# Patient Record
Sex: MALE | Race: BLACK OR AFRICAN AMERICAN | NOT HISPANIC OR LATINO | Employment: OTHER | URBAN - METROPOLITAN AREA
[De-identification: names, ages, dates, MRNs, and addresses within clinical notes are randomized per-mention and may not be internally consistent; named-entity substitution may affect disease eponyms.]

---

## 2017-01-12 ENCOUNTER — HOSPITAL ENCOUNTER (OUTPATIENT)
Dept: CARDIOLOGY | Facility: CLINIC | Age: 67
Discharge: HOME | End: 2017-01-12
Payer: MEDICARE

## 2017-01-12 DIAGNOSIS — I21.4 NON-ST ELEVATION MYOCARDIAL INFARCTION (NSTEMI): ICD-10-CM

## 2017-01-12 LAB — DIASTOLIC DYSFUNCTION: NO

## 2017-01-12 PROCEDURE — 94621 CARDIOPULM EXERCISE TESTING: CPT | Mod: S$GLB,,, | Performed by: INTERNAL MEDICINE

## 2017-01-18 ENCOUNTER — CLINICAL SUPPORT (OUTPATIENT)
Dept: CARDIAC REHAB | Facility: CLINIC | Age: 67
End: 2017-01-18
Payer: MEDICARE

## 2017-01-18 VITALS
SYSTOLIC BLOOD PRESSURE: 130 MMHG | RESPIRATION RATE: 16 BRPM | OXYGEN SATURATION: 99 % | DIASTOLIC BLOOD PRESSURE: 68 MMHG | HEART RATE: 79 BPM

## 2017-01-18 DIAGNOSIS — I25.10 CORONARY ATHEROSCLEROSIS OF UNSPECIFIED TYPE OF VESSEL, NATIVE OR GRAFT: ICD-10-CM

## 2017-01-18 DIAGNOSIS — I21.4 NSTEMI (NON-ST ELEVATED MYOCARDIAL INFARCTION): ICD-10-CM

## 2017-01-18 DIAGNOSIS — Z98.61 S/P PTCA (PERCUTANEOUS TRANSLUMINAL CORONARY ANGIOPLASTY): ICD-10-CM

## 2017-01-18 DIAGNOSIS — Z98.61 S/P PERCUTANEOUS TRANSLUMINAL CORONARY ANGIOPLASTY: ICD-10-CM

## 2017-01-18 PROCEDURE — 93798 PHYS/QHP OP CAR RHAB W/ECG: CPT | Mod: S$GLB,,, | Performed by: INTERNAL MEDICINE

## 2017-01-18 PROCEDURE — 99999 PR PBB SHADOW E&M-EST. PATIENT-LVL III: CPT | Mod: PBBFAC,,,

## 2017-01-18 NOTE — PROGRESS NOTES
Met with Chau Rodarte for orientation to Phase II cardiac rehab.      Weight: 249 lbs  BMI:  36.76  Abdominal girth: 49 inches  Body fat: 31.3%    Pt reports that he recently lost ~20 lbs. Pt was 268 lbs in November. Pt is now 249 lbs after being hospitalized for hear attack in December. Goal weight is 230 lbs.     Labs reviewed with patient. Patient confirms he is taking atorvastatin for cholesterol control.    Lab Results   Component Value Date    CHOL 110 (L) 01/12/2017     Lab Results   Component Value Date    HDL 25 (L) 01/12/2017     Lab Results   Component Value Date    LDLCALC 72.2 01/12/2017     Lab Results   Component Value Date    TRIG 64 01/12/2017     Lab Results   Component Value Date    CHOLHDL 22.7 01/12/2017         Lab Results   Component Value Date    GLUF 111 (H) 01/12/2017     Lab Results   Component Value Date    HGBA1C 8.2 (H) 01/12/2017       History of diabetes for twenty years and is followed by his PCP.  Diabetic medications currently taking include insulin.  Patient has a home glucometer and checks his glucose 1x/day in the morning.  Reports typical morning glucose ranges between  mg/dL.  Patient verbalizes understanding to bring home glucometer and check glucose pre and post each exercise session.  Per cardiac rehab protocols, patient's glucose must be between 90 and 270 mg/dL to exercise.  Patient denies any recent glucose levels less than 60 mg/dL or greater than 300 mg/dL.  Patient aware of signs and symptoms of hypoglycemia, reports his symptoms to be that he gets shaky.  Patient verbalizes importance of notifying rehab staff if symptoms of hypoglycemia occur while at cardiac rehab.  Abnormal labs will be reported by rehab staff.      Vitamins/supplements: Vitamin D    Patient eats 2 meals daily + 1 snack.  Pt's wife prepares meals at home.  [employer] food with Mrs Tao. Recently started using olive oil instead of cristco.  Denies use of a salt shaker at the table on prepared  foods. Dines out 1x/month at restaurants such as [employer] and "[employer]  Chooses fried foods 1x/month. Pt does not like seafood. Pt eats plenty of vegetables (salad, peppers). Beverages include crystal light, water, tea.  Alcohol: none.    24 hr diet recall:  B: 1 egg, spoonful of grits, 1/2 biscuit, 2 turkey sausage links  S: 1 hard boiled egg, crackers  D: Salad with grilled chicken    3-day food record given to patient to complete and return for nutritional assessment.  Will follow Chau Rodarte while enrolled in Phase II cardiac rehab and encourage compliance to 2 gram sodium, Mediterranean style eating.      Amy Dunbar MS, RD, LDN

## 2017-01-18 NOTE — PROGRESS NOTES
Patient here for Phase II Cardiac Rehab orientation.      Discussed QOL, Risk factors & goals with patient.  Head to toe nursing assessment done.  Vital signs obtained. Pt is wearing a life vest. Pt denies chest pain or shortness of breath. Pt has good pulses bilaterally in upper extremities. Bilateral pedal pulses present are a little fainter. No edema present in extremities.  Pt has been diabetic X 20 years feet examined and there are no ulcers or wounds. Pt instructed about monitoring blood glucose before and after exercises.    QOL:  Discussed Latoya & SF36 Questionnaire scores with patient. Pt states he has good coping skills and denies any overwhelming stress or anxiety.  Encouraged pt to seek help if problems occur.     RISK FACTORS:  The following risk factors are present:  diabetes, hyperlipidemia, hypertension, obesity, positive family history, sedentary lifestyle    GOALS:  Patient has set the following goals:  Weight loss    Discussed Cardiac Rehab program in depth with patient.  Medication list updated per patient & marked as reviewed.  Patient has been instructed to notify staff of any problems while attending rehab (ie: chest pain, shortness of breath, lightheadedness, dizziness).  Patient has been instructed to monitor blood pressure readings outside of rehab & to keep a daily log of the readings.  Patient verbalizes understanding.    Sean Hamm, RN  Cardiac Rehab Nurse

## 2017-01-18 NOTE — PROGRESS NOTES
Exercise:  Met with the patient for orientation.  Consent forms were signed, entry CPX test results were reviewed, proper attire and shoes were discussed, and strength assessment was performed.         Entry CPX test results included weight (249 lb), abdominal girth (49 in), BMI (36.76), and body composition (31.3%).  An estimated MET Level of 3.0 and a Peak VO2 of 13.3ml/kg/min (3.8 actual METS) were measured.  This places the patient in the high risk category.  Upon exit CPX an estimated MET Level of 3.9 will be desired to achieve the goal of a 30% improvement.     Based on entry CPX test, the target heart rate range for patient is rest + 20 bpm.  The patient will attend cardiac rehab class 3 times per week which will include both aerobic and resistance training which will be modified to fit limitations.  Aerobic exercise will be 30-60 minutes with a goal intensity of 12-15 on the RPE scale.  Resistance training will incorporate free weights 1-2 sets, 10-15 repetitions with a beginning weight of 5 to 8 pounds based on strength assessment.    There were some limitations noted by patient with walking due to some leg blockages.  The patient stated they are currently not exercising.  Patient was encouraged to begin exercising aerobically and to prepare to exercise at least two additional days per week outside of attending cardiac rehab class for a minimum of 30 minutes.  The patient stated understanding.      The patient will begin Cardiac Rehab on Monday, January 23 at 4:00pm.    Exercise prescription will be adjusted based on tolerance of exercise intensity by patient.    Devyn Lucero., CEP

## 2017-01-23 ENCOUNTER — CLINICAL SUPPORT (OUTPATIENT)
Dept: CARDIAC REHAB | Facility: CLINIC | Age: 67
End: 2017-01-23
Payer: MEDICARE

## 2017-01-23 PROCEDURE — 93798 PHYS/QHP OP CAR RHAB W/ECG: CPT | Mod: S$GLB,,, | Performed by: INTERNAL MEDICINE

## 2017-01-23 NOTE — PROGRESS NOTES
Patient here for  1st Phase II Cardiac rehab session.  Patient tolerated Nustep well without any problems, but on treadmill, had to intermittently start & stop treadmill due to right calf pain of 3-4/10.   Will continue to monitor patient.

## 2017-01-25 ENCOUNTER — DOCUMENTATION ONLY (OUTPATIENT)
Dept: CARDIAC REHAB | Facility: CLINIC | Age: 67
End: 2017-01-25

## 2017-01-25 DIAGNOSIS — I49.8 ARRHYTHMIA, ATRIAL: Primary | ICD-10-CM

## 2017-01-25 PROCEDURE — 93000 ELECTROCARDIOGRAM COMPLETE: CPT | Mod: S$GLB,,, | Performed by: INTERNAL MEDICINE

## 2017-01-25 NOTE — PROGRESS NOTES
Patient here for Phase II Cardiac rehab session.  Noted atrial flutter on monitor.  Dr. Sims was notified & review EKG strips from rehab.  Also had a 12 lead EKG done.  Patient denies any lightheadedness/dizziness/chest pain/shortness of breath.  Dr. Sims will notify Mr. Rodarte's cardiologist, Dr. Simpson of this information.  Patient did not exercise today.  Will follow up after Dr. Simpson reviews info on patient.  Patient has been instructed to seek attention via ER/911 in the event of any problems.  He verbalizes understanding.

## 2017-01-26 ENCOUNTER — TELEPHONE (OUTPATIENT)
Dept: CARDIOLOGY | Facility: CLINIC | Age: 67
End: 2017-01-26

## 2017-01-26 ENCOUNTER — OFFICE VISIT (OUTPATIENT)
Dept: ENDOCRINOLOGY | Facility: CLINIC | Age: 67
End: 2017-01-26
Payer: MEDICARE

## 2017-01-26 ENCOUNTER — INITIAL CONSULT (OUTPATIENT)
Dept: ELECTROPHYSIOLOGY | Facility: CLINIC | Age: 67
End: 2017-01-26
Payer: MEDICARE

## 2017-01-26 VITALS
HEIGHT: 69 IN | WEIGHT: 251.56 LBS | HEART RATE: 80 BPM | DIASTOLIC BLOOD PRESSURE: 80 MMHG | SYSTOLIC BLOOD PRESSURE: 116 MMHG | BODY MASS INDEX: 37.26 KG/M2

## 2017-01-26 VITALS
SYSTOLIC BLOOD PRESSURE: 132 MMHG | BODY MASS INDEX: 37.18 KG/M2 | HEIGHT: 69 IN | WEIGHT: 251 LBS | HEART RATE: 80 BPM | DIASTOLIC BLOOD PRESSURE: 76 MMHG

## 2017-01-26 DIAGNOSIS — I25.5 ISCHEMIC CARDIOMYOPATHY: Primary | ICD-10-CM

## 2017-01-26 DIAGNOSIS — E11.51 TYPE 2 DIABETES MELLITUS WITH DIABETIC PERIPHERAL ANGIOPATHY WITHOUT GANGRENE, WITHOUT LONG-TERM CURRENT USE OF INSULIN: Chronic | ICD-10-CM

## 2017-01-26 DIAGNOSIS — E11.42 TYPE 2 DIABETES MELLITUS WITH DIABETIC POLYNEUROPATHY, WITHOUT LONG-TERM CURRENT USE OF INSULIN: Chronic | ICD-10-CM

## 2017-01-26 DIAGNOSIS — I63.9 CRYPTOGENIC STROKE: Chronic | ICD-10-CM

## 2017-01-26 DIAGNOSIS — E66.9 OBESITY (BMI 30-39.9): Chronic | ICD-10-CM

## 2017-01-26 DIAGNOSIS — I50.23 ACUTE ON CHRONIC SYSTOLIC CHF (CONGESTIVE HEART FAILURE): ICD-10-CM

## 2017-01-26 DIAGNOSIS — I50.20 BENIGN HYPERTENSIVE HEART AND KIDNEY DISEASE WITH SYSTOLIC CHF, NYHA CLASS 2 AND CKD STAGE 3: ICD-10-CM

## 2017-01-26 DIAGNOSIS — I48.3 TYPICAL ATRIAL FLUTTER: Chronic | ICD-10-CM

## 2017-01-26 DIAGNOSIS — I10 ESSENTIAL HYPERTENSION: ICD-10-CM

## 2017-01-26 DIAGNOSIS — I25.5 ISCHEMIC CARDIOMYOPATHY: ICD-10-CM

## 2017-01-26 DIAGNOSIS — Z95.828 S/P FEMORAL-POPLITEAL BYPASS SURGERY: ICD-10-CM

## 2017-01-26 DIAGNOSIS — N18.30 BENIGN HYPERTENSIVE HEART AND KIDNEY DISEASE WITH SYSTOLIC CHF, NYHA CLASS 2 AND CKD STAGE 3: ICD-10-CM

## 2017-01-26 DIAGNOSIS — I25.10 CORONARY ARTERY DISEASE INVOLVING NATIVE CORONARY ARTERY OF NATIVE HEART WITHOUT ANGINA PECTORIS: ICD-10-CM

## 2017-01-26 DIAGNOSIS — I13.0 BENIGN HYPERTENSIVE HEART AND KIDNEY DISEASE WITH SYSTOLIC CHF, NYHA CLASS 2 AND CKD STAGE 3: ICD-10-CM

## 2017-01-26 DIAGNOSIS — E78.5 HYPERLIPIDEMIA, UNSPECIFIED HYPERLIPIDEMIA TYPE: ICD-10-CM

## 2017-01-26 DIAGNOSIS — E11.42 TYPE 2 DIABETES MELLITUS WITH DIABETIC POLYNEUROPATHY, WITHOUT LONG-TERM CURRENT USE OF INSULIN: Primary | Chronic | ICD-10-CM

## 2017-01-26 DIAGNOSIS — I49.8 ARRHYTHMIA, ATRIAL: ICD-10-CM

## 2017-01-26 PROCEDURE — 3075F SYST BP GE 130 - 139MM HG: CPT | Mod: S$GLB,,, | Performed by: INTERNAL MEDICINE

## 2017-01-26 PROCEDURE — 93000 ELECTROCARDIOGRAM COMPLETE: CPT | Mod: S$GLB,,, | Performed by: INTERNAL MEDICINE

## 2017-01-26 PROCEDURE — 1159F MED LIST DOCD IN RCRD: CPT | Mod: S$GLB,,, | Performed by: INTERNAL MEDICINE

## 2017-01-26 PROCEDURE — 1126F AMNT PAIN NOTED NONE PRSNT: CPT | Mod: S$GLB,,, | Performed by: INTERNAL MEDICINE

## 2017-01-26 PROCEDURE — 99999 PR PBB SHADOW E&M-EST. PATIENT-LVL III: CPT | Mod: PBBFAC,,, | Performed by: INTERNAL MEDICINE

## 2017-01-26 PROCEDURE — 1160F RVW MEDS BY RX/DR IN RCRD: CPT | Mod: S$GLB,,, | Performed by: INTERNAL MEDICINE

## 2017-01-26 PROCEDURE — 3074F SYST BP LT 130 MM HG: CPT | Mod: S$GLB,,, | Performed by: INTERNAL MEDICINE

## 2017-01-26 PROCEDURE — 3045F PR MOST RECENT HEMOGLOBIN A1C LEVEL 7.0-9.0%: CPT | Mod: S$GLB,,, | Performed by: INTERNAL MEDICINE

## 2017-01-26 PROCEDURE — 1157F ADVNC CARE PLAN IN RCRD: CPT | Mod: S$GLB,,, | Performed by: INTERNAL MEDICINE

## 2017-01-26 PROCEDURE — 3060F POS MICROALBUMINURIA REV: CPT | Mod: S$GLB,,, | Performed by: INTERNAL MEDICINE

## 2017-01-26 PROCEDURE — 3078F DIAST BP <80 MM HG: CPT | Mod: S$GLB,,, | Performed by: INTERNAL MEDICINE

## 2017-01-26 PROCEDURE — 3079F DIAST BP 80-89 MM HG: CPT | Mod: S$GLB,,, | Performed by: INTERNAL MEDICINE

## 2017-01-26 PROCEDURE — 99214 OFFICE O/P EST MOD 30 MIN: CPT | Mod: S$GLB,,, | Performed by: INTERNAL MEDICINE

## 2017-01-26 PROCEDURE — 2022F DILAT RTA XM EVC RTNOPTHY: CPT | Mod: S$GLB,,, | Performed by: INTERNAL MEDICINE

## 2017-01-26 PROCEDURE — 99205 OFFICE O/P NEW HI 60 MIN: CPT | Mod: S$GLB,,, | Performed by: INTERNAL MEDICINE

## 2017-01-26 PROCEDURE — 99499 UNLISTED E&M SERVICE: CPT | Mod: S$GLB,,, | Performed by: INTERNAL MEDICINE

## 2017-01-26 RX ORDER — WARFARIN SODIUM 5 MG/1
5 TABLET ORAL DAILY
Qty: 30 TABLET | Refills: 11 | Status: ON HOLD | OUTPATIENT
Start: 2017-01-26 | End: 2017-03-22 | Stop reason: HOSPADM

## 2017-01-26 RX ORDER — PANTOPRAZOLE SODIUM 20 MG/1
20 TABLET, DELAYED RELEASE ORAL DAILY
Qty: 30 TABLET | Refills: 11 | Status: ON HOLD | OUTPATIENT
Start: 2017-01-26 | End: 2017-03-22

## 2017-01-26 NOTE — LETTER
January 26, 2017      Meño Simpson MD  [address]  [facility] [address]           [facility] - Arrhythmia  [address]  [facility] [address]  Phone: [phone]  Fax: [phone]          Patient: Chau Rodarte   MR Number: [id number]   YOB: 1950   Date of Visit: 1/26/2017       Dear Dr. Meño Simpson:    Thank you for referring Chau Rodarte to me for evaluation. Attached you will find relevant portions of my assessment and plan of care.    If you have questions, please do not hesitate to call me. I look forward to following Chau Rodarte along with you.    Sincerely,    Cayden Moreno MD    Enclosure  CC:  No Recipients    If you would like to receive this communication electronically, please contact [email or web address] or [phone] to request more information on [employer] access.    For providers and/or their staff who would like to refer a patient to [facility], please contact us through our one-stop-shop provider referral line, [facility], at [phone].    If you feel you have received this communication in error or would no longer like to receive these types of communications, please e-mail [email or web address]

## 2017-01-26 NOTE — MR AVS SNAPSHOT
[facility] - Endo/Diab/Metab  [address]  [facility] [address]  Phone: [phone]  Fax: [phone]                  Chau Rodarte   2017 3:00 PM   Office Visit    Description:  Male : 1950   Provider:  Cony Saleem NP   Department:  [facility] Endo/Diab/Metab           Reason for Visit     Diabetes Mellitus           Diagnoses this Visit        Comments    Type 2 diabetes mellitus with diabetic polyneuropathy, without long-term current use of insulin    -  Primary            To Do List           Future Appointments        Provider Department Dept Phone    2017 4:00 PM REH[facility], [facility]ARDI[facility] - Cardiac Rehab [phone]    2017 4:00 PM RE[facility], CARDIAC [facility] - Cardiac Rehab [phone]    2017 4:00 PM REHAB, CARDIAC [facility] - Cardiac Rehab [phone]    2/3/2017 4:00 PM RE[facility], [facility]ARDI[facility] - Cardiac Rehab [phone]    2017 4:00 PM REHAB, CARDIAC [facility] - Cardiac Rehab [phone]      Goals (5 Years of Data)     None      Follow-Up and Disposition     Return in about 3 months (around 2017).      [facility]     [facility] - 24/7 Assistance  Registered nurses in the [facility] provide clinical advisement, health education, appointment booking, and other advisory services.  Call for this free service at [phone].             Medications           Message regarding Medications     Verify the changes and/or additions to your medication regime listed below are the same as discussed with your clinician today.  If any of these changes or additions are incorrect, please notify your healthcare provider.        STOP taking these medications     ondansetron (ZOFRAN) 4 MG tablet Take 1 tablet (4 mg total) by mouth 3 (three) times daily as needed for Nausea.    zolpidem (AMBIEN) 10 mg Tab Take 1 tablet (10 mg total) by mouth nightly as needed (insomnia).           Verify that the below list of  "medications is an accurate representation of the medications you are currently taking.  If none reported, the list may be blank. If incorrect, please contact your healthcare provider. Carry this list with you in case of emergency.           Current Medications     aspirin 81 MG Chew Take 1 tablet (81 mg total) by mouth once daily.    atorvastatin (LIPITOR) 80 MG tablet Take 1 tablet (80 mg total) by mouth once daily.    clopidogrel (PLAVIX) 75 mg tablet Take 1 tablet (75 mg total) by mouth once daily.    ergocalciferol (ERGOCALCIFEROL) 50,000 unit Cap Take 1 capsule (50,000 Units total) by mouth twice a week.    furosemide (LASIX) 40 MG tablet Take 1 tablet (40 mg total) by mouth once daily.    insulin detemir (LEVEMIR FLEXTOUCH) 100 unit/mL (3 mL) SubQ InPn pen Inject 16 Units into the skin every evening.    isosorbide-hydrALAZINE 20-37.5 mg (BIDIL) 20-37.5 mg Tab Take 1 tablet by mouth 3 (three) times daily.    liraglutide 0.6 mg/0.1 mL, 18 mg/3 mL, subq PNIJ (VICTOZA 2-SAGAR) 0.6 mg/0.1 mL (18 mg/3 mL) PnIj Inject 0.6 mg daily x1 week, then 1.2 mg daily x1 week, then 1.8 mg daily thereafter    metoprolol succinate (TOPROL-XL) 50 MG 24 hr tablet Take 1 tablet (50 mg total) by mouth once daily.    pen needle, diabetic (BD ULTRA-FINE HANG PEN NEEDLES) 32 gauge x 5/32" Ndle Uses 2 times daily, on  insulin injections           Clinical Reference Information           Vital Signs - Last Recorded  Most recent update: 1/26/2017  3:06 PM by Sara Bean MA    BP Pulse Ht Wt BMI    116/80 (BP Location: Left arm, Patient Position: Sitting, BP Method: Manual) 80 5' 9" (1.753 m) 114.1 kg (251 lb 8.7 oz) 37.15 kg/m2      Blood Pressure          Most Recent Value    BP  116/80      Allergies as of 1/26/2017     No Known Allergies      Immunizations Administered on Date of Encounter - 1/26/2017     None      Orders Placed During Today's Visit     Future Labs/Procedures Expected by Expires    Hemoglobin A1c  1/26/2017 " 3/27/2018    Renal function panel  1/26/2017 3/27/2018    TSH  1/26/2017 (Approximate) 1/21/2018    Vitamin D  1/26/2017 3/27/2018

## 2017-01-26 NOTE — PROGRESS NOTES
Subjective:    Patient ID:  Chau Rodarte is a 66 y.o. male who presents for evaluation of Atrial Flutter    Referring Cardiologists: Meño Gan MD, Yanick Holland MD and Robson Sims MD    HPI  I had the pleasure of seeing Mr. Rodarte today in our electrophysiology clinic in consultation for his newly discovered atrial arrhythmia.  As you are aware he is a pleasant 66 year-old man with extensive vascular disease including right femoral-popliteal bypass and left SFA stent, ischemic cardiomyopathy with prior LVEF of 35% and currently 20-25% s/p multivessel PCI 12/5/2016 (PCI to prox-LAD/LCX, distal LMCA and mid LAD) with NYHA class II symptoms, chronic kidney disease stage 3, cryptogenic stroke, hypertension and poorly controlled diabetes mellitus.  He was admitted in November of 2016 for progressive heart failure symptoms.  He was also found to have a NSTEMI in this setting.  He was diuresed and underwent multi-vessel PCI as stated, after turned down for surgery.  He was feeling better and has been undergoing cardiac rehab at the [facility].  When he arrived yesterday he was noted to not be in sinus rhythm on the monitor.  An electrocardiogram was performed which disclosed atrial flutter with variable AV conduction.  He had no significant symptoms and was referred here for further evaluation.  He has no history of palpitations.  He does report a stroke of unknown etiology in 2006.  His symptoms were facial droop, dysarthria and right sided weakness.  He has been on aspirin/plavix since.  He has never been diagnosed with an atrial arrhythmia.    I reviewed all available electrocardiograms in EPIC which disclose normal sinus rhythm until 1/25/2017 electrocardiogram which revealed atrial flutter with variable AV conduction (ventricular rate of 84 bpm).      11/28//2016 ECHO  CONCLUSIONS     1 - Concentric LVH with severely depressed left ventricular systolic function (EF 20-25%).     2 - Severe left atrial  enlargement.     3 - Left ventricular diastolic dysfunction.     4 - Normal right ventricular systolic function .     5 - Increased central venous pressure.     My interpretation of today's in clinic electrocardiogram is sinus rhythm with first degree AV block, rate of 80 bpm and AZ of 246.    Review of Systems   Constitution: Negative. Negative for weakness and malaise/fatigue.   HENT: Negative.  Negative for congestion and headaches.    Eyes: Negative.  Negative for blurred vision.   Cardiovascular: Positive for dyspnea on exertion (much improved). Negative for chest pain, irregular heartbeat, leg swelling, near-syncope, orthopnea, palpitations, paroxysmal nocturnal dyspnea and syncope.   Respiratory: Negative.  Negative for shortness of breath, sleep disturbances due to breathing and wheezing.    Endocrine: Negative.    Hematologic/Lymphatic: Negative.  Negative for bleeding problem. Does not bruise/bleed easily.   Skin: Negative.    Musculoskeletal: Negative.  Negative for arthritis.   Gastrointestinal: Negative.  Negative for abdominal pain, hematochezia and melena.   Genitourinary: Negative.    Neurological: Negative for excessive daytime sleepiness, dizziness, focal weakness, light-headedness and loss of balance.   Psychiatric/Behavioral: Negative.         Objective:    Physical Exam   Constitutional: He is oriented to person, place, and time. He appears well-developed and well-nourished. No distress.   HENT:   Head: Normocephalic and atraumatic.   Eyes: Conjunctivae are normal. Right eye exhibits no discharge. Left eye exhibits no discharge.   Neck: Neck supple. No JVD present.   Cardiovascular: Normal rate, regular rhythm and normal heart sounds.  Exam reveals no gallop and no friction rub.    No murmur heard.  Pulmonary/Chest: Effort normal and breath sounds normal. No respiratory distress. He has no wheezes. He has no rales.   Abdominal: Soft. Bowel sounds are normal. He exhibits no distension. There is  "no tenderness.   Musculoskeletal: Normal range of motion. He exhibits no edema or tenderness.   Neurological: He is alert and oriented to person, place, and time. No cranial nerve deficit.   Skin: Skin is warm and dry. No rash noted. He is not diaphoretic. No erythema. No pallor.   Psychiatric: He has a normal mood and affect. His behavior is normal. Judgment and thought content normal.   Vitals reviewed.        Assessment:       1. Ischemic cardiomyopathy    2. Typical atrial flutter    3. Essential hypertension    4. Coronary artery disease involving native coronary artery of native heart without angina pectoris    5. 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, NYHA class 2 and CKD stage 3    6. Type 2 diabetes mellitus with diabetic peripheral angiopathy without gangrene, without long-term current use of insulin    7. Type 2 diabetes mellitus with diabetic polyneuropathy, without long-term current use of insulin    8. Obesity (BMI 30-39.9)    9. S/P femoral-popliteal bypass surgery    10. Cryptogenic stroke         Plan:       In summary, Mr. Rodarte is a pleasant 66 year-old man with extensive vascular disease including right femoral-popliteal bypass and left SFA stent, ischemic cardiomyopathy with prior LVEF of 35% and currently 20-25% s/p multivessel PCI 12/5/2016 (PCI to prox-LAD/LCX, distal LMCA and mid LAD) with NYHA class II symptoms, chronic kidney disease stage 3, cryptogenic stroke, hypertension and poorly controlled diabetes mellitus who presents for evaluation of newly diagnosed typical counterclockwise atrial flutter.  He had no perceived symptoms at the time of diagnosis.  However his [id number] score is 7 carrying a 14% per year risk of stroke.  He has already had a stroke.  While he is certainly at risk for atrial fibrillation, this has never been seen in him.  I discussed atrial flutter, even if he does not "feel" it, may worsen his heart failure by loss of AV synchrony as well " as carries its stroke risk.  Since this is the only atrial arrhythmia we have seen in him I feel there is benefit to performing RFA of the cavotricuspid isthmus.  I spent about a half hour discussing the nature of EP study and ablation. We discussed risks and benefits at length. Our discussion included, but was not limited to the risk of death, infection, bleeding, stroke, MI, vascular injury, cardiac perforation, embolism, cardiac tamponade, skin burns, and other organic injury including the possibility for need for surgery or pacemaker implantation.  He and his wife understood and agreed.  They had no further questions regarding the procedure.  Consent signed.    I had a long discussion with the patient and his wife about the pathophysiology and risks of atrial flutter and its basic pathophysiology, including its health implications and treatment options with the patient. Specifically, I addressed the need for CVA (stroke) prophylaxis with aspirin versus oral anticoagulation (warfarin vs NOACs, discussed bleeding risks, irreversibility of NOACs vs Vitamin K/plasma reversal of warfarin, and need to come to the ER for any head trauma for CT scanning even if asymptomatic).  In the setting of DAPT for the new year, warfarin would be the preferred anti-coagulant.  Furthermore I would continue warfarin indefinitely as long as is tolerated as his stroke risk is very high and he is at risk for atrial fibrillation, even after CTI ablation.  He agrees.  Would also start gastric protection with pantoprazole.    With regard to his cardiomyopathy, will need a repeat ECHO 3 months post PCI.  He is on metoprolol and BiDil currently.  He is not on an ACE-i or ARB yet due to acute on chronic kidney injury it is not being started at the moment.  Should his EF remain less <35% then would offer ICD for primary prevention.      RFA of CTI  Carto  LIANE morning of, cancel if in NSR  INR target 2-3  Anesthesia/MAC for sedation    Thank  you for allowing me to participate in the care of this patient. Please do not hesitate to call me with any questions or concerns.    Cayden Moreno MD, PhD  Cardiac Electrophysiology

## 2017-01-26 NOTE — TELEPHONE ENCOUNTER
Referral made to see EP today at 3:40 PM with Dr. Cayden Moreno for further management of AFL. Patient notified of appointment and verbalized understanding.

## 2017-01-26 NOTE — MR AVS SNAPSHOT
[facility]  [address]  [facility] [address]  Phone: [phone]  Fax: [phone]                  Chau Rodarte   2017 3:40 PM   Initial consult    Description:  Male : 1950   Provider:  Cayden Moreno MD   Department:  [facility] Arr[facility]           Reason for Visit     Atrial Flutter           Diagnoses this Visit        Comments    Ischemic cardiomyopathy    -  Primary     Typical atrial flutter         Essential hypertension         Coronary artery disease involving native coronary artery of native heart without angina pectoris         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, NYHA class 2 and CKD stage 3         Type 2 diabetes mellitus with diabetic peripheral angiopathy without gangrene, without long-term current use of insulin         Type 2 diabetes mellitus with diabetic polyneuropathy, without long-term current use of insulin         Obesity (BMI 30-39.9)         S/P femoral-popliteal bypass surgery                To Do List           Future Appointments        Provider Department Dept Phone    2017 4:00 PM [facility] [phone]    2017 4:00 PM [facility] [phone]    2017 4:00 PM [facility] [phone]    2/3/2017 4:00 PM [facility], [facility] [phone]    2017 4:00 PM [facility], [facility] [phone]      Goals (5 Years of Data)     None       These Medications        Disp Refills Start End    pantoprazole (PROTONIX) 20 MG tablet 30 tablet 11 2017    Take 1 tablet (20 mg total) by mouth once daily. - Oral    Pharmacy: [facility]/pharmacy #[id number] - [place] [address] Ph #: [phone]       warfarin (COUMADIN) 5 MG tablet 30 tablet 11 2017    Take 1 tablet (5 mg total) by mouth Daily. - Oral    Pharmacy: [facility]/pharmacy #[id number] - [place] [address]  #: [phone]         [facility]     [facility] - 24/7 Assistance  Registered nurses in the [facility] provide clinical advisement, health education, appointment booking, and other advisory services.  Call for this free service at [phone].             Medications           Message regarding Medications     Verify the changes and/or additions to your medication regime listed below are the same as discussed with your clinician today.  If any of these changes or additions are incorrect, please notify your healthcare provider.        START taking these NEW medications        Refills    pantoprazole (PROTONIX) 20 MG tablet 11    Sig: Take 1 tablet (20 mg total) by mouth once daily.    Class: Normal    Route: Oral    warfarin (COUMADIN) 5 MG tablet 11    Sig: Take 1 tablet (5 mg total) by mouth Daily.    Class: Normal    Route: Oral           Verify that the below list of medications is an accurate representation of the medications you are currently taking.  If none reported, the list may be blank. If incorrect, please contact your healthcare provider. Carry this list with you in case of emergency.           Current Medications     aspirin 81 MG Chew Take 1 tablet (81 mg total) by mouth once daily.    atorvastatin (LIPITOR) 80 MG tablet Take 1 tablet (80 mg total) by mouth once daily.    clopidogrel (PLAVIX) 75 mg tablet Take 1 tablet (75 mg total) by mouth once daily.    ergocalciferol (ERGOCALCIFEROL) 50,000 unit Cap Take 1 capsule (50,000 Units total) by mouth twice a week.    furosemide (LASIX) 40 MG tablet Take 1 tablet (40 mg total) by mouth once daily.    insulin detemir (LEVEMIR FLEXTOUCH) 100 unit/mL (3 mL) SubQ InPn pen Inject 16 Units into the skin every evening.    isosorbide-hydrALAZINE 20-37.5 mg (BIDIL) 20-37.5 mg Tab Take 1 tablet by mouth 3 (three) times daily.    liraglutide 0.6 mg/0.1 mL, 18 mg/3 mL, subq PNIJ (VICTOZA 2-SAGAR) 0.6  "mg/0.1 mL (18 mg/3 mL) PnIj Inject 0.6 mg daily x1 week, then 1.2 mg daily x1 week, then 1.8 mg daily thereafter    metoprolol succinate (TOPROL-XL) 50 MG 24 hr tablet Take 1 tablet (50 mg total) by mouth once daily.    pantoprazole (PROTONIX) 20 MG tablet Take 1 tablet (20 mg total) by mouth once daily.    pen needle, diabetic (BD ULTRA-FINE HANG PEN NEEDLES) 32 gauge x 5/32" Ndle Uses 2 times daily, on  insulin injections    warfarin (COUMADIN) 5 MG tablet Take 1 tablet (5 mg total) by mouth Daily.           Clinical Reference Information           Vital Signs - Last Recorded  Most recent update: 1/26/2017  4:17 PM by Kike Grant MA    BP Pulse Ht Wt BMI    132/76 80 5' 9" (1.753 m) 113.9 kg (251 lb) 37.07 kg/m2      Blood Pressure          Most Recent Value    BP  132/76      Allergies as of 1/26/2017     No Known Allergies      Immunizations Administered on Date of Encounter - 1/26/2017     None      Orders Placed During Today's Visit      Normal Orders This Visit    Ambulatory consult to Anticoagulation Monitoring       "

## 2017-01-26 NOTE — PROGRESS NOTES
Chief Complaint: Diabetes Mellitus      HPI:  Chau Rodarte is 66 y.o. male here for the first time for diabetes management. He initially presented to the ER in 12/2016 with c/o progressively increasing shortness of breath, +NSTEMI with stent placement. Endocrinology was consulted on admission for a Hb A1c of 9.2%     The patient states diabetes was diagnosed at age 46     Currently, he denies nocturia, polyuria, unexplained weight loss or blurred vision.     Current diabetes medications include: Victoza 1.2 mg daily and Levemir 16 units at bedtime   Other medications tried: metformin, farxiga and glucotrol     Hypoglycemia: denies recent hypoglycemic episodes or symptoms     denies hospital admission related to diabetes.    Diabetes complications include: neuropathy, CKD     Last diabetic eye exam: July 2016  No evidence of retinopathy     Last podiatry exam: Tyra Hoover - outside podiatrist     reports history of numbness, tingling sensation to hands or feet     reports symptomatic CAD or CVD     24 hour dietary recall:   Breakfast: fried egg, turkey sausage, 4 ounces apple juice   Lunch: chicken salad sandwich on wheat, water   Dinner: 1/2 pork chop , 1 ear of boil corn, unsweet tea   Snacks: jello     SMBG: tests BG once daily   Personal review of log with fasting averages between  mg/dL     Diabetes education: Yes  Exercise: started cardio rehab on Monday 01/21/2017    ADA STANDARDS OF CARE:   ACE inhibitor or angiotension II receptor blocker: denies   Statin drug: Lipitor  Low dose ASA: yes, 81 mg daily   Dental exam: 6 months ago   Flu shot: yes 12/2016  Pneumonia vaccine: denies    Wears medic alert tag: No    Has Glucagon ER kit: No    Review of Systems   Constitutional: Negative for unexpected weight change.   Eyes: Negative for visual disturbance.   Respiratory: Negative for shortness of breath.    Cardiovascular: Negative for chest pain.   Gastrointestinal: Negative for abdominal pain,  constipation, diarrhea, nausea and vomiting.   Endocrine: Negative for polydipsia, polyphagia and polyuria.   Genitourinary: Negative for dysuria.   Musculoskeletal: Negative for myalgias.   Skin: Negative for wound.   Neurological: Negative for headaches.   Hematological: Does not bruise/bleed easily.   Psychiatric/Behavioral: Negative for sleep disturbance.       Physical Exam   Constitutional: He appears well-developed.   HENT:   Right Ear: External ear normal.   Left Ear: External ear normal.   Nose: Nose normal.   Hearing normal    Neck: No tracheal deviation present. No thyromegaly present.   Cardiovascular: Normal rate.    No murmur heard.  Pulses:       Dorsalis pedis pulses are 2+ on the right side, and 2+ on the left side.   Pulmonary/Chest: Effort normal and breath sounds normal.   Abdominal: Soft. He exhibits no mass.   No hernia noted   Musculoskeletal: He exhibits no edema.        Right foot: There is no deformity.        Left foot: There is no deformity.   Feet:   Right Foot:   Protective Sensation: 10 sites tested. 8 sites sensed.   Skin Integrity: Positive for callus and dry skin. Negative for ulcer, blister, skin breakdown, erythema or warmth.   Left Foot:   Protective Sensation: 10 sites tested. 8 sites sensed.   Skin Integrity: Positive for callus and dry skin. Negative for ulcer, blister, skin breakdown, erythema or warmth.   Neurological: He is alert. No cranial nerve deficit or sensory deficit. Coordination and gait normal.   Decreased vibratory sensation noted bilaterally      Skin: No rash noted.   No induration   injection sites are ok. No lipo hypertropthy or atrophy    +acanthosis and skin tags  No supraclavicular fulness  Pale thin striae  Normal proximal muscle strength     Psychiatric: He has a normal mood and affect. Judgment normal.   Vitals reviewed.      Labs:  Hemoglobin A1C   Date Value Ref Range Status   01/12/2017 8.2 (H) 4.5 - 6.2 % Final     Comment:     According to ADA  guidelines, hemoglobin A1C <7.0% represents  optimal control in non-pregnant diabetic patients.  Different  metrics may apply to specific populations.   Standards of Medical Care in Diabetes - 2016.  For the purpose of screening for the presence of diabetes:  <5.7%     Consistent with the absence of diabetes  5.7-6.4%  Consistent with increasing risk for diabetes   (prediabetes)  >or=6.5%  Consistent with diabetes  Currently no consensus exists for use of hemoglobin A1C  for diagnosis of diabetes for children.     11/28/2016 9.2 (H) 4.5 - 6.2 % Final     Comment:     According to ADA guidelines, hemoglobin A1C <7.0% represents  optimal control in non-pregnant diabetic patients.  Different  metrics may apply to specific populations.   Standards of Medical Care in Diabetes - 2016.  For the purpose of screening for the presence of diabetes:  <5.7%     Consistent with the absence of diabetes  5.7-6.4%  Consistent with increasing risk for diabetes   (prediabetes)  >or=6.5%  Consistent with diabetes  Currently no consensus exists for use of hemoglobin A1C  for diagnosis of diabetes for children.       Lab Results   Component Value Date    CHOL 110 (L) 01/12/2017    HDL 25 (L) 01/12/2017    LDLCALC 72.2 01/12/2017    TRIG 64 01/12/2017    CHOLHDL 22.7 01/12/2017     Lab Results   Component Value Date     12/20/2016    K 4.7 12/20/2016     12/20/2016    CO2 28 12/20/2016     (H) 12/20/2016    BUN 30 (H) 12/20/2016    CREATININE 2.0 (H) 12/20/2016    CALCIUM 9.5 12/20/2016    PROT 6.8 12/07/2016    ALBUMIN 2.9 (L) 12/07/2016    BILITOT 0.5 12/07/2016    ALKPHOS 82 12/07/2016    AST 34 12/07/2016    ALT 39 12/07/2016    ANIONGAP 11 12/20/2016    ESTGFRAFRICA 38.8 (A) 12/20/2016    EGFRNONAA 33.5 (A) 12/20/2016         Assessment and Plan:  1. Type 2 diabetes mellitus with diabetic polyneuropathy, without long-term current use of insulin  - good control per reported blood sugars   - discussed disease  progression, risks and complications of uncontrolled diabetes   - for now, continue current tx plan   - reinforced dietary and lifestyle modifications   - reviewed signs and symptoms of hypoglycemia along with appropriate treatment protocol   - discussed importance of appropriate footwear and daily foot inspections   - continue to smbg 2 times daily and bring log or meter to office visits   -     Vitamin D; Future; Expected date: 1/26/17  -     TSH; Future; Expected date: 1/26/17  -     Hemoglobin A1c; Future; Expected date: 1/26/17  -     Renal function panel; Future; Expected date: 1/26/17    2. Essential hypertension  - stable   - followed by Cardiology and PCP   - follow a low sodium diet   - encouraged exercise     3. Acute on chronic systolic CHF (congestive heart failure)  - followed by Cardiology     4. Ischemic cardiomyopathy  - followed by Cardiology     5. Hyperlipidemia, unspecified hyperlipidemia type  - on statin therapy   - discussed the importance of following a low sodium diet   - encouraged to continue exercise as tolerated     RTC in 3 months with labs prior   The patient is instructed to notify the office with BG concerns or questions

## 2017-01-27 ENCOUNTER — ANTI-COAG VISIT (OUTPATIENT)
Dept: CARDIOLOGY | Facility: CLINIC | Age: 67
End: 2017-01-27

## 2017-01-27 ENCOUNTER — TELEPHONE (OUTPATIENT)
Dept: CARDIAC REHAB | Facility: CLINIC | Age: 67
End: 2017-01-27

## 2017-01-27 DIAGNOSIS — Z79.01 LONG TERM (CURRENT) USE OF ANTICOAGULANTS: ICD-10-CM

## 2017-01-27 DIAGNOSIS — I48.3 TYPICAL ATRIAL FLUTTER: ICD-10-CM

## 2017-01-27 NOTE — PROGRESS NOTES
66 year-old man with extensive vascular disease including right femoral-popliteal bypass and left SFA stent, ischemic cardiomyopathy with prior LVEF of 35% and currently 20-25% s/p multivessel PCI 12/5/2016 (PCI to prox-LAD/LCX, distal LMCA and mid LAD) with NYHA class II symptoms, chronic kidney disease stage 3, cryptogenic stroke, hypertension and poorly controlled diabetes mellitus who presents for evaluation of newly diagnosed typical counterclockwise atrial flutter with Dr. Moreno 1/26. It was decided to pursue RFA of CTI and start on anticoagulation. Pt received Rx for warfarin 5mg. S/w pt and wife, they report pt is going to have labs 1/31 and ablation on 2/3. This is not yet documented in EPIC. I have sent a message to Dr. Moreno' staff for confirmation. We will have pt come in for INR and education 1/31.     Per Jaimee, they are planning preop labs 1/31, RFA 2/3

## 2017-01-29 DIAGNOSIS — I48.3 TYPICAL ATRIAL FLUTTER: Primary | ICD-10-CM

## 2017-01-30 NOTE — PROGRESS NOTES
"ABLATION EDUCATION CHECKLIST    1/31/17 - lab work  [facility]  You do not have to fast for this lab work!    2/3/17 @ 7 AM - Transesophageal Echocardiogram (LIANE) & Ablation  Report to Cardiology Waiting Room on 3rd floor of the Hospital    (Do not report to clinic)  Directions for Reporting to Cardiology Waiting Area in the Hospital  If you park in the Parking Garage:  Take elevators to the 2nd floor  Walk up ramp and turn right by Gold Elevators  Take elevator to the 3rd floor  Upon exiting the elevator, turn away from the clinic areas  Walk long joseph around to front of hospital to area with windows overlooking [facility]  Check in at Reception Desk  OR  If family is dropping you off:  Have them drop you off at the front of the Hospital  (Near the ER, where all the flags are hung).  Take the E elevators to the 3rd floor.  Check in at the Reception Desk in the waiting room.    Do not eat or drink anything after: 12 mn on the night before your procedure    Medications:   On the night prior to your procedure, only take 1/2 dose of insulin Levemir - 8 units total.   On the morning of your procedure, do not take victoza.   You may take your usual morning medications with a sip of water    You will be spending the night after your procedure  You will need someone to drive you home the day after your procedure.    Your pain during your procedure will be managed by the anesthesia team.     THE ABOVE INSTRUCTIONS WERE GIVEN TO THE PATIENT VERBALLY AND THEY VERBALIZED UNDERSTANDING.  THEY DO NOT REQUIRE ANY SPECIAL NEEDS AND DO NOT HAVE ANY LEARNING BARRIERS.    Any need to reschedule or cancel procedures, or any questions regarding your procedures should be addressed directly with the Arrhythmia Department Nurses at the following phone number: [phone]        "

## 2017-01-31 ENCOUNTER — LAB VISIT (OUTPATIENT)
Dept: LAB | Facility: HOSPITAL | Age: 67
End: 2017-01-31
Attending: INTERNAL MEDICINE
Payer: MEDICARE

## 2017-01-31 ENCOUNTER — TELEPHONE (OUTPATIENT)
Dept: CARDIOLOGY | Facility: CLINIC | Age: 67
End: 2017-01-31

## 2017-01-31 ENCOUNTER — ANTI-COAG VISIT (OUTPATIENT)
Dept: CARDIOLOGY | Facility: CLINIC | Age: 67
End: 2017-01-31
Payer: MEDICARE

## 2017-01-31 DIAGNOSIS — I48.3 TYPICAL ATRIAL FLUTTER: ICD-10-CM

## 2017-01-31 DIAGNOSIS — Z79.01 LONG TERM (CURRENT) USE OF ANTICOAGULANTS: Primary | ICD-10-CM

## 2017-01-31 LAB
ANION GAP SERPL CALC-SCNC: 10 MMOL/L
APTT BLDCRRT: 26.1 SEC
BASOPHILS # BLD AUTO: 0.04 K/UL
BASOPHILS NFR BLD: 0.4 %
BUN SERPL-MCNC: 26 MG/DL
CALCIUM SERPL-MCNC: 8.9 MG/DL
CHLORIDE SERPL-SCNC: 105 MMOL/L
CO2 SERPL-SCNC: 27 MMOL/L
CREAT SERPL-MCNC: 1.6 MG/DL
DIFFERENTIAL METHOD: ABNORMAL
EOSINOPHIL # BLD AUTO: 0.4 K/UL
EOSINOPHIL NFR BLD: 4.3 %
ERYTHROCYTE [DISTWIDTH] IN BLOOD BY AUTOMATED COUNT: 14.8 %
EST. GFR  (AFRICAN AMERICAN): 50.8 ML/MIN/1.73 M^2
EST. GFR  (NON AFRICAN AMERICAN): 43.9 ML/MIN/1.73 M^2
GLUCOSE SERPL-MCNC: 118 MG/DL
HCT VFR BLD AUTO: 35.9 %
HGB BLD-MCNC: 11.8 G/DL
INR PPP: 1.4
INR PPP: 1.4 (ref 2–3)
LYMPHOCYTES # BLD AUTO: 2.1 K/UL
LYMPHOCYTES NFR BLD: 22.6 %
MCH RBC QN AUTO: 29.1 PG
MCHC RBC AUTO-ENTMCNC: 32.9 %
MCV RBC AUTO: 89 FL
MONOCYTES # BLD AUTO: 0.9 K/UL
MONOCYTES NFR BLD: 9.3 %
NEUTROPHILS # BLD AUTO: 5.9 K/UL
NEUTROPHILS NFR BLD: 63.2 %
PLATELET # BLD AUTO: 276 K/UL
PMV BLD AUTO: 10 FL
POTASSIUM SERPL-SCNC: 4.1 MMOL/L
PROTHROMBIN TIME: 14.3 SEC
RBC # BLD AUTO: 4.05 M/UL
SODIUM SERPL-SCNC: 142 MMOL/L
WBC # BLD AUTO: 9.38 K/UL

## 2017-01-31 PROCEDURE — 85610 PROTHROMBIN TIME: CPT | Mod: QW,S$GLB,,

## 2017-01-31 PROCEDURE — 99211 OFF/OP EST MAY X REQ PHY/QHP: CPT | Mod: 25,S$GLB,,

## 2017-01-31 PROCEDURE — 36415 COLL VENOUS BLD VENIPUNCTURE: CPT

## 2017-01-31 PROCEDURE — 85025 COMPLETE CBC W/AUTO DIFF WBC: CPT

## 2017-01-31 PROCEDURE — 85730 THROMBOPLASTIN TIME PARTIAL: CPT

## 2017-01-31 PROCEDURE — 85610 PROTHROMBIN TIME: CPT

## 2017-01-31 PROCEDURE — 80048 BASIC METABOLIC PNL TOTAL CA: CPT

## 2017-01-31 NOTE — PROGRESS NOTES
INR increasing as expected after only 4 doses. We will boost dose alittle today due to plans for RFA later this week. We will f/u after RFA for further dose adjustments.     Educated pt on coumadin diet and when to call CC; handouts given on each. He generally doesn't eat high vit K foods already and so will avoid them. Pt has been taking coumadin in the AM. Pt advised to switch to PM dosing. He took 5mg this AM. We will have him take 2.5mg tonight then continue PM dosing.

## 2017-01-31 NOTE — TELEPHONE ENCOUNTER
Patient called about LIANE scheduled on 2/3/17 . Pre-procedural questions asked.  Denies swallowing issues and esophageal issues. Denies previous sedation/anesthesia problems. States does not snore or have sleep apnea.   Has dentures. Denies recent trauma/surgery/radiation therapy to head/neck/airway. States is able to move neck without difficulty. LIANE described to patient. Instructed NPO past midnight and to have a designated  to drive patient home after the procedures due to sedation being given. Instructed to report to   [facility] at 0700 am.  Verbalizes understanding. Questions answered.

## 2017-02-03 ENCOUNTER — SURGERY (OUTPATIENT)
Age: 67
End: 2017-02-03

## 2017-02-03 ENCOUNTER — HOSPITAL ENCOUNTER (OUTPATIENT)
Facility: HOSPITAL | Age: 67
Discharge: HOME | End: 2017-02-03
Attending: INTERNAL MEDICINE | Admitting: INTERNAL MEDICINE
Payer: MEDICARE

## 2017-02-03 ENCOUNTER — HOSPITAL ENCOUNTER (OUTPATIENT)
Dept: CARDIOLOGY | Facility: CLINIC | Age: 67
Discharge: HOME | End: 2017-02-03

## 2017-02-03 ENCOUNTER — ANESTHESIA (OUTPATIENT)
Dept: MEDSURG UNIT | Facility: HOSPITAL | Age: 67
End: 2017-02-03
Payer: MEDICARE

## 2017-02-03 ENCOUNTER — ANTI-COAG VISIT (OUTPATIENT)
Dept: CARDIOLOGY | Facility: CLINIC | Age: 67
End: 2017-02-03

## 2017-02-03 ENCOUNTER — ANESTHESIA EVENT (OUTPATIENT)
Dept: MEDSURG UNIT | Facility: HOSPITAL | Age: 67
End: 2017-02-03
Payer: MEDICARE

## 2017-02-03 VITALS
HEART RATE: 61 BPM | SYSTOLIC BLOOD PRESSURE: 167 MMHG | OXYGEN SATURATION: 96 % | BODY MASS INDEX: 37.18 KG/M2 | WEIGHT: 251 LBS | HEIGHT: 69 IN | DIASTOLIC BLOOD PRESSURE: 74 MMHG | RESPIRATION RATE: 20 BRPM | TEMPERATURE: 97 F

## 2017-02-03 DIAGNOSIS — I48.3 TYPICAL ATRIAL FLUTTER: Primary | Chronic | ICD-10-CM

## 2017-02-03 DIAGNOSIS — Z79.01 LONG TERM (CURRENT) USE OF ANTICOAGULANTS: ICD-10-CM

## 2017-02-03 DIAGNOSIS — I48.3 TYPICAL ATRIAL FLUTTER: ICD-10-CM

## 2017-02-03 DIAGNOSIS — I48.92 ATRIAL FLUTTER: ICD-10-CM

## 2017-02-03 LAB
INR PPP: 1.9
POCT GLUCOSE: 152 MG/DL (ref 70–110)
POCT GLUCOSE: 161 MG/DL (ref 70–110)
PROTHROMBIN TIME: 18.9 SEC

## 2017-02-03 PROCEDURE — 93621 COMP EP EVL L PAC&REC C SINS: CPT | Mod: 26,,, | Performed by: INTERNAL MEDICINE

## 2017-02-03 PROCEDURE — 93005 ELECTROCARDIOGRAM TRACING: CPT

## 2017-02-03 PROCEDURE — 63600175 PHARM REV CODE 636 W HCPCS: Performed by: NURSE ANESTHETIST, CERTIFIED REGISTERED

## 2017-02-03 PROCEDURE — 93653 COMPRE EP EVAL TX SVT: CPT | Mod: ,,, | Performed by: INTERNAL MEDICINE

## 2017-02-03 PROCEDURE — 93010 ELECTROCARDIOGRAM REPORT: CPT | Mod: ,,, | Performed by: INTERNAL MEDICINE

## 2017-02-03 PROCEDURE — 93010 ELECTROCARDIOGRAM REPORT: CPT | Mod: 76,,, | Performed by: INTERNAL MEDICINE

## 2017-02-03 PROCEDURE — 93613 INTRACARDIAC EPHYS 3D MAPG: CPT | Mod: ,,, | Performed by: INTERNAL MEDICINE

## 2017-02-03 PROCEDURE — 82962 GLUCOSE BLOOD TEST: CPT

## 2017-02-03 PROCEDURE — 85610 PROTHROMBIN TIME: CPT

## 2017-02-03 PROCEDURE — 37000009 HC ANESTHESIA EA ADD 15 MINS: Performed by: INTERNAL MEDICINE

## 2017-02-03 PROCEDURE — C1893 INTRO/SHEATH, FIXED,NON-PEEL: HCPCS

## 2017-02-03 PROCEDURE — 27200049 EP LAB PROCEDURE

## 2017-02-03 PROCEDURE — D9220A PRA ANESTHESIA: Mod: ANES,,, | Performed by: ANESTHESIOLOGY

## 2017-02-03 PROCEDURE — 25000003 PHARM REV CODE 250: Performed by: NURSE ANESTHETIST, CERTIFIED REGISTERED

## 2017-02-03 PROCEDURE — 25000003 PHARM REV CODE 250: Performed by: NURSE PRACTITIONER

## 2017-02-03 PROCEDURE — 25000003 PHARM REV CODE 250

## 2017-02-03 PROCEDURE — 37000008 HC ANESTHESIA 1ST 15 MINUTES: Performed by: INTERNAL MEDICINE

## 2017-02-03 PROCEDURE — D9220A PRA ANESTHESIA: Mod: CRNA,,, | Performed by: NURSE ANESTHETIST, CERTIFIED REGISTERED

## 2017-02-03 RX ORDER — NAPROXEN SODIUM 220 MG/1
81 TABLET, FILM COATED ORAL DAILY
Status: DISCONTINUED | OUTPATIENT
Start: 2017-02-04 | End: 2017-02-03 | Stop reason: HOSPADM

## 2017-02-03 RX ORDER — ATORVASTATIN CALCIUM 20 MG/1
80 TABLET, FILM COATED ORAL DAILY
Status: DISCONTINUED | OUTPATIENT
Start: 2017-02-04 | End: 2017-02-03 | Stop reason: HOSPADM

## 2017-02-03 RX ORDER — METOPROLOL SUCCINATE 50 MG/1
50 TABLET, EXTENDED RELEASE ORAL DAILY
Status: DISCONTINUED | OUTPATIENT
Start: 2017-02-04 | End: 2017-02-03 | Stop reason: HOSPADM

## 2017-02-03 RX ORDER — WARFARIN SODIUM 5 MG/1
5 TABLET ORAL DAILY
Status: DISCONTINUED | OUTPATIENT
Start: 2017-02-03 | End: 2017-02-03 | Stop reason: HOSPADM

## 2017-02-03 RX ORDER — IBUPROFEN 200 MG
16 TABLET ORAL
Status: DISCONTINUED | OUTPATIENT
Start: 2017-02-03 | End: 2017-02-03 | Stop reason: HOSPADM

## 2017-02-03 RX ORDER — CLOPIDOGREL BISULFATE 75 MG/1
75 TABLET ORAL DAILY
Status: DISCONTINUED | OUTPATIENT
Start: 2017-02-04 | End: 2017-02-03 | Stop reason: HOSPADM

## 2017-02-03 RX ORDER — MIDAZOLAM HYDROCHLORIDE 1 MG/ML
INJECTION, SOLUTION INTRAMUSCULAR; INTRAVENOUS
Status: DISCONTINUED | OUTPATIENT
Start: 2017-02-03 | End: 2017-02-03

## 2017-02-03 RX ORDER — IBUPROFEN 200 MG
24 TABLET ORAL
Status: DISCONTINUED | OUTPATIENT
Start: 2017-02-03 | End: 2017-02-03 | Stop reason: HOSPADM

## 2017-02-03 RX ORDER — SODIUM CHLORIDE 9 MG/ML
INJECTION, SOLUTION INTRAVENOUS CONTINUOUS
Status: DISCONTINUED | OUTPATIENT
Start: 2017-02-03 | End: 2017-02-03

## 2017-02-03 RX ORDER — GLUCAGON 1 MG
1 KIT INJECTION
Status: DISCONTINUED | OUTPATIENT
Start: 2017-02-03 | End: 2017-02-03 | Stop reason: HOSPADM

## 2017-02-03 RX ORDER — DEXMEDETOMIDINE HYDROCHLORIDE 100 UG/ML
INJECTION, SOLUTION INTRAVENOUS
Status: DISCONTINUED | OUTPATIENT
Start: 2017-02-03 | End: 2017-02-03

## 2017-02-03 RX ORDER — FUROSEMIDE 40 MG/1
40 TABLET ORAL DAILY
Status: DISCONTINUED | OUTPATIENT
Start: 2017-02-04 | End: 2017-02-03 | Stop reason: HOSPADM

## 2017-02-03 RX ORDER — ISOSORBIDE DINITRATE AND HYDRALAZINE HYDROCHLORIDE 37.5; 2 MG/1; MG/1
1 TABLET ORAL 3 TIMES DAILY
Status: DISCONTINUED | OUTPATIENT
Start: 2017-02-03 | End: 2017-02-03 | Stop reason: HOSPADM

## 2017-02-03 RX ORDER — INSULIN ASPART 100 [IU]/ML
0-5 INJECTION, SOLUTION INTRAVENOUS; SUBCUTANEOUS
Status: DISCONTINUED | OUTPATIENT
Start: 2017-02-03 | End: 2017-02-03 | Stop reason: HOSPADM

## 2017-02-03 RX ORDER — FENTANYL CITRATE 50 UG/ML
INJECTION, SOLUTION INTRAMUSCULAR; INTRAVENOUS
Status: DISCONTINUED | OUTPATIENT
Start: 2017-02-03 | End: 2017-02-03

## 2017-02-03 RX ORDER — PANTOPRAZOLE SODIUM 20 MG/1
20 TABLET, DELAYED RELEASE ORAL DAILY
Status: DISCONTINUED | OUTPATIENT
Start: 2017-02-04 | End: 2017-02-03 | Stop reason: HOSPADM

## 2017-02-03 RX ADMIN — MIDAZOLAM HYDROCHLORIDE 1 MG: 1 INJECTION INTRAMUSCULAR; INTRAVENOUS at 09:02

## 2017-02-03 RX ADMIN — FENTANYL CITRATE 50 MCG: 50 INJECTION, SOLUTION INTRAMUSCULAR; INTRAVENOUS at 09:02

## 2017-02-03 RX ADMIN — DEXMEDETOMIDINE HYDROCHLORIDE 0.5 MCG/KG/HR: 100 INJECTION, SOLUTION, CONCENTRATE INTRAVENOUS at 08:02

## 2017-02-03 RX ADMIN — MIDAZOLAM HYDROCHLORIDE 0.5 MG: 1 INJECTION INTRAMUSCULAR; INTRAVENOUS at 09:02

## 2017-02-03 RX ADMIN — SODIUM CHLORIDE, SODIUM GLUCONATE, SODIUM ACETATE, POTASSIUM CHLORIDE, MAGNESIUM CHLORIDE, SODIUM PHOSPHATE, DIBASIC, AND POTASSIUM PHOSPHATE: .53; .5; .37; .037; .03; .012; .00082 INJECTION, SOLUTION INTRAVENOUS at 08:02

## 2017-02-03 RX ADMIN — SODIUM CHLORIDE 1000 ML: 0.9 INJECTION, SOLUTION INTRAVENOUS at 08:02

## 2017-02-03 NOTE — H&P (VIEW-ONLY)
Subjective:    Patient ID:  Chau Rodarte is a 66 y.o. male who presents for evaluation of Atrial Flutter    Referring Cardiologists: Meño Gan MD, Yanick Holland MD and Robson Sims MD    HPI  I had the pleasure of seeing Mr. Rodarte today in our electrophysiology clinic in consultation for his newly discovered atrial arrhythmia.  As you are aware he is a pleasant 66 year-old man with extensive vascular disease including right femoral-popliteal bypass and left SFA stent, ischemic cardiomyopathy with prior LVEF of 35% and currently 20-25% s/p multivessel PCI 12/5/2016 (PCI to prox-LAD/LCX, distal LMCA and mid LAD) with NYHA class II symptoms, chronic kidney disease stage 3, cryptogenic stroke, hypertension and poorly controlled diabetes mellitus.  He was admitted in November of 2016 for progressive heart failure symptoms.  He was also found to have a NSTEMI in this setting.  He was diuresed and underwent multi-vessel PCI as stated, after turned down for surgery.  He was feeling better and has been undergoing cardiac rehab at the [facility].  When he arrived yesterday he was noted to not be in sinus rhythm on the monitor.  An electrocardiogram was performed which disclosed atrial flutter with variable AV conduction.  He had no significant symptoms and was referred here for further evaluation.  He has no history of palpitations.  He does report a stroke of unknown etiology in 2006.  His symptoms were facial droop, dysarthria and right sided weakness.  He has been on aspirin/plavix since.  He has never been diagnosed with an atrial arrhythmia.    I reviewed all available electrocardiograms in EPIC which disclose normal sinus rhythm until 1/25/2017 electrocardiogram which revealed atrial flutter with variable AV conduction (ventricular rate of 84 bpm).      11/28//2016 ECHO  CONCLUSIONS     1 - Concentric LVH with severely depressed left ventricular systolic function (EF 20-25%).     2 - Severe left atrial  enlargement.     3 - Left ventricular diastolic dysfunction.     4 - Normal right ventricular systolic function .     5 - Increased central venous pressure.     My interpretation of today's in clinic electrocardiogram is sinus rhythm with first degree AV block, rate of 80 bpm and DC of 246.    Review of Systems   Constitution: Negative. Negative for weakness and malaise/fatigue.   HENT: Negative.  Negative for congestion and headaches.    Eyes: Negative.  Negative for blurred vision.   Cardiovascular: Positive for dyspnea on exertion (much improved). Negative for chest pain, irregular heartbeat, leg swelling, near-syncope, orthopnea, palpitations, paroxysmal nocturnal dyspnea and syncope.   Respiratory: Negative.  Negative for shortness of breath, sleep disturbances due to breathing and wheezing.    Endocrine: Negative.    Hematologic/Lymphatic: Negative.  Negative for bleeding problem. Does not bruise/bleed easily.   Skin: Negative.    Musculoskeletal: Negative.  Negative for arthritis.   Gastrointestinal: Negative.  Negative for abdominal pain, hematochezia and melena.   Genitourinary: Negative.    Neurological: Negative for excessive daytime sleepiness, dizziness, focal weakness, light-headedness and loss of balance.   Psychiatric/Behavioral: Negative.         Objective:    Physical Exam   Constitutional: He is oriented to person, place, and time. He appears well-developed and well-nourished. No distress.   HENT:   Head: Normocephalic and atraumatic.   Eyes: Conjunctivae are normal. Right eye exhibits no discharge. Left eye exhibits no discharge.   Neck: Neck supple. No JVD present.   Cardiovascular: Normal rate, regular rhythm and normal heart sounds.  Exam reveals no gallop and no friction rub.    No murmur heard.  Pulmonary/Chest: Effort normal and breath sounds normal. No respiratory distress. He has no wheezes. He has no rales.   Abdominal: Soft. Bowel sounds are normal. He exhibits no distension. There is  "no tenderness.   Musculoskeletal: Normal range of motion. He exhibits no edema or tenderness.   Neurological: He is alert and oriented to person, place, and time. No cranial nerve deficit.   Skin: Skin is warm and dry. No rash noted. He is not diaphoretic. No erythema. No pallor.   Psychiatric: He has a normal mood and affect. His behavior is normal. Judgment and thought content normal.   Vitals reviewed.        Assessment:       1. Ischemic cardiomyopathy    2. Typical atrial flutter    3. Essential hypertension    4. Coronary artery disease involving native coronary artery of native heart without angina pectoris    5. 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, NYHA class 2 and CKD stage 3    6. Type 2 diabetes mellitus with diabetic peripheral angiopathy without gangrene, without long-term current use of insulin    7. Type 2 diabetes mellitus with diabetic polyneuropathy, without long-term current use of insulin    8. Obesity (BMI 30-39.9)    9. S/P femoral-popliteal bypass surgery    10. Cryptogenic stroke         Plan:       In summary, Mr. Rodarte is a pleasant 66 year-old man with extensive vascular disease including right femoral-popliteal bypass and left SFA stent, ischemic cardiomyopathy with prior LVEF of 35% and currently 20-25% s/p multivessel PCI 12/5/2016 (PCI to prox-LAD/LCX, distal LMCA and mid LAD) with NYHA class II symptoms, chronic kidney disease stage 3, cryptogenic stroke, hypertension and poorly controlled diabetes mellitus who presents for evaluation of newly diagnosed typical counterclockwise atrial flutter.  He had no perceived symptoms at the time of diagnosis.  However his [id number] score is 7 carrying a 14% per year risk of stroke.  He has already had a stroke.  While he is certainly at risk for atrial fibrillation, this has never been seen in him.  I discussed atrial flutter, even if he does not "feel" it, may worsen his heart failure by loss of AV synchrony as well " as carries its stroke risk.  Since this is the only atrial arrhythmia we have seen in him I feel there is benefit to performing RFA of the cavotricuspid isthmus.  I spent about a half hour discussing the nature of EP study and ablation. We discussed risks and benefits at length. Our discussion included, but was not limited to the risk of death, infection, bleeding, stroke, MI, vascular injury, cardiac perforation, embolism, cardiac tamponade, skin burns, and other organic injury including the possibility for need for surgery or pacemaker implantation.  He and his wife understood and agreed.  They had no further questions regarding the procedure.  Consent signed.    I had a long discussion with the patient and his wife about the pathophysiology and risks of atrial flutter and its basic pathophysiology, including its health implications and treatment options with the patient. Specifically, I addressed the need for CVA (stroke) prophylaxis with aspirin versus oral anticoagulation (warfarin vs NOACs, discussed bleeding risks, irreversibility of NOACs vs Vitamin K/plasma reversal of warfarin, and need to come to the ER for any head trauma for CT scanning even if asymptomatic).  In the setting of DAPT for the new year, warfarin would be the preferred anti-coagulant.  Furthermore I would continue warfarin indefinitely as long as is tolerated as his stroke risk is very high and he is at risk for atrial fibrillation, even after CTI ablation.  He agrees.  Would also start gastric protection with pantoprazole.    With regard to his cardiomyopathy, will need a repeat ECHO 3 months post PCI.  He is on metoprolol and BiDil currently.  He is not on an ACE-i or ARB yet due to acute on chronic kidney injury it is not being started at the moment.  Should his EF remain less <35% then would offer ICD for primary prevention.      RFA of CTI  Carto  LIANE morning of, cancel if in NSR  INR target 2-3  Anesthesia/MAC for sedation    Thank  you for allowing me to participate in the care of this patient. Please do not hesitate to call me with any questions or concerns.    Cayden Moreno MD, PhD  Cardiac Electrophysiology

## 2017-02-03 NOTE — DISCHARGE INSTRUCTIONS
Continue coumadin dosing per coumadin clinic instructions.      1. Do not strain or lift anything greater than 10 lb for 1 week.   2. Do not drive or operate any dangerous machinery for 24 hours.   3. Keep the dressing on, clean, and dry for 24 hours.   4. After 24 hours, the dressing may be removed and a shower is allowed.   5. Clean the area with mild soap and water.   6. Once the skin has healed (1 week), bathing in a tub or swimming is allowed.   7. Inspect the groin site daily and report to the physician any signs of infection at the site: redness, pain, fever >100.4, unusual pain at the   access site or affected extremity, unusual swelling at the access site, or any yellow, white or green drainage.  Call 911 if you have:   Bleeding from the puncture site that you cannot stop by doing the following:   Relax and lie down right away. Keep your leg flat and apply firm pressure to the site using your fingers and a gauze pad. Keep the pressure on for 10 minutes. Continue this until the bleeding stops. This may take awhile. When bleeding stops, cover the site with a sterile bandage and keep your leg still as much as possible.

## 2017-02-03 NOTE — ANESTHESIA RELEASE NOTE
"Anesthesia Release from PACU Note    Patient: Chau Rodarte    Procedure(s) Performed: Procedure(s) (LRB):  ABLATION (N/A)  TRANSESOPHAGEAL ECHOCARDIOGRAM (LIANE) (N/A)    Anesthesia type: general    Post pain: Adequate analgesia    Post assessment: no apparent anesthetic complications, tolerated procedure well and no evidence of recall    Last Vitals:   Visit Vitals    BP (!) 116/55    Pulse (!) 58    Temp 36.7 °C (98.1 °F) (Axillary)    Resp 12    Ht 5' 9" (1.753 m)    Wt 113.9 kg (251 lb)    SpO2 96%    BMI 37.07 kg/m2       Post vital signs: stable    Level of consciousness: awake, alert  and oriented    Nausea/Vomiting: no nausea/no vomiting    Complications: none    Airway Patency: patent    Respiratory: unassisted    Cardiovascular: stable and blood pressure at baseline    Hydration: euvolemic  "

## 2017-02-03 NOTE — PROCEDURES
Cardiac Electrophysiology/RFA of CTI for Atrial Flutter Procedure Note:    Procedure Date: 2/3/2017  Procedure Name: RFA of CTI for typical atrial flutter  Procedure Indication: atrial flutter with history of cryptogenic stroke     : Cayden Moreno MD, PhD    Procedure Details: The patient's bilateral groins were prepped and draped in usual sterile fashion.   Time out was performed.  Right groin area overlying femoral veins were identified and anesthetized with 10cc of 1% lidocaine. Using US guidance, right CFV was accessed with needle and wire.  However there was abundant scar tissue due to prior surgery.  Needle and wire were removed and local pressure applied for 5 minutes.  Next the left groin skin overlying the left CVF was anesthetized with 10cc of 1% lidocaine. Once the area was anesthetized properly 1 8.5Fr sheath and 2 7.5Fr sheaths were introduced into the left CFV using US guidance. Right atrial Halo and coronary sinus catheters were introduced into the appropriate position. 8.5Fr sheath was exchanged for a SR0 sheath.  Presenting rhythm was sinus rhythm.  We elected to not attempt to induce any arrhythmias as he has a depressed EF and did not want to prolong the case.  His ECG was consistent with typical counterclockwise CTI dependent flutter.  Using CARTO mapping system and an 8mm non-irrigated ablation catheter ablation lesions were applied in a linear fashion across the CTI.  Differential pacing and right atrial activation pattern were criteria used to confirm bidirectional CTI block.  These maneuvers were repeated 20 minutes later confirming block persistence.  Catheters were removed.  Sheaths were removed and manual pressure was held for 20 minutes with hemostasis achieved.  Groin site was covered with sterile gauze and tegaderm bandage. There were no immediate complications.    Estimated blood loss: <50cc    Plan:   Post-groin access and sedation monitoring  Continue coumadin, INR  2-3    Cayden Moreno MD, PhD  Cardiac Electrophysiology Fellow

## 2017-02-03 NOTE — PROGRESS NOTES
Cardiac Electrophysiology  RFA of CTI for Atrial Flutter    Pt seen in recovery room, VSS. Left groin with no bleeding.       PLAN:  1. Monitor overnight on telemetry   2. Post-groin access monitoring ( 4 hours bedrest)   3. Sedation monitoring.  4. Resume home coumadin at 5 mg   5. Resume home medications     CHIN Calderon-BC  Cardiology Electrophysiology  NP   [facility]    Attending: MD Cayden Rodarte

## 2017-02-03 NOTE — PLAN OF CARE
Problem: Patient Care Overview  Goal: Plan of Care Review  Outcome: Ongoing (interventions implemented as appropriate)  Received report from CHUNG Alvarado. Patient s/p RFA, AAOx3. VSS, no c/o pain or discomfort at this time, resp even and unlabored. Gauze/tegaderm dressing to L groin is CDI. No active bleeding. No hematoma noted. Post procedure protocol reviewed with patient and patient's family. Understanding verbalized. Family members at bedside. Nurse call bell within reach. Will continue to monitor per post procedure protocol.

## 2017-02-03 NOTE — PROGRESS NOTES
Pt DC'd per MD order. Discharge instructions given including activity, wound care, S&S of infections, future appointments, and when to call MD. Medications reviewed including when to take next dose. PIV DC'd, catheter tip intact. Pt left unit via wheelchair with family.

## 2017-02-03 NOTE — INTERVAL H&P NOTE
The patient has been examined and the H&P has been reviewed:      No acute changes since previous visit  EP Clinic visit, no acute symptoms at present.      * Typical atrial flutter  - RFA of CTI  - Carto  - LIANE cancelled given patient in NSR   - Anesthesia/MAC for sedation    CHIN Calderon-BC  Cardiology Electrophysiology  NP   [facility]    Attending:  MD Cayden Moreno

## 2017-02-03 NOTE — DISCHARGE SUMMARY
[facility]  Discharge Note  Short Stay    Admit Date: 2/3/2017    Discharge Date and Time: 2/3/2017    Attending Physician: Cayden Moreno MD     Discharge Provider: Cayden Moreno    Diagnoses:  Active Hospital Problems    Diagnosis  POA    *Typical atrial flutter [I48.3]  Yes     Chronic    Atrial flutter [I48.92]  Yes    Long term (current) use of anticoagulants [Z79.01] [Z79.01]  Not Applicable    Cryptogenic stroke [I63.9]  Yes     Chronic    Obesity (BMI 30-39.9) [E66.9]  Yes     Chronic    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, NYHA class 2 and CKD stage 3 [I13.0, I50.20, N18.3]  Yes    HLD (hyperlipidemia) [E78.5]  Yes    S/P femoral-popliteal bypass surgery [Z95.828]  Not Applicable     Right leg        Resolved Hospital Problems    Diagnosis Date Resolved POA   No resolved problems to display.       Discharged Condition: good    Hospital Course: Patient was admitted for an outpatient procedure and tolerated the procedure well with no complications.    Final Diagnoses: Same as principal problem.    Disposition: Home or Self Care    Follow up/Patient Instructions:    Medications:  Reconciled Home Medications:   Current Discharge Medication List      CONTINUE these medications which have NOT CHANGED    Details   aspirin 81 MG Chew Take 1 tablet (81 mg total) by mouth once daily.  Refills: 0      atorvastatin (LIPITOR) 80 MG tablet Take 1 tablet (80 mg total) by mouth once daily.  Qty: 90 tablet, Refills: 3      clopidogrel (PLAVIX) 75 mg tablet Take 1 tablet (75 mg total) by mouth once daily.  Qty: 90 tablet, Refills: 3      ergocalciferol (ERGOCALCIFEROL) 50,000 unit Cap Take 1 capsule (50,000 Units total) by mouth twice a week.  Qty: 24 capsule, Refills: 1    Associated Diagnoses: Vitamin D deficiency      furosemide (LASIX) 40 MG tablet Take 1 tablet (40 mg total) by mouth once daily.  Qty: 30 tablet, Refills: 11      insulin detemir (LEVEMIR FLEXTOUCH) 100  "unit/mL (3 mL) SubQ InPn pen Inject 16 Units into the skin every evening.  Qty: 1 Box, Refills: 3      isosorbide-hydrALAZINE 20-37.5 mg (BIDIL) 20-37.5 mg Tab Take 1 tablet by mouth 3 (three) times daily.  Qty: 90 tablet, Refills: 11      liraglutide 0.6 mg/0.1 mL, 18 mg/3 mL, subq PNIJ (VICTOZA 2-SAGAR) 0.6 mg/0.1 mL (18 mg/3 mL) PnIj Inject 0.6 mg daily x1 week, then 1.2 mg daily x1 week, then 1.8 mg daily thereafter  Qty: 9 mL, Refills: 3    Associated Diagnoses: Type 2 diabetes mellitus without complication, with long-term current use of insulin      metoprolol succinate (TOPROL-XL) 50 MG 24 hr tablet Take 1 tablet (50 mg total) by mouth once daily.  Qty: 30 tablet, Refills: 11      pantoprazole (PROTONIX) 20 MG tablet Take 1 tablet (20 mg total) by mouth once daily.  Qty: 30 tablet, Refills: 11    Associated Diagnoses: Typical atrial flutter      pen needle, diabetic (BD ULTRA-FINE HANG PEN NEEDLES) 32 gauge x 5/32" Ndle Uses 2 times daily, on  insulin injections  Qty: 180 each, Refills: 4      warfarin (COUMADIN) 5 MG tablet Take 1 tablet (5 mg total) by mouth Daily.  Qty: 30 tablet, Refills: 11    Associated Diagnoses: Typical atrial flutter             Discharge Procedure Orders  Diet general   Order Specific Question Answer Comments   Total calories: 2000 Calorie    Additional restrictions: Cardiac (Low Na/Chol)      Lifting restrictions   Order Comments: No lifting objects heavier than 10 pounds for 1 week     Call MD for:  temperature >100.4     Call MD for:  persistent nausea and vomiting or diarrhea     Call MD for:  severe uncontrolled pain     Call MD for:  redness, tenderness, or signs of infection (pain, swelling, redness, odor or green/yellow discharge around incision site)     Call MD for:  difficulty breathing or increased cough     Call MD for:  severe persistent headache     Call MD for:  worsening rash     Call MD for:  persistent dizziness, light-headedness, or visual disturbances     Call MD " for:  increased confusion or weakness     Remove dressing in 24 hours       Follow-up Information     Follow up with Cayden Moreno MD In 4 weeks.    Specialties:  Cardiology, Electrophysiology    Contact information:    [address]  [facility] [address] [phone]            Discharge Procedure Orders (must include Diet, Follow-up, Activity):    Discharge Procedure Orders (must include Diet, Follow-up, Activity)  Diet general   Order Specific Question Answer Comments   Total calories: 2000 Calorie    Additional restrictions: Cardiac (Low Na/Chol)      Lifting restrictions   Order Comments: No lifting objects heavier than 10 pounds for 1 week     Call MD for:  temperature >100.4     Call MD for:  persistent nausea and vomiting or diarrhea     Call MD for:  severe uncontrolled pain     Call MD for:  redness, tenderness, or signs of infection (pain, swelling, redness, odor or green/yellow discharge around incision site)     Call MD for:  difficulty breathing or increased cough     Call MD for:  severe persistent headache     Call MD for:  worsening rash     Call MD for:  persistent dizziness, light-headedness, or visual disturbances     Call MD for:  increased confusion or weakness     Remove dressing in 24 hours

## 2017-02-03 NOTE — ANESTHESIA PREPROCEDURE EVALUATION
02/03/2017  Chau Rodarte is a 66 y.o., male.    [facility] Anesthesia Evaluation    I have reviewed the Patient Summary Reports.    I have reviewed the Nursing Notes.      Review of Systems  Anesthesia Hx:  No problems with previous Anesthesia    Social:  Non-Smoker    Hematology/Oncology:  Hematology Normal   Oncology Normal     Cardiovascular:   Hypertension, well controlled Past MI CAD   CHF    Pulmonary:  Pulmonary Normal    Renal/:   Chronic Renal Disease    Hepatic/GI:  Hepatic/GI Normal    Musculoskeletal:  Musculoskeletal Normal    OB/GYN/PEDS:  Legal Guardian is Mother , birth was Full Term Denies Developmental Delay Denies Anomilies    Neurological:  Neurology Normal    Endocrine:   Diabetes, well controlled    Dermatological:  Skin Normal        Physical Exam  General:  Obesity, Well nourished    Airway/Jaw/Neck:  Airway Findings: Mouth Opening: Normal Tongue: Normal  General Airway Assessment: Adult  Mallampati: II  Improves to II with phonation.  TM Distance: Normal, at least 6 cm      Dental:  Dental Findings: Upper Dentures, lower partial dentures   Chest/Lungs:  Chest/Lungs Findings: Clear to auscultation     Heart/Vascular:  Heart Findings: Rate: Normal  Rhythm: Regular Rhythm  Sounds: Normal        Mental Status:  Mental Status Findings:  Cooperative, Alert and Oriented         Anesthesia Plan  Type of Anesthesia, risks & benefits discussed:  Anesthesia Type:  general  Patient's Preference:   Intra-op Monitoring Plan:   Intra-op Monitoring Plan Comments:   Post Op Pain Control Plan:   Post Op Pain Control Plan Comments:   Induction:   IV  Beta Blocker:  Patient is on a Beta-Blocker and has received one dose within the past 24 hours (No further documentation required).       Informed Consent: Patient understands risks and agrees with Anesthesia plan.  Questions answered. Anesthesia consent signed  with patient.  ASA Score: 4     Day of Surgery Review of History & Physical:            Ready For Surgery From Anesthesia Perspective.     Pre-operative evaluation for ABLATION (N/A), TRANSESOPHAGEAL ECHOCARDIOGRAM (LIANE) (N/A)    Chief Complaint:    PMH:    Past Surgical History   Procedure Laterality Date    Left leg stent      Right leg bypass      Foot nerve graft  2013         Vital Signs Range (Last 24H):  Temp:  [36.6 °C (97.9 °F)]   Pulse:  [72]   Resp:  [16]   BP: (130-132)/(61-65)   SpO2:  [98 %]       CBC:     Recent Labs  Lab 17  0720 17  1613   WBC 8.38 9.38   RBC 4.14* 4.05*   HGB 12.1* 11.8*   HCT 35.9* 35.9*    276   MCV 87 89   MCH 29.2 29.1   MCHC 33.7 32.9       CMP:   Recent Labs  Lab 17  1613      K 4.1      CO2 27   BUN 26*   CREATININE 1.6*   *   CALCIUM 8.9       INR:    Recent Labs  Lab 17  1549 17  1613   INR 1.4 1.4*   APTT  --  26.1         Diagnostic Studies:      [facility] Echo:    Patient Active Problem List   Diagnosis    HLD (hyperlipidemia)    S/P femoral-popliteal bypass surgery    HTN (hypertension)    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, NYHA class 2 and CKD stage 3    NSTEMI (non-ST elevated myocardial infarction)    Type 2 diabetes mellitus with diabetic polyneuropathy, without long-term current use of insulin    Acute on chronic systolic CHF (congestive heart failure)    Obesity (BMI 30-39.9)    Cardiomegaly    Acute on chronic renal failure    Coronary artery disease involving native coronary artery of native heart without angina pectoris    Ischemic cardiomyopathy    Typical atrial flutter    Cryptogenic stroke    Long term (current) use of anticoagulants [Z79.01]    Atrial flutter

## 2017-02-03 NOTE — IP AVS SNAPSHOT
[facility]  [address]  [facility] [address]  Phone: [phone]           Patient Discharge Instructions     Our goal is to set you up for success. This packet includes information on your condition, medications, and your home care. It will help you to care for yourself so you don't get sicker and need to go back to the hospital.     Please ask your nurse if you have any questions.        There are many details to remember when preparing to leave the hospital. Here is what you will need to do:    1. Take your medicine. If you are prescribed medications, review your Medication List in the following pages. You may have new medications to  at the pharmacy and others that you'll need to stop taking. Review the instructions for how and when to take your medications. Talk with your doctor or nurses if you are unsure of what to do.     2. Go to your follow-up appointments. Specific follow-up information is listed in the following pages. Your may be contacted by a transition nurse or clinical provider about future appointments. Be sure we have all of the phone numbers to reach you, if needed. Please contact your provider's office if you are unable to make an appointment.     3. Watch for warning signs. Your doctor or nurse will give you detailed warning signs to watch for and when to call for assistance. These instructions may also include educational information about your condition. If you experience any of warning signs to your health, call your doctor.               [facility]  Unless otherwise directed by your provider, please contact [facility], our nurse care line that is available for 24/7 assistance.     [phone] (toll-free)    Registered nurses in the [facility] provide clinical advisement, health education, appointment booking, and other advisory services.                    ** Verify the list of medication(s) below is accurate and up  to date. Carry this with you in case of emergency. If your medications have changed, please notify your healthcare provider.             Medication List      CHANGE how you take these medications        Additional Info                      liraglutide 0.6 mg/0.1 mL (18 mg/3 mL) subq PNIJ 0.6 mg/0.1 mL (18 mg/3 mL) Pnij   Commonly known as:  VICTOZA 2-SAGAR   Quantity:  9 mL   Refills:  3   What changed:    - how much to take  - how to take this  - when to take this  - additional instructions    Instructions:  Inject 0.6 mg daily x1 week, then 1.2 mg daily x1 week, then 1.8 mg daily thereafter     Begin Date    AM    Noon    PM    Bedtime         CONTINUE taking these medications        Additional Info                      aspirin 81 MG Chew   Refills:  0   Dose:  81 mg    Instructions:  Take 1 tablet (81 mg total) by mouth once daily.     Begin Date    AM    Noon    PM    Bedtime       atorvastatin 80 MG tablet   Commonly known as:  LIPITOR   Quantity:  90 tablet   Refills:  3   Dose:  80 mg    Instructions:  Take 1 tablet (80 mg total) by mouth once daily.     Begin Date    AM    Noon    PM    Bedtime       clopidogrel 75 mg tablet   Commonly known as:  PLAVIX   Quantity:  90 tablet   Refills:  3   Dose:  75 mg    Instructions:  Take 1 tablet (75 mg total) by mouth once daily.     Begin Date    AM    Noon    PM    Bedtime       ergocalciferol 50,000 unit Cap   Commonly known as:  ERGOCALCIFEROL   Quantity:  24 capsule   Refills:  1   Dose:  19829 Units    Instructions:  Take 1 capsule (50,000 Units total) by mouth twice a week.     Begin Date    AM    Noon    PM    Bedtime       furosemide 40 MG tablet   Commonly known as:  LASIX   Quantity:  30 tablet   Refills:  11   Dose:  40 mg    Instructions:  Take 1 tablet (40 mg total) by mouth once daily.     Begin Date    AM    Noon    PM    Bedtime       insulin detemir 100 unit/mL (3 mL) Inpn pen   Commonly known as:  LEVEMIR FLEXTOUCH   Quantity:  1 Box   Refills:  3  "  Dose:  16 Units    Instructions:  Inject 16 Units into the skin every evening.     Begin Date    AM    Noon    PM    Bedtime       isosorbide-hydrALAZINE 20-37.5 mg 20-37.5 mg Tab   Commonly known as:  BIDIL   Quantity:  90 tablet   Refills:  11   Dose:  1 tablet    Instructions:  Take 1 tablet by mouth 3 (three) times daily.     Begin Date    AM    Noon    PM    Bedtime       metoprolol succinate 50 MG 24 hr tablet   Commonly known as:  TOPROL-XL   Quantity:  30 tablet   Refills:  11   Dose:  50 mg    Instructions:  Take 1 tablet (50 mg total) by mouth once daily.     Begin Date    AM    Noon    PM    Bedtime       pantoprazole 20 MG tablet   Commonly known as:  PROTONIX   Quantity:  30 tablet   Refills:  11   Dose:  20 mg    Instructions:  Take 1 tablet (20 mg total) by mouth once daily.     Begin Date    AM    Noon    PM    Bedtime       pen needle, diabetic 32 gauge x 5/32" Ndle   Commonly known as:  BD ULTRA-FINE HANG PEN NEEDLES   Quantity:  180 each   Refills:  4    Instructions:  Uses 2 times daily, on  insulin injections     Begin Date    AM    Noon    PM    Bedtime       warfarin 5 MG tablet   Commonly known as:  COUMADIN   Quantity:  30 tablet   Refills:  11   Dose:  5 mg    Instructions:  Take 1 tablet (5 mg total) by mouth Daily.     Begin Date    AM    Noon    PM    Bedtime                  Please bring to all follow up appointments:    1. A copy of your discharge instructions.  2. All medicines you are currently taking in their original bottles.  3. Identification and insurance card.    Please arrive 15 minutes ahead of scheduled appointment time.    Please call 24 hours in advance if you must reschedule your appointment and/or time.        Your Scheduled Appointments     Feb 06, 2017  2:30 PM CST   Non-Fasting Lab with LAB, [facility] [address]  [place] [address] [phone]            Feb 06, 2017  4:00 PM CST   Cardiac Rehab Phase 2 with " REHAB, CARDIAC   [place] [facility] [place]    [address] [facility]  [place] [address]   [phone]            Feb 08, 2017  4:00 PM CST   Cardiac Rehab Phase 2 with REHAB, CARDIAC   [place] [facility]    [address] [facility]  [place] [address]   [phone]            Feb 10, 2017  4:00 PM CST   Cardiac Rehab Phase 2 with REHAB, CARDIAC   [place] [facility] [place]    [address] [facility]  [place] [address]   [phone]            Feb 13, 2017  4:00 PM CST   Cardiac Rehab Phase 2 with REHAB, CARDIAC   [place] [facility]    [address] [facility]  [place] [address]   [phone]              Follow-up Information     Follow up with Cayden Moreno MD In 4 weeks.    Specialties:  Cardiology, Electrophysiology    Contact information:    [address] [place] [id number] [phone]          Discharge Instructions     Future Orders    Call MD for:  difficulty breathing or increased cough     Call MD for:  increased confusion or weakness     Call MD for:  persistent dizziness, light-headedness, or visual disturbances     Call MD for:  persistent nausea and vomiting or diarrhea     Call MD for:  redness, tenderness, or signs of infection (pain, swelling, redness, odor or green/yellow discharge around incision site)     Call MD for:  severe persistent headache     Call MD for:  severe uncontrolled pain     Call MD for:  temperature >100.4     Call MD for:  worsening rash     Diet general     Questions:    Total calories:  2000 Calorie    Fat restriction, if any:      Protein restriction, if any:      Na restriction, if any:      Fluid restriction:      Additional restrictions:  Cardiac (Low Na/Chol)    Lifting restrictions     Comments:    No lifting objects heavier than 10 pounds for 1 week        Discharge Instructions       Continue coumadin dosing per coumadin clinic instructions.      1. Do not strain or  lift anything greater than 10 lb for 1 week.   2. Do not drive or operate any dangerous machinery for 24 hours.   3. Keep the dressing on, clean, and dry for 24 hours.   4. After 24 hours, the dressing may be removed and a shower is allowed.   5. Clean the area with mild soap and water.   6. Once the skin has healed (1 week), bathing in a tub or swimming is allowed.   7. Inspect the groin site daily and report to the physician any signs of infection at the site: redness, pain, fever >100.4, unusual pain at the   access site or affected extremity, unusual swelling at the access site, or any yellow, white or green drainage.  Call 911 if you have:   Bleeding from the puncture site that you cannot stop by doing the following:   Relax and lie down right away. Keep your leg flat and apply firm pressure to the site using your fingers and a gauze pad. Keep the pressure on for 10 minutes. Continue this until the bleeding stops. This may take awhile. When bleeding stops, cover the site with a sterile bandage and keep your leg still as much as possible.      Primary Diagnosis     Your primary diagnosis was:  Typical Atrial Flutter      Admission Information     Date & Time Provider Department CSN    2/3/2017  7:35 AM Cayden Moreno MD [facility] [id number]      Care Providers     Provider Role Specialty Primary office phone    Cayden Moreno MD Attending Provider Cardiology [phone]    Cayden Moreno MD Surgeon  Cardiology [phone]    Anjel King MD Team Attending  Cardiology [phone]    Robson Kulkarni MD Team Attending  Electrophysiology [phone]    Osei Green MD Team Attending  Electrophysiology [phone]    Koby Bauman MD Team Attending  Cardiology [phone]    Karsten Rodriguez MD Team Attending  Electrophysiology [phone]      Your Vitals Were     BP Pulse Temp Resp Height Weight    167/74 (BP Location: Left arm, Patient Position: Sitting, BP Method:  "Automatic) 61 96.9 °F (36.1 °C) (Oral) 20 5' 9" (1.753 m) 113.9 kg (251 lb)    SpO2 BMI             96% 37.07 kg/m2         Recent Lab Values        11/28/2016 1/12/2017                        3:09 AM  7:20 AM          A1C 9.2 (H) 8.2 (H)          Comment for A1C at  3:09 AM on 11/28/2016:  According to ADA guidelines, hemoglobin A1C <7.0% represents  optimal control in non-pregnant diabetic patients.  Different  metrics may apply to specific populations.   Standards of Medical Care in Diabetes - 2016.  For the purpose of screening for the presence of diabetes:  <5.7%     Consistent with the absence of diabetes  5.7-6.4%  Consistent with increasing risk for diabetes   (prediabetes)  >or=6.5%  Consistent with diabetes  Currently no consensus exists for use of hemoglobin A1C  for diagnosis of diabetes for children.      Comment for A1C at  7:20 AM on 1/12/2017:  According to ADA guidelines, hemoglobin A1C <7.0% represents  optimal control in non-pregnant diabetic patients.  Different  metrics may apply to specific populations.   Standards of Medical Care in Diabetes - 2016.  For the purpose of screening for the presence of diabetes:  <5.7%     Consistent with the absence of diabetes  5.7-6.4%  Consistent with increasing risk for diabetes   (prediabetes)  >or=6.5%  Consistent with diabetes  Currently no consensus exists for use of hemoglobin A1C  for diagnosis of diabetes for children.        Allergies as of 2/3/2017     No Known Allergies      Advance Directives     An advance directive is a document which, in the event you are no longer able to make decisions for yourself, tells your healthcare team what kind of treatment you do or do not want to receive, or who you would like to make those decisions for you.  If you do not currently have an advance directive, [facility] encourages you to create one.  For more information call:  [phone]), [phone] ([phone]),  or log on to " [email or web address].        Smoking Cessation     If you would like to quit smoking:   You may be eligible for free services if you are a [place] resident and started smoking cigarettes before September 1, 1988.  Call the Smoking Cessation Trust (SCT) toll free at (368) 792-0238 or (525) 447-1086.   Call 1-800-QUIT-NOW if you do not meet the above criteria.            Language Assistance Services     ATTENTION: Language assistance services are available, free of charge. Please call [phone].      ATENCIÓN: Si habla español, tiene a zaldivar disposición servicios gratuitos de asistencia lingüística. Llame al [phone].     CHÚ Ý: N?u b?n nói Ti?ng Vi?t, có các d?ch v? h? tr? ngôn ng? mi?n phí dành cho b?n. G?i s? [phone].        Stroke Education              Heart Failure Education       Heart Failure: Being Active  You have a condition called heart failure. Being active doesnt mean that you have to wear yourself out. Even a little movement each day helps to strengthen your heart. If you cant get out to exercise, you can do simple stretching and strengthening exercises at home. These are good ways to keep you well-conditioned and prevent you and your heart from becoming excessively weak.    Ideas to get you started  · Add a little movement to things you do now. Walk to mail letters. Park your car at the far end of the parking lot and walk to the store. Walk up a flight of stairs instead of taking the elevator.  · Choose activities you enjoy. You might walk, swim, or ride an exercise bike. Things like gardening and washing the car count, too. Other possibilities include: washing dishes, walking the dog, walking around the mall, and doing aerobic activities with friends.  · Join a group exercise program at a [facility], a senior center, or a community center. Or look into a hospital cardiac rehabilitation program. Ask your doctor if you qualify.  Tips to keep you going  · Get up and get dressed  each day. Go to a coffee shop and read a newspaper or go somewhere that you'll be in the presence of other active people. Youll feel more like being active.  · Make a plan. Choose one or more activities that you enjoy and that you can easily do. Then plan to do at least one each day. You might write your plan on a calendar.  · Go with a friend or a group if you like company. This can help you feel supported and stay motivated, too.  · Plan social events that you enjoy. This will keep you mentally engaged as well as physically motivated to do things you find pleasure in.  For your safety  · Talk with your healthcare provider before starting an exercise program.  · Exercise indoors when its too hot or too cold outside, or when the air quality is poor. Try walking at a shopping mall.  · Wear socks and sturdy shoes to maintain your balance and prevent falls.  · Start slowly. Do a few minutes several times a day at first. Increase your time and speed little by little.  · Stop and rest whenever you feel tired or get short of breath.  · Dont push yourself on days when you dont feel well.  Date Last Reviewed: 3/20/2016  © 4775-9808 [employer]. [address] [place] [address], [place] [address]. All rights reserved. This information is not intended as a substitute for professional medical care. Always follow your healthcare professional's instructions.              Heart Failure: Evaluating Your Heart  You have a condition called heart failure. To evaluate your condition, your doctor will examine you, ask questions, and do some tests. Along with looking for signs of heart failure, the doctor looks for any other health problems that may have led to heart failure. The results of your evaluation will help your doctor form a treatment plan.  Health history and physical exam  Your visit will start with a health history. Tell the doctor about any symptoms youve noticed and about all medicines you take. Then youll have  a physical exam. This includes listening to your heartbeat and breathing. Youll also be checked for swelling (edema) in your legs and neck. When you have fluid buildup or fluid in the lungs, it may be called congestive heart failure.  Diagnosing heart failure     During an echocardiogram, sound waves bounce off the heart. These are converted into a picture on the screen.   The following may be done to help your doctor form a diagnosis:  · X-rays show the size and shape of your heart. These pictures can also show fluid in your lungs.  · An electrocardiogram (ECG or EKG) shows the pattern of your heartbeat. Small pads (electrodes) are placed on your chest, arms, and legs. Wires connect the pads to the ECG machine, which records your hearts electrical signals. This can give the doctor information about heart function.  · An echocardiogram uses ultrasound waves to show the structure and movement of your heart muscle. This shows how well the heart pumps. It also shows the thickness of the heart walls, and if the heart is enlarged. It is one of the most useful, non-invasive tests as it provides information about the heart's general function. This helps your doctor make treatment decisions.  · Lab tests evaluate small amounts of blood or urine for signs of problems. A BNP lab test can help diagnose and evaluate heart failure. BNP stands for B-type natriuretic peptide. The ventricles secrete more BNP when heart failure worsens. Lab tests can also provide information about metabolic dysfunction or heart dysfunction.  Your treatment plan  Based on the results of your evaluation and tests, your doctor will develop a treatment plan. This plan is designed to relieve some of your heart failure symptoms and help make you more comfortable. Your treatment plan may include:  · Medicine to help your heart work better and improve your quality of life  · Changes in what you eat and drink to help prevent fluid from backing up in your  body  · Daily monitoring of your weight and heart failure symptoms to see how well your treatment plan is working  · Exercise to help you stay healthy  · Help with quitting smoking  · Emotional and psychological support to help adjust to the changes  · Referrals to other specialists to make sure you are being treated comprehensively  Date Last Reviewed: 3/21/2016  © 2195-1414 [employer]. [address] [place] [address], [place] [address]. All rights reserved. This information is not intended as a substitute for professional medical care. Always follow your healthcare professional's instructions.              Heart Failure: Making Changes to Your Diet  You have a condition called heart failure. When you have heart failure, excess fluid is more likely to build up in your body because your heart isn't working well. This makes the heart work harder to pump blood. Fluid buildup causes symptoms such as shortness of breath and swelling (edema). This is often referred to as congestive heart failure or CHF. Controlling the amount of salt (sodium) you eat may help stop fluid from building up. Your doctor may also tell you to reduce the amount of fluid you drink.  Reading food labels    Your healthcare provider will tell you how much sodium you can eat each day. Read food labels to keep track. Keep in mind that certain foods are high in salt. These include canned, frozen, and processed foods. Check the amount of sodium in each serving. Watch out for high-sodium ingredients. These include MSG (monosodium glutamate), baking soda, and sodium phosphate.   Eating less salt  Give yourself time to get used to eating less salt. It may take a little while. Here are some tips to help:  · Take the saltshaker off the table. Replace it with salt-free herb mixes and spices.  · Eat fresh or plain frozen vegetables. These have much less salt than canned vegetables.  · Choose low-sodium snacks like sodium-free pretzels, crackers, or  air-popped popcorn.  · Dont add salt to your food when youre cooking. Instead, season your foods with pepper, lemon, garlic, or onion.  · When you eat out, ask that your food be cooked without added salt.  · Avoid eating fried foods as these often have a great deal of salt.  If youre told to limit fluids  You may need to limit how much fluid you have to help prevent swelling. This includes anything that is liquid at room temperature, such as ice cream and soup. If your doctor tells you to limit fluid, try these tips:  · Measure drinks in a measuring cup before you drink them. This will help you meet daily goals.  · Chill drinks to make them more refreshing.  · Suck on frozen lemon wedges to quench thirst.  · Only drink when youre thirsty.  · Chew sugarless gum or suck on hard candy to keep your mouth moist.  · Weigh yourself daily to know if your body's fluid content is rising.  My sodium goal  Your healthcare provider may give you a sodium goal to meet each day. This includes sodium found in food as well as salt that you add. My goal is to eat no more than ___________ mg of sodium per day.     When to call your doctor  Call your doctor right away if you have any symptoms of worsening heart failure. These can include:  · Sudden weight gain  · Increased swelling of your legs or ankles  · Trouble breathing when youre resting or at night  · Increase in the number of pillows you have to sleep on  · Chest pain, pressure, discomfort, or pain in the jaw, neck, or back   Date Last Reviewed: 3/21/2016  © 5515-8624 [employer]. [address] [place] [address]. All rights reserved. This information is not intended as a substitute for professional medical care. Always follow your healthcare professional's instructions.              Heart Failure: Medicines to Help Your Heart    You have a condition called heart failure (also known as congestive heart failure, or CHF). Your doctor will likely  prescribe medicines for heart failure and any underlying health problems you have. Most heart failure patients take one or more types of medicinen. Your healthcare provider will work to find the combination of medicines that works best for you.  Heart failure medicines  Here are the most common heart failure medicines:  · ACE inhibitors lower blood pressure and decrease strain on the heart. This makes it easier for the heart to pump. Angiotensin receptor blockers have similar effects. These are prescribed for some patients instead of ACE inhibitors.  · Beta-blockers relieve stress on the heart. They also improve symptoms. They may also improve the heart's pumping action over time.  · Diuretics (also called water pills) help rid your body of excess water. This can help rid your body of swelling (edema). Having less fluid to pump means your heart doesnt have to work as hard. Some diuretics make your body lose a mineral called potassium. Your doctor will tell you if you need to take supplements or eat more foods high in potassium.  · Digoxin helps your heart pump with more strength. This helps your heart pump more blood with each beat. So, more oxygen-rich blood travels to the rest of the body.  · Aldosterone antagonists help alter hormones and decrease strain on the heart.  · Hydralazine and nitrates are two separate medicines used together to treat heart failure. They may come in one combination pill. They lower blood pressure and decrease how hard the heart has to pump.  Medicines for related conditions  Controlling other heart problems helps keep heart failure under control, too. Depending on other heart problems you have, medicines may be prescribed to:  · Lower blood pressure (antihypertensives).  · Lower cholesterol levels (statins).  · Prevent blood clots (anticoagulants or aspirin).  · Keep the heartbeat steady (antiarrhythmics).  Date Last Reviewed: 3/5/2016  © 8512-9939 [employer] [address] [id number]. All rights reserved. This information is not intended as a substitute for professional medical care. Always follow your healthcare professional's instructions.              Heart Failure: Procedures That May Help    The heart is a muscle that pumps oxygen-rich blood to all parts of the body. When you have heart failure, the heart is not able to pump as well as it should. Blood and fluid may back up into the lungs (congestive heart failure), and some parts of the body dont get enough oxygen-rich blood to work normally. These problems lead to the symptoms of heart failure.     Certain procedures may help the heart pump better in some cases of heart failure. Some procedures are done to treat health problems that may have caused the heart failure such as coronary artery disease or heart rhythm problems. For more serious heart failure, other options are available.  Treating artery and valve problems  If you have coronary artery disease or valve disease, procedures may be done to improve blood flow. This helps the heart pump better, which can improve heart failure symptoms. First, your doctor may do a cardiac catheterization to help detect clogged blood vessels or valve damage. During this procedure, a  thin tube (catheter) in inserted into a blood vessel and guided to the heart. There a dye is injected and a special type of X-ray (angiogram) is taken of the blood vessels. Procedures to open a blocked artery or fix damaged valves can also be done using catheterization.  · Angioplasty uses a balloon-tipped instrument at the end of the catheter. The balloon is inflated to widen the narrowed artery. In many cases, a stent is expanded to further support the narrowed artery. A stent is a metal mesh tube.  · Valve surgery repairs or replacement of faulty valves can also be done during catheterization so blood can flow properly through the chambers of the heart.  Bypass surgery is another  option to help treat blocked arteries. It uses a healthy blood vessel from elsewhere in the body. The healthy blood vessel is attached above and below the blocked area so that blood can flow around the blocked artery.  Treating heart rhythm problems  A device may be placed in the chest to help a weak heart maintain a healthy, heartbeat so the heart can pump more effectively:  · Pacemaker. A pacemaker is an implanted device that regulates your heartbeat electronically. It monitors your heart's rhythm and generates a painless electric impulse that helps the heart beat in a regular rhythm. A pacemaker is programmed to meet your specific heart rhythm needs.  · Biventricular pacing/cardiac resynchronization therapy. A type of pacemaker that paces both pumping chambers of the heart at the same time to coordinate contractions and to improve the heart's function. Some people with heart failure are candidates for this therapy.  · Implantable cardioverter defibrillator. A device similar to a pacemaker that senses when the heart is beating too fast and delivers an electrical shock to convert the fast rhythm to a normal rhythm. This can be a life saving device.  In severe cases  In more serious cases of heart failure when other treatments no longer work, other options may include:  · Ventricular assist devices (VADs). These are mechanical devices used to take over the pumping function for one or both of the heart's ventricles, or pumping chambers. A VAD may be necessary when heart failure progresses to the point that medicines and other treatments no longer help. In some cases, a VAD may be used as a bridge to transplant.  · Heart transplant. This is replacing the diseased heart with a healthy one from a donor. This is an option for a few people who are very sick. A heart transplant is very serious and not an option for all patients. Your doctor can tell you more.  Date Last Reviewed: 3/20/2016  © 3550-4669 [employer]. [address] [place] [address], [place] [address]. All rights reserved. This information is not intended as a substitute for professional medical care. Always follow your healthcare professional's instructions.              Heart Failure: Tracking Your Weight  You have a condition called heart failure. When you have heart failure, a sudden weight gain or a steady rise in weight is a warning sign that your body is retaining too much water and salt. This could mean your heart failure is getting worse. If left untreated, it can cause problems for your lungs and result in shortness of breath. Weighing yourself each day is the best way to know if youre retaining water. If your weight goes up quickly, call your doctor. You will be given instructions on how to get rid of the excess water. You will likely need medicines and to avoid salt. This will help your heart work better.  Call your doctor if you gain more than 2 pounds in 1 day, more than 5 pounds in 1 week, or whatever weight gain you were told to report by your doctor. This is often a sign of worsening heart failure and needs to be evaluated and treated. Your doctor will tell you what to do next.   Tips for weighing yourself    · Weigh yourself at the same time each morning, wearing the same clothes. Weigh yourself after urinating and before eating.  · Use the same scale each day. Make sure the numbers are easy to read. Put the scale on a flat, hard surface -- not on a rug or carpet.  · Do not stop weighing yourself. If you forget one day, weigh again the next morning.  How to use your weight chart  · Keep your weight chart near the scale. Write your weight on the chart as soon as you get off the scale.  · Fill in the month and the start date on the chart. Then write down your weight each day. Your chart will look like this:    · If you miss a day, leave the space blank. Weigh yourself the next day and write your weight in the next space.  · Take your weight  chart with you when you go to see your doctor.  Date Last Reviewed: 3/20/2016  © 7595-8211 [employer]. [address] [place] [address], [place] [address]. All rights reserved. This information is not intended as a substitute for professional medical care. Always follow your healthcare professional's instructions.              Heart Failure: Warning Signs of a Flare-Up  You have a condition called heart failure. Once you have heart failure, flare-ups can happen. Below are signs that can mean your heart failure is getting worse. If you notice any of these warning signs, call your healthcare provider.  Swelling    · Your feet, ankles, or lower legs get puffier.  · You notice skin changes on your lower legs.  · Your shoes feel too tight.  · Your clothes are tighter in the waist.  · You have trouble getting rings on or off your fingers.  Shortness of breath  · You have to breathe harder even when youre doing your normal activities or when youre resting.  · You are short of breath walking up stairs or even short distances.  · You wake up at night short of breath or coughing.  · You need to use more pillows or sit up to sleep.  · You wake up tired or restless.  Other warning signs  · You feel weaker, dizzy, or more tired.  · You have chest pain or changes in your heartbeat.  · You have a cough that wont go away.  · You cant remember things or dont feel like eating.  Tracking your weight  Gaining weight is often the first warning sign that heart failure is getting worse. Gaining even a few pounds can be a sign that your body is retaining excess water and salt. Weighing yourself each day in the morning after you urinate and before you eat, is the best way to know if you're retaining water. Get a scale that is easy to read and make sure you wear the same clothes and use the same scale every time you weigh. Your healthcare provider will show you how to track your weight. Call your doctor if you gain more than 2  pounds in 1 day, 5 pounds in 1 week, or whatever weight gain you were told to report by your doctor. This is often a sign of worsening heart failure and needs to be evaluated and treated before it compromises your breathing. Your doctor will tell you what to do next.    Date Last Reviewed: 3/15/2016  © 6274-6659 [employer]. [address] [place] [address], [place] [address]. All rights reserved. This information is not intended as a substitute for professional medical care. Always follow your healthcare professional's instructions.              Coumadin Discharge Instructions                         Chronic Kindey Disease Education             Diabetes Discharge Instructions                                    [facility] complies with applicable Federal civil rights laws and does not discriminate on the basis of race, color, national origin, age, disability, or sex.

## 2017-02-03 NOTE — ANESTHESIA POSTPROCEDURE EVALUATION
"Anesthesia Post Evaluation    Patient: Chau Rodarte    Procedure(s) Performed: Procedure(s) (LRB):  ABLATION (N/A)  TRANSESOPHAGEAL ECHOCARDIOGRAM (LIANE) (N/A)    Final Anesthesia Type: general  Patient location during evaluation: PACU  Patient participation: Yes- Able to Participate  Level of consciousness: awake and alert  Post-procedure vital signs: reviewed and stable  Pain management: adequate  Airway patency: patent  PONV status at discharge: No PONV  Anesthetic complications: no      Cardiovascular status: blood pressure returned to baseline  Respiratory status: unassisted  Hydration status: euvolemic  Follow-up not needed.        Visit Vitals    BP (!) 116/55    Pulse (!) 58    Temp 36.7 °C (98.1 °F) (Axillary)    Resp 12    Ht 5' 9" (1.753 m)    Wt 113.9 kg (251 lb)    SpO2 96%    BMI 37.07 kg/m2       Pain/Miroslava Score: Pain Assessment Performed: Yes (2/3/2017 11:50 AM)  Presence of Pain: denies (2/3/2017 11:50 AM)  Miroslava Score: 10 (2/3/2017 11:45 AM)      "

## 2017-02-03 NOTE — TRANSFER OF CARE
"Anesthesia Transfer of Care Note    Patient: Chau Rodarte    Procedure(s) Performed: Procedure(s) (LRB):  ABLATION (N/A)  TRANSESOPHAGEAL ECHOCARDIOGRAM (LIANE) (N/A)    Patient location: PACU    Anesthesia Type: general    Transport from OR: Transported from OR on 6-10 L/min O2 by face mask with adequate spontaneous ventilation    Post pain: adequate analgesia    Post assessment: no apparent anesthetic complications    Post vital signs: stable    Level of consciousness: sedated    Complications: none          Last vitals:   Visit Vitals    /62    Pulse 64    Temp 36.7 °C (98.1 °F) (Axillary)    Resp 12    Ht 5' 9" (1.753 m)    Wt 113.9 kg (251 lb)    SpO2 100%    BMI 37.07 kg/m2     "

## 2017-02-03 NOTE — PLAN OF CARE
Problem: Patient Care Overview  Goal: Plan of Care Review  Outcome: Ongoing (interventions implemented as appropriate)  Patient arrived to room. PIV placed, labs sent. Admit assessment completed. Plan of care discussed with patient. Will monitor

## 2017-02-03 NOTE — PROGRESS NOTES
Patient ambulated around unit with standby assist. L groin dressing remained CDI, site soft, no bleeding or hematoma noted. Will monitor.

## 2017-02-06 ENCOUNTER — LAB VISIT (OUTPATIENT)
Dept: LAB | Facility: HOSPITAL | Age: 67
End: 2017-02-06
Attending: INTERNAL MEDICINE
Payer: MEDICARE

## 2017-02-06 DIAGNOSIS — Z79.01 LONG TERM (CURRENT) USE OF ANTICOAGULANTS: ICD-10-CM

## 2017-02-06 DIAGNOSIS — I48.3 TYPICAL ATRIAL FLUTTER: ICD-10-CM

## 2017-02-06 LAB
INR PPP: 2.4
PROTHROMBIN TIME: 24 SEC

## 2017-02-06 PROCEDURE — 85610 PROTHROMBIN TIME: CPT

## 2017-02-06 PROCEDURE — 36415 COLL VENOUS BLD VENIPUNCTURE: CPT | Mod: PO

## 2017-02-07 ENCOUNTER — ANTI-COAG VISIT (OUTPATIENT)
Dept: CARDIOLOGY | Facility: CLINIC | Age: 67
End: 2017-02-07

## 2017-02-07 DIAGNOSIS — Z79.01 LONG TERM (CURRENT) USE OF ANTICOAGULANTS: ICD-10-CM

## 2017-02-07 DIAGNOSIS — I48.3 TYPICAL ATRIAL FLUTTER: ICD-10-CM

## 2017-02-07 NOTE — PROGRESS NOTES
LMFCB for patient to discuss bloody diarrhea.  Left second message, advised patient to seek emergent medical care if he is having a large amount of blood, SOB, or chest pain.  Advised him to contact clinic for further discussion.  Addendum:  2/8 LMFCB.

## 2017-02-09 ENCOUNTER — TELEPHONE (OUTPATIENT)
Dept: ELECTROPHYSIOLOGY | Facility: CLINIC | Age: 67
End: 2017-02-09

## 2017-02-09 NOTE — TELEPHONE ENCOUNTER
----- Message from Darya Lockwood sent at 2/9/2017 11:36 AM CST -----  Contact: patient wife  Please call pt wife at [phone] or [phone]. Need a return to work excuse from 02/03/17 (LIANE done) faxed to [phone]. Patient is going back to work on Monday 02/13/17    Thank you

## 2017-02-10 ENCOUNTER — ANTI-COAG VISIT (OUTPATIENT)
Dept: CARDIOLOGY | Facility: CLINIC | Age: 67
End: 2017-02-10

## 2017-02-10 DIAGNOSIS — Z79.01 LONG TERM (CURRENT) USE OF ANTICOAGULANTS: ICD-10-CM

## 2017-02-10 DIAGNOSIS — I48.3 TYPICAL ATRIAL FLUTTER: ICD-10-CM

## 2017-02-13 ENCOUNTER — LAB VISIT (OUTPATIENT)
Dept: LAB | Facility: HOSPITAL | Age: 67
End: 2017-02-13
Attending: INTERNAL MEDICINE
Payer: MEDICARE

## 2017-02-13 ENCOUNTER — ANTI-COAG VISIT (OUTPATIENT)
Dept: CARDIOLOGY | Facility: CLINIC | Age: 67
End: 2017-02-13

## 2017-02-13 ENCOUNTER — CLINICAL SUPPORT (OUTPATIENT)
Dept: CARDIAC REHAB | Facility: CLINIC | Age: 67
End: 2017-02-13
Payer: MEDICARE

## 2017-02-13 DIAGNOSIS — I48.3 TYPICAL ATRIAL FLUTTER: ICD-10-CM

## 2017-02-13 DIAGNOSIS — Z79.01 LONG TERM (CURRENT) USE OF ANTICOAGULANTS: ICD-10-CM

## 2017-02-13 DIAGNOSIS — I25.10 CORONARY ATHEROSCLEROSIS OF UNSPECIFIED TYPE OF VESSEL, NATIVE OR GRAFT: ICD-10-CM

## 2017-02-13 DIAGNOSIS — I25.2 OLD MYOCARDIAL INFARCTION: ICD-10-CM

## 2017-02-13 LAB
INR PPP: 2.9
PROTHROMBIN TIME: 29 SEC

## 2017-02-13 PROCEDURE — 93798 PHYS/QHP OP CAR RHAB W/ECG: CPT | Mod: S$GLB,,, | Performed by: INTERNAL MEDICINE

## 2017-02-13 NOTE — PROGRESS NOTES
Patient tolerated exercise session fair with complaints of right calf pain of 5-6/10.  Patient able to tolerate Nustep modality through the entire 15 minutes.  While walking on treadmill, patient having to intermittently start/stop treadmill due to calf pain.  Encouragement given.  Will continue to monitor patient.  Noted Normal sinus rhythm on monitor with rare PVC's.

## 2017-02-15 ENCOUNTER — CLINICAL SUPPORT (OUTPATIENT)
Dept: CARDIAC REHAB | Facility: CLINIC | Age: 67
End: 2017-02-15
Payer: MEDICARE

## 2017-02-15 DIAGNOSIS — I25.10 CORONARY ATHEROSCLEROSIS OF UNSPECIFIED TYPE OF VESSEL, NATIVE OR GRAFT: ICD-10-CM

## 2017-02-15 DIAGNOSIS — I25.2 OLD MYOCARDIAL INFARCTION: ICD-10-CM

## 2017-02-15 PROCEDURE — 93798 PHYS/QHP OP CAR RHAB W/ECG: CPT | Mod: S$GLB,,, | Performed by: INTERNAL MEDICINE

## 2017-02-15 NOTE — PROGRESS NOTES
Pt did well on Nu Step but had to start and stop frequently on the treadmill due to leg pain but did finish his 15 minutes. Patient tolerated exercise session well with no complaints

## 2017-02-20 ENCOUNTER — ANTI-COAG VISIT (OUTPATIENT)
Dept: CARDIOLOGY | Facility: CLINIC | Age: 67
End: 2017-02-20

## 2017-02-20 ENCOUNTER — OFFICE VISIT (OUTPATIENT)
Dept: SLEEP MEDICINE | Facility: CLINIC | Age: 67
End: 2017-02-20
Payer: MEDICARE

## 2017-02-20 ENCOUNTER — LAB VISIT (OUTPATIENT)
Dept: LAB | Facility: HOSPITAL | Age: 67
End: 2017-02-20
Attending: INTERNAL MEDICINE
Payer: MEDICARE

## 2017-02-20 VITALS
HEIGHT: 69 IN | SYSTOLIC BLOOD PRESSURE: 106 MMHG | BODY MASS INDEX: 37.18 KG/M2 | DIASTOLIC BLOOD PRESSURE: 66 MMHG | WEIGHT: 251 LBS | HEART RATE: 72 BPM

## 2017-02-20 DIAGNOSIS — Z79.01 LONG TERM (CURRENT) USE OF ANTICOAGULANTS: ICD-10-CM

## 2017-02-20 DIAGNOSIS — G47.30 SLEEP APNEA, UNSPECIFIED TYPE: Primary | ICD-10-CM

## 2017-02-20 DIAGNOSIS — I48.3 TYPICAL ATRIAL FLUTTER: ICD-10-CM

## 2017-02-20 LAB
INR PPP: 2.9
PROTHROMBIN TIME: 28.9 SEC

## 2017-02-20 PROCEDURE — 99204 OFFICE O/P NEW MOD 45 MIN: CPT | Mod: S$GLB,,, | Performed by: PSYCHIATRY & NEUROLOGY

## 2017-02-20 PROCEDURE — 3078F DIAST BP <80 MM HG: CPT | Mod: S$GLB,,, | Performed by: PSYCHIATRY & NEUROLOGY

## 2017-02-20 PROCEDURE — 1126F AMNT PAIN NOTED NONE PRSNT: CPT | Mod: S$GLB,,, | Performed by: PSYCHIATRY & NEUROLOGY

## 2017-02-20 PROCEDURE — 99499 UNLISTED E&M SERVICE: CPT | Mod: S$GLB,,, | Performed by: PSYCHIATRY & NEUROLOGY

## 2017-02-20 PROCEDURE — 1157F ADVNC CARE PLAN IN RCRD: CPT | Mod: S$GLB,,, | Performed by: PSYCHIATRY & NEUROLOGY

## 2017-02-20 PROCEDURE — 99999 PR PBB SHADOW E&M-EST. PATIENT-LVL III: CPT | Mod: PBBFAC,,, | Performed by: PSYCHIATRY & NEUROLOGY

## 2017-02-20 PROCEDURE — 3074F SYST BP LT 130 MM HG: CPT | Mod: S$GLB,,, | Performed by: PSYCHIATRY & NEUROLOGY

## 2017-02-20 PROCEDURE — 1159F MED LIST DOCD IN RCRD: CPT | Mod: S$GLB,,, | Performed by: PSYCHIATRY & NEUROLOGY

## 2017-02-20 NOTE — PROGRESS NOTES
Chau Rodarte  was seen at the request of  Referral, Self for sleep evaluation.    02/20/2017 INITIAL HISTORY OF PRESENT ILLNESS:  Chau Rodarte is a 66 y.o. male is here to be evaluated for a sleep disorder.       CHIEF COMPLAINT:      The patient's denied snoring, gasping for air. Would sometimes fall asleep while watching TV.  Likes to lift head of bed.    Seeing a cardiologists - Dr. Leung and Dr. Moreno - had 3 stents - recent hospitalization November for acute CHF - feeling much better now. Had 2 MIs before; h/o A-flutter.    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  Daytime naps: no    Exercise routine: yes  Caffeine:  Coffee   1 cup in AM.     PREVIOUS SLEEP STUDIES:     never    DME:       PAST MEDICAL HISTORY:    Active Ambulatory Problems     Diagnosis Date Noted    HLD (hyperlipidemia) 10/24/2013    S/P femoral-popliteal bypass surgery 10/24/2013    HTN (hypertension) 03/03/2014    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, NYHA class 2 and CKD stage 3 02/21/2016    NSTEMI (non-ST elevated myocardial infarction) 11/28/2016    Type 2 diabetes mellitus  with diabetic polyneuropathy, without long-term current use of insulin 11/29/2016    Acute on chronic systolic CHF (congestive heart failure) 11/29/2016    Obesity (BMI 30-39.9) 11/29/2016    Cardiomegaly 12/05/2016    Acute on chronic renal failure 12/06/2016    Coronary artery disease involving native coronary artery of native heart without angina pectoris 12/20/2016    Ischemic cardiomyopathy 12/20/2016    Typical atrial flutter 01/26/2017    Cryptogenic stroke 01/26/2017    Long term (current) use of anticoagulants [Z79.01] 01/27/2017    Atrial flutter 02/03/2017     Resolved Ambulatory Problems     Diagnosis Date Noted    DM (diabetes mellitus) 10/17/2013    Ulcer of foot 11/13/2013    Hyperlipidemia 03/03/2014    Diabetes mellitus type 2, uncontrolled, with complications 02/21/2016    Shortness of breath 11/28/2016    Shortness of breath 12/05/2016     Past Medical History   Diagnosis Date    CKD (chronic kidney disease) stage 2, GFR 60-89 ml/min 2/21/2016    PVD (peripheral vascular disease)     Stroke     Type 2 diabetes mellitus with diabetic peripheral angiopathy without gangrene, without long-term current use of insulin 10/24/2013                PAST SURGICAL HISTORY:    Past Surgical History   Procedure Laterality Date    Left leg stent      Right leg bypass      Foot nerve graft  11/2013         FAMILY HISTORY:                Family History   Problem Relation Age of Onset    Diabetes Mother     Heart disease Father        SOCIAL HISTORY:          Tobacco:   History   Smoking Status    Former Smoker    Quit date: 3/1/2012   Smokeless Tobacco    Never Used       alcohol use:    History   Alcohol Use No                   ALLERGIES:  Review of patient's allergies indicates:  No Known Allergies    CURRENT MEDICATIONS:    Current Outpatient Prescriptions   Medication Sig Dispense Refill    aspirin 81 MG Chew Take 1 tablet (81 mg total) by mouth once daily.  0    atorvastatin  "(LIPITOR) 80 MG tablet Take 1 tablet (80 mg total) by mouth once daily. 90 tablet 3    clopidogrel (PLAVIX) 75 mg tablet Take 1 tablet (75 mg total) by mouth once daily. 90 tablet 3    ergocalciferol (ERGOCALCIFEROL) 50,000 unit Cap Take 1 capsule (50,000 Units total) by mouth twice a week. 24 capsule 1    furosemide (LASIX) 40 MG tablet Take 1 tablet (40 mg total) by mouth once daily. 30 tablet 11    insulin detemir (LEVEMIR FLEXTOUCH) 100 unit/mL (3 mL) SubQ InPn pen Inject 16 Units into the skin every evening. 1 Box 3    isosorbide-hydrALAZINE 20-37.5 mg (BIDIL) 20-37.5 mg Tab Take 1 tablet by mouth 3 (three) times daily. 90 tablet 11    liraglutide 0.6 mg/0.1 mL, 18 mg/3 mL, subq PNIJ (VICTOZA 2-SAGAR) 0.6 mg/0.1 mL (18 mg/3 mL) PnIj Inject 0.6 mg daily x1 week, then 1.2 mg daily x1 week, then 1.8 mg daily thereafter (Patient taking differently: Inject 1.8 mg into the skin once daily. er) 9 mL 3    metoprolol succinate (TOPROL-XL) 50 MG 24 hr tablet Take 1 tablet (50 mg total) by mouth once daily. 30 tablet 11    pantoprazole (PROTONIX) 20 MG tablet Take 1 tablet (20 mg total) by mouth once daily. 30 tablet 11    pen needle, diabetic (BD ULTRA-FINE HANG PEN NEEDLES) 32 gauge x 5/32" Ndle Uses 2 times daily, on  insulin injections 180 each 4    warfarin (COUMADIN) 5 MG tablet Take 1 tablet (5 mg total) by mouth Daily. 30 tablet 11     No current facility-administered medications for this visit.                       REVIEW OF SYSTEMS:   Sleep related symptoms as per HPI    reports weight gain - lost 20 lbs since admission  Denies dyspnea  Denies palpitations  Denies acid reflux   Denies polyuria  Denies  mood diturbance  Denies  anemia  Denies  muscle pain  Denies  Gait imbalance    Otherwise, a balance of 10 systems reviewed is negative.    PHYSICAL EXAM:  Visit Vitals    /66 (BP Location: Right arm, Patient Position: Sitting, BP Method: Automatic)    Pulse 72    Ht 5' 9" (1.753 m)    Wt 113.9 " "kg (251 lb)    BMI 37.07 kg/m2     GENERAL: Normal development, well groomed.  HEENT:   HEENT:  Conjunctivae are non-erythematous; Pupils equal, round, and reactive to light; Nose is symmetrical; Nasal mucosa is pink and moist; Septum is midline; Inferior turbinates are hypertrophied; Nasal airflow is normal; Posterior pharynx is pink; Modified Mallampati:III-IV; Posterior palate is low; Tonsils not visualized; Uvula is normal and pink;Tongue is normal; Dentition is fair; No TMJ tenderness; Jaw opening and protrusion without click and without discomfort.  NECK: Supple. Neck circumference is 18 inches. No thyromegaly. No palpable nodes.     SKIN: On face and neck: No abrasions, no rashes, no lesions.  No subcutaneous nodules are palpable.  RESPIRATORY: Chest is clear to auscultation.  Normal chest expansion and non-labored breathing at rest.  CARDIOVASCULAR: Normal S1, S2.  No murmurs, gallops or rubs. No carotid bruits bilaterally.  No edema. No clubbing. No cyanosis.    NEURO: Oriented to time, place and person. Normal attention span and concentration. Gait normal.    PSYCH: Affect is full. Mood is normal  MUSCULOSKELETAL: Moves 4 extremities. Gait normal.         Using [facility]: yes      ASSESSMENT:    1. Sleep Apnea NEC. The patient symptomatically has periodic daytime sleepines  with exam findings of "a crowded oral airway and elevated body mass index. The patient has medical co-morbidities of CAD (h/o 2 MI), CHF and diabetes,  which can be worsened by DELISA. This warrants further investigation for possible obstructive sleep apnea.          PLAN:    Diagnostic: Polysomnogram in lab given CHF. The nature of this procedure and its indication was discussed with the patient. he would  like to come discuss PSG results if + for moderate or severe DELISA.        PSG in lab testing is warranted due to medical comorbidities, including CHF.      More than 25 minutes of this 45 minutes visit was spent in counseling: during " our discussion today, we talked about the etiology of  DELISA as well as the potential ramifications of untreated sleep apnea, which could include daytime sleepiness, hypertension, heart disease and/or stroke.  We discussed potential treatment options, which could include weight loss, body positioning, continuous positive airway pressure (CPAP), or referral for surgical consideration. Meanwhile, he  is urged to avoid supine sleep, weight gain and alcoholic beverages since all of these can worsen DELISA.     Precautions: The patient was advised to abstain from driving should he feel sleepy or drowsy.    Follow up: MD/NP  after the sleep study has been completed.     Thank you for allowing me the opportunity to participate in the care of your patient.    This visit summary will be sent to referring provider via inbasket

## 2017-02-20 NOTE — PATIENT INSTRUCTIONS
SLEEP LAB ([facility] or [facility]) will contact you to schedulethe sleep study. Their number is [phone] (ext 1). Please call them if you do not hear from them in 10 business days from now.  The [facility] is located on 7th floor of the [facility]; [place] lab is located in [facility].    SLEEP CLINIC (my assistant) will call you when the sleep study results are ready - if you have not heard from us by 2 weeks from the date of the study, please call [phone] (ext 2) or you can use [facility] to contact me.    You are advised to abstain from driving should you feel sleepy or drowsy.

## 2017-02-22 ENCOUNTER — CLINICAL SUPPORT (OUTPATIENT)
Dept: CARDIAC REHAB | Facility: CLINIC | Age: 67
End: 2017-02-22
Payer: MEDICARE

## 2017-02-22 DIAGNOSIS — I25.2 OLD MYOCARDIAL INFARCTION: ICD-10-CM

## 2017-02-22 DIAGNOSIS — I25.10 CORONARY ATHEROSCLEROSIS OF UNSPECIFIED TYPE OF VESSEL, NATIVE OR GRAFT: ICD-10-CM

## 2017-02-22 PROCEDURE — 93798 PHYS/QHP OP CAR RHAB W/ECG: CPT | Mod: S$GLB,,, | Performed by: INTERNAL MEDICINE

## 2017-02-27 ENCOUNTER — LAB VISIT (OUTPATIENT)
Dept: LAB | Facility: HOSPITAL | Age: 67
End: 2017-02-27
Attending: NURSE PRACTITIONER
Payer: MEDICARE

## 2017-02-27 DIAGNOSIS — I48.3 TYPICAL ATRIAL FLUTTER: ICD-10-CM

## 2017-02-27 DIAGNOSIS — Z79.01 LONG TERM (CURRENT) USE OF ANTICOAGULANTS: ICD-10-CM

## 2017-02-27 DIAGNOSIS — E11.51 TYPE 2 DIABETES MELLITUS WITH DIABETIC PERIPHERAL ANGIOPATHY WITHOUT GANGRENE, WITHOUT LONG-TERM CURRENT USE OF INSULIN: Chronic | ICD-10-CM

## 2017-02-27 LAB
ANION GAP SERPL CALC-SCNC: 8 MMOL/L
BUN SERPL-MCNC: 24 MG/DL
CALCIUM SERPL-MCNC: 8.9 MG/DL
CHLORIDE SERPL-SCNC: 104 MMOL/L
CO2 SERPL-SCNC: 29 MMOL/L
CREAT SERPL-MCNC: 1.9 MG/DL
EST. GFR  (AFRICAN AMERICAN): 41.3 ML/MIN/1.73 M^2
EST. GFR  (NON AFRICAN AMERICAN): 35.7 ML/MIN/1.73 M^2
GLUCOSE SERPL-MCNC: 110 MG/DL
INR PPP: 2.1
POTASSIUM SERPL-SCNC: 4 MMOL/L
PROTHROMBIN TIME: 21.4 SEC
SODIUM SERPL-SCNC: 141 MMOL/L

## 2017-02-27 PROCEDURE — 85610 PROTHROMBIN TIME: CPT

## 2017-02-27 PROCEDURE — 80048 BASIC METABOLIC PNL TOTAL CA: CPT

## 2017-02-27 PROCEDURE — 36415 COLL VENOUS BLD VENIPUNCTURE: CPT | Mod: PO

## 2017-02-27 PROCEDURE — 83036 HEMOGLOBIN GLYCOSYLATED A1C: CPT

## 2017-02-28 LAB
ESTIMATED AVG GLUCOSE: 157 MG/DL
HBA1C MFR BLD HPLC: 7.1 %

## 2017-03-01 ENCOUNTER — OFFICE VISIT (OUTPATIENT)
Dept: PODIATRY | Facility: CLINIC | Age: 67
End: 2017-03-01
Payer: MEDICARE

## 2017-03-01 VITALS
HEIGHT: 69 IN | DIASTOLIC BLOOD PRESSURE: 78 MMHG | SYSTOLIC BLOOD PRESSURE: 126 MMHG | BODY MASS INDEX: 37.18 KG/M2 | WEIGHT: 251 LBS | HEART RATE: 72 BPM

## 2017-03-01 DIAGNOSIS — E11.9 ENCOUNTER FOR COMPREHENSIVE DIABETIC FOOT EXAMINATION, TYPE 2 DIABETES MELLITUS: Primary | ICD-10-CM

## 2017-03-01 DIAGNOSIS — E11.42 DIABETIC POLYNEUROPATHY ASSOCIATED WITH TYPE 2 DIABETES MELLITUS: ICD-10-CM

## 2017-03-01 DIAGNOSIS — Z79.01 LONG TERM (CURRENT) USE OF ANTICOAGULANTS: ICD-10-CM

## 2017-03-01 DIAGNOSIS — Z95.828 S/P FEMORAL-POPLITEAL BYPASS SURGERY: ICD-10-CM

## 2017-03-01 PROCEDURE — 1159F MED LIST DOCD IN RCRD: CPT | Mod: S$GLB,,, | Performed by: PODIATRIST

## 2017-03-01 PROCEDURE — 3074F SYST BP LT 130 MM HG: CPT | Mod: S$GLB,,, | Performed by: PODIATRIST

## 2017-03-01 PROCEDURE — 1126F AMNT PAIN NOTED NONE PRSNT: CPT | Mod: S$GLB,,, | Performed by: PODIATRIST

## 2017-03-01 PROCEDURE — 99999 PR PBB SHADOW E&M-EST. PATIENT-LVL III: CPT | Mod: PBBFAC,,, | Performed by: PODIATRIST

## 2017-03-01 PROCEDURE — 99202 OFFICE O/P NEW SF 15 MIN: CPT | Mod: 25,S$GLB,, | Performed by: PODIATRIST

## 2017-03-01 PROCEDURE — 99499 UNLISTED E&M SERVICE: CPT | Mod: S$GLB,,, | Performed by: PODIATRIST

## 2017-03-01 PROCEDURE — 11721 DEBRIDE NAIL 6 OR MORE: CPT | Mod: Q9,S$GLB,, | Performed by: PODIATRIST

## 2017-03-01 PROCEDURE — 3060F POS MICROALBUMINURIA REV: CPT | Mod: S$GLB,,, | Performed by: PODIATRIST

## 2017-03-01 PROCEDURE — 3045F PR MOST RECENT HEMOGLOBIN A1C LEVEL 7.0-9.0%: CPT | Mod: S$GLB,,, | Performed by: PODIATRIST

## 2017-03-01 PROCEDURE — 1157F ADVNC CARE PLAN IN RCRD: CPT | Mod: S$GLB,,, | Performed by: PODIATRIST

## 2017-03-01 PROCEDURE — 1160F RVW MEDS BY RX/DR IN RCRD: CPT | Mod: S$GLB,,, | Performed by: PODIATRIST

## 2017-03-01 PROCEDURE — 2022F DILAT RTA XM EVC RTNOPTHY: CPT | Mod: S$GLB,,, | Performed by: PODIATRIST

## 2017-03-01 PROCEDURE — 3078F DIAST BP <80 MM HG: CPT | Mod: S$GLB,,, | Performed by: PODIATRIST

## 2017-03-01 NOTE — MR AVS SNAPSHOT
[facility]   [place] [address]  Phone: [phone]                  Chau Rodarte   3/1/2017 1:45 PM   Office Visit    Description:  Male : 1950   Provider:  Mervin García DPM   Department:  [facility]           Reason for Visit     Diabetes Mellitus     Diabetic Foot Exam           Diagnoses this Visit        Comments    Encounter for comprehensive diabetic foot examination, type 2 diabetes mellitus    -  Primary     S/P femoral-popliteal bypass surgery         Long term (current) use of anticoagulants         Diabetic polyneuropathy associated with type 2 diabetes mellitus                To Do List           Future Appointments        Provider Department Dept Phone    3/1/2017 4:00 PM REHAB, CARDIAC [place] - Card[facility] Rehab [phone]    3/3/2017 4:00 PM REHAB, CARDIAC [place] [facility] [phone]    3/6/2017 4:00 PM REHAB, CARDIAC [place] [facility] [phone]    3/7/2017 2:00 PM MD Jay[facility] Nagy [facility] - Cardiology [phone]    3/8/2017 2:30 PM EKG, APPT [facility] -[phone]      Goals (5 Years of Data)     None      Follow-Up and Disposition     Return in about 6 months (around 2017).    Follow-up and Disposition History      [facility] On Call     [facility] On Call Nurse Care Line -  Assistance  Registered nurses in the [facility] On Call Center provide clinical advisement, health education, appointment booking, and other advisory services.  Call for this free service at [phone].             Medications           Message regarding Medications     Verify the changes and/or additions to your medication regime listed below are the same as discussed with your clinician today.  If any of these changes or additions are incorrect, please notify your healthcare provider.             Verify that the below list of medications is an accurate representation of the medications you are currently taking.  If none reported, the list  "may be blank. If incorrect, please contact your healthcare provider. Carry this list with you in case of emergency.           Current Medications     aspirin 81 MG Chew Take 1 tablet (81 mg total) by mouth once daily.    atorvastatin (LIPITOR) 80 MG tablet Take 1 tablet (80 mg total) by mouth once daily.    clopidogrel (PLAVIX) 75 mg tablet Take 1 tablet (75 mg total) by mouth once daily.    ergocalciferol (ERGOCALCIFEROL) 50,000 unit Cap Take 1 capsule (50,000 Units total) by mouth twice a week.    furosemide (LASIX) 40 MG tablet Take 1 tablet (40 mg total) by mouth once daily.    insulin detemir (LEVEMIR FLEXTOUCH) 100 unit/mL (3 mL) SubQ InPn pen Inject 16 Units into the skin every evening.    isosorbide-hydrALAZINE 20-37.5 mg (BIDIL) 20-37.5 mg Tab Take 1 tablet by mouth 3 (three) times daily.    liraglutide 0.6 mg/0.1 mL, 18 mg/3 mL, subq PNIJ (VICTOZA 2-SAGAR) 0.6 mg/0.1 mL (18 mg/3 mL) PnIj Inject 0.6 mg daily x1 week, then 1.2 mg daily x1 week, then 1.8 mg daily thereafter    metoprolol succinate (TOPROL-XL) 50 MG 24 hr tablet Take 1 tablet (50 mg total) by mouth once daily.    pantoprazole (PROTONIX) 20 MG tablet Take 1 tablet (20 mg total) by mouth once daily.    pen needle, diabetic (BD ULTRA-FINE HANG PEN NEEDLES) 32 gauge x 5/32" Ndle Uses 2 times daily, on  insulin injections    warfarin (COUMADIN) 5 MG tablet Take 1 tablet (5 mg total) by mouth Daily.           Clinical Reference Information           Your Vitals Were     BP                   126/78           Blood Pressure          Most Recent Value    BP  126/78      Allergies as of 3/1/2017     No Known Allergies      Immunizations Administered on Date of Encounter - 3/1/2017     None      Instructions    How to Check Your Feet    Below are tips to help you look for foot problems. Try to check your feet at the same time each day, such as when you get out of bed in the morning.    · Check the top of each foot. The tops of toes, back of the heel, and " outer edge of the foot can get a lot of rubbing from poor-fitting shoes.    · Check the bottom of each foot. Daily wear and tear often leads to problems at pressure spots.    · Check the toes and nails. Fungal infections often occur between toes. Toenail problems can also be a sign of fungal infections or lead to breaks in the skin.    · Check your shoes, too. Loose objects inside a shoe can injure the foot. Use your hand to feel inside your shoes for things like rangel, loose stitching, or rough areas that could irritate your skin.        Diabetic Foot Care    Diabetes can lead to a number of different foot complications. Fortunately, most of these complications can be prevented with a little extra foot care. If diabetes is not well controlled, the high blood sugar can cause damage to blood vessels and result in poor circulation to the foot. When the skin does not get enough blood flow, it becomes prone to pressure sores and ulcers, which heal slowly.  High blood sugar can also damage nerves, interfering with the ability to feel pain and pressure. When you cant feel your foot normally, it is easy to injure your skin, bones and joints without knowing it. For these reasons diabetes increases the risk of fungal infections, bunions and ulcers. Deep ulcers can lead to bone infection. Gangrene is the most serious foot complication of diabetes. It usually occurs on the tips of the toes as blacked areas of skin. The black area is dead tissue. In severe cases, gangrene spreads to involve the entire toe, other toes and the entire foot. Foot or toe amputation may be required. Good foot care and blood sugar control can prevent this.    Home Care  1. Wear comfortable, proper fitting shoes.  2. Wash your feet daily with warm water and mild soap.  3. After drying, apply a moisturizing cream or lotion.  4. Check your feet daily for skin breaks, blisters, swelling, or redness. Look between your toes also.  5. Wear cotton socks  and change them every day.  6. Trim toe nails carefully and do not cut your cuticles.  7. Strive to keep your blood sugar under control with a combination of medicines, diet and activity.  8. If you smoke and have diabetes, it is very important that you stop. Smoking reduces blood flow to your foot.  9. Avoid activities that increase your risk of foot injury:  · Do not walk barefoot.  · Do not use heating pads or hot water bottles on your feet.  · Do not put your foot in a hot tub without first checking the temperature with your hand.  10) Schedule yearly foot exams.    Follow Up  with your doctor or as advised by our staff. Report any cut, puncture, scrape, other injury, blister, ingrown toenail or ulcer on your foot.    Get Prompt Medical Attention  if any of the following occur:  -- Open ulcer with pus draining from the wound  -- Increasing foot or leg pain  -- New areas of redness or swelling or tender areas of the foot    © 7954-6701 [employer]. [address] [place] [address]. All rights reserved. This information is not intended as a substitute for professional medical care. Always follow your healthcare professional's instructions.           Language Assistance Services     ATTENTION: Language assistance services are available, free of charge. Please call [phone].      ATENCIÓN: Si andreas bains, tiene a zaldivar disposición servicios gratuitos de asistencia lingüística. Llame al [phone].     YESENIA Ý: N?u b?n nói Ti?ng Vi?t, có các d?ch v? h? tr? ngôn ng? mi?n phí dành cho b?n. G?i s? [phone].         [facility] complies with applicable Federal civil rights laws and does not discriminate on the basis of race, color, national origin, age, disability, or sex.

## 2017-03-01 NOTE — PATIENT INSTRUCTIONS
How to Check Your Feet    Below are tips to help you look for foot problems. Try to check your feet at the same time each day, such as when you get out of bed in the morning.    · Check the top of each foot. The tops of toes, back of the heel, and outer edge of the foot can get a lot of rubbing from poor-fitting shoes.    · Check the bottom of each foot. Daily wear and tear often leads to problems at pressure spots.    · Check the toes and nails. Fungal infections often occur between toes. Toenail problems can also be a sign of fungal infections or lead to breaks in the skin.    · Check your shoes, too. Loose objects inside a shoe can injure the foot. Use your hand to feel inside your shoes for things like rangel, loose stitching, or rough areas that could irritate your skin.        Diabetic Foot Care    Diabetes can lead to a number of different foot complications. Fortunately, most of these complications can be prevented with a little extra foot care. If diabetes is not well controlled, the high blood sugar can cause damage to blood vessels and result in poor circulation to the foot. When the skin does not get enough blood flow, it becomes prone to pressure sores and ulcers, which heal slowly.  High blood sugar can also damage nerves, interfering with the ability to feel pain and pressure. When you cant feel your foot normally, it is easy to injure your skin, bones and joints without knowing it. For these reasons diabetes increases the risk of fungal infections, bunions and ulcers. Deep ulcers can lead to bone infection. Gangrene is the most serious foot complication of diabetes. It usually occurs on the tips of the toes as blacked areas of skin. The black area is dead tissue. In severe cases, gangrene spreads to involve the entire toe, other toes and the entire foot. Foot or toe amputation may be required. Good foot care and blood sugar control can prevent this.    Home Care  1. Wear comfortable, proper fitting  shoes.  2. Wash your feet daily with warm water and mild soap.  3. After drying, apply a moisturizing cream or lotion.  4. Check your feet daily for skin breaks, blisters, swelling, or redness. Look between your toes also.  5. Wear cotton socks and change them every day.  6. Trim toe nails carefully and do not cut your cuticles.  7. Strive to keep your blood sugar under control with a combination of medicines, diet and activity.  8. If you smoke and have diabetes, it is very important that you stop. Smoking reduces blood flow to your foot.  9. Avoid activities that increase your risk of foot injury:  · Do not walk barefoot.  · Do not use heating pads or hot water bottles on your feet.  · Do not put your foot in a hot tub without first checking the temperature with your hand.  10) Schedule yearly foot exams.    Follow Up  with your doctor or as advised by our staff. Report any cut, puncture, scrape, other injury, blister, ingrown toenail or ulcer on your foot.    Get Prompt Medical Attention  if any of the following occur:  -- Open ulcer with pus draining from the wound  -- Increasing foot or leg pain  -- New areas of redness or swelling or tender areas of the foot    © 4815-2170 [employer]. [address] [place] [address], [place] [address]. All rights reserved. This information is not intended as a substitute for professional medical care. Always follow your healthcare professional's instructions.

## 2017-03-01 NOTE — PROGRESS NOTES
Subjective:      Patient ID: Chau Rodarte is a 66 y.o. male.    Chief Complaint: Diabetes Mellitus and Diabetic Foot Exam    Chau DAMIAN is a 66 y.o. male who presents to the clinic upon referral from Dr. Reid  for evaluation and treatment of diabetic feet. Chau DAMIAN has a past medical history of Acute on chronic systolic CHF (congestive heart failure) (11/29/2016); CKD (chronic kidney disease) stage 2, GFR 60-89 ml/min (2/21/2016); HTN (hypertension) (3/3/2014); Hyperlipidemia (3/3/2014); NSTEMI (non-ST elevated myocardial infarction) (11/28/2016); Obesity (BMI 30-39.9) (11/29/2016); PVD (peripheral vascular disease); Stroke; Type 2 diabetes mellitus with diabetic peripheral angiopathy without gangrene, without long-term current use of insulin (10/24/2013); and Type 2 diabetes mellitus with diabetic polyneuropathy, without long-term current use of insulin (11/29/2016). Patient relates no major problem with feet. Only complaints today consist of thick, fungal toenails. Denies history of lower extremity wounds, infections and/or injury.      PCP: Shaggy Neal MD    Date Last Seen by PCP: October, 2017  Kvng, 1/6/2017    Current shoe gear: Casual shoes    Hemoglobin A1C   Date Value Ref Range Status   02/27/2017 7.1 (H) 4.5 - 6.2 % Final     Comment:     According to ADA guidelines, hemoglobin A1C <7.0% represents  optimal control in non-pregnant diabetic patients.  Different  metrics may apply to specific populations.   Standards of Medical Care in Diabetes - 2016.  For the purpose of screening for the presence of diabetes:  <5.7%     Consistent with the absence of diabetes  5.7-6.4%  Consistent with increasing risk for diabetes   (prediabetes)  >or=6.5%  Consistent with diabetes  Currently no consensus exists for use of hemoglobin A1C  for diagnosis of diabetes for children.     01/12/2017 8.2 (H) 4.5 - 6.2 % Final     Comment:     According to ADA guidelines, hemoglobin A1C <7.0% represents  optimal control in  non-pregnant diabetic patients.  Different  metrics may apply to specific populations.   Standards of Medical Care in Diabetes - 2016.  For the purpose of screening for the presence of diabetes:  <5.7%     Consistent with the absence of diabetes  5.7-6.4%  Consistent with increasing risk for diabetes   (prediabetes)  >or=6.5%  Consistent with diabetes  Currently no consensus exists for use of hemoglobin A1C  for diagnosis of diabetes for children.     11/28/2016 9.2 (H) 4.5 - 6.2 % Final     Comment:     According to ADA guidelines, hemoglobin A1C <7.0% represents  optimal control in non-pregnant diabetic patients.  Different  metrics may apply to specific populations.   Standards of Medical Care in Diabetes - 2016.  For the purpose of screening for the presence of diabetes:  <5.7%     Consistent with the absence of diabetes  5.7-6.4%  Consistent with increasing risk for diabetes   (prediabetes)  >or=6.5%  Consistent with diabetes  Currently no consensus exists for use of hemoglobin A1C  for diagnosis of diabetes for children.             Review of Systems   Constitution: Negative for chills, fever and malaise/fatigue.   Cardiovascular: Negative for chest pain, leg swelling, orthopnea and palpitations.   Respiratory: Negative for cough, shortness of breath and wheezing.    Skin: Positive for color change, dry skin and nail changes. Negative for itching, poor wound healing and rash.   Musculoskeletal: Negative for arthritis, gout, joint pain, joint swelling, muscle weakness and myalgias.   Neurological: Positive for numbness, paresthesias and sensory change. Negative for disturbances in coordination, dizziness, focal weakness and tremors.           Objective:      Physical Exam   Cardiovascular:   Dorsalis pedis and posterior tibial pulses are diminished Bilaterally. Toes are cool to touch. Feet are warm proximally.There is decreased digital hair. Skin is atrophic, slightly hyperpigmented, and mildly edematous        Musculoskeletal:        Right foot: There is no deformity.        Left foot: There is no deformity.   Musculoskeletal:  Muscle strength is 5/5 in all groups bilaterally.  Metatarsophalangeal and subtalar range of motion are within normal limits without crepitus bilaterally. There is limitation of ankle dorsiflexion with knees extended and flexed Bilaterally.       Feet:   Right Foot:   Protective Sensation: 10 sites tested. 8 sites sensed.   Skin Integrity: Negative for ulcer, blister, skin breakdown or callus.   Left Foot:   Protective Sensation: 10 sites tested. 7 sites sensed.   Skin Integrity: Negative for ulcer, blister, skin breakdown or callus.   Neurological:   Williamson-Isiah 5.07 monofilamant testing is diminished both feet. Sharp/dull sensation diminished Bilaterally. Light touch absent Bilaterally.       Skin:   Toenails 1-5 bilaterally are elongated by 2-3 mm, thickened by 2-3 mm, discolored/yellowed, dystrophic, brittle with subungual debris. No incurvation. Mild xerosis noted, bilat. No open wounds.                 Assessment:       Encounter Diagnoses   Name Primary?    Encounter for comprehensive diabetic foot examination, type 2 diabetes mellitus Yes    S/P femoral-popliteal bypass surgery     Long term (current) use of anticoagulants [Z79.01]          Plan:       Chau DAMIAN was seen today for diabetes mellitus and diabetic foot exam.    Diagnoses and all orders for this visit:    Encounter for comprehensive diabetic foot examination, type 2 diabetes mellitus    S/P femoral-popliteal bypass surgery    Long term (current) use of anticoagulants [Z79.01]      I counseled the patient on his conditions, their implications and medical management.        - Shoe inspection. Diabetic Foot Education. Patient reminded of the importance of good nutrition and blood sugar control to help prevent podiatric complications of diabetes. Patient instructed on proper foot hygeine. We discussed wearing proper shoe  gear, daily foot inspections, never walking without protective shoe gear, never putting sharp instruments to feet.    - With patient's permission, nails were aggressively reduced and debrided x 10 to their soft tissue attachment mechanically and with electric , removing all offending nail and debris. Patient relates relief following the procedure. He will continue to monitor the areas daily, inspect his feet, wear protective shoe gear when ambulatory, moisturizer to maintain skin integrity and follow in this office in approximately 6 months, sooner p.r.n.

## 2017-03-02 DIAGNOSIS — I48.92 ATRIAL FLUTTER, UNSPECIFIED TYPE: Primary | ICD-10-CM

## 2017-03-06 ENCOUNTER — CLINICAL SUPPORT (OUTPATIENT)
Dept: CARDIAC REHAB | Facility: CLINIC | Age: 67
End: 2017-03-06
Payer: MEDICARE

## 2017-03-06 DIAGNOSIS — I25.10 CORONARY ATHEROSCLEROSIS OF UNSPECIFIED TYPE OF VESSEL, NATIVE OR GRAFT: ICD-10-CM

## 2017-03-06 DIAGNOSIS — I25.2 OLD MYOCARDIAL INFARCTION: ICD-10-CM

## 2017-03-06 PROCEDURE — 93798 PHYS/QHP OP CAR RHAB W/ECG: CPT | Mod: S$GLB,,, | Performed by: INTERNAL MEDICINE

## 2017-03-06 NOTE — PROGRESS NOTES
Patient tolerated exercise session well with continued complaints of bilateral leg pain.  Patient intermittently starting & stopping on treadmill.  Will continue to monitor.

## 2017-03-07 ENCOUNTER — OFFICE VISIT (OUTPATIENT)
Dept: CARDIOLOGY | Facility: CLINIC | Age: 67
End: 2017-03-07
Payer: MEDICARE

## 2017-03-07 ENCOUNTER — HOSPITAL ENCOUNTER (OUTPATIENT)
Dept: CARDIOLOGY | Facility: CLINIC | Age: 67
Discharge: HOME | End: 2017-03-07
Payer: MEDICARE

## 2017-03-07 ENCOUNTER — TELEPHONE (OUTPATIENT)
Dept: CARDIOLOGY | Facility: CLINIC | Age: 67
End: 2017-03-07

## 2017-03-07 VITALS
HEIGHT: 69 IN | DIASTOLIC BLOOD PRESSURE: 59 MMHG | WEIGHT: 246.25 LBS | HEART RATE: 74 BPM | BODY MASS INDEX: 36.47 KG/M2 | SYSTOLIC BLOOD PRESSURE: 123 MMHG

## 2017-03-07 DIAGNOSIS — I25.10 CORONARY ARTERY DISEASE INVOLVING NATIVE CORONARY ARTERY OF NATIVE HEART WITHOUT ANGINA PECTORIS: ICD-10-CM

## 2017-03-07 DIAGNOSIS — I51.7 CARDIOMEGALY: ICD-10-CM

## 2017-03-07 DIAGNOSIS — I25.10 CORONARY ARTERY DISEASE INVOLVING NATIVE CORONARY ARTERY OF NATIVE HEART WITHOUT ANGINA PECTORIS: Primary | ICD-10-CM

## 2017-03-07 DIAGNOSIS — I48.3 TYPICAL ATRIAL FLUTTER: Chronic | ICD-10-CM

## 2017-03-07 DIAGNOSIS — I25.5 ISCHEMIC CARDIOMYOPATHY: ICD-10-CM

## 2017-03-07 DIAGNOSIS — E78.5 HYPERLIPIDEMIA, UNSPECIFIED HYPERLIPIDEMIA TYPE: ICD-10-CM

## 2017-03-07 DIAGNOSIS — I10 ESSENTIAL HYPERTENSION: ICD-10-CM

## 2017-03-07 LAB
DIASTOLIC DYSFUNCTION: YES
RETIRED EF AND QEF - SEE NOTES: 45 (ref 55–65)

## 2017-03-07 PROCEDURE — 1126F AMNT PAIN NOTED NONE PRSNT: CPT | Mod: GC,S$GLB,, | Performed by: INTERNAL MEDICINE

## 2017-03-07 PROCEDURE — 3078F DIAST BP <80 MM HG: CPT | Mod: GC,S$GLB,, | Performed by: INTERNAL MEDICINE

## 2017-03-07 PROCEDURE — 99213 OFFICE O/P EST LOW 20 MIN: CPT | Mod: GC,S$GLB,, | Performed by: INTERNAL MEDICINE

## 2017-03-07 PROCEDURE — 93306 TTE W/DOPPLER COMPLETE: CPT | Mod: S$GLB,,, | Performed by: INTERNAL MEDICINE

## 2017-03-07 PROCEDURE — 1159F MED LIST DOCD IN RCRD: CPT | Mod: GC,S$GLB,, | Performed by: INTERNAL MEDICINE

## 2017-03-07 PROCEDURE — 96374 THER/PROPH/DIAG INJ IV PUSH: CPT | Mod: 59,S$GLB,, | Performed by: INTERNAL MEDICINE

## 2017-03-07 PROCEDURE — 99999 PR PBB SHADOW E&M-EST. PATIENT-LVL III: CPT | Mod: PBBFAC,GC,, | Performed by: INTERNAL MEDICINE

## 2017-03-07 PROCEDURE — 1157F ADVNC CARE PLAN IN RCRD: CPT | Mod: GC,S$GLB,, | Performed by: INTERNAL MEDICINE

## 2017-03-07 PROCEDURE — 3074F SYST BP LT 130 MM HG: CPT | Mod: GC,S$GLB,, | Performed by: INTERNAL MEDICINE

## 2017-03-07 PROCEDURE — 1160F RVW MEDS BY RX/DR IN RCRD: CPT | Mod: GC,S$GLB,, | Performed by: INTERNAL MEDICINE

## 2017-03-07 NOTE — MR AVS SNAPSHOT
[facility] - Cardiology  [address]  [facility] [address]  Phone: [phone]                  Chau Rodarte   3/7/2017 2:00 PM   Office Visit    Description:  Male : 1950   Provider:  Meño Simpson MD   Department:  [facility]           Reason for Visit     Coronary Artery Disease                To Do List           Future Appointments        Provider Department Dept Phone    3/7/2017 2:30 PM ECHO, MAIN CAMPUS [facility] - Echo/Stress Lab [phone]    3/8/2017 2:30 PM EKG, APPT [facility] - [phone]    3/8/2017 3:20 PM Cayden Moreno MD [facility] - Arrhythmia [phone]    3/8/2017 4:00 PM REHAB, CARDIAC [place] - Cardiac Rehab [phone]    3/10/2017 4:00 PM REHAB, CARDIAC [place] - Cardiac Rehab [phone]      Goals (5 Years of Data)     None      [facility]     [facility] On Call Nurse [facility] Line -  Assistance  Registered nurses in the [facility] On Call Center provide clinical advisement, health education, appointment booking, and other advisory services.  Call for this free service at [phone].             Medications           Message regarding Medications     Verify the changes and/or additions to your medication regime listed below are the same as discussed with your clinician today.  If any of these changes or additions are incorrect, please notify your healthcare provider.             Verify that the below list of medications is an accurate representation of the medications you are currently taking.  If none reported, the list may be blank. If incorrect, please contact your healthcare provider. Carry this list with you in case of emergency.           Current Medications     aspirin 81 MG Chew Take 1 tablet (81 mg total) by mouth once daily.    atorvastatin (LIPITOR) 80 MG tablet Take 1 tablet (80 mg total) by mouth once daily.    clopidogrel (PLAVIX) 75 mg tablet Take 1 tablet (75 mg total) by mouth once daily.    ergocalciferol (ERGOCALCIFEROL)  "50,000 unit Cap Take 1 capsule (50,000 Units total) by mouth twice a week.    furosemide (LASIX) 40 MG tablet Take 1 tablet (40 mg total) by mouth once daily.    insulin detemir (LEVEMIR FLEXTOUCH) 100 unit/mL (3 mL) SubQ InPn pen Inject 16 Units into the skin every evening.    isosorbide-hydrALAZINE 20-37.5 mg (BIDIL) 20-37.5 mg Tab Take 1 tablet by mouth 3 (three) times daily.    liraglutide 0.6 mg/0.1 mL, 18 mg/3 mL, subq PNIJ (VICTOZA 2-SAGAR) 0.6 mg/0.1 mL (18 mg/3 mL) PnIj Inject 0.6 mg daily x1 week, then 1.2 mg daily x1 week, then 1.8 mg daily thereafter    metoprolol succinate (TOPROL-XL) 50 MG 24 hr tablet Take 1 tablet (50 mg total) by mouth once daily.    pantoprazole (PROTONIX) 20 MG tablet Take 1 tablet (20 mg total) by mouth once daily.    pen needle, diabetic (BD ULTRA-FINE HANG PEN NEEDLES) 32 gauge x 5/32" Ndle Uses 2 times daily, on  insulin injections    warfarin (COUMADIN) 5 MG tablet Take 1 tablet (5 mg total) by mouth Daily.           Clinical Reference Information           Your Vitals Were     BP Pulse Height Weight BMI    123/59 (BP Location: Left arm, Patient Position: Sitting, BP Method: Automatic) 74 5' 9" (1.753 m) 111.7 kg (246 lb 4.1 oz) 36.37 kg/m2      Blood Pressure          Most Recent Value    Right Arm BP - Sitting  110/55    Left Arm BP - Sitting  123/59    BP  (!)  123/59      Allergies as of 3/7/2017     No Known Allergies      Immunizations Administered on Date of Encounter - 3/7/2017     None      Language Assistance Services     ATTENTION: Language assistance services are available, free of charge. Please call [phone].      ATENCIÓN: Si andreas bains, tiene a zaldivar disposición servicios gratuitos de asistencia lingüística. Llame al [phone].     CHÚ Ý: N?u b?n nói Ti?ng Vi?t, có các d?ch v? h? tr? ngôn ng? mi?n phí dành cho b?n. G?i s? [phone].         [facility] complies with applicable Federal civil rights laws and does not discriminate on the " basis of race, color, national origin, age, disability, or sex.

## 2017-03-07 NOTE — PROGRESS NOTES
Subjective:    Patient ID:  Chau Rodarte is a 66 y.o. male who presents for follow-up of Coronary Artery Disease (6 months fu)      HPI Comments: 66 y.o male with a PMHx of CAD (s/p PCI on 12/5 of pLAD, mLAD, distal LM and pLCx), ICM (EF=20-25%), CKD III, T2DM, HTN, PVD (s/p Right Fem-Pop bypass, Left SFA stent) that presents today as a hospital follow-up.    Patient was initially admitted on 11/28 from the ER after presenting with worsening VELÁSQUEZ and SOB over a two week period. Treated as NSTEMI, found to have multi-vessel disease, evaluate by CTS, not a candidate for CABG. Patient underwent MV-PCI as above with Impella support and tolerated procedure without significant complication. Since being discharge, patient denies chest pain, SOB, VELÁSQUEZ, palpitations, PND, orthopnea. Reports medical compliance and has been walking as tolerated. Has been undergoing Phase II of cardiac rehab with good results. Reports increased exercise tolerance and no reports recurrence of previous sx prior to PCI. Underwent CTI RFA for typical AFL with Dr. Cayden Moreno on 2/3 after being found to have typical AFL during cardiac rehab. Now on Coumadin also.    Coronary Artery Disease   Pertinent negatives include no chest pain, dizziness, leg swelling, palpitations or shortness of breath.       Review of Systems   Constitution: Negative for chills and fever.   HENT: Negative for headaches, hoarse voice and sore throat.    Cardiovascular: Negative for chest pain, claudication, cyanosis, dyspnea on exertion, irregular heartbeat, leg swelling, near-syncope, orthopnea, palpitations, paroxysmal nocturnal dyspnea and syncope.   Respiratory: Negative for cough, hemoptysis and shortness of breath.    Musculoskeletal: Negative for back pain, joint pain and joint swelling.   Gastrointestinal: Negative for abdominal pain, constipation, diarrhea, hematochezia, melena, nausea and vomiting.   Genitourinary: Negative for dysuria, hematuria and incomplete  emptying.   Neurological: Negative for dizziness and light-headedness.   Psychiatric/Behavioral: Negative for altered mental status, depression and suicidal ideas. The patient is not nervous/anxious.         Objective:    Physical Exam   Constitutional: He is oriented to person, place, and time. He appears well-developed and well-nourished. No distress.   HENT:   Head: Normocephalic and atraumatic.   Mouth/Throat: Oropharynx is clear and moist. No oropharyngeal exudate.   Eyes: Conjunctivae and EOM are normal. Pupils are equal, round, and reactive to light. Right eye exhibits no discharge. Left eye exhibits no discharge. No scleral icterus.   Neck: Normal range of motion. Neck supple. No JVD present. No tracheal deviation present. No thyromegaly present.   Cardiovascular: Normal rate, regular rhythm, normal heart sounds and intact distal pulses.  Exam reveals no gallop and no friction rub.    No murmur heard.  Pulmonary/Chest: Effort normal and breath sounds normal. No respiratory distress. He has no wheezes. He has no rales.   Abdominal: Soft. Bowel sounds are normal. He exhibits no distension and no mass. There is no tenderness. There is no rebound and no guarding.   Musculoskeletal: Normal range of motion.   Lymphadenopathy:     He has no cervical adenopathy.   Neurological: He is alert and oriented to person, place, and time. No cranial nerve deficit.   Skin: Skin is warm and dry. He is not diaphoretic.   Psychiatric: He has a normal mood and affect. His behavior is normal. Judgment and thought content normal.   Nursing note and vitals reviewed.      Vitals:    03/07/17 1359   BP: (!) 123/59   Pulse: 74         Assessment:       1. Coronary artery disease involving native coronary artery of native heart without angina pectoris    2. Cardiomegaly    3. Ischemic cardiomyopathy    4. Typical atrial flutter    5. Essential hypertension    6. Hyperlipidemia, unspecified hyperlipidemia type      Patient presented with  NSTEMI, found to have MV CAD, underwent complicated MV-PCI with impella support, no immediate complications since surgery. Compliant with medications. Progressing with therapy. Underwent CTI RFA in Feb for typical AFL.      Plan:          Chau DAMIAN was seen today for shortness of breath.    Diagnoses and all orders for this visit:    Coronary artery disease involving native coronary artery of native heart without angina pectoris  - Continue aspirin, Plavix, statin  - Continue Toprol and Bidil    Essential hypertension  - Well controlled, tolerating regimen of Bidil and Toprol XL well, continue    Type 2 diabetes mellitus with diabetic polyneuropathy, without long-term current use of insulin       -     Management per endocrine    Ischemic cardiomyopathy  -     2D Echo w/ Color Flow Doppler; Future - Today to reassess EF        -     Patient has appointment with Dr. Cayden Moreno with EP tomorrow. Repeat echo to determine need for ICD for primary prevention of SCA.      RTC in 3 months    Patient seen and discussed with Dr. Cayden Vanegas.     Meño Simpson MD, MPH  PGY-IV  Cardiovascular Disease Fellow

## 2017-03-07 NOTE — PROGRESS NOTES
Pt consented for contrast administration. No contraindications exist. 22g IV started in right AC. Optison administered. Pt tolerated well. IV discontinued. Catheter intact. Pressure dressing applied. Echo complete.

## 2017-03-07 NOTE — TELEPHONE ENCOUNTER
Notified patient that EF had improved to 45%. Continue current medication regimen. Keep follow-up with Dr. Cayden Moreno.

## 2017-03-08 ENCOUNTER — OFFICE VISIT (OUTPATIENT)
Dept: ELECTROPHYSIOLOGY | Facility: CLINIC | Age: 67
End: 2017-03-08
Payer: MEDICARE

## 2017-03-08 VITALS
HEIGHT: 69 IN | WEIGHT: 244 LBS | BODY MASS INDEX: 36.14 KG/M2 | SYSTOLIC BLOOD PRESSURE: 87 MMHG | DIASTOLIC BLOOD PRESSURE: 55 MMHG | HEART RATE: 71 BPM

## 2017-03-08 DIAGNOSIS — I25.5 ISCHEMIC CARDIOMYOPATHY: ICD-10-CM

## 2017-03-08 DIAGNOSIS — I63.9 CRYPTOGENIC STROKE: Chronic | ICD-10-CM

## 2017-03-08 DIAGNOSIS — Z95.828 S/P FEMORAL-POPLITEAL BYPASS SURGERY: ICD-10-CM

## 2017-03-08 DIAGNOSIS — E11.42 TYPE 2 DIABETES MELLITUS WITH DIABETIC POLYNEUROPATHY, WITHOUT LONG-TERM CURRENT USE OF INSULIN: Chronic | ICD-10-CM

## 2017-03-08 DIAGNOSIS — I48.3 TYPICAL ATRIAL FLUTTER: Primary | Chronic | ICD-10-CM

## 2017-03-08 DIAGNOSIS — I25.10 CORONARY ARTERY DISEASE INVOLVING NATIVE CORONARY ARTERY OF NATIVE HEART WITHOUT ANGINA PECTORIS: ICD-10-CM

## 2017-03-08 DIAGNOSIS — I48.92 ATRIAL FLUTTER, UNSPECIFIED TYPE: ICD-10-CM

## 2017-03-08 DIAGNOSIS — I10 ESSENTIAL HYPERTENSION: ICD-10-CM

## 2017-03-08 DIAGNOSIS — Z79.01 LONG TERM (CURRENT) USE OF ANTICOAGULANTS: ICD-10-CM

## 2017-03-08 DIAGNOSIS — E66.9 OBESITY (BMI 30-39.9): Chronic | ICD-10-CM

## 2017-03-08 PROCEDURE — 1159F MED LIST DOCD IN RCRD: CPT | Mod: S$GLB,,, | Performed by: INTERNAL MEDICINE

## 2017-03-08 PROCEDURE — 3074F SYST BP LT 130 MM HG: CPT | Mod: S$GLB,,, | Performed by: INTERNAL MEDICINE

## 2017-03-08 PROCEDURE — 1126F AMNT PAIN NOTED NONE PRSNT: CPT | Mod: S$GLB,,, | Performed by: INTERNAL MEDICINE

## 2017-03-08 PROCEDURE — 99999 PR PBB SHADOW E&M-EST. PATIENT-LVL III: CPT | Mod: PBBFAC,,, | Performed by: INTERNAL MEDICINE

## 2017-03-08 PROCEDURE — 99215 OFFICE O/P EST HI 40 MIN: CPT | Mod: S$GLB,,, | Performed by: INTERNAL MEDICINE

## 2017-03-08 PROCEDURE — 93005 ELECTROCARDIOGRAM TRACING: CPT | Mod: S$GLB,,, | Performed by: INTERNAL MEDICINE

## 2017-03-08 PROCEDURE — 3045F PR MOST RECENT HEMOGLOBIN A1C LEVEL 7.0-9.0%: CPT | Mod: S$GLB,,, | Performed by: INTERNAL MEDICINE

## 2017-03-08 PROCEDURE — 3060F POS MICROALBUMINURIA REV: CPT | Mod: S$GLB,,, | Performed by: INTERNAL MEDICINE

## 2017-03-08 PROCEDURE — 2022F DILAT RTA XM EVC RTNOPTHY: CPT | Mod: S$GLB,,, | Performed by: INTERNAL MEDICINE

## 2017-03-08 PROCEDURE — 93010 ELECTROCARDIOGRAM REPORT: CPT | Mod: S$GLB,,, | Performed by: INTERNAL MEDICINE

## 2017-03-08 PROCEDURE — 1157F ADVNC CARE PLAN IN RCRD: CPT | Mod: S$GLB,,, | Performed by: INTERNAL MEDICINE

## 2017-03-08 PROCEDURE — 3078F DIAST BP <80 MM HG: CPT | Mod: S$GLB,,, | Performed by: INTERNAL MEDICINE

## 2017-03-08 PROCEDURE — 1160F RVW MEDS BY RX/DR IN RCRD: CPT | Mod: S$GLB,,, | Performed by: INTERNAL MEDICINE

## 2017-03-08 PROCEDURE — 99499 UNLISTED E&M SERVICE: CPT | Mod: S$GLB,,, | Performed by: INTERNAL MEDICINE

## 2017-03-08 NOTE — LETTER
March 8, 2017      Meño Simpson MD  [address]  [facility] [address]           [facility] - Arrhythmia  [address]  [facility] [address]  Phone: [phone]  Fax: [phone]          Patient: Chau Roadrte   MR Number: [id number]   YOB: 1950   Date of Visit: 3/8/2017       Dear Dr. Meño Simpson:    Thank you for referring Chau Rodarte to me for evaluation. Attached you will find relevant portions of my assessment and plan of care.    If you have questions, please do not hesitate to call me. I look forward to following Chau Rodarte along with you.    Sincerely,    Cayden Moreno MD    Enclosure  CC:  No Recipients    If you would like to receive this communication electronically, please contact [email or web address] or [phone] to request more information on [facility] access.    For providers and/or their staff who would like to refer a patient to [facility], please contact us through our one-stop-shop provider referral line, [facility], at [phone].    If you feel you have received this communication in error or would no longer like to receive these types of communications, please e-mail [email or web address]

## 2017-03-08 NOTE — PROGRESS NOTES
Subjective:    Patient ID:  Chau Rodarte is a 66 y.o. male who presents for follow-up of Atrial Flutter and Congestive Heart Failure    Referring Cardiologists: Meño Gan MD, Yanick Holland MD and Robson Sims MD    HPI  Prior Hx:  I had the pleasure of seeing Mr. Rodarte today in our electrophysiology clinic in consultation for his newly discovered atrial arrhythmia. As you are aware he is a pleasant 66 year-old man with extensive vascular disease including right femoral-popliteal bypass and left SFA stent, ischemic cardiomyopathy with prior LVEF of 35% and currently 20-25% s/p multivessel PCI 12/5/2016 (PCI to prox-LAD/LCX, distal LMCA and mid LAD) with NYHA class II symptoms, chronic kidney disease stage 3, cryptogenic stroke, hypertension and poorly controlled diabetes mellitus. He was admitted in November of 2016 for progressive heart failure symptoms. He was also found to have a NSTEMI in this setting. He was diuresed and underwent multi-vessel PCI as stated, after turned down for surgery. He was feeling better and has been undergoing cardiac rehab at the [facility]. When he arrived yesterday he was noted to not be in sinus rhythm on the monitor. An electrocardiogram was performed which disclosed atrial flutter with variable AV conduction. He had no significant symptoms and was referred here for further evaluation. He has no history of palpitations. He does report a stroke of unknown etiology in 2006. His symptoms were facial droop, dysarthria and right sided weakness. He has been on aspirin/plavix since. He has never been diagnosed with an atrial arrhythmia.     I reviewed all available electrocardiograms in EPIC which disclose normal sinus rhythm until 1/25/2017 electrocardiogram which revealed atrial flutter with variable AV conduction (ventricular rate of 84 bpm).    ECHO 3/2017 CONCLUSIONS     1 - Eccentric hypertrophy.     2 - Mildly depressed left ventricular systolic function (EF 45-50%).  "    3 - Normal right ventricular systolic function .     4 - Grade I left ventricular diastolic dysfunction.     5 - Mildly enlarged ascending aorta.       ROS     Objective:    Physical Exam      Assessment:       1. Typical atrial flutter    2. Atrial flutter, unspecified type    3. Ischemic cardiomyopathy    4. Coronary artery disease involving native coronary artery of native heart without angina pectoris    5. Essential hypertension    6. Type 2 diabetes mellitus with diabetic polyneuropathy, without long-term current use of insulin    7. Obesity (BMI 30-39.9)    8. Long term (current) use of anticoagulants [Z79.01]    9. S/P femoral-popliteal bypass surgery    10. Cryptogenic stroke, remote history (2006)         Plan:       In summary, Mr. Rodarte is a pleasant 66 year-old man with extensive vascular disease including right femoral-popliteal bypass and left SFA stent, ischemic cardiomyopathy with prior LVEF of 20-25% now improved to 45% s/p multivessel PCI 12/5/2016 (PCI to prox-LAD/LCX, distal LMCA and mid LAD) with NYHA class II symptoms, chronic kidney disease stage 3, cryptogenic stroke, hypertension and poorly controlled diabetes mellitus who presents for evaluation of newly diagnosed typical counterclockwise atrial flutter. He had no perceived symptoms at the time of diagnosis. However his [id number] score is 7 carrying a 14% per year risk of stroke. He has already had a stroke. While he is certainly at risk for atrial fibrillation, this has never been seen in him. We discussed this and his atrial flutter, even if he does not "feel" it, may worsen his heart failure by loss of AV synchrony as well as carries its stroke risk. Since this is the only atrial arrhythmia we discuss benefit of RFA of the CTI which he had performed 4 weeks ago.  His EF is improved post RFA of CTI and multivessel PCI.  He has no indication now for ICD implantation.  Due to his high recurrent stroke risk we discussed indefinite " anticoagulation.  He is however at a high bleeding risk due to need for dual-antiplatelet therapy.  Since we have never seen atrial fibrillation in him and he has a remote history of cryptogenic stroke and he is now s/p RFA of CTI for typical atrial flutter we discussed benefit of ILR implantation for atrial fibrillation surveillance in setting of remote stroke history, which could have been from occult atrial flutter.  He agreed and desires to minimize his bleeding risk however make a safe decision for atrial fibrillation monitoring.    Plan  ILR implantation for atrial fibrillation surveillance in setting of remote stroke with recent history of typical atrial flutter s/p RFA of CTI  Stop coumadin 4 days prior to procedure.    Thank you for allowing me to participate in the care of this patient. Please do not hesitate to call me with any questions or concerns.    Cayden Moreno MD, PhD  Cardiac Electrophysiology

## 2017-03-10 ENCOUNTER — CLINICAL SUPPORT (OUTPATIENT)
Dept: CARDIAC REHAB | Facility: CLINIC | Age: 67
End: 2017-03-10
Payer: MEDICARE

## 2017-03-10 DIAGNOSIS — I25.10 CORONARY ATHEROSCLEROSIS OF UNSPECIFIED TYPE OF VESSEL, NATIVE OR GRAFT: ICD-10-CM

## 2017-03-10 DIAGNOSIS — I25.2 OLD MYOCARDIAL INFARCTION: ICD-10-CM

## 2017-03-10 PROCEDURE — 93798 PHYS/QHP OP CAR RHAB W/ECG: CPT | Mod: S$GLB,,, | Performed by: INTERNAL MEDICINE

## 2017-03-10 NOTE — PROGRESS NOTES
Pt arrived late and completed Nu Step for 20 minutes. Pt attempted to walk on treadmill but had to keep stopping due to calf pain and ended up only completing 8 minutes.  Pt was unable to do any weights due to time constraints.

## 2017-03-13 ENCOUNTER — TELEPHONE (OUTPATIENT)
Dept: ELECTROPHYSIOLOGY | Facility: CLINIC | Age: 67
End: 2017-03-13

## 2017-03-13 DIAGNOSIS — I48.92 ATRIAL FLUTTER, UNSPECIFIED TYPE: Primary | ICD-10-CM

## 2017-03-13 DIAGNOSIS — I48.3 TYPICAL ATRIAL FLUTTER: Primary | Chronic | ICD-10-CM

## 2017-03-14 ENCOUNTER — PATIENT MESSAGE (OUTPATIENT)
Dept: ELECTROPHYSIOLOGY | Facility: CLINIC | Age: 67
End: 2017-03-14

## 2017-03-14 ENCOUNTER — TELEPHONE (OUTPATIENT)
Dept: ELECTROPHYSIOLOGY | Facility: CLINIC | Age: 67
End: 2017-03-14

## 2017-03-14 NOTE — TELEPHONE ENCOUNTER
Pt confirmed appt for Holter placement on 3/16, however, would prefer an afternoon appt at 330 pm. Would also like a phone call from Dr. Moreno for questions regarding procedure on 2/3/17. I will send Dr. Moreno a message. Denied further questions, needs, and concerns.   June stated OK for Pt to have Holter placed at 330 pm.

## 2017-03-14 NOTE — TELEPHONE ENCOUNTER
----- Message from Anay Ware RN sent at 3/13/2017  1:54 PM CDT -----  Fiona Da Silva,     This pt is scheduled for an ILR procedure with MS. He needs a holter first. I put the order in. Can you let the pt know and assist with scheduling?     Thanks!  Anay

## 2017-03-14 NOTE — TELEPHONE ENCOUNTER
I called Pt to discuss Holter monitor placement, however, he did not answer. I left a detailed voicemail asking him to call me back at his earliest convenience. I will continue to follow up.

## 2017-03-14 NOTE — TELEPHONE ENCOUNTER
IMPLANTABLE LOOP RECORDER EDUCATION CHECKLIST    3/16/17 @ 330 PM: 48 Holter Monitor Placement @ [facility]: Arrhythmia Clinic.     3/24/17 @ 6 AM  REPORT TO THE CARDIOLOGY WAITING AREA ON 3RD FLOOR OF THE HOSPITAL (DO NOT REPORT TO CLINIC)  Directions for Reporting to Cardiology Waiting Area in the Hospital  If you park in the Parking Garage:  Take elevators to the 2nd floor  Walk up ramp and turn right by Gold Elevators  Take elevator to the 3rd floor  Upon exiting the elevator, turn away from the clinic areas  Walk long joseph around to front of hospital to area with windows overlooking [facility]  Check in at Reception Desk  OR  If family is dropping you off:  Have them drop you off at the front of the Hospital  (Near the ER, where all the flags are hung).  Take the E elevators to the 3rd floor.  Check in at the Reception Desk in the waiting room.    WASH WITH HIBICLENS ANTIBACTERIAL SOAP (SUCH AS DIAL) ON THE NIGHT BEFORE AND THE MORNING OF YOUR PROCEDURE PRIOR TO ARRIVAL    DO NOT EAT OR DRINK ANYTHING 6 HOURS BEFORE YOUR PROCEDURE    MEDICATIONS:  TAKE HALF of your insulin detemir (Levemir) the night before your procedure - 8 units.   HOLD ALL diabetic medications the morning of your procedure - Victoza.   HOLD warfarin (Coumadin) four (4) days prior to your procedure. YOUR LAST DOSE: Monday, March 20, 2017.   YOU MAY TAKE YOUR OTHER NORMAL MORNING MEDICATIONS WITH A SIP OF WATER, including aspirin.     YOU WILL GO HOME AFTER YOUR PROCEDURE    YOUR PAIN WILL ME MONITORED BY THE SEDATION NURSE DURING YOUR PROCEDURE    THE ABOVE INSTRUCTIONS WERE GIVEN TO THE PATIENT VERBALLY AND THEY VERBALIZED UNDERSTANDING. THEY DO NOT REQUIRE ANY SPECIAL NEEDS AND DO NOT HAVE ANY LEARNING BARRIERS.     Any need to reschedule or cancel procedures, or any questions regarding your procedures should be addressed directly with the Arrhythmia Department Nurses at the following phone number: [phone]

## 2017-03-15 ENCOUNTER — TELEPHONE (OUTPATIENT)
Dept: ELECTROPHYSIOLOGY | Facility: CLINIC | Age: 67
End: 2017-03-15

## 2017-03-15 NOTE — TELEPHONE ENCOUNTER
----- Message from Cayden Moreno MD sent at 3/14/2017  5:18 PM CDT -----  Thanks,  I spoke with him    Cayden  ----- Message -----     From: Rekha Martinez, RN     Sent: 3/14/2017   3:29 PM       To: Cayden Moreno MD    Hi Dr. Moreno,     Mr. Rodarte is going to have his 48 HR Holter placed on 3/16 and ILR Implantation is scheduled 3/24 (Going to hold coumadin 4 days prior). He would like to speak with you in reference to EP procedure 2/3. He would not give me further information, however, has some questions. Please let me know if I can assist in any other way.     Thanks,     RV

## 2017-03-16 ENCOUNTER — CLINICAL SUPPORT (OUTPATIENT)
Dept: ELECTROPHYSIOLOGY | Facility: CLINIC | Age: 67
End: 2017-03-16
Payer: MEDICARE

## 2017-03-16 DIAGNOSIS — I48.92 ATRIAL FLUTTER, UNSPECIFIED TYPE: ICD-10-CM

## 2017-03-16 PROCEDURE — 93224 XTRNL ECG REC UP TO 48 HRS: CPT | Mod: S$GLB,,, | Performed by: INTERNAL MEDICINE

## 2017-03-17 ENCOUNTER — NURSE TRIAGE (OUTPATIENT)
Dept: ADMINISTRATIVE | Facility: CLINIC | Age: 67
End: 2017-03-17

## 2017-03-17 NOTE — PROGRESS NOTES
2/27 INR inadvertently not addressed, however, it was therapeutic.  Scheduled patient for INR 3/20 to get current level.  He is scheduled for ILR implantation 3/24 and per cardiology, is to hold warfarin x 4 days prior.  Per patient, he may not resume warfarin after ILR placement.

## 2017-03-17 NOTE — TELEPHONE ENCOUNTER
"  Reason for Disposition   [1] Bruise on head/face, chest, or abdomen AND [2]  taking Coumadin (warfarin) or other strong blood thinner, or known bleeding disorder (e.g., thrombocytopenia)    Answer Assessment - Initial Assessment Questions  1. APPEARANCE of BRUISE: "Describe the bruise."       Both feet are bruised  2. SIZE: "How large is the bruise?"       Entire foot involved  3. NUMBER: "How many bruises are there?"       Bruises on stomach and arm    5. ONSET: "How long ago did the bruise occur?"       X 1 week  6. CAUSE: "Tell me how it happened."      Hurts to walk  7. MEDICAL HISTORY: "Do you have any medical problems that can cause easy bruising or bleeding?" (e.g., leukemia, liver disease, recent chemotherapy)      On coumadin  8. MEDICATIONS : "Do you take any medications which thin the blood such as: aspirin, heparin, ibuprofen (NSAIDS), Plavix, or Coumadin?"      On coumadin  9. OTHER SYMPTOMS: "Do you have any other symptoms?"  (e.g., weakness, dizziness, pain, fever, nosebleed, blood in urine/stool)      + blood in stool this PM/ + pain in feet/ denies the rest  10. PREGNANCY: "Is there any chance you are pregnant?" "When was your last menstrual period?"        n/a    Protocols used: ST BRUISES-A-AH    Feet have been red and tender x 1 week/ now they are bruised looking/ this PM passed BRB mixed in stool/ denies bleeding from gums or hematuria/ also notes bruises on abdomen and arms/ on coumadin/ last level checked 2 weeks ago    To ER for evaluation and tx    Cortney Jones RN  "

## 2017-03-20 ENCOUNTER — ANESTHESIA EVENT (OUTPATIENT)
Dept: ENDOSCOPY | Facility: HOSPITAL | Age: 67
DRG: 378 | End: 2017-03-20
Payer: MEDICARE

## 2017-03-20 ENCOUNTER — HOSPITAL ENCOUNTER (INPATIENT)
Facility: HOSPITAL | Age: 67
LOS: 2 days | Discharge: HOME | DRG: 378 | End: 2017-03-22
Attending: EMERGENCY MEDICINE | Admitting: EMERGENCY MEDICINE
Payer: MEDICARE

## 2017-03-20 ENCOUNTER — TELEPHONE (OUTPATIENT)
Dept: SLEEP MEDICINE | Facility: OTHER | Age: 67
End: 2017-03-20

## 2017-03-20 DIAGNOSIS — I48.3 TYPICAL ATRIAL FLUTTER: Chronic | ICD-10-CM

## 2017-03-20 DIAGNOSIS — N17.9 AKI (ACUTE KIDNEY INJURY): ICD-10-CM

## 2017-03-20 DIAGNOSIS — K92.2 GASTROINTESTINAL HEMORRHAGE, UNSPECIFIED GASTROINTESTINAL HEMORRHAGE TYPE: ICD-10-CM

## 2017-03-20 DIAGNOSIS — K62.5 RECTAL BLEEDING: ICD-10-CM

## 2017-03-20 DIAGNOSIS — K92.2 LOWER GI BLEED: ICD-10-CM

## 2017-03-20 DIAGNOSIS — R42 DIZZINESS: Primary | ICD-10-CM

## 2017-03-20 DIAGNOSIS — D62 ACUTE BLOOD LOSS ANEMIA: ICD-10-CM

## 2017-03-20 DIAGNOSIS — K92.2 GIB (GASTROINTESTINAL BLEEDING): ICD-10-CM

## 2017-03-20 PROBLEM — N18.30 CKD STAGE 3 DUE TO TYPE 2 DIABETES MELLITUS: Status: ACTIVE | Noted: 2017-03-20

## 2017-03-20 PROBLEM — E11.22 CKD STAGE 3 DUE TO TYPE 2 DIABETES MELLITUS: Status: ACTIVE | Noted: 2017-03-20

## 2017-03-20 LAB
ABO + RH BLD: NORMAL
ALBUMIN SERPL BCP-MCNC: 3 G/DL
ALP SERPL-CCNC: 79 U/L
ALT SERPL W/O P-5'-P-CCNC: 8 U/L
ANION GAP SERPL CALC-SCNC: 15 MMOL/L
APTT BLDCRRT: 29.7 SEC
AST SERPL-CCNC: 12 U/L
BASOPHILS # BLD AUTO: 0.02 K/UL
BASOPHILS # BLD AUTO: 0.04 K/UL
BASOPHILS # BLD AUTO: 0.04 K/UL
BASOPHILS NFR BLD: 0.2 %
BASOPHILS NFR BLD: 0.3 %
BASOPHILS NFR BLD: 0.4 %
BILIRUB SERPL-MCNC: 0.5 MG/DL
BILIRUB UR QL STRIP: NEGATIVE
BLD GP AB SCN CELLS X3 SERPL QL: NORMAL
BLD PROD TYP BPU: NORMAL
BLOOD UNIT EXPIRATION DATE: NORMAL
BLOOD UNIT TYPE CODE: 5100
BLOOD UNIT TYPE: NORMAL
BUN SERPL-MCNC: 74 MG/DL
CALCIUM SERPL-MCNC: 8.4 MG/DL
CHLORIDE SERPL-SCNC: 106 MMOL/L
CLARITY UR REFRACT.AUTO: CLEAR
CO2 SERPL-SCNC: 20 MMOL/L
CODING SYSTEM: NORMAL
COLOR UR AUTO: YELLOW
CREAT SERPL-MCNC: 3.1 MG/DL
DIFFERENTIAL METHOD: ABNORMAL
DISPENSE STATUS: NORMAL
EOSINOPHIL # BLD AUTO: 0.2 K/UL
EOSINOPHIL # BLD AUTO: 0.3 K/UL
EOSINOPHIL # BLD AUTO: 0.4 K/UL
EOSINOPHIL NFR BLD: 1.2 %
EOSINOPHIL NFR BLD: 2.2 %
EOSINOPHIL NFR BLD: 3.6 %
ERYTHROCYTE [DISTWIDTH] IN BLOOD BY AUTOMATED COUNT: 15.3 %
ERYTHROCYTE [DISTWIDTH] IN BLOOD BY AUTOMATED COUNT: 15.3 %
ERYTHROCYTE [DISTWIDTH] IN BLOOD BY AUTOMATED COUNT: 15.4 %
EST. GFR  (AFRICAN AMERICAN): 22.8 ML/MIN/1.73 M^2
EST. GFR  (NON AFRICAN AMERICAN): 19.7 ML/MIN/1.73 M^2
GLUCOSE SERPL-MCNC: 216 MG/DL
GLUCOSE UR QL STRIP: NEGATIVE
HCT VFR BLD AUTO: 22.4 %
HCT VFR BLD AUTO: 22.7 %
HCT VFR BLD AUTO: 23.1 %
HGB BLD-MCNC: 7.4 G/DL
HGB BLD-MCNC: 7.5 G/DL
HGB BLD-MCNC: 7.5 G/DL
HGB UR QL STRIP: NEGATIVE
INR PPP: 1.9
INR PPP: 2.8
KETONES UR QL STRIP: NEGATIVE
LEUKOCYTE ESTERASE UR QL STRIP: NEGATIVE
LYMPHOCYTES # BLD AUTO: 1.5 K/UL
LYMPHOCYTES # BLD AUTO: 1.9 K/UL
LYMPHOCYTES # BLD AUTO: 2.4 K/UL
LYMPHOCYTES NFR BLD: 11.4 %
LYMPHOCYTES NFR BLD: 17.2 %
LYMPHOCYTES NFR BLD: 20.1 %
MAGNESIUM SERPL-MCNC: 1.9 MG/DL
MCH RBC QN AUTO: 28.6 PG
MCH RBC QN AUTO: 28.7 PG
MCH RBC QN AUTO: 29 PG
MCHC RBC AUTO-ENTMCNC: 32 %
MCHC RBC AUTO-ENTMCNC: 33 %
MCHC RBC AUTO-ENTMCNC: 33.5 %
MCV RBC AUTO: 86 FL
MCV RBC AUTO: 88 FL
MCV RBC AUTO: 89 FL
MONOCYTES # BLD AUTO: 0.6 K/UL
MONOCYTES # BLD AUTO: 0.9 K/UL
MONOCYTES # BLD AUTO: 1 K/UL
MONOCYTES NFR BLD: 4.8 %
MONOCYTES NFR BLD: 7.6 %
MONOCYTES NFR BLD: 9 %
NEUTROPHILS # BLD AUTO: 10.4 K/UL
NEUTROPHILS # BLD AUTO: 7.8 K/UL
NEUTROPHILS # BLD AUTO: 8.4 K/UL
NEUTROPHILS NFR BLD: 69.6 %
NEUTROPHILS NFR BLD: 69.7 %
NEUTROPHILS NFR BLD: 81.9 %
NITRITE UR QL STRIP: NEGATIVE
NUM UNITS TRANS FFP: NORMAL
NUM UNITS TRANS FFP: NORMAL
PH UR STRIP: 5 [PH] (ref 5–8)
PHOSPHATE SERPL-MCNC: 4 MG/DL
PLATELET # BLD AUTO: 205 K/UL
PLATELET # BLD AUTO: 206 K/UL
PLATELET # BLD AUTO: 238 K/UL
PMV BLD AUTO: 10.2 FL
PMV BLD AUTO: 10.2 FL
PMV BLD AUTO: 9.9 FL
POCT GLUCOSE: 119 MG/DL (ref 70–110)
POCT GLUCOSE: 138 MG/DL (ref 70–110)
POTASSIUM SERPL-SCNC: 4.2 MMOL/L
PROT SERPL-MCNC: 6 G/DL
PROT UR QL STRIP: NEGATIVE
PROTHROMBIN TIME: 18.8 SEC
PROTHROMBIN TIME: 27.9 SEC
RBC # BLD AUTO: 2.59 M/UL
RBC # BLD AUTO: 2.59 M/UL
RBC # BLD AUTO: 2.61 M/UL
SODIUM SERPL-SCNC: 141 MMOL/L
SP GR UR STRIP: 1.01 (ref 1–1.03)
TRANS ERYTHROCYTES VOL PATIENT: NORMAL ML
URN SPEC COLLECT METH UR: NORMAL
UROBILINOGEN UR STRIP-ACNC: NEGATIVE EU/DL
WBC # BLD AUTO: 11.19 K/UL
WBC # BLD AUTO: 12.06 K/UL
WBC # BLD AUTO: 12.67 K/UL

## 2017-03-20 PROCEDURE — 85610 PROTHROMBIN TIME: CPT | Mod: 91

## 2017-03-20 PROCEDURE — 96365 THER/PROPH/DIAG IV INF INIT: CPT

## 2017-03-20 PROCEDURE — C9113 INJ PANTOPRAZOLE SODIUM, VIA: HCPCS | Performed by: STUDENT IN AN ORGANIZED HEALTH CARE EDUCATION/TRAINING PROGRAM

## 2017-03-20 PROCEDURE — 99285 EMERGENCY DEPT VISIT HI MDM: CPT | Mod: 25

## 2017-03-20 PROCEDURE — 86901 BLOOD TYPING SEROLOGIC RH(D): CPT

## 2017-03-20 PROCEDURE — 83735 ASSAY OF MAGNESIUM: CPT

## 2017-03-20 PROCEDURE — 36415 COLL VENOUS BLD VENIPUNCTURE: CPT

## 2017-03-20 PROCEDURE — 96376 TX/PRO/DX INJ SAME DRUG ADON: CPT

## 2017-03-20 PROCEDURE — 99291 CRITICAL CARE FIRST HOUR: CPT | Mod: ,,, | Performed by: EMERGENCY MEDICINE

## 2017-03-20 PROCEDURE — 25000003 PHARM REV CODE 250: Performed by: STUDENT IN AN ORGANIZED HEALTH CARE EDUCATION/TRAINING PROGRAM

## 2017-03-20 PROCEDURE — 63600175 PHARM REV CODE 636 W HCPCS: Performed by: INTERNAL MEDICINE

## 2017-03-20 PROCEDURE — 86900 BLOOD TYPING SEROLOGIC ABO: CPT

## 2017-03-20 PROCEDURE — 80053 COMPREHEN METABOLIC PANEL: CPT

## 2017-03-20 PROCEDURE — 85610 PROTHROMBIN TIME: CPT

## 2017-03-20 PROCEDURE — 84100 ASSAY OF PHOSPHORUS: CPT

## 2017-03-20 PROCEDURE — 25000003 PHARM REV CODE 250: Performed by: INTERNAL MEDICINE

## 2017-03-20 PROCEDURE — 11000001 HC ACUTE MED/SURG PRIVATE ROOM

## 2017-03-20 PROCEDURE — 96366 THER/PROPH/DIAG IV INF ADDON: CPT

## 2017-03-20 PROCEDURE — 85730 THROMBOPLASTIN TIME PARTIAL: CPT

## 2017-03-20 PROCEDURE — 99223 1ST HOSP IP/OBS HIGH 75: CPT | Mod: AI,GC,, | Performed by: HOSPITALIST

## 2017-03-20 PROCEDURE — 85025 COMPLETE CBC W/AUTO DIFF WBC: CPT

## 2017-03-20 PROCEDURE — P9021 RED BLOOD CELLS UNIT: HCPCS

## 2017-03-20 PROCEDURE — 81003 URINALYSIS AUTO W/O SCOPE: CPT

## 2017-03-20 PROCEDURE — C9113 INJ PANTOPRAZOLE SODIUM, VIA: HCPCS | Performed by: INTERNAL MEDICINE

## 2017-03-20 PROCEDURE — 93005 ELECTROCARDIOGRAM TRACING: CPT

## 2017-03-20 PROCEDURE — 63600175 PHARM REV CODE 636 W HCPCS: Performed by: STUDENT IN AN ORGANIZED HEALTH CARE EDUCATION/TRAINING PROGRAM

## 2017-03-20 PROCEDURE — 93010 ELECTROCARDIOGRAM REPORT: CPT | Mod: ,,, | Performed by: INTERNAL MEDICINE

## 2017-03-20 PROCEDURE — 86920 COMPATIBILITY TEST SPIN: CPT

## 2017-03-20 PROCEDURE — 36430 TRANSFUSION BLD/BLD COMPNT: CPT

## 2017-03-20 PROCEDURE — 99222 1ST HOSP IP/OBS MODERATE 55: CPT | Mod: GC,,, | Performed by: INTERNAL MEDICINE

## 2017-03-20 PROCEDURE — P9017 PLASMA 1 DONOR FRZ W/IN 8 HR: HCPCS

## 2017-03-20 RX ORDER — HYDROCODONE BITARTRATE AND ACETAMINOPHEN 500; 5 MG/1; MG/1
TABLET ORAL
Status: DISCONTINUED | OUTPATIENT
Start: 2017-03-20 | End: 2017-03-22 | Stop reason: HOSPADM

## 2017-03-20 RX ORDER — POLYETHYLENE GLYCOL 3350, SODIUM SULFATE ANHYDROUS, SODIUM BICARBONATE, SODIUM CHLORIDE, POTASSIUM CHLORIDE 236; 22.74; 6.74; 5.86; 2.97 G/4L; G/4L; G/4L; G/4L; G/4L
2000 POWDER, FOR SOLUTION ORAL ONCE
Status: COMPLETED | OUTPATIENT
Start: 2017-03-20 | End: 2017-03-20

## 2017-03-20 RX ORDER — PANTOPRAZOLE SODIUM 40 MG/10ML
80 INJECTION, POWDER, LYOPHILIZED, FOR SOLUTION INTRAVENOUS
Status: COMPLETED | OUTPATIENT
Start: 2017-03-20 | End: 2017-03-20

## 2017-03-20 RX ORDER — ACETAMINOPHEN 500 MG
5000 TABLET ORAL DAILY
COMMUNITY
End: 2019-01-14

## 2017-03-20 RX ORDER — ATORVASTATIN CALCIUM 20 MG/1
80 TABLET, FILM COATED ORAL DAILY
Status: DISCONTINUED | OUTPATIENT
Start: 2017-03-20 | End: 2017-03-22 | Stop reason: HOSPADM

## 2017-03-20 RX ORDER — INSULIN ASPART 100 [IU]/ML
0-5 INJECTION, SOLUTION INTRAVENOUS; SUBCUTANEOUS
Status: DISCONTINUED | OUTPATIENT
Start: 2017-03-20 | End: 2017-03-22 | Stop reason: HOSPADM

## 2017-03-20 RX ORDER — POLYETHYLENE GLYCOL 3350, SODIUM SULFATE ANHYDROUS, SODIUM BICARBONATE, SODIUM CHLORIDE, POTASSIUM CHLORIDE 236; 22.74; 6.74; 5.86; 2.97 G/4L; G/4L; G/4L; G/4L; G/4L
2000 POWDER, FOR SOLUTION ORAL ONCE
Status: COMPLETED | OUTPATIENT
Start: 2017-03-21 | End: 2017-03-21

## 2017-03-20 RX ORDER — IBUPROFEN 200 MG
16 TABLET ORAL
Status: DISCONTINUED | OUTPATIENT
Start: 2017-03-20 | End: 2017-03-22 | Stop reason: HOSPADM

## 2017-03-20 RX ORDER — PANTOPRAZOLE SODIUM 40 MG/10ML
40 INJECTION, POWDER, LYOPHILIZED, FOR SOLUTION INTRAVENOUS 2 TIMES DAILY
Status: DISCONTINUED | OUTPATIENT
Start: 2017-03-20 | End: 2017-03-22 | Stop reason: HOSPADM

## 2017-03-20 RX ORDER — GLUCAGON 1 MG
1 KIT INJECTION
Status: DISCONTINUED | OUTPATIENT
Start: 2017-03-20 | End: 2017-03-22 | Stop reason: HOSPADM

## 2017-03-20 RX ORDER — CLOPIDOGREL BISULFATE 75 MG/1
75 TABLET ORAL DAILY
Status: DISCONTINUED | OUTPATIENT
Start: 2017-03-20 | End: 2017-03-22 | Stop reason: HOSPADM

## 2017-03-20 RX ORDER — ISOSORBIDE DINITRATE AND HYDRALAZINE HYDROCHLORIDE 37.5; 2 MG/1; MG/1
1 TABLET ORAL 3 TIMES DAILY
Status: DISCONTINUED | OUTPATIENT
Start: 2017-03-20 | End: 2017-03-22 | Stop reason: HOSPADM

## 2017-03-20 RX ORDER — METOPROLOL SUCCINATE 50 MG/1
50 TABLET, EXTENDED RELEASE ORAL DAILY
Status: DISCONTINUED | OUTPATIENT
Start: 2017-03-20 | End: 2017-03-22 | Stop reason: HOSPADM

## 2017-03-20 RX ORDER — IBUPROFEN 200 MG
24 TABLET ORAL
Status: DISCONTINUED | OUTPATIENT
Start: 2017-03-20 | End: 2017-03-22 | Stop reason: HOSPADM

## 2017-03-20 RX ORDER — ONDANSETRON 2 MG/ML
4 INJECTION INTRAMUSCULAR; INTRAVENOUS EVERY 12 HOURS PRN
Status: DISCONTINUED | OUTPATIENT
Start: 2017-03-20 | End: 2017-03-22 | Stop reason: HOSPADM

## 2017-03-20 RX ORDER — NAPROXEN SODIUM 220 MG/1
81 TABLET, FILM COATED ORAL DAILY
Status: DISCONTINUED | OUTPATIENT
Start: 2017-03-20 | End: 2017-03-22 | Stop reason: HOSPADM

## 2017-03-20 RX ADMIN — CLOPIDOGREL 75 MG: 75 TABLET, FILM COATED ORAL at 09:03

## 2017-03-20 RX ADMIN — INSULIN DETEMIR 8 UNITS: 100 INJECTION, SOLUTION SUBCUTANEOUS at 09:03

## 2017-03-20 RX ADMIN — POLYETHYLENE GLYCOL 3350, SODIUM SULFATE ANHYDROUS, SODIUM BICARBONATE, SODIUM CHLORIDE, POTASSIUM CHLORIDE 2000 ML: 236; 22.74; 6.74; 5.86; 2.97 POWDER, FOR SOLUTION ORAL at 06:03

## 2017-03-20 RX ADMIN — HYDRALAZINE HYDROCHLORIDE AND ISOSORBIDE DINITRATE 1 TABLET: 37.5; 2 TABLET, FILM COATED ORAL at 02:03

## 2017-03-20 RX ADMIN — PANTOPRAZOLE SODIUM 80 MG: 40 INJECTION, POWDER, FOR SOLUTION INTRAVENOUS at 03:03

## 2017-03-20 RX ADMIN — ASPIRIN 81 MG CHEWABLE TABLET 81 MG: 81 TABLET CHEWABLE at 09:03

## 2017-03-20 RX ADMIN — METOPROLOL SUCCINATE 50 MG: 50 TABLET, EXTENDED RELEASE ORAL at 09:03

## 2017-03-20 RX ADMIN — SODIUM CHLORIDE 1000 ML: 0.9 INJECTION, SOLUTION INTRAVENOUS at 03:03

## 2017-03-20 RX ADMIN — PANTOPRAZOLE SODIUM 8 MG/HR: 40 INJECTION, POWDER, FOR SOLUTION INTRAVENOUS at 04:03

## 2017-03-20 RX ADMIN — ATORVASTATIN CALCIUM 80 MG: 20 TABLET, FILM COATED ORAL at 09:03

## 2017-03-20 RX ADMIN — PANTOPRAZOLE SODIUM 40 MG: 40 INJECTION, POWDER, FOR SOLUTION INTRAVENOUS at 09:03

## 2017-03-20 NOTE — ASSESSMENT & PLAN NOTE
-- Patient on aspirin and plavix following PCI with placement of multiple SOULEYMANE in December of 2016  -- Hold aspirin and plavix in setting of GI bleed

## 2017-03-20 NOTE — ANESTHESIA PREPROCEDURE EVALUATION
03/20/2017  Chau Rodarte is a 66 y.o., male with a PMHx of CAD (s/p PCI on 12/5 of pLAD, mLAD, distal LM and pLCx), ICM (EF=20-25%) improved to 45% on most recent 2DE 03/2017 s/p lifevest, CKD III, T2DM, HTN, PVD (s/p Right Fem-Pop bypass, Left SFA stent) who presents to hospital today w/ complaints rectal bleeding since Friday evening    Pre-operative evaluation for Procedure(s) (LRB):  ESOPHAGOGASTRODUODENOSCOPY (EGD) (N/A)  COLONOSCOPY (N/A)    Lines  18 G PIV RAC  20 G PIV LAC       Patient Active Problem List   Diagnosis    HLD (hyperlipidemia)    S/P femoral-popliteal bypass surgery    Essential hypertension    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, NYHA class 2 and CKD stage 3    NSTEMI (non-ST elevated myocardial infarction)    Type 2 diabetes mellitus with diabetic polyneuropathy, with long-term current use of insulin    Chronic systolic heart failure    Obesity (BMI 30-39.9)    Cardiomegaly    CORRY (acute kidney injury)    Coronary artery disease involving native coronary artery of native heart without angina pectoris    Ischemic cardiomyopathy    Typical atrial flutter    Cryptogenic stroke, remote history (2006)    Long term (current) use of anticoagulants [Z79.01]    Lower GI bleed    CKD stage 3 due to type 2 diabetes mellitus    Acute blood loss anemia       Review of patient's allergies indicates:  No Known Allergies    No current facility-administered medications on file prior to encounter.      Current Outpatient Prescriptions on File Prior to Encounter   Medication Sig Dispense Refill    aspirin 81 MG Chew Take 1 tablet (81 mg total) by mouth once daily.  0    atorvastatin (LIPITOR) 80 MG tablet Take 1 tablet (80 mg total) by mouth once daily. 90 tablet 3    clopidogrel (PLAVIX) 75 mg tablet Take 1 tablet (75 mg total) by mouth once daily. 90  "tablet 3    furosemide (LASIX) 40 MG tablet Take 1 tablet (40 mg total) by mouth once daily. 30 tablet 11    insulin detemir (LEVEMIR FLEXTOUCH) 100 unit/mL (3 mL) SubQ InPn pen Inject 16 Units into the skin every evening. 1 Box 3    isosorbide-hydrALAZINE 20-37.5 mg (BIDIL) 20-37.5 mg Tab Take 1 tablet by mouth 3 (three) times daily. 90 tablet 11    liraglutide 0.6 mg/0.1 mL, 18 mg/3 mL, subq PNIJ (VICTOZA 2-SAGAR) 0.6 mg/0.1 mL (18 mg/3 mL) PnIj Inject 0.6 mg daily x1 week, then 1.2 mg daily x1 week, then 1.8 mg daily thereafter (Patient taking differently: Inject 1.8 mg into the skin once daily. er) 9 mL 3    metoprolol succinate (TOPROL-XL) 50 MG 24 hr tablet Take 1 tablet (50 mg total) by mouth once daily. 30 tablet 11    pantoprazole (PROTONIX) 20 MG tablet Take 1 tablet (20 mg total) by mouth once daily. 30 tablet 11    pen needle, diabetic (BD ULTRA-FINE HANG PEN NEEDLES) 32 gauge x 5/32" Ndle Uses 2 times daily, on  insulin injections 180 each 4    warfarin (COUMADIN) 5 MG tablet Take 1 tablet (5 mg total) by mouth Daily. 30 tablet 11       Past Surgical History:   Procedure Laterality Date    FOOT NERVE GRAFT  11/2013    left leg stent      right leg bypass         Social History     Social History    Marital status:      Spouse name: N/A    Number of children: N/A    Years of education: N/A     Occupational History    Not on file.     Social History Main Topics    Smoking status: Former Smoker     Quit date: 3/1/2011    Smokeless tobacco: Never Used    Alcohol use No    Drug use: No    Sexual activity: Yes     Other Topics Concern    Not on file     Social History Narrative         Vital Signs Range (Last 24H):  Temp:  [35.8 °C (96.5 °F)-36.8 °C (98.3 °F)]   Pulse:  []   Resp:  [16-18]   BP: ()/(53-78)   SpO2:  [86 %-100 %]       CBC:   Recent Labs      03/20/17   0224  03/20/17   1325   WBC  12.67  12.06   RBC  2.59*  2.61*   HGB  7.4*  7.5*   HCT  23.1*  22.4*   PLT "  238  205   MCV  89  86   MCH  28.6  28.7   MCHC  32.0  33.5       CMP:   Recent Labs      03/20/17 0224 03/20/17   0826   NA  141   --    K  4.2   --    CL  106   --    CO2  20*   --    BUN  74*   --    CREATININE  3.1*   --    GLU  216*   --    MG   --   1.9   PHOS   --   4.0   CALCIUM  8.4*   --    ALBUMIN  3.0*   --    PROT  6.0   --    ALKPHOS  79   --    ALT  8*   --    AST  12   --    BILITOT  0.5   --        INR  Recent Labs      03/20/17 0224 03/20/17   1325   INR  2.8*  1.9*   APTT  29.7   --            Diagnostic Studies:    2D Echo:  CONCLUSIONS     1 - Eccentric hypertrophy.     2 - Mildly depressed left ventricular systolic function (EF 45-50%).     3 - Normal right ventricular systolic function .     4 - Grade I left ventricular diastolic dysfunction.     5 - Mildly enlarged ascending aorta.             This document has been electronically    SIGNED BY: Robson Sims MD On: 03/07/2017 15:38      [facility] Anesthesia Evaluation    I have reviewed the Patient Summary Reports.    I have reviewed the Nursing Notes.      Review of Systems  Anesthesia Hx:  No problems with previous Anesthesia  History of prior surgery of interest to airway management or planning: Denies Family Hx of Anesthesia complications.   Denies Personal Hx of Anesthesia complications.   Social:  Non-Smoker    Hematology/Oncology:  Hematology Normal   Oncology Normal     Cardiovascular:   Hypertension, well controlled Past MI CAD   CHF    Pulmonary:  Pulmonary Normal    Renal/:   Chronic Renal Disease    Hepatic/GI:  Hepatic/GI Normal    Musculoskeletal:  Musculoskeletal Normal    OB/GYN/PEDS:  Legal Guardian is Mother , birth was Full Term Denies Developmental Delay Denies Anomilies    Neurological:  Neurology Normal    Endocrine:   Diabetes, well controlled    Dermatological:  Skin Normal        Physical Exam  General:  Obesity, Well nourished    Airway/Jaw/Neck:  Airway Findings: Mouth Opening: Normal Tongue: Normal  General  Airway Assessment: Adult  Mallampati: III  Improves to II with phonation.  TM Distance: Normal, at least 6 cm      Dental:  Dental Findings: Upper Dentures, lower partial dentures   Chest/Lungs:  Chest/Lungs Findings: Clear to auscultation     Heart/Vascular:  Heart Findings: Rate: Normal  Rhythm: Regular Rhythm  Sounds: Normal        Mental Status:  Mental Status Findings:  Cooperative, Alert and Oriented         Anesthesia Plan  Type of Anesthesia, risks & benefits discussed:  Anesthesia Type:  general, MAC  Patient's Preference:   Intra-op Monitoring Plan:   Intra-op Monitoring Plan Comments:   Post Op Pain Control Plan:   Post Op Pain Control Plan Comments:   Induction:    Beta Blocker:  Patient is on a Beta-Blocker and has received one dose within the past 24 hours (No further documentation required).       Informed Consent: Patient understands risks and agrees with Anesthesia plan.  Questions answered. Anesthesia consent signed with patient.  ASA Score: 3     Day of Surgery Review of History & Physical:    H&P update referred to the surgeon.         Ready For Surgery From Anesthesia Perspective.

## 2017-03-20 NOTE — ASSESSMENT & PLAN NOTE
-- Patient was anticoagulated with coumadin  -- Was planning to have loop recorder implanted on Friday, 3/24  -- hold coumadin in setting of GI Bleed and INR of 2.8

## 2017-03-20 NOTE — ED NOTES
"Patient identifiers for Chau Rodarte checked and correct.  LOC: Patient is awake, alert, and aware of environment with an appropriate affect. Patient is oriented x 3 and speaking appropriately.  APPEARANCE: Patient resting comfortably and in no acute distress. Patient is clean and well groomed, patient's clothing is properly fastened.  SKIN: The skin is warm and dry. Patient has normal skin turgor and moist mucus membrances. Skin is intact; minimal bruising to abdomen  MUSKULOSKELETAL: Patient is moving all extremities well, no obvious swelling or deformities noted. Pulses intact.   RESPIRATORY: Airway is open and patent. Respirations are spontaneous with normal effort and rate.  CARDIAC: Patient has a normal rate and rhythm. No peripheral edema noted. Capillary refill < 3 seconds.  ABDOMEN: No distention noted. Bowel sounds active in all 4 quadrants. Soft and non-tender upon palpation. Pt reports bloody stool  NEUROLOGICAL: PERRL. Facial expression is symmetrical. Hand grasps are equal bilaterally. Normal sensation in all extremities when touched with finger. Feet tender to touch (pt has neuropathy)    C/o "lightheadedness" when stands  "

## 2017-03-20 NOTE — PHARMACY MED REC
"[facility]  [facility]    Patient was admitted on 3/20/2017 for Lower GI bleed.    Patient's prior to admission medication regimen was as follows:  Prescriptions Prior to Admission   Medication Sig Dispense Refill Last Dose    aspirin 81 MG Chew Take 1 tablet (81 mg total) by mouth once daily.  0 3/20/2017    atorvastatin (LIPITOR) 80 MG tablet Take 1 tablet (80 mg total) by mouth once daily. 90 tablet 3 3/20/2017    cholecalciferol, vitamin D3, (VITAMIN D3) 5,000 unit Tab Take 5,000 Units by mouth once daily.       clopidogrel (PLAVIX) 75 mg tablet Take 1 tablet (75 mg total) by mouth once daily. 90 tablet 3 3/20/2017    furosemide (LASIX) 40 MG tablet Take 1 tablet (40 mg total) by mouth once daily. 30 tablet 11 3/20/2017    insulin detemir (LEVEMIR FLEXTOUCH) 100 unit/mL (3 mL) SubQ InPn pen Inject 16 Units into the skin every evening. 1 Box 3 3/20/2017    isosorbide-hydrALAZINE 20-37.5 mg (BIDIL) 20-37.5 mg Tab Take 1 tablet by mouth 3 (three) times daily. 90 tablet 11 3/20/2017    liraglutide 0.6 mg/0.1 mL, 18 mg/3 mL, subq PNIJ (VICTOZA 2-SAGAR) 0.6 mg/0.1 mL (18 mg/3 mL) PnIj Inject 0.6 mg daily x1 week, then 1.2 mg daily x1 week, then 1.8 mg daily thereafter (Patient taking differently: Inject 1.8 mg into the skin once daily. er) 9 mL 3 Taking    metoprolol succinate (TOPROL-XL) 50 MG 24 hr tablet Take 1 tablet (50 mg total) by mouth once daily. 30 tablet 11 3/20/2017    pantoprazole (PROTONIX) 20 MG tablet Take 1 tablet (20 mg total) by mouth once daily. 30 tablet 11 3/20/2017    pen needle, diabetic (BD ULTRA-FINE HANG PEN NEEDLES) 32 gauge x 5/32" Ndle Uses 2 times daily, on  insulin injections 180 each 4 Taking    warfarin (COUMADIN) 5 MG tablet Take 1 tablet (5 mg total) by mouth Daily. 30 tablet 11 3/20/2017     Please add appropriate    SmartPhrase below:  Admission Medication Reconciliation - Pharmacy Consult Note    The home medication history was taken by Su" "Chito, pharmacy technician.  Based on information gathered and subsequent review by the clinical pharmacist, the items below may need attention.     You may go to "Admission" then "Reconcile Home Medications" tabs to review and/or act upon these items. Based on information gathered and subsequent review by the clinical pharmacist, the items below may need attention.    Potentially problematic discrepancies with current MAR  o Patient IS taking the following which was not ordered upon admit  o Furosemide 40 mg QD  o Victoza 1.8 mg SQ QD (held)  o Warfarin 5 mg QD (held)    Please address this information as you see fit.  Feel free to contact us if you have any questions or require assistance.    Karen Lake, Pharm.D  EXT [phone]        "

## 2017-03-20 NOTE — H&P
[facility] Hospital Medicine  History & Physical    Patient Name: Chau Rodarte  MRN: [id number]  Admission Date: 3/20/2017  Attending Physician: Eric Anderson MD  Primary Care Provider: Shaggy Neal MD    [facility] Medicine Team: [facility] HOSP MED 3 Rodney Kim MD     Patient information was obtained from patient, spouse/SO, past medical records and ER records.     Subjective:     Principal Problem:GIB (gastrointestinal bleeding)    Chief Complaint:   Chief Complaint   Patient presents with    Dizziness     Pt reports intermittent dizziness all day. Pt also states he has had bright red blood in his stool since Friday, currently on Coumadin. Denies CP or SOB.        HPI: 66 y.o male with a PMHx of CAD (s/p PCI on 12/5 of pLAD, mLAD, distal LM and pLCx), ICM (EF=20-25%) improved to 45% on most recent 2DE 03/2017 s/p lifevest, CKD III, T2DM, HTN, PVD (s/p Right Fem-Pop bypass, Left SFA stent) who presents to hospital today w/ complaints rectal bleeding since Friday evening. Pt denies any preceding HAs. Pts wife is at bedside during exam, she confirms that he had his first ever episode of bleeding Friday (apx 2-4 teaspoons, then had a little more blood the next day, and by the third day lost .5-1 cup of blood per pt. Pt appears to be on plavix and coumadin w/ baby asprin. Pt does endorse dizziness. He was initially tachycardic and hypotensive upon presentation. Noted to have significant amount of dark maroon stool on bedside HUE. INR 2.8 today.    Patient given 1 U pRBC and 1 L NS in ED. Pantoprazole drip also initiated. GI was consulted.        Past Medical History:   Diagnosis Date    Acute on chronic systolic CHF (congestive heart failure) 11/29/2016    CKD (chronic kidney disease) stage 2, GFR 60-89 ml/min 2/21/2016    HTN (hypertension) 3/3/2014    Hyperlipidemia 3/3/2014    NSTEMI (non-ST elevated myocardial infarction) 11/28/2016    Obesity (BMI 30-39.9) 11/29/2016    PVD (peripheral  "vascular disease)     Stroke     Type 2 diabetes mellitus with diabetic peripheral angiopathy without gangrene, without long-term current use of insulin 10/24/2013    Type 2 diabetes mellitus with diabetic polyneuropathy, without long-term current use of insulin 11/29/2016       Past Surgical History:   Procedure Laterality Date    FOOT NERVE GRAFT  11/2013    left leg stent      right leg bypass         Review of patient's allergies indicates:  No Known Allergies    No current facility-administered medications on file prior to encounter.      Current Outpatient Prescriptions on File Prior to Encounter   Medication Sig    aspirin 81 MG Chew Take 1 tablet (81 mg total) by mouth once daily.    atorvastatin (LIPITOR) 80 MG tablet Take 1 tablet (80 mg total) by mouth once daily.    clopidogrel (PLAVIX) 75 mg tablet Take 1 tablet (75 mg total) by mouth once daily.    ergocalciferol (ERGOCALCIFEROL) 50,000 unit Cap Take 1 capsule (50,000 Units total) by mouth twice a week.    furosemide (LASIX) 40 MG tablet Take 1 tablet (40 mg total) by mouth once daily.    insulin detemir (LEVEMIR FLEXTOUCH) 100 unit/mL (3 mL) SubQ InPn pen Inject 16 Units into the skin every evening.    isosorbide-hydrALAZINE 20-37.5 mg (BIDIL) 20-37.5 mg Tab Take 1 tablet by mouth 3 (three) times daily.    metoprolol succinate (TOPROL-XL) 50 MG 24 hr tablet Take 1 tablet (50 mg total) by mouth once daily.    pantoprazole (PROTONIX) 20 MG tablet Take 1 tablet (20 mg total) by mouth once daily.    warfarin (COUMADIN) 5 MG tablet Take 1 tablet (5 mg total) by mouth Daily.    liraglutide 0.6 mg/0.1 mL, 18 mg/3 mL, subq PNIJ (VICTOZA 2-SAGAR) 0.6 mg/0.1 mL (18 mg/3 mL) PnIj Inject 0.6 mg daily x1 week, then 1.2 mg daily x1 week, then 1.8 mg daily thereafter (Patient taking differently: Inject 1.8 mg into the skin once daily. er)    pen needle, diabetic (BD ULTRA-FINE HANG PEN NEEDLES) 32 gauge x 5/32" Ndle Uses 2 times daily, on  insulin " injections     Family History     Problem Relation (Age of Onset)    Diabetes Mother    Heart disease Father        Social History Main Topics    Smoking status: Former Smoker     Quit date: 3/1/2011    Smokeless tobacco: Never Used    Alcohol use No    Drug use: No    Sexual activity: Not on file     Review of Systems   General: Negative for weight gain, weakness, fatigue  HEENT: Negative for sore throat, vision changes, congestion  CVS: Positive for palpitations. Negative for chest pain, pressure  Resp: Negative for SOB or wheezing  GI: Positive for diarrhea. Negative for nausea, vomiting, constipation, or abdominal pain  : Negative for dysuria  MSK: Negative for myalgias or arthralgias.  Skin: Negative for rashes or bleeding  Neuro: Positive for dizziness, lightheadedness. Negative for headache, syncope, numbness, paresthesias  All other systems otherwise negative    Objective:     Vital Signs (Most Recent):  Temp: 98.3 °F (36.8 °C) (03/20/17 0534)  Pulse: 66 (03/20/17 0534)  Resp: 16 (03/20/17 0534)  BP: 124/64 (03/20/17 0534)  SpO2: 100 % (03/20/17 0534) Vital Signs (24h Range):  Temp:  [97.9 °F (36.6 °C)-98.3 °F (36.8 °C)] 98.3 °F (36.8 °C)  Pulse:  [] 66  Resp:  [16-18] 16  SpO2:  [86 %-100 %] 100 %  BP: ()/(53-70) 124/64     Weight: 110.7 kg (244 lb)  Body mass index is 36.03 kg/(m^2).    Physical Exam  Cons: AOx3, NAD, well developed and well nourished  HENT: Normocephalic and atraumatic.   Eyes: PERRL, No scleral icterus.   Neck: Neck supple. No thyromegaly present.   CV: tachycardic, without murmur, gallop, or rub    Pulmonary: clear to auscultation bilaterally  Abdomen: Soft. Bowel sounds are normal. Nontender, nondistended. Rectal per ED note: guaiac positive with dark maroon stool present  Neurological: Normal strength and normal reflexes. No cranial nerve deficit.   Skin: Skin is warm and dry. No rash noted.   Psychiatric: Patient has a normal mood and affect. Behavior is normal.  Thought content normal. Cognition and memory are normal.     Significant Labs:   Recent Results (from the past 24 hour(s))   CBC auto differential    Collection Time: 03/20/17  2:24 AM   Result Value Ref Range    WBC 12.67 3.90 - 12.70 K/uL    RBC 2.59 (L) 4.60 - 6.20 M/uL    Hemoglobin 7.4 (L) 14.0 - 18.0 g/dL    Hematocrit 23.1 (L) 40.0 - 54.0 %    MCV 89 82 - 98 fL    MCH 28.6 27.0 - 31.0 pg    MCHC 32.0 32.0 - 36.0 %    RDW 15.4 (H) 11.5 - 14.5 %    Platelets 238 150 - 350 K/uL    MPV 10.2 9.2 - 12.9 fL    Gran # 10.4 (H) 1.8 - 7.7 K/uL    Lymph # 1.5 1.0 - 4.8 K/uL    Mono # 0.6 0.3 - 1.0 K/uL    Eos # 0.2 0.0 - 0.5 K/uL    Baso # 0.04 0.00 - 0.20 K/uL    Gran% 81.9 (H) 38.0 - 73.0 %    Lymph% 11.4 (L) 18.0 - 48.0 %    Mono% 4.8 4.0 - 15.0 %    Eosinophil% 1.2 0.0 - 8.0 %    Basophil% 0.3 0.0 - 1.9 %    Differential Method Automated    Comprehensive metabolic panel    Collection Time: 03/20/17  2:24 AM   Result Value Ref Range    Sodium 141 136 - 145 mmol/L    Potassium 4.2 3.5 - 5.1 mmol/L    Chloride 106 95 - 110 mmol/L    CO2 20 (L) 23 - 29 mmol/L    Glucose 216 (H) 70 - 110 mg/dL    BUN, Bld 74 (H) 8 - 23 mg/dL    Creatinine 3.1 (H) 0.5 - 1.4 mg/dL    Calcium 8.4 (L) 8.7 - 10.5 mg/dL    Total Protein 6.0 6.0 - 8.4 g/dL    Albumin 3.0 (L) 3.5 - 5.2 g/dL    Total Bilirubin 0.5 0.1 - 1.0 mg/dL    Alkaline Phosphatase 79 55 - 135 U/L    AST 12 10 - 40 U/L    ALT 8 (L) 10 - 44 U/L    Anion Gap 15 8 - 16 mmol/L    eGFR if African American 22.8 (A) >60 mL/min/1.73 m^2    eGFR if non  19.7 (A) >60 mL/min/1.73 m^2   Protime-INR    Collection Time: 03/20/17  2:24 AM   Result Value Ref Range    Prothrombin Time 27.9 (H) 9.0 - 12.5 sec    INR 2.8 (H) 0.8 - 1.2   Type & Screen    Collection Time: 03/20/17  2:24 AM   Result Value Ref Range    Group & Rh O POS     Indirect Ron NEG    APTT    Collection Time: 03/20/17  2:24 AM   Result Value Ref Range    aPTT 29.7 21.0 - 32.0 sec   Prepare RBC 1 Unit     Collection Time: 03/20/17  2:24 AM   Result Value Ref Range    UNIT NUMBER [id number]     PRODUCT CODE W5822C88     DISPENSE STATUS ISSUED     CODING SYSTEM [id number]     Unit Blood Type Code 5100     Unit Blood Type O POS     Unit Expiration [id number]      Significant Imaging:   Imaging Results     None            Assessment/Plan:     * GIB (gastrointestinal bleeding)  -- Patient on DAPT as well as coumadin for his prior stents and atrial flutter, respectively  -- Patient reports history of bright red blood of increasing volume in the toilet with bowel movements  -- Hg has decreased from baseline of 12 to 7.4  -- Likely lower GI bleed  -- Consult placed with GI  -- q12 cbc  -- sugar free clears diet  -- Two large bore IVs obtained  -- Given one unit pRBC, continue to transfuse to maintain Hg > 7  -- Maintain platelets greater than 50 and INR<2.5      HLD (hyperlipidemia)  -- atorvastatin 80 mg qd    HTN (hypertension)  -- Patient taking bidil and metoprolol at home  -- Some orthostatic changes in BP earlier that patient reports are common for him  -- Plan to continue BP meds, with hold parameters      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, NYHA class 2 and CKD stage 3  -- Patient with CHF, last EF 45%  -- Continue bidil and metoprolol    Type 2 diabetes mellitus with diabetic polyneuropathy, without long-term current use of insulin  -- Patient taking 16 U levemir qd at home as well as victoza  -- Hold victoza  -- Low dose SSI  -- Give levemir 8 U qd  -- Glucose checks qidwm      Coronary artery disease involving native coronary artery of native heart without angina pectoris  -- Patient on aspirin and plavix following PCI with placement of multiple SOULEYMANE in December of 2016  -- Continue aspirin and plavix due to recency of SOULEYMANE    Typical atrial flutter  -- Patient was anticoagulated with coumadin  -- Was planning to have loop recorder implanted on Friday, 3/24  -- hold coumadin in setting  of GI Bleed and INR of 2.8      Long term (current) use of anticoagulants [Z79.01]  -- Patient on coumadin for atrial flutter  -- Hold coumadin      VTE Risk Mitigation     None        Rodney Kim MD  Department of Hospital Medicine   [facility]

## 2017-03-20 NOTE — ASSESSMENT & PLAN NOTE
-- Patient taking 16 U levemir qd at home as well as victoza  -- Hold victoza  -- Low dose SSI  -- Give levemir 8 U qd  -- Glucose checks qid

## 2017-03-20 NOTE — PLAN OF CARE
Shaggy Neal MD     Extended Emergency Contact Information  Primary Emergency Contact: Vladimir Rodarte  Address: [address] [place] [address] [place]  Mobile Phone: [phone]  Relation: Spouse       [id number] [place] - [address]  [address] [address]  Phone: [phone] Fax: [phone]    Payor: [employer] MEDICARE / Plan: [employer] 65 / Product Type: Medicare Advantage /         03/20/17 1537   Discharge Assessment   Assessment Type Discharge Planning Assessment   Confirmed/corrected address and phone number on facesheet? Yes   Assessment information obtained from? Patient   Expected Length of Stay (days) 3   Communicated expected length of stay with patient/caregiver yes   Prior to hospitilization cognitive status: Alert/Oriented   Prior to hospitalization functional status: Independent   Current cognitive status: Alert/Oriented   Current Functional Status: Needs Assistance   Arrived From home or self-care   Lives With spouse   Able to Return to Prior Arrangements yes   Is patient able to care for self after discharge? Yes   How many people do you have in your home that can help with your care after discharge? 1   Patient's perception of discharge disposition home or selfcare   Readmission Within The Last 30 Days no previous admission in last 30 days   Patient currently being followed by outpatient case management? No   Patient currently receives home health services? No   Does the patient currently use HME? No   Patient currently receives private duty nursing? No   Patient currently receives any other outside agency services? No   Equipment Currently Used at Home none   Do you have any problems affording any of your prescribed medications? No   Is the patient taking medications as prescribed? yes   Do you have any financial concerns preventing you from receiving the healthcare you need? No   Does the patient  have transportation to healthcare appointments? Yes   Transportation Available family or friend will provide   On Dialysis? No   Does the patient receive services at the Coumadin Clinic? Yes  ([facility])   Are there any open cases? No   Discharge Plan A Home with family   Discharge Plan B Home with family;Home Health   Patient/Family In Agreement With Plan yes

## 2017-03-20 NOTE — NURSING TRANSFER
Nursing Transfer Note      3/20/2017     Transfer To: [address]    Transfer via wheelchair    Transfer with cardiac monitoring    Transported by RN    Medicines sent: Golytely prep    Chart send with patient: Yes    Notified: spouse    Patient reassessed at: 3/20/17 at 1800    Upon arrival to floor: patient oriented to room, call bell in reach and bed in lowest position

## 2017-03-20 NOTE — PROVIDER PROGRESS NOTES - EMERGENCY DEPT.
Encounter Date: 3/20/2017    ED Physician Progress Notes         EKG - STEMI Decision  Initial Reading: No STEMI present.

## 2017-03-20 NOTE — ASSESSMENT & PLAN NOTE
-- Patient taking bidil and metoprolol at home  -- Some orthostatic changes in BP earlier that patient reports are common for him  -- Plan to continue BP meds, with hold parameters

## 2017-03-20 NOTE — ASSESSMENT & PLAN NOTE
-- Patient on DAPT as well as coumadin for his prior stents and atrial flutter, respectively  -- Patient reports history of bright red blood of increasing volume in the toilet with bowel movements  -- Hg has decreased from baseline of 12 to 7.4  -- Likely lower GI bleed  -- Consult placed with GI  -- q8 cbc  -- Pantoprazole drip  -- NPO  -- Two large bore IVs obtained  -- Given one unit pRBC, continue to transfuse to maintain Hg > 7  -- Maintain platelets greater than 50 and INR<2.5

## 2017-03-20 NOTE — ED PROVIDER NOTES
Encounter Date: 3/20/2017    SCRIBE #1 NOTE: I, Luiza Degroot, am scribing for, and in the presence of,  Dr. Flores. I have scribed the following portions of the note - the Resident attestation.       History     Chief Complaint   Patient presents with    Dizziness     Pt reports intermittent dizziness all day. Pt also states he has had bright red blood in his stool since Friday, currently on Coumadin. Denies CP or SOB.     Review of patient's allergies indicates:  No Known Allergies  HPI Comments: 66 y.o male with a PMHx of CAD (s/p PCI on 12/5 of pLAD, mLAD, distal LM and pLCx), ICM (EF=20-25%) improved to 45% on most recent 2DE 03/2017 s/p lifevest, CKD III, T2DM, HTN, PVD (s/p Right Fem-Pop bypass, Left SFA stent) who presents to hospital today w/ complaints rectal bleeding since Friday evening. Pt denies any preceding HAs. Pts wife is at bedside during exam, she confirms that he had his first ever episode of bleeding Friday (apx 2-4 teaspoons, then had a little more blood the next day, and by the third day lost .5-1 cup of blood per pt. Pt appears to be on plavix and coumadin w/ baby asprin. Pt does endorse dizziness. He was initially tachycardic and hypotensive upon presentation. Noted to have significant amount of dark maroon stool on bedside HUE. INR 2.8 today        The history is provided by the patient and medical records.     Past Medical History:   Diagnosis Date    Acute on chronic systolic CHF (congestive heart failure) 11/29/2016    CKD (chronic kidney disease) stage 2, GFR 60-89 ml/min 2/21/2016    HTN (hypertension) 3/3/2014    Hyperlipidemia 3/3/2014    NSTEMI (non-ST elevated myocardial infarction) 11/28/2016    Obesity (BMI 30-39.9) 11/29/2016    PVD (peripheral vascular disease)     Stroke     Type 2 diabetes mellitus with diabetic peripheral angiopathy without gangrene, without long-term current use of insulin 10/24/2013    Type 2 diabetes mellitus with diabetic polyneuropathy,  without long-term current use of insulin 11/29/2016     Past Surgical History:   Procedure Laterality Date    FOOT NERVE GRAFT  11/2013    left leg stent      right leg bypass       Family History   Problem Relation Age of Onset    Diabetes Mother     Heart disease Father      Social History   Substance Use Topics    Smoking status: Former Smoker     Quit date: 3/1/2011    Smokeless tobacco: Never Used    Alcohol use No     Review of Systems   HENT: Negative for drooling, ear discharge, ear pain, facial swelling, hearing loss and mouth sores.    Eyes: Positive for visual disturbance. Negative for photophobia, pain, redness and itching.   Respiratory: Negative for apnea, cough, choking, chest tightness, shortness of breath, wheezing and stridor.    Cardiovascular: Positive for palpitations. Negative for chest pain and leg swelling.   Gastrointestinal: Positive for diarrhea. Negative for abdominal distention, abdominal pain, anal bleeding, blood in stool, constipation, nausea, rectal pain and vomiting.   Endocrine: Negative for cold intolerance, heat intolerance and polyphagia.   Genitourinary: Negative for decreased urine volume, difficulty urinating, dysuria, enuresis, flank pain, frequency, genital sores, penile pain, penile swelling, scrotal swelling and testicular pain.   Musculoskeletal: Negative for arthralgias, back pain, gait problem, myalgias, neck pain and neck stiffness.   Skin: Positive for rash. Negative for pallor.   Neurological: Positive for dizziness and light-headedness. Negative for tremors, seizures, syncope, facial asymmetry, speech difficulty, weakness and numbness.   Hematological: Negative for adenopathy. Bruises/bleeds easily.   Psychiatric/Behavioral: Negative for agitation, behavioral problems, confusion, decreased concentration and sleep disturbance. The patient is not nervous/anxious.        Physical Exam   Initial Vitals   BP Pulse Resp Temp SpO2   03/20/17 0053 03/20/17 0053  03/20/17 0053 03/20/17 0053 03/20/17 0053   128/60 106 18 98.2 °F (36.8 °C) 98 %     Physical Exam    Constitutional: He appears well-developed and well-nourished. He is not diaphoretic.   HENT:   Head: Normocephalic and atraumatic.   Right Ear: External ear normal.   Left Ear: External ear normal.   Nose: Nose normal.   Mouth/Throat: Oropharynx is clear and moist.   Eyes: Conjunctivae and EOM are normal. Pupils are equal, round, and reactive to light. Right eye exhibits no discharge. Left eye exhibits no discharge.   Neck: No thyromegaly present. No tracheal deviation present. No JVD present.   Cardiovascular: Normal heart sounds and intact distal pulses. Exam reveals no gallop and no friction rub.    No murmur heard.  tachycardic   Pulmonary/Chest: Breath sounds normal. No stridor. No respiratory distress. He has no wheezes. He has no rhonchi. He has no rales.   Abdominal: Soft. He exhibits no distension. There is no tenderness. There is no rebound and no guarding.   Genitourinary: Rectal exam shows guaiac positive stool. Guaiac positive stool. : Acceptable.  Musculoskeletal: Normal range of motion. He exhibits no edema or tenderness.   Lymphadenopathy:     He has no cervical adenopathy.   Neurological: He is alert and oriented to person, place, and time. He has normal reflexes. He displays normal reflexes. No cranial nerve deficit or sensory deficit.   Skin: Skin is warm and dry. No pallor.   Psychiatric: He has a normal mood and affect. His behavior is normal. Judgment and thought content normal.         ED Course   Procedures  Labs Reviewed   CBC W/ AUTO DIFFERENTIAL - Abnormal; Notable for the following:        Result Value    RBC 2.59 (*)     Hemoglobin 7.4 (*)     Hematocrit 23.1 (*)     RDW 15.4 (*)     Gran # 10.4 (*)     Gran% 81.9 (*)     Lymph% 11.4 (*)     All other components within normal limits   COMPREHENSIVE METABOLIC PANEL - Abnormal; Notable for the following:     CO2 20 (*)      Glucose 216 (*)     BUN, Bld 74 (*)     Creatinine 3.1 (*)     Calcium 8.4 (*)     Albumin 3.0 (*)     ALT 8 (*)     eGFR if  22.8 (*)     eGFR if non  19.7 (*)     All other components within normal limits   PROTIME-INR - Abnormal; Notable for the following:     Prothrombin Time 27.9 (*)     INR 2.8 (*)     All other components within normal limits   APTT   URINALYSIS   CBC W/ AUTO DIFFERENTIAL   CBC W/ AUTO DIFFERENTIAL   TYPE & SCREEN   PREPARE RBC SOFT                  Medical Decision Making:   History:   Old Medical Records: I decided to obtain old medical records.  Initial Assessment:   67yo M presents w/ brisk dark maroon rectal bleed since Friday. Pt has been on coumadin since 12/2017, INR 2.8, within acceptable range at this point. Labs including INR, CBC, CMP, lytes, ordered. IVF and PRBC started.  Differential Diagnosis:   UGIB vs LGIB vs PUD vs hemorrhoids    Clinical Tests:   Lab Tests: Reviewed and Ordered  Medical Tests: Reviewed and Ordered  ED Management:  - HUE w/ strongly positive dark maroon blood, BUN/CR <30 suggesting likely LGIB.   - At this point will start IVF resuscitation w/ 1L and follow w/ IVF as tolerated.   - Pt consented and transfused 1u PRBC.   - Given 80 IV protonix push followed by GTT  - GI Consulted, assistance appreciated  - pt will be admitted to hospital medicine  - Discontinue all ASA, NSAIDs and Heparin products  - Please correct any coagulopathy with platelets and FFP to a goal of platelets >50K and INR <2.5  - Maintain IV access with 2 large bore IVs  - Keep NPO     It was a pleasure caring for this patient,    Bartolo No MD  IM PGY2  5:08 AM                    Scribe Attestation:   Scribe #1: I performed the above scribed service and the documentation accurately describes the services I performed. I attest to the accuracy of the note.    Attending Attestation:   Physician Attestation Statement for Resident:  As the supervising MD    Physician Attestation Statement: I have personally seen and examined this patient.   I agree with the above history. -: Emergent evaluation of pt who presents with dizziness and blood in stool for last 2 days. Pt is currently taking coumadin for atrial fibrillation. He denies any prior history of GI bleed. He is noted to have heme positive maroon stool. He has orthostatic positive vital signs. His H&H dropped from 11 to 7.4. He has been typed and screened and a Protonix infusion has been started.    As the supervising MD I agree with the above PE.    As the supervising MD I agree with the above treatment, course, plan, and disposition.        Attending Critical Care:   Critical Care Times:   Direct Patient Care (initial evaluation, reassessments, and time considering the case)................................................................25 minutes.   Additional History from reviewing old medical records or taking additional history from the family, EMS, PCP, etc.......................5 minutes.   Ordering, Reviewing, and Interpreting Diagnostic Studies...............................................................................................................5 minutes.   Documentation..................................................................................................................................................................................5 minutes.   Consultation with other Physicians. .................................................................................................................................................5 minutes.   Consultation with the patient's family directly relating to the patient's condition, care, and DNR status (when patient unable)......5 minutes.   ==============================================================  · Total Critical Care Time - exclusive of procedural time: 50 minutes.  ==============================================================  Critical  care was necessary to treat or prevent imminent or life-threatening deterioration of the following conditions: GI bleeding.     Physician Attestation for Scribe:  Physician Attestation Statement for Scribe #1: I, Dr. Flores, reviewed documentation, as scribed by Luiza Degroot in my presence, and it is both accurate and complete.         Attending ED Notes:   Orthostatic VS improved after IV Fluids, discussed with Dr. Perez, stable for the floor.          ED Course     Clinical Impression:   The primary encounter diagnosis was Dizziness. Diagnoses of Rectal bleeding, Gastrointestinal hemorrhage, unspecified gastrointestinal hemorrhage type, Acute blood loss anemia, CORRY (acute kidney injury), and GIB (gastrointestinal bleeding) were also pertinent to this visit.    Disposition:   Disposition: Admitted  Condition: Fair       Juan Flores MD  03/20/17 4319

## 2017-03-20 NOTE — ASSESSMENT & PLAN NOTE
-- Patient with CHF, last EF 45%  -- Continue bidil and metoprolol  -- Hold aspirin and plavix in setting of GI bleed

## 2017-03-20 NOTE — ED NOTES
Pt transferred to [address] by Shaggy WILKES. Pt on telemetry and continuous pulse ox. WAR room aware. Wife w pt

## 2017-03-20 NOTE — ED TRIAGE NOTES
"Pt reports he has been "feeling light headed all day. Then had a BM tonight with a lot of bright red blood in it." pt takes coumadin  "

## 2017-03-20 NOTE — SUBJECTIVE & OBJECTIVE
Past Medical History:   Diagnosis Date    Acute on chronic systolic CHF (congestive heart failure) 11/29/2016    CKD (chronic kidney disease) stage 2, GFR 60-89 ml/min 2/21/2016    HTN (hypertension) 3/3/2014    Hyperlipidemia 3/3/2014    NSTEMI (non-ST elevated myocardial infarction) 11/28/2016    Obesity (BMI 30-39.9) 11/29/2016    PVD (peripheral vascular disease)     Stroke     Type 2 diabetes mellitus with diabetic peripheral angiopathy without gangrene, without long-term current use of insulin 10/24/2013    Type 2 diabetes mellitus with diabetic polyneuropathy, without long-term current use of insulin 11/29/2016       Past Surgical History:   Procedure Laterality Date    FOOT NERVE GRAFT  11/2013    left leg stent      right leg bypass         Review of patient's allergies indicates:  No Known Allergies    No current facility-administered medications on file prior to encounter.      Current Outpatient Prescriptions on File Prior to Encounter   Medication Sig    aspirin 81 MG Chew Take 1 tablet (81 mg total) by mouth once daily.    atorvastatin (LIPITOR) 80 MG tablet Take 1 tablet (80 mg total) by mouth once daily.    clopidogrel (PLAVIX) 75 mg tablet Take 1 tablet (75 mg total) by mouth once daily.    ergocalciferol (ERGOCALCIFEROL) 50,000 unit Cap Take 1 capsule (50,000 Units total) by mouth twice a week.    furosemide (LASIX) 40 MG tablet Take 1 tablet (40 mg total) by mouth once daily.    insulin detemir (LEVEMIR FLEXTOUCH) 100 unit/mL (3 mL) SubQ InPn pen Inject 16 Units into the skin every evening.    isosorbide-hydrALAZINE 20-37.5 mg (BIDIL) 20-37.5 mg Tab Take 1 tablet by mouth 3 (three) times daily.    metoprolol succinate (TOPROL-XL) 50 MG 24 hr tablet Take 1 tablet (50 mg total) by mouth once daily.    pantoprazole (PROTONIX) 20 MG tablet Take 1 tablet (20 mg total) by mouth once daily.    warfarin (COUMADIN) 5 MG tablet Take 1 tablet (5 mg total) by mouth Daily.     "liraglutide 0.6 mg/0.1 mL, 18 mg/3 mL, subq PNIJ (VICTOZA 2-SAGAR) 0.6 mg/0.1 mL (18 mg/3 mL) PnIj Inject 0.6 mg daily x1 week, then 1.2 mg daily x1 week, then 1.8 mg daily thereafter (Patient taking differently: Inject 1.8 mg into the skin once daily. er)    pen needle, diabetic (BD ULTRA-FINE HANG PEN NEEDLES) 32 gauge x 5/32" Ndle Uses 2 times daily, on  insulin injections     Family History     Problem Relation (Age of Onset)    Diabetes Mother    Heart disease Father        Social History Main Topics    Smoking status: Former Smoker     Quit date: 3/1/2011    Smokeless tobacco: Never Used    Alcohol use No    Drug use: No    Sexual activity: Not on file     Review of Systems   General: Negative for weight gain, weakness, fatigue  HEENT: Negative for sore throat, vision changes, congestion  CVS: Positive for palpitations. Negative for chest pain, pressure  Resp: Negative for SOB or wheezing  GI: Positive for diarrhea. Negative for nausea, vomiting, constipation, or abdominal pain  : Negative for dysuria  MSK: Negative for myalgias or arthralgias.  Skin: Negative for rashes or bleeding  Neuro: Positive for dizziness, lightheadedness. Negative for headache, syncope, numbness, paresthesias  All other systems otherwise negative    Objective:     Vital Signs (Most Recent):  Temp: 98.3 °F (36.8 °C) (03/20/17 0534)  Pulse: 66 (03/20/17 0534)  Resp: 16 (03/20/17 0534)  BP: 124/64 (03/20/17 0534)  SpO2: 100 % (03/20/17 0534) Vital Signs (24h Range):  Temp:  [97.9 °F (36.6 °C)-98.3 °F (36.8 °C)] 98.3 °F (36.8 °C)  Pulse:  [] 66  Resp:  [16-18] 16  SpO2:  [86 %-100 %] 100 %  BP: ()/(53-70) 124/64     Weight: 110.7 kg (244 lb)  Body mass index is 36.03 kg/(m^2).    Physical Exam  Cons: AOx3, NAD, well developed and well nourished  HENT: Normocephalic and atraumatic.   Eyes: PERRL, No scleral icterus.   Neck: Neck supple. No thyromegaly present.   CV: tachycardic, without murmur, gallop, or rub  "   Pulmonary: clear to auscultation bilaterally  Abdomen: Soft. Bowel sounds are normal. Nontender, nondistended. Rectal per ED note: guaiac positive with dark maroon stool present  Neurological: Normal strength and normal reflexes. No cranial nerve deficit.   Skin: Skin is warm and dry. No rash noted.   Psychiatric: Patient has a normal mood and affect. Behavior is normal. Thought content normal. Cognition and memory are normal.     Significant Labs:   Recent Results (from the past 24 hour(s))   CBC auto differential    Collection Time: 03/20/17  2:24 AM   Result Value Ref Range    WBC 12.67 3.90 - 12.70 K/uL    RBC 2.59 (L) 4.60 - 6.20 M/uL    Hemoglobin 7.4 (L) 14.0 - 18.0 g/dL    Hematocrit 23.1 (L) 40.0 - 54.0 %    MCV 89 82 - 98 fL    MCH 28.6 27.0 - 31.0 pg    MCHC 32.0 32.0 - 36.0 %    RDW 15.4 (H) 11.5 - 14.5 %    Platelets 238 150 - 350 K/uL    MPV 10.2 9.2 - 12.9 fL    Gran # 10.4 (H) 1.8 - 7.7 K/uL    Lymph # 1.5 1.0 - 4.8 K/uL    Mono # 0.6 0.3 - 1.0 K/uL    Eos # 0.2 0.0 - 0.5 K/uL    Baso # 0.04 0.00 - 0.20 K/uL    Gran% 81.9 (H) 38.0 - 73.0 %    Lymph% 11.4 (L) 18.0 - 48.0 %    Mono% 4.8 4.0 - 15.0 %    Eosinophil% 1.2 0.0 - 8.0 %    Basophil% 0.3 0.0 - 1.9 %    Differential Method Automated    Comprehensive metabolic panel    Collection Time: 03/20/17  2:24 AM   Result Value Ref Range    Sodium 141 136 - 145 mmol/L    Potassium 4.2 3.5 - 5.1 mmol/L    Chloride 106 95 - 110 mmol/L    CO2 20 (L) 23 - 29 mmol/L    Glucose 216 (H) 70 - 110 mg/dL    BUN, Bld 74 (H) 8 - 23 mg/dL    Creatinine 3.1 (H) 0.5 - 1.4 mg/dL    Calcium 8.4 (L) 8.7 - 10.5 mg/dL    Total Protein 6.0 6.0 - 8.4 g/dL    Albumin 3.0 (L) 3.5 - 5.2 g/dL    Total Bilirubin 0.5 0.1 - 1.0 mg/dL    Alkaline Phosphatase 79 55 - 135 U/L    AST 12 10 - 40 U/L    ALT 8 (L) 10 - 44 U/L    Anion Gap 15 8 - 16 mmol/L    eGFR if African American 22.8 (A) >60 mL/min/1.73 m^2    eGFR if non  19.7 (A) >60 mL/min/1.73 m^2   Protime-INR     Collection Time: 03/20/17  2:24 AM   Result Value Ref Range    Prothrombin Time 27.9 (H) 9.0 - 12.5 sec    INR 2.8 (H) 0.8 - 1.2   Type & Screen    Collection Time: 03/20/17  2:24 AM   Result Value Ref Range    Group & Rh O POS     Indirect Ron NEG    APTT    Collection Time: 03/20/17  2:24 AM   Result Value Ref Range    aPTT 29.7 21.0 - 32.0 sec   Prepare RBC 1 Unit    Collection Time: 03/20/17  2:24 AM   Result Value Ref Range    UNIT NUMBER [id number]     PRODUCT CODE N3339P22     DISPENSE STATUS ISSUED     CODING SYSTEM [id number]     Unit Blood Type Code 5100     Unit Blood Type O POS     Unit Expiration [id number]      Significant Imaging:   Imaging Results     None

## 2017-03-20 NOTE — CONSULTS
[facility]  Gastroenterology  Consult Note    Patient Name: Chau Rodarte  MRN: [id number]  Admission Date: 3/20/2017  Hospital Length of Stay: 0 days  Code Status: Full Code   Attending Provider: Kevin Bernabe*   Consulting Provider: Mert Richardson MD  Primary Care Physician: Shaggy Neal MD  Principal Problem:Lower GI bleed    Inpatient consult to Gastroenterology  Consult performed by: MERT RICHARDSON  Consult ordered by: WANDER MAYEN  Reason for consult: rectal bleeding        Subjective:     Chau Rodarte is a 66 y.o. male with CAD (s/p PCI on 12/5 of pLAD, mLAD, distal LM and pLCx), ICM (EF 20-25%) improved to 45% on most recent TTE, 03/2017 s/p lifevest, CKD III, T2DM, HTN, PVD (s/p Right Fem-Pop bypass, Left SFA stent). He presented to hospital with BRBPR and dizziness since Friday. Bleeding is not asscoiated with any abdominal discomfort. He had no prior GIB. No prior EGDs but had colonoscopy in 2009 which was normal. He was hypotensive on admission which resolved after 1U PRBC transfusion. No recent abdominal imaging. Denies NSAID use. He reports last BM was yesterday and it was bright red blood with no clots. No nausea or vomiting, no hematemesis.     Review of Systems:  Constitutional: No fever, chills.   Eyes: no visual changes, no diplopia, no eye discharge  ENT: no sore throat or runny nose.  Respiratory: no cough or shortness of breath    Cardiovascular: no chest pain or palpitations    Gastrointestinal: as per HPI  Hematologic/Lymphatic: No petechia, no lymphadenopathy  Musculoskeletal: + arthralgias and myalgias    Neurological: no seizures, tremors   Behavioral/Psych: No suicidal ideation, no hallucination  Skin: No rash, no raynaud's    Past Medical History: has a past medical history of Acute on chronic systolic CHF (congestive heart failure); CKD (chronic kidney disease) stage 2, GFR 60-89 ml/min; HTN (hypertension); Hyperlipidemia; NSTEMI (non-ST  elevated myocardial infarction); Obesity (BMI 30-39.9); PVD (peripheral vascular disease); Stroke; Type 2 diabetes mellitus with diabetic peripheral angiopathy without gangrene, without long-term current use of insulin; and Type 2 diabetes mellitus with diabetic polyneuropathy, without long-term current use of insulin.    Past Surgical History: has a past surgical history that includes left leg stent; right leg bypass; and Foot nerve graft (11/2013).    Family History:family history includes Diabetes in his mother; Heart disease in his father.    Allergies: Reviewed in EPIC.     Social History: reports that he quit smoking about 6 years ago. He has never used smokeless tobacco. He reports that he does not drink alcohol or use illicit drugs.    Home medications:   No current facility-administered medications on file prior to encounter.      Current Outpatient Prescriptions on File Prior to Encounter   Medication Sig Dispense Refill    aspirin 81 MG Chew Take 1 tablet (81 mg total) by mouth once daily.  0    atorvastatin (LIPITOR) 80 MG tablet Take 1 tablet (80 mg total) by mouth once daily. 90 tablet 3    clopidogrel (PLAVIX) 75 mg tablet Take 1 tablet (75 mg total) by mouth once daily. 90 tablet 3    ergocalciferol (ERGOCALCIFEROL) 50,000 unit Cap Take 1 capsule (50,000 Units total) by mouth twice a week. 24 capsule 1    furosemide (LASIX) 40 MG tablet Take 1 tablet (40 mg total) by mouth once daily. 30 tablet 11    insulin detemir (LEVEMIR FLEXTOUCH) 100 unit/mL (3 mL) SubQ InPn pen Inject 16 Units into the skin every evening. 1 Box 3    isosorbide-hydrALAZINE 20-37.5 mg (BIDIL) 20-37.5 mg Tab Take 1 tablet by mouth 3 (three) times daily. 90 tablet 11    metoprolol succinate (TOPROL-XL) 50 MG 24 hr tablet Take 1 tablet (50 mg total) by mouth once daily. 30 tablet 11    pantoprazole (PROTONIX) 20 MG tablet Take 1 tablet (20 mg total) by mouth once daily. 30 tablet 11    warfarin (COUMADIN) 5 MG tablet Take  "1 tablet (5 mg total) by mouth Daily. 30 tablet 11    liraglutide 0.6 mg/0.1 mL, 18 mg/3 mL, subq PNIJ (VICTOZA 2-SAGAR) 0.6 mg/0.1 mL (18 mg/3 mL) PnIj Inject 0.6 mg daily x1 week, then 1.2 mg daily x1 week, then 1.8 mg daily thereafter (Patient taking differently: Inject 1.8 mg into the skin once daily. er) 9 mL 3    pen needle, diabetic (BD ULTRA-FINE HANG PEN NEEDLES) 32 gauge x 5/32" Ndle Uses 2 times daily, on  insulin injections 180 each 4       Scheduled Meds:    aspirin  81 mg Oral Daily    atorvastatin  80 mg Oral Daily    clopidogrel  75 mg Oral Daily    insulin detemir  8 Units Subcutaneous QHS    isosorbide-hydrALAZINE 20-37.5 mg  1 tablet Oral TID    metoprolol succinate  50 mg Oral Daily       Continuous Infusions:      PRN Meds: sodium chloride, dextrose 50%, dextrose 50%, glucagon (human recombinant), glucose, glucose, insulin aspart, ondansetron    Vital signs:  Temp:  [96.5 °F (35.8 °C)-98.3 °F (36.8 °C)]   Pulse:  []   Resp:  [16-18]   BP: ()/(53-72)   SpO2:  [86 %-100 %]     Physical exam:  General: No acute distress, WBWD  HEENT: Head: Normocephalic, atraumatic.   Eyes: Sclera non-icteric. EOMI.   Neck: Supple  Heart: Regular rate and rhythm, no gallops  Lungs: Non labored breathing, no wheezing  Abdomen: Bowel sounds normal, no organomegaly, masses, or hernia. No tenderness, no rebound or guarding. No distention.   Rectal: Deferred  Extremities: No deformities, no C/C/E  Neurologic: Able to follow commands and move 4 extremities. AOX3  Skin: No rash    Routine labs:    Recent Labs  Lab 03/20/17 0224   WBC 12.67   HGB 7.4*   HCT 23.1*      MCV 89       Recent Labs  Lab 03/20/17 0224   INR 2.8*   APTT 29.7       Recent Labs  Lab 03/20/17 0224      K 4.2   CO2 20*   BUN 74*   CREATININE 3.1*       Recent Labs  Lab 03/20/17 0224   ALBUMIN 3.0*   ALKPHOS 79   ALT 8*   AST 12   BILITOT 0.5     MELD-Na score: 29 at 3/20/2017  2:24 AM  MELD score: 29 at 3/20/2017  " 2:24 AM  Calculated from:  Serum Creatinine: 3.1 mg/dL at 3/20/2017  2:24 AM  Serum Sodium: 141 mmol/L (Rounded to 137) at 3/20/2017  2:24 AM  Total Bilirubin: 0.5 mg/dL (Rounded to 1) at 3/20/2017  2:24 AM  INR(ratio): 2.8 at 3/20/2017  2:24 AM  Age: 66 years    Imaging and prior endoscopies  As above    Assessment:  Chau Rdoarte is a 66 y.o. male with significant vascular disease on triple therapy with painless hematochezia. This is likely a lower source and behaves as diverticular bleeding, less likely colonic ischemia or upper GI. We would proceed with EGD/colon if he continues bleed to rule out UGI source.    Recs:  Protonix 80mg IV bolus and gtt  Intravascular resuscitation/support with IVFs   Serial H/H's and pRBCs transfusion as indicated  Discontinue all NSAIDs and Heparin products  Please correct any coagulopathy with platelets and FFP to a goal of platelets >50K and INR <2.0  Maintain IV access with 2 large bore IVs  NPO  Plan for EGD/ colonoscopy if he continues to bleed.   Please notify GI team if there is significant change in patient's clinical status      Denver Garg MD  Gastroenterology & Hepatology Fellow  Pager: [phone]

## 2017-03-20 NOTE — PLAN OF CARE
Problem: Patient Care Overview  Goal: Plan of Care Review  Outcome: Ongoing (interventions implemented as appropriate)  Patient received 2 units of PRBCs and 2 units FFP this shift. All transfusions well tolerated by patient. He is scheduled for EGD and colonoscopy tomorrow.  No BM this shift. Generalized weakness noted. Safety maintained at all times.  Accucheck monitored, patient not requiring Insulin correction coverage.

## 2017-03-20 NOTE — PROGRESS NOTES
Please notify the patient that his holter did not show any atrial fibrillation but shows some occasional episodes of an atrial tachycardia, mostly nonsustained.  I would like to proceed with loop implant once he is out of the hospital for his GI bleed.

## 2017-03-20 NOTE — IP AVS SNAPSHOT
[facility]  [address]  [facility] [address]  Phone: [phone]           Patient Discharge Instructions     Our goal is to set you up for success. This packet includes information on your condition, medications, and your home care. It will help you to care for yourself so you don't get sicker and need to go back to the hospital.     Please ask your nurse if you have any questions.        There are many details to remember when preparing to leave the hospital. Here is what you will need to do:    1. Take your medicine. If you are prescribed medications, review your Medication List in the following pages. You may have new medications to  at the pharmacy and others that you'll need to stop taking. Review the instructions for how and when to take your medications. Talk with your doctor or nurses if you are unsure of what to do.     2. Go to your follow-up appointments. Specific follow-up information is listed in the following pages. Your may be contacted by a transition nurse or clinical provider about future appointments. Be sure we have all of the phone numbers to reach you, if needed. Please contact your provider's office if you are unable to make an appointment.     3. Watch for warning signs. Your doctor or nurse will give you detailed warning signs to watch for and when to call for assistance. These instructions may also include educational information about your condition. If you experience any of warning signs to your health, call your doctor.               ** Verify the list of medication(s) below is accurate and up to date. Carry this with you in case of emergency. If your medications have changed, please notify your healthcare provider.             Medication List      CHANGE how you take these medications        Additional Info                      liraglutide 0.6 mg/0.1 mL (18 mg/3 mL) subq PNIJ 0.6 mg/0.1 mL (18 mg/3 mL) Pnij   Commonly known as:  VICTOZA  2-SAGAR   Quantity:  9 mL   Refills:  3   What changed:    - how much to take  - how to take this  - when to take this  - additional instructions    Instructions:  Inject 0.6 mg daily x1 week, then 1.2 mg daily x1 week, then 1.8 mg daily thereafter     Begin Date    AM    Noon    PM    Bedtime       pantoprazole 20 MG tablet   Commonly known as:  PROTONIX   Quantity:  30 tablet   Refills:  11   Dose:  40 mg   What changed:  how much to take    Instructions:  Take 2 tablets (40 mg total) by mouth once daily.     Begin Date    AM    Noon    PM    Bedtime         CONTINUE taking these medications        Additional Info                      aspirin 81 MG Chew   Refills:  0   Dose:  81 mg    Last time this was given:  81 mg on 3/22/2017  8:10 AM   Instructions:  Take 1 tablet (81 mg total) by mouth once daily.     Begin Date    AM    Noon    PM    Bedtime       atorvastatin 80 MG tablet   Commonly known as:  LIPITOR   Quantity:  90 tablet   Refills:  3   Dose:  80 mg    Last time this was given:  80 mg on 3/22/2017  8:10 AM   Instructions:  Take 1 tablet (80 mg total) by mouth once daily.     Begin Date    AM    Noon    PM    Bedtime       clopidogrel 75 mg tablet   Commonly known as:  PLAVIX   Quantity:  90 tablet   Refills:  3   Dose:  75 mg    Last time this was given:  75 mg on 3/22/2017  8:10 AM   Instructions:  Take 1 tablet (75 mg total) by mouth once daily.     Begin Date    AM    Noon    PM    Bedtime       furosemide 40 MG tablet   Commonly known as:  LASIX   Quantity:  30 tablet   Refills:  11   Dose:  40 mg    Instructions:  Take 1 tablet (40 mg total) by mouth once daily.     Begin Date    AM    Noon    PM    Bedtime       insulin detemir 100 unit/mL (3 mL) Inpn pen   Commonly known as:  LEVEMIR FLEXTOUCH   Quantity:  1 Box   Refills:  3   Dose:  16 Units    Last time this was given:  8 Units on 3/21/2017 11:54 PM   Instructions:  Inject 16 Units into the skin every evening.     Begin Date    AM    Noon    PM  "   Bedtime       isosorbide-hydrALAZINE 20-37.5 mg 20-37.5 mg Tab   Commonly known as:  BIDIL   Quantity:  90 tablet   Refills:  11   Dose:  1 tablet    Last time this was given:  1 tablet on 3/22/2017  6:29 AM   Instructions:  Take 1 tablet by mouth 3 (three) times daily.     Begin Date    AM    Noon    PM    Bedtime       metoprolol succinate 50 MG 24 hr tablet   Commonly known as:  TOPROL-XL   Quantity:  30 tablet   Refills:  11   Dose:  50 mg    Last time this was given:  50 mg on 3/22/2017  8:10 AM   Instructions:  Take 1 tablet (50 mg total) by mouth once daily.     Begin Date    AM    Noon    PM    Bedtime       pen needle, diabetic 32 gauge x 5/32" Ndle   Commonly known as:  BD ULTRA-FINE HANG PEN NEEDLES   Quantity:  180 each   Refills:  4    Instructions:  Uses 2 times daily, on  insulin injections     Begin Date    AM    Noon    PM    Bedtime       VITAMIN D3 5,000 unit Tab   Refills:  0   Dose:  5000 Units   Generic drug:  cholecalciferol (vitamin D3)    Instructions:  Take 5,000 Units by mouth once daily.     Begin Date    AM    Noon    PM    Bedtime         STOP taking these medications     warfarin 5 MG tablet   Commonly known as:  COUMADIN            Where to Get Your Medications      These medications were sent to [facility]/pharmacy #[id number] - [place] - [address]  [address] [place] [address]     Phone:  [phone]     pantoprazole 20 MG tablet                  Please bring to all follow up appointments:    1. A copy of your discharge instructions.  2. All medicines you are currently taking in their original bottles.  3. Identification and insurance card.    Please arrive 15 minutes ahead of scheduled appointment time.    Please call 24 hours in advance if you must reschedule your appointment and/or time.        Your Scheduled Appointments     Mar 22, 2017  4:00 PM CDT   Cardiac Rehab Phase 2 with REHAB, CARDIAC   [place] [facility]    [address] [facility] " [facility]  [place] [address]   [phone]            Mar 24, 2017  6:45 AM CDT   SSCU with [address]PREP, [address] [facility]    [address] [facility] [address] [phone]            Mar 24, 2017  4:00 PM CDT   Cardiac Rehab Phase 2 with REHAB, CARDIAC   [place] [facility] [place]    [address] [facility]  [place] [id number] [phone]            Mar 27, 2017  4:00 PM CDT   Cardiac Rehab Phase 2 with REHAB, CARDIAC   [place] [facility] ([place]    [address] [facility]  [place] [address] [phone]            Mar 28, 2017  2:00 PM CDT   Physical with Riki Huynh MD   [facility] ([facility])    [address]  [place] [address] [phone]              Your Future Surgeries/Procedures     Mar 24, 2017   Surgery with Cayden Moreno MD   [facility]    [facility] [facility] [address] [phone]              Follow-up Information     Follow up with Shaggy Neal MD In 1 month.    Specialty:  General Practice    Contact information:    [address] [address] [phone]          Follow up with [facility] - Cardiology.    Specialty:  Cardiology    Contact information:    [facility] [facility] [address] [phone]    Additional information:    3rd floor        Discharge Instructions     Future Orders    Call MD for:  difficulty breathing or increased cough     Call MD for:  increased confusion or weakness     Call MD for:  persistent dizziness, light-headedness, or visual disturbances     Call MD for:  persistent nausea and vomiting or diarrhea     Call MD for:  redness, tenderness, or signs of infection (pain, swelling, redness, odor or green/yellow discharge around incision site)     Call MD for:  severe persistent headache     Call MD for:   severe uncontrolled pain     Call MD for:  temperature >100.4     Call MD for:  worsening rash     Diet general     Questions:    Total calories:      Fat restriction, if any:      Protein restriction, if any:      Na restriction, if any:      Fluid restriction:      Additional restrictions:          Discharge Instructions         Colonoscopy     A camera attached to a flexible tube with a viewing lens is used to take video pictures.     Colonoscopy is a test to view the inside of your lower digestive tract (colon and rectum). Sometimes it can show the last part of the small intestine (ileum). During the test, small pieces of tissue may be removed for testing. This is called a biopsy. Small growths, such as polyps, may also be removed.   Why is colonoscopy done?  The test is done to help look for colon cancer. And it can help find the source of abdominal pain, bleeding, and changes in bowel habits. It may be needed once a year, depending on factors such as your:  · Age  · Health history  · Family health history  · Symptoms  · Results from any prior colonoscopy  Risks and possible complications  These include:  · Bleeding               · A puncture or tear in the colon   · Risks of anesthesia  · A cancer lesion not being seen  Getting ready   To prepare for the test:  · Talk with your healthcare provider about the risks of the test (see below). Also ask your healthcare provider about alternatives to the test.  · Tell your healthcare provider about any medicines you take. Also tell him or her about any health conditions you may have.  · Make sure your rectum and colon are empty for the test. Follow the diet and bowel prep instructions exactly. If you dont, the test may need to be rescheduled.  · Plan for a friend or family member to drive you home after the test.     Colonoscopy provides an inside view of the entire colon.     You may discuss the results with your doctor right away or at a future visit.  During the  test   The test is usually done in the hospital on an outpatient basis. This means you go home the same day. The procedure takes about 30 minutes. During that time:  · You are given relaxing (sedating) medicine through an IV line. You may be drowsy, or fully asleep.  · The healthcare provider will first give you a physical exam to check for anal and rectal problems.  · Then the anus is lubricated and the scope inserted.  · If you are awake, you may have a feeling similar to needing to have a bowel movement. You may also feel pressure as air is pumped into the colon. Its OK to pass gas during the procedure.  · Biopsy, polyp removal, or other treatments may be done during the test.  After the test   You may have gas right after the test. It can help to try to pass it to help prevent later bloating. Your healthcare provider may discuss the results with you right away. Or you may need to schedule a follow-up visit to talk about the results. After the test, you can go back to your normal eating and other activities. You may be tired from the sedation and need to rest for a few hours.  Date Last Reviewed: 11/1/2016  © 2197-4136 [employer]. [address] [place] [address]. All rights reserved. This information is not intended as a substitute for professional medical care. Always follow your healthcare professional's instructions.        Upper GI Endoscopy     During endoscopy, a long, flexible tube is used to view the inside of your upper GI tract.      Upper GI endoscopy allows your healthcare provider to look directly into the beginning of your gastrointestinal (GI) tract. The esophagus, stomach, and duodenum (the first part of the small intestine) make up the upper GI tract.   Before the exam  Follow these and any other instructions you are given before your endoscopy. If you dont follow the healthcare providers instructions carefully, the test may need to be canceled or done over:  · Don't  eat or drink anything after midnight the night before your exam. If your exam is in the afternoon, drink only clear liquids in the morning. Don't eat or drink anything for 8 hours before the exam. In some cases, you may be able to take medicines with sips of water until 2 hours before the procedure. Speak with your healthcare provider about this.   · Bring your X-rays and any other test results you have.  · Because you will be sedated, arrange for an adult to drive you home after the exam.  · Tell your healthcare provider before the exam if you are taking any medicines or have any medical problems.  The procedure  Here is what to expect:  · You will lie on the endoscopy table. Usually patients lie on the left side.  · You will be monitored and given oxygen.  · Your throat may be numbed with a spray or gargle. You are given medicine through an intravenous (IV) line that will help you relax and remain comfortable. You may be awake or asleep during the procedure.  · The healthcare provider will put the endoscope in your mouth and down your esophagus. It is thinner than most pieces of food that you swallow. It will not affect your breathing. The medicine helps keep you from gagging.  · Air is put into your GI tract to expand it. It can make you burp.  · During the procedure, the healthcare provider can take biopsies (tissue samples), remove abnormalities, such as polyps, or treat abnormalities through a variety of devices placed through the endoscope. You will not feel this.   · The endoscope carries images of your upper GI tract to a video screen. If you are awake, you may be able to look at the images.  · After the procedure is done, you will rest for a time. An adult must drive you home.  When to call your healthcare provider  Contact your healthcare provider if you have:  · Black or tarry stools, or blood in your stool  · Fever  · Pain in your belly that does not go away  · Nausea and vomiting, or vomiting blood  "  Date Last Reviewed: 7/1/2016  © 1198-7921 [employer]. [address] [place] [address], [place] [address]. All rights reserved. This information is not intended as a substitute for professional medical care. Always follow your healthcare professional's instructions.            Primary Diagnosis     Your primary diagnosis was:  Lower Intestinal Bleeding      Admission Information     Date & Time Provider Department CSN    3/20/2017  1:41 AM Pat Castle MD [facility] [id number]      Care Providers     Provider Role Specialty Primary office phone    Pat Castle MD Attending Provider Hospitalist [phone]    Mike Huizar MD Consulting Physician  Gastroenterology [phone]    Pat Castle MD Team Attending  Hospitalist [phone]    Karissa Peck MD Surgeon  Gastroenterology [phone]      Your Vitals Were     BP Pulse Temp Resp Height Weight    138/68 (BP Location: Right arm, Patient Position: Lying, BP Method: Automatic) 82 98.5 °F (36.9 °C) (Oral) 18 5' 9" (1.753 m) 110.7 kg (244 lb)    SpO2 BMI             97% 36.03 kg/m2         Recent Lab Values        11/28/2016 1/12/2017 2/27/2017                     3:09 AM  7:20 AM  9:37 AM         A1C 9.2 (H) 8.2 (H) 7.1 (H)         Comment for A1C at  3:09 AM on 11/28/2016:  According to ADA guidelines, hemoglobin A1C <7.0% represents  optimal control in non-pregnant diabetic patients.  Different  metrics may apply to specific populations.   Standards of Medical Care in Diabetes - 2016.  For the purpose of screening for the presence of diabetes:  <5.7%     Consistent with the absence of diabetes  5.7-6.4%  Consistent with increasing risk for diabetes   (prediabetes)  >or=6.5%  Consistent with diabetes  Currently no consensus exists for use of hemoglobin A1C  for diagnosis of diabetes for children.      Comment for A1C at  7:20 AM on 1/12/2017:  According to ADA guidelines, hemoglobin A1C <7.0% " represents  optimal control in non-pregnant diabetic patients.  Different  metrics may apply to specific populations.   Standards of Medical Care in Diabetes - 2016.  For the purpose of screening for the presence of diabetes:  <5.7%     Consistent with the absence of diabetes  5.7-6.4%  Consistent with increasing risk for diabetes   (prediabetes)  >or=6.5%  Consistent with diabetes  Currently no consensus exists for use of hemoglobin A1C  for diagnosis of diabetes for children.      Comment for A1C at  9:37 AM on 2/27/2017:  According to ADA guidelines, hemoglobin A1C <7.0% represents  optimal control in non-pregnant diabetic patients.  Different  metrics may apply to specific populations.   Standards of Medical Care in Diabetes - 2016.  For the purpose of screening for the presence of diabetes:  <5.7%     Consistent with the absence of diabetes  5.7-6.4%  Consistent with increasing risk for diabetes   (prediabetes)  >or=6.5%  Consistent with diabetes  Currently no consensus exists for use of hemoglobin A1C  for diagnosis of diabetes for children.        Pending Labs     Order Current Status    H.Pylori Antibody IgG In process    Specimen to Pathology - Surgery In process    Prepare Fresh Frozen Plasma 2 Units Preliminary result    Prepare RBC 1 Unit Preliminary result      Allergies as of 3/22/2017     No Known Allergies      [facility]     [facility] Nurse Care Line - 24/7 Assistance  Unless otherwise directed by your provider, please contact [facility], our nurse care line that is available for 24/7 assistance.     Registered nurses in the [facility] provide clinical advisement, health education, appointment booking, and other advisory services.  Call for this free service at [phone].        Advance Directives     An advance directive is a document which, in the event you are no longer able to make decisions for yourself, tells your healthcare team what kind of treatment you do  or do not want to receive, or who you would like to make those decisions for you.  If you do not currently have an advance directive, [facility] encourages you to create one.  For more information call:  [phone]), [phone] ([phone]),  or log on to [email or web address].        Smoking Cessation     If you would like to quit smoking:   You may be eligible for free services if you are a [place] resident and started smoking cigarettes before September 1, 1988.  Call the Smoking Cessation Trust (SCT) toll free at (208) 054-8092 or (730) 178-7481.   Call 2-638-QUIT-NOW if you do not meet the above criteria.            Language Assistance Services     ATTENTION: Language assistance services are available, free of charge. Please call [phone].      ATENCIÓN: Si habla español, tiene a zaldivar disposición servicios gratuitos de asistencia lingüística. Llame al [phone].     [facility] Ý: N?u b?n nói Ti?ng Vi?t, có các d?ch v? h? tr? ngôn ng? mi?n phí dành cho b?n. G?i s? [phone].        Blood Transfusion Reaction Signs and Symptoms     The blood you have received has been matched for you as carefully as possible. Most patients who receive a blood transfusion do not experience problems. However, there can be a delayed reaction that happens a few weeks after your blood transfusion. Contact your physician immediately if you experience any NEW SYMPTOMS listed below:     Fever greater than 100.4 degrees    Chills   Yellow color to your skin or eyes(Jaundice)   Back pain, chest pain, or pain at the infusion site   Weakness (more than usual)   Discomfort or uneasiness more than usual (Malaise)   Nausea or vomiting   Shortness of breath, wheezing, or coughing   Higher or lower blood pressure than normal   Skin rash, itching, skin redness, or localized swelling (example: hands or feet)   Urinating less than normal   Urine appears reddish or orange and is darker than  normal      Remember that some these signs may already exist for you--such as having chronic back pain or high blood pressure. You only need to look for and report to your doctor any new occurrences since your blood transfusion that are of concern.        Stroke Education              Heart Failure Education       Heart Failure: Being Active  You have a condition called heart failure. Being active doesnt mean that you have to wear yourself out. Even a little movement each day helps to strengthen your heart. If you cant get out to exercise, you can do simple stretching and strengthening exercises at home. These are good ways to keep you well-conditioned and prevent you and your heart from becoming excessively weak.    Ideas to get you started  · Add a little movement to things you do now. Walk to mail letters. Park your car at the far end of the parking lot and walk to the store. Walk up a flight of stairs instead of taking the elevator.  · Choose activities you enjoy. You might walk, swim, or ride an exercise bike. Things like gardening and washing the car count, too. Other possibilities include: washing dishes, walking the dog, walking around the mall, and doing aerobic activities with friends.  · Join a group exercise program at a [facility] or [facility], a senior center, or a community center. Or look into a hospital cardiac rehabilitation program. Ask your doctor if you qualify.  Tips to keep you going  · Get up and get dressed each day. Go to a coffee shop and read a newspaper or go somewhere that you'll be in the presence of other active people. Youll feel more like being active.  · Make a plan. Choose one or more activities that you enjoy and that you can easily do. Then plan to do at least one each day. You might write your plan on a calendar.  · Go with a friend or a group if you like company. This can help you feel supported and stay motivated, too.  · Plan social events that you enjoy. This will keep you mentally  engaged as well as physically motivated to do things you find pleasure in.  For your safety  · Talk with your healthcare provider before starting an exercise program.  · Exercise indoors when its too hot or too cold outside, or when the air quality is poor. Try walking at a shopping mall.  · Wear socks and sturdy shoes to maintain your balance and prevent falls.  · Start slowly. Do a few minutes several times a day at first. Increase your time and speed little by little.  · Stop and rest whenever you feel tired or get short of breath.  · Dont push yourself on days when you dont feel well.  Date Last Reviewed: 3/20/2016  © 6776-9546 [employer]. [address] [place] [address], [place] [address]. All rights reserved. This information is not intended as a substitute for professional medical care. Always follow your healthcare professional's instructions.              Heart Failure: Evaluating Your Heart  You have a condition called heart failure. To evaluate your condition, your doctor will examine you, ask questions, and do some tests. Along with looking for signs of heart failure, the doctor looks for any other health problems that may have led to heart failure. The results of your evaluation will help your doctor form a treatment plan.  Health history and physical exam  Your visit will start with a health history. Tell the doctor about any symptoms youve noticed and about all medicines you take. Then youll have a physical exam. This includes listening to your heartbeat and breathing. Youll also be checked for swelling (edema) in your legs and neck. When you have fluid buildup or fluid in the lungs, it may be called congestive heart failure.  Diagnosing heart failure     During an echocardiogram, sound waves bounce off the heart. These are converted into a picture on the screen.   The following may be done to help your doctor form a diagnosis:  · X-rays show the size and shape of your heart. These  pictures can also show fluid in your lungs.  · An electrocardiogram (ECG or EKG) shows the pattern of your heartbeat. Small pads (electrodes) are placed on your chest, arms, and legs. Wires connect the pads to the ECG machine, which records your hearts electrical signals. This can give the doctor information about heart function.  · An echocardiogram uses ultrasound waves to show the structure and movement of your heart muscle. This shows how well the heart pumps. It also shows the thickness of the heart walls, and if the heart is enlarged. It is one of the most useful, non-invasive tests as it provides information about the heart's general function. This helps your doctor make treatment decisions.  · Lab tests evaluate small amounts of blood or urine for signs of problems. A BNP lab test can help diagnose and evaluate heart failure. BNP stands for B-type natriuretic peptide. The ventricles secrete more BNP when heart failure worsens. Lab tests can also provide information about metabolic dysfunction or heart dysfunction.  Your treatment plan  Based on the results of your evaluation and tests, your doctor will develop a treatment plan. This plan is designed to relieve some of your heart failure symptoms and help make you more comfortable. Your treatment plan may include:  · Medicine to help your heart work better and improve your quality of life  · Changes in what you eat and drink to help prevent fluid from backing up in your body  · Daily monitoring of your weight and heart failure symptoms to see how well your treatment plan is working  · Exercise to help you stay healthy  · Help with quitting smoking  · Emotional and psychological support to help adjust to the changes  · Referrals to other specialists to make sure you are being treated comprehensively  Date Last Reviewed: 3/21/2016  © 5253-1274 [employer]. [address] [place] [address], [place] [address]. All rights reserved. This information is not  intended as a substitute for professional medical care. Always follow your healthcare professional's instructions.              Heart Failure: Making Changes to Your Diet  You have a condition called heart failure. When you have heart failure, excess fluid is more likely to build up in your body because your heart isn't working well. This makes the heart work harder to pump blood. Fluid buildup causes symptoms such as shortness of breath and swelling (edema). This is often referred to as congestive heart failure or CHF. Controlling the amount of salt (sodium) you eat may help stop fluid from building up. Your doctor may also tell you to reduce the amount of fluid you drink.  Reading food labels    Your healthcare provider will tell you how much sodium you can eat each day. Read food labels to keep track. Keep in mind that certain foods are high in salt. These include canned, frozen, and processed foods. Check the amount of sodium in each serving. Watch out for high-sodium ingredients. These include MSG (monosodium glutamate), baking soda, and sodium phosphate.   Eating less salt  Give yourself time to get used to eating less salt. It may take a little while. Here are some tips to help:  · Take the saltshaker off the table. Replace it with salt-free herb mixes and spices.  · Eat fresh or plain frozen vegetables. These have much less salt than canned vegetables.  · Choose low-sodium snacks like sodium-free pretzels, crackers, or air-popped popcorn.  · Dont add salt to your food when youre cooking. Instead, season your foods with pepper, lemon, garlic, or onion.  · When you eat out, ask that your food be cooked without added salt.  · Avoid eating fried foods as these often have a great deal of salt.  If youre told to limit fluids  You may need to limit how much fluid you have to help prevent swelling. This includes anything that is liquid at room temperature, such as ice cream and soup. If your doctor tells you to  limit fluid, try these tips:  · Measure drinks in a measuring cup before you drink them. This will help you meet daily goals.  · Chill drinks to make them more refreshing.  · Suck on frozen lemon wedges to quench thirst.  · Only drink when youre thirsty.  · Chew sugarless gum or suck on hard candy to keep your mouth moist.  · Weigh yourself daily to know if your body's fluid content is rising.  My sodium goal  Your healthcare provider may give you a sodium goal to meet each day. This includes sodium found in food as well as salt that you add. My goal is to eat no more than ___________ mg of sodium per day.     When to call your doctor  Call your doctor right away if you have any symptoms of worsening heart failure. These can include:  · Sudden weight gain  · Increased swelling of your legs or ankles  · Trouble breathing when youre resting or at night  · Increase in the number of pillows you have to sleep on  · Chest pain, pressure, discomfort, or pain in the jaw, neck, or back   Date Last Reviewed: 3/21/2016  © 2302-8982 [employer]. [address] [place] [address]. All rights reserved. This information is not intended as a substitute for professional medical care. Always follow your healthcare professional's instructions.              Heart Failure: Medicines to Help Your Heart    You have a condition called heart failure (also known as congestive heart failure, or CHF). Your doctor will likely prescribe medicines for heart failure and any underlying health problems you have. Most heart failure patients take one or more types of medicinen. Your healthcare provider will work to find the combination of medicines that works best for you.  Heart failure medicines  Here are the most common heart failure medicines:  · ACE inhibitors lower blood pressure and decrease strain on the heart. This makes it easier for the heart to pump. Angiotensin receptor blockers have similar effects. These are  prescribed for some patients instead of ACE inhibitors.  · Beta-blockers relieve stress on the heart. They also improve symptoms. They may also improve the heart's pumping action over time.  · Diuretics (also called water pills) help rid your body of excess water. This can help rid your body of swelling (edema). Having less fluid to pump means your heart doesnt have to work as hard. Some diuretics make your body lose a mineral called potassium. Your doctor will tell you if you need to take supplements or eat more foods high in potassium.  · Digoxin helps your heart pump with more strength. This helps your heart pump more blood with each beat. So, more oxygen-rich blood travels to the rest of the body.  · Aldosterone antagonists help alter hormones and decrease strain on the heart.  · Hydralazine and nitrates are two separate medicines used together to treat heart failure. They may come in one combination pill. They lower blood pressure and decrease how hard the heart has to pump.  Medicines for related conditions  Controlling other heart problems helps keep heart failure under control, too. Depending on other heart problems you have, medicines may be prescribed to:  · Lower blood pressure (antihypertensives).  · Lower cholesterol levels (statins).  · Prevent blood clots (anticoagulants or aspirin).  · Keep the heartbeat steady (antiarrhythmics).  Date Last Reviewed: 3/5/2016  © 9396-7671 [email or web address]. [address] [place] [address], [place] [address]. All rights reserved. This information is not intended as a substitute for professional medical care. Always follow your healthcare professional's instructions.              Heart Failure: Procedures That May Help    The heart is a muscle that pumps oxygen-rich blood to all parts of the body. When you have heart failure, the heart is not able to pump as well as it should. Blood and fluid may back up into the lungs (congestive heart failure), and some parts of  the body dont get enough oxygen-rich blood to work normally. These problems lead to the symptoms of heart failure.     Certain procedures may help the heart pump better in some cases of heart failure. Some procedures are done to treat health problems that may have caused the heart failure such as coronary artery disease or heart rhythm problems. For more serious heart failure, other options are available.  Treating artery and valve problems  If you have coronary artery disease or valve disease, procedures may be done to improve blood flow. This helps the heart pump better, which can improve heart failure symptoms. First, your doctor may do a cardiac catheterization to help detect clogged blood vessels or valve damage. During this procedure, a  thin tube (catheter) in inserted into a blood vessel and guided to the heart. There a dye is injected and a special type of X-ray (angiogram) is taken of the blood vessels. Procedures to open a blocked artery or fix damaged valves can also be done using catheterization.  · Angioplasty uses a balloon-tipped instrument at the end of the catheter. The balloon is inflated to widen the narrowed artery. In many cases, a stent is expanded to further support the narrowed artery. A stent is a metal mesh tube.  · Valve surgery repairs or replacement of faulty valves can also be done during catheterization so blood can flow properly through the chambers of the heart.  Bypass surgery is another option to help treat blocked arteries. It uses a healthy blood vessel from elsewhere in the body. The healthy blood vessel is attached above and below the blocked area so that blood can flow around the blocked artery.  Treating heart rhythm problems  A device may be placed in the chest to help a weak heart maintain a healthy, heartbeat so the heart can pump more effectively:  · Pacemaker. A pacemaker is an implanted device that regulates your heartbeat electronically. It monitors your heart's  rhythm and generates a painless electric impulse that helps the heart beat in a regular rhythm. A pacemaker is programmed to meet your specific heart rhythm needs.  · Biventricular pacing/cardiac resynchronization therapy. A type of pacemaker that paces both pumping chambers of the heart at the same time to coordinate contractions and to improve the heart's function. Some people with heart failure are candidates for this therapy.  · Implantable cardioverter defibrillator. A device similar to a pacemaker that senses when the heart is beating too fast and delivers an electrical shock to convert the fast rhythm to a normal rhythm. This can be a life saving device.  In severe cases  In more serious cases of heart failure when other treatments no longer work, other options may include:  · Ventricular assist devices (VADs). These are mechanical devices used to take over the pumping function for one or both of the heart's ventricles, or pumping chambers. A VAD may be necessary when heart failure progresses to the point that medicines and other treatments no longer help. In some cases, a VAD may be used as a bridge to transplant.  · Heart transplant. This is replacing the diseased heart with a healthy one from a donor. This is an option for a few people who are very sick. A heart transplant is very serious and not an option for all patients. Your doctor can tell you more.  Date Last Reviewed: 3/20/2016  © 0924-9958 [employer]. [address] [place] [address], [place] [address]. All rights reserved. This information is not intended as a substitute for professional medical care. Always follow your healthcare professional's instructions.              Heart Failure: Tracking Your Weight  You have a condition called heart failure. When you have heart failure, a sudden weight gain or a steady rise in weight is a warning sign that your body is retaining too much water and salt. This could mean your heart failure is getting  worse. If left untreated, it can cause problems for your lungs and result in shortness of breath. Weighing yourself each day is the best way to know if youre retaining water. If your weight goes up quickly, call your doctor. You will be given instructions on how to get rid of the excess water. You will likely need medicines and to avoid salt. This will help your heart work better.  Call your doctor if you gain more than 2 pounds in 1 day, more than 5 pounds in 1 week, or whatever weight gain you were told to report by your doctor. This is often a sign of worsening heart failure and needs to be evaluated and treated. Your doctor will tell you what to do next.   Tips for weighing yourself    · Weigh yourself at the same time each morning, wearing the same clothes. Weigh yourself after urinating and before eating.  · Use the same scale each day. Make sure the numbers are easy to read. Put the scale on a flat, hard surface -- not on a rug or carpet.  · Do not stop weighing yourself. If you forget one day, weigh again the next morning.  How to use your weight chart  · Keep your weight chart near the scale. Write your weight on the chart as soon as you get off the scale.  · Fill in the month and the start date on the chart. Then write down your weight each day. Your chart will look like this:    · If you miss a day, leave the space blank. Weigh yourself the next day and write your weight in the next space.  · Take your weight chart with you when you go to see your doctor.  Date Last Reviewed: 3/20/2016  © 9563-5608 [employer]. [address] [place] [address]. All rights reserved. This information is not intended as a substitute for professional medical care. Always follow your healthcare professional's instructions.              Heart Failure: Warning Signs of a Flare-Up  You have a condition called heart failure. Once you have heart failure, flare-ups can happen. Below are signs that can mean  your heart failure is getting worse. If you notice any of these warning signs, call your healthcare provider.  Swelling    · Your feet, ankles, or lower legs get puffier.  · You notice skin changes on your lower legs.  · Your shoes feel too tight.  · Your clothes are tighter in the waist.  · You have trouble getting rings on or off your fingers.  Shortness of breath  · You have to breathe harder even when youre doing your normal activities or when youre resting.  · You are short of breath walking up stairs or even short distances.  · You wake up at night short of breath or coughing.  · You need to use more pillows or sit up to sleep.  · You wake up tired or restless.  Other warning signs  · You feel weaker, dizzy, or more tired.  · You have chest pain or changes in your heartbeat.  · You have a cough that wont go away.  · You cant remember things or dont feel like eating.  Tracking your weight  Gaining weight is often the first warning sign that heart failure is getting worse. Gaining even a few pounds can be a sign that your body is retaining excess water and salt. Weighing yourself each day in the morning after you urinate and before you eat, is the best way to know if you're retaining water. Get a scale that is easy to read and make sure you wear the same clothes and use the same scale every time you weigh. Your healthcare provider will show you how to track your weight. Call your doctor if you gain more than 2 pounds in 1 day, 5 pounds in 1 week, or whatever weight gain you were told to report by your doctor. This is often a sign of worsening heart failure and needs to be evaluated and treated before it compromises your breathing. Your doctor will tell you what to do next.    Date Last Reviewed: 3/15/2016  © 2973-3861 [employer]. [address] [place] [address], [place] [address]. All rights reserved. This information is not intended as a substitute for professional medical care. Always follow your  healthcare professional's instructions.              Chronic Kindey Disease Education             Diabetes Discharge Instructions                                    [facility] complies with applicable Federal civil rights laws and does not discriminate on the basis of race, color, national origin, age, disability, or sex.

## 2017-03-21 ENCOUNTER — TELEPHONE (OUTPATIENT)
Dept: ELECTROPHYSIOLOGY | Facility: CLINIC | Age: 67
End: 2017-03-21

## 2017-03-21 ENCOUNTER — ANESTHESIA (OUTPATIENT)
Dept: ENDOSCOPY | Facility: HOSPITAL | Age: 67
DRG: 378 | End: 2017-03-21
Payer: MEDICARE

## 2017-03-21 LAB
ALBUMIN SERPL BCP-MCNC: 3.2 G/DL
ALP SERPL-CCNC: 78 U/L
ALT SERPL W/O P-5'-P-CCNC: 8 U/L
ANION GAP SERPL CALC-SCNC: 12 MMOL/L
AST SERPL-CCNC: 13 U/L
BASOPHILS # BLD AUTO: 0.03 K/UL
BASOPHILS # BLD AUTO: 0.05 K/UL
BASOPHILS NFR BLD: 0.3 %
BASOPHILS NFR BLD: 0.5 %
BILIRUB SERPL-MCNC: 0.9 MG/DL
BUN SERPL-MCNC: 66 MG/DL
CALCIUM SERPL-MCNC: 8.6 MG/DL
CHLORIDE SERPL-SCNC: 112 MMOL/L
CO2 SERPL-SCNC: 20 MMOL/L
CREAT SERPL-MCNC: 2.5 MG/DL
DIFFERENTIAL METHOD: ABNORMAL
DIFFERENTIAL METHOD: ABNORMAL
EOSINOPHIL # BLD AUTO: 0.3 K/UL
EOSINOPHIL # BLD AUTO: 0.4 K/UL
EOSINOPHIL NFR BLD: 3.5 %
EOSINOPHIL NFR BLD: 3.6 %
ERYTHROCYTE [DISTWIDTH] IN BLOOD BY AUTOMATED COUNT: 15.4 %
ERYTHROCYTE [DISTWIDTH] IN BLOOD BY AUTOMATED COUNT: 15.4 %
EST. GFR  (AFRICAN AMERICAN): 29.6 ML/MIN/1.73 M^2
EST. GFR  (NON AFRICAN AMERICAN): 25.6 ML/MIN/1.73 M^2
GLUCOSE SERPL-MCNC: 112 MG/DL
HCT VFR BLD AUTO: 22.4 %
HCT VFR BLD AUTO: 23 %
HGB BLD-MCNC: 7.4 G/DL
HGB BLD-MCNC: 7.8 G/DL
INR PPP: 1.9
LYMPHOCYTES # BLD AUTO: 1.5 K/UL
LYMPHOCYTES # BLD AUTO: 1.7 K/UL
LYMPHOCYTES NFR BLD: 15.8 %
LYMPHOCYTES NFR BLD: 17.4 %
MCH RBC QN AUTO: 29.1 PG
MCH RBC QN AUTO: 29.4 PG
MCHC RBC AUTO-ENTMCNC: 33 %
MCHC RBC AUTO-ENTMCNC: 33.9 %
MCV RBC AUTO: 87 FL
MCV RBC AUTO: 88 FL
MONOCYTES # BLD AUTO: 0.6 K/UL
MONOCYTES # BLD AUTO: 1.2 K/UL
MONOCYTES NFR BLD: 12 %
MONOCYTES NFR BLD: 6.4 %
NEUTROPHILS # BLD AUTO: 6.5 K/UL
NEUTROPHILS # BLD AUTO: 6.9 K/UL
NEUTROPHILS NFR BLD: 68.1 %
NEUTROPHILS NFR BLD: 71.9 %
PLATELET # BLD AUTO: 207 K/UL
PLATELET # BLD AUTO: 228 K/UL
PMV BLD AUTO: 10 FL
PMV BLD AUTO: 10.2 FL
POCT GLUCOSE: 116 MG/DL (ref 70–110)
POCT GLUCOSE: 118 MG/DL (ref 70–110)
POTASSIUM SERPL-SCNC: 3.8 MMOL/L
PROT SERPL-MCNC: 6.3 G/DL
PROTHROMBIN TIME: 18.6 SEC
RBC # BLD AUTO: 2.54 M/UL
RBC # BLD AUTO: 2.65 M/UL
SODIUM SERPL-SCNC: 144 MMOL/L
WBC # BLD AUTO: 9.54 K/UL
WBC # BLD AUTO: 9.61 K/UL

## 2017-03-21 PROCEDURE — 63600175 PHARM REV CODE 636 W HCPCS: Performed by: NURSE ANESTHETIST, CERTIFIED REGISTERED

## 2017-03-21 PROCEDURE — 43239 EGD BIOPSY SINGLE/MULTIPLE: CPT | Performed by: INTERNAL MEDICINE

## 2017-03-21 PROCEDURE — 85610 PROTHROMBIN TIME: CPT

## 2017-03-21 PROCEDURE — 88305 TISSUE EXAM BY PATHOLOGIST: CPT | Performed by: PATHOLOGY

## 2017-03-21 PROCEDURE — 25000003 PHARM REV CODE 250: Performed by: NURSE ANESTHETIST, CERTIFIED REGISTERED

## 2017-03-21 PROCEDURE — C9113 INJ PANTOPRAZOLE SODIUM, VIA: HCPCS | Performed by: INTERNAL MEDICINE

## 2017-03-21 PROCEDURE — 0DJD8ZZ INSPECTION OF LOWER INTESTINAL TRACT, VIA NATURAL OR ARTIFICIAL OPENING ENDOSCOPIC: ICD-10-PCS | Performed by: INTERNAL MEDICINE

## 2017-03-21 PROCEDURE — 0DB98ZX EXCISION OF DUODENUM, VIA NATURAL OR ARTIFICIAL OPENING ENDOSCOPIC, DIAGNOSTIC: ICD-10-PCS | Performed by: INTERNAL MEDICINE

## 2017-03-21 PROCEDURE — 45378 DIAGNOSTIC COLONOSCOPY: CPT | Mod: ,,, | Performed by: INTERNAL MEDICINE

## 2017-03-21 PROCEDURE — 85025 COMPLETE CBC W/AUTO DIFF WBC: CPT | Mod: 91

## 2017-03-21 PROCEDURE — 88341 IMHCHEM/IMCYTCHM EA ADD ANTB: CPT | Mod: 26,,, | Performed by: PATHOLOGY

## 2017-03-21 PROCEDURE — 25000003 PHARM REV CODE 250: Performed by: INTERNAL MEDICINE

## 2017-03-21 PROCEDURE — 82962 GLUCOSE BLOOD TEST: CPT | Performed by: INTERNAL MEDICINE

## 2017-03-21 PROCEDURE — 25000003 PHARM REV CODE 250: Performed by: STUDENT IN AN ORGANIZED HEALTH CARE EDUCATION/TRAINING PROGRAM

## 2017-03-21 PROCEDURE — 0D998ZX DRAINAGE OF DUODENUM, VIA NATURAL OR ARTIFICIAL OPENING ENDOSCOPIC, DIAGNOSTIC: ICD-10-PCS | Performed by: INTERNAL MEDICINE

## 2017-03-21 PROCEDURE — 36415 COLL VENOUS BLD VENIPUNCTURE: CPT

## 2017-03-21 PROCEDURE — 88305 TISSUE EXAM BY PATHOLOGIST: CPT | Mod: 26,,, | Performed by: PATHOLOGY

## 2017-03-21 PROCEDURE — 88342 IMHCHEM/IMCYTCHM 1ST ANTB: CPT | Mod: 26,,, | Performed by: PATHOLOGY

## 2017-03-21 PROCEDURE — 80053 COMPREHEN METABOLIC PANEL: CPT

## 2017-03-21 PROCEDURE — D9220A PRA ANESTHESIA: Mod: CRNA,,, | Performed by: NURSE ANESTHETIST, CERTIFIED REGISTERED

## 2017-03-21 PROCEDURE — 63600175 PHARM REV CODE 636 W HCPCS: Performed by: INTERNAL MEDICINE

## 2017-03-21 PROCEDURE — 37000008 HC ANESTHESIA 1ST 15 MINUTES: Performed by: INTERNAL MEDICINE

## 2017-03-21 PROCEDURE — 63600175 PHARM REV CODE 636 W HCPCS: Performed by: STUDENT IN AN ORGANIZED HEALTH CARE EDUCATION/TRAINING PROGRAM

## 2017-03-21 PROCEDURE — 45378 DIAGNOSTIC COLONOSCOPY: CPT | Performed by: INTERNAL MEDICINE

## 2017-03-21 PROCEDURE — 11000001 HC ACUTE MED/SURG PRIVATE ROOM

## 2017-03-21 PROCEDURE — D9220A PRA ANESTHESIA: Mod: ANES,,, | Performed by: ANESTHESIOLOGY

## 2017-03-21 PROCEDURE — 37000009 HC ANESTHESIA EA ADD 15 MINS: Performed by: INTERNAL MEDICINE

## 2017-03-21 PROCEDURE — 43239 EGD BIOPSY SINGLE/MULTIPLE: CPT | Mod: 51,,, | Performed by: INTERNAL MEDICINE

## 2017-03-21 RX ORDER — ETOMIDATE 2 MG/ML
INJECTION INTRAVENOUS
Status: DISCONTINUED | OUTPATIENT
Start: 2017-03-21 | End: 2017-03-21

## 2017-03-21 RX ORDER — PROPOFOL 10 MG/ML
VIAL (ML) INTRAVENOUS
Status: DISCONTINUED | OUTPATIENT
Start: 2017-03-21 | End: 2017-03-21

## 2017-03-21 RX ORDER — LIDOCAINE HCL/PF 100 MG/5ML
SYRINGE (ML) INTRAVENOUS
Status: DISCONTINUED | OUTPATIENT
Start: 2017-03-21 | End: 2017-03-21

## 2017-03-21 RX ORDER — PROPOFOL 10 MG/ML
VIAL (ML) INTRAVENOUS CONTINUOUS PRN
Status: DISCONTINUED | OUTPATIENT
Start: 2017-03-21 | End: 2017-03-21

## 2017-03-21 RX ORDER — SODIUM CHLORIDE 9 MG/ML
INJECTION, SOLUTION INTRAVENOUS CONTINUOUS PRN
Status: DISCONTINUED | OUTPATIENT
Start: 2017-03-21 | End: 2017-03-21

## 2017-03-21 RX ORDER — ONDANSETRON 2 MG/ML
INJECTION INTRAMUSCULAR; INTRAVENOUS
Status: DISCONTINUED | OUTPATIENT
Start: 2017-03-21 | End: 2017-03-21

## 2017-03-21 RX ADMIN — SODIUM CHLORIDE: 0.9 INJECTION, SOLUTION INTRAVENOUS at 11:03

## 2017-03-21 RX ADMIN — TOPICAL ANESTHETIC 1 EACH: 200 SPRAY DENTAL; PERIODONTAL at 11:03

## 2017-03-21 RX ADMIN — PROPOFOL 60 MG: 10 INJECTION, EMULSION INTRAVENOUS at 11:03

## 2017-03-21 RX ADMIN — POLYETHYLENE GLYCOL 3350, SODIUM SULFATE ANHYDROUS, SODIUM BICARBONATE, SODIUM CHLORIDE, POTASSIUM CHLORIDE 2000 ML: 236; 22.74; 6.74; 5.86; 2.97 POWDER, FOR SOLUTION ORAL at 04:03

## 2017-03-21 RX ADMIN — INSULIN ASPART 1 UNITS: 100 INJECTION, SOLUTION INTRAVENOUS; SUBCUTANEOUS at 11:03

## 2017-03-21 RX ADMIN — ETOMIDATE 2 MG: 2 INJECTION, SOLUTION INTRAVENOUS at 12:03

## 2017-03-21 RX ADMIN — ETOMIDATE 4 MG: 2 INJECTION, SOLUTION INTRAVENOUS at 11:03

## 2017-03-21 RX ADMIN — ETOMIDATE 6 MG: 2 INJECTION, SOLUTION INTRAVENOUS at 11:03

## 2017-03-21 RX ADMIN — PROPOFOL 100 MCG/KG/MIN: 10 INJECTION, EMULSION INTRAVENOUS at 11:03

## 2017-03-21 RX ADMIN — HYDRALAZINE HYDROCHLORIDE AND ISOSORBIDE DINITRATE 1 TABLET: 37.5; 2 TABLET, FILM COATED ORAL at 11:03

## 2017-03-21 RX ADMIN — SODIUM CHLORIDE 500 ML: 0.9 INJECTION, SOLUTION INTRAVENOUS at 05:03

## 2017-03-21 RX ADMIN — ONDANSETRON 4 MG: 2 INJECTION INTRAMUSCULAR; INTRAVENOUS at 11:03

## 2017-03-21 RX ADMIN — HYDRALAZINE HYDROCHLORIDE AND ISOSORBIDE DINITRATE 1 TABLET: 37.5; 2 TABLET, FILM COATED ORAL at 05:03

## 2017-03-21 RX ADMIN — SODIUM CHLORIDE: 0.9 INJECTION, SOLUTION INTRAVENOUS at 12:03

## 2017-03-21 RX ADMIN — PANTOPRAZOLE SODIUM 40 MG: 40 INJECTION, POWDER, FOR SOLUTION INTRAVENOUS at 09:03

## 2017-03-21 RX ADMIN — INSULIN DETEMIR 8 UNITS: 100 INJECTION, SOLUTION SUBCUTANEOUS at 11:03

## 2017-03-21 RX ADMIN — LIDOCAINE HYDROCHLORIDE 100 MG: 20 INJECTION, SOLUTION INTRAVENOUS at 11:03

## 2017-03-21 NOTE — ANESTHESIA POSTPROCEDURE EVALUATION
"Anesthesia Post Evaluation    Patient: Chau Rodarte    Procedure(s) Performed: Procedure(s) (LRB):  ESOPHAGOGASTRODUODENOSCOPY (EGD) (N/A)  COLONOSCOPY (N/A)    Final Anesthesia Type: general  Patient location during evaluation: [facility]  Patient participation: Yes- Able to Participate  Level of consciousness: awake and alert and oriented  Post-procedure vital signs: reviewed and stable  Pain management: adequate  Airway patency: patent  PONV status at discharge: No PONV  Anesthetic complications: no      Cardiovascular status: blood pressure returned to baseline and stable  Respiratory status: unassisted, room air and spontaneous ventilation  Hydration status: euvolemic  Follow-up not needed.        Visit Vitals    BP (!) 145/74    Pulse 70    Temp 36.7 °C (98 °F) (Temporal)    Resp 16    Ht 5' 9" (1.753 m)    Wt 110.7 kg (244 lb)    SpO2 100%    BMI 36.03 kg/m2       Pain/Miroslava Score: Pain Assessment Performed: Yes (3/21/2017 12:56 PM)  Presence of Pain: denies (3/21/2017  2:00 PM)  Miroslava Score: 10 (3/21/2017  2:00 PM)      "

## 2017-03-21 NOTE — TELEPHONE ENCOUNTER
----- Message from Cayden Moreno MD sent at 3/20/2017  3:43 PM CDT -----  Please notify the patient that his holter did not show any atrial fibrillation but shows some occasional episodes of an atrial tachycardia, mostly nonsustained.  I would like to proceed with loop implant once he is out of the hospital for his GI bleed.

## 2017-03-21 NOTE — ASSESSMENT & PLAN NOTE
-- Patient was anticoagulated with coumadin  -- Was planning to have loop recorder implanted on Friday, 3/24  -- hold coumadin in setting of GI Bleed and INR down trending

## 2017-03-21 NOTE — TRANSFER OF CARE
"Anesthesia Transfer of Care Note    Patient: Chau Rodarte    Procedure(s) Performed: Procedure(s) (LRB):  ESOPHAGOGASTRODUODENOSCOPY (EGD) (N/A)  COLONOSCOPY (N/A)    Patient location: [facility]    Anesthesia Type: general    Transport from OR: Transported from OR on 2-3 L/min O2 by NC with adequate spontaneous ventilation    Post pain: adequate analgesia    Post assessment: no apparent anesthetic complications and tolerated procedure well    Post vital signs: stable    Level of consciousness: responds to stimulation and sedated    Nausea/Vomiting: no nausea/vomiting    Complications: none          Last vitals:   Visit Vitals    BP (!) 146/65    Pulse 75    Temp 36.9 °C (98.4 °F)    Resp 18    Ht 5' 9" (1.753 m)    Wt 110.7 kg (244 lb)    SpO2 100%    BMI 36.03 kg/m2     "

## 2017-03-21 NOTE — NURSING TRANSFER
Nursing Transfer Note      3/21/2017     Transfer To: Room 541 B    Transfer via stretcher    Transfer with cardiac monitoring    Transported by PCT    Medicines sent: None    Chart send with patient: Yes    Notified: RN    Patient reassessed at: Now and at time of arrival    Upon arrival to floor: cardiac monitor applied, patient oriented to room, call bell in reach and bed in lowest position

## 2017-03-21 NOTE — ANESTHESIA RELEASE NOTE
"Anesthesia Release from PACU Note    Patient: Chau Rodarte    Procedure(s) Performed: Procedure(s) (LRB):  ESOPHAGOGASTRODUODENOSCOPY (EGD) (N/A)  COLONOSCOPY (N/A)    Anesthesia type: general    Post pain: Adequate analgesia    Post assessment: no apparent anesthetic complications and tolerated procedure well    Last Vitals:   Visit Vitals    BP (!) 145/74    Pulse 70    Temp 36.7 °C (98 °F) (Temporal)    Resp 16    Ht 5' 9" (1.753 m)    Wt 110.7 kg (244 lb)    SpO2 100%    BMI 36.03 kg/m2       Post vital signs: stable    Level of consciousness: awake, alert  and oriented    Nausea/Vomiting: no nausea/no vomiting    Complications: none    Airway Patency: patent    Respiratory: unassisted, spontaneous ventilation, room air    Cardiovascular: stable and blood pressure at baseline    Hydration: euvolemic  "

## 2017-03-21 NOTE — ASSESSMENT & PLAN NOTE
- likely lower GI given hx  - Presented with hemoglobin of 7.5 baseline appears around 11  - Stable throughout admission, will transfuse to goal of 8 given history of CAD and stents

## 2017-03-21 NOTE — DISCHARGE INSTRUCTIONS

## 2017-03-21 NOTE — ASSESSMENT & PLAN NOTE
-- Patient on DAPT as well as coumadin for his prior stents and atrial flutter, respectively  -- Patient reports history of bright red blood of increasing volume in the toilet with bowel movements  -- Hg has decreased from baseline of 12 to 7.4  -- q8 cbc, Pantoprazole drip, NPO, Two large bore IVs obtained  -- Given one unit pRBC, continue to transfuse to maintain Hg > 7  -- Maintain platelets greater than 50 and INR<2.5  -- Per GI scope planned today

## 2017-03-21 NOTE — INTERVAL H&P NOTE
The patient has been examined and the H&P has been reviewed:    I concur with the findings and no changes have occurred since H&P was written.    Anesthesia/Surgery risks, benefits and alternative options discussed and understood by patient/family.      Short Stay Endoscopy History and Physical    PCP - Shaggy Neal MD     Procedure - EGD  ASA - per anesthesia  Mallampati - per anesthesia  History of Anesthesia problems - no  Family history Anesthesia problems -  no   Plan of anesthesia - General    HPI:  This is a 66 y.o. male here for evaluation of of rectal bleeding with EGD/colon:     ROS:  Constitutional: No fevers, chills, No weight loss  CV: No chest pain  Pulm: No cough, No shortness of breath  Ophtho: No vision changes  GI: see HPI  Derm: No rash    Medical History:  has a past medical history of Acute on chronic systolic CHF (congestive heart failure) (11/29/2016); CKD (chronic kidney disease) stage 2, GFR 60-89 ml/min (2/21/2016); HTN (hypertension) (3/3/2014); Hyperlipidemia (3/3/2014); NSTEMI (non-ST elevated myocardial infarction) (11/28/2016); Obesity (BMI 30-39.9) (11/29/2016); PVD (peripheral vascular disease); Stroke; Type 2 diabetes mellitus with diabetic peripheral angiopathy without gangrene, without long-term current use of insulin (10/24/2013); and Type 2 diabetes mellitus with diabetic polyneuropathy, without long-term current use of insulin (11/29/2016).    Surgical History:  has a past surgical history that includes left leg stent; right leg bypass; and Foot nerve graft (11/2013).    Family History: family history includes Diabetes in his mother; Heart disease in his father.. Otherwise no colon cancer, inflammatory bowel disease, or GI malignancies.    Social History:  reports that he quit smoking about 6 years ago. He has never used smokeless tobacco. He reports that he does not drink alcohol or use illicit drugs.    Review of patient's allergies indicates:  No Known  "Allergies    Medications:   Prescriptions Prior to Admission   Medication Sig Dispense Refill Last Dose    aspirin 81 MG Chew Take 1 tablet (81 mg total) by mouth once daily.  0 3/20/2017    atorvastatin (LIPITOR) 80 MG tablet Take 1 tablet (80 mg total) by mouth once daily. 90 tablet 3 3/20/2017    cholecalciferol, vitamin D3, (VITAMIN D3) 5,000 unit Tab Take 5,000 Units by mouth once daily.       clopidogrel (PLAVIX) 75 mg tablet Take 1 tablet (75 mg total) by mouth once daily. 90 tablet 3 3/20/2017    furosemide (LASIX) 40 MG tablet Take 1 tablet (40 mg total) by mouth once daily. 30 tablet 11 3/20/2017    insulin detemir (LEVEMIR FLEXTOUCH) 100 unit/mL (3 mL) SubQ InPn pen Inject 16 Units into the skin every evening. 1 Box 3 3/20/2017    isosorbide-hydrALAZINE 20-37.5 mg (BIDIL) 20-37.5 mg Tab Take 1 tablet by mouth 3 (three) times daily. 90 tablet 11 3/20/2017    liraglutide 0.6 mg/0.1 mL, 18 mg/3 mL, subq PNIJ (VICTOZA 2-SAGAR) 0.6 mg/0.1 mL (18 mg/3 mL) PnIj Inject 0.6 mg daily x1 week, then 1.2 mg daily x1 week, then 1.8 mg daily thereafter (Patient taking differently: Inject 1.8 mg into the skin once daily. er) 9 mL 3 Taking    metoprolol succinate (TOPROL-XL) 50 MG 24 hr tablet Take 1 tablet (50 mg total) by mouth once daily. 30 tablet 11 3/20/2017    pantoprazole (PROTONIX) 20 MG tablet Take 1 tablet (20 mg total) by mouth once daily. 30 tablet 11 3/20/2017    pen needle, diabetic (BD ULTRA-FINE HANG PEN NEEDLES) 32 gauge x 5/32" Ndle Uses 2 times daily, on  insulin injections 180 each 4 Taking    warfarin (COUMADIN) 5 MG tablet Take 1 tablet (5 mg total) by mouth Daily. 30 tablet 11 3/20/2017       Physical Exam:    Vital Signs:   Vitals:    03/21/17 0719   BP: (!) 101/50   Pulse: 70   Resp: 18   Temp: 97 °F (36.1 °C)       General Appearance: Well appearing in no acute distress  Eyes:    No scleral icterus  Lungs: CTA anteriorly  Heart:  Regular rate, S1, S2 normal, no murmurs heard.  Abdomen: " Soft, non tender, non distended with normal bowel sounds. No hepatosplenomegaly, ascites, or mass.  Extremities: No edema  Skin: No rash    Labs:  Lab Results   Component Value Date    WBC 9.54 03/21/2017    HGB 7.8 (L) 03/21/2017    HCT 23.0 (L) 03/21/2017     03/21/2017    CHOL 110 (L) 01/12/2017    TRIG 64 01/12/2017    HDL 25 (L) 01/12/2017    ALT 8 (L) 03/21/2017    AST 13 03/21/2017     03/21/2017    K 3.8 03/21/2017     (H) 03/21/2017    CREATININE 2.5 (H) 03/21/2017    BUN 66 (H) 03/21/2017    CO2 20 (L) 03/21/2017    TSH 0.69 06/22/2016    INR 1.9 (H) 03/21/2017    GLUF 111 (H) 01/12/2017    HGBA1C 7.1 (H) 02/27/2017    MICROALBUR 6.8 02/02/2016       I have explained the risks and benefits of endoscopy procedures to the patient including but not limited to bleeding, perforation, infection, and death.      Karissa Peck MD      Active Hospital Problems    Diagnosis  POA    *Lower GI bleed [K92.2]  Yes    CKD stage 3 due to type 2 diabetes mellitus [E11.22, N18.3]  Yes    Acute blood loss anemia [D62]  Yes    Long term (current) use of anticoagulants [Z79.01] [Z79.01]  Not Applicable    Typical atrial flutter [I48.3]  Yes     Chronic    Coronary artery disease involving native coronary artery of native heart without angina pectoris [I25.10]  Yes    CORRY (acute kidney injury) [N17.9]  Yes    Type 2 diabetes mellitus with diabetic polyneuropathy, with long-term current use of insulin [E11.42, Z79.4]  Not Applicable    Chronic systolic heart failure [I50.22]  Yes    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

## 2017-03-21 NOTE — SUBJECTIVE & OBJECTIVE
Interval History: NAEON.  Drinking go lytely plans for scope today.  Reports dark bloody BMs, Otherwise states he is doing well.  Denies abdominal pain, nausea and vomiting.      Review of Systems   Constitutional: Negative for chills and fever.   HENT: Negative for congestion.    Eyes: Negative for pain and visual disturbance.   Respiratory: Negative for cough and shortness of breath.    Gastrointestinal: Positive for blood in stool. Negative for constipation, nausea and vomiting.   Genitourinary: Negative for dysuria.   Neurological: Negative for dizziness and headaches.     Objective:     Vital Signs (Most Recent):  Temp: 97 °F (36.1 °C) (03/21/17 0719)  Pulse: 68 (03/21/17 0900)  Resp: 18 (03/21/17 0719)  BP: (!) 101/50 (03/21/17 0719)  SpO2: 98 % (03/21/17 0719) Vital Signs (24h Range):  Temp:  [96.4 °F (35.8 °C)-97.3 °F (36.3 °C)] 97 °F (36.1 °C)  Pulse:  [61-77] 68  Resp:  [16-18] 18  SpO2:  [77 %-100 %] 98 %  BP: (101-172)/(50-78) 101/50     Weight: 110.7 kg (244 lb)  Body mass index is 36.03 kg/(m^2).    Intake/Output Summary (Last 24 hours) at 03/21/17 0904  Last data filed at 03/21/17 0600   Gross per 24 hour   Intake              964 ml   Output             1554 ml   Net             -590 ml      Physical Exam   Constitutional: He is oriented to person, place, and time. He appears well-developed and well-nourished.   HENT:   Head: Normocephalic and atraumatic.   Right Ear: External ear normal.   Left Ear: External ear normal.   Mouth/Throat: Oropharynx is clear and moist.   Eyes: EOM are normal. Pupils are equal, round, and reactive to light.   Neck: Normal range of motion. Neck supple.   Cardiovascular: Normal rate, regular rhythm and normal heart sounds.    Pulmonary/Chest: Effort normal and breath sounds normal.   Abdominal: Soft. Bowel sounds are normal. He exhibits no distension. There is no tenderness.   Musculoskeletal: Normal range of motion.   Neurological: He is alert and oriented to person,  place, and time.   Skin: Skin is warm and dry. No rash noted. No erythema.       Significant Labs:   CBC:   Recent Labs  Lab 03/20/17  1325 03/20/17 2003 03/21/17  0801   WBC 12.06 11.19 9.54   HGB 7.5* 7.5* 7.8*   HCT 22.4* 22.7* 23.0*    206 228     CMP:   Recent Labs  Lab 03/20/17  0224 03/21/17  0459    144   K 4.2 3.8    112*   CO2 20* 20*   * 112*   BUN 74* 66*   CREATININE 3.1* 2.5*   CALCIUM 8.4* 8.6*   PROT 6.0 6.3   ALBUMIN 3.0* 3.2*   BILITOT 0.5 0.9   ALKPHOS 79 78   AST 12 13   ALT 8* 8*   ANIONGAP 15 12   EGFRNONAA 19.7* 25.6*       Significant Imaging: I have reviewed all pertinent imaging results/findings within the past 24 hours.

## 2017-03-21 NOTE — TELEPHONE ENCOUNTER
Informed Pt's spouse of Holter results. We are going to follow up if we need to reschedule loop recorder.

## 2017-03-21 NOTE — PROGRESS NOTES
[facility] Hospital Medicine  Progress Note    Patient Name: Chau Rodarte  MRN: [id number]  Patient Class: IP- Inpatient   Admission Date: 3/20/2017  Length of Stay: 1 days  Attending Physician: Eric Anderson MD  Primary Care Provider: Shaggy Neal MD    [facility] Hospital Medicine Team: [facility] HOSP MED 3 Dougie Cabral MD    Subjective:     Principal Problem:Lower GI bleed    HPI:  66 y.o male with a PMHx of CAD (s/p PCI on 12/5 of pLAD, mLAD, distal LM and pLCx), ICM (EF=20-25%) improved to 45% on most recent 2DE 03/2017 s/p lifevest, CKD III, T2DM, HTN, PVD (s/p Right Fem-Pop bypass, Left SFA stent) who presents to hospital today w/ complaints rectal bleeding since Friday evening. Pt denies any preceding HAs. Pts wife is at bedside during exam, she confirms that he had his first ever episode of bleeding Friday (apx 2-4 teaspoons, then had a little more blood the next day, and by the third day lost .5-1 cup of blood per pt. Pt appears to be on plavix and coumadin w/ baby asprin. Pt does endorse dizziness. He was initially tachycardic and hypotensive upon presentation. Noted to have significant amount of dark maroon stool on bedside HUE. INR 2.8 today.    Patient given 1 U pRBC and 1 L NS in ED. Pantoprazole drip also initiated. GI was consulted.        Interval History: NAEON.  Drinking go lytely plans for scope today.  Reports dark bloody BMs, Otherwise states he is doing well.  Denies abdominal pain, nausea and vomiting.      Review of Systems   Constitutional: Negative for chills and fever.   HENT: Negative for congestion.    Eyes: Negative for pain and visual disturbance.   Respiratory: Negative for cough and shortness of breath.    Gastrointestinal: Positive for blood in stool. Negative for constipation, nausea and vomiting.   Genitourinary: Negative for dysuria.   Neurological: Negative for dizziness and headaches.     Objective:     Vital Signs (Most Recent):  Temp: 97 °F (36.1 °C) (03/21/17  0719)  Pulse: 68 (03/21/17 0900)  Resp: 18 (03/21/17 0719)  BP: (!) 101/50 (03/21/17 0719)  SpO2: 98 % (03/21/17 0719) Vital Signs (24h Range):  Temp:  [96.4 °F (35.8 °C)-97.3 °F (36.3 °C)] 97 °F (36.1 °C)  Pulse:  [61-77] 68  Resp:  [16-18] 18  SpO2:  [77 %-100 %] 98 %  BP: (101-172)/(50-78) 101/50     Weight: 110.7 kg (244 lb)  Body mass index is 36.03 kg/(m^2).    Intake/Output Summary (Last 24 hours) at 03/21/17 0904  Last data filed at 03/21/17 0600   Gross per 24 hour   Intake              964 ml   Output             1554 ml   Net             -590 ml      Physical Exam   Constitutional: He is oriented to person, place, and time. He appears well-developed and well-nourished.   HENT:   Head: Normocephalic and atraumatic.   Right Ear: External ear normal.   Left Ear: External ear normal.   Mouth/Throat: Oropharynx is clear and moist.   Eyes: EOM are normal. Pupils are equal, round, and reactive to light.   Neck: Normal range of motion. Neck supple.   Cardiovascular: Normal rate, regular rhythm and normal heart sounds.    Pulmonary/Chest: Effort normal and breath sounds normal.   Abdominal: Soft. Bowel sounds are normal. He exhibits no distension. There is no tenderness.   Musculoskeletal: Normal range of motion.   Neurological: He is alert and oriented to person, place, and time.   Skin: Skin is warm and dry. No rash noted. No erythema.       Significant Labs:   CBC:   Recent Labs  Lab 03/20/17  1325 03/20/17 2003 03/21/17  0801   WBC 12.06 11.19 9.54   HGB 7.5* 7.5* 7.8*   HCT 22.4* 22.7* 23.0*    206 228     CMP:   Recent Labs  Lab 03/20/17  0224 03/21/17  0459    144   K 4.2 3.8    112*   CO2 20* 20*   * 112*   BUN 74* 66*   CREATININE 3.1* 2.5*   CALCIUM 8.4* 8.6*   PROT 6.0 6.3   ALBUMIN 3.0* 3.2*   BILITOT 0.5 0.9   ALKPHOS 79 78   AST 12 13   ALT 8* 8*   ANIONGAP 15 12   EGFRNONAA 19.7* 25.6*       Significant Imaging: I have reviewed all pertinent imaging results/findings within  the past 24 hours.    Assessment/Plan:      * Lower GI bleed  -- Patient on DAPT as well as coumadin for his prior stents and atrial flutter, respectively  -- Patient reports history of bright red blood of increasing volume in the toilet with bowel movements  -- Hg has decreased from baseline of 12 to 7.4  -- q8 cbc, Pantoprazole drip, NPO, Two large bore IVs obtained  -- Given one unit pRBC, continue to transfuse to maintain Hg > 8  -- Maintain platelets greater than 50 and INR<2.5  -- Per GI scope planned today       Essential hypertension  -- Patient taking bidil and metoprolol at home  -- Some orthostatic changes in BP earlier that patient reports are common for him  -- Plan to continue BP meds, with hold parameters      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, NYHA class 2 and CKD stage 3  -- Patient with CHF, last EF 45%  -- Continue bidil and metoprolol  -- Hold aspirin and plavix in setting of GI bleed        Type 2 diabetes mellitus with diabetic polyneuropathy, with long-term current use of insulin  -- Patient taking 16 U levemir qd at home as well as victoza  -- Hold victoza  -- Low dose SSI  -- Give levemir 8 U qd  -- Glucose checks qid      Coronary artery disease involving native coronary artery of native heart without angina pectoris  -- Patient on aspirin and plavix following PCI with placement of multiple SOULEYMANE in December of 2016  -- Hold aspirin and plavix in setting of GI bleed    Typical atrial flutter  -- Patient was anticoagulated with coumadin  -- Was planning to have loop recorder implanted on Friday, 3/24  -- hold coumadin in setting of GI Bleed and INR down trending       Long term (current) use of anticoagulants [Z79.01]  -- Patient on coumadin for atrial flutter  -- Hold coumadin      Acute blood loss anemia  - likely lower GI given hx  - Presented with hemoglobin of 7.5 baseline appears around 11  - Stable throughout admission, will transfuse to goal of 8 given  history of CAD and stents       VTE Risk Mitigation         Ordered     Reason for no Mechanical VTE Prophylaxis  Once      03/20/17 0641     Medium Risk of VTE  Once      03/20/17 0641        Dispo: Scope today, will continue to monitor, currently doing well, possible discharge tomorrow.      Dougie Cabral MD  Department of Hospital Medicine   [facility]

## 2017-03-21 NOTE — PT/OT/SLP PROGRESS
"Occupational Therapy      Chau Rodarte  MRN: [id number]    Patient not seen today secondary to Unavailable (Comment) (OT attempted to see pt in AM. RN requested OT hold 2* pt "about to leave for his procedure" (EGD). Unable to return later this date. Will follow up at next scheduled visit.).    KULDEEP Beasley  3/21/2017  "

## 2017-03-21 NOTE — H&P (VIEW-ONLY)
[facility]  Gastroenterology  Consult Note    Patient Name: Chau Rodarte  MRN: [id number]  Admission Date: 3/20/2017  Hospital Length of Stay: 0 days  Code Status: Full Code   Attending Provider: Kevin Bernabe*   Consulting Provider: Mert Richardson MD  Primary Care Physician: Shaggy Neal MD  Principal Problem:Lower GI bleed    Inpatient consult to Gastroenterology  Consult performed by: MERT RICHARDSON  Consult ordered by: WANDER MAYEN  Reason for consult: rectal bleeding        Subjective:     Chau Rodarte is a 66 y.o. male with CAD (s/p PCI on 12/5 of pLAD, mLAD, distal LM and pLCx), ICM (EF 20-25%) improved to 45% on most recent TTE, 03/2017 s/p lifevest, CKD III, T2DM, HTN, PVD (s/p Right Fem-Pop bypass, Left SFA stent). He presented to hospital with BRBPR and dizziness since Friday. Bleeding is not asscoiated with any abdominal discomfort. He had no prior GIB. No prior EGDs but had colonoscopy in 2009 which was normal. He was hypotensive on admission which resolved after 1U PRBC transfusion. No recent abdominal imaging. Denies NSAID use. He reports last BM was yesterday and it was bright red blood with no clots. No nausea or vomiting, no hematemesis.     Review of Systems:  Constitutional: No fever, chills.   Eyes: no visual changes, no diplopia, no eye discharge  ENT: no sore throat or runny nose.  Respiratory: no cough or shortness of breath    Cardiovascular: no chest pain or palpitations    Gastrointestinal: as per HPI  Hematologic/Lymphatic: No petechia, no lymphadenopathy  Musculoskeletal: + arthralgias and myalgias    Neurological: no seizures, tremors   Behavioral/Psych: No suicidal ideation, no hallucination  Skin: No rash, no raynaud's    Past Medical History: has a past medical history of Acute on chronic systolic CHF (congestive heart failure); CKD (chronic kidney disease) stage 2, GFR 60-89 ml/min; HTN (hypertension); Hyperlipidemia; NSTEMI (non-ST  elevated myocardial infarction); Obesity (BMI 30-39.9); PVD (peripheral vascular disease); Stroke; Type 2 diabetes mellitus with diabetic peripheral angiopathy without gangrene, without long-term current use of insulin; and Type 2 diabetes mellitus with diabetic polyneuropathy, without long-term current use of insulin.    Past Surgical History: has a past surgical history that includes left leg stent; right leg bypass; and Foot nerve graft (11/2013).    Family History:family history includes Diabetes in his mother; Heart disease in his father.    Allergies: Reviewed in EPIC.     Social History: reports that he quit smoking about 6 years ago. He has never used smokeless tobacco. He reports that he does not drink alcohol or use illicit drugs.    Home medications:   No current facility-administered medications on file prior to encounter.      Current Outpatient Prescriptions on File Prior to Encounter   Medication Sig Dispense Refill    aspirin 81 MG Chew Take 1 tablet (81 mg total) by mouth once daily.  0    atorvastatin (LIPITOR) 80 MG tablet Take 1 tablet (80 mg total) by mouth once daily. 90 tablet 3    clopidogrel (PLAVIX) 75 mg tablet Take 1 tablet (75 mg total) by mouth once daily. 90 tablet 3    ergocalciferol (ERGOCALCIFEROL) 50,000 unit Cap Take 1 capsule (50,000 Units total) by mouth twice a week. 24 capsule 1    furosemide (LASIX) 40 MG tablet Take 1 tablet (40 mg total) by mouth once daily. 30 tablet 11    insulin detemir (LEVEMIR FLEXTOUCH) 100 unit/mL (3 mL) SubQ InPn pen Inject 16 Units into the skin every evening. 1 Box 3    isosorbide-hydrALAZINE 20-37.5 mg (BIDIL) 20-37.5 mg Tab Take 1 tablet by mouth 3 (three) times daily. 90 tablet 11    metoprolol succinate (TOPROL-XL) 50 MG 24 hr tablet Take 1 tablet (50 mg total) by mouth once daily. 30 tablet 11    pantoprazole (PROTONIX) 20 MG tablet Take 1 tablet (20 mg total) by mouth once daily. 30 tablet 11    warfarin (COUMADIN) 5 MG tablet Take  "1 tablet (5 mg total) by mouth Daily. 30 tablet 11    liraglutide 0.6 mg/0.1 mL, 18 mg/3 mL, subq PNIJ (VICTOZA 2-SAGAR) 0.6 mg/0.1 mL (18 mg/3 mL) PnIj Inject 0.6 mg daily x1 week, then 1.2 mg daily x1 week, then 1.8 mg daily thereafter (Patient taking differently: Inject 1.8 mg into the skin once daily. er) 9 mL 3    pen needle, diabetic (BD ULTRA-FINE HANG PEN NEEDLES) 32 gauge x 5/32" Ndle Uses 2 times daily, on  insulin injections 180 each 4       Scheduled Meds:    aspirin  81 mg Oral Daily    atorvastatin  80 mg Oral Daily    clopidogrel  75 mg Oral Daily    insulin detemir  8 Units Subcutaneous QHS    isosorbide-hydrALAZINE 20-37.5 mg  1 tablet Oral TID    metoprolol succinate  50 mg Oral Daily       Continuous Infusions:      PRN Meds: sodium chloride, dextrose 50%, dextrose 50%, glucagon (human recombinant), glucose, glucose, insulin aspart, ondansetron    Vital signs:  Temp:  [96.5 °F (35.8 °C)-98.3 °F (36.8 °C)]   Pulse:  []   Resp:  [16-18]   BP: ()/(53-72)   SpO2:  [86 %-100 %]     Physical exam:  General: No acute distress, WBWD  HEENT: Head: Normocephalic, atraumatic.   Eyes: Sclera non-icteric. EOMI.   Neck: Supple  Heart: Regular rate and rhythm, no gallops  Lungs: Non labored breathing, no wheezing  Abdomen: Bowel sounds normal, no organomegaly, masses, or hernia. No tenderness, no rebound or guarding. No distention.   Rectal: Deferred  Extremities: No deformities, no C/C/E  Neurologic: Able to follow commands and move 4 extremities. AOX3  Skin: No rash    Routine labs:    Recent Labs  Lab 03/20/17 0224   WBC 12.67   HGB 7.4*   HCT 23.1*      MCV 89       Recent Labs  Lab 03/20/17 0224   INR 2.8*   APTT 29.7       Recent Labs  Lab 03/20/17 0224      K 4.2   CO2 20*   BUN 74*   CREATININE 3.1*       Recent Labs  Lab 03/20/17 0224   ALBUMIN 3.0*   ALKPHOS 79   ALT 8*   AST 12   BILITOT 0.5     MELD-Na score: 29 at 3/20/2017  2:24 AM  MELD score: 29 at 3/20/2017  " 2:24 AM  Calculated from:  Serum Creatinine: 3.1 mg/dL at 3/20/2017  2:24 AM  Serum Sodium: 141 mmol/L (Rounded to 137) at 3/20/2017  2:24 AM  Total Bilirubin: 0.5 mg/dL (Rounded to 1) at 3/20/2017  2:24 AM  INR(ratio): 2.8 at 3/20/2017  2:24 AM  Age: 66 years    Imaging and prior endoscopies  As above    Assessment:  Chau Rodarte is a 66 y.o. male with significant vascular disease on triple therapy with painless hematochezia. This is likely a lower source and behaves as diverticular bleeding, less likely colonic ischemia or upper GI. We would proceed with EGD/colon if he continues bleed to rule out UGI source.    Recs:  Protonix 80mg IV bolus and gtt  Intravascular resuscitation/support with IVFs   Serial H/H's and pRBCs transfusion as indicated  Discontinue all NSAIDs and Heparin products  Please correct any coagulopathy with platelets and FFP to a goal of platelets >50K and INR <2.0  Maintain IV access with 2 large bore IVs  NPO  Plan for EGD/ colonoscopy if he continues to bleed.   Please notify GI team if there is significant change in patient's clinical status      Denver Garg MD  Gastroenterology & Hepatology Fellow  Pager: [phone]

## 2017-03-21 NOTE — TELEPHONE ENCOUNTER
Mrs. Rodarte states Pt is currently in hospital for bloody stool and GI bleed. She was wondering if ILR placement can be done while Inpatient or do we need to reschedule. I explained I will message Dr. Moreno and will follow up with her as soon as I hear back from him. She verbalized understanding and denied further questions, needs, and concerns.

## 2017-03-22 ENCOUNTER — TELEPHONE (OUTPATIENT)
Dept: ELECTROPHYSIOLOGY | Facility: CLINIC | Age: 67
End: 2017-03-22

## 2017-03-22 VITALS
HEIGHT: 69 IN | BODY MASS INDEX: 36.14 KG/M2 | TEMPERATURE: 99 F | WEIGHT: 244 LBS | HEART RATE: 82 BPM | RESPIRATION RATE: 18 BRPM | DIASTOLIC BLOOD PRESSURE: 68 MMHG | OXYGEN SATURATION: 97 % | SYSTOLIC BLOOD PRESSURE: 138 MMHG

## 2017-03-22 DIAGNOSIS — D62 ACUTE BLOOD LOSS ANEMIA: Primary | ICD-10-CM

## 2017-03-22 LAB
ALBUMIN SERPL BCP-MCNC: 2.9 G/DL
ALP SERPL-CCNC: 74 U/L
ALT SERPL W/O P-5'-P-CCNC: 9 U/L
ANION GAP SERPL CALC-SCNC: 10 MMOL/L
AST SERPL-CCNC: 14 U/L
BASOPHILS # BLD AUTO: 0.03 K/UL
BASOPHILS NFR BLD: 0.3 %
BILIRUB SERPL-MCNC: 0.7 MG/DL
BUN SERPL-MCNC: 45 MG/DL
CALCIUM SERPL-MCNC: 8.3 MG/DL
CHLORIDE SERPL-SCNC: 114 MMOL/L
CO2 SERPL-SCNC: 22 MMOL/L
CREAT SERPL-MCNC: 2.2 MG/DL
DIFFERENTIAL METHOD: ABNORMAL
EOSINOPHIL # BLD AUTO: 0.4 K/UL
EOSINOPHIL NFR BLD: 4.1 %
ERYTHROCYTE [DISTWIDTH] IN BLOOD BY AUTOMATED COUNT: 15.5 %
EST. GFR  (AFRICAN AMERICAN): 34.6 ML/MIN/1.73 M^2
EST. GFR  (NON AFRICAN AMERICAN): 29.9 ML/MIN/1.73 M^2
GLUCOSE SERPL-MCNC: 134 MG/DL
H PYLORI IGG SERPL QL IA: NEGATIVE
HCT VFR BLD AUTO: 21.3 %
HGB BLD-MCNC: 7.2 G/DL
LYMPHOCYTES # BLD AUTO: 1.7 K/UL
LYMPHOCYTES NFR BLD: 18.2 %
MCH RBC QN AUTO: 29.1 PG
MCHC RBC AUTO-ENTMCNC: 33.8 %
MCV RBC AUTO: 86 FL
MONOCYTES # BLD AUTO: 1.2 K/UL
MONOCYTES NFR BLD: 12.8 %
NEUTROPHILS # BLD AUTO: 6 K/UL
NEUTROPHILS NFR BLD: 64.4 %
PLATELET # BLD AUTO: 218 K/UL
PMV BLD AUTO: 9.3 FL
POCT GLUCOSE: 126 MG/DL (ref 70–110)
POCT GLUCOSE: 202 MG/DL (ref 70–110)
POTASSIUM SERPL-SCNC: 3.5 MMOL/L
PROT SERPL-MCNC: 5.8 G/DL
RBC # BLD AUTO: 2.47 M/UL
SODIUM SERPL-SCNC: 146 MMOL/L
WBC # BLD AUTO: 9.35 K/UL

## 2017-03-22 PROCEDURE — 63600175 PHARM REV CODE 636 W HCPCS: Performed by: INTERNAL MEDICINE

## 2017-03-22 PROCEDURE — 85025 COMPLETE CBC W/AUTO DIFF WBC: CPT

## 2017-03-22 PROCEDURE — 25000003 PHARM REV CODE 250: Performed by: STUDENT IN AN ORGANIZED HEALTH CARE EDUCATION/TRAINING PROGRAM

## 2017-03-22 PROCEDURE — 80053 COMPREHEN METABOLIC PANEL: CPT

## 2017-03-22 PROCEDURE — 86677 HELICOBACTER PYLORI ANTIBODY: CPT

## 2017-03-22 PROCEDURE — 36415 COLL VENOUS BLD VENIPUNCTURE: CPT

## 2017-03-22 PROCEDURE — C9113 INJ PANTOPRAZOLE SODIUM, VIA: HCPCS | Performed by: INTERNAL MEDICINE

## 2017-03-22 RX ORDER — PANTOPRAZOLE SODIUM 20 MG/1
40 TABLET, DELAYED RELEASE ORAL DAILY
Qty: 30 TABLET | Refills: 11 | Status: SHIPPED | OUTPATIENT
Start: 2017-03-22 | End: 2017-10-11

## 2017-03-22 RX ADMIN — ASPIRIN 81 MG CHEWABLE TABLET 81 MG: 81 TABLET CHEWABLE at 08:03

## 2017-03-22 RX ADMIN — ATORVASTATIN CALCIUM 80 MG: 20 TABLET, FILM COATED ORAL at 08:03

## 2017-03-22 RX ADMIN — CLOPIDOGREL 75 MG: 75 TABLET, FILM COATED ORAL at 08:03

## 2017-03-22 RX ADMIN — HYDRALAZINE HYDROCHLORIDE AND ISOSORBIDE DINITRATE 1 TABLET: 37.5; 2 TABLET, FILM COATED ORAL at 06:03

## 2017-03-22 RX ADMIN — PANTOPRAZOLE SODIUM 40 MG: 40 INJECTION, POWDER, FOR SOLUTION INTRAVENOUS at 08:03

## 2017-03-22 RX ADMIN — METOPROLOL SUCCINATE 50 MG: 50 TABLET, EXTENDED RELEASE ORAL at 08:03

## 2017-03-22 NOTE — PROGRESS NOTES
Pt discharged. Instructions  given and explained. Pt verbalized understanding with no questions. Pt AAOx3, VSS

## 2017-03-22 NOTE — NURSING
Patient getting dressed to leave, currently waiting for transport. No complaints at this time, NADN, safety maintained. Will continue to monitor until patient leaves. Ivet Melgar RN

## 2017-03-22 NOTE — SUBJECTIVE & OBJECTIVE
Review of Systems   Constitutional: Negative for chills and fever.   HENT: Negative for congestion.    Eyes: Negative for pain and visual disturbance.   Respiratory: Negative for cough and shortness of breath.    Gastrointestinal: Positive for blood in stool. Negative for constipation, nausea and vomiting.   Genitourinary: Negative for dysuria.   Neurological: Negative for dizziness and headaches.     Objective:     Vital Signs (Most Recent):  Temp: 98.5 °F (36.9 °C) (03/22/17 0745)  Pulse: 82 (03/22/17 0900)  Resp: 18 (03/22/17 0900)  BP: 138/68 (03/22/17 0745)  SpO2: 97 % (03/22/17 0745) Vital Signs (24h Range):  Temp:  [97.9 °F (36.6 °C)-98.5 °F (36.9 °C)] 98.5 °F (36.9 °C)  Pulse:  [72-85] 82  Resp:  [16-18] 18  SpO2:  [97 %-98 %] 97 %  BP: (123-138)/(60-70) 138/68     Weight: 110.7 kg (244 lb)  Body mass index is 36.03 kg/(m^2).    Intake/Output Summary (Last 24 hours) at 03/22/17 1622  Last data filed at 03/22/17 0932   Gross per 24 hour   Intake             1616 ml   Output             2050 ml   Net             -434 ml      Physical Exam   Constitutional: He is oriented to person, place, and time. He appears well-developed and well-nourished.   HENT:   Head: Normocephalic and atraumatic.   Right Ear: External ear normal.   Left Ear: External ear normal.   Mouth/Throat: Oropharynx is clear and moist.   Eyes: EOM are normal. Pupils are equal, round, and reactive to light.   Neck: Normal range of motion. Neck supple.   Cardiovascular: Normal rate, regular rhythm and normal heart sounds.    Pulmonary/Chest: Effort normal and breath sounds normal.   Abdominal: Soft. Bowel sounds are normal. He exhibits no distension. There is no tenderness.   Musculoskeletal: Normal range of motion.   Neurological: He is alert and oriented to person, place, and time.   Skin: Skin is warm and dry. No rash noted. No erythema.         Recent Labs  Lab 03/21/17  0801 03/21/17  1944 03/22/17  0850   WBC 9.54 9.61 9.35   HGB 7.8*  7.4* 7.2*   HCT 23.0* 22.4* 21.3*    207 218       Recent Labs  Lab 03/20/17  0224 03/21/17  0459 03/22/17  0427    144 146*   K 4.2 3.8 3.5    112* 114*   CO2 20* 20* 22*   BUN 74* 66* 45*   CREATININE 3.1* 2.5* 2.2*   CALCIUM 8.4* 8.6* 8.3*

## 2017-03-22 NOTE — DISCHARGE SUMMARY
DISCHARGE SUMMARY  Hospital Medicine    Team: [facility] [place] [facility]OS[facility] MED 3    Patient Name: Chau Rodarte  YOB: 1950    Admit Date: 3/20/2017    Discharge Date: 03/22/2017    Discharge Attending Physician: Dr. Castle    Resident on Service: Dr. Quintanilla    Chief Complaint: GI Bleed    Princilpal Diagnoses:  Active Hospital Problems    Diagnosis  POA    *Lower GI bleed [K92.2]  Yes    CKD stage 3 due to type 2 diabetes mellitus [E11.22, N18.3]  Yes    Acute blood loss anemia [D62]  Yes    Long term (current) use of anticoagulants [Z79.01] [Z79.01]  Not Applicable    Typical atrial flutter [I48.3]  Yes     Chronic    Coronary artery disease involving native coronary artery of native heart without angina pectoris [I25.10]  Yes    CORRY (acute kidney injury) [N17.9]  Yes    Type 2 diabetes mellitus with diabetic polyneuropathy, with long-term current use of insulin [E11.42, Z79.4]  Not Applicable    Chronic systolic heart failure [I50.22]  Yes    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       Discharged Condition: Admit problems have stabilized      HOSPITAL COURSE:      Initial Presentation:    66 y.o male with a PMHx of CAD (s/p PCI on 12/5 of pLAD, mLAD, distal LM and pLCx), ICM (EF=20-25%) improved to 45% on most recent 2DE 03/2017 s/p lifevest, CKD III, T2DM, HTN, PVD (s/p Right Fem-Pop bypass, Left SFA stent) who presents to hospital today w/ complaints rectal bleeding since Friday evening. Pt denies any preceding HAs. Pts wife is at bedside during exam, she confirms that he had his first ever episode of bleeding Friday (apx 2-4 teaspoons, then had a little more blood the next day, and by the third day lost .5-1 cup of blood per pt. Pt appears to be on plavix and coumadin w/ baby asprin. Pt  does endorse dizziness. He was initially tachycardic and hypotensive upon presentation. Noted to have significant amount of dark maroon stool on bedside HUE. INR 2.8 today.    Course of Principle Problem for Admission:    H/H >7 and vitals WNL during 48-hr hospitalization. EGD completed 3/21 with no signs of bleeding. Colonoscopy showing hematin but no acute bleeding. Pt transfused 1 unit of blood + IVF and doing well. CORRY improving. Decision made to hold Coumadin until follow-up appointment with Cardiology on Friday 3/24. Repeat colonoscopy in one month. Will resume DAPT for SOULEYMANE placed 12/2016. Plan for blood draw at [facility] on 3/23 to check H/H.    Other Medical Problems Addressed in the Hospital:    * Lower GI bleed  -- Patient on DAPT as well as coumadin for his prior stents and atrial flutter, respectively  -- Patient reports history of bright red blood of increasing volume in the toilet with bowel movements  -- Hg has decreased from baseline of 12 to 7.4  -- q8 cbc, Pantoprazole drip, NPO, Two large bore IVs obtained  -- Given one unit pRBC, continue to transfuse to maintain Hg > 8  -- Maintain platelets greater than 50 and INR<2.5  -- EGD: negative  -- Colonoscopy: hematin in the colon; no acute bleed  -- H/H >7 for 48 hrs  -- Continue to hold Coumadin until follow-up with Cardiology on 3/24          Essential hypertension  -- Patient taking bidil and metoprolol at home  -- Some orthostatic changes in BP earlier that patient reports are common for him  -- Plan to continue BP meds, with hold parameters  -- Continue BP meds upon discharge          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, NYHA class 2 and CKD stage 3  -- Patient with CHF, last EF 45%  -- Continue bidil and metoprolol  -- Hold aspirin and plavix in setting of GI bleed  -- Continue DAPT on discharge           Type 2 diabetes mellitus with diabetic polyneuropathy, with long-term current use of insulin  -- Patient  taking 16 U levemir qd at home as well as victoza  -- Hold victoza  -- Low dose SSI  -- Give levemir 8 U qd  -- Glucose checks qid          Coronary artery disease involving native coronary artery of native heart without angina pectoris  -- Patient on aspirin and plavix following PCI with placement of multiple SOULEYMANE in December of 2016  -- Hold aspirin and plavix in setting of GI bleed      Typical atrial flutter  -- Patient was anticoagulated with coumadin  -- Was planning to have loop recorder implanted on Friday, 3/24  -- hold coumadin in setting of GI Bleed and INR down trending  -- Loop recorder appointment with Dr. Moreno 3/24          Long term (current) use of anticoagulants [Z79.01]  -- Patient on coumadin for atrial flutter  -- Hold coumadin        Acute blood loss anemia  - likely lower GI given hx  - Presented with hemoglobin of 7.5 baseline appears around 11  - Stable throughout admission, will transfuse to goal of 8 given history of CAD and stents     CONSULTS: GI: will need repeat colon in one month and follow-up on biopsy results      Last CBC/BMP/HgbA1c (if applicable):  Recent Results (from the past 336 hour(s))   CBC auto differential    Collection Time: 03/22/17  8:50 AM   Result Value Ref Range    WBC 9.35 3.90 - 12.70 K/uL    Hemoglobin 7.2 (L) 14.0 - 18.0 g/dL    Hematocrit 21.3 (L) 40.0 - 54.0 %    Platelets 218 150 - 350 K/uL   CBC auto differential    Collection Time: 03/21/17  7:44 PM   Result Value Ref Range    WBC 9.61 3.90 - 12.70 K/uL    Hemoglobin 7.4 (L) 14.0 - 18.0 g/dL    Hematocrit 22.4 (L) 40.0 - 54.0 %    Platelets 207 150 - 350 K/uL   CBC auto differential    Collection Time: 03/21/17  8:01 AM   Result Value Ref Range    WBC 9.54 3.90 - 12.70 K/uL    Hemoglobin 7.8 (L) 14.0 - 18.0 g/dL    Hematocrit 23.0 (L) 40.0 - 54.0 %    Platelets 228 150 - 350 K/uL     No results found for this or any previous visit (from the past 336 hour(s)).  Lab Results   Component Value Date    HGBA1C  7.1 (H) 02/27/2017         Special Treatments/Procedures: EGD and Colonoscopy (see report 3/21/2017)    Disposition:  Home       Future Scheduled Appointments:  Future Appointments  Date Time Provider Department Center   3/24/2017 6:45 AM 3PREP, RACHEL DIAZ [facility]   3/24/2017 4:00 PM REHAB, CARDIAC [facility] [place]   3/27/2017 4:00 PM REHAB, CARDIAC [facility] [place]   3/28/2017 2:00 PM Riki Huynh MD [facility]   3/29/2017 4:00 PM REHAB, CARDIAC [facility] [place]   3/31/2017 10:20 AM PACEMAKER, ICD [facility]   3/31/2017 4:00 PM REHAB, CARDIAC [facility] [place]   4/3/2017 3:30 PM REHAB, CARDIAC [facility] [place]   4/5/2017 3:30 PM REHAB, CARDIAC [facility] [place]   4/7/2017 3:30 PM REHAB, CARDIAC [facility] [place]   4/10/2017 3:30 PM REHAB, CARDIAC [facility] [place]   4/12/2017 3:30 PM REHAB, CARDIAC [facility] [place]   5/9/2017 8:00 AM LAB, JULIO CÉSAR KENH LAB [place]   5/16/2017 3:30 PM Cony Saleem NP [facility] ENDOCRN [facility]       Follow-up Plans from This Hospitalization:      Discharge Medication List:     Chau Rodarte   Home Medication Instructions VIRGILIO:[id number]    Printed on:03/22/17 8548   Medication Information                      aspirin 81 MG Chew  Take 1 tablet (81 mg total) by mouth once daily.             atorvastatin (LIPITOR) 80 MG tablet  Take 1 tablet (80 mg total) by mouth once daily.             cholecalciferol, vitamin D3, (VITAMIN D3) 5,000 unit Tab  Take 5,000 Units by mouth once daily.             clopidogrel (PLAVIX) 75 mg tablet  Take 1 tablet (75 mg total) by mouth once daily.             furosemide (LASIX) 40 MG tablet  Take 1 tablet (40 mg total) by mouth once daily.             insulin detemir (LEVEMIR FLEXTOUCH) 100 unit/mL (3 mL) SubQ InPn pen  Inject 16 Units into the skin every evening.             isosorbide-hydrALAZINE 20-37.5 mg (BIDIL) 20-37.5 mg Tab  Take 1 tablet by mouth 3 (three)  "times daily.             liraglutide 0.6 mg/0.1 mL, 18 mg/3 mL, subq PNIJ (VICTOZA 2-SAGAR) 0.6 mg/0.1 mL (18 mg/3 mL) PnIj  Inject 0.6 mg daily x1 week, then 1.2 mg daily x1 week, then 1.8 mg daily thereafter             metoprolol succinate (TOPROL-XL) 50 MG 24 hr tablet  Take 1 tablet (50 mg total) by mouth once daily.             pantoprazole (PROTONIX) 20 MG tablet  Take 2 tablets (40 mg total) by mouth once daily.             pen needle, diabetic (BD ULTRA-FINE HANG PEN NEEDLES) 32 gauge x 5/32" Ndle  Uses 2 times daily, on  insulin injections                 Patient Instructions:    Discharge Procedure Orders  Diet general     Call MD for:  increased confusion or weakness     Call MD for:  persistent dizziness, light-headedness, or visual disturbances     Call MD for:  worsening rash     Call MD for:  severe persistent headache     Call MD for:  difficulty breathing or increased cough     Call MD for:  redness, tenderness, or signs of infection (pain, swelling, redness, odor or green/yellow discharge around incision site)     Call MD for:  severe uncontrolled pain     Call MD for:  persistent nausea and vomiting or diarrhea     Call MD for:  temperature >100.4         At the time of discharge patient was told to take all medications as prescribed, to keep all followup appointments, and to call their primary care physician or return to the emergency room if they have any worsening or concerning symptoms.    Signing Physician:  Chente Monet MD  "

## 2017-03-22 NOTE — TELEPHONE ENCOUNTER
Per Dr. Moreno, Pt is being discharged.OK to proceed with ILR implantation as scheduled on Friday. Mrs. Rodarte called me and I informed her of this. She verbalized understanding. We discussed all pre-procedure instructions and she verbalized understanding. Denied further questions, needs, and concerns.

## 2017-03-22 NOTE — PROGRESS NOTES
[facility] Hospital Medicine  Progress Note    Patient Name: Chau Rodarte  MRN: [id number]  Patient Class: IP- Inpatient   Admission Date: 3/20/2017  Length of Stay: 2 days  Attending Physician: No att. providers found  Primary Care Provider: Shaggy Neal MD    [facility] Hospital Medicine Team: [facility] [place] [facility] 3 Chente Monet MD    Subjective:     Principal Problem:Lower GI bleed    HPI:  66 y.o male with a PMHx of CAD (s/p PCI on 12/5 of pLAD, mLAD, distal LM and pLCx), ICM (EF=20-25%) improved to 45% on most recent 2DE 03/2017 s/p lifevest, CKD III, T2DM, HTN, PVD (s/p Right Fem-Pop bypass, Left SFA stent) who presents to hospital today w/ complaints rectal bleeding since Friday evening. Pt denies any preceding HAs. Pts wife is at bedside during exam, she confirms that he had his first ever episode of bleeding Friday (apx 2-4 teaspoons, then had a little more blood the next day, and by the third day lost .5-1 cup of blood per pt. Pt appears to be on plavix and coumadin w/ baby asprin. Pt does endorse dizziness. He was initially tachycardic and hypotensive upon presentation. Noted to have significant amount of dark maroon stool on bedside HUE. INR 2.8 today.    Patient given 1 U pRBC and 1 L NS in ED. Pantoprazole drip also initiated. GI was consulted.        Hospital Course:  3/21: EGD and Colonoscopy completed. EGD negative and Colonoscopy showing hematin/coffee ground material in colon. Discussed with GI and plan to continue DAPT and hold Warfarin until appointment with Dr. Moreno on 3/24 for loop recorder placement.   3/22: H/H >7 for past 48 hrs. Plan to discharge home with follow-up with Cardiology on Friday and PCP the following week. Repeat Colonoscopy in 1 month. Will discharge home with Protonix 40mg PO QD. Hold Warfarin until cardiology follow-up.          Review of Systems   Constitutional: Negative for chills and fever.   HENT: Negative for congestion.    Eyes: Negative for pain and visual  disturbance.   Respiratory: Negative for cough and shortness of breath.    Gastrointestinal: Positive for blood in stool. Negative for constipation, nausea and vomiting.   Genitourinary: Negative for dysuria.   Neurological: Negative for dizziness and headaches.     Objective:     Vital Signs (Most Recent):  Temp: 98.5 °F (36.9 °C) (03/22/17 0745)  Pulse: 82 (03/22/17 0900)  Resp: 18 (03/22/17 0900)  BP: 138/68 (03/22/17 0745)  SpO2: 97 % (03/22/17 0745) Vital Signs (24h Range):  Temp:  [97.9 °F (36.6 °C)-98.5 °F (36.9 °C)] 98.5 °F (36.9 °C)  Pulse:  [72-85] 82  Resp:  [16-18] 18  SpO2:  [97 %-98 %] 97 %  BP: (123-138)/(60-70) 138/68     Weight: 110.7 kg (244 lb)  Body mass index is 36.03 kg/(m^2).    Intake/Output Summary (Last 24 hours) at 03/22/17 1622  Last data filed at 03/22/17 0932   Gross per 24 hour   Intake             1616 ml   Output             2050 ml   Net             -434 ml      Physical Exam   Constitutional: He is oriented to person, place, and time. He appears well-developed and well-nourished.   HENT:   Head: Normocephalic and atraumatic.   Right Ear: External ear normal.   Left Ear: External ear normal.   Mouth/Throat: Oropharynx is clear and moist.   Eyes: EOM are normal. Pupils are equal, round, and reactive to light.   Neck: Normal range of motion. Neck supple.   Cardiovascular: Normal rate, regular rhythm and normal heart sounds.    Pulmonary/Chest: Effort normal and breath sounds normal.   Abdominal: Soft. Bowel sounds are normal. He exhibits no distension. There is no tenderness.   Musculoskeletal: Normal range of motion.   Neurological: He is alert and oriented to person, place, and time.   Skin: Skin is warm and dry. No rash noted. No erythema.         Recent Labs  Lab 03/21/17  0801 03/21/17  1944 03/22/17  0850   WBC 9.54 9.61 9.35   HGB 7.8* 7.4* 7.2*   HCT 23.0* 22.4* 21.3*    207 218       Recent Labs  Lab 03/20/17  0224 03/21/17  0459 03/22/17  0427    144 146*   K  4.2 3.8 3.5    112* 114*   CO2 20* 20* 22*   BUN 74* 66* 45*   CREATININE 3.1* 2.5* 2.2*   CALCIUM 8.4* 8.6* 8.3*         Assessment/Plan:      * Lower GI bleed  -- Patient on DAPT as well as coumadin for his prior stents and atrial flutter, respectively  -- Patient reports history of bright red blood of increasing volume in the toilet with bowel movements  -- Hg has decreased from baseline of 12 to 7.4  -- q8 cbc, Pantoprazole drip, NPO, Two large bore IVs obtained  -- Given one unit pRBC, continue to transfuse to maintain Hg > 8  -- Maintain platelets greater than 50 and INR<2.5  -- EGD: negative  -- Colonoscopy: hematin in the colon; no acute bleed  -- H/H >7 for 48 hrs  -- Continue to hold Coumadin until follow-up with Cardiology on 3/24        Essential hypertension  -- Patient taking bidil and metoprolol at home  -- Some orthostatic changes in BP earlier that patient reports are common for him  -- Plan to continue BP meds, with hold parameters  -- Continue BP meds upon discharge        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, NYHA class 2 and CKD stage 3  -- Patient with CHF, last EF 45%  -- Continue bidil and metoprolol  -- Hold aspirin and plavix in setting of GI bleed  -- Continue DAPT on discharge         Type 2 diabetes mellitus with diabetic polyneuropathy, with long-term current use of insulin  -- Patient taking 16 U levemir qd at home as well as victoza  -- Hold victoza  -- Low dose SSI  -- Give levemir 8 U qd  -- Glucose checks qid        Coronary artery disease involving native coronary artery of native heart without angina pectoris  -- Patient on aspirin and plavix following PCI with placement of multiple SOULEYMANE in December of 2016  -- Hold aspirin and plavix in setting of GI bleed     Typical atrial flutter  -- Patient was anticoagulated with coumadin  -- Was planning to have loop recorder implanted on Friday, 3/24  -- hold coumadin in setting of GI Bleed and INR down  trending  -- Loop recorder appointment with Dr. Moreno 3/24        Long term (current) use of anticoagulants [Z79.01]  -- Patient on coumadin for atrial flutter  -- Hold coumadin        Acute blood loss anemia  - likely lower GI given hx  - Presented with hemoglobin of 7.5 baseline appears around 11  - Stable throughout admission, will transfuse to goal of 8 given history of CAD and stents     Dispo: Follow-up with Dr. Moreno on 3/24 for Loop Recorder; hold warfarin until follow-up appointment    VTE Risk Mitigation         Ordered     Medium Risk of VTE  Once      03/20/17 0641          Chente Monet MD  Department of Hospital Medicine   [facility]

## 2017-03-23 ENCOUNTER — LAB VISIT (OUTPATIENT)
Dept: LAB | Facility: HOSPITAL | Age: 67
End: 2017-03-23
Attending: STUDENT IN AN ORGANIZED HEALTH CARE EDUCATION/TRAINING PROGRAM
Payer: MEDICARE

## 2017-03-23 DIAGNOSIS — D62 ACUTE BLOOD LOSS ANEMIA: ICD-10-CM

## 2017-03-23 LAB
BASOPHILS # BLD AUTO: 0.03 K/UL
BASOPHILS NFR BLD: 0.3 %
DIFFERENTIAL METHOD: ABNORMAL
EOSINOPHIL # BLD AUTO: 0.4 K/UL
EOSINOPHIL NFR BLD: 3.8 %
ERYTHROCYTE [DISTWIDTH] IN BLOOD BY AUTOMATED COUNT: 15.7 %
HCT VFR BLD AUTO: 22.8 %
HGB BLD-MCNC: 7.5 G/DL
LYMPHOCYTES # BLD AUTO: 1.6 K/UL
LYMPHOCYTES NFR BLD: 14.5 %
MCH RBC QN AUTO: 29.3 PG
MCHC RBC AUTO-ENTMCNC: 32.9 %
MCV RBC AUTO: 89 FL
MONOCYTES # BLD AUTO: 1.2 K/UL
MONOCYTES NFR BLD: 11.4 %
NEUTROPHILS # BLD AUTO: 7.5 K/UL
NEUTROPHILS NFR BLD: 69.7 %
PLATELET # BLD AUTO: 251 K/UL
PMV BLD AUTO: 9.7 FL
RBC # BLD AUTO: 2.56 M/UL
WBC # BLD AUTO: 10.73 K/UL

## 2017-03-23 PROCEDURE — 36415 COLL VENOUS BLD VENIPUNCTURE: CPT | Mod: PO

## 2017-03-23 PROCEDURE — 85025 COMPLETE CBC W/AUTO DIFF WBC: CPT

## 2017-03-24 ENCOUNTER — TELEPHONE (OUTPATIENT)
Dept: HEPATOLOGY | Facility: HOSPITAL | Age: 67
End: 2017-03-24

## 2017-03-24 ENCOUNTER — HOSPITAL ENCOUNTER (OUTPATIENT)
Facility: HOSPITAL | Age: 67
Discharge: HOME | End: 2017-03-24
Attending: INTERNAL MEDICINE | Admitting: INTERNAL MEDICINE
Payer: MEDICARE

## 2017-03-24 VITALS
HEIGHT: 69 IN | HEART RATE: 79 BPM | RESPIRATION RATE: 18 BRPM | DIASTOLIC BLOOD PRESSURE: 67 MMHG | WEIGHT: 244 LBS | TEMPERATURE: 99 F | BODY MASS INDEX: 36.14 KG/M2 | SYSTOLIC BLOOD PRESSURE: 144 MMHG | OXYGEN SATURATION: 96 %

## 2017-03-24 DIAGNOSIS — I63.9 CRYPTOGENIC STROKE: Primary | Chronic | ICD-10-CM

## 2017-03-24 DIAGNOSIS — I48.92 ATRIAL FLUTTER: ICD-10-CM

## 2017-03-24 DIAGNOSIS — I48.91 AF (ATRIAL FIBRILLATION): ICD-10-CM

## 2017-03-24 DIAGNOSIS — E78.5 HYPERLIPIDEMIA: ICD-10-CM

## 2017-03-24 LAB
POCT GLUCOSE: 117 MG/DL (ref 70–110)
POCT GLUCOSE: 151 MG/DL (ref 70–110)

## 2017-03-24 PROCEDURE — 25000003 PHARM REV CODE 250

## 2017-03-24 PROCEDURE — 63600175 PHARM REV CODE 636 W HCPCS

## 2017-03-24 PROCEDURE — C1764 EVENT RECORDER, CARDIAC: HCPCS

## 2017-03-24 PROCEDURE — 33282 EP LAB PROCEDURE: CPT | Mod: ,,, | Performed by: INTERNAL MEDICINE

## 2017-03-24 RX ORDER — CEFAZOLIN SODIUM 2 G/50ML
2 SOLUTION INTRAVENOUS
Status: DISCONTINUED | OUTPATIENT
Start: 2017-03-24 | End: 2017-03-24 | Stop reason: HOSPADM

## 2017-03-24 NOTE — DISCHARGE SUMMARY
[facility]  Cardiology  Discharge Summary      Patient Name: Chau Rodarte  MRN: [id number]  Admission Date: 3/24/2017  Hospital Length of Stay: 0 days  Discharge Date and Time:  03/24/2017 9:42 AM  Attending Physician: Cayden Moreno MD  Discharging Provider: Kezia Dias NP  Primary Care Physician: Shaggy Neal MD    HPI:t 66 year-old man with extensive vascular disease including right femoral-popliteal bypass and left SFA stent, ischemic cardiomyopathy with prior LVEF of 20-25% now improved to 45% s/p multivessel PCI 12/5/2016 (PCI to prox-LAD/LCX, distal LMCA and mid LAD) with NYHA class II symptoms, chronic kidney disease stage 3, cryptogenic stroke, hypertension and poorly controlled diabetes mellitus,  atrial flutter s/p CTI RFA on 2/3/17.  Pt was hospitalized for GI bleed,  discharged  on 3/22/17 > denies any further bleeding > EGD completed 3/21/17 and showed gastritis,  no signs of bleeding. Colonoscopy w/  no acute bleeding. Plan for Repeat colonoscopy in one month. Pt continued on ASA/Plavix, coumadin stopped. Recent Holter did not reveal any AF. Hence presents today for  ILR implantation for atrial fibrillation surveillance in setting of remote stroke history.     Procedure(s) (LRB):  PLACEMENT-LOOP RECORDER (N/A)        Hospital Course:  Pt underwent ILR MDT implant, tolerated procedure, no acute complications noted. Left chest site CDI. Pt to follow up with device clinic for wound check in 1 week, and 3 months with MD Cayden Moreno   Discharge plans/instructions discussed with patient and wife who verbalized understanding and agreement of plans of care.       Final Active Diagnoses:    Diagnosis Date Noted POA    PRINCIPAL PROBLEM:  Cryptogenic stroke, remote history (2006) [I63.9] 01/26/2017 Yes     Chronic    Typical atrial flutter [I48.3] 01/26/2017 Yes     Chronic      Problems Resolved During this Admission:    Diagnosis Date Noted Date Resolved POA       Discharged Condition:  good    Follow Up:  Follow-up Information     Follow up with Jayme Trotter In 1 week.    Specialty:  Cardiology    Why:  wound check     Contact information:    [address]ia [address]  [facility] [address] [phone]    Additional information:    [facility] [place] - 3rd Floor        Follow up with Cayden Moreno MD In 3 months.    Specialties:  Cardiology, Electrophysiology    Contact information:    Sofia DIAZ  [facility] [id number] [phone]          Patient Instructions:     Diet general   Order Specific Question Answer Comments   Additional restrictions: Cardiac (Low Na/Chol)      Activity as tolerated     Call MD for:  temperature >100.4     Call MD for:  persistent nausea and vomiting or diarrhea     Call MD for:  severe uncontrolled pain     Call MD for:  redness, tenderness, or signs of infection (pain, swelling, redness, odor or green/yellow discharge around incision site)     Call MD for:  difficulty breathing or increased cough     Call MD for:  severe persistent headache     Call MD for:  worsening rash     Call MD for:  persistent dizziness, light-headedness, or visual disturbances     Call MD for:  increased confusion or weakness     Call MD for:   Scheduling Instructions: For any concerning medical symptoms     Remove dressing in 24 hours       Medications:  Reconciled Home Medications:   Discharge Medication List as of 3/24/2017  9:32 AM      CONTINUE these medications which have NOT CHANGED    Details   aspirin 81 MG Chew Take 1 tablet (81 mg total) by mouth once daily., Starting 12/7/2016, Until Thu 12/7/17, OTC      atorvastatin (LIPITOR) 80 MG tablet Take 1 tablet (80 mg total) by mouth once daily., Starting 12/7/2016, Until Thu 12/7/17, Normal      cholecalciferol, vitamin D3, (VITAMIN D3) 5,000 unit Tab Take 5,000 Units by mouth once daily., Until Discontinued, Historical Med      clopidogrel (PLAVIX) 75 mg tablet Take 1 tablet (75 mg total) by mouth once daily.,  "Starting 12/7/2016, Until Discontinued, Normal      furosemide (LASIX) 40 MG tablet Take 1 tablet (40 mg total) by mouth once daily., Starting 12/7/2016, Until Thu 12/7/17, Normal      insulin detemir (LEVEMIR FLEXTOUCH) 100 unit/mL (3 mL) SubQ InPn pen Inject 16 Units into the skin every evening., Starting 12/7/2016, Until Discontinued, Normal      isosorbide-hydrALAZINE 20-37.5 mg (BIDIL) 20-37.5 mg Tab Take 1 tablet by mouth 3 (three) times daily., Starting 12/20/2016, Until Wed 12/20/17, Normal      liraglutide 0.6 mg/0.1 mL, 18 mg/3 mL, subq PNIJ (VICTOZA 2-SAGAR) 0.6 mg/0.1 mL (18 mg/3 mL) PnIj Inject 0.6 mg daily x1 week, then 1.2 mg daily x1 week, then 1.8 mg daily thereafter, Normal      metoprolol succinate (TOPROL-XL) 50 MG 24 hr tablet Take 1 tablet (50 mg total) by mouth once daily., Starting 12/20/2016, Until Wed 12/20/17, Normal      pantoprazole (PROTONIX) 20 MG tablet Take 2 tablets (40 mg total) by mouth once daily., Starting 3/22/2017, Until Thu 3/22/18, Normal      pen needle, diabetic (BD ULTRA-FINE HANG PEN NEEDLES) 32 gauge x 5/32" Ndle Uses 2 times daily, on  insulin injections, Normal           CHNI Calderon-BC  Cardiology Electrophysiology  NP   [facility]    Attending: MD Cayden Moreno         "

## 2017-03-24 NOTE — IP AVS SNAPSHOT
[facility]  [address]  [facility] [address]  Phone: [phone]           Patient Discharge Instructions     Our goal is to set you up for success. This packet includes information on your condition, medications, and your home care. It will help you to care for yourself so you don't get sicker and need to go back to the hospital.     Please ask your nurse if you have any questions.        There are many details to remember when preparing to leave the hospital. Here is what you will need to do:    1. Take your medicine. If you are prescribed medications, review your Medication List in the following pages. You may have new medications to  at the pharmacy and others that you'll need to stop taking. Review the instructions for how and when to take your medications. Talk with your doctor or nurses if you are unsure of what to do.     2. Go to your follow-up appointments. Specific follow-up information is listed in the following pages. Your may be contacted by a transition nurse or clinical provider about future appointments. Be sure we have all of the phone numbers to reach you, if needed. Please contact your provider's office if you are unable to make an appointment.     3. Watch for warning signs. Your doctor or nurse will give you detailed warning signs to watch for and when to call for assistance. These instructions may also include educational information about your condition. If you experience any of warning signs to your health, call your doctor.               [facility]  Unless otherwise directed by your provider, please contact [facility], our nurse care line that is available for 24/7 assistance.     [phone] (toll-free)    Registered nurses in the [facility] provide clinical advisement, health education, appointment booking, and other advisory services.                    ** Verify the list of medication(s) below is accurate and up  to date. Carry this with you in case of emergency. If your medications have changed, please notify your healthcare provider.             Medication List      CHANGE how you take these medications        Additional Info                      liraglutide 0.6 mg/0.1 mL (18 mg/3 mL) subq PNIJ 0.6 mg/0.1 mL (18 mg/3 mL) Pnij   Commonly known as:  VICTOZA 2-SAGAR   Quantity:  9 mL   Refills:  3   What changed:    - how much to take  - how to take this  - when to take this  - additional instructions    Instructions:  Inject 0.6 mg daily x1 week, then 1.2 mg daily x1 week, then 1.8 mg daily thereafter     Begin Date    AM    Noon    PM    Bedtime         CONTINUE taking these medications        Additional Info                      aspirin 81 MG Chew   Refills:  0   Dose:  81 mg    Instructions:  Take 1 tablet (81 mg total) by mouth once daily.     Begin Date    AM    Noon    PM    Bedtime       atorvastatin 80 MG tablet   Commonly known as:  LIPITOR   Quantity:  90 tablet   Refills:  3   Dose:  80 mg    Instructions:  Take 1 tablet (80 mg total) by mouth once daily.     Begin Date    AM    Noon    PM    Bedtime       clopidogrel 75 mg tablet   Commonly known as:  PLAVIX   Quantity:  90 tablet   Refills:  3   Dose:  75 mg    Instructions:  Take 1 tablet (75 mg total) by mouth once daily.     Begin Date    AM    Noon    PM    Bedtime       furosemide 40 MG tablet   Commonly known as:  LASIX   Quantity:  30 tablet   Refills:  11   Dose:  40 mg    Instructions:  Take 1 tablet (40 mg total) by mouth once daily.     Begin Date    AM    Noon    PM    Bedtime       insulin detemir 100 unit/mL (3 mL) Inpn pen   Commonly known as:  LEVEMIR FLEXTOUCH   Quantity:  1 Box   Refills:  3   Dose:  16 Units    Instructions:  Inject 16 Units into the skin every evening.     Begin Date    AM    Noon    PM    Bedtime       isosorbide-hydrALAZINE 20-37.5 mg 20-37.5 mg Tab   Commonly known as:  BIDIL   Quantity:  90 tablet   Refills:  11   Dose:  1  "tablet    Instructions:  Take 1 tablet by mouth 3 (three) times daily.     Begin Date    AM    Noon    PM    Bedtime       metoprolol succinate 50 MG 24 hr tablet   Commonly known as:  TOPROL-XL   Quantity:  30 tablet   Refills:  11   Dose:  50 mg    Instructions:  Take 1 tablet (50 mg total) by mouth once daily.     Begin Date    AM    Noon    PM    Bedtime       pantoprazole 20 MG tablet   Commonly known as:  PROTONIX   Quantity:  30 tablet   Refills:  11   Dose:  40 mg    Instructions:  Take 2 tablets (40 mg total) by mouth once daily.     Begin Date    AM    Noon    PM    Bedtime       pen needle, diabetic 32 gauge x 5/32" Ndle   Commonly known as:  BD ULTRA-FINE HANG PEN NEEDLES   Quantity:  180 each   Refills:  4    Instructions:  Uses 2 times daily, on  insulin injections     Begin Date    AM    Noon    PM    Bedtime       VITAMIN D3 5,000 unit Tab   Refills:  0   Dose:  5000 Units   Generic drug:  cholecalciferol (vitamin D3)    Instructions:  Take 5,000 Units by mouth once daily.     Begin Date    AM    Noon    PM    Bedtime                  Please bring to all follow up appointments:    1. A copy of your discharge instructions.  2. All medicines you are currently taking in their original bottles.  3. Identification and insurance card.    Please arrive 15 minutes ahead of scheduled appointment time.    Please call 24 hours in advance if you must reschedule your appointment and/or time.        Your Scheduled Appointments     Mar 24, 2017  4:00 PM CDT   Cardiac Rehab Phase 2 with REHAB, CARDIAC   [place] [facility] [place]    [address] [facility] [address]   [phone]            Mar 27, 2017  4:00 PM CDT   Cardiac Rehab Phase 2 with REHAB, CARDIAC   [place] [facility] ([place]    [address] [facility] [address]   [phone]            Mar 28, 2017  2:00 PM CDT   Physical with MD Jayme Willoughby - Internal Medicine ([facility])    [address]  [facility] [address]   [phone]            Mar 29, 2017  4:00 PM CDT   Cardiac Rehab Phase 2 with REHAB, CARDIAC   [place] [facility] [place]    [address] [facility]  [place] [address] [phone]            Mar 31, 2017  4:00 PM CDT   Cardiac Rehab Phase 2 with REHAB, CARDIAC   [place] [facility]    [address] [facility]  [place] [address] [phone]              Follow-up Information     Follow up with Jayme Trotter In 1 week.    Specialty:  Cardiology    Why:  wound check     Contact information:    [address]  [facility] [address] [phone]    Additional information:    [facility] - 3rd Floor        Follow up with Cayden Moreno MD In 3 months.    Specialties:  Cardiology, Electrophysiology    Contact information:    [address]  [facility] [id number] [phone]          Discharge Instructions     Future Orders    Activity as tolerated     Call MD for:  difficulty breathing or increased cough     Call MD for:  increased confusion or weakness     Call MD for:  persistent dizziness, light-headedness, or visual disturbances     Call MD for:  persistent nausea and vomiting or diarrhea     Call MD for:  redness, tenderness, or signs of infection (pain, swelling, redness, odor or green/yellow discharge around incision site)     Call MD for:  severe persistent headache     Call MD for:  severe uncontrolled pain     Call MD for:  temperature >100.4     Call MD for:  worsening rash     Call MD for:     Scheduling Instructions:    For any concerning medical symptoms    Diet general     Questions:    Total calories:      Fat restriction, if any:      Protein restriction, if any:      Na restriction, if any:      Fluid restriction:      Additional restrictions:  Cardiac (Low Na/Chol)        Primary Diagnosis     Your primary diagnosis was:  Stroke Of Unknown Cause      Admission  "Information     Date & Time Provider Department CSN    3/24/2017  6:18 AM Cayden Moreno MD [facility] [id number]      Care Providers     Provider Role Specialty Primary office phone    Cayden Moreno MD Attending Provider Cardiology [phone]      Your Vitals Were     BP Pulse Temp Resp Height Weight    144/67 (BP Location: Right arm, Patient Position: Sitting, BP Method: Automatic) 80 99 °F (37.2 °C) (Oral) 18 5' 9" (1.753 m) 110.7 kg (244 lb)    SpO2 BMI             96% 36.03 kg/m2         Recent Lab Values        11/28/2016 1/12/2017 2/27/2017                     3:09 AM  7:20 AM  9:37 AM         A1C 9.2 (H) 8.2 (H) 7.1 (H)         Comment for A1C at  3:09 AM on 11/28/2016:  According to ADA guidelines, hemoglobin A1C <7.0% represents  optimal control in non-pregnant diabetic patients.  Different  metrics may apply to specific populations.   Standards of Medical Care in Diabetes - 2016.  For the purpose of screening for the presence of diabetes:  <5.7%     Consistent with the absence of diabetes  5.7-6.4%  Consistent with increasing risk for diabetes   (prediabetes)  >or=6.5%  Consistent with diabetes  Currently no consensus exists for use of hemoglobin A1C  for diagnosis of diabetes for children.      Comment for A1C at  7:20 AM on 1/12/2017:  According to ADA guidelines, hemoglobin A1C <7.0% represents  optimal control in non-pregnant diabetic patients.  Different  metrics may apply to specific populations.   Standards of Medical Care in Diabetes - 2016.  For the purpose of screening for the presence of diabetes:  <5.7%     Consistent with the absence of diabetes  5.7-6.4%  Consistent with increasing risk for diabetes   (prediabetes)  >or=6.5%  Consistent with diabetes  Currently no consensus exists for use of hemoglobin A1C  for diagnosis of diabetes for children.      Comment for A1C at  9:37 AM on 2/27/2017:  According to ADA guidelines, hemoglobin A1C <7.0% represents  optimal control " in non-pregnant diabetic patients.  Different  metrics may apply to specific populations.   Standards of Medical Care in Diabetes - 2016.  For the purpose of screening for the presence of diabetes:  <5.7%     Consistent with the absence of diabetes  5.7-6.4%  Consistent with increasing risk for diabetes   (prediabetes)  >or=6.5%  Consistent with diabetes  Currently no consensus exists for use of hemoglobin A1C  for diagnosis of diabetes for children.        Pending Labs     Order Current Status    Basic metabolic panel In process    Protime-INR In process      Allergies as of 3/24/2017     No Known Allergies      Advance Directives     An advance directive is a document which, in the event you are no longer able to make decisions for yourself, tells your healthcare team what kind of treatment you do or do not want to receive, or who you would like to make those decisions for you.  If you do not currently have an advance directive, [facility] encourages you to create one.  For more information call:  [phone]), [phone] ([phone]),  or log on to [email or web address].        Smoking Cessation     If you would like to quit smoking:   You may be eligible for free services if you are a [place] resident and started smoking cigarettes before September 1, 1988.  Call the Smoking Cessation Trust (SCT) toll free at (061) 379-3713 or (501) 695-8846.   Call 7-760-QUIT-NOW if you do not meet the above criteria.            Language Assistance Services     ATTENTION: Language assistance services are available, free of charge. Please call [phone].      ATENCIÓN: Si habla español, tiene a zaldivar disposición servicios gratuitos de asistencia lingüística. Llame al [phone].     CHÚ Ý: N?u b?n nói Ti?ng Vi?t, có các d?ch v? h? tr? ngôn ng? mi?n phí dành cho b?n. G?i s? [phone].        Stroke Education              Heart Failure Education       Heart Failure: Being Active  You have a  condition called heart failure. Being active doesnt mean that you have to wear yourself out. Even a little movement each day helps to strengthen your heart. If you cant get out to exercise, you can do simple stretching and strengthening exercises at home. These are good ways to keep you well-conditioned and prevent you and your heart from becoming excessively weak.    Ideas to get you started  · Add a little movement to things you do now. Walk to mail letters. Park your car at the far end of the parking lot and walk to the store. Walk up a flight of stairs instead of taking the elevator.  · Choose activities you enjoy. You might walk, swim, or ride an exercise bike. Things like gardening and washing the car count, too. Other possibilities include: washing dishes, walking the dog, walking around the mall, and doing aerobic activities with friends.  · Join a group exercise program at a [facility] or [facility], a senior center, or a community center. Or look into a hospital cardiac rehabilitation program. Ask your doctor if you qualify.  Tips to keep you going  · Get up and get dressed each day. Go to a coffee shop and read a newspaper or go somewhere that you'll be in the presence of other active people. Youll feel more like being active.  · Make a plan. Choose one or more activities that you enjoy and that you can easily do. Then plan to do at least one each day. You might write your plan on a calendar.  · Go with a friend or a group if you like company. This can help you feel supported and stay motivated, too.  · Plan social events that you enjoy. This will keep you mentally engaged as well as physically motivated to do things you find pleasure in.  For your safety  · Talk with your healthcare provider before starting an exercise program.  · Exercise indoors when its too hot or too cold outside, or when the air quality is poor. Try walking at a shopping mall.  · Wear socks and sturdy shoes to maintain your balance and  prevent falls.  · Start slowly. Do a few minutes several times a day at first. Increase your time and speed little by little.  · Stop and rest whenever you feel tired or get short of breath.  · Dont push yourself on days when you dont feel well.  Date Last Reviewed: 3/20/2016  © 4934-5128 [employer]. [address] [place] [address], [place] [address]. All rights reserved. This information is not intended as a substitute for professional medical care. Always follow your healthcare professional's instructions.              Heart Failure: Evaluating Your Heart  You have a condition called heart failure. To evaluate your condition, your doctor will examine you, ask questions, and do some tests. Along with looking for signs of heart failure, the doctor looks for any other health problems that may have led to heart failure. The results of your evaluation will help your doctor form a treatment plan.  Health history and physical exam  Your visit will start with a health history. Tell the doctor about any symptoms youve noticed and about all medicines you take. Then youll have a physical exam. This includes listening to your heartbeat and breathing. Youll also be checked for swelling (edema) in your legs and neck. When you have fluid buildup or fluid in the lungs, it may be called congestive heart failure.  Diagnosing heart failure     During an echocardiogram, sound waves bounce off the heart. These are converted into a picture on the screen.   The following may be done to help your doctor form a diagnosis:  · X-rays show the size and shape of your heart. These pictures can also show fluid in your lungs.  · An electrocardiogram (ECG or EKG) shows the pattern of your heartbeat. Small pads (electrodes) are placed on your chest, arms, and legs. Wires connect the pads to the ECG machine, which records your hearts electrical signals. This can give the doctor information about heart function.  · An echocardiogram uses  ultrasound waves to show the structure and movement of your heart muscle. This shows how well the heart pumps. It also shows the thickness of the heart walls, and if the heart is enlarged. It is one of the most useful, non-invasive tests as it provides information about the heart's general function. This helps your doctor make treatment decisions.  · Lab tests evaluate small amounts of blood or urine for signs of problems. A BNP lab test can help diagnose and evaluate heart failure. BNP stands for B-type natriuretic peptide. The ventricles secrete more BNP when heart failure worsens. Lab tests can also provide information about metabolic dysfunction or heart dysfunction.  Your treatment plan  Based on the results of your evaluation and tests, your doctor will develop a treatment plan. This plan is designed to relieve some of your heart failure symptoms and help make you more comfortable. Your treatment plan may include:  · Medicine to help your heart work better and improve your quality of life  · Changes in what you eat and drink to help prevent fluid from backing up in your body  · Daily monitoring of your weight and heart failure symptoms to see how well your treatment plan is working  · Exercise to help you stay healthy  · Help with quitting smoking  · Emotional and psychological support to help adjust to the changes  · Referrals to other specialists to make sure you are being treated comprehensively  Date Last Reviewed: 3/21/2016  © 7396-0403 [employer]. [address] [place] [address], [place] [address]. All rights reserved. This information is not intended as a substitute for professional medical care. Always follow your healthcare professional's instructions.              Heart Failure: Making Changes to Your Diet  You have a condition called heart failure. When you have heart failure, excess fluid is more likely to build up in your body because your heart isn't working well. This makes the heart work  harder to pump blood. Fluid buildup causes symptoms such as shortness of breath and swelling (edema). This is often referred to as congestive heart failure or CHF. Controlling the amount of salt (sodium) you eat may help stop fluid from building up. Your doctor may also tell you to reduce the amount of fluid you drink.  Reading food labels    Your healthcare provider will tell you how much sodium you can eat each day. Read food labels to keep track. Keep in mind that certain foods are high in salt. These include canned, frozen, and processed foods. Check the amount of sodium in each serving. Watch out for high-sodium ingredients. These include MSG (monosodium glutamate), baking soda, and sodium phosphate.   Eating less salt  Give yourself time to get used to eating less salt. It may take a little while. Here are some tips to help:  · Take the saltshaker off the table. Replace it with salt-free herb mixes and spices.  · Eat fresh or plain frozen vegetables. These have much less salt than canned vegetables.  · Choose low-sodium snacks like sodium-free pretzels, crackers, or air-popped popcorn.  · Dont add salt to your food when youre cooking. Instead, season your foods with pepper, lemon, garlic, or onion.  · When you eat out, ask that your food be cooked without added salt.  · Avoid eating fried foods as these often have a great deal of salt.  If youre told to limit fluids  You may need to limit how much fluid you have to help prevent swelling. This includes anything that is liquid at room temperature, such as ice cream and soup. If your doctor tells you to limit fluid, try these tips:  · Measure drinks in a measuring cup before you drink them. This will help you meet daily goals.  · Chill drinks to make them more refreshing.  · Suck on frozen lemon wedges to quench thirst.  · Only drink when youre thirsty.  · Chew sugarless gum or suck on hard candy to keep your mouth moist.  · Weigh yourself daily to know if  your body's fluid content is rising.  My sodium goal  Your healthcare provider may give you a sodium goal to meet each day. This includes sodium found in food as well as salt that you add. My goal is to eat no more than ___________ mg of sodium per day.     When to call your doctor  Call your doctor right away if you have any symptoms of worsening heart failure. These can include:  · Sudden weight gain  · Increased swelling of your legs or ankles  · Trouble breathing when youre resting or at night  · Increase in the number of pillows you have to sleep on  · Chest pain, pressure, discomfort, or pain in the jaw, neck, or back   Date Last Reviewed: 3/21/2016  © 3879-8198 [employer]. [address] [place] [address], [place] [address]. All rights reserved. This information is not intended as a substitute for professional medical care. Always follow your healthcare professional's instructions.              Heart Failure: Medicines to Help Your Heart    You have a condition called heart failure (also known as congestive heart failure, or CHF). Your doctor will likely prescribe medicines for heart failure and any underlying health problems you have. Most heart failure patients take one or more types of medicinen. Your healthcare provider will work to find the combination of medicines that works best for you.  Heart failure medicines  Here are the most common heart failure medicines:  · ACE inhibitors lower blood pressure and decrease strain on the heart. This makes it easier for the heart to pump. Angiotensin receptor blockers have similar effects. These are prescribed for some patients instead of ACE inhibitors.  · Beta-blockers relieve stress on the heart. They also improve symptoms. They may also improve the heart's pumping action over time.  · Diuretics (also called water pills) help rid your body of excess water. This can help rid your body of swelling (edema). Having less fluid to pump means your heart  doesnt have to work as hard. Some diuretics make your body lose a mineral called potassium. Your doctor will tell you if you need to take supplements or eat more foods high in potassium.  · Digoxin helps your heart pump with more strength. This helps your heart pump more blood with each beat. So, more oxygen-rich blood travels to the rest of the body.  · Aldosterone antagonists help alter hormones and decrease strain on the heart.  · Hydralazine and nitrates are two separate medicines used together to treat heart failure. They may come in one combination pill. They lower blood pressure and decrease how hard the heart has to pump.  Medicines for related conditions  Controlling other heart problems helps keep heart failure under control, too. Depending on other heart problems you have, medicines may be prescribed to:  · Lower blood pressure (antihypertensives).  · Lower cholesterol levels (statins).  · Prevent blood clots (anticoagulants or aspirin).  · Keep the heartbeat steady (antiarrhythmics).  Date Last Reviewed: 3/5/2016  © 2584-3692 [employer]. [address] [place] [address]. All rights reserved. This information is not intended as a substitute for professional medical care. Always follow your healthcare professional's instructions.              Heart Failure: Procedures That May Help    The heart is a muscle that pumps oxygen-rich blood to all parts of the body. When you have heart failure, the heart is not able to pump as well as it should. Blood and fluid may back up into the lungs (congestive heart failure), and some parts of the body dont get enough oxygen-rich blood to work normally. These problems lead to the symptoms of heart failure.     Certain procedures may help the heart pump better in some cases of heart failure. Some procedures are done to treat health problems that may have caused the heart failure such as coronary artery disease or heart rhythm problems. For more  serious heart failure, other options are available.  Treating artery and valve problems  If you have coronary artery disease or valve disease, procedures may be done to improve blood flow. This helps the heart pump better, which can improve heart failure symptoms. First, your doctor may do a cardiac catheterization to help detect clogged blood vessels or valve damage. During this procedure, a  thin tube (catheter) in inserted into a blood vessel and guided to the heart. There a dye is injected and a special type of X-ray (angiogram) is taken of the blood vessels. Procedures to open a blocked artery or fix damaged valves can also be done using catheterization.  · Angioplasty uses a balloon-tipped instrument at the end of the catheter. The balloon is inflated to widen the narrowed artery. In many cases, a stent is expanded to further support the narrowed artery. A stent is a metal mesh tube.  · Valve surgery repairs or replacement of faulty valves can also be done during catheterization so blood can flow properly through the chambers of the heart.  Bypass surgery is another option to help treat blocked arteries. It uses a healthy blood vessel from elsewhere in the body. The healthy blood vessel is attached above and below the blocked area so that blood can flow around the blocked artery.  Treating heart rhythm problems  A device may be placed in the chest to help a weak heart maintain a healthy, heartbeat so the heart can pump more effectively:  · Pacemaker. A pacemaker is an implanted device that regulates your heartbeat electronically. It monitors your heart's rhythm and generates a painless electric impulse that helps the heart beat in a regular rhythm. A pacemaker is programmed to meet your specific heart rhythm needs.  · Biventricular pacing/cardiac resynchronization therapy. A type of pacemaker that paces both pumping chambers of the heart at the same time to coordinate contractions and to improve the heart's  function. Some people with heart failure are candidates for this therapy.  · Implantable cardioverter defibrillator. A device similar to a pacemaker that senses when the heart is beating too fast and delivers an electrical shock to convert the fast rhythm to a normal rhythm. This can be a life saving device.  In severe cases  In more serious cases of heart failure when other treatments no longer work, other options may include:  · Ventricular assist devices (VADs). These are mechanical devices used to take over the pumping function for one or both of the heart's ventricles, or pumping chambers. A VAD may be necessary when heart failure progresses to the point that medicines and other treatments no longer help. In some cases, a VAD may be used as a bridge to transplant.  · Heart transplant. This is replacing the diseased heart with a healthy one from a donor. This is an option for a few people who are very sick. A heart transplant is very serious and not an option for all patients. Your doctor can tell you more.  Date Last Reviewed: 3/20/2016  © 8930-1356 [employer]. [address] [place] [address]. All rights reserved. This information is not intended as a substitute for professional medical care. Always follow your healthcare professional's instructions.              Heart Failure: Tracking Your Weight  You have a condition called heart failure. When you have heart failure, a sudden weight gain or a steady rise in weight is a warning sign that your body is retaining too much water and salt. This could mean your heart failure is getting worse. If left untreated, it can cause problems for your lungs and result in shortness of breath. Weighing yourself each day is the best way to know if youre retaining water. If your weight goes up quickly, call your doctor. You will be given instructions on how to get rid of the excess water. You will likely need medicines and to avoid salt. This will help  your heart work better.  Call your doctor if you gain more than 2 pounds in 1 day, more than 5 pounds in 1 week, or whatever weight gain you were told to report by your doctor. This is often a sign of worsening heart failure and needs to be evaluated and treated. Your doctor will tell you what to do next.   Tips for weighing yourself    · Weigh yourself at the same time each morning, wearing the same clothes. Weigh yourself after urinating and before eating.  · Use the same scale each day. Make sure the numbers are easy to read. Put the scale on a flat, hard surface -- not on a rug or carpet.  · Do not stop weighing yourself. If you forget one day, weigh again the next morning.  How to use your weight chart  · Keep your weight chart near the scale. Write your weight on the chart as soon as you get off the scale.  · Fill in the month and the start date on the chart. Then write down your weight each day. Your chart will look like this:    · If you miss a day, leave the space blank. Weigh yourself the next day and write your weight in the next space.  · Take your weight chart with you when you go to see your doctor.  Date Last Reviewed: 3/20/2016  © 6629-5938 [employer]. [address] [place] [address], [place] [address]. All rights reserved. This information is not intended as a substitute for professional medical care. Always follow your healthcare professional's instructions.              Heart Failure: Warning Signs of a Flare-Up  You have a condition called heart failure. Once you have heart failure, flare-ups can happen. Below are signs that can mean your heart failure is getting worse. If you notice any of these warning signs, call your healthcare provider.  Swelling    · Your feet, ankles, or lower legs get puffier.  · You notice skin changes on your lower legs.  · Your shoes feel too tight.  · Your clothes are tighter in the waist.  · You have trouble getting rings on or off your fingers.  Shortness of  breath  · You have to breathe harder even when youre doing your normal activities or when youre resting.  · You are short of breath walking up stairs or even short distances.  · You wake up at night short of breath or coughing.  · You need to use more pillows or sit up to sleep.  · You wake up tired or restless.  Other warning signs  · You feel weaker, dizzy, or more tired.  · You have chest pain or changes in your heartbeat.  · You have a cough that wont go away.  · You cant remember things or dont feel like eating.  Tracking your weight  Gaining weight is often the first warning sign that heart failure is getting worse. Gaining even a few pounds can be a sign that your body is retaining excess water and salt. Weighing yourself each day in the morning after you urinate and before you eat, is the best way to know if you're retaining water. Get a scale that is easy to read and make sure you wear the same clothes and use the same scale every time you weigh. Your healthcare provider will show you how to track your weight. Call your doctor if you gain more than 2 pounds in 1 day, 5 pounds in 1 week, or whatever weight gain you were told to report by your doctor. This is often a sign of worsening heart failure and needs to be evaluated and treated before it compromises your breathing. Your doctor will tell you what to do next.    Date Last Reviewed: 3/15/2016  © 2950-9852 [employer]. [address] [place] [address], [place] [address]. All rights reserved. This information is not intended as a substitute for professional medical care. Always follow your healthcare professional's instructions.              Chronic Kindey Disease Education             Diabetes Discharge Instructions                                    [facility] complies with applicable Federal civil rights laws and does not discriminate on the basis of race, color, national origin, age, disability, or sex.

## 2017-03-24 NOTE — H&P (VIEW-ONLY)
Subjective:    Patient ID:  Chau Rodarte is a 66 y.o. male who presents for follow-up of Atrial Flutter and Congestive Heart Failure    Referring Cardiologists: Meño Gan MD, Yanick Holland MD and Robson Sims MD    HPI  Prior Hx:  I had the pleasure of seeing Mr. Rodarte today in our electrophysiology clinic in consultation for his newly discovered atrial arrhythmia. As you are aware he is a pleasant 66 year-old man with extensive vascular disease including right femoral-popliteal bypass and left SFA stent, ischemic cardiomyopathy with prior LVEF of 35% and currently 20-25% s/p multivessel PCI 12/5/2016 (PCI to prox-LAD/LCX, distal LMCA and mid LAD) with NYHA class II symptoms, chronic kidney disease stage 3, cryptogenic stroke, hypertension and poorly controlled diabetes mellitus. He was admitted in November of 2016 for progressive heart failure symptoms. He was also found to have a NSTEMI in this setting. He was diuresed and underwent multi-vessel PCI as stated, after turned down for surgery. He was feeling better and has been undergoing cardiac rehab at the [facility]. When he arrived yesterday he was noted to not be in sinus rhythm on the monitor. An electrocardiogram was performed which disclosed atrial flutter with variable AV conduction. He had no significant symptoms and was referred here for further evaluation. He has no history of palpitations. He does report a stroke of unknown etiology in 2006. His symptoms were facial droop, dysarthria and right sided weakness. He has been on aspirin/plavix since. He has never been diagnosed with an atrial arrhythmia.     I reviewed all available electrocardiograms in EPIC which disclose normal sinus rhythm until 1/25/2017 electrocardiogram which revealed atrial flutter with variable AV conduction (ventricular rate of 84 bpm).    ECHO 3/2017 CONCLUSIONS     1 - Eccentric hypertrophy.     2 - Mildly depressed left ventricular systolic function (EF 45-50%).  "    3 - Normal right ventricular systolic function .     4 - Grade I left ventricular diastolic dysfunction.     5 - Mildly enlarged ascending aorta.       ROS     Objective:    Physical Exam      Assessment:       1. Typical atrial flutter    2. Atrial flutter, unspecified type    3. Ischemic cardiomyopathy    4. Coronary artery disease involving native coronary artery of native heart without angina pectoris    5. Essential hypertension    6. Type 2 diabetes mellitus with diabetic polyneuropathy, without long-term current use of insulin    7. Obesity (BMI 30-39.9)    8. Long term (current) use of anticoagulants [Z79.01]    9. S/P femoral-popliteal bypass surgery    10. Cryptogenic stroke, remote history (2006)         Plan:       In summary, Mr. Rodarte is a pleasant 66 year-old man with extensive vascular disease including right femoral-popliteal bypass and left SFA stent, ischemic cardiomyopathy with prior LVEF of 20-25% now improved to 45% s/p multivessel PCI 12/5/2016 (PCI to prox-LAD/LCX, distal LMCA and mid LAD) with NYHA class II symptoms, chronic kidney disease stage 3, cryptogenic stroke, hypertension and poorly controlled diabetes mellitus who presents for evaluation of newly diagnosed typical counterclockwise atrial flutter. He had no perceived symptoms at the time of diagnosis. However his [id number] score is 7 carrying a 14% per year risk of stroke. He has already had a stroke. While he is certainly at risk for atrial fibrillation, this has never been seen in him. We discussed this and his atrial flutter, even if he does not "feel" it, may worsen his heart failure by loss of AV synchrony as well as carries its stroke risk. Since this is the only atrial arrhythmia we discuss benefit of RFA of the CTI which he had performed 4 weeks ago.  His EF is improved post RFA of CTI and multivessel PCI.  He has no indication now for ICD implantation.  Due to his high recurrent stroke risk we discussed indefinite " anticoagulation.  He is however at a high bleeding risk due to need for dual-antiplatelet therapy.  Since we have never seen atrial fibrillation in him and he has a remote history of cryptogenic stroke and he is now s/p RFA of CTI for typical atrial flutter we discussed benefit of ILR implantation for atrial fibrillation surveillance in setting of remote stroke history, which could have been from occult atrial flutter.  He agreed and desires to minimize his bleeding risk however make a safe decision for atrial fibrillation monitoring.    Plan  ILR implantation for atrial fibrillation surveillance in setting of remote stroke with recent history of typical atrial flutter s/p RFA of CTI  Stop coumadin 4 days prior to procedure.    Thank you for allowing me to participate in the care of this patient. Please do not hesitate to call me with any questions or concerns.    Cayden Moreno MD, PhD  Cardiac Electrophysiology

## 2017-03-24 NOTE — PLAN OF CARE
Problem: Patient Care Overview  Goal: Plan of Care Review  Outcome: Ongoing (interventions implemented as appropriate)  Received report from CHUNG Donnelly. Patient s/p loop recorder placement, AAOx3. VSS, no c/o pain or discomfort at this time, resp even and unlabored. Post procedure protocol reviewed with patient and patient's family. Understanding verbalized. Family members at bedside. Nurse call bell within reach. Will continue to monitor per post procedure protocol.

## 2017-03-24 NOTE — PLAN OF CARE
Problem: Patient Care Overview  Goal: Plan of Care Review  Outcome: Outcome(s) achieved Date Met:  03/24/17  Patient discharged per MD orders. Instructions given on medications, wound care, activity, signs of infection, when to call MD, and follow up appointments. Pt verbalized understanding. Patient AAOx3, VSS, no c/o pain or discomfort at this time. mepilex dressing to mid anterior chest wall is c/d/i. No active bleeding. No hematoma noted. Telemetry and PIV removed. Patient was discharged home via wheelchair accompanied by his wife.

## 2017-03-24 NOTE — INTERVAL H&P NOTE
The patient has been examined and the H&P has been reviewed:    I concur with the findings and changes have been noted since the H&P was written:  Pt was hospitalized for GI bleed, DC on 3/22/17 > denies any further bleeding > EGD completed 3/21 and showed gastritic,   no signs of bleeding. Colonoscopy w/  no acute bleeding. Pt transfused 1 unit of blood,    Repeat colonoscopy in one month. Pt continued on ASA/Plavix, coumadin hold at present. Recent Holter did not reveal any AF.     PLAN:   ILR implant /MDT       CHIN Calderon-BC  Cardiology Electrophysiology  NP   [facility]    Attending: Cayden Moreno MD               Active Hospital Problems    Diagnosis  POA    AF (atrial fibrillation) [I48.91]  Yes      Resolved Hospital Problems    Diagnosis Date Resolved POA   No resolved problems to display.

## 2017-03-24 NOTE — TELEPHONE ENCOUNTER
Called patient mobile number-- no answer. Left message letting patient know blood levels (hemoglobin) had increased appropriately. Left call back number if patient has any questions.

## 2017-03-24 NOTE — NURSING
Pt arrived to floor ambulatory from home accompanied by his wife. Pt oriented to room . Iv inserted. Admit assessment completed. Nurse call bell within reach. Will continue to monitor

## 2017-03-28 ENCOUNTER — TELEPHONE (OUTPATIENT)
Dept: ELECTROPHYSIOLOGY | Facility: CLINIC | Age: 67
End: 2017-03-28

## 2017-03-28 ENCOUNTER — OFFICE VISIT (OUTPATIENT)
Dept: INTERNAL MEDICINE | Facility: CLINIC | Age: 67
End: 2017-03-28
Payer: MEDICARE

## 2017-03-28 VITALS
HEIGHT: 69 IN | SYSTOLIC BLOOD PRESSURE: 124 MMHG | BODY MASS INDEX: 36.54 KG/M2 | HEART RATE: 71 BPM | DIASTOLIC BLOOD PRESSURE: 78 MMHG | WEIGHT: 246.69 LBS

## 2017-03-28 DIAGNOSIS — I74.3 ARTERIAL EMBOLISM AND THROMBOSIS OF LOWER EXTREMITY: Primary | ICD-10-CM

## 2017-03-28 PROCEDURE — 1125F AMNT PAIN NOTED PAIN PRSNT: CPT | Mod: S$GLB,,, | Performed by: INTERNAL MEDICINE

## 2017-03-28 PROCEDURE — 1157F ADVNC CARE PLAN IN RCRD: CPT | Mod: S$GLB,,, | Performed by: INTERNAL MEDICINE

## 2017-03-28 PROCEDURE — 99499 UNLISTED E&M SERVICE: CPT | Mod: S$GLB,,, | Performed by: INTERNAL MEDICINE

## 2017-03-28 PROCEDURE — 1160F RVW MEDS BY RX/DR IN RCRD: CPT | Mod: S$GLB,,, | Performed by: INTERNAL MEDICINE

## 2017-03-28 PROCEDURE — 99204 OFFICE O/P NEW MOD 45 MIN: CPT | Mod: S$GLB,,, | Performed by: INTERNAL MEDICINE

## 2017-03-28 PROCEDURE — 99999 PR PBB SHADOW E&M-EST. PATIENT-LVL III: CPT | Mod: PBBFAC,,, | Performed by: INTERNAL MEDICINE

## 2017-03-28 PROCEDURE — 3078F DIAST BP <80 MM HG: CPT | Mod: S$GLB,,, | Performed by: INTERNAL MEDICINE

## 2017-03-28 PROCEDURE — 3074F SYST BP LT 130 MM HG: CPT | Mod: S$GLB,,, | Performed by: INTERNAL MEDICINE

## 2017-03-28 PROCEDURE — 1159F MED LIST DOCD IN RCRD: CPT | Mod: S$GLB,,, | Performed by: INTERNAL MEDICINE

## 2017-03-28 RX ORDER — GABAPENTIN 300 MG/1
300 CAPSULE ORAL 2 TIMES DAILY
Refills: 0 | COMMUNITY
Start: 2017-03-22 | End: 2019-02-13

## 2017-03-28 RX ORDER — HYDROCODONE BITARTRATE AND ACETAMINOPHEN 5; 325 MG/1; MG/1
1 TABLET ORAL EVERY 12 HOURS PRN
Qty: 40 TABLET | Refills: 0 | Status: SHIPPED | OUTPATIENT
Start: 2017-03-28 | End: 2017-05-04 | Stop reason: SDUPTHER

## 2017-03-28 NOTE — MR AVS SNAPSHOT
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    -  Primary            To Do List           Future Appointments        Provider Department Dept Phone    3/31/2017 3:00 PM PACEMAKER, ICD [facility] - Arrhythmia [phone]    2017 1:15 PM Nehal William MD [facility] - Vascular Surgery [phone]    2017 8:00 AM LAB, [facility] [phone]    2017 3:30 PM Cony Saleem NP [facility] - Endo/Diab/Metab [phone]    2017 10:00 AM EKG, APPT [facility] - [phone]      Goals (5 Years of Data)     None      Follow-Up and Disposition     Return in about 3 months (around 2017).       These Medications        Disp Refills Start End    hydrocodone-acetaminophen 5-325mg (NORCO) 5-325 mg per tablet 40 tablet 0 3/28/2017     Take 1 tablet by mouth every 12 (twelve) hours as needed for Pain. - Oral    Pharmacy: [facility]/pharmacy #[id number] - [place] - [address]  #: [phone]         [facility]     [facility] -  Assistance  Registered nurses in the [facility] provide clinical advisement, health education, appointment booking, and other advisory services.  Call for this free service at [phone].             Medications           Message regarding Medications     Verify the changes and/or additions to your medication regime listed below are the same as discussed with your clinician today.  If any of these changes or additions are incorrect, please notify your healthcare provider.        START taking  "these NEW medications        Refills    hydrocodone-acetaminophen 5-325mg (NORCO) 5-325 mg per tablet 0    Sig: Take 1 tablet by mouth every 12 (twelve) hours as needed for Pain.    Class: Normal    Route: Oral           Verify that the below list of medications is an accurate representation of the medications you are currently taking.  If none reported, the list may be blank. If incorrect, please contact your healthcare provider. Carry this list with you in case of emergency.           Current Medications     aspirin 81 MG Chew Take 1 tablet (81 mg total) by mouth once daily.    atorvastatin (LIPITOR) 80 MG tablet Take 1 tablet (80 mg total) by mouth once daily.    cholecalciferol, vitamin D3, (VITAMIN D3) 5,000 unit Tab Take 5,000 Units by mouth once daily.    clopidogrel (PLAVIX) 75 mg tablet Take 1 tablet (75 mg total) by mouth once daily.    furosemide (LASIX) 40 MG tablet Take 1 tablet (40 mg total) by mouth once daily.    insulin detemir (LEVEMIR FLEXTOUCH) 100 unit/mL (3 mL) SubQ InPn pen Inject 16 Units into the skin every evening.    isosorbide-hydrALAZINE 20-37.5 mg (BIDIL) 20-37.5 mg Tab Take 1 tablet by mouth 3 (three) times daily.    liraglutide 0.6 mg/0.1 mL, 18 mg/3 mL, subq PNIJ (VICTOZA 2-SAGAR) 0.6 mg/0.1 mL (18 mg/3 mL) PnIj Inject 0.6 mg daily x1 week, then 1.2 mg daily x1 week, then 1.8 mg daily thereafter    metoprolol succinate (TOPROL-XL) 50 MG 24 hr tablet Take 1 tablet (50 mg total) by mouth once daily.    pantoprazole (PROTONIX) 20 MG tablet Take 2 tablets (40 mg total) by mouth once daily.    pen needle, diabetic (BD ULTRA-FINE HANG PEN NEEDLES) 32 gauge x 5/32" Ndle Uses 2 times daily, on  insulin injections    gabapentin (NEURONTIN) 300 MG capsule Take 300 mg by mouth 2 (two) times daily.    hydrocodone-acetaminophen 5-325mg (NORCO) 5-325 mg per tablet Take 1 tablet by mouth every 12 (twelve) hours as needed for Pain.           Clinical Reference Information           Your Vitals Were  " "   BP Pulse Height Weight BMI    124/78 (BP Location: Left arm, Patient Position: Sitting, BP Method: Manual) 71 5' 9" (1.753 m) 111.9 kg (246 lb 11.1 oz) 36.43 kg/m2      Blood Pressure          Most Recent Value    BP  124/78      Allergies as of 3/28/2017     No Known Allergies      Immunizations Administered on Date of Encounter - 3/28/2017     None      Orders Placed During Today's Visit      Normal Orders This Visit    Ambulatory Referral to Vascular Surgery       Instructions    Call to see Dr. Gan       Language Assistance Services     ATTENTION: Language assistance services are available, free of charge. Please call [phone].      ATENCIÓN: Si habla español, tiene a zaldivar disposición servicios gratuitos de asistencia lingüística. Llame al [phone].     YESENIA Ý: N?u b?n nói Ti?ng Vi?t, có các d?ch v? h? tr? ngôn ng? mi?n phí jovanah cho b?n. G?i s? [phone].         [facility] complies with applicable Federal civil rights laws and does not discriminate on the basis of race, color, national origin, age, disability, or sex.        "

## 2017-03-28 NOTE — PROGRESS NOTES
Subjective:       Patient ID: Chau Rodarte is a 66 y.o. male.    Chief Complaint: Establish Care    HPI Comments: Here to est care.    hosp f/u, recent GIB, bruising. Had bright red GIB. egd showed gastritis, colo was OK. Had 1 U PRBCs and 2 U plasma. No longer any blood in stools and no abd symptoms and bowels are now normal.    He does report new symptoms of 2-3 weeks of bilat foot pain and mottling skin rash.  Feet hurt bilat. Tender heels bilat, like walking on rocks. Clearly pain is eased by resting, but there is rest pain as well. He has known PAD and s/p R fem pop bypass and L leg stent. His typical claudication symptoms, bilat calf pain with 1 block exertion is actually stable.    Had a afib ablation almost 2 mos ago, did fine with the procedure, coumadin started around time of procedure. Foot pain symptoms came on 3-4 weeks after.    No chest pains, no worsening sob/rome.    Review of Systems   Constitutional: Positive for activity change. Negative for fatigue, fever and unexpected weight change.   HENT: Negative for congestion.    Respiratory: Negative for cough, chest tightness, shortness of breath and wheezing.    Cardiovascular: Negative for chest pain, palpitations and leg swelling.   Gastrointestinal: Negative for abdominal distention, abdominal pain, anal bleeding, blood in stool (resolved), nausea and vomiting.   Genitourinary: Negative for decreased urine volume and difficulty urinating.   Musculoskeletal: Negative for arthralgias and neck pain.   Skin: Positive for rash (mottled skin bilat feet). Negative for wound.   Neurological: Negative for tremors, syncope, weakness and numbness.   Hematological: Negative for adenopathy. Does not bruise/bleed easily.   Psychiatric/Behavioral: Negative for confusion.       Objective:      Physical Exam   Constitutional: He appears well-developed and well-nourished. No distress.   Nontoxic appearing   HENT:   Head: Normocephalic and atraumatic.   Mouth/Throat:  No oropharyngeal exudate.   Eyes: EOM are normal. Pupils are equal, round, and reactive to light. No scleral icterus.   Cardiovascular: Normal rate, regular rhythm and normal heart sounds.    Pulmonary/Chest: Effort normal. He has wheezes (mild).   Abdominal: Soft. Bowel sounds are normal. He exhibits no distension and no mass. There is no tenderness. There is no rebound and no guarding. No hernia.   Musculoskeletal:   bilat feet with mottling, that is mildly tender around heels and toes.  No pedal sores or ulcers.  I am not able to palpate bilat pulses, though the feet are not cold to touch, and CRT is sluggish but <3 seconds.  No calf tenderness bilat.   Skin: He is not diaphoretic.   Psychiatric: He has a normal mood and affect. His behavior is normal.       Assessment:       1. 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  bilat foot painful mottling c/w proximal arterial embolism. I am not sure the source, afib possible with recent ablation, as is atheroembolism from aorta. At this point he cannot have coumadin with recent GIB.  I do not see signs of acute critical leg ischemia since sensation and strength intact (walking endurance is stable at 1 block) and his feet are not cold.  Hopefully symptoms will slowly resolve, but see vascular surgery and Dr. Gan back to make sure whether any other w/u and/or treatment necessary.    Symptoms of GIB have resolved, follow serial Hgb over time.    CHF appears euvolemic today.    Return in about 3 months (around 6/28/2017).    Cc Dr. Gan.

## 2017-03-28 NOTE — TELEPHONE ENCOUNTER
----- Message from Annemarie South sent at 3/28/2017  8:24 AM CDT -----  Contact: Wife of pt called  Wife of pt called, requesting to reschedule upcoming tune up appointment. She states she will like you to contact him at Ph [phone]. Thank you    I spoke with patient and asked him to come in for 3pm instead of 10:20am. Patient stated he will be here for 3 pm on Friday.

## 2017-03-28 NOTE — Clinical Note
Hi Onelia Gan and Stewart, please see my note, not sure if he needs further w/u at this time for what appear to be bilat foot emboli. Dr William he is seeing you on 4/12. Thank you, Riki Huynh

## 2017-03-31 ENCOUNTER — TELEPHONE (OUTPATIENT)
Dept: ELECTROPHYSIOLOGY | Facility: CLINIC | Age: 67
End: 2017-03-31

## 2017-03-31 ENCOUNTER — CLINICAL SUPPORT (OUTPATIENT)
Dept: ELECTROPHYSIOLOGY | Facility: CLINIC | Age: 67
End: 2017-03-31
Payer: MEDICARE

## 2017-03-31 DIAGNOSIS — I48.3 TYPICAL ATRIAL FLUTTER: Chronic | ICD-10-CM

## 2017-03-31 PROCEDURE — 93285 PRGRMG DEV EVAL SCRMS IP: CPT | Mod: S$GLB,,, | Performed by: INTERNAL MEDICINE

## 2017-04-03 ENCOUNTER — TELEPHONE (OUTPATIENT)
Dept: SLEEP MEDICINE | Facility: OTHER | Age: 67
End: 2017-04-03

## 2017-04-03 ENCOUNTER — OFFICE VISIT (OUTPATIENT)
Dept: PODIATRY | Facility: CLINIC | Age: 67
End: 2017-04-03
Payer: MEDICARE

## 2017-04-03 VITALS
HEART RATE: 59 BPM | SYSTOLIC BLOOD PRESSURE: 127 MMHG | WEIGHT: 246 LBS | BODY MASS INDEX: 36.43 KG/M2 | DIASTOLIC BLOOD PRESSURE: 59 MMHG | HEIGHT: 69 IN

## 2017-04-03 DIAGNOSIS — I73.9 PAD (PERIPHERAL ARTERY DISEASE): ICD-10-CM

## 2017-04-03 DIAGNOSIS — E11.42 DIABETIC POLYNEUROPATHY ASSOCIATED WITH TYPE 2 DIABETES MELLITUS: Primary | ICD-10-CM

## 2017-04-03 DIAGNOSIS — T14.8XXA DEEP TISSUE INJURY: ICD-10-CM

## 2017-04-03 DIAGNOSIS — L89.890: ICD-10-CM

## 2017-04-03 PROCEDURE — 3060F POS MICROALBUMINURIA REV: CPT | Mod: S$GLB,,, | Performed by: PODIATRIST

## 2017-04-03 PROCEDURE — 1159F MED LIST DOCD IN RCRD: CPT | Mod: S$GLB,,, | Performed by: PODIATRIST

## 2017-04-03 PROCEDURE — 99213 OFFICE O/P EST LOW 20 MIN: CPT | Mod: S$GLB,,, | Performed by: PODIATRIST

## 2017-04-03 PROCEDURE — 3074F SYST BP LT 130 MM HG: CPT | Mod: S$GLB,,, | Performed by: PODIATRIST

## 2017-04-03 PROCEDURE — 3045F PR MOST RECENT HEMOGLOBIN A1C LEVEL 7.0-9.0%: CPT | Mod: S$GLB,,, | Performed by: PODIATRIST

## 2017-04-03 PROCEDURE — 1125F AMNT PAIN NOTED PAIN PRSNT: CPT | Mod: S$GLB,,, | Performed by: PODIATRIST

## 2017-04-03 PROCEDURE — 99999 PR PBB SHADOW E&M-EST. PATIENT-LVL III: CPT | Mod: PBBFAC,,, | Performed by: PODIATRIST

## 2017-04-03 PROCEDURE — 1157F ADVNC CARE PLAN IN RCRD: CPT | Mod: S$GLB,,, | Performed by: PODIATRIST

## 2017-04-03 PROCEDURE — 3078F DIAST BP <80 MM HG: CPT | Mod: S$GLB,,, | Performed by: PODIATRIST

## 2017-04-03 PROCEDURE — 1160F RVW MEDS BY RX/DR IN RCRD: CPT | Mod: S$GLB,,, | Performed by: PODIATRIST

## 2017-04-03 PROCEDURE — 99499 UNLISTED E&M SERVICE: CPT | Mod: S$GLB,,, | Performed by: PODIATRIST

## 2017-04-03 NOTE — PROGRESS NOTES
Subjective:      Patient ID: Chau Rodarte is a 66 y.o. male.    Chief Complaint: Diabetes Mellitus and Foot Pain (Bottom of left foot, redness bilateral)    Chau DAMIAN is a 66 y.o. male who presents to the clinic for evaluation and treatment of high risk feet. Chau DAMIAN has a past medical history of Acute on chronic systolic CHF (congestive heart failure) (11/29/2016); Anticoagulant long-term use; CKD (chronic kidney disease) stage 2, GFR 60-89 ml/min (2/21/2016); Coronary artery disease; Encounter for blood transfusion; HTN (hypertension) (3/3/2014); Hyperlipidemia (3/3/2014); NSTEMI (non-ST elevated myocardial infarction) (11/28/2016); Obesity (BMI 30-39.9) (11/29/2016); PVD (peripheral vascular disease); Stroke; Type 2 diabetes mellitus with diabetic peripheral angiopathy without gangrene, without long-term current use of insulin (10/24/2013); and Type 2 diabetes mellitus with diabetic polyneuropathy, without long-term current use of insulin (11/29/2016).  This patient has documented high risk feet requiring routine maintenance secondary to peripheral vascular disease. Complains moderate pain to back of left heel and mild right heel. Relates prior reaction to coumadin. History of right LE bypass and endovascular intervention with stenting left. Says the discoloration to both his feet is improving, however pain has progressed > 1 week. Wearing flip flops today. Uses a motorized scooter to get around.    PCP: Shaggy Neal MD    Date Last Seen by PCP:     Current shoe gear:  Affected Foot: Flip-flops     Unaffected Foot: Flip-flops    Hemoglobin A1C   Date Value Ref Range Status   02/27/2017 7.1 (H) 4.5 - 6.2 % Final     Comment:     According to ADA guidelines, hemoglobin A1C <7.0% represents  optimal control in non-pregnant diabetic patients.  Different  metrics may apply to specific populations.   Standards of Medical Care in Diabetes - 2016.  For the purpose of screening for the presence of diabetes:  <5.7%      Consistent with the absence of diabetes  5.7-6.4%  Consistent with increasing risk for diabetes   (prediabetes)  >or=6.5%  Consistent with diabetes  Currently no consensus exists for use of hemoglobin A1C  for diagnosis of diabetes for children.     01/12/2017 8.2 (H) 4.5 - 6.2 % Final     Comment:     According to ADA guidelines, hemoglobin A1C <7.0% represents  optimal control in non-pregnant diabetic patients.  Different  metrics may apply to specific populations.   Standards of Medical Care in Diabetes - 2016.  For the purpose of screening for the presence of diabetes:  <5.7%     Consistent with the absence of diabetes  5.7-6.4%  Consistent with increasing risk for diabetes   (prediabetes)  >or=6.5%  Consistent with diabetes  Currently no consensus exists for use of hemoglobin A1C  for diagnosis of diabetes for children.     11/28/2016 9.2 (H) 4.5 - 6.2 % Final     Comment:     According to ADA guidelines, hemoglobin A1C <7.0% represents  optimal control in non-pregnant diabetic patients.  Different  metrics may apply to specific populations.   Standards of Medical Care in Diabetes - 2016.  For the purpose of screening for the presence of diabetes:  <5.7%     Consistent with the absence of diabetes  5.7-6.4%  Consistent with increasing risk for diabetes   (prediabetes)  >or=6.5%  Consistent with diabetes  Currently no consensus exists for use of hemoglobin A1C  for diagnosis of diabetes for children.         Review of Systems   Constitution: Negative for chills and fever.   Cardiovascular: Positive for claudication and leg swelling. Negative for chest pain.   Respiratory: Negative for cough and shortness of breath.    Skin: Positive for color change and dry skin.   Gastrointestinal: Negative for nausea and vomiting.   Neurological: Positive for paresthesias.   Psychiatric/Behavioral: Negative for altered mental status.           Objective:      Physical Exam   Constitutional: He is oriented to person, place, and  time. No distress.   Cardiovascular:   Pulses:       Dorsalis pedis pulses are 0 on the right side, and 0 on the left side.        Posterior tibial pulses are 1+ on the right side, and 1+ on the left side.   CFT< 3 secs all toes bilateral foot, skin temp warm bilateral foot, no hair growth bilateral lower extremity, moderate brawny lower extremity edema bilateral.     Musculoskeletal:        Right foot: There is decreased range of motion.        Left foot: There is decreased range of motion.   Localized pain to ecchymotic patches of skin bilateral foot.    Pes planus foot type bilateral.    + equinus bilateral.   Feet:   Right Foot:   Protective Sensation: 10 sites tested. 5 sites sensed.   Left Foot:   Protective Sensation: 10 sites tested. 6 sites sensed.   Neurological: He is alert and oriented to person, place, and time. He has normal strength. A sensory deficit is present.   Skin: Skin is warm, dry and intact. Ecchymosis noted. No rash noted. He is not diaphoretic. No cyanosis or erythema. Nails show no clubbing.   Ecchymotic pre-ulceration skin posterior left heel, plantar first metatarsal head right foot and posterior lateral right heel.    Nails 1-5 bilateral are thickened and well trimmed.     No open lesions or macerations bilateral lower extremity.    Skin is thin and atrophied bilateral lower extremity.                 4/3/17          Assessment:       Encounter Diagnoses   Name Primary?    Diabetic polyneuropathy associated with type 2 diabetes mellitus Yes    PAD (peripheral artery disease)     Deep tissue injury     Decubitus ulcer of foot, unstageable, unspecified laterality          Plan:       Chau DAMIAN was seen today for diabetes mellitus and foot pain.    Diagnoses and all orders for this visit:    Diabetic polyneuropathy associated with type 2 diabetes mellitus    PAD (peripheral artery disease)    Deep tissue injury    Decubitus ulcer of foot, unstageable, unspecified laterality      I  counseled the patient on his conditions, their implications and medical management.    Shoe inspection. Diabetic Foot Education. Patient reminded of the importance of good nutrition and blood sugar control to help prevent podiatric complications of diabetes. Patient instructed on proper foot hygeine. We discussed wearing proper shoe gear, daily foot inspections, never walking without protective shoe gear, never putting sharp instruments to feet, routine podiatric nail visits every 2-3 months.      Clinical exam reveals that he has pre-ulcerative lesions to both his feet. He is at high risk for developing more than a stage 0 ulceration to his feet.    Discussed offloading his heel with floating them with pillows under the legs, wearing open backed shoes and inspecting his feet twice daily. Discussed routine use of emollient therapy to protect the skin and routine use of his Diabetic shoes.    Will monitor closely and he will notify if any significant changes.    RTC 2 weeks or prn.

## 2017-04-03 NOTE — MR AVS SNAPSHOT
[facility]   [facility] [address]  Phone: [phone]                  Chau Rodarte   4/3/2017 9:30 AM   Office Visit    Description:  Male : 1950   Provider:  Quirino Bland DPM   Department:  [facility]           Reason for Visit     Diabetes Mellitus     Foot Pain           Diagnoses this Visit        Comments    Diabetic polyneuropathy associated with type 2 diabetes mellitus    -  Primary     PAD (peripheral artery disease)         Deep tissue injury         Decubitus ulcer of foot, unstageable, unspecified laterality                To Do List           Future Appointments        Provider Department Dept Phone    2017 1:15 PM MD [facility] - Vascular Surgery [phone]    2017 10:00 AM Quirino Bland DPM [facility] [phone]    2017 8:00 AM HOME MONITOR DEVICE CHECK, [facility] - Arrhythmia [phone]    2017 8:00 AM LAB, [facility] [phone]    2017 3:30 PM ARY [facility] - Endo/Diab/Metab [phone]      Goals (5 Years of Data)     None      Follow-Up and Disposition     Return in about 2 weeks (around 2017).      [facility]     [facility] Care Line - 24/ Assistance  Unless otherwise directed by your provider, please contact [facility], our nurse care line that is available for  assistance.     Registered nurses in the [facility] provide: appointment scheduling, clinical advisement, health education, and other advisory services.  Call: [phone] (toll free)               Medications           Message regarding Medications     Verify the changes and/or additions to your medication regime listed below are the same as discussed with your clinician today.  If any of these changes or additions are incorrect, please notify your healthcare provider.             Verify that the below list of  "medications is an accurate representation of the medications you are currently taking.  If none reported, the list may be blank. If incorrect, please contact your healthcare provider. Carry this list with you in case of emergency.           Current Medications     aspirin 81 MG Chew Take 1 tablet (81 mg total) by mouth once daily.    atorvastatin (LIPITOR) 80 MG tablet Take 1 tablet (80 mg total) by mouth once daily.    cholecalciferol, vitamin D3, (VITAMIN D3) 5,000 unit Tab Take 5,000 Units by mouth once daily.    clopidogrel (PLAVIX) 75 mg tablet Take 1 tablet (75 mg total) by mouth once daily.    furosemide (LASIX) 40 MG tablet Take 1 tablet (40 mg total) by mouth once daily.    gabapentin (NEURONTIN) 300 MG capsule Take 300 mg by mouth 2 (two) times daily.    hydrocodone-acetaminophen 5-325mg (NORCO) 5-325 mg per tablet Take 1 tablet by mouth every 12 (twelve) hours as needed for Pain.    insulin detemir (LEVEMIR FLEXTOUCH) 100 unit/mL (3 mL) SubQ InPn pen Inject 16 Units into the skin every evening.    isosorbide-hydrALAZINE 20-37.5 mg (BIDIL) 20-37.5 mg Tab Take 1 tablet by mouth 3 (three) times daily.    liraglutide 0.6 mg/0.1 mL, 18 mg/3 mL, subq PNIJ (VICTOZA 2-SAGAR) 0.6 mg/0.1 mL (18 mg/3 mL) PnIj Inject 0.6 mg daily x1 week, then 1.2 mg daily x1 week, then 1.8 mg daily thereafter    metoprolol succinate (TOPROL-XL) 50 MG 24 hr tablet Take 1 tablet (50 mg total) by mouth once daily.    pantoprazole (PROTONIX) 20 MG tablet Take 2 tablets (40 mg total) by mouth once daily.    pen needle, diabetic (BD ULTRA-FINE HANG PEN NEEDLES) 32 gauge x 5/32" Ndle Uses 2 times daily, on  insulin injections           Clinical Reference Information           Your Vitals Were     BP Pulse Height Weight BMI    127/59 59 5' 9" (1.753 m) 111.6 kg (246 lb) 36.33 kg/m2      Blood Pressure          Most Recent Value    BP  (!)  127/59      Allergies as of 4/3/2017     No Known Allergies      Immunizations Administered on Date of " Encounter - 4/3/2017     None      Instructions    Float heels at all time by placing 2 pillows under the legs. Wear open back shoes for now and limit ambulation.       Language Assistance Services     ATTENTION: Language assistance services are available, free of charge. Please call [phone].      ATENCIÓN: Si habla nara, tiene a zaldivar disposición servicios gratuitos de asistencia lingüística. Llame al [phone].     CHÚ Ý: N?u b?n nói Ti?ng Vi?t, có các d?ch v? h? tr? ngôn ng? mi?n phí dành cho b?n. G?i s? [phone].         [facility] complies with applicable Federal civil rights laws and does not discriminate on the basis of race, color, national origin, age, disability, or sex.

## 2017-04-06 ENCOUNTER — ANTI-COAG VISIT (OUTPATIENT)
Dept: CARDIOLOGY | Facility: CLINIC | Age: 67
End: 2017-04-06

## 2017-04-06 DIAGNOSIS — Z79.01 LONG TERM (CURRENT) USE OF ANTICOAGULANTS: ICD-10-CM

## 2017-04-06 DIAGNOSIS — I48.3 TYPICAL ATRIAL FLUTTER: ICD-10-CM

## 2017-04-06 NOTE — PROGRESS NOTES
Per the pt's discharge note on 3/22, his coumadin therapy has been stopped 2/2 a GI bleed.  Coumadin has been removed from his med card and I am therefore discharging him from the CC at this time.

## 2017-04-07 ENCOUNTER — TELEPHONE (OUTPATIENT)
Dept: PODIATRY | Facility: CLINIC | Age: 67
End: 2017-04-07

## 2017-04-07 RX ORDER — DICLOFENAC SODIUM 10 MG/G
2 GEL TOPICAL 3 TIMES DAILY
Qty: 100 G | Refills: 1 | Status: SHIPPED | OUTPATIENT
Start: 2017-04-07 | End: 2018-04-03

## 2017-04-07 NOTE — TELEPHONE ENCOUNTER
----- Message from Jennifer Hernandez sent at 4/7/2017 11:17 AM CDT -----  No. [phone]   Please call in anti inflammatory gel to [facility] in [place].

## 2017-04-11 ENCOUNTER — TELEPHONE (OUTPATIENT)
Dept: GASTROENTEROLOGY | Facility: CLINIC | Age: 67
End: 2017-04-11

## 2017-04-11 DIAGNOSIS — K62.5 RECTAL BLEEDING: Primary | ICD-10-CM

## 2017-04-11 NOTE — TELEPHONE ENCOUNTER
Spoke to Mr. Rodarte, results and recommendations given.  Phone number to endo schedulers given as well. Pt verbalizes understanding.

## 2017-04-11 NOTE — TELEPHONE ENCOUNTER
PATH:    Please let the pt know that pathology from recent procedure shows:    No  significant abnormality.      Pls arrange repeat colonoscopy next available.

## 2017-04-18 ENCOUNTER — TELEPHONE (OUTPATIENT)
Dept: ELECTROPHYSIOLOGY | Facility: CLINIC | Age: 67
End: 2017-04-18

## 2017-04-19 DIAGNOSIS — I73.9 PVD (PERIPHERAL VASCULAR DISEASE): Primary | ICD-10-CM

## 2017-04-20 ENCOUNTER — HOSPITAL ENCOUNTER (OUTPATIENT)
Dept: VASCULAR SURGERY | Facility: CLINIC | Age: 67
Discharge: HOME | End: 2017-04-20
Attending: SURGERY
Payer: MEDICARE

## 2017-04-20 DIAGNOSIS — I73.9 PVD (PERIPHERAL VASCULAR DISEASE): ICD-10-CM

## 2017-04-20 PROCEDURE — 93923 UPR/LXTR ART STDY 3+ LVLS: CPT | Mod: S$GLB,,, | Performed by: SURGERY

## 2017-04-20 PROCEDURE — 93925 LOWER EXTREMITY STUDY: CPT | Mod: S$GLB,,, | Performed by: SURGERY

## 2017-04-24 ENCOUNTER — OFFICE VISIT (OUTPATIENT)
Dept: PODIATRY | Facility: CLINIC | Age: 67
End: 2017-04-24
Payer: MEDICARE

## 2017-04-24 VITALS
BODY MASS INDEX: 36.43 KG/M2 | WEIGHT: 246 LBS | DIASTOLIC BLOOD PRESSURE: 77 MMHG | HEART RATE: 72 BPM | SYSTOLIC BLOOD PRESSURE: 142 MMHG | HEIGHT: 69 IN

## 2017-04-24 DIAGNOSIS — Z95.818 STATUS POST PLACEMENT OF IMPLANTABLE LOOP RECORDER: Primary | ICD-10-CM

## 2017-04-24 DIAGNOSIS — I63.9 CRYPTOGENIC STROKE: ICD-10-CM

## 2017-04-24 DIAGNOSIS — E11.42 DIABETIC POLYNEUROPATHY ASSOCIATED WITH TYPE 2 DIABETES MELLITUS: Primary | ICD-10-CM

## 2017-04-24 DIAGNOSIS — L97.512 ULCER OF FOOT, RIGHT, WITH FAT LAYER EXPOSED: ICD-10-CM

## 2017-04-24 DIAGNOSIS — I73.9 PAD (PERIPHERAL ARTERY DISEASE): ICD-10-CM

## 2017-04-24 DIAGNOSIS — L97.429 ULCER OF HEEL, LEFT, WITH UNSPECIFIED SEVERITY: ICD-10-CM

## 2017-04-24 PROCEDURE — 99213 OFFICE O/P EST LOW 20 MIN: CPT | Mod: 25,S$GLB,, | Performed by: PODIATRIST

## 2017-04-24 PROCEDURE — 3078F DIAST BP <80 MM HG: CPT | Mod: S$GLB,,, | Performed by: PODIATRIST

## 2017-04-24 PROCEDURE — 87070 CULTURE OTHR SPECIMN AEROBIC: CPT

## 2017-04-24 PROCEDURE — 97597 DBRDMT OPN WND 1ST 20 CM/<: CPT | Mod: S$GLB,,, | Performed by: PODIATRIST

## 2017-04-24 PROCEDURE — 1160F RVW MEDS BY RX/DR IN RCRD: CPT | Mod: S$GLB,,, | Performed by: PODIATRIST

## 2017-04-24 PROCEDURE — 99999 PR PBB SHADOW E&M-EST. PATIENT-LVL IV: CPT | Mod: PBBFAC,,, | Performed by: PODIATRIST

## 2017-04-24 PROCEDURE — 1159F MED LIST DOCD IN RCRD: CPT | Mod: S$GLB,,, | Performed by: PODIATRIST

## 2017-04-24 PROCEDURE — 99499 UNLISTED E&M SERVICE: CPT | Mod: S$GLB,,, | Performed by: PODIATRIST

## 2017-04-24 PROCEDURE — 1126F AMNT PAIN NOTED NONE PRSNT: CPT | Mod: S$GLB,,, | Performed by: PODIATRIST

## 2017-04-24 PROCEDURE — 3077F SYST BP >= 140 MM HG: CPT | Mod: S$GLB,,, | Performed by: PODIATRIST

## 2017-04-24 PROCEDURE — 3045F PR MOST RECENT HEMOGLOBIN A1C LEVEL 7.0-9.0%: CPT | Mod: S$GLB,,, | Performed by: PODIATRIST

## 2017-04-24 NOTE — PROGRESS NOTES
Subjective:      Patient ID: Chau Rodarte is a 67 y.o. male.    Chief Complaint: Follow-up (2 wk )    Chau DAMIAN is a 67 y.o. male who presents to the clinic for evaluation and treatment of high risk feet. Chau DAMIAN has a past medical history of Acute on chronic systolic CHF (congestive heart failure) (11/29/2016); Anticoagulant long-term use; CKD (chronic kidney disease) stage 2, GFR 60-89 ml/min (2/21/2016); Coronary artery disease; Encounter for blood transfusion; HTN (hypertension) (3/3/2014); Hyperlipidemia (3/3/2014); NSTEMI (non-ST elevated myocardial infarction) (11/28/2016); Obesity (BMI 30-39.9) (11/29/2016); PVD (peripheral vascular disease); Stroke; Type 2 diabetes mellitus with diabetic peripheral angiopathy without gangrene, without long-term current use of insulin (10/24/2013); and Type 2 diabetes mellitus with diabetic polyneuropathy, without long-term current use of insulin (11/29/2016).  This patient has documented high risk feet requiring routine maintenance secondary to peripheral vascular disease. Complains moderate pain to back of left heel and mild right heel. Relates prior reaction to coumadin. History of right LE bypass and endovascular intervention with stenting left. Says the discoloration to both his feet is improving, however pain has progressed > 1 week. Wearing flip flops today. Uses a motorized scooter to get around.    4/24/17: No new complaints. Says he was on his feet a lot this past weekend. He wants to return to work. Accompanied by his mother and wife.    PCP: Shaggy Neal MD    Date Last Seen by PCP:     Current shoe gear:  Affected Foot: Flip-flops     Unaffected Foot: Flip-flops    Hemoglobin A1C   Date Value Ref Range Status   02/27/2017 7.1 (H) 4.5 - 6.2 % Final     Comment:     According to ADA guidelines, hemoglobin A1C <7.0% represents  optimal control in non-pregnant diabetic patients.  Different  metrics may apply to specific populations.   Standards of Medical Care in  Diabetes - 2016.  For the purpose of screening for the presence of diabetes:  <5.7%     Consistent with the absence of diabetes  5.7-6.4%  Consistent with increasing risk for diabetes   (prediabetes)  >or=6.5%  Consistent with diabetes  Currently no consensus exists for use of hemoglobin A1C  for diagnosis of diabetes for children.     01/12/2017 8.2 (H) 4.5 - 6.2 % Final     Comment:     According to ADA guidelines, hemoglobin A1C <7.0% represents  optimal control in non-pregnant diabetic patients.  Different  metrics may apply to specific populations.   Standards of Medical Care in Diabetes - 2016.  For the purpose of screening for the presence of diabetes:  <5.7%     Consistent with the absence of diabetes  5.7-6.4%  Consistent with increasing risk for diabetes   (prediabetes)  >or=6.5%  Consistent with diabetes  Currently no consensus exists for use of hemoglobin A1C  for diagnosis of diabetes for children.     11/28/2016 9.2 (H) 4.5 - 6.2 % Final     Comment:     According to ADA guidelines, hemoglobin A1C <7.0% represents  optimal control in non-pregnant diabetic patients.  Different  metrics may apply to specific populations.   Standards of Medical Care in Diabetes - 2016.  For the purpose of screening for the presence of diabetes:  <5.7%     Consistent with the absence of diabetes  5.7-6.4%  Consistent with increasing risk for diabetes   (prediabetes)  >or=6.5%  Consistent with diabetes  Currently no consensus exists for use of hemoglobin A1C  for diagnosis of diabetes for children.         Review of Systems   Constitution: Negative for chills and fever.   Cardiovascular: Positive for claudication and leg swelling. Negative for chest pain.   Respiratory: Negative for cough and shortness of breath.    Skin: Positive for color change and dry skin.   Gastrointestinal: Negative for nausea and vomiting.   Neurological: Positive for paresthesias.   Psychiatric/Behavioral: Negative for altered mental status.            Objective:      Physical Exam   Constitutional: He is oriented to person, place, and time. No distress.   Cardiovascular:   Pulses:       Dorsalis pedis pulses are 0 on the right side, and 0 on the left side.        Posterior tibial pulses are 1+ on the right side, and 1+ on the left side.   CFT< 3 secs all toes bilateral foot, skin temp warm bilateral foot, no hair growth bilateral lower extremity, moderate brawny lower extremity edema bilateral.     Musculoskeletal:        Right foot: There is decreased range of motion.        Left foot: There is decreased range of motion.   Localized pain to ecchymotic patches of skin bilateral foot.    Pes planus foot type bilateral.    + equinus bilateral.   Feet:   Right Foot:   Protective Sensation: 10 sites tested. 5 sites sensed.   Left Foot:   Protective Sensation: 10 sites tested. 6 sites sensed.   Neurological: He is alert and oriented to person, place, and time. He has normal strength. A sensory deficit is present.   Skin: Skin is warm, dry and intact. Ecchymosis noted. No rash noted. He is not diaphoretic. No cyanosis or erythema. Nails show no clubbing.   Ulceration posterior left heel with central eschar, surrounding pink epithelial tissue and moderate slough of the surrounding margins, no active drainage, no odor, no erythema. Post debridement measures 2.8x4.5x0.1cm.    Plantar first metatarsal head ulceration with mixed granular and fibrous base, mild purulent drainage, overlying non-viable skin, no surrounding erythema, mild odor. Post debridement measures 2.5x1.1x0.1cm.    Nails 1-5 bilateral are thickened and well trimmed.     Skin is thin and atrophied bilateral lower extremity.                 4/3/17       4/24/17 Post debridement    4/24/17 Post debridement            Assessment:       Encounter Diagnoses   Name Primary?    Diabetic polyneuropathy associated with type 2 diabetes mellitus Yes    PAD (peripheral artery disease)     Ulcer of heel, left,  with unspecified severity     Ulcer of foot, right, with fat layer exposed          Plan:       Chau DAMIAN was seen today for follow-up.    Diagnoses and all orders for this visit:    Diabetic polyneuropathy associated with type 2 diabetes mellitus  -     Ambulatory referral to Home Health  -     Debridement    PAD (peripheral artery disease)  -     Ambulatory referral to Home Health  -     Debridement    Ulcer of heel, left, with unspecified severity  -     Ambulatory referral to Home Health  -     Debridement    Ulcer of foot, right, with fat layer exposed  -     Ambulatory referral to Home Health  -     Debridement  -     Aerobic culture (Specify Source)      I counseled the patient on his conditions, their implications and medical management.    Shoe inspection. Diabetic Foot Education. Patient reminded of the importance of good nutrition and blood sugar control to help prevent podiatric complications of diabetes. Patient instructed on proper foot hygeine. We discussed wearing proper shoe gear, daily foot inspections, never walking without protective shoe gear, never putting sharp instruments to feet, routine podiatric nail visits every 2-3 months.      Debridement and dressing per attached note.    Discussed limited weightbearing for transfers. Encouraged to use his wheel chair and float heels.    HH for dressing changes and to monitor the wound.    RTC 1 week for wound check to ensure the wounds do not deteriorate further.

## 2017-04-24 NOTE — PROCEDURES
"Wound Debridement  Date/Time: 4/24/2017 3:30 PM  Performed by: EVELIN LOMELI  Authorized by: EVELIN LOMELI     Time out: Immediately prior to procedure a "time out" was called to verify the correct patient, procedure, equipment, support staff and site/side marked as required.    Consent Done?:  Yes (Verbal)  Local anesthesia used?: No      Wound Details:    Location:  Right foot    Location:  Right 1st Metatarsal Head    Type of Debridement:  Excisional       Length (cm):  2.5       Area (sq cm):  2.75       Width (cm):  1.1       Percent Debrided (%):  100       Depth (cm):  0.1       Total Area Debrided (sq cm):  2.75    Depth of debridement:  Epidermis/Dermis    Tissue debrided:  Dermis and Epidermis    Devitalized tissue debrided:  Exudate, Necrotic/Eschar and Fibrin    Instruments:  Tolu    2nd Wound Details:     Location:  Left foot    Location:  Left Heel    Location:  Left Heel    Type of Debridement:  Excisional       Length (cm):  2.8       Area (sq cm):  12.6       Width (cm):  4.5       Percent Debrided (%):  30       Depth (cm):  0.1       Total Area Debrided (sq cm):  3.78    Depth of debridement:  Epidermis/Dermis    Tissue debrided:  Epidermis and Dermis    Devitalized tissue debrided:  Necrotic/Eschar and Sough    Instruments:  Nippers    Bleeding:  None  Patient tolerance:  Patient tolerated the procedure well with no immediate complications     Applied mepilex Ag over wound sites followed by 2 layers cast padding secured with flexinet.      "

## 2017-04-24 NOTE — MR AVS SNAPSHOT
[facility]   [facility] [address]  Phone: [phone]                  Chau Rodarte   2017 3:00 PM   Office Visit    Description:  Male : 1950   Provider:  Quirino Bland DPM   Department:  [facility]           Reason for Visit     Follow-up           Diagnoses this Visit        Comments    Diabetic polyneuropathy associated with type 2 diabetes mellitus    -  Primary     PAD (peripheral artery disease)         Ulcer of heel, left, with unspecified severity         Ulcer of foot, right, with fat layer exposed                To Do List           Future Appointments        Provider Department Dept Phone    2017 3:15 PM MD Jayme Israel [facility] - Vascular Surgery [phone]    2017 8:00 AM HOME MONITOR DEVICE CHECK, [facility] - Arrhythmia [phone]    2017 9:00 AM Quirino Bland DPM [facility] [phone]    2017 8:00 AM LAB, [facility] [phone]    2017 3:30 PM ARY [facility] Endo/Diab/Metab [phone]      Goals (5 Years of Data)     None      Follow-Up and Disposition     Return in about 1 week (around 2017).      [facility]     [facility] Care Line -  Assistance  Unless otherwise directed by your provider, please contact [facility], our nurse care line that is available for  assistance.     Registered nurses in the [facility] provide: appointment scheduling, clinical advisement, health education, and other advisory services.  Call: [phone] (toll free)               Medications           Message regarding Medications     Verify the changes and/or additions to your medication regime listed below are the same as discussed with your clinician today.  If any of these changes or additions are incorrect, please notify your healthcare provider.             Verify that the below list of medications is an  "accurate representation of the medications you are currently taking.  If none reported, the list may be blank. If incorrect, please contact your healthcare provider. Carry this list with you in case of emergency.           Current Medications     aspirin 81 MG Chew Take 1 tablet (81 mg total) by mouth once daily.    atorvastatin (LIPITOR) 80 MG tablet Take 1 tablet (80 mg total) by mouth once daily.    cholecalciferol, vitamin D3, (VITAMIN D3) 5,000 unit Tab Take 5,000 Units by mouth once daily.    clopidogrel (PLAVIX) 75 mg tablet Take 1 tablet (75 mg total) by mouth once daily.    diclofenac sodium 1 % Gel Apply 2 g topically 3 (three) times daily.    furosemide (LASIX) 40 MG tablet Take 1 tablet (40 mg total) by mouth once daily.    gabapentin (NEURONTIN) 300 MG capsule Take 300 mg by mouth 2 (two) times daily.    hydrocodone-acetaminophen 5-325mg (NORCO) 5-325 mg per tablet Take 1 tablet by mouth every 12 (twelve) hours as needed for Pain.    insulin detemir (LEVEMIR FLEXTOUCH) 100 unit/mL (3 mL) SubQ InPn pen Inject 16 Units into the skin every evening.    isosorbide-hydrALAZINE 20-37.5 mg (BIDIL) 20-37.5 mg Tab Take 1 tablet by mouth 3 (three) times daily.    liraglutide 0.6 mg/0.1 mL, 18 mg/3 mL, subq PNIJ (VICTOZA 2-SAGAR) 0.6 mg/0.1 mL (18 mg/3 mL) PnIj Inject 0.6 mg daily x1 week, then 1.2 mg daily x1 week, then 1.8 mg daily thereafter    metoprolol succinate (TOPROL-XL) 50 MG 24 hr tablet Take 1 tablet (50 mg total) by mouth once daily.    pantoprazole (PROTONIX) 20 MG tablet Take 2 tablets (40 mg total) by mouth once daily.    pen needle, diabetic (BD ULTRA-FINE HANG PEN NEEDLES) 32 gauge x 5/32" Ndle Uses 2 times daily, on  insulin injections           Clinical Reference Information           Your Vitals Were     BP Pulse Height Weight BMI    142/77 72 5' 9" (1.753 m) 111.6 kg (246 lb) 36.33 kg/m2      Blood Pressure          Most Recent Value    BP  (!)  142/77      Allergies as of 4/24/2017     No Known " Allergies      Immunizations Administered on Date of Encounter - 4/24/2017     None      Orders Placed During Today's Visit      Normal Orders This Visit    Ambulatory referral to Home Health       Language Assistance Services     ATTENTION: Language assistance services are available, free of charge. Please call [phone].      ATENCIÓN: Si habla nara, tiene a zaldivar disposición servicios gratuitos de asistencia lingüística. Llame al [phone].     CHÚ Ý: N?u b?n nói Ti?ng Vi?t, có các d?ch v? h? tr? ngôn ng? mi?n phí dành cho b?n. G?i s? [phone].         [facility] complies with applicable Federal civil rights laws and does not discriminate on the basis of race, color, national origin, age, disability, or sex.

## 2017-04-26 ENCOUNTER — TELEPHONE (OUTPATIENT)
Dept: PODIATRY | Facility: CLINIC | Age: 67
End: 2017-04-26

## 2017-04-26 ENCOUNTER — OFFICE VISIT (OUTPATIENT)
Dept: VASCULAR SURGERY | Facility: CLINIC | Age: 67
End: 2017-04-26
Payer: MEDICARE

## 2017-04-26 VITALS
SYSTOLIC BLOOD PRESSURE: 146 MMHG | DIASTOLIC BLOOD PRESSURE: 72 MMHG | TEMPERATURE: 98 F | HEART RATE: 72 BPM | WEIGHT: 240 LBS | BODY MASS INDEX: 35.55 KG/M2 | HEIGHT: 69 IN

## 2017-04-26 DIAGNOSIS — N18.30 CKD STAGE 3 DUE TO TYPE 2 DIABETES MELLITUS: ICD-10-CM

## 2017-04-26 DIAGNOSIS — E11.22 CKD STAGE 3 DUE TO TYPE 2 DIABETES MELLITUS: ICD-10-CM

## 2017-04-26 DIAGNOSIS — I70.25 ATHEROSCLEROSIS OF NATIVE ARTERIES OF THE EXTREMITIES WITH ULCERATION: Primary | ICD-10-CM

## 2017-04-26 LAB — BACTERIA SPEC AEROBE CULT: NORMAL

## 2017-04-26 PROCEDURE — 1160F RVW MEDS BY RX/DR IN RCRD: CPT | Mod: S$GLB,,, | Performed by: SURGERY

## 2017-04-26 PROCEDURE — 99999 PR PBB SHADOW E&M-EST. PATIENT-LVL III: CPT | Mod: PBBFAC,,, | Performed by: SURGERY

## 2017-04-26 PROCEDURE — 1126F AMNT PAIN NOTED NONE PRSNT: CPT | Mod: S$GLB,,, | Performed by: SURGERY

## 2017-04-26 PROCEDURE — 99214 OFFICE O/P EST MOD 30 MIN: CPT | Mod: S$GLB,,, | Performed by: SURGERY

## 2017-04-26 PROCEDURE — 3078F DIAST BP <80 MM HG: CPT | Mod: S$GLB,,, | Performed by: SURGERY

## 2017-04-26 PROCEDURE — 3045F PR MOST RECENT HEMOGLOBIN A1C LEVEL 7.0-9.0%: CPT | Mod: S$GLB,,, | Performed by: SURGERY

## 2017-04-26 PROCEDURE — 1159F MED LIST DOCD IN RCRD: CPT | Mod: S$GLB,,, | Performed by: SURGERY

## 2017-04-26 PROCEDURE — 3077F SYST BP >= 140 MM HG: CPT | Mod: S$GLB,,, | Performed by: SURGERY

## 2017-04-26 PROCEDURE — 3060F POS MICROALBUMINURIA REV: CPT | Mod: 8P,S$GLB,, | Performed by: SURGERY

## 2017-04-26 RX ORDER — LIDOCAINE HYDROCHLORIDE 10 MG/ML
1 INJECTION, SOLUTION EPIDURAL; INFILTRATION; INTRACAUDAL; PERINEURAL ONCE
Status: CANCELLED | OUTPATIENT
Start: 2017-04-26 | End: 2017-04-26

## 2017-04-26 RX ORDER — SODIUM CHLORIDE 9 MG/ML
INJECTION, SOLUTION INTRAVENOUS CONTINUOUS
Status: CANCELLED | OUTPATIENT
Start: 2017-04-26

## 2017-04-26 NOTE — PROGRESS NOTES
Patient ID: Chau Rodarte is a 67 y.o. male.    I. HISTORY     Chief Complaint: Ulcers of bilateral feet    HPI 67 year old man with PAD s/p L SFA stent in May 2012 and R Fem-AK Pop bypass in June 2012 presents for eval of bilateral ulcers of the feet.    Both revascularization procedures were done for claudication, at [facility].  He was seen by us in 2014, at which point he had MARIO of .42 on the right and .64 on the L.  Despite that, he could ambulate well.  US suggested that SFA stent was occluded, but that bypass was patent.  CTA was ordered to evaluate the RLE vasculature, but he apparently did not have that done.    He returns today urged by his PCP and his podiatrist.  He was hospitalized in November for CHF and required cardiac stent x 3.  EF has since improved to 45%.  He was started on coumadin, which precipitated a GI bleed.    After the hospitalization for GI bleed, he noted pain and redness to his feet, 1.5 months ago.  Podiatry diagnosed him with ulcers of the Left heel and under his Right big toe.  Both of these were debrided by Dr. Bland.  Pictures are in the notes.  The left heel ulcer appears worse.    Currently, he does have claudication at 1-2 blocks.    Review of Systems   Constitution: Negative for chills and fever.   HENT: Negative for congestion and ear discharge.    Eyes: Negative for discharge and double vision.   Cardiovascular: Positive for claudication and dyspnea on exertion. Negative for cyanosis.   Respiratory: Negative for cough and hemoptysis.    Skin: Negative for color change and dry skin.       II. PHYSICAL EXAM     Physical Exam   Constitutional: He is oriented to person, place, and time. He appears well-developed and well-nourished.   HENT:   Head: Normocephalic and atraumatic.   Eyes: Conjunctivae are normal. Pupils are equal, round, and reactive to light.   Neck: Normal range of motion. Neck supple.   Cardiovascular: Normal rate and regular rhythm.    Pulmonary/Chest: Effort  normal and breath sounds normal.   Abdominal: Soft. Bowel sounds are normal.   Neurological: He is alert and oriented to person, place, and time.   Football dressings in place to bilateral feet- these were not taken down    III. ASSESSMENT & PLAN (MEDICAL DECISION MAKING)     1. Atherosclerosis of native arteries of the extremities with ulceration    2. CKD stage 3 due to type 2 diabetes mellitus        Imaging Results:    MARIO:  R L   .38 (.42) .60 (.64)     US lower extremity arterial: Right bypass graft occluded.  No flow identified.                                                Left SFA and SFA stent shows no flow, suggesting occlusion.    Assessment/Diagnosis and Plan:    67 year old man with poorly healing ulcers (L > R), occluded L SFA stent, and R occluded Fem- AK pop bypass    To cath lab on Monday 5/1 for LLE angiogram via R fem approach  This will aid planning of future operative revascularization   If amenable, may attempt to intervene on L SFA stent    Tyson Pinto MD  General Surgery, PGY-3  [phone]       Vascular Surgery Staff  I have seen and examined the patient and reviewed the residents note. I agree with their assessment and plan.    Long standing history of PAD last seen by me in 2014. Now with bilateral tissue loss left foot worse than right.  Plan left LE angio with possible intervention using CO2 and minimal dye given CKD.    I have explained the risks, benefits, medical treatments, and alternatives of the procedure in detail. Risks include but are not limited to bleeding, injury to vessel, kidney damage or failure, need for emergency surgery, limb loss or need for additional procedures or bypass surgery. The patient and his wife voiced understanding and all questions have been answered.  They agree to proceed as planned.    Nehal William MD FACS [facility]  Vascular & Endovascular Surgery

## 2017-04-26 NOTE — LETTER
April 27, 2017      Riki Huynh MD  [address]  [place] [address]           [facility] - Vascular Surgery  [address]  [place] [address]  Phone: [phone]  Fax: [phone]          Patient: Chau Rodarte   MR Number: [id number]   YOB: 1950   Date of Visit: 4/26/2017       Dear Dr. Riki Huynh:    Thank you for referring Chau Rodarte to me for evaluation. Attached you will find relevant portions of my assessment and plan of care.    If you have questions, please do not hesitate to call me. I look forward to following Chau Rodarte along with you.    Sincerely,    Nehal William MD    Enclosure  CC:  No Recipients    If you would like to receive this communication electronically, please contact [email or web address] or [phone] to request more information on [employer] access.    For providers and/or their staff who would like to refer a patient to [facility], please contact us through our one-stop-shop provider referral line, [facility], at [phone].    If you feel you have received this communication in error or would no longer like to receive these types of communications, please e-mail [email or web address]

## 2017-04-26 NOTE — TELEPHONE ENCOUNTER
----- Message from Jennifer Hernandez sent at 4/26/2017  9:50 AM CDT -----  Ladan with [employer] called regarding home health orders.    No. [phone]   Please call.

## 2017-04-28 ENCOUNTER — TELEPHONE (OUTPATIENT)
Dept: VASCULAR SURGERY | Facility: CLINIC | Age: 67
End: 2017-04-28

## 2017-05-01 ENCOUNTER — HOSPITAL ENCOUNTER (OUTPATIENT)
Facility: HOSPITAL | Age: 67
Discharge: HOME | End: 2017-05-01
Attending: SURGERY | Admitting: SURGERY
Payer: MEDICARE

## 2017-05-01 VITALS
OXYGEN SATURATION: 96 % | BODY MASS INDEX: 36.29 KG/M2 | HEART RATE: 74 BPM | RESPIRATION RATE: 18 BRPM | HEIGHT: 69 IN | DIASTOLIC BLOOD PRESSURE: 74 MMHG | TEMPERATURE: 97 F | WEIGHT: 245 LBS | SYSTOLIC BLOOD PRESSURE: 158 MMHG

## 2017-05-01 DIAGNOSIS — I70.25 ATHEROSCLEROSIS OF NATIVE ARTERIES OF THE EXTREMITIES WITH ULCERATION: ICD-10-CM

## 2017-05-01 DIAGNOSIS — N18.30 CKD STAGE 3 DUE TO TYPE 2 DIABETES MELLITUS: ICD-10-CM

## 2017-05-01 DIAGNOSIS — E11.22 CKD STAGE 3 DUE TO TYPE 2 DIABETES MELLITUS: ICD-10-CM

## 2017-05-01 LAB
POCT GLUCOSE: 103 MG/DL (ref 70–110)
POCT GLUCOSE: 106 MG/DL (ref 70–110)

## 2017-05-01 PROCEDURE — 75710 ARTERY X-RAYS ARM/LEG: CPT | Mod: 26,,, | Performed by: SURGERY

## 2017-05-01 PROCEDURE — 75625 CONTRAST EXAM ABDOMINL AORTA: CPT | Mod: 26,,, | Performed by: SURGERY

## 2017-05-01 PROCEDURE — 63600175 PHARM REV CODE 636 W HCPCS

## 2017-05-01 PROCEDURE — 36246 INS CATH ABD/L-EXT ART 2ND: CPT | Mod: ,,, | Performed by: SURGERY

## 2017-05-01 PROCEDURE — 25000003 PHARM REV CODE 250: Performed by: STUDENT IN AN ORGANIZED HEALTH CARE EDUCATION/TRAINING PROGRAM

## 2017-05-01 PROCEDURE — 25000003 PHARM REV CODE 250: Performed by: SURGERY

## 2017-05-01 PROCEDURE — 63600175 PHARM REV CODE 636 W HCPCS: Performed by: STUDENT IN AN ORGANIZED HEALTH CARE EDUCATION/TRAINING PROGRAM

## 2017-05-01 PROCEDURE — 76937 US GUIDE VASCULAR ACCESS: CPT | Mod: 26,,, | Performed by: SURGERY

## 2017-05-01 PROCEDURE — C1894 INTRO/SHEATH, NON-LASER: HCPCS

## 2017-05-01 PROCEDURE — 25000003 PHARM REV CODE 250

## 2017-05-01 RX ORDER — HYDRALAZINE HYDROCHLORIDE 20 MG/ML
10 INJECTION INTRAMUSCULAR; INTRAVENOUS ONCE
Status: COMPLETED | OUTPATIENT
Start: 2017-05-01 | End: 2017-05-01

## 2017-05-01 RX ORDER — LIDOCAINE HYDROCHLORIDE 10 MG/ML
1 INJECTION, SOLUTION EPIDURAL; INFILTRATION; INTRACAUDAL; PERINEURAL ONCE
Status: DISCONTINUED | OUTPATIENT
Start: 2017-05-01 | End: 2017-05-01 | Stop reason: HOSPADM

## 2017-05-01 RX ORDER — SODIUM CHLORIDE 9 MG/ML
INJECTION, SOLUTION INTRAVENOUS CONTINUOUS
Status: DISCONTINUED | OUTPATIENT
Start: 2017-05-01 | End: 2017-05-01

## 2017-05-01 RX ORDER — METOPROLOL TARTRATE 25 MG/1
25 TABLET, FILM COATED ORAL ONCE
Status: DISCONTINUED | OUTPATIENT
Start: 2017-05-01 | End: 2017-05-01 | Stop reason: HOSPADM

## 2017-05-01 RX ORDER — ISOSORBIDE DINITRATE AND HYDRALAZINE HYDROCHLORIDE 37.5; 2 MG/1; MG/1
1 TABLET ORAL ONCE
Status: COMPLETED | OUTPATIENT
Start: 2017-05-01 | End: 2017-05-01

## 2017-05-01 RX ORDER — CEFAZOLIN SODIUM 2 G/50ML
2 SOLUTION INTRAVENOUS
Status: DISCONTINUED | OUTPATIENT
Start: 2017-05-01 | End: 2017-05-01 | Stop reason: HOSPADM

## 2017-05-01 RX ADMIN — HYDRALAZINE HYDROCHLORIDE 10 MG: 20 INJECTION INTRAMUSCULAR; INTRAVENOUS at 01:05

## 2017-05-01 RX ADMIN — SODIUM CHLORIDE 500 ML: 0.9 INJECTION, SOLUTION INTRAVENOUS at 02:05

## 2017-05-01 RX ADMIN — SODIUM CHLORIDE: 0.9 INJECTION, SOLUTION INTRAVENOUS at 08:05

## 2017-05-01 RX ADMIN — HYDRALAZINE HYDROCHLORIDE AND ISOSORBIDE DINITRATE 1 TABLET: 37.5; 2 TABLET, FILM COATED ORAL at 01:05

## 2017-05-01 NOTE — IP AVS SNAPSHOT
[facility]  [address]  [facility] [address]  Phone: [phone]           Patient Discharge Instructions   Our goal is to set you up for success. This packet includes information on your condition, medications, and your home care.  It will help you care for yourself to prevent having to return to the hospital.     Please ask your nurse if you have any questions.      There are many details to remember when preparing to leave the hospital. Here is what you will need to do:    1. Take your medicine. If you are prescribed medications, review your Medication List on the following pages. You may have new medications to  at the pharmacy and others that you'll need to stop taking. Review the instructions for how and when to take your medications. Talk with your doctor or nurses if you are unsure of what to do.     2. Go to your follow-up appointments. Specific follow-up information is listed in the following pages. Your may be contacted by a nurse or clinical provider about future appointments. Be sure we have all of the phone numbers to reach you. Please contact your provider's office if you are unable to make an appointment.     3. Watch for warning signs. Your doctor or nurse will give you detailed warning signs to watch for and when to call for assistance. These instructions may also include educational information about your condition. If you experience any of warning signs to your health, call your doctor.           [facility]  Unless otherwise directed by your provider, please   contact [facility], our nurse care line   that is available for 24/7 assistance.     [phone] (toll-free)     Registered nurses in the [facility]   provide: appointment scheduling, clinical advisement, health education, and other advisory services.                  ** Verify the list of medication(s) below is accurate and up to date. Carry this with you in case of  emergency. If your medications have changed, please notify your healthcare provider.             Medication List      CHANGE how you take these medications        Additional Info                      liraglutide 0.6 mg/0.1 mL (18 mg/3 mL) subq PNIJ 0.6 mg/0.1 mL (18 mg/3 mL) Pnij   Commonly known as:  VICTOZA 2-SAGAR   Quantity:  9 mL   Refills:  3   What changed:    - how much to take  - how to take this  - when to take this  - additional instructions    Instructions:  Inject 0.6 mg daily x1 week, then 1.2 mg daily x1 week, then 1.8 mg daily thereafter     Begin Date    AM    Noon    PM    Bedtime         CONTINUE taking these medications        Additional Info                      aspirin 81 MG Chew   Refills:  0   Dose:  81 mg    Instructions:  Take 1 tablet (81 mg total) by mouth once daily.     Begin Date    AM    Noon    PM    Bedtime       atorvastatin 80 MG tablet   Commonly known as:  LIPITOR   Quantity:  90 tablet   Refills:  3   Dose:  80 mg    Instructions:  Take 1 tablet (80 mg total) by mouth once daily.     Begin Date    AM    Noon    PM    Bedtime       clopidogrel 75 mg tablet   Commonly known as:  PLAVIX   Quantity:  90 tablet   Refills:  3   Dose:  75 mg    Instructions:  Take 1 tablet (75 mg total) by mouth once daily.     Begin Date    AM    Noon    PM    Bedtime       diclofenac sodium 1 % Gel   Quantity:  100 g   Refills:  1   Dose:  2 g    Instructions:  Apply 2 g topically 3 (three) times daily.     Begin Date    AM    Noon    PM    Bedtime       furosemide 40 MG tablet   Commonly known as:  LASIX   Quantity:  30 tablet   Refills:  11   Dose:  40 mg    Instructions:  Take 1 tablet (40 mg total) by mouth once daily.     Begin Date    AM    Noon    PM    Bedtime       gabapentin 300 MG capsule   Commonly known as:  NEURONTIN   Refills:  0   Dose:  300 mg    Instructions:  Take 300 mg by mouth 2 (two) times daily.     Begin Date    AM    Noon    PM    Bedtime       hydrocodone-acetaminophen  "5-325mg 5-325 mg per tablet   Commonly known as:  NORCO   Quantity:  40 tablet   Refills:  0   Dose:  1 tablet    Instructions:  Take 1 tablet by mouth every 12 (twelve) hours as needed for Pain.     Begin Date    AM    Noon    PM    Bedtime       insulin detemir 100 unit/mL (3 mL) Inpn pen   Commonly known as:  LEVEMIR FLEXTOUCH   Quantity:  1 Box   Refills:  3   Dose:  16 Units    Instructions:  Inject 16 Units into the skin every evening.     Begin Date    AM    Noon    PM    Bedtime       isosorbide-hydrALAZINE 20-37.5 mg 20-37.5 mg Tab   Commonly known as:  BIDIL   Quantity:  90 tablet   Refills:  11   Dose:  1 tablet    Last time this was given:  1 tablet on 5/1/2017  1:33 PM   Instructions:  Take 1 tablet by mouth 3 (three) times daily.     Begin Date    AM    Noon    PM    Bedtime       metoprolol succinate 50 MG 24 hr tablet   Commonly known as:  TOPROL-XL   Quantity:  30 tablet   Refills:  11   Dose:  50 mg    Instructions:  Take 1 tablet (50 mg total) by mouth once daily.     Begin Date    AM    Noon    PM    Bedtime       pantoprazole 20 MG tablet   Commonly known as:  PROTONIX   Quantity:  30 tablet   Refills:  11   Dose:  40 mg    Instructions:  Take 2 tablets (40 mg total) by mouth once daily.     Begin Date    AM    Noon    PM    Bedtime       pen needle, diabetic 32 gauge x 5/32" Ndle   Commonly known as:  BD ULTRA-FINE HANG PEN NEEDLES   Quantity:  180 each   Refills:  4    Instructions:  Uses 2 times daily, on  insulin injections     Begin Date    AM    Noon    PM    Bedtime       VITAMIN D3 5,000 unit Tab   Refills:  0   Dose:  5000 Units   Generic drug:  cholecalciferol (vitamin D3)    Instructions:  Take 5,000 Units by mouth once daily.     Begin Date    AM    Noon    PM    Bedtime                  Please bring to all follow up appointments:    1. A copy of your discharge instructions.  2. All medicines you are currently taking in their original bottles.  3. Identification and insurance " card.    Please arrive 15 minutes ahead of scheduled appointment time.    Please call 24 hours in advance if you must reschedule your appointment and/or time.        Your Scheduled Appointments     May 02, 2017  8:00 AM CDT   Remote Interrogation with HOME MONITOR DEVICE CHECK, [facility]   [facility] - Arrhythmia ([facility] )    [address]  [facility] [address]   [phone]            May 04, 2017  9:00 AM CDT   Established Patient Visit with Quirino Bland DPM   [place] [facility] Podiatry ([facility])    [address]  [place] [id number] [phone]            May 09, 2017  8:00 AM CDT   Non-Fasting Lab with JANESSA, KENNER [facility] ([facility])    [address] [facility] [id number] [phone]            May 16, 2017  3:30 PM CDT   Established Patient with ARY Palafox - Endo/Diab/Metab ([facility] )    [address]  [facility] [id number] [phone]            Jun 27, 2017 10:00 AM CDT   EKG with EKG, APPT   [facility] - EKG ([facility] )    [address]  [facility] [id number] [phone]              Follow-up Information     Follow up with Nehal William MD. Schedule an appointment as soon as possible for a visit in 2 weeks.    Specialty:  Vascular Surgery    Why:  For wound re-check    Contact information:    [address]  [facility] [address]  [phone]          Discharge Instructions     Future Orders    Activity as tolerated     Call MD for:  difficulty breathing or increased cough     Call MD for:  persistent nausea and vomiting or diarrhea     Call MD for:  redness, tenderness, or signs of infection (pain, swelling, redness, odor or green/yellow discharge around incision site)     Call MD for:  severe persistent headache     Call MD for:  severe uncontrolled pain     Call MD for:  temperature >100.4     Diet general     Questions:    Total calories:      Fat  "restriction, if any:      Protein restriction, if any:      Na restriction, if any:      Fluid restriction:      Additional restrictions:      VAS US Vein Mapping         Discharge Instructions       Please follow up with Dr. William in two weeks and obtain vein mapping of lower extremities to check for possible bypass.      Primary Diagnosis     Your primary diagnosis was:  Decreased Circulation      Admission Information     Date & Time Provider Department CSN    5/1/2017  6:58 AM Nehal William MD [facility] [id number]      Care Providers     Provider Role Specialty Primary office phone    Nehal William MD Attending Provider Vascular Surgery [phone]      Your Vitals Were     BP Pulse Temp Resp Height Weight    158/74 74 96.7 °F (35.9 °C) (Oral) 18 5' 9" (1.753 m) 111.1 kg (245 lb)    SpO2 BMI             96% 36.18 kg/m2         Recent Lab Values        11/28/2016 1/12/2017 2/27/2017                     3:09 AM  7:20 AM  9:37 AM         A1C 9.2 (H) 8.2 (H) 7.1 (H)         Comment for A1C at  3:09 AM on 11/28/2016:  According to ADA guidelines, hemoglobin A1C <7.0% represents  optimal control in non-pregnant diabetic patients.  Different  metrics may apply to specific populations.   Standards of Medical Care in Diabetes - 2016.  For the purpose of screening for the presence of diabetes:  <5.7%     Consistent with the absence of diabetes  5.7-6.4%  Consistent with increasing risk for diabetes   (prediabetes)  >or=6.5%  Consistent with diabetes  Currently no consensus exists for use of hemoglobin A1C  for diagnosis of diabetes for children.      Comment for A1C at  7:20 AM on 1/12/2017:  According to ADA guidelines, hemoglobin A1C <7.0% represents  optimal control in non-pregnant diabetic patients.  Different  metrics may apply to specific populations.   Standards of Medical Care in Diabetes - 2016.  For the purpose of screening for the presence of diabetes:  <5.7%     Consistent with the " absence of diabetes  5.7-6.4%  Consistent with increasing risk for diabetes   (prediabetes)  >or=6.5%  Consistent with diabetes  Currently no consensus exists for use of hemoglobin A1C  for diagnosis of diabetes for children.      Comment for A1C at  9:37 AM on 2/27/2017:  According to ADA guidelines, hemoglobin A1C <7.0% represents  optimal control in non-pregnant diabetic patients.  Different  metrics may apply to specific populations.   Standards of Medical Care in Diabetes - 2016.  For the purpose of screening for the presence of diabetes:  <5.7%     Consistent with the absence of diabetes  5.7-6.4%  Consistent with increasing risk for diabetes   (prediabetes)  >or=6.5%  Consistent with diabetes  Currently no consensus exists for use of hemoglobin A1C  for diagnosis of diabetes for children.        Allergies as of 5/1/2017     No Known Allergies      Advance Directives     An advance directive is a document which, in the event you are no longer able to make decisions for yourself, tells your healthcare team what kind of treatment you do or do not want to receive, or who you would like to make those decisions for you.  If you do not currently have an advance directive, [facility] encourages you to create one.  For more information call:  [phone]), [phone] ([phone]),  or log on to [email or web address].        Smoking Cessation     If you would like to quit smoking:   You may be eligible for free services if you are a [place] resident and started smoking cigarettes before September 1, 1988.  Call the Smoking Cessation Trust (SCT) toll free at (799) 728-9689 or (362) 006-4716.   Call 8-800-QUIT-NOW if you do not meet the above criteria.   Contact us via email: [email or web address]   View our website for more information: [email or web address]        Language Assistance Services     ATTENTION: Language assistance services are available, free of charge. Please call  [phone].      ATENCIÓN: Si luciola nara, tiene a zaldivar disposición servicios gratuitos de asistencia lingüística. Oni al [phone].     [facility] Ý: N?u b?n nói Ti?ng Vi?t, có các d?ch v? h? tr? ngôn ng? mi?n phí dành cho b?n. G?i s? [phone].        Stroke Education              Heart Failure Education       Heart Failure: Being Active  You have a condition called heart failure. Being active doesnt mean that you have to wear yourself out. Even a little movement each day helps to strengthen your heart. If you cant get out to exercise, you can do simple stretching and strengthening exercises at home. These are good ways to keep you well-conditioned and prevent you and your heart from becoming excessively weak.    Ideas to get you started  · Add a little movement to things you do now. Walk to mail letters. Park your car at the far end of the parking lot and walk to the store. Walk up a flight of stairs instead of taking the elevator.  · Choose activities you enjoy. You might walk, swim, or ride an exercise bike. Things like gardening and washing the car count, too. Other possibilities include: washing dishes, walking the dog, walking around the mall, and doing aerobic activities with friends.  · Join a group exercise program at a [facility] or [facility], a senior center, or a community center. Or look into a hospital cardiac rehabilitation program. Ask your doctor if you qualify.  Tips to keep you going  · Get up and get dressed each day. Go to a coffee shop and read a newspaper or go somewhere that you'll be in the presence of other active people. Youll feel more like being active.  · Make a plan. Choose one or more activities that you enjoy and that you can easily do. Then plan to do at least one each day. You might write your plan on a calendar.  · Go with a friend or a group if you like company. This can help you feel supported and stay motivated, too.  · Plan social events that you enjoy. This will keep you  mentally engaged as well as physically motivated to do things you find pleasure in.  For your safety  · Talk with your healthcare provider before starting an exercise program.  · Exercise indoors when its too hot or too cold outside, or when the air quality is poor. Try walking at a shopping mall.  · Wear socks and sturdy shoes to maintain your balance and prevent falls.  · Start slowly. Do a few minutes several times a day at first. Increase your time and speed little by little.  · Stop and rest whenever you feel tired or get short of breath.  · Dont push yourself on days when you dont feel well.  Date Last Reviewed: 3/20/2016  © 4286-8572 [employer]. [address] [place] [address], [place] [address]. All rights reserved. This information is not intended as a substitute for professional medical care. Always follow your healthcare professional's instructions.              Heart Failure: Evaluating Your Heart  You have a condition called heart failure. To evaluate your condition, your doctor will examine you, ask questions, and do some tests. Along with looking for signs of heart failure, the doctor looks for any other health problems that may have led to heart failure. The results of your evaluation will help your doctor form a treatment plan.  Health history and physical exam  Your visit will start with a health history. Tell the doctor about any symptoms youve noticed and about all medicines you take. Then youll have a physical exam. This includes listening to your heartbeat and breathing. Youll also be checked for swelling (edema) in your legs and neck. When you have fluid buildup or fluid in the lungs, it may be called congestive heart failure.  Diagnosing heart failure     During an echocardiogram, sound waves bounce off the heart. These are converted into a picture on the screen.   The following may be done to help your doctor form a diagnosis:  · X-rays show the size and shape of your heart.  These pictures can also show fluid in your lungs.  · An electrocardiogram (ECG or EKG) shows the pattern of your heartbeat. Small pads (electrodes) are placed on your chest, arms, and legs. Wires connect the pads to the ECG machine, which records your hearts electrical signals. This can give the doctor information about heart function.  · An echocardiogram uses ultrasound waves to show the structure and movement of your heart muscle. This shows how well the heart pumps. It also shows the thickness of the heart walls, and if the heart is enlarged. It is one of the most useful, non-invasive tests as it provides information about the heart's general function. This helps your doctor make treatment decisions.  · Lab tests evaluate small amounts of blood or urine for signs of problems. A BNP lab test can help diagnose and evaluate heart failure. BNP stands for B-type natriuretic peptide. The ventricles secrete more BNP when heart failure worsens. Lab tests can also provide information about metabolic dysfunction or heart dysfunction.  Your treatment plan  Based on the results of your evaluation and tests, your doctor will develop a treatment plan. This plan is designed to relieve some of your heart failure symptoms and help make you more comfortable. Your treatment plan may include:  · Medicine to help your heart work better and improve your quality of life  · Changes in what you eat and drink to help prevent fluid from backing up in your body  · Daily monitoring of your weight and heart failure symptoms to see how well your treatment plan is working  · Exercise to help you stay healthy  · Help with quitting smoking  · Emotional and psychological support to help adjust to the changes  · Referrals to other specialists to make sure you are being treated comprehensively  Date Last Reviewed: 3/21/2016  © 3348-3773 [employer]. [address] [place] [address], [place] [address]. All rights reserved. This information is  not intended as a substitute for professional medical care. Always follow your healthcare professional's instructions.              Heart Failure: Making Changes to Your Diet  You have a condition called heart failure. When you have heart failure, excess fluid is more likely to build up in your body because your heart isn't working well. This makes the heart work harder to pump blood. Fluid buildup causes symptoms such as shortness of breath and swelling (edema). This is often referred to as congestive heart failure or CHF. Controlling the amount of salt (sodium) you eat may help stop fluid from building up. Your doctor may also tell you to reduce the amount of fluid you drink.  Reading food labels    Your healthcare provider will tell you how much sodium you can eat each day. Read food labels to keep track. Keep in mind that certain foods are high in salt. These include canned, frozen, and processed foods. Check the amount of sodium in each serving. Watch out for high-sodium ingredients. These include MSG (monosodium glutamate), baking soda, and sodium phosphate.   Eating less salt  Give yourself time to get used to eating less salt. It may take a little while. Here are some tips to help:  · Take the saltshaker off the table. Replace it with salt-free herb mixes and spices.  · Eat fresh or plain frozen vegetables. These have much less salt than canned vegetables.  · Choose low-sodium snacks like sodium-free pretzels, crackers, or air-popped popcorn.  · Dont add salt to your food when youre cooking. Instead, season your foods with pepper, lemon, garlic, or onion.  · When you eat out, ask that your food be cooked without added salt.  · Avoid eating fried foods as these often have a great deal of salt.  If youre told to limit fluids  You may need to limit how much fluid you have to help prevent swelling. This includes anything that is liquid at room temperature, such as ice cream and soup. If your doctor tells you  to limit fluid, try these tips:  · Measure drinks in a measuring cup before you drink them. This will help you meet daily goals.  · Chill drinks to make them more refreshing.  · Suck on frozen lemon wedges to quench thirst.  · Only drink when youre thirsty.  · Chew sugarless gum or suck on hard candy to keep your mouth moist.  · Weigh yourself daily to know if your body's fluid content is rising.  My sodium goal  Your healthcare provider may give you a sodium goal to meet each day. This includes sodium found in food as well as salt that you add. My goal is to eat no more than ___________ mg of sodium per day.     When to call your doctor  Call your doctor right away if you have any symptoms of worsening heart failure. These can include:  · Sudden weight gain  · Increased swelling of your legs or ankles  · Trouble breathing when youre resting or at night  · Increase in the number of pillows you have to sleep on  · Chest pain, pressure, discomfort, or pain in the jaw, neck, or back   Date Last Reviewed: 3/21/2016  © 6245-4722 [employer]. [address] [place] [address]. All rights reserved. This information is not intended as a substitute for professional medical care. Always follow your healthcare professional's instructions.              Heart Failure: Medicines to Help Your Heart    You have a condition called heart failure (also known as congestive heart failure, or CHF). Your doctor will likely prescribe medicines for heart failure and any underlying health problems you have. Most heart failure patients take one or more types of medicinen. Your healthcare provider will work to find the combination of medicines that works best for you.  Heart failure medicines  Here are the most common heart failure medicines:  · ACE inhibitors lower blood pressure and decrease strain on the heart. This makes it easier for the heart to pump. Angiotensin receptor blockers have similar effects. These are  prescribed for some patients instead of ACE inhibitors.  · Beta-blockers relieve stress on the heart. They also improve symptoms. They may also improve the heart's pumping action over time.  · Diuretics (also called water pills) help rid your body of excess water. This can help rid your body of swelling (edema). Having less fluid to pump means your heart doesnt have to work as hard. Some diuretics make your body lose a mineral called potassium. Your doctor will tell you if you need to take supplements or eat more foods high in potassium.  · Digoxin helps your heart pump with more strength. This helps your heart pump more blood with each beat. So, more oxygen-rich blood travels to the rest of the body.  · Aldosterone antagonists help alter hormones and decrease strain on the heart.  · Hydralazine and nitrates are two separate medicines used together to treat heart failure. They may come in one combination pill. They lower blood pressure and decrease how hard the heart has to pump.  Medicines for related conditions  Controlling other heart problems helps keep heart failure under control, too. Depending on other heart problems you have, medicines may be prescribed to:  · Lower blood pressure (antihypertensives).  · Lower cholesterol levels (statins).  · Prevent blood clots (anticoagulants or aspirin).  · Keep the heartbeat steady (antiarrhythmics).  Date Last Reviewed: 3/5/2016  © 5960-5206 [employer]. [address] [place] [address], [place] [address]. All rights reserved. This information is not intended as a substitute for professional medical care. Always follow your healthcare professional's instructions.              Heart Failure: Procedures That May Help    The heart is a muscle that pumps oxygen-rich blood to all parts of the body. When you have heart failure, the heart is not able to pump as well as it should. Blood and fluid may back up into the lungs (congestive heart failure), and some parts of  the body dont get enough oxygen-rich blood to work normally. These problems lead to the symptoms of heart failure.     Certain procedures may help the heart pump better in some cases of heart failure. Some procedures are done to treat health problems that may have caused the heart failure such as coronary artery disease or heart rhythm problems. For more serious heart failure, other options are available.  Treating artery and valve problems  If you have coronary artery disease or valve disease, procedures may be done to improve blood flow. This helps the heart pump better, which can improve heart failure symptoms. First, your doctor may do a cardiac catheterization to help detect clogged blood vessels or valve damage. During this procedure, a  thin tube (catheter) in inserted into a blood vessel and guided to the heart. There a dye is injected and a special type of X-ray (angiogram) is taken of the blood vessels. Procedures to open a blocked artery or fix damaged valves can also be done using catheterization.  · Angioplasty uses a balloon-tipped instrument at the end of the catheter. The balloon is inflated to widen the narrowed artery. In many cases, a stent is expanded to further support the narrowed artery. A stent is a metal mesh tube.  · Valve surgery repairs or replacement of faulty valves can also be done during catheterization so blood can flow properly through the chambers of the heart.  Bypass surgery is another option to help treat blocked arteries. It uses a healthy blood vessel from elsewhere in the body. The healthy blood vessel is attached above and below the blocked area so that blood can flow around the blocked artery.  Treating heart rhythm problems  A device may be placed in the chest to help a weak heart maintain a healthy, heartbeat so the heart can pump more effectively:  · Pacemaker. A pacemaker is an implanted device that regulates your heartbeat electronically. It monitors your heart's  rhythm and generates a painless electric impulse that helps the heart beat in a regular rhythm. A pacemaker is programmed to meet your specific heart rhythm needs.  · Biventricular pacing/cardiac resynchronization therapy. A type of pacemaker that paces both pumping chambers of the heart at the same time to coordinate contractions and to improve the heart's function. Some people with heart failure are candidates for this therapy.  · Implantable cardioverter defibrillator. A device similar to a pacemaker that senses when the heart is beating too fast and delivers an electrical shock to convert the fast rhythm to a normal rhythm. This can be a life saving device.  In severe cases  In more serious cases of heart failure when other treatments no longer work, other options may include:  · Ventricular assist devices (VADs). These are mechanical devices used to take over the pumping function for one or both of the heart's ventricles, or pumping chambers. A VAD may be necessary when heart failure progresses to the point that medicines and other treatments no longer help. In some cases, a VAD may be used as a bridge to transplant.  · Heart transplant. This is replacing the diseased heart with a healthy one from a donor. This is an option for a few people who are very sick. A heart transplant is very serious and not an option for all patients. Your doctor can tell you more.  Date Last Reviewed: 3/20/2016  © 7898-1405 [employer]. [address] [place] [address], [place] [address]. All rights reserved. This information is not intended as a substitute for professional medical care. Always follow your healthcare professional's instructions.              Heart Failure: Tracking Your Weight  You have a condition called heart failure. When you have heart failure, a sudden weight gain or a steady rise in weight is a warning sign that your body is retaining too much water and salt. This could mean your heart failure is getting  worse. If left untreated, it can cause problems for your lungs and result in shortness of breath. Weighing yourself each day is the best way to know if youre retaining water. If your weight goes up quickly, call your doctor. You will be given instructions on how to get rid of the excess water. You will likely need medicines and to avoid salt. This will help your heart work better.  Call your doctor if you gain more than 2 pounds in 1 day, more than 5 pounds in 1 week, or whatever weight gain you were told to report by your doctor. This is often a sign of worsening heart failure and needs to be evaluated and treated. Your doctor will tell you what to do next.   Tips for weighing yourself    · Weigh yourself at the same time each morning, wearing the same clothes. Weigh yourself after urinating and before eating.  · Use the same scale each day. Make sure the numbers are easy to read. Put the scale on a flat, hard surface -- not on a rug or carpet.  · Do not stop weighing yourself. If you forget one day, weigh again the next morning.  How to use your weight chart  · Keep your weight chart near the scale. Write your weight on the chart as soon as you get off the scale.  · Fill in the month and the start date on the chart. Then write down your weight each day. Your chart will look like this:    · If you miss a day, leave the space blank. Weigh yourself the next day and write your weight in the next space.  · Take your weight chart with you when you go to see your doctor.  Date Last Reviewed: 3/20/2016  © 0516-2502 [employer]. [address] [place] [address]. All rights reserved. This information is not intended as a substitute for professional medical care. Always follow your healthcare professional's instructions.              Heart Failure: Warning Signs of a Flare-Up  You have a condition called heart failure. Once you have heart failure, flare-ups can happen. Below are signs that can mean  your heart failure is getting worse. If you notice any of these warning signs, call your healthcare provider.  Swelling    · Your feet, ankles, or lower legs get puffier.  · You notice skin changes on your lower legs.  · Your shoes feel too tight.  · Your clothes are tighter in the waist.  · You have trouble getting rings on or off your fingers.  Shortness of breath  · You have to breathe harder even when youre doing your normal activities or when youre resting.  · You are short of breath walking up stairs or even short distances.  · You wake up at night short of breath or coughing.  · You need to use more pillows or sit up to sleep.  · You wake up tired or restless.  Other warning signs  · You feel weaker, dizzy, or more tired.  · You have chest pain or changes in your heartbeat.  · You have a cough that wont go away.  · You cant remember things or dont feel like eating.  Tracking your weight  Gaining weight is often the first warning sign that heart failure is getting worse. Gaining even a few pounds can be a sign that your body is retaining excess water and salt. Weighing yourself each day in the morning after you urinate and before you eat, is the best way to know if you're retaining water. Get a scale that is easy to read and make sure you wear the same clothes and use the same scale every time you weigh. Your healthcare provider will show you how to track your weight. Call your doctor if you gain more than 2 pounds in 1 day, 5 pounds in 1 week, or whatever weight gain you were told to report by your doctor. This is often a sign of worsening heart failure and needs to be evaluated and treated before it compromises your breathing. Your doctor will tell you what to do next.    Date Last Reviewed: 3/15/2016  © 6687-6621 [employer]. [address] [place] [address], [place] [address]. All rights reserved. This information is not intended as a substitute for professional medical care. Always follow your  healthcare professional's instructions.              Chronic Kindey Disease Education             Diabetes Discharge Instructions                                    [facility] complies with applicable Federal civil rights laws and does not discriminate on the basis of race, color, national origin, age, disability, or sex.

## 2017-05-01 NOTE — PROGRESS NOTES
Pt's BP dropped 80's/50's.  Pt is asymptomatic. Initiated 500cc bolus.  Notified Dr. William and Dr. Zaidi.  Orders were to complete IV bolus at 500cc and then stop bolus and continue to monitor.  Orders are to follow up if BP doesn't improve.

## 2017-05-01 NOTE — DISCHARGE INSTRUCTIONS
Please follow up with Dr. William in two weeks and obtain vein mapping of lower extremities to check for possible bypass.

## 2017-05-01 NOTE — PROGRESS NOTES
Pt returned to room AAOx4 and denies pain.  BP elevated 207/95 and pulse 65.  Notified Dr. Zaidi.  Hydralazine ordered.  Will continue to monitor.

## 2017-05-01 NOTE — H&P (VIEW-ONLY)
Patient ID: hCau Rodarte is a 67 y.o. male.    I. HISTORY     Chief Complaint: Ulcers of bilateral feet    HPI 67 year old man with PAD s/p L SFA stent in May 2012 and R Fem-AK Pop bypass in June 2012 presents for eval of bilateral ulcers of the feet.    Both revascularization procedures were done for claudication, at [facility].  He was seen by us in 2014, at which point he had MARIO of .42 on the right and .64 on the L.  Despite that, he could ambulate well.  US suggested that SFA stent was occluded, but that bypass was patent.  CTA was ordered to evaluate the RLE vasculature, but he apparently did not have that done.    He returns today urged by his PCP and his podiatrist.  He was hospitalized in November for CHF and required cardiac stent x 3.  EF has since improved to 45%.  He was started on coumadin, which precipitated a GI bleed.    After the hospitalization for GI bleed, he noted pain and redness to his feet, 1.5 months ago.  Podiatry diagnosed him with ulcers of the Left heel and under his Right big toe.  Both of these were debrided by Dr. Bland.  Pictures are in the notes.  The left heel ulcer appears worse.    Currently, he does have claudication at 1-2 blocks.    Review of Systems   Constitution: Negative for chills and fever.   HENT: Negative for congestion and ear discharge.    Eyes: Negative for discharge and double vision.   Cardiovascular: Positive for claudication and dyspnea on exertion. Negative for cyanosis.   Respiratory: Negative for cough and hemoptysis.    Skin: Negative for color change and dry skin.       II. PHYSICAL EXAM     Physical Exam   Constitutional: He is oriented to person, place, and time. He appears well-developed and well-nourished.   HENT:   Head: Normocephalic and atraumatic.   Eyes: Conjunctivae are normal. Pupils are equal, round, and reactive to light.   Neck: Normal range of motion. Neck supple.   Cardiovascular: Normal rate and regular rhythm.    Pulmonary/Chest: Effort  normal and breath sounds normal.   Abdominal: Soft. Bowel sounds are normal.   Neurological: He is alert and oriented to person, place, and time.   Football dressings in place to bilateral feet- these were not taken down    III. ASSESSMENT & PLAN (MEDICAL DECISION MAKING)     1. Atherosclerosis of native arteries of the extremities with ulceration    2. CKD stage 3 due to type 2 diabetes mellitus        Imaging Results:    MARIO:  R L   .38 (.42) .60 (.64)     US lower extremity arterial: Right bypass graft occluded.  No flow identified.                                                Left SFA and SFA stent shows no flow, suggesting occlusion.    Assessment/Diagnosis and Plan:    67 year old man with poorly healing ulcers (L > R), occluded L SFA stent, and R occluded Fem- AK pop bypass    To cath lab on Monday 5/1 for LLE angiogram via R fem approach  This will aid planning of future operative revascularization   If amenable, may attempt to intervene on L SFA stent    Tyson Pinto MD  General Surgery, PGY-3  [phone]       Vascular Surgery Staff  I have seen and examined the patient and reviewed the residents note. I agree with their assessment and plan.    Long standing history of PAD last seen by me in 2014. Now with bilateral tissue loss left foot worse than right.  Plan left LE angio with possible intervention using CO2 and minimal dye given CKD.    I have explained the risks, benefits, medical treatments, and alternatives of the procedure in detail. Risks include but are not limited to bleeding, injury to vessel, kidney damage or failure, need for emergency surgery, limb loss or need for additional procedures or bypass surgery. The patient and his wife voiced understanding and all questions have been answered.  They agree to proceed as planned.    Nehal William MD FACS [facility]  Vascular & Endovascular Surgery

## 2017-05-01 NOTE — INTERVAL H&P NOTE
The patient has been examined and the H&P has been reviewed:    I concur with the findings and no changes have occurred since H&P was written.    Anesthesia/Surgery risks, benefits and alternative options discussed and understood by patient/family.          Active Hospital Problems    Diagnosis  POA    Atherosclerosis of native arteries of the extremities with ulceration [I70.25]  Yes      Resolved Hospital Problems    Diagnosis Date Resolved POA   No resolved problems to display.

## 2017-05-01 NOTE — NURSING
Pt is AAOx3 and in no apparent distress.  R groin site c/d/i and soft.  Provided a copy of discharge instructions.  Teaching performed.  Pt verbalized understanding and denied any questions.  Provided pt with prescriptions. PIV d/c catheter tip intact.  2x2 applied and no active bleeding noted.  Pt waiting for wheelchair to escort to garage.  Family at the bedside.

## 2017-05-02 ENCOUNTER — TELEPHONE (OUTPATIENT)
Dept: VASCULAR SURGERY | Facility: CLINIC | Age: 67
End: 2017-05-02

## 2017-05-02 ENCOUNTER — TELEPHONE (OUTPATIENT)
Dept: INTERNAL MEDICINE | Facility: CLINIC | Age: 67
End: 2017-05-02

## 2017-05-02 NOTE — DISCHARGE SUMMARY
[facility]  General Surgery  Discharge Summary      Patient Name: Chau Rodarte  MRN: [id number]  Admission Date: 5/1/2017  Hospital Length of Stay: 0 days  Discharge Date and Time:  05/02/2017 5:46 AM  Attending Physician: No att. providers found   Discharging Provider: Graciela Zaidi MD  Primary Care Provider: Riki Huynh MD     HPI:     Procedure(s) (LRB):  AORTOGRAM WITH RUNOFF, Right CFA access, Left LE runoff CarbonDioxide (Left)     Hospital Course:     Patient underwent diagnostic angiogram of the lower extremities, he was discharged the same day with instructions to follow up in two weeks with Dr. William and to have vein mapping done prior to discuss possible bypass surgery in [facility]     Consults:     Significant Diagnostic Studies: Labs: BMP: No results for input(s): GLU, NA, K, CL, CO2, BUN, CREATININE, CALCIUM, MG in the last 48 hours. and CBC No results for input(s): WBC, HGB, HCT, PLT in the last 48 hours.    Pending Diagnostic Studies:     None        Final Active Diagnoses:    Diagnosis Date Noted POA    PRINCIPAL PROBLEM:  Atherosclerosis of native arteries of the extremities with ulceration [I70.25] 04/26/2017 Yes      Problems Resolved During this Admission:    Diagnosis Date Noted Date Resolved POA      Discharged Condition: good    Disposition: Home or Self Care    Follow Up:  Follow-up Information     Follow up with Nehal William MD. Schedule an appointment as soon as possible for a visit in 2 weeks.    Specialty:  Vascular Surgery    Why:  For wound re-check    Contact information:    [address] [place] [id number]  [phone]          Patient Instructions:     VAS US Vein Mapping   Standing Status: Future  Standing Exp. Date: 05/01/18     Diet general     Activity as tolerated     Call MD for:  temperature >100.4     Call MD for:  persistent nausea and vomiting or diarrhea     Call MD for:  severe uncontrolled pain     Call MD for:  redness, tenderness, or  signs of infection (pain, swelling, redness, odor or green/yellow discharge around incision site)     Call MD for:  difficulty breathing or increased cough     Call MD for:  severe persistent headache       Medications:  Reconciled Home Medications:   Discharge Medication List as of 5/1/2017  5:18 PM      CONTINUE these medications which have NOT CHANGED    Details   aspirin 81 MG Chew Take 1 tablet (81 mg total) by mouth once daily., Starting 12/7/2016, Until Thu 12/7/17, OTC      atorvastatin (LIPITOR) 80 MG tablet Take 1 tablet (80 mg total) by mouth once daily., Starting 12/7/2016, Until Thu 12/7/17, Normal      cholecalciferol, vitamin D3, (VITAMIN D3) 5,000 unit Tab Take 5,000 Units by mouth once daily., Until Discontinued, Historical Med      clopidogrel (PLAVIX) 75 mg tablet Take 1 tablet (75 mg total) by mouth once daily., Starting 12/7/2016, Until Discontinued, Normal      diclofenac sodium 1 % Gel Apply 2 g topically 3 (three) times daily., Starting 4/7/2017, Until Discontinued, Normal      furosemide (LASIX) 40 MG tablet Take 1 tablet (40 mg total) by mouth once daily., Starting 12/7/2016, Until Thu 12/7/17, Normal      gabapentin (NEURONTIN) 300 MG capsule Take 300 mg by mouth 2 (two) times daily., Starting 3/22/2017, Until Discontinued, Historical Med      hydrocodone-acetaminophen 5-325mg (NORCO) 5-325 mg per tablet Take 1 tablet by mouth every 12 (twelve) hours as needed for Pain., Starting 3/28/2017, Until Discontinued, Normal      insulin detemir (LEVEMIR FLEXTOUCH) 100 unit/mL (3 mL) SubQ InPn pen Inject 16 Units into the skin every evening., Starting 12/7/2016, Until Discontinued, Normal      isosorbide-hydrALAZINE 20-37.5 mg (BIDIL) 20-37.5 mg Tab Take 1 tablet by mouth 3 (three) times daily., Starting 12/20/2016, Until Wed 12/20/17, Normal      liraglutide 0.6 mg/0.1 mL, 18 mg/3 mL, subq PNIJ (VICTOZA 2-SAGAR) 0.6 mg/0.1 mL (18 mg/3 mL) PnIj Inject 0.6 mg daily x1 week, then 1.2 mg daily x1 week,  "then 1.8 mg daily thereafter, Normal      metoprolol succinate (TOPROL-XL) 50 MG 24 hr tablet Take 1 tablet (50 mg total) by mouth once daily., Starting 12/20/2016, Until Wed 12/20/17, Normal      pantoprazole (PROTONIX) 20 MG tablet Take 2 tablets (40 mg total) by mouth once daily., Starting 3/22/2017, Until u 3/22/18, Normal      pen needle, diabetic (BD ULTRA-FINE HANG PEN NEEDLES) 32 gauge x 5/32" Ndle Uses 2 times daily, on  insulin injections, Normal             Graciela ERNESTINA Zaidi MD  General Surgery  [facility]  "

## 2017-05-02 NOTE — TELEPHONE ENCOUNTER
----- Message from José Miguel Collazo sent at 5/1/2017  9:06 AM CDT -----  Contact: Vladimir Spouse [phone]  Type: Orders Request    What orders/ testing are being requested? Scooter     Is there a future appointment scheduled for the patient with PCP? No    Comments: Advice    Thanks

## 2017-05-02 NOTE — TELEPHONE ENCOUNTER
Hi, I would need to see him back to discuss this in person, also he should check in with [employer] about where he could get a scooter.  Thank you, Riki Huynh

## 2017-05-02 NOTE — TELEPHONE ENCOUNTER
Hi,  We can discuss both his depression and scooter needs when I see him back.  Are you able to set up an appt for him to see me back.  Thank you, Riki Huynh

## 2017-05-02 NOTE — TELEPHONE ENCOUNTER
----- Message from Mary Crum sent at 5/2/2017 12:10 PM CDT -----  Contact: Cleaster/wife  Caller states pt needs to schedule consult, referral is in epic.Please call Vladimir back @ [phone].Thank you.

## 2017-05-02 NOTE — TELEPHONE ENCOUNTER
Hi, spoke with the patient he states that [facility] would like him to get a referral from his PCP for a scooter. Patient has already purchased an Electric Scooter by [employer] at [employer]. He also states that the insurance will then reimburse a percentage of what he paid but a referral is needed first. Patient's wife is concerned about patient being aggravated and has signs of depression due to his state of mind about his condition. Please advise

## 2017-05-02 NOTE — TELEPHONE ENCOUNTER
----- Message from Jorge Aguilarr sent at 5/2/2017 11:57 AM CDT -----  Contact: Cleaster/ Wife/ [phone] cell  Pt's wife would like to speak with someone in the office to discuss getting something for the pt's state of mind.  The pt is having a hard time adjusting to his medical situation and his wife wants to get some advisement and recommendations on getting the pt something like a medication to help him adjust.  She also wants to check the status of the request for a scooter that was put in on yesterday.  She would like to speak with someone in the office today, if possible.  Please call and advise.    Thank you

## 2017-05-04 ENCOUNTER — OFFICE VISIT (OUTPATIENT)
Dept: PODIATRY | Facility: CLINIC | Age: 67
End: 2017-05-04
Payer: MEDICARE

## 2017-05-04 VITALS
HEART RATE: 67 BPM | BODY MASS INDEX: 36.29 KG/M2 | SYSTOLIC BLOOD PRESSURE: 108 MMHG | WEIGHT: 245 LBS | DIASTOLIC BLOOD PRESSURE: 66 MMHG | HEIGHT: 69 IN

## 2017-05-04 DIAGNOSIS — I74.3 ARTERIAL EMBOLISM AND THROMBOSIS OF LOWER EXTREMITY: ICD-10-CM

## 2017-05-04 DIAGNOSIS — L89.622 DECUBITUS ULCER OF LEFT HEEL, STAGE 2: ICD-10-CM

## 2017-05-04 DIAGNOSIS — E11.51 TYPE 2 DIABETES MELLITUS WITH DIABETIC PERIPHERAL ANGIOPATHY WITHOUT GANGRENE, WITH LONG-TERM CURRENT USE OF INSULIN: ICD-10-CM

## 2017-05-04 DIAGNOSIS — L97.512 ULCER OF FOOT, RIGHT, WITH FAT LAYER EXPOSED: ICD-10-CM

## 2017-05-04 DIAGNOSIS — Z79.4 TYPE 2 DIABETES MELLITUS WITH DIABETIC PERIPHERAL ANGIOPATHY WITHOUT GANGRENE, WITH LONG-TERM CURRENT USE OF INSULIN: ICD-10-CM

## 2017-05-04 DIAGNOSIS — E11.42 DIABETIC POLYNEUROPATHY ASSOCIATED WITH TYPE 2 DIABETES MELLITUS: Primary | ICD-10-CM

## 2017-05-04 PROCEDURE — 99499 UNLISTED E&M SERVICE: CPT | Mod: S$GLB,,, | Performed by: PODIATRIST

## 2017-05-04 PROCEDURE — 3074F SYST BP LT 130 MM HG: CPT | Mod: S$GLB,,, | Performed by: PODIATRIST

## 2017-05-04 PROCEDURE — 99213 OFFICE O/P EST LOW 20 MIN: CPT | Mod: S$GLB,,, | Performed by: PODIATRIST

## 2017-05-04 PROCEDURE — 3045F PR MOST RECENT HEMOGLOBIN A1C LEVEL 7.0-9.0%: CPT | Mod: S$GLB,,, | Performed by: PODIATRIST

## 2017-05-04 PROCEDURE — 99999 PR PBB SHADOW E&M-EST. PATIENT-LVL III: CPT | Mod: PBBFAC,,, | Performed by: PODIATRIST

## 2017-05-04 PROCEDURE — 1160F RVW MEDS BY RX/DR IN RCRD: CPT | Mod: S$GLB,,, | Performed by: PODIATRIST

## 2017-05-04 PROCEDURE — 1125F AMNT PAIN NOTED PAIN PRSNT: CPT | Mod: S$GLB,,, | Performed by: PODIATRIST

## 2017-05-04 PROCEDURE — 3078F DIAST BP <80 MM HG: CPT | Mod: S$GLB,,, | Performed by: PODIATRIST

## 2017-05-04 PROCEDURE — 1159F MED LIST DOCD IN RCRD: CPT | Mod: S$GLB,,, | Performed by: PODIATRIST

## 2017-05-04 RX ORDER — HYDROCODONE BITARTRATE AND ACETAMINOPHEN 5; 325 MG/1; MG/1
1 TABLET ORAL EVERY 6 HOURS PRN
Qty: 60 TABLET | Refills: 0 | Status: ON HOLD | OUTPATIENT
Start: 2017-05-04 | End: 2017-06-16 | Stop reason: HOSPADM

## 2017-05-04 NOTE — PROGRESS NOTES
Subjective:      Patient ID: Chau Rodarte is a 67 y.o. male.    Chief Complaint: Wound Check (Bilateral Foot) and Medication Refill (Norco 5/325)    Chau DAMIAN is a 67 y.o. male who presents to the clinic for evaluation and treatment of high risk feet. Chau DAMIAN has a past medical history of Acute on chronic systolic CHF (congestive heart failure) (11/29/2016); Anticoagulant long-term use; CKD (chronic kidney disease) stage 2, GFR 60-89 ml/min (2/21/2016); Coronary artery disease; Encounter for blood transfusion; HTN (hypertension) (3/3/2014); Hyperlipidemia (3/3/2014); NSTEMI (non-ST elevated myocardial infarction) (11/28/2016); Obesity (BMI 30-39.9) (11/29/2016); PVD (peripheral vascular disease); Stroke; Type 2 diabetes mellitus with diabetic peripheral angiopathy without gangrene, without long-term current use of insulin (10/24/2013); and Type 2 diabetes mellitus with diabetic polyneuropathy, without long-term current use of insulin (11/29/2016).  This patient has documented high risk feet requiring routine maintenance secondary to peripheral vascular disease. Complains moderate pain to back of left heel and mild right heel. Relates prior reaction to coumadin. History of right LE bypass and endovascular intervention with stenting left. Says the discoloration to both his feet is improving, however pain has progressed > 1 week. Wearing flip flops today. Uses a motorized scooter to get around.    4/24/17: No new complaints. Says he was on his feet a lot this past weekend. He wants to return to work. Accompanied by his mother and wife.    5/4/17: Presents for wound check. History of attempted revascularization per Dr. William. Requires fem-pop bypass per Dr. William. Relates pain is not managed currently.     PCP: Riki Huynh MD    Date Last Seen by PCP:     Current shoe gear:  Affected Foot: Flip-flops     Unaffected Foot: Flip-flops    Hemoglobin A1C   Date Value Ref Range Status   02/27/2017 7.1 (H) 4.5 - 6.2 % Final      Comment:     According to ADA guidelines, hemoglobin A1C <7.0% represents  optimal control in non-pregnant diabetic patients.  Different  metrics may apply to specific populations.   Standards of Medical Care in Diabetes - 2016.  For the purpose of screening for the presence of diabetes:  <5.7%     Consistent with the absence of diabetes  5.7-6.4%  Consistent with increasing risk for diabetes   (prediabetes)  >or=6.5%  Consistent with diabetes  Currently no consensus exists for use of hemoglobin A1C  for diagnosis of diabetes for children.     01/12/2017 8.2 (H) 4.5 - 6.2 % Final     Comment:     According to ADA guidelines, hemoglobin A1C <7.0% represents  optimal control in non-pregnant diabetic patients.  Different  metrics may apply to specific populations.   Standards of Medical Care in Diabetes - 2016.  For the purpose of screening for the presence of diabetes:  <5.7%     Consistent with the absence of diabetes  5.7-6.4%  Consistent with increasing risk for diabetes   (prediabetes)  >or=6.5%  Consistent with diabetes  Currently no consensus exists for use of hemoglobin A1C  for diagnosis of diabetes for children.     11/28/2016 9.2 (H) 4.5 - 6.2 % Final     Comment:     According to ADA guidelines, hemoglobin A1C <7.0% represents  optimal control in non-pregnant diabetic patients.  Different  metrics may apply to specific populations.   Standards of Medical Care in Diabetes - 2016.  For the purpose of screening for the presence of diabetes:  <5.7%     Consistent with the absence of diabetes  5.7-6.4%  Consistent with increasing risk for diabetes   (prediabetes)  >or=6.5%  Consistent with diabetes  Currently no consensus exists for use of hemoglobin A1C  for diagnosis of diabetes for children.         Review of Systems   Constitution: Negative for chills and fever.   Cardiovascular: Positive for claudication and leg swelling. Negative for chest pain.   Respiratory: Negative for cough and shortness of  breath.    Skin: Positive for color change, dry skin and poor wound healing.   Gastrointestinal: Negative for nausea and vomiting.   Neurological: Positive for paresthesias.   Psychiatric/Behavioral: Negative for altered mental status.           Objective:      Physical Exam   Constitutional: He is oriented to person, place, and time. No distress.   Cardiovascular:   Pulses:       Dorsalis pedis pulses are 0 on the right side, and 0 on the left side.        Posterior tibial pulses are 1+ on the right side, and 1+ on the left side.   CFT< 3 secs all toes bilateral foot, skin temp warm bilateral foot, no hair growth bilateral lower extremity, moderate brawny lower extremity edema bilateral.     Musculoskeletal:        Right foot: There is decreased range of motion.        Left foot: There is decreased range of motion.   Localized pain to ecchymotic patches of skin bilateral foot.    Pes planus foot type bilateral.    + equinus bilateral.   Feet:   Right Foot:   Protective Sensation: 10 sites tested. 5 sites sensed.   Left Foot:   Protective Sensation: 10 sites tested. 6 sites sensed.   Neurological: He is alert and oriented to person, place, and time. He has normal strength. A sensory deficit is present.   Skin: Skin is warm and dry. Ecchymosis noted. No rash noted. He is not diaphoretic. No cyanosis or erythema. Nails show no clubbing.   Ulceration posterior left heel with central eschar, surrounding pink epithelial tissue and moderate slough of the surrounding margins, no active drainage, no odor, no erythema. Measures 3.4x4.5x0.1cm.    Plantar first metatarsal head ulceration with mixed granular and fibrous base, new epithelial tissue proximally, no drainage, surrounding skin is healthy, no surrounding erythema, no odor. Measures 1.4x1.1x0.1cm.    Nails 1-5 bilateral are thickened and well trimmed.     Skin is thin and atrophied bilateral lower extremity.           Assessment:       Encounter Diagnoses   Name  Primary?   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    Diagnoses and all orders for this visit:    Diabetic polyneuropathy associated with type 2 diabetes mellitus  -     Ambulatory Referral to Wound Clinic  -     SUBSEQUENT HOME HEALTH ORDERS    Type 2 diabetes mellitus with diabetic peripheral angiopathy without gangrene, with long-term current use of insulin  -     SUBSEQUENT HOME HEALTH ORDERS    Decubitus ulcer of left heel, stage 2  -     Ambulatory Referral to Wound Clinic  -     SUBSEQUENT HOME HEALTH ORDERS    Ulcer of foot, right, with fat layer exposed  -     Ambulatory Referral to Wound Clinic  -     SUBSEQUENT HOME HEALTH ORDERS    Arterial embolism and thrombosis of lower extremity  -     hydrocodone-acetaminophen 5-325mg (NORCO) 5-325 mg per tablet; Take 1 tablet by mouth every 6 (six) hours as needed for Pain.  -     SUBSEQUENT HOME HEALTH ORDERS    Other orders  -     collagenase (SANTYL) ointment; Apply topically once daily.      I counseled the patient on his conditions, their implications and medical management.    Shoe inspection. Diabetic Foot Education. Patient reminded of the importance of good nutrition and blood sugar control to help prevent podiatric complications of diabetes. Patient instructed on proper foot hygeine. We discussed wearing proper shoe gear, daily foot inspections, never walking without protective shoe gear, never putting sharp instruments to feet, routine podiatric nail visits every 2-3 months.      Dressings removed bilateral foot. Feet cleansed with hibiclens scrub. Applied betadine to wound  sites and covered with mepilex foam, wrapped 2 layers coban secured with flexinet. HH orders updated. MWBAT as discussed mainly for transfers.    Pending revascularization.    Continue monitoring for now.    RTC 2 weeks or prn.

## 2017-05-04 NOTE — OP NOTE
DATE OF PROCEDURE:  05/01/2017.    PREOPERATIVE DIAGNOSES:  1.  Atherosclerosis of bilateral lower extremities with ulceration and   nonhealing wounds.  2.  Chronic kidney disease, stage III.    POSTOPERATIVE DIAGNOSES:  1.  Atherosclerosis of bilateral lower extremities with ulceration and   nonhealing wounds.  2.  Chronic kidney disease, stage III.    PROCEDURES PERFORMED:  1.  Abdominal aortogram plus bilateral lower extremity runoff.  2.  Ultrasound-guided right common femoral artery access.  3.  Moderate sedation, 60 minutes.    SURGEON:  Nehal William M.D.    ASSISTANT:  Graciela Kim M.D. (RES).    ANESTHESIA:  IV moderate sedation.    FINDINGS:  Intervention for nonhealing bilateral foot wounds.  I was unable to   enter left SFA flush occlusion.  Needs a left fem-below-knee-pop bypass.    ESTIMATED BLOOD LOSS:  5 mL.    CONTRAST:  400 mL carbon dioxide, 8 mL Visipaque.    COMPLICATIONS:  None.    SPECIMEN:  None.    DISPOSITION:  Home.    HISTORY:  Mr. Rodarte is a 67-year-old gentleman with severe peripheral vascular   disease, who I have followed in the past.  He has occluded left SFA stents and   an occluded right fem-above-knee-pop bypass.  He has now developed a left heel   ulcer and right toe wounds.  His left heel ulcer is worse than the right toe   wounds.  He was counseled on benefits and risks of angiogram and possible   intervention.  He understood the benefits and risks of the operation.  Carbon   dioxide gas was used given the patient's chronic kidney disease.    PROCEDURE IN DETAIL:  After obtaining consent, he was brought to the Cath Lab.    IV moderate sedation was administered.  I was present throughout the   administration of the IV sedation and monitored the patient's cardiorespiratory   status.  Total moderate sedation time was approximately 60 minutes.  Under   ultrasound guidance, we accessed the right common femoral artery.  The vessel   was widely patent and suitable for access.   There was dense scar tissue in the   right groin which made access significantly more difficult.  An Amplatz wire was   used.  The track was serially dilated and a 6-[place] sheath was passed.  A   permanent recording was made and placed in the patient's chart.  Hydrophilic   catheter was passed into the abdominal aorta and we performed an aortogram using   carbon dioxide gas.  The distal aorta, common iliac, internal iliac and   external iliac arteries were patent and free of significant disease.  We then   passed the hydrophilic wire up and over the aortic bifurcation, placed the   catheter to the left common femoral artery, performed our left lower extremity   angiogram, showed the common femoral and profunda femoral to be widely patent   and robust.  The SFA was occluded at its origin and reconstituted above the knee   from muscular collaterals.  Distally, the popliteal artery was opened and there   appeared to be 2-vessel runoff to the foot.  We exchanged for a long   hydrophilic sheath, attempted to enter the occluded SFA; however, we could not   gain purchase in the SFA and the wire and catheter kept going down the profunda.    We aborted these attempts out of fear of dissecting the common femoral and   occluding the profunda.  The wires and catheters were then removed.  From the   catheter placed in the right external iliac artery, we performed a right lower   extremity angiogram, which showed that the right common femoral and profunda   were widely patent.  The bypass was occluded.  The reconstitution of tibial   vessels below the knee was faintly seen.  At this point, a Mynx closure was used   in the right groin for adequate hemostasis.  Protamine was not given to reverse   our systemic heparinization.  The patient and his wife were counseled that he   needs a preoperative workup and vein mapping for a possible left leg bypass.      MARI/VIKKI  dd: 05/04/2017 09:10:02 (CDT)  td: 05/04/2017 12:49:22 (CDT)  Doc  ID   #[id number]  Job ID #[id number]    CC:

## 2017-05-04 NOTE — MR AVS SNAPSHOT
[facility]   [place]  [place] [address]  Phone: [phone]                  Chau Rodarte   2017 9:00 AM   Office Visit    Description:  Male : 1950   Provider:  Quirino Bland DPM   Department:  [facility]           Reason for Visit     Wound Check     Medication Refill           Diagnoses this Visit        Comments    Diabetic polyneuropathy associated with type 2 diabetes mellitus    -  Primary     Type 2 diabetes mellitus with diabetic peripheral angiopathy without gangrene, with long-term current use of insulin         Decubitus ulcer of left heel, stage 2         Ulcer of foot, right, with fat layer exposed         Arterial embolism and thrombosis of lower extremity                To Do List           Future Appointments        Provider Department Dept Phone    2017 8:00 AM JANESSA KENNER [facility] [phone]    5/10/2017 1:00 PM MD Jayme Willoughby [facility] - Internal Medicine [phone]    2017 3:30 PM AR[facility] - Endo/Diab/Metab [phone]    2017 8:45 AM Quirino Bland DPM [facility] [phone]    2017 8:00 AM VASCULAR, LAB [facility] - Vascular Laboratory [phone]      Goals (5 Years of Data)     None      Follow-Up and Disposition     Return in about 2 weeks (around 2017).       These Medications        Disp Refills Start End    hydrocodone-acetaminophen 5-325mg (NORCO) 5-325 mg per tablet 60 tablet 0 2017     Take 1 tablet by mouth every 6 (six) hours as needed for Pain. - Oral    Pharmacy: [facility]/pharmacy #[id number] - [place] - [address] Ph #: [phone]         [facility]     [facility] Care Line - / Assistance  Unless otherwise directed by your provider, please contact [facility], our nurse care line that is available for / assistance.     Registered nurses in the [facility] provide: appointment  scheduling, clinical advisement, health education, and other advisory services.  Call: [phone] (toll free)               Medications           Message regarding Medications     Verify the changes and/or additions to your medication regime listed below are the same as discussed with your clinician today.  If any of these changes or additions are incorrect, please notify your healthcare provider.        CHANGE how you are taking these medications     Start Taking Instead of    hydrocodone-acetaminophen 5-325mg (NORCO) 5-325 mg per tablet hydrocodone-acetaminophen 5-325mg (NORCO) 5-325 mg per tablet    Dosage:  Take 1 tablet by mouth every 6 (six) hours as needed for Pain. Dosage:  Take 1 tablet by mouth every 12 (twelve) hours as needed for Pain.    Reason for Change:  Reorder            Verify that the below list of medications is an accurate representation of the medications you are currently taking.  If none reported, the list may be blank. If incorrect, please contact your healthcare provider. Carry this list with you in case of emergency.           Current Medications     aspirin 81 MG Chew Take 1 tablet (81 mg total) by mouth once daily.    atorvastatin (LIPITOR) 80 MG tablet Take 1 tablet (80 mg total) by mouth once daily.    cholecalciferol, vitamin D3, (VITAMIN D3) 5,000 unit Tab Take 5,000 Units by mouth once daily.    clopidogrel (PLAVIX) 75 mg tablet Take 1 tablet (75 mg total) by mouth once daily.    diclofenac sodium 1 % Gel Apply 2 g topically 3 (three) times daily.    furosemide (LASIX) 40 MG tablet Take 1 tablet (40 mg total) by mouth once daily.    gabapentin (NEURONTIN) 300 MG capsule Take 300 mg by mouth 2 (two) times daily.    hydrocodone-acetaminophen 5-325mg (NORCO) 5-325 mg per tablet Take 1 tablet by mouth every 6 (six) hours as needed for Pain.    insulin detemir (LEVEMIR FLEXTOUCH) 100 unit/mL (3 mL) SubQ InPn pen Inject 16 Units into the skin every evening.    isosorbide-hydrALAZINE  "20-37.5 mg (BIDIL) 20-37.5 mg Tab Take 1 tablet by mouth 3 (three) times daily.    liraglutide 0.6 mg/0.1 mL, 18 mg/3 mL, subq PNIJ (VICTOZA 2-SAGAR) 0.6 mg/0.1 mL (18 mg/3 mL) PnIj Inject 0.6 mg daily x1 week, then 1.2 mg daily x1 week, then 1.8 mg daily thereafter    metoprolol succinate (TOPROL-XL) 50 MG 24 hr tablet Take 1 tablet (50 mg total) by mouth once daily.    pantoprazole (PROTONIX) 20 MG tablet Take 2 tablets (40 mg total) by mouth once daily.    pen needle, diabetic ([employer] ULTRA-FINE HANG PEN NEEDLES) 32 gauge x 5/32" Ndle Uses 2 times daily, on  insulin injections           Clinical Reference Information           Your Vitals Were     BP Pulse Height Weight BMI    108/66 67 5' 9" (1.753 m) 111.1 kg (245 lb) 36.18 kg/m2      Blood Pressure          Most Recent Value    BP  108/66      Allergies as of 5/4/2017     No Known Allergies      Immunizations Administered on Date of Encounter - 5/4/2017     None      Orders Placed During Today's Visit      Normal Orders This Visit    Ambulatory Referral to Wound Clinic       Language Assistance Services     ATTENTION: Language assistance services are available, free of charge. Please call [phone].      ATENCIÓN: Si habla español, tiene a zaldivar disposición servicios gratuitos de asistencia lingüística. Llame al [phone].     CHÚ Ý: N?u b?n nói Ti?ng Vi?t, có các d?ch v? h? tr? ngôn ng? mi?n phí dành cho b?n. G?i s? [phone].         [facility] complies with applicable Federal civil rights laws and does not discriminate on the basis of race, color, national origin, age, disability, or sex.        "

## 2017-05-04 NOTE — BRIEF OP NOTE
[facility]  Brief Operative Note    SUMMARY     Surgery Date: 5/1/2017     Surgeon(s) and Role:     * Nehal William MD - Primary    Assisting Surgeon: None    Pre-op Diagnosis:  Atherosclerosis of native arteries of the extremities with ulceration [I70.25]  CKD stage 3 due to type 2 diabetes mellitus [E11.22, N18.3]    Post-op Diagnosis:  Same    Procedure:  Aortogram + Bilateral LE runoff     US Right CFA access    Anesthesia: RN IV Sedation    Description of Procedure: Intervention for nonhealing foot wounds    Description of the findings of the procedure: Unable to enter left SFA flush occlusion. Needs Left Fem-BK Pop bypass.    Estimated Blood Loss: 5mL         Contrast:  400cc CO2, 8ml contrast.    Specimens:   Specimen     None

## 2017-05-09 ENCOUNTER — LAB VISIT (OUTPATIENT)
Dept: LAB | Facility: HOSPITAL | Age: 67
End: 2017-05-09
Attending: INTERNAL MEDICINE
Payer: MEDICARE

## 2017-05-09 DIAGNOSIS — E11.42 TYPE 2 DIABETES MELLITUS WITH DIABETIC POLYNEUROPATHY, WITHOUT LONG-TERM CURRENT USE OF INSULIN: Chronic | ICD-10-CM

## 2017-05-09 LAB
25(OH)D3+25(OH)D2 SERPL-MCNC: 54 NG/ML
ALBUMIN SERPL BCP-MCNC: 3.2 G/DL
ANION GAP SERPL CALC-SCNC: 9 MMOL/L
BUN SERPL-MCNC: 39 MG/DL
CALCIUM SERPL-MCNC: 9 MG/DL
CHLORIDE SERPL-SCNC: 104 MMOL/L
CO2 SERPL-SCNC: 27 MMOL/L
CREAT SERPL-MCNC: 2.8 MG/DL
EST. GFR  (AFRICAN AMERICAN): 25.6 ML/MIN/1.73 M^2
EST. GFR  (NON AFRICAN AMERICAN): 22.2 ML/MIN/1.73 M^2
ESTIMATED AVG GLUCOSE: 137 MG/DL
GLUCOSE SERPL-MCNC: 109 MG/DL
HBA1C MFR BLD HPLC: 6.4 %
PHOSPHATE SERPL-MCNC: 3.9 MG/DL
POTASSIUM SERPL-SCNC: 4.3 MMOL/L
SODIUM SERPL-SCNC: 140 MMOL/L
TSH SERPL DL<=0.005 MIU/L-ACNC: 0.67 UIU/ML

## 2017-05-09 PROCEDURE — 82306 VITAMIN D 25 HYDROXY: CPT

## 2017-05-09 PROCEDURE — 80069 RENAL FUNCTION PANEL: CPT

## 2017-05-09 PROCEDURE — 36415 COLL VENOUS BLD VENIPUNCTURE: CPT | Mod: PO

## 2017-05-09 PROCEDURE — 83036 HEMOGLOBIN GLYCOSYLATED A1C: CPT

## 2017-05-09 PROCEDURE — 84443 ASSAY THYROID STIM HORMONE: CPT

## 2017-05-10 ENCOUNTER — OFFICE VISIT (OUTPATIENT)
Dept: INTERNAL MEDICINE | Facility: CLINIC | Age: 67
End: 2017-05-10
Payer: MEDICARE

## 2017-05-10 ENCOUNTER — TELEPHONE (OUTPATIENT)
Dept: PODIATRY | Facility: CLINIC | Age: 67
End: 2017-05-10

## 2017-05-10 VITALS
HEART RATE: 63 BPM | BODY MASS INDEX: 35.07 KG/M2 | WEIGHT: 236.75 LBS | DIASTOLIC BLOOD PRESSURE: 64 MMHG | HEIGHT: 69 IN | SYSTOLIC BLOOD PRESSURE: 134 MMHG

## 2017-05-10 DIAGNOSIS — I73.9 PVD (PERIPHERAL VASCULAR DISEASE): ICD-10-CM

## 2017-05-10 DIAGNOSIS — I63.9 CRYPTOGENIC STROKE: Chronic | ICD-10-CM

## 2017-05-10 DIAGNOSIS — Z79.01 LONG TERM (CURRENT) USE OF ANTICOAGULANTS: ICD-10-CM

## 2017-05-10 DIAGNOSIS — I10 ESSENTIAL HYPERTENSION: ICD-10-CM

## 2017-05-10 DIAGNOSIS — E78.5 HYPERLIPIDEMIA, UNSPECIFIED HYPERLIPIDEMIA TYPE: ICD-10-CM

## 2017-05-10 DIAGNOSIS — Z23 NEED FOR STREPTOCOCCUS PNEUMONIAE VACCINATION: ICD-10-CM

## 2017-05-10 DIAGNOSIS — I50.20 BENIGN HYPERTENSIVE HEART AND KIDNEY DISEASE WITH SYSTOLIC CHF, NYHA CLASS 2 AND CKD STAGE 3: ICD-10-CM

## 2017-05-10 DIAGNOSIS — I70.25 ATHEROSCLEROSIS OF NATIVE ARTERIES OF THE EXTREMITIES WITH ULCERATION: Primary | ICD-10-CM

## 2017-05-10 DIAGNOSIS — N18.30 CKD STAGE 3 DUE TO TYPE 2 DIABETES MELLITUS: ICD-10-CM

## 2017-05-10 DIAGNOSIS — I48.3 TYPICAL ATRIAL FLUTTER: Chronic | ICD-10-CM

## 2017-05-10 DIAGNOSIS — D62 ACUTE BLOOD LOSS ANEMIA: ICD-10-CM

## 2017-05-10 DIAGNOSIS — E66.9 OBESITY (BMI 30-39.9): Chronic | ICD-10-CM

## 2017-05-10 DIAGNOSIS — K92.2 LOWER GI BLEED: ICD-10-CM

## 2017-05-10 DIAGNOSIS — I50.22 CHRONIC SYSTOLIC HEART FAILURE: ICD-10-CM

## 2017-05-10 DIAGNOSIS — E11.22 CKD STAGE 3 DUE TO TYPE 2 DIABETES MELLITUS: ICD-10-CM

## 2017-05-10 DIAGNOSIS — I21.4 NSTEMI (NON-ST ELEVATED MYOCARDIAL INFARCTION): ICD-10-CM

## 2017-05-10 DIAGNOSIS — E11.42 TYPE 2 DIABETES MELLITUS WITH DIABETIC POLYNEUROPATHY, WITH LONG-TERM CURRENT USE OF INSULIN: ICD-10-CM

## 2017-05-10 DIAGNOSIS — I51.7 CARDIOMEGALY: ICD-10-CM

## 2017-05-10 DIAGNOSIS — N18.30 BENIGN HYPERTENSIVE HEART AND KIDNEY DISEASE WITH SYSTOLIC CHF, NYHA CLASS 2 AND CKD STAGE 3: ICD-10-CM

## 2017-05-10 DIAGNOSIS — I25.5 ISCHEMIC CARDIOMYOPATHY: ICD-10-CM

## 2017-05-10 DIAGNOSIS — Z11.59 NEED FOR HEPATITIS C SCREENING TEST: ICD-10-CM

## 2017-05-10 DIAGNOSIS — I48.0 PAROXYSMAL ATRIAL FIBRILLATION: ICD-10-CM

## 2017-05-10 DIAGNOSIS — I25.10 CORONARY ARTERY DISEASE INVOLVING NATIVE CORONARY ARTERY OF NATIVE HEART WITHOUT ANGINA PECTORIS: ICD-10-CM

## 2017-05-10 DIAGNOSIS — Z95.828 S/P FEMORAL-POPLITEAL BYPASS SURGERY: ICD-10-CM

## 2017-05-10 DIAGNOSIS — I13.0 BENIGN HYPERTENSIVE HEART AND KIDNEY DISEASE WITH SYSTOLIC CHF, NYHA CLASS 2 AND CKD STAGE 3: ICD-10-CM

## 2017-05-10 DIAGNOSIS — Z79.4 TYPE 2 DIABETES MELLITUS WITH DIABETIC POLYNEUROPATHY, WITH LONG-TERM CURRENT USE OF INSULIN: ICD-10-CM

## 2017-05-10 DIAGNOSIS — N17.9 AKI (ACUTE KIDNEY INJURY): ICD-10-CM

## 2017-05-10 LAB
CREAT UR-MCNC: 141 MG/DL
MICROALBUMIN UR DL<=1MG/L-MCNC: 66 UG/ML
MICROALBUMIN/CREATININE RATIO: 46.8 UG/MG

## 2017-05-10 PROCEDURE — 99214 OFFICE O/P EST MOD 30 MIN: CPT | Mod: 25,S$GLB,, | Performed by: INTERNAL MEDICINE

## 2017-05-10 PROCEDURE — 3078F DIAST BP <80 MM HG: CPT | Mod: S$GLB,,, | Performed by: INTERNAL MEDICINE

## 2017-05-10 PROCEDURE — G0009 ADMIN PNEUMOCOCCAL VACCINE: HCPCS | Mod: 59,S$GLB,, | Performed by: INTERNAL MEDICINE

## 2017-05-10 PROCEDURE — 82570 ASSAY OF URINE CREATININE: CPT

## 2017-05-10 PROCEDURE — 1159F MED LIST DOCD IN RCRD: CPT | Mod: S$GLB,,, | Performed by: INTERNAL MEDICINE

## 2017-05-10 PROCEDURE — 90670 PCV13 VACCINE IM: CPT | Mod: S$GLB,,, | Performed by: INTERNAL MEDICINE

## 2017-05-10 PROCEDURE — 99499 UNLISTED E&M SERVICE: CPT | Mod: S$GLB,,, | Performed by: INTERNAL MEDICINE

## 2017-05-10 PROCEDURE — 1160F RVW MEDS BY RX/DR IN RCRD: CPT | Mod: S$GLB,,, | Performed by: INTERNAL MEDICINE

## 2017-05-10 PROCEDURE — 99999 PR PBB SHADOW E&M-EST. PATIENT-LVL III: CPT | Mod: PBBFAC,,, | Performed by: INTERNAL MEDICINE

## 2017-05-10 PROCEDURE — 1126F AMNT PAIN NOTED NONE PRSNT: CPT | Mod: S$GLB,,, | Performed by: INTERNAL MEDICINE

## 2017-05-10 PROCEDURE — 3044F HG A1C LEVEL LT 7.0%: CPT | Mod: S$GLB,,, | Performed by: INTERNAL MEDICINE

## 2017-05-10 NOTE — PROGRESS NOTES
Subjective:       Patient ID: Chau Rodarte is a 67 y.o. male.    Chief Complaint: Follow-up    HPI Comments: He wants a motorized scooter.    Has ulcers on his feet, has home health helping with wound care.    Needs bypass fem surgery 2/2 PAD.    dm2 well controlled.    Review of Systems   Constitutional: Positive for activity change. Negative for fatigue, fever and unexpected weight change.   HENT: Negative for congestion.    Respiratory: Negative for cough, chest tightness, shortness of breath and wheezing.    Cardiovascular: Negative for chest pain, palpitations and leg swelling.   Gastrointestinal: Negative for abdominal distention, abdominal pain, anal bleeding, blood in stool (resolved), nausea and vomiting.   Genitourinary: Negative for decreased urine volume and difficulty urinating.   Musculoskeletal: Negative for arthralgias and neck pain.   Skin: Positive for rash and wound.   Neurological: Negative for tremors, syncope, weakness and numbness.   Hematological: Negative for adenopathy. Does not bruise/bleed easily.   Psychiatric/Behavioral: Negative for confusion.       Objective:      Physical Exam   Constitutional: He appears well-developed and well-nourished. No distress.   Nontoxic appearing   HENT:   Head: Normocephalic and atraumatic.   Mouth/Throat: No oropharyngeal exudate.   Eyes: EOM are normal. Pupils are equal, round, and reactive to light. No scleral icterus.   Cardiovascular: Normal rate, regular rhythm and normal heart sounds.    Pulmonary/Chest: Effort normal. He has wheezes (mild).   Abdominal: Soft. Bowel sounds are normal. He exhibits no distension and no mass. There is no tenderness. There is no rebound and no guarding. No hernia.   Musculoskeletal:   bilat feet bandaged   Skin: He is not diaphoretic.   Psychiatric: He has a normal mood and affect. His behavior is normal.       Assessment:       1. Need for Streptococcus pneumoniae vaccination    2. Atherosclerosis of native arteries of  the extremities with ulceration    3. PVD (peripheral vascular disease)    4. Paroxysmal atrial fibrillation    5. Lower GI bleed    6. CKD stage 3 due to type 2 diabetes mellitus    7. Acute blood loss anemia    8. Long term (current) use of anticoagulants [Z79.01]    9. Typical atrial flutter    10. Cryptogenic stroke, remote history (2006)    11. Coronary artery disease involving native coronary artery of native heart without angina pectoris    12. Ischemic cardiomyopathy    13. CORRY (acute kidney injury)    14. Cardiomegaly    15. Type 2 diabetes mellitus with diabetic polyneuropathy, with long-term current use of insulin    16. Chronic systolic heart failure    17. Obesity (BMI 30-39.9)    18. NSTEMI (non-ST elevated myocardial infarction)    19. 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, NYHA class 2 and CKD stage 3    20. Essential hypertension    21. Hyperlipidemia, unspecified hyperlipidemia type    22. S/P femoral-popliteal bypass surgery    23. Need for hepatitis C screening test        Plan:       Chau DAMIAN was seen today for follow-up.    Diagnoses and all orders for this visit:    Here for f/u.    He is chronically disabled 2/2 PAD and needs a motorized POV/wheel chair.    Need for Streptococcus pneumoniae vaccination  -     Pneumococcal Conjugate Vaccine (13 Valent) (IM)    Atherosclerosis of native arteries of the extremities with ulceration  -     WHEELCHAIR FOR HOME USE  PVD (peripheral vascular disease)  -     WHEELCHAIR FOR HOME USE  Paroxysmal atrial fibrillation  -     WHEELCHAIR FOR HOME USE  Lower GI bleed  -     WHEELCHAIR FOR HOME USE  CKD stage 3 due to type 2 diabetes mellitus  -     WHEELCHAIR FOR HOME USE  Acute blood loss anemia  -     WHEELCHAIR FOR HOME USE  Long term (current) use of anticoagulants [Z79.01]  -     WHEELCHAIR FOR HOME USE  Typical atrial flutter  -     WHEELCHAIR FOR HOME USE  Cryptogenic stroke, remote history (2006)  -     WHEELCHAIR FOR HOME  USE  Coronary artery disease involving native coronary artery of native heart without angina pectoris  -     WHEELCHAIR FOR HOME USE  Ischemic cardiomyopathy  -     WHEELCHAIR FOR HOME USE  CORRY (acute kidney injury)  -     WHEELCHAIR FOR HOME USE  Cardiomegaly  -     WHEELCHAIR FOR HOME USE  Type 2 diabetes mellitus with diabetic polyneuropathy, with long-term current use of insulin  -     WHEELCHAIR FOR HOME USE  -     Microalbumin/creatinine urine ratio  Chronic systolic heart failure  -     WHEELCHAIR FOR HOME US  Obesity (BMI 30-39.9)  -     WHEELCHAIR FOR HOME USE  NSTEMI (non-ST elevated myocardial infarction)  -     WHEELCHAIR FOR HOME USE  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, NYHA class 2 and CKD stage 3  -     WHEELCHAIR FOR HOME USE  Essential hypertension  -     WHEELCHAIR FOR HOME USE  Hyperlipidemia, unspecified hyperlipidemia type  -     WHEELCHAIR FOR HOME USE  S/P femoral-popliteal bypass surgery  -     WHEELCHAIR FOR HOME USE  LOMN for motorized wheelchair.    Need for hepatitis C screening test  -     Hepatitis C antibody; Future    Return in about 6 months (around 11/10/2017).

## 2017-05-10 NOTE — LETTER
May 10, 2017    Chau Rodarte  [address] [place] [address]             [facility]  [address]  [facility] [address]  Phone: [phone]  Fax: [phone] Dear Mr. Rodarte/This letter is to certify that I am the above named patient's primary care doctor:    A motorized scooter is medically necessary for Mr. Chau Rodarte due to his chronic conditions that severely limit his ability to walk.    Patient Active Problem List   Diagnosis    HLD (hyperlipidemia)    S/P femoral-popliteal bypass surgery    Essential hypertension    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, NYHA class 2 and CKD stage 3    NSTEMI (non-ST elevated myocardial infarction)    Type 2 diabetes mellitus with diabetic polyneuropathy, with long-term current use of insulin    Chronic systolic heart failure    Obesity (BMI 30-39.9)    Cardiomegaly    CORRY (acute kidney injury)    Coronary artery disease involving native coronary artery of native heart without angina pectoris    Ischemic cardiomyopathy    Typical atrial flutter    Cryptogenic stroke, remote history (2006)    Long term (current) use of anticoagulants [Z79.01]    Lower GI bleed    CKD stage 3 due to type 2 diabetes mellitus    Acute blood loss anemia    AF (atrial fibrillation)    PVD (peripheral vascular disease)    Atherosclerosis of native arteries of the extremities with ulceration           If you have any questions or concerns, please don't hesitate to call.    Sincerely,        Riki Huynh MD

## 2017-05-10 NOTE — TELEPHONE ENCOUNTER
----- Message from Matilda Granados sent at 5/10/2017  1:29 PM CDT -----  Contact: [facility]/Annette/[phone]  When was the patient's last appt and they need a copy of the last wound care orders faxed to [phone].

## 2017-05-10 NOTE — Clinical Note
Dr. Tommie Perera, [facility] -- [phone]. Please call to get last eye eval. Thank you, Riki Huynh

## 2017-05-16 ENCOUNTER — OFFICE VISIT (OUTPATIENT)
Dept: ENDOCRINOLOGY | Facility: CLINIC | Age: 67
End: 2017-05-16
Payer: MEDICARE

## 2017-05-16 VITALS
SYSTOLIC BLOOD PRESSURE: 130 MMHG | DIASTOLIC BLOOD PRESSURE: 78 MMHG | WEIGHT: 237.63 LBS | HEART RATE: 82 BPM | BODY MASS INDEX: 35.2 KG/M2 | HEIGHT: 69 IN

## 2017-05-16 DIAGNOSIS — E11.22 TYPE 2 DIABETES MELLITUS WITH STAGE 4 CHRONIC KIDNEY DISEASE, WITH LONG-TERM CURRENT USE OF INSULIN: ICD-10-CM

## 2017-05-16 DIAGNOSIS — L97.519 SKIN ULCERS OF BOTH FEET: ICD-10-CM

## 2017-05-16 DIAGNOSIS — Z79.4 TYPE 2 DIABETES MELLITUS WITH STAGE 4 CHRONIC KIDNEY DISEASE, WITH LONG-TERM CURRENT USE OF INSULIN: ICD-10-CM

## 2017-05-16 DIAGNOSIS — I50.22 CHRONIC SYSTOLIC HEART FAILURE: ICD-10-CM

## 2017-05-16 DIAGNOSIS — E66.9 OBESITY (BMI 30-39.9): Chronic | ICD-10-CM

## 2017-05-16 DIAGNOSIS — E11.42 TYPE 2 DIABETES MELLITUS WITH DIABETIC POLYNEUROPATHY, WITH LONG-TERM CURRENT USE OF INSULIN: Primary | ICD-10-CM

## 2017-05-16 DIAGNOSIS — E78.5 HYPERLIPIDEMIA, UNSPECIFIED HYPERLIPIDEMIA TYPE: ICD-10-CM

## 2017-05-16 DIAGNOSIS — L97.529 SKIN ULCERS OF BOTH FEET: ICD-10-CM

## 2017-05-16 DIAGNOSIS — Z79.4 TYPE 2 DIABETES MELLITUS WITH DIABETIC POLYNEUROPATHY, WITH LONG-TERM CURRENT USE OF INSULIN: Primary | ICD-10-CM

## 2017-05-16 DIAGNOSIS — I25.10 CORONARY ARTERY DISEASE INVOLVING NATIVE CORONARY ARTERY OF NATIVE HEART WITHOUT ANGINA PECTORIS: ICD-10-CM

## 2017-05-16 DIAGNOSIS — Z79.01 LONG TERM (CURRENT) USE OF ANTICOAGULANTS: ICD-10-CM

## 2017-05-16 DIAGNOSIS — I10 ESSENTIAL HYPERTENSION: ICD-10-CM

## 2017-05-16 DIAGNOSIS — N18.4 TYPE 2 DIABETES MELLITUS WITH STAGE 4 CHRONIC KIDNEY DISEASE, WITH LONG-TERM CURRENT USE OF INSULIN: ICD-10-CM

## 2017-05-16 DIAGNOSIS — I48.0 PAROXYSMAL ATRIAL FIBRILLATION: ICD-10-CM

## 2017-05-16 PROCEDURE — 3044F HG A1C LEVEL LT 7.0%: CPT | Mod: S$GLB,,, | Performed by: INTERNAL MEDICINE

## 2017-05-16 PROCEDURE — 3078F DIAST BP <80 MM HG: CPT | Mod: S$GLB,,, | Performed by: INTERNAL MEDICINE

## 2017-05-16 PROCEDURE — 3060F POS MICROALBUMINURIA REV: CPT | Mod: 8P,S$GLB,, | Performed by: INTERNAL MEDICINE

## 2017-05-16 PROCEDURE — 1126F AMNT PAIN NOTED NONE PRSNT: CPT | Mod: S$GLB,,, | Performed by: INTERNAL MEDICINE

## 2017-05-16 PROCEDURE — 1159F MED LIST DOCD IN RCRD: CPT | Mod: S$GLB,,, | Performed by: INTERNAL MEDICINE

## 2017-05-16 PROCEDURE — 99999 PR PBB SHADOW E&M-EST. PATIENT-LVL IV: CPT | Mod: PBBFAC,,, | Performed by: INTERNAL MEDICINE

## 2017-05-16 PROCEDURE — 99214 OFFICE O/P EST MOD 30 MIN: CPT | Mod: S$GLB,,, | Performed by: INTERNAL MEDICINE

## 2017-05-16 PROCEDURE — 1160F RVW MEDS BY RX/DR IN RCRD: CPT | Mod: S$GLB,,, | Performed by: INTERNAL MEDICINE

## 2017-05-16 PROCEDURE — 3075F SYST BP GE 130 - 139MM HG: CPT | Mod: S$GLB,,, | Performed by: INTERNAL MEDICINE

## 2017-05-16 NOTE — MR AVS SNAPSHOT
[facility] - Endo/Diab/Metab  [address]  [place] [address]  Phone: [phone]  Fax: [phone]                  Chau Rodarte   2017 3:30 PM   Office Visit    Description:  Male : 1950   Provider:  Cony Saleem NP   Department:  [facility] - Endo/Diab/Metab           Reason for Visit     Follow-up           Diagnoses this Visit        Comments    Type 2 diabetes mellitus with diabetic polyneuropathy, with long-term current use of insulin    -  Primary     Type 2 diabetes mellitus with stage 4 chronic kidney disease, with long-term current use of insulin         Hyperlipidemia, unspecified hyperlipidemia type         Obesity (BMI 30-39.9)         Paroxysmal atrial fibrillation         Chronic systolic heart failure         Coronary artery disease involving native coronary artery of native heart without angina pectoris         Essential hypertension         Long term (current) use of anticoagulants         Skin ulcers of both feet                To Do List           Future Appointments        Provider Department Dept Phone    2017 8:45 AM Quirino Bland DPM [place] - Podiatry [phone]    2017 8:00 AM VASCULAR, LAB [facility] - Vascular Laboratory [phone]    2017 8:45 AM Nehal William MD [facility] - Vascular Surgery [phone]    2017 8:00 AM HOME MONITOR DEVICE CHECK, [facility] - Arrhythmia [phone]    2017 3:40 PM Noble Mitchell MD [facility] - Nephrology [phone]      Goals (5 Years of Data)     None      Follow-Up and Disposition     Return in about 6 months (around 2017).       These Medications        Disp Refills Start End    insulin detemir (LEVEMIR FLEXTOUCH) 100 unit/mL (3 mL) SubQ InPn pen 1 Box 3 2017     Inject 16 Units into the skin every evening. - Subcutaneous    Pharmacy: [facility]/pharmacy #[id number] - [address] Ph #: [phone]         [facility]     [facility] Care [facility] - 24/7 Assistance  Unless otherwise directed by your provider, please contact [facility], our nurse care line that is available for 24/7 assistance.     Registered nurses in the [facility] provide: appointment scheduling, clinical advisement, health education, and other advisory services.  Call: [phone] (toll free)               Medications           Message regarding Medications     Verify the changes and/or additions to your medication regime listed below are the same as discussed with your clinician today.  If any of these changes or additions are incorrect, please notify your healthcare provider.             Verify that the below list of medications is an accurate representation of the medications you are currently taking.  If none reported, the list may be blank. If incorrect, please contact your healthcare provider. Carry this list with you in case of emergency.           Current Medications     aspirin 81 MG Chew Take 1 tablet (81 mg total) by mouth once daily.    atorvastatin (LIPITOR) 80 MG tablet Take 1 tablet (80 mg total) by mouth once daily.    cholecalciferol, vitamin D3, (VITAMIN D3) 5,000 unit Tab Take 5,000 Units by mouth once daily.    clopidogrel (PLAVIX) 75 mg tablet Take 1 tablet (75 mg total) by mouth once daily.    collagenase (SANTYL) ointment Apply topically once daily.    diclofenac sodium 1 % Gel Apply 2 g topically 3 (three) times daily.    furosemide (LASIX) 40 MG tablet Take 1 tablet (40 mg total) by mouth once daily.    gabapentin (NEURONTIN) 300 MG capsule Take 300 mg by mouth 2 (two) times daily.    hydrocodone-acetaminophen 5-325mg (NORCO) 5-325 mg per tablet Take 1 tablet by mouth every 6 (six) hours as needed for Pain.    insulin detemir (LEVEMIR FLEXTOUCH) 100 unit/mL (3 mL) SubQ InPn pen Inject 16 Units into the skin every evening.    isosorbide-hydrALAZINE 20-37.5 mg (BIDIL) 20-37.5 mg Tab Take 1 tablet by mouth 3 (three) times  "daily.    liraglutide 0.6 mg/0.1 mL, 18 mg/3 mL, subq PNIJ (VICTOZA 2-SAGAR) 0.6 mg/0.1 mL (18 mg/3 mL) PnIj Inject 0.6 mg daily x1 week, then 1.2 mg daily x1 week, then 1.8 mg daily thereafter    metoprolol succinate (TOPROL-XL) 50 MG 24 hr tablet Take 1 tablet (50 mg total) by mouth once daily.    pantoprazole (PROTONIX) 20 MG tablet Take 2 tablets (40 mg total) by mouth once daily.    pen needle, diabetic (BD ULTRA-FINE HANG PEN NEEDLES) 32 gauge x 5/32" Ndle Uses 2 times daily, on  insulin injections           Clinical Reference Information           Your Vitals Were     BP Pulse Height Weight BMI    130/78 82 5' 9" (1.753 m) 107.8 kg (237 lb 10.5 oz) 35.1 kg/m2      Blood Pressure          Most Recent Value    BP  130/78      Allergies as of 5/16/2017     No Known Allergies      Immunizations Administered on Date of Encounter - 5/16/2017     None      Orders Placed During Today's Visit      Normal Orders This Visit    Ambulatory referral to Nephrology     Future Labs/Procedures Expected by Expires    Hemoglobin A1c  5/16/2017 7/15/2018    Renal function panel  5/16/2017 7/15/2018      Language Assistance Services     ATTENTION: Language assistance services are available, free of charge. Please call [phone].      ATENCIÓN: Si habla nara, tiene a zaldivar disposición servicios gratuitos de asistencia lingüística. Llame al [phone].     CHÚ Ý: N?u b?n nói Ti?ng Vi?t, có các d?ch v? h? tr? ngôn ng? mi?n phí dành cho b?n. G?i s? [phone].         [facility] complies with applicable Federal civil rights laws and does not discriminate on the basis of race, color, national origin, age, disability, or sex.        "

## 2017-05-16 NOTE — PROGRESS NOTES
Chief Complaint: Follow-up      HPI:  Chau Rodarte is 67 y.o. male here today for T2DM follow up     He was last seen in Jan 2017.     He initially presented to the ER in 12/2016 with c/o progressively increasing shortness of breath, +NSTEMI with stent placement. Endocrinology was consulted on admission for a Hb A1c of 9.2%     The patient states diabetes was diagnosed at age 46     Currently, he denies nocturia, polyuria, unexplained weight loss or blurred vision.     Current DM regimen:   Victoza 1.2 mg daily   Levemir 16 units at bedtime     Other medications tried: metformin, farxiga and glucotrol     Hypoglycemia: denies recent episodes or symptoms     Denies hospital admission related to diabetes.    Diabetes complications include: neuropathy, CKD     Last diabetic eye exam: July 2016  No evidence of retinopathy     Patient has bilateral foot ulcers, followed by Dr. Bland  Last appointment 5/04/2017. Also has home health for dressing changes as ordered by podiatry     He endorses numbness, tingling sensations to bilateral lower extremities      Reports symptomatic CAD or CVD     24 hour dietary recall:   Breakfast: fried egg, turkey sausage, 4 ounces apple juice   Lunch: chicken salad sandwich on wheat, water   Dinner: 1/2 pork chop , 1 ear of boil corn, unsweet tea   Snacks: jello     SMBG: tests BG once daily   Personal review of log with fasting averages between  mg/dL     Diabetes education: Yes  Exercise: started cardio rehab on Monday 01/21/2017    ADA STANDARDS OF CARE:   ACE inhibitor or angiotension II receptor blocker: denies   Statin drug: Lipitor  Low dose ASA: yes, 81 mg daily     Review of Systems   Constitutional: Negative for unexpected weight change.   Eyes: Negative for visual disturbance.   Respiratory: Negative for shortness of breath.    Cardiovascular: Negative for chest pain.   Gastrointestinal: Negative for abdominal pain, constipation, diarrhea, nausea and vomiting.   Endocrine:  Negative for polydipsia, polyphagia and polyuria.   Genitourinary: Negative for dysuria.   Musculoskeletal: Negative for myalgias.   Skin: Negative for wound.   Neurological: Negative for headaches.   Hematological: Does not bruise/bleed easily.   Psychiatric/Behavioral: Negative for sleep disturbance.       Physical Exam   Constitutional: He appears well-developed.   HENT:   Right Ear: External ear normal.   Left Ear: External ear normal.   Nose: Nose normal.   Hearing normal    Neck: No tracheal deviation present. No thyromegaly present.   Cardiovascular: Normal rate.    No murmur heard.  Pulmonary/Chest: Effort normal and breath sounds normal.   Abdominal: Soft. He exhibits no mass.   No hernia noted   Musculoskeletal: He exhibits no edema.   Feet:   Right Foot:   Skin Integrity: Positive for ulcer (pt reported. foot wrapped in bandage, wearing darco shoe ).   Left Foot:   Skin Integrity: Positive for ulcer (pt reported. foot wrapped in bandage, wearing darco shoe ).   Neurological: He is alert. No cranial nerve deficit or sensory deficit. Coordination and gait normal.        Skin:   No induration   injection sites are ok. No lipo hypertropthy or atrophy         Psychiatric: He has a normal mood and affect. Judgment normal.   Vitals reviewed.      Labs:  Hemoglobin A1C   Date Value Ref Range Status   05/09/2017 6.4 (H) 4.5 - 6.2 % Final     Comment:     According to ADA guidelines, hemoglobin A1C <7.0% represents  optimal control in non-pregnant diabetic patients.  Different  metrics may apply to specific populations.   Standards of Medical Care in Diabetes - 2016.  For the purpose of screening for the presence of diabetes:  <5.7%     Consistent with the absence of diabetes  5.7-6.4%  Consistent with increasing risk for diabetes   (prediabetes)  >or=6.5%  Consistent with diabetes  Currently no consensus exists for use of hemoglobin A1C  for diagnosis of diabetes for children.     02/27/2017 7.1 (H) 4.5 - 6.2 %  Final     Comment:     According to ADA guidelines, hemoglobin A1C <7.0% represents  optimal control in non-pregnant diabetic patients.  Different  metrics may apply to specific populations.   Standards of Medical Care in Diabetes - 2016.  For the purpose of screening for the presence of diabetes:  <5.7%     Consistent with the absence of diabetes  5.7-6.4%  Consistent with increasing risk for diabetes   (prediabetes)  >or=6.5%  Consistent with diabetes  Currently no consensus exists for use of hemoglobin A1C  for diagnosis of diabetes for children.     01/12/2017 8.2 (H) 4.5 - 6.2 % Final     Comment:     According to ADA guidelines, hemoglobin A1C <7.0% represents  optimal control in non-pregnant diabetic patients.  Different  metrics may apply to specific populations.   Standards of Medical Care in Diabetes - 2016.  For the purpose of screening for the presence of diabetes:  <5.7%     Consistent with the absence of diabetes  5.7-6.4%  Consistent with increasing risk for diabetes   (prediabetes)  >or=6.5%  Consistent with diabetes  Currently no consensus exists for use of hemoglobin A1C  for diagnosis of diabetes for children.     11/28/2016 9.2 (H) 4.5 - 6.2 % Final     Comment:     According to ADA guidelines, hemoglobin A1C <7.0% represents  optimal control in non-pregnant diabetic patients.  Different  metrics may apply to specific populations.   Standards of Medical Care in Diabetes - 2016.  For the purpose of screening for the presence of diabetes:  <5.7%     Consistent with the absence of diabetes  5.7-6.4%  Consistent with increasing risk for diabetes   (prediabetes)  >or=6.5%  Consistent with diabetes  Currently no consensus exists for use of hemoglobin A1C  for diagnosis of diabetes for children.       Lab Results   Component Value Date    CHOL 110 (L) 01/12/2017    HDL 25 (L) 01/12/2017    LDLCALC 72.2 01/12/2017    TRIG 64 01/12/2017    CHOLHDL 22.7 01/12/2017     Lab Results   Component Value Date      05/09/2017    K 4.3 05/09/2017     05/09/2017    CO2 27 05/09/2017     05/09/2017    BUN 39 (H) 05/09/2017    CREATININE 2.8 (H) 05/09/2017    CALCIUM 9.0 05/09/2017    PROT 5.8 (L) 03/22/2017    ALBUMIN 3.2 (L) 05/09/2017    BILITOT 0.7 03/22/2017    ALKPHOS 74 03/22/2017    AST 14 03/22/2017    ALT 9 (L) 03/22/2017    ANIONGAP 9 05/09/2017    ESTGFRAFRICA 25.6 (A) 05/09/2017    EGFRNONAA 22.2 (A) 05/09/2017         Assessment and Plan:  1. Type 2 diabetes mellitus with diabetic polyneuropathy, without long-term current use of insulin  - excellent control per A1c - 6.4%   - continue current medical regimen    - reinforced dietary and lifestyle modifications   - reviewed signs and symptoms of hypoglycemia along with appropriate treatment protocol   - discussed importance of appropriate footwear and daily foot inspections   - continue to smbg 2 times daily and bring log or meter to office visits     2. Type 2 diabetes with stage 4 CKD, with long term current use of insulin   - refer to Nephrology   - optimize BG and BP control     3. Essential hypertension  - stable with current regimen   - followed by Cardiology and PCP   - follow a low sodium diet   - encouraged exercise     4. Acute on chronic systolic CHF (congestive heart failure)  - followed by Cardiology     5. Ischemic cardiomyopathy  - followed by Cardiology     6. Hyperlipidemia, unspecified hyperlipidemia type  - on statin therapy   - discussed the importance of following a low sodium diet   - encouraged to continue exercise as tolerated     7. Diabetic skin ulcers of both feet  - continue to follow with podiatry as advised

## 2017-05-19 ENCOUNTER — OFFICE VISIT (OUTPATIENT)
Dept: PODIATRY | Facility: CLINIC | Age: 67
End: 2017-05-19
Payer: MEDICARE

## 2017-05-19 VITALS
BODY MASS INDEX: 34.8 KG/M2 | DIASTOLIC BLOOD PRESSURE: 64 MMHG | HEIGHT: 69 IN | HEART RATE: 64 BPM | WEIGHT: 235 LBS | SYSTOLIC BLOOD PRESSURE: 110 MMHG

## 2017-05-19 DIAGNOSIS — L89.620 DECUBITUS ULCER, HEEL, LEFT, UNSTAGEABLE: ICD-10-CM

## 2017-05-19 DIAGNOSIS — Z79.4 TYPE 2 DIABETES MELLITUS WITH DIABETIC PERIPHERAL ANGIOPATHY WITHOUT GANGRENE, WITH LONG-TERM CURRENT USE OF INSULIN: Primary | ICD-10-CM

## 2017-05-19 DIAGNOSIS — L97.519 ULCER OF FOOT, RIGHT, WITH UNSPECIFIED SEVERITY: ICD-10-CM

## 2017-05-19 DIAGNOSIS — E11.51 TYPE 2 DIABETES MELLITUS WITH DIABETIC PERIPHERAL ANGIOPATHY WITHOUT GANGRENE, WITH LONG-TERM CURRENT USE OF INSULIN: Primary | ICD-10-CM

## 2017-05-19 PROCEDURE — 99212 OFFICE O/P EST SF 10 MIN: CPT | Mod: 25,S$GLB,, | Performed by: PODIATRIST

## 2017-05-19 PROCEDURE — 1159F MED LIST DOCD IN RCRD: CPT | Mod: S$GLB,,, | Performed by: PODIATRIST

## 2017-05-19 PROCEDURE — 99499 UNLISTED E&M SERVICE: CPT | Mod: S$GLB,,, | Performed by: PODIATRIST

## 2017-05-19 PROCEDURE — 1160F RVW MEDS BY RX/DR IN RCRD: CPT | Mod: S$GLB,,, | Performed by: PODIATRIST

## 2017-05-19 PROCEDURE — 99999 PR PBB SHADOW E&M-EST. PATIENT-LVL III: CPT | Mod: PBBFAC,,, | Performed by: PODIATRIST

## 2017-05-19 PROCEDURE — 1126F AMNT PAIN NOTED NONE PRSNT: CPT | Mod: S$GLB,,, | Performed by: PODIATRIST

## 2017-05-19 PROCEDURE — 11042 DBRDMT SUBQ TIS 1ST 20SQCM/<: CPT | Mod: S$GLB,,, | Performed by: PODIATRIST

## 2017-05-19 PROCEDURE — 3044F HG A1C LEVEL LT 7.0%: CPT | Mod: S$GLB,,, | Performed by: PODIATRIST

## 2017-05-19 PROCEDURE — 3078F DIAST BP <80 MM HG: CPT | Mod: S$GLB,,, | Performed by: PODIATRIST

## 2017-05-19 PROCEDURE — 3074F SYST BP LT 130 MM HG: CPT | Mod: S$GLB,,, | Performed by: PODIATRIST

## 2017-05-19 NOTE — MR AVS SNAPSHOT
[facility] Podiatr   [place] [address]  Phone: [phone]                  Chau Rodarte   2017 8:45 AM   Office Visit    Description:  Male : 1950   Provider:  Quirino Bland DPM   Department:  [facility] Podiatry           Reason for Visit     Wound Check           Diagnoses this Visit        Comments    Type 2 diabetes mellitus with diabetic peripheral angiopathy without gangrene, with long-term current use of insulin    -  Primary     Decubitus ulcer, heel, left, unstageable         Ulcer of foot, right, with unspecified severity                To Do List           Future Appointments        Provider Department Dept Phone    2017 8:00 AM VASCULAR, LAB [facility] Vascular Laboratory [phone]    2017 8:45 AM Nehal William MD [facility] Vascular Surgery [phone]    2017 8:00 AM Quirino Bland DPM [facility] [phone]    2017 8:00 AM HOME MONITOR DEVICE CHECK, [facility] Arrhythmia [phone]    2017 3:40 PM Noble Mitchell MD [facility] Nephrology [phone]      Goals (5 Years of Data)     None      [facility]     [facility] Nurse Care [facility] - 24/ Assistance  Unless otherwise directed by your provider, please contact [facility], our nurse care line that is available for  assistance.     Registered nurses in the [facility] provide: appointment scheduling, clinical advisement, health education, and other advisory services.  Call: [phone] (toll free)               Medications           Message regarding Medications     Verify the changes and/or additions to your medication regime listed below are the same as discussed with your clinician today.  If any of these changes or additions are incorrect, please notify your healthcare provider.             Verify that the below list of medications is an accurate representation of the medications you are currently taking.  If none  "reported, the list may be blank. If incorrect, please contact your healthcare provider. Carry this list with you in case of emergency.           Current Medications     aspirin 81 MG Chew Take 1 tablet (81 mg total) by mouth once daily.    atorvastatin (LIPITOR) 80 MG tablet Take 1 tablet (80 mg total) by mouth once daily.    cholecalciferol, vitamin D3, (VITAMIN D3) 5,000 unit Tab Take 5,000 Units by mouth once daily.    clopidogrel (PLAVIX) 75 mg tablet Take 1 tablet (75 mg total) by mouth once daily.    collagenase (SANTYL) ointment Apply topically once daily.    diclofenac sodium 1 % Gel Apply 2 g topically 3 (three) times daily.    furosemide (LASIX) 40 MG tablet Take 1 tablet (40 mg total) by mouth once daily.    gabapentin (NEURONTIN) 300 MG capsule Take 300 mg by mouth 2 (two) times daily.    hydrocodone-acetaminophen 5-325mg (NORCO) 5-325 mg per tablet Take 1 tablet by mouth every 6 (six) hours as needed for Pain.    insulin detemir (LEVEMIR FLEXTOUCH) 100 unit/mL (3 mL) SubQ InPn pen Inject 16 Units into the skin every evening.    isosorbide-hydrALAZINE 20-37.5 mg (BIDIL) 20-37.5 mg Tab Take 1 tablet by mouth 3 (three) times daily.    liraglutide 0.6 mg/0.1 mL, 18 mg/3 mL, subq PNIJ (VICTOZA 2-SAGAR) 0.6 mg/0.1 mL (18 mg/3 mL) PnIj Inject 0.6 mg daily x1 week, then 1.2 mg daily x1 week, then 1.8 mg daily thereafter    metoprolol succinate (TOPROL-XL) 50 MG 24 hr tablet Take 1 tablet (50 mg total) by mouth once daily.    pantoprazole (PROTONIX) 20 MG tablet Take 2 tablets (40 mg total) by mouth once daily.    pen needle, diabetic (BD ULTRA-FINE HANG PEN NEEDLES) 32 gauge x 5/32" Ndle Uses 2 times daily, on  insulin injections           Clinical Reference Information           Your Vitals Were     BP Pulse Height Weight BMI    110/64 64 5' 9" (1.753 m) 106.6 kg (235 lb) 34.7 kg/m2      Blood Pressure          Most Recent Value    BP  110/64      Allergies as of 5/19/2017     No Known Allergies    "   Immunizations Administered on Date of Encounter - 5/19/2017     None      Orders Placed During Today's Visit      Normal Orders This Visit    Debridement       Language Assistance Services     ATTENTION: Language assistance services are available, free of charge. Please call [phone].      ATENCIÓN: Si anderas bains, tiene a zaldivar disposición servicios gratuitos de asistencia lingüística. Llame al [phone].     CHÚ Ý: N?u b?n nói Ti?ng Vi?t, có các d?ch v? h? tr? ngôn ng? mi?n phí dành cho b?n. G?i s? [phone].         [place] - Podiatry complies with applicable Federal civil rights laws and does not discriminate on the basis of race, color, national origin, age, disability, or sex.

## 2017-05-19 NOTE — PROCEDURES
"Wound Debridement  Date/Time: 5/19/2017 9:19 AM  Performed by: EVELIN LOMELI  Authorized by: EVELIN LOMELI     Time out: Immediately prior to procedure a "time out" was called to verify the correct patient, procedure, equipment, support staff and site/side marked as required.    Consent Done?:  Yes (Verbal)    Preparation: Patient was prepped and draped in usual sterile fashion    Local anesthesia used?: Yes    Local anesthetic:  Topical anesthetic    Wound Details:    Location:  Right foot    Location:  Right 1st Metatarsal Head    Type of Debridement:  Excisional       Length (cm):  1.8       Area (sq cm):  1.98       Width (cm):  1.1       Percent Debrided (%):  100       Depth (cm):  0.2       Total Area Debrided (sq cm):  1.98    Depth of debridement:  Subcutaneous tissue    Tissue debrided:  Dermis, Epidermis and Subcutaneous    Devitalized tissue debrided:  Necrotic/Eschar    Bleeding:  Minimal  Hemostasis Achieved: Yes    Method Used:  Pressure  Patient tolerance:  Patient tolerated the procedure well with no immediate complications     Mepilex Ag with 2 rolls of cast padding secured by flexinet bilateral foot.      "

## 2017-05-19 NOTE — PROGRESS NOTES
Subjective:      Patient ID: Chau Rodarte is a 67 y.o. male.    Chief Complaint: Wound Check (Bilateral Foot)    Chau DAMIAN is a 67 y.o. male who presents to the clinic for evaluation and treatment of high risk feet. Chau DAMIAN has a past medical history of Acute on chronic systolic CHF (congestive heart failure) (11/29/2016); Anticoagulant long-term use; CKD (chronic kidney disease) stage 2, GFR 60-89 ml/min (2/21/2016); Coronary artery disease; Encounter for blood transfusion; HTN (hypertension) (3/3/2014); Hyperlipidemia (3/3/2014); NSTEMI (non-ST elevated myocardial infarction) (11/28/2016); Obesity (BMI 30-39.9) (11/29/2016); PVD (peripheral vascular disease); Stroke; Type 2 diabetes mellitus with diabetic peripheral angiopathy without gangrene, without long-term current use of insulin (10/24/2013); and Type 2 diabetes mellitus with diabetic polyneuropathy, without long-term current use of insulin (11/29/2016).  This patient has documented high risk feet requiring routine maintenance secondary to peripheral vascular disease. Complains moderate pain to back of left heel and mild right heel. Relates prior reaction to coumadin. History of right LE bypass and endovascular intervention with stenting left. Says the discoloration to both his feet is improving, however pain has progressed > 1 week. Wearing flip flops today. Uses a motorized scooter to get around.    4/24/17: No new complaints. Says he was on his feet a lot this past weekend. He wants to return to work. Accompanied by his mother and wife.    5/4/17: Presents for wound check. History of attempted revascularization per Dr. William. Requires fem-pop bypass per Dr. William. Relates pain is not managed currently.     5/19/17: Presents for wound check. Pending vein mapping and follow up with Dr. William 5/25/17. HH changing his dressings. Pain has improved since last visit.    PCP: Riki Huynh MD    Date Last Seen by PCP:     Current shoe gear:  Affected Foot:  Flip-flops     Unaffected Foot: Flip-flops    Hemoglobin A1C   Date Value Ref Range Status   05/09/2017 6.4 (H) 4.5 - 6.2 % Final     Comment:     According to ADA guidelines, hemoglobin A1C <7.0% represents  optimal control in non-pregnant diabetic patients.  Different  metrics may apply to specific populations.   Standards of Medical Care in Diabetes - 2016.  For the purpose of screening for the presence of diabetes:  <5.7%     Consistent with the absence of diabetes  5.7-6.4%  Consistent with increasing risk for diabetes   (prediabetes)  >or=6.5%  Consistent with diabetes  Currently no consensus exists for use of hemoglobin A1C  for diagnosis of diabetes for children.     02/27/2017 7.1 (H) 4.5 - 6.2 % Final     Comment:     According to ADA guidelines, hemoglobin A1C <7.0% represents  optimal control in non-pregnant diabetic patients.  Different  metrics may apply to specific populations.   Standards of Medical Care in Diabetes - 2016.  For the purpose of screening for the presence of diabetes:  <5.7%     Consistent with the absence of diabetes  5.7-6.4%  Consistent with increasing risk for diabetes   (prediabetes)  >or=6.5%  Consistent with diabetes  Currently no consensus exists for use of hemoglobin A1C  for diagnosis of diabetes for children.     01/12/2017 8.2 (H) 4.5 - 6.2 % Final     Comment:     According to ADA guidelines, hemoglobin A1C <7.0% represents  optimal control in non-pregnant diabetic patients.  Different  metrics may apply to specific populations.   Standards of Medical Care in Diabetes - 2016.  For the purpose of screening for the presence of diabetes:  <5.7%     Consistent with the absence of diabetes  5.7-6.4%  Consistent with increasing risk for diabetes   (prediabetes)  >or=6.5%  Consistent with diabetes  Currently no consensus exists for use of hemoglobin A1C  for diagnosis of diabetes for children.         Review of Systems   Constitution: Negative for chills and fever.    Cardiovascular: Positive for claudication and leg swelling. Negative for chest pain.   Respiratory: Negative for cough and shortness of breath.    Skin: Positive for color change, dry skin and poor wound healing.   Gastrointestinal: Negative for nausea and vomiting.   Neurological: Positive for paresthesias.   Psychiatric/Behavioral: Negative for altered mental status.           Objective:      Physical Exam   Constitutional: He is oriented to person, place, and time. No distress.   Cardiovascular:   Pulses:       Dorsalis pedis pulses are 0 on the right side, and 0 on the left side.        Posterior tibial pulses are 1+ on the right side, and 1+ on the left side.   CFT< 3 secs all toes bilateral foot, skin temp warm bilateral foot, no hair growth bilateral lower extremity, moderate brawny lower extremity edema bilateral.     Musculoskeletal:        Right foot: There is decreased range of motion.        Left foot: There is decreased range of motion.   Localized pain to ecchymotic patches of skin bilateral foot.    Pes planus foot type bilateral.    + equinus bilateral.   Feet:   Right Foot:   Protective Sensation: 10 sites tested. 5 sites sensed.   Left Foot:   Protective Sensation: 10 sites tested. 6 sites sensed.   Neurological: He is alert and oriented to person, place, and time. He has normal strength. A sensory deficit is present.   Skin: Skin is warm and dry. Ecchymosis noted. No rash noted. He is not diaphoretic. No cyanosis or erythema. Nails show no clubbing.   Ulceration posterior left heel with central eschar, surrounding pink epithelial tissue and moderate slough of the surrounding margins, no active drainage, no odor, no erythema. Measures 3.5x4.9x0.1cm.    Plantar first metatarsal head ulceration with mixed granular and fibrous base, new epithelial tissue proximally, no drainage, surrounding skin is healthy, no surrounding erythema, no odor. Measures 1.8x1.1x0.1cm pre-debridement.    Nails 1-5  bilateral are thickened and well trimmed.     Skin is thin and atrophied bilateral lower extremity.    Skin is dry and hyperpigmented surrounding left hallux nail bed and distal tip.             5/19/17      Assessment:       Encounter Diagnoses   Name Primary?    Type 2 diabetes mellitus with diabetic peripheral angiopathy without gangrene, with long-term current use of insulin Yes    Decubitus ulcer, heel, left, unstageable     Ulcer of foot, right, with unspecified severity          Plan:       Chau DAMIAN was seen today for wound check.    Diagnoses and all orders for this visit:    Type 2 diabetes mellitus with diabetic peripheral angiopathy without gangrene, with long-term current use of insulin  -     Debridement  -     SUBSEQUENT HOME HEALTH ORDERS    Decubitus ulcer, heel, left, unstageable  -     SUBSEQUENT HOME HEALTH ORDERS    Ulcer of foot, right, with unspecified severity  -     Debridement  -     SUBSEQUENT HOME HEALTH ORDERS      I counseled the patient on his conditions, their implications and medical management.    Shoe inspection. Diabetic Foot Education. Patient reminded of the importance of good nutrition and blood sugar control to help prevent podiatric complications of diabetes. Patient instructed on proper foot hygeine. We discussed wearing proper shoe gear, daily foot inspections, never walking without protective shoe gear, never putting sharp instruments to feet, routine podiatric nail visits every 2-3 months.      Dressings removed bilateral foot. Feet cleansed with hibiclens scrub. Applied betadine to wound sites and covered with mepilex foam, wrapped 2 layers coban secured with flexinet. MWBAT with Darco shoes mainly for transfers.    Debridement dressing of right foot ulceration per attached noted.    Pending revascularization.    Continue monitoring for now.    RTC 2 weeks or prn.

## 2017-06-01 ENCOUNTER — OFFICE VISIT (OUTPATIENT)
Dept: PODIATRY | Facility: CLINIC | Age: 67
End: 2017-06-01
Payer: MEDICARE

## 2017-06-01 VITALS
HEIGHT: 69 IN | WEIGHT: 235 LBS | DIASTOLIC BLOOD PRESSURE: 81 MMHG | SYSTOLIC BLOOD PRESSURE: 150 MMHG | HEART RATE: 75 BPM | BODY MASS INDEX: 34.8 KG/M2

## 2017-06-01 DIAGNOSIS — Z79.4 TYPE 2 DIABETES MELLITUS WITH DIABETIC PERIPHERAL ANGIOPATHY WITHOUT GANGRENE, WITH LONG-TERM CURRENT USE OF INSULIN: Primary | ICD-10-CM

## 2017-06-01 DIAGNOSIS — E11.51 TYPE 2 DIABETES MELLITUS WITH DIABETIC PERIPHERAL ANGIOPATHY WITHOUT GANGRENE, WITH LONG-TERM CURRENT USE OF INSULIN: Primary | ICD-10-CM

## 2017-06-01 DIAGNOSIS — L97.519 ULCER OF FOOT, RIGHT, WITH UNSPECIFIED SEVERITY: ICD-10-CM

## 2017-06-01 DIAGNOSIS — L89.620 DECUBITUS ULCER, HEEL, LEFT, UNSTAGEABLE: ICD-10-CM

## 2017-06-01 DIAGNOSIS — E11.42 DIABETIC POLYNEUROPATHY ASSOCIATED WITH TYPE 2 DIABETES MELLITUS: ICD-10-CM

## 2017-06-01 PROCEDURE — 99499 UNLISTED E&M SERVICE: CPT | Mod: S$GLB,,, | Performed by: PODIATRIST

## 2017-06-01 PROCEDURE — 99999 PR PBB SHADOW E&M-EST. PATIENT-LVL IV: CPT | Mod: PBBFAC,,, | Performed by: PODIATRIST

## 2017-06-01 PROCEDURE — 11042 DBRDMT SUBQ TIS 1ST 20SQCM/<: CPT | Mod: S$GLB,,, | Performed by: PODIATRIST

## 2017-06-01 NOTE — PROGRESS NOTES
Subjective:      Patient ID: Chau Rodarte is a 67 y.o. male.    Chief Complaint: Wound Check (Bilateral)    Chau DAMIAN is a 67 y.o. male who presents to the clinic for evaluation and treatment of high risk feet. Chau DAMIAN has a past medical history of Acute on chronic systolic CHF (congestive heart failure) (11/29/2016); Anticoagulant long-term use; CKD (chronic kidney disease) stage 2, GFR 60-89 ml/min (2/21/2016); Coronary artery disease; Encounter for blood transfusion; HTN (hypertension) (3/3/2014); Hyperlipidemia (3/3/2014); NSTEMI (non-ST elevated myocardial infarction) (11/28/2016); Obesity (BMI 30-39.9) (11/29/2016); PVD (peripheral vascular disease); Stroke; Type 2 diabetes mellitus with diabetic peripheral angiopathy without gangrene, without long-term current use of insulin (10/24/2013); and Type 2 diabetes mellitus with diabetic polyneuropathy, without long-term current use of insulin (11/29/2016).  This patient has documented high risk feet requiring routine maintenance secondary to peripheral vascular disease. Complains moderate pain to back of left heel and mild right heel. Relates prior reaction to coumadin. History of right LE bypass and endovascular intervention with stenting left. Says the discoloration to both his feet is improving, however pain has progressed > 1 week. Wearing flip flops today. Uses a motorized scooter to get around.    4/24/17: No new complaints. Says he was on his feet a lot this past weekend. He wants to return to work. Accompanied by his mother and wife.    5/4/17: Presents for wound check. History of attempted revascularization per Dr. William. Requires fem-pop bypass per Dr. William. Relates pain is not managed currently.     5/19/17: Presents for wound check. Pending vein mapping and follow up with Dr. William 5/25/17. HH changing his dressings. Pain has improved since last visit.    6/2/17: Relates he is still pending vascular intervention due to rescheduled appointments last week.  Requesting to ambulate more. Wearing Darco shoes. Accompanied by his wife.    PCP: Riki Huynh MD    Date Last Seen by PCP:     Current shoe gear:  Affected Foot: Flip-flops     Unaffected Foot: Flip-flops    Hemoglobin A1C   Date Value Ref Range Status   05/09/2017 6.4 (H) 4.5 - 6.2 % Final     Comment:     According to ADA guidelines, hemoglobin A1C <7.0% represents  optimal control in non-pregnant diabetic patients.  Different  metrics may apply to specific populations.   Standards of Medical Care in Diabetes - 2016.  For the purpose of screening for the presence of diabetes:  <5.7%     Consistent with the absence of diabetes  5.7-6.4%  Consistent with increasing risk for diabetes   (prediabetes)  >or=6.5%  Consistent with diabetes  Currently no consensus exists for use of hemoglobin A1C  for diagnosis of diabetes for children.     02/27/2017 7.1 (H) 4.5 - 6.2 % Final     Comment:     According to ADA guidelines, hemoglobin A1C <7.0% represents  optimal control in non-pregnant diabetic patients.  Different  metrics may apply to specific populations.   Standards of Medical Care in Diabetes - 2016.  For the purpose of screening for the presence of diabetes:  <5.7%     Consistent with the absence of diabetes  5.7-6.4%  Consistent with increasing risk for diabetes   (prediabetes)  >or=6.5%  Consistent with diabetes  Currently no consensus exists for use of hemoglobin A1C  for diagnosis of diabetes for children.     01/12/2017 8.2 (H) 4.5 - 6.2 % Final     Comment:     According to ADA guidelines, hemoglobin A1C <7.0% represents  optimal control in non-pregnant diabetic patients.  Different  metrics may apply to specific populations.   Standards of Medical Care in Diabetes - 2016.  For the purpose of screening for the presence of diabetes:  <5.7%     Consistent with the absence of diabetes  5.7-6.4%  Consistent with increasing risk for diabetes   (prediabetes)  >or=6.5%  Consistent with diabetes  Currently no  consensus exists for use of hemoglobin A1C  for diagnosis of diabetes for children.         Review of Systems   Constitution: Negative for chills and fever.   Cardiovascular: Positive for claudication and leg swelling. Negative for chest pain.   Respiratory: Negative for cough and shortness of breath.    Skin: Positive for color change, dry skin and poor wound healing.   Gastrointestinal: Negative for nausea and vomiting.   Neurological: Positive for paresthesias.   Psychiatric/Behavioral: Negative for altered mental status.           Objective:      Physical Exam   Constitutional: He is oriented to person, place, and time. No distress.   Cardiovascular:   Pulses:       Dorsalis pedis pulses are 0 on the right side, and 0 on the left side.        Posterior tibial pulses are 1+ on the right side, and 1+ on the left side.   CFT< 3 secs all toes bilateral foot, skin temp warm bilateral foot, no hair growth bilateral lower extremity, moderate brawny lower extremity edema bilateral.     Musculoskeletal:        Right foot: There is decreased range of motion.        Left foot: There is decreased range of motion.   Localized pain to ecchymotic patches of skin bilateral foot.    Pes planus foot type bilateral.    + equinus bilateral.   Feet:   Right Foot:   Protective Sensation: 10 sites tested. 5 sites sensed.   Left Foot:   Protective Sensation: 10 sites tested. 6 sites sensed.   Neurological: He is alert and oriented to person, place, and time. He has normal strength. A sensory deficit is present.   Skin: Skin is warm and dry. Ecchymosis noted. No rash noted. He is not diaphoretic. No cyanosis or erythema. Nails show no clubbing.   Ulceration posterior left heel with central eschar, surrounding pink epithelial tissue and moderate slough of the surrounding margins, no active drainage, no odor, no erythema. Measures 3.5x4.9x0.1cm.    Plantar first metatarsal head ulceration with mixed granular and fibrous base, new  epithelial tissue proximally, no drainage, surrounding skin is raised and hyperkeratotic, no surrounding erythema, no odor. Measures 1.8x1.1x0.1cm pre-debridement. Post debridement measures 3.5x4.0x0.2cm.    Nails 1-5 bilateral are thickened and well trimmed.     Skin is thin and atrophied bilateral lower extremity.    Skin is dry and hyperpigmented surrounding left hallux nail bed and distal tip.             5/19/17 6/1/17 3.5x4.0x0.2cm post debridement right foot        Assessment:       Encounter Diagnoses   Name Primary?    Type 2 diabetes mellitus with diabetic peripheral angiopathy without gangrene, with long-term current use of insulin Yes    Decubitus ulcer, heel, left, unstageable     Ulcer of foot, right, with unspecified severity     Diabetic polyneuropathy associated with type 2 diabetes mellitus          Plan:       Chau DAMIAN was seen today for wound check.    Diagnoses and all orders for this visit:    Type 2 diabetes mellitus with diabetic peripheral angiopathy without gangrene, with long-term current use of insulin  -     Debridement    Decubitus ulcer, heel, left, unstageable    Ulcer of foot, right, with unspecified severity  -     Debridement    Diabetic polyneuropathy associated with type 2 diabetes mellitus  -     Debridement      I counseled the patient on his conditions, their implications and medical management.    Shoe inspection. Diabetic Foot Education. Patient reminded of the importance of good nutrition and blood sugar control to help prevent podiatric complications of diabetes. Patient instructed on proper foot hygeine. We discussed wearing proper shoe gear, daily foot inspections, never walking without protective shoe gear, never putting sharp instruments to feet, routine podiatric nail visits every 2-3 months.      Dressings removed bilateral foot. Feet cleansed with hibiclens scrub. Applied betadine to wound sites and covered with mepilex foam, wrapped 2 layers coban  secured with flexinet. MWBAT with Darco shoes mainly for transfers.    Debridement dressing of right foot ulceration per attached noted.    Pending revascularization possibly next week per patient.    Continue monitoring for now.    F/U at wound center in 2 weeks.

## 2017-06-03 NOTE — PROCEDURES
"Wound Debridement  Date/Time: 6/1/2017 8:30 AM  Performed by: EVELIN LOMELI  Authorized by: EVELIN LOMELI     Time out: Immediately prior to procedure a "time out" was called to verify the correct patient, procedure, equipment, support staff and site/side marked as required.    Consent Done?:  Yes (Verbal)    Preparation: Patient was prepped and draped in usual sterile fashion    Local anesthesia used?: No      Wound Details:    Location:  Right foot    Location:  Right 1st Metatarsal Head    Type of Debridement:  Excisional       Length (cm):  3.5       Area (sq cm):  14       Width (cm):  4       Percent Debrided (%):  100       Depth (cm):  0.2       Total Area Debrided (sq cm):  14    Depth of debridement:  Subcutaneous tissue    Tissue debrided:  Subcutaneous and Dermis    Devitalized tissue debrided:  Callus, Exudate, Fibrin, Sough and Biofilm    Instruments:  Blade    Bleeding:  Minimal  Hemostasis Achieved: Yes    Method Used:  Pressure  Patient tolerance:  Patient tolerated the procedure well with no immediate complications     Painted wound edges with betadine and covered with krysten foam, 2 layers cast padding secured with flexinet.      "

## 2017-06-06 ENCOUNTER — CLINICAL SUPPORT (OUTPATIENT)
Dept: ELECTROPHYSIOLOGY | Facility: CLINIC | Age: 67
End: 2017-06-06
Payer: MEDICARE

## 2017-06-06 DIAGNOSIS — Z95.818 STATUS POST PLACEMENT OF IMPLANTABLE LOOP RECORDER: ICD-10-CM

## 2017-06-06 DIAGNOSIS — I63.9 CRYPTOGENIC STROKE: ICD-10-CM

## 2017-06-06 PROCEDURE — 93297 REM INTERROG DEV EVAL ICPMS: CPT | Mod: S$GLB,,, | Performed by: INTERNAL MEDICINE

## 2017-06-06 PROCEDURE — 93299 LOOP RECORDER REMOTE: CPT | Mod: S$GLB,,, | Performed by: INTERNAL MEDICINE

## 2017-06-07 ENCOUNTER — HOSPITAL ENCOUNTER (OUTPATIENT)
Dept: VASCULAR SURGERY | Facility: CLINIC | Age: 67
Discharge: HOME | End: 2017-06-07
Attending: STUDENT IN AN ORGANIZED HEALTH CARE EDUCATION/TRAINING PROGRAM
Payer: MEDICARE

## 2017-06-07 ENCOUNTER — OFFICE VISIT (OUTPATIENT)
Dept: VASCULAR SURGERY | Facility: CLINIC | Age: 67
End: 2017-06-07
Attending: STUDENT IN AN ORGANIZED HEALTH CARE EDUCATION/TRAINING PROGRAM
Payer: MEDICARE

## 2017-06-07 VITALS
RESPIRATION RATE: 18 BRPM | HEART RATE: 71 BPM | SYSTOLIC BLOOD PRESSURE: 127 MMHG | TEMPERATURE: 98 F | BODY MASS INDEX: 34.12 KG/M2 | WEIGHT: 230.38 LBS | HEIGHT: 69 IN | DIASTOLIC BLOOD PRESSURE: 64 MMHG

## 2017-06-07 DIAGNOSIS — E11.42 TYPE 2 DIABETES MELLITUS WITH DIABETIC POLYNEUROPATHY, WITH LONG-TERM CURRENT USE OF INSULIN: ICD-10-CM

## 2017-06-07 DIAGNOSIS — I13.0 BENIGN HYPERTENSIVE HEART AND KIDNEY DISEASE WITH SYSTOLIC CHF, NYHA CLASS 2 AND CKD STAGE 3: ICD-10-CM

## 2017-06-07 DIAGNOSIS — I25.10 CORONARY ARTERY DISEASE INVOLVING NATIVE CORONARY ARTERY OF NATIVE HEART WITHOUT ANGINA PECTORIS: ICD-10-CM

## 2017-06-07 DIAGNOSIS — E11.22 TYPE 2 DIABETES MELLITUS WITH STAGE 4 CHRONIC KIDNEY DISEASE, WITH LONG-TERM CURRENT USE OF INSULIN: ICD-10-CM

## 2017-06-07 DIAGNOSIS — Z79.4 TYPE 2 DIABETES MELLITUS WITH STAGE 4 CHRONIC KIDNEY DISEASE, WITH LONG-TERM CURRENT USE OF INSULIN: ICD-10-CM

## 2017-06-07 DIAGNOSIS — I25.5 ISCHEMIC CARDIOMYOPATHY: ICD-10-CM

## 2017-06-07 DIAGNOSIS — Z01.818 PREOP EXAMINATION: ICD-10-CM

## 2017-06-07 DIAGNOSIS — Z79.4 TYPE 2 DIABETES MELLITUS WITH DIABETIC POLYNEUROPATHY, WITH LONG-TERM CURRENT USE OF INSULIN: ICD-10-CM

## 2017-06-07 DIAGNOSIS — N18.4 TYPE 2 DIABETES MELLITUS WITH STAGE 4 CHRONIC KIDNEY DISEASE, WITH LONG-TERM CURRENT USE OF INSULIN: ICD-10-CM

## 2017-06-07 DIAGNOSIS — I50.20 BENIGN HYPERTENSIVE HEART AND KIDNEY DISEASE WITH SYSTOLIC CHF, NYHA CLASS 2 AND CKD STAGE 3: ICD-10-CM

## 2017-06-07 DIAGNOSIS — I73.9 PERIPHERAL VASCULAR DISEASE, UNSPECIFIED: ICD-10-CM

## 2017-06-07 DIAGNOSIS — E78.00 PURE HYPERCHOLESTEROLEMIA: ICD-10-CM

## 2017-06-07 DIAGNOSIS — I70.25 ATHEROSCLEROSIS OF NATIVE ARTERIES OF THE EXTREMITIES WITH ULCERATION: Primary | ICD-10-CM

## 2017-06-07 DIAGNOSIS — I70.25 ATHEROSCLEROSIS OF NATIVE ARTERIES OF THE EXTREMITIES WITH ULCERATION: ICD-10-CM

## 2017-06-07 DIAGNOSIS — E66.9 OBESITY (BMI 30-39.9): Chronic | ICD-10-CM

## 2017-06-07 DIAGNOSIS — N18.30 BENIGN HYPERTENSIVE HEART AND KIDNEY DISEASE WITH SYSTOLIC CHF, NYHA CLASS 2 AND CKD STAGE 3: ICD-10-CM

## 2017-06-07 PROCEDURE — 3044F HG A1C LEVEL LT 7.0%: CPT | Mod: S$GLB,,, | Performed by: SURGERY

## 2017-06-07 PROCEDURE — 99214 OFFICE O/P EST MOD 30 MIN: CPT | Mod: S$GLB,,, | Performed by: SURGERY

## 2017-06-07 PROCEDURE — 99999 PR PBB SHADOW E&M-EST. PATIENT-LVL IV: CPT | Mod: PBBFAC,,, | Performed by: SURGERY

## 2017-06-07 PROCEDURE — 1159F MED LIST DOCD IN RCRD: CPT | Mod: S$GLB,,, | Performed by: SURGERY

## 2017-06-07 PROCEDURE — 93970 EXTREMITY STUDY: CPT | Mod: S$GLB,,, | Performed by: SURGERY

## 2017-06-07 PROCEDURE — 99499 UNLISTED E&M SERVICE: CPT | Mod: S$GLB,,, | Performed by: SURGERY

## 2017-06-07 RX ORDER — LIDOCAINE HYDROCHLORIDE 10 MG/ML
1 INJECTION, SOLUTION EPIDURAL; INFILTRATION; INTRACAUDAL; PERINEURAL ONCE
Status: CANCELLED | OUTPATIENT
Start: 2017-06-07 | End: 2017-06-07

## 2017-06-07 RX ORDER — SODIUM CHLORIDE 9 MG/ML
INJECTION, SOLUTION INTRAVENOUS CONTINUOUS
Status: CANCELLED | OUTPATIENT
Start: 2017-06-07

## 2017-06-07 NOTE — LETTER
June 8, 2017      Graciela Romero MD  [address]  [place] [address]           [facility]  [address]  [place] [address]  Phone: [phone]  Fax: [phone]          Patient: Chau Rodarte   MR Number: [id number]   YOB: 1950   Date of Visit: 6/7/2017       Dear Dr. Graciela Romero:    Thank you for referring Chau Rodarte to me for evaluation. Attached you will find relevant portions of my assessment and plan of care.    If you have questions, please do not hesitate to call me. I look forward to following Chau Rodarte along with you.    Sincerely,    Nehal William MD    Enclosure  CC:  No Recipients    If you would like to receive this communication electronically, please contact [email or web address] or [phone] to request more information on [facility] access.    For providers and/or their staff who would like to refer a patient to [facility], please contact us through our one-stop-shop provider referral line, [facility], at [phone].    If you feel you have received this communication in error or would no longer like to receive these types of communications, please e-mail [email or web address]

## 2017-06-08 NOTE — PROGRESS NOTES
Patient ID: Chau Rodarte is a 67 y.o. male.    I. HISTORY     Chief Complaint: Ulcers of bilateral feet    HPI Chau Rodarte is a 67 y.o. male with PAD s/p L SFA stent in May 2012 and R Fem-AK Pop bypass in June 2012 with PTFE presents for eval of bilateral ulcers of the feet.    Both revascularization procedures were done for claudication, at [facility].  He was seen by us in 2014, at which point he had MARIO of .42 on the right and .64 on the L.  Despite that, he could ambulate well.  US suggested that SFA stent was occluded, but that bypass was patent.  CTA was ordered to evaluate the RLE vasculature, but he apparently did not have that done.    He returns today urged by his PCP and his podiatrist.  He was hospitalized in November 2016 for CHF and required cardiac stent x 3.  EF has since improved to 45%.  He was started on coumadin, which precipitated a GI bleed.    After the hospitalization for GI bleed, he noted pain and redness to his feet, 1.5 months ago.  Podiatry diagnosed him with ulcers of the Left heel and under his Right big toe.  Both of these were debrided by Dr. Bland.  Pictures are in the notes.  The left heel ulcer appears worse. Currently, he does have claudication at 1-2 blocks.    Leftt LE angio with flush SFA occlusion and occlused stents to BK-pop. Returns to discuss bypass.    Review of Systems   Constitution: Negative for chills and fever.   HENT: Negative for congestion and ear discharge.    Eyes: Negative for discharge and double vision.   Cardiovascular: Positive for claudication and dyspnea on exertion. Negative for chest pain and cyanosis.   Respiratory: Negative for cough and hemoptysis.    Endocrine: Negative.    Hematologic/Lymphatic: Negative.    Skin: Positive for poor wound healing. Negative for color change and dry skin.   Musculoskeletal: Positive for arthritis, joint pain and stiffness.   Gastrointestinal: Negative.  Negative for abdominal pain, nausea and vomiting.    Neurological: Positive for numbness and paresthesias. Negative for dizziness and focal weakness.   Psychiatric/Behavioral: Negative.    Allergic/Immunologic: Negative.        II. PHYSICAL EXAM     Physical Exam   Constitutional: He is oriented to person, place, and time. He appears well-developed and well-nourished.   HENT:   Head: Normocephalic and atraumatic.   Eyes: Conjunctivae are normal. Pupils are equal, round, and reactive to light.   Neck: Normal range of motion. Neck supple.   Cardiovascular: Normal rate and regular rhythm.    Pulmonary/Chest: Effort normal and breath sounds normal.   Abdominal: Soft. Bowel sounds are normal.   Neurological: He is alert and oriented to person, place, and time.   Football dressings in place to bilateral feet- these were not taken down    III. ASSESSMENT & PLAN (MEDICAL DECISION MAKING)     1. Atherosclerosis of native arteries of the extremities with ulceration    2. 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, NYHA class 2 and CKD stage 3    3. Coronary artery disease involving native coronary artery of native heart without angina pectoris    4. Pure hypercholesterolemia    5. Ischemic cardiomyopathy    6. Obesity (BMI 30-39.9)    7. Type 2 diabetes mellitus with diabetic polyneuropathy, with long-term current use of insulin    8. Type 2 diabetes mellitus with stage 4 chronic kidney disease, with long-term current use of insulin        Imaging Results:    MARIO:  R L   .38 (.42) .60 (.64)     US lower extremity arterial: Right bypass graft occluded.  No flow identified.                                                Left SFA and SFA stent shows no flow, suggesting occlusion.    Assessment/Diagnosis and Plan:    Chau Rodarte is a 67 y.o. male with bilateral tissue loss. Images reviewed in media tab.  Plan left Fem-BK pop bypass with GSV next Wednesday. He is high risk for surgery given his underlying cardiac disease. He is aware of the risk of cardiac  complications (~10%) including fatal or non-fatal MI, worsening heart failure or need for additional coronary intervention. Without revascularization he could lose his leg above the knee.     He and his wife agree to proceed. We will need to also evaluate his right leg to determine optimal treatment on that side as well.    Nehal William MD FACS [facility]  Vascular & Endovascular Surgery

## 2017-06-12 ENCOUNTER — HOSPITAL ENCOUNTER (OUTPATIENT)
Dept: PREADMISSION TESTING | Facility: HOSPITAL | Age: 67
Discharge: HOME | DRG: 253 | End: 2017-06-12
Attending: ANESTHESIOLOGY
Payer: MEDICARE

## 2017-06-12 ENCOUNTER — ANESTHESIA EVENT (OUTPATIENT)
Dept: SURGERY | Facility: HOSPITAL | Age: 67
DRG: 253 | End: 2017-06-12
Payer: MEDICARE

## 2017-06-12 ENCOUNTER — HOSPITAL ENCOUNTER (OUTPATIENT)
Dept: RADIOLOGY | Facility: HOSPITAL | Age: 67
Discharge: HOME | DRG: 253 | End: 2017-06-12
Attending: SURGERY
Payer: MEDICARE

## 2017-06-12 ENCOUNTER — OFFICE VISIT (OUTPATIENT)
Dept: NEPHROLOGY | Facility: CLINIC | Age: 67
End: 2017-06-12
Payer: MEDICARE

## 2017-06-12 VITALS
SYSTOLIC BLOOD PRESSURE: 150 MMHG | DIASTOLIC BLOOD PRESSURE: 80 MMHG | HEART RATE: 61 BPM | BODY MASS INDEX: 33.97 KG/M2 | HEIGHT: 69 IN | OXYGEN SATURATION: 99 %

## 2017-06-12 VITALS
SYSTOLIC BLOOD PRESSURE: 123 MMHG | WEIGHT: 230 LBS | HEART RATE: 62 BPM | OXYGEN SATURATION: 97 % | RESPIRATION RATE: 16 BRPM | TEMPERATURE: 98 F | BODY MASS INDEX: 34.07 KG/M2 | DIASTOLIC BLOOD PRESSURE: 65 MMHG | HEIGHT: 69 IN

## 2017-06-12 DIAGNOSIS — Z01.818 PRE-OP EVALUATION: ICD-10-CM

## 2017-06-12 DIAGNOSIS — N18.4 CKD (CHRONIC KIDNEY DISEASE), STAGE IV: Primary | ICD-10-CM

## 2017-06-12 DIAGNOSIS — I73.9 PVD (PERIPHERAL VASCULAR DISEASE): ICD-10-CM

## 2017-06-12 DIAGNOSIS — E11.21 DIABETIC NEPHROPATHY ASSOCIATED WITH TYPE 2 DIABETES MELLITUS: ICD-10-CM

## 2017-06-12 DIAGNOSIS — Z01.818 PRE-OP EVALUATION: Primary | ICD-10-CM

## 2017-06-12 DIAGNOSIS — I21.4 NSTEMI (NON-ST ELEVATION MYOCARDIAL INFARCTION): ICD-10-CM

## 2017-06-12 PROCEDURE — 1159F MED LIST DOCD IN RCRD: CPT | Mod: S$GLB,,, | Performed by: INTERNAL MEDICINE

## 2017-06-12 PROCEDURE — 1126F AMNT PAIN NOTED NONE PRSNT: CPT | Mod: S$GLB,,, | Performed by: INTERNAL MEDICINE

## 2017-06-12 PROCEDURE — 99499 UNLISTED E&M SERVICE: CPT | Mod: S$GLB,,, | Performed by: INTERNAL MEDICINE

## 2017-06-12 PROCEDURE — 71020 XR CHEST PA AND LATERAL: CPT | Mod: 26,,, | Performed by: RADIOLOGY

## 2017-06-12 PROCEDURE — 3066F NEPHROPATHY DOC TX: CPT | Mod: S$GLB,,, | Performed by: INTERNAL MEDICINE

## 2017-06-12 PROCEDURE — 99999 PR PBB SHADOW E&M-EST. PATIENT-LVL III: CPT | Mod: PBBFAC,,, | Performed by: INTERNAL MEDICINE

## 2017-06-12 PROCEDURE — 3044F HG A1C LEVEL LT 7.0%: CPT | Mod: S$GLB,,, | Performed by: INTERNAL MEDICINE

## 2017-06-12 PROCEDURE — 99203 OFFICE O/P NEW LOW 30 MIN: CPT | Mod: S$GLB,,, | Performed by: INTERNAL MEDICINE

## 2017-06-12 NOTE — DISCHARGE INSTRUCTIONS
Your surgery has been scheduled for:__________________________________________    You should report to:  ____[facility], located on the [place] side of the first floor of the           [facility] ([phone])  ____[facility], located on the [facility] side of the            Second floor of the [facility] ([phone])  ____3rd Floor [facility]U located on the [facility] side of the [facility] [phone]  Please Note   - Tell your doctor if you take Aspirin, products containing Aspirin, herbal medications  or blood thinners, such as Coumadin, Ticlid, or Plavix.  (Consult your provider regarding holding or stopping before surgery).  - Arrange for someone to drive you home following surgery.  You will not be allowed to leave the surgical facility alone or drive yourself home following sedation and anesthesia.  Before Surgery  - Stop taking all herbal medications 14days prior to surgery  - No Motrin/Advil (Ibuprofen) 7 days before surgery  - No Aleve (Naproxen) 7 days before surgery  - Stop Taking Asprin, products containing Asprin _____days before surgery  - Stop taking blood thinners_______days before surgery  - Refrain from drinking alcoholic beverages for 24hours before and after surgery  - Stop or limit smoking _________days before surgery  Night before Surgery  - DO NOT EAT OR DRINK ANYTHING AFTER MIDNIGHT, INCLUDING GUM, HARD CANDY, MINTS, OR CHEWING TOBACCO.  - Take a shower or bath (shower is recommended).  Bathe with Hibiclens soap or an antibacterial soap from the neck down.  If not supplied by your surgeon, hibiclens soap will need to be purchased over the counter in pharmacy.  Rinse soap off thoroughly.  - Shampoo your hair with your regular shampoo  The Day of Surgery  - Take another bath or shower with hibiclens or any antibacterial soap, to reduce the chance of infection.  - Take heart and blood  pressure medications with a small sip of water, as advised by the perioperative team.  - Do not take fluid pills  - You may brush your teeth and rinse your mouth, but do not swall any additional water.   - Do not apply perfumes, powder, body lotions or deodorant on the day of surgery.  - Nail polish should be removed.  - Do not wear makeup or moisturizer  - Wear comfortable clothes, such as a button front shirt and loose fitting pants.  - Leave all jewelry, including body piercings, and valuables at home.    - Bring any devices you will neeed after surgery such as crutches or canes.  - If you have sleep apnea, please bring your CPAP machine  In the event that your physical condition changes including the onset of a cold or respiratory illness, or if you have to delay or cancel your surgery, please notify your surgeon.  Anesthesia: General Anesthesia  Youre due to have surgery. During surgery, youll be given medication called anesthesia. (It is also called anesthetic.) This will keep you comfortable and pain-free. Your anesthesia provider will use general anesthesia. This sheet tells you more about it.  What is general anesthesia?     You are watched continuously during your procedure by the anesthesia provider   General anesthesia puts you into a state like deep sleep. It goes into the bloodstream (IV anesthetics), into the lungs (gas anesthetics), or both. You feel nothing during the procedure. You will not remember it. During the procedure, the anesthesia provider monitors you continuously. He or she checks your heart rate and rhythm, blood pressure, breathing, and blood oxygen.  · IV Anesthetics. IV anesthetics are given through an IV line in your arm. Theyre often given first. This is so you are asleep before a gas anesthetic is started. Some kinds of IV anesthetics relieve pain. Others relax you. Your doctor will decide which kind is best in your case.  · Gas Anesthetics. Gas anesthetics are breathed into  the lungs. They are often used to keep you asleep. They can be given through a facemask or a tube placed in your larynx or trachea (breathing tube).  ? If you have a facemask, your anesthesia provider will most likely place it over your nose and mouth while youre still awake. Youll breathe oxygen through the mask as your IV anesthetic is started. Gas anesthetic may be added through the mask.  ? If you have a tube in the larynx or trachea, it will be inserted into your throat after youre asleep.  Anesthesia tools and medications  You will likely have:  · IV anesthetics. These are put into an IV line into your bloodstream.  · Gas anesthetics. You breathe these anesthetics into your lungs, where they pass into your bloodstream.  · Pulse oximeter. This is a small clip that is attached to the end of your finger. This measures your blood oxygen level.  · Electrocardiography leads (electrodes). These are small sticky pads that are placed on your chest. They record your heart rate and rhythm.  · Blood pressure cuff. This reads your blood pressure.  Risks and possible complications  General anesthesia has some risks. These include:  · Breathing problems  · Nausea and vomiting  · Sore throat or hoarseness (usually temporary)  · Allergic reaction to the anesthetic  · Irregular heartbeat (rare)  · Cardiac arrest (rare)   Anesthesia safety  · Follow all instructions you are given for how long not to eat or drink before your procedure.  · Be sure your doctor knows what medications and drugs you take. This includes over-the-counter medications, herbs, supplements, alcohol or other drugs. You will be asked when those were last taken.  · Have an adult family member or friend drive you home after the procedure.  · For the first 24 hours after your surgery:  ? Do not drive or use heavy equipment.  ? Have a trusted family member or spouse make important decisions or sign documents.  ? Avoid alcohol.  ? Have a responsible adult stay  with you. He or she can watch for problems and help keep you safe.  Date Last Reviewed: 10/16/2014  © 1703-7852 [employer]. [address] [place] [address], [place] [address]. All rights reserved. This information is not intended as a substitute for professional medical care. Always follow your healthcare professional's instructions.

## 2017-06-12 NOTE — ANESTHESIA PREPROCEDURE EVALUATION
06/12/2017  Chau Rodarte is a 67 y.o., male.    Anesthesia Evaluation         Review of Systems  Anesthesia Hx:  No problems with previous Anesthesia History of prior surgery of interest to airway management or planning: Previous anesthesia: General 3/2017: EGD with general anesthesia.  Denies Family Hx of Anesthesia complications.   Denies Personal Hx of Anesthesia complications.   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 Tobacco Use: Former smoker, quit smoking <10 years Denies Alcohol Use.   Hematology/Oncology:         -- Anemia: -- Coag Disorders: Bleeding Disorder: currently taking: aspirin and clopidogrel   -- Transfusion: -- Transfusion Hx (no complications) (after GI bleed: 1 unit; 2 units of plasma):    EENT/Dental:   Denies Throat Symptoms Denies Jaw Problems   Cardiovascular:   Hypertension Past MI CAD asymptomatic CABG/stent  NYHA Classification II ECG has been reviewed. Loop recorder Functional Capacity low / < 4 METS, limited activity due to foot ulcers: denies CP/SOB  Coronary Artery Disease: S/P Percutaneous Coronary Intervention (PCI) (stents x3) coronary stent, unknown type, currently on clopidogrel (Plavix), currently on aspirin. Hx of Myocardial Infarction, NSTEMI, NSTEMI date of 12/2016  Congestive Heart Failure (CHF)  Cardiomyopathy, Ischemic Cardiomyopathy  Denies Deep Venous Thrombosis (DVT)  Peripheral Arterial Disease (S/P Left SFA stent)  Hypertension, Essential Hypertension , Recent typical clinic B/P of 123/65 @ POC visit  Disorder of Cardiac Rhythm, Atrial Fibrillation, Atrial Flutter    Pulmonary:  Pulmonary Normal  Denies Asthma.  Denies Chronic Obstructive Pulmonary Disease (COPD).  Possible Obstructive Sleep Apnea , (STOP/BANG) Symptoms P - Pressure being treated for high BP, A - Age > 50 and G - Gender (Male > Female)    Renal/:    Chronic Renal Disease crt 2.5 Kidney Function/Disease, Chronic Kidney Disease (CKD) , CKD Stage III (GFR 30-59)    Hepatic/GI:  Esophageal / Stomach Disorders Gastric Conditions: (GI bleed after starting Coumadin)Denies Gerd  Denies Liver Disease    Neurological:   CVA, no residual symptoms  Denies Seizure Disorder  CVA - Cerebrovasular Accident , Most recent CVA was on 2006 , has had 1 stroke occasional gait problems   Endocrine:   Diabetes, using insulin  Diabetes, Type 2 Diabetes for 20 years , Complications include Diabetic Neuropathy , controlled by insulin. Typical AM glucose range: 105-108 , most recent HgA1c value was 6.4 on 5/9/17.  Denies Thyroid Disease  Metabolic Disorders, Hyperlipoproteinemia, hypercholesterolemia, Obesity / BMI > 30  Dermatological:   Bilateral ulcers to feet       Physical Exam  General:  Well nourished    Airway/Jaw/Neck:  Airway Findings: Mouth Opening: Normal General Airway Assessment: Adult  Mallampati: II  Improves to II with phonation.  Jaw/Neck Findings:  Neck ROM: Normal ROM      Dental:  Dental Findings: Upper Dentures, Lower Dentures   Chest/Lungs:  Chest/Lungs Findings: Clear to auscultation, Normal Respiratory Rate     Heart/Vascular:  Heart Findings: Rate: Normal, Bradycardia  Vascular Findings: Normal      Skin:  Skin Findings:  dry dressing intact to bilateral foot          Anesthesia Plan  Type of Anesthesia, risks & benefits discussed:  Anesthesia Type:  general  Patient's Preference:   Intra-op Monitoring Plan: standard ASA monitors and arterial line  Intra-op Monitoring Plan Comments:   Post Op Pain Control Plan:   Post Op Pain Control Plan Comments:   Induction:   IV  Beta Blocker:  Patient is not currently on a Beta-Blocker (No further documentation required).       Informed Consent: Patient understands risks and agrees with Anesthesia plan.  Questions answered. Anesthesia consent signed with patient.  ASA Score: 3     Day of Surgery Review of History & Physical:     H&P update referred to the surgeon.         Ready For Surgery From Anesthesia Perspective.          No clearance needed per Dr. Scot Khan RN

## 2017-06-12 NOTE — LETTER
June 16, 2017      Cony Saleem, NP  [address]  [place] [address]           [facility] - Nephrology  [address]  [facility] [address]  Phone: [phone]  Fax: [phone]          Patient: Chau Rodarte   MR Number: [id number]   YOB: 1950   Date of Visit: 6/12/2017       Dear Cony Saleem:    Thank you for referring Chau Rodarte to me for evaluation. Attached you will find relevant portions of my assessment and plan of care.    If you have questions, please do not hesitate to call me. I look forward to following Chau Rodarte along with you.    Sincerely,    Roseanne Rios  CC:  No Recipients    If you would like to receive this communication electronically, please contact [email or web address] or [phone] to request more information on [employer] Link access.    For providers and/or their staff who would like to refer a patient to [facility], please contact us through our one-stop-shop provider referral line, [facility], at [phone].    If you feel you have received this communication in error or would no longer like to receive these types of communications, please e-mail [email or web address]

## 2017-06-12 NOTE — PROGRESS NOTES
Patient is here today for evaluation of CKD IV. Baseline Cr 2.5-2.8 mg/dl His most recent lab (5/9/17) Cr 2.8 mg/dk; eGFR;26 ml/min; potassium 4.3 mmol/L  There is a hx of diabetes complicated by neuropathy; nephropathy; last A1c; 6.4%  He is s/p NSTEMI and has hx of PVD. Today he has no new complaints    ROS;   Mood and Affect; Appropriate  S/P Fem/Pop bypass; skin ulcer both feet; chronic systolic heart faiurke    Exam  Pleasant gentleman; no acute distress; oriented x 3  HEENT; WNL  CHEST; Clear P&A; no rales or rhonchi  HEART; RR; S1&S2 no murmur rub gallop  ABD; Benign  EXT;(-) Edema    Impression  CKD IV Multiple factors including diabetes  Repeat labs 2 weeks  Diabetes At or below goal A1C    Plan  Return Visit; 3 mo; pending above

## 2017-06-13 ENCOUNTER — HOSPITAL ENCOUNTER (OUTPATIENT)
Dept: WOUND CARE | Facility: HOSPITAL | Age: 67
Discharge: HOME | End: 2017-06-13
Attending: PODIATRIST
Payer: MEDICARE

## 2017-06-13 ENCOUNTER — TELEPHONE (OUTPATIENT)
Dept: VASCULAR SURGERY | Facility: CLINIC | Age: 67
End: 2017-06-13

## 2017-06-13 VITALS
TEMPERATURE: 98 F | HEIGHT: 69 IN | BODY MASS INDEX: 32.58 KG/M2 | DIASTOLIC BLOOD PRESSURE: 56 MMHG | SYSTOLIC BLOOD PRESSURE: 96 MMHG | HEART RATE: 80 BPM | WEIGHT: 220 LBS

## 2017-06-13 DIAGNOSIS — Z79.4 TYPE 2 DIABETES MELLITUS WITH STAGE 4 CHRONIC KIDNEY DISEASE, WITH LONG-TERM CURRENT USE OF INSULIN: ICD-10-CM

## 2017-06-13 DIAGNOSIS — I70.245 ATHEROSCLEROSIS OF NATIVE ARTERY OF BOTH LOWER EXTREMITIES WITH BILATERAL ULCERATION OF OTHER PART OF FEET: ICD-10-CM

## 2017-06-13 DIAGNOSIS — L97.529 SKIN ULCERS OF BOTH FEET: Primary | ICD-10-CM

## 2017-06-13 DIAGNOSIS — I70.235 ATHEROSCLEROSIS OF NATIVE ARTERY OF BOTH LOWER EXTREMITIES WITH BILATERAL ULCERATION OF OTHER PART OF FEET: ICD-10-CM

## 2017-06-13 DIAGNOSIS — E11.51 TYPE 2 DIABETES MELLITUS WITH PERIPHERAL CIRCULATORY DISORDER: ICD-10-CM

## 2017-06-13 DIAGNOSIS — N18.4 TYPE 2 DIABETES MELLITUS WITH STAGE 4 CHRONIC KIDNEY DISEASE, WITH LONG-TERM CURRENT USE OF INSULIN: ICD-10-CM

## 2017-06-13 DIAGNOSIS — E11.22 TYPE 2 DIABETES MELLITUS WITH STAGE 4 CHRONIC KIDNEY DISEASE, WITH LONG-TERM CURRENT USE OF INSULIN: ICD-10-CM

## 2017-06-13 DIAGNOSIS — L97.519 SKIN ULCERS OF BOTH FEET: Primary | ICD-10-CM

## 2017-06-13 PROCEDURE — 27201912 HC WOUND CARE DEBRIDEMENT SUPPLIES

## 2017-06-13 PROCEDURE — 11042 DBRDMT SUBQ TIS 1ST 20SQCM/<: CPT

## 2017-06-13 PROCEDURE — 99215 OFFICE O/P EST HI 40 MIN: CPT | Mod: 25

## 2017-06-13 PROCEDURE — 99205 OFFICE O/P NEW HI 60 MIN: CPT | Mod: 25

## 2017-06-13 PROCEDURE — 11042 DBRDMT SUBQ TIS 1ST 20SQCM/<: CPT | Mod: ,,, | Performed by: PODIATRIST

## 2017-06-13 PROCEDURE — 11042 PR DEBRIDEMENT, SKIN, SUB-Q TISSUE,=<20 SQ CM: ICD-10-PCS | Mod: ,,, | Performed by: PODIATRIST

## 2017-06-13 NOTE — PROGRESS NOTES
Much of the documentation for this visit was completed in the [facility] [employer] system. Please see the attached documentation for further details about this patient's care.     Renae Shanks RN

## 2017-06-14 ENCOUNTER — HOSPITAL ENCOUNTER (INPATIENT)
Facility: HOSPITAL | Age: 67
LOS: 2 days | Discharge: HOME HEALTH | DRG: 253 | End: 2017-06-16
Attending: SURGERY | Admitting: SURGERY
Payer: MEDICARE

## 2017-06-14 ENCOUNTER — SURGERY (OUTPATIENT)
Age: 67
End: 2017-06-14

## 2017-06-14 ENCOUNTER — ANESTHESIA (OUTPATIENT)
Dept: SURGERY | Facility: HOSPITAL | Age: 67
DRG: 253 | End: 2017-06-14
Payer: MEDICARE

## 2017-06-14 DIAGNOSIS — E11.42 TYPE 2 DIABETES MELLITUS WITH DIABETIC POLYNEUROPATHY, WITH LONG-TERM CURRENT USE OF INSULIN: ICD-10-CM

## 2017-06-14 DIAGNOSIS — I73.9 PERIPHERAL VASCULAR DISEASE, UNSPECIFIED: ICD-10-CM

## 2017-06-14 DIAGNOSIS — N18.4 TYPE 2 DIABETES MELLITUS WITH STAGE 4 CHRONIC KIDNEY DISEASE, WITH LONG-TERM CURRENT USE OF INSULIN: ICD-10-CM

## 2017-06-14 DIAGNOSIS — Z79.4 TYPE 2 DIABETES MELLITUS WITH DIABETIC POLYNEUROPATHY, WITH LONG-TERM CURRENT USE OF INSULIN: ICD-10-CM

## 2017-06-14 DIAGNOSIS — E11.22 TYPE 2 DIABETES MELLITUS WITH STAGE 4 CHRONIC KIDNEY DISEASE, WITH LONG-TERM CURRENT USE OF INSULIN: ICD-10-CM

## 2017-06-14 DIAGNOSIS — I10 ESSENTIAL HYPERTENSION: ICD-10-CM

## 2017-06-14 DIAGNOSIS — E66.9 OBESITY (BMI 30-39.9): Chronic | ICD-10-CM

## 2017-06-14 DIAGNOSIS — L97.519 SKIN ULCERS OF BOTH FEET: ICD-10-CM

## 2017-06-14 DIAGNOSIS — L97.529 SKIN ULCERS OF BOTH FEET: ICD-10-CM

## 2017-06-14 DIAGNOSIS — I70.25 ATHEROSCLEROSIS OF NATIVE ARTERIES OF THE EXTREMITIES WITH ULCERATION: Primary | ICD-10-CM

## 2017-06-14 DIAGNOSIS — I50.20 BENIGN HYPERTENSIVE HEART AND KIDNEY DISEASE WITH SYSTOLIC CHF, NYHA CLASS 2 AND CKD STAGE 3: ICD-10-CM

## 2017-06-14 DIAGNOSIS — Z95.828 S/P FEMORAL-POPLITEAL BYPASS SURGERY: ICD-10-CM

## 2017-06-14 DIAGNOSIS — I13.0 BENIGN HYPERTENSIVE HEART AND KIDNEY DISEASE WITH SYSTOLIC CHF, NYHA CLASS 2 AND CKD STAGE 3: ICD-10-CM

## 2017-06-14 DIAGNOSIS — N18.30 BENIGN HYPERTENSIVE HEART AND KIDNEY DISEASE WITH SYSTOLIC CHF, NYHA CLASS 2 AND CKD STAGE 3: ICD-10-CM

## 2017-06-14 DIAGNOSIS — I25.5 ISCHEMIC CARDIOMYOPATHY: ICD-10-CM

## 2017-06-14 DIAGNOSIS — I25.10 CORONARY ARTERY DISEASE INVOLVING NATIVE CORONARY ARTERY OF NATIVE HEART WITHOUT ANGINA PECTORIS: ICD-10-CM

## 2017-06-14 DIAGNOSIS — E78.00 PURE HYPERCHOLESTEROLEMIA: ICD-10-CM

## 2017-06-14 DIAGNOSIS — Z79.4 TYPE 2 DIABETES MELLITUS WITH STAGE 4 CHRONIC KIDNEY DISEASE, WITH LONG-TERM CURRENT USE OF INSULIN: ICD-10-CM

## 2017-06-14 LAB
GLUCOSE SERPL-MCNC: 86 MG/DL (ref 70–110)
GLUCOSE SERPL-MCNC: 94 MG/DL (ref 70–110)
HCO3 UR-SCNC: 22.7 MMOL/L (ref 24–28)
HCO3 UR-SCNC: 23.7 MMOL/L (ref 24–28)
HCT VFR BLD CALC: 27 %PCV (ref 36–54)
HCT VFR BLD CALC: 27 %PCV (ref 36–54)
PCO2 BLDA: 26 MMHG (ref 35–45)
PCO2 BLDA: 29.3 MMHG (ref 35–45)
PH SMN: 7.5 [PH] (ref 7.35–7.45)
PH SMN: 7.57 [PH] (ref 7.35–7.45)
PO2 BLDA: 143 MMHG (ref 80–100)
PO2 BLDA: 195 MMHG (ref 80–100)
POC ACTIVATED CLOTTING TIME K: 103 SEC (ref 74–137)
POC ACTIVATED CLOTTING TIME K: 125 SEC (ref 74–137)
POC ACTIVATED CLOTTING TIME K: 230 SEC (ref 74–137)
POC ACTIVATED CLOTTING TIME K: 252 SEC (ref 74–137)
POC ACTIVATED CLOTTING TIME K: 257 SEC (ref 74–137)
POC BE: -1 MMOL/L
POC BE: 2 MMOL/L
POC IONIZED CALCIUM: 1.07 MMOL/L (ref 1.06–1.42)
POC IONIZED CALCIUM: 1.07 MMOL/L (ref 1.06–1.42)
POC SATURATED O2: 100 % (ref 95–100)
POC SATURATED O2: 99 % (ref 95–100)
POC TCO2: 24 MMOL/L (ref 23–27)
POC TCO2: 25 MMOL/L (ref 23–27)
POCT GLUCOSE: 118 MG/DL (ref 70–110)
POCT GLUCOSE: 99 MG/DL (ref 70–110)
POTASSIUM BLD-SCNC: 3.4 MMOL/L (ref 3.5–5.1)
POTASSIUM BLD-SCNC: 3.5 MMOL/L (ref 3.5–5.1)
SAMPLE: ABNORMAL
SAMPLE: NORMAL
SAMPLE: NORMAL
SODIUM BLD-SCNC: 141 MMOL/L (ref 136–145)
SODIUM BLD-SCNC: 141 MMOL/L (ref 136–145)

## 2017-06-14 PROCEDURE — 71000033 HC RECOVERY, INTIAL HOUR: Performed by: SURGERY

## 2017-06-14 PROCEDURE — 99900035 HC TECH TIME PER 15 MIN (STAT)

## 2017-06-14 PROCEDURE — 06BQ0ZZ EXCISION OF LEFT SAPHENOUS VEIN, OPEN APPROACH: ICD-10-PCS | Performed by: SURGERY

## 2017-06-14 PROCEDURE — C1894 INTRO/SHEATH, NON-LASER: HCPCS | Performed by: SURGERY

## 2017-06-14 PROCEDURE — 041L09L BYPASS LEFT FEMORAL ARTERY TO POPLITEAL ARTERY WITH AUTOLOGOUS VENOUS TISSUE, OPEN APPROACH: ICD-10-PCS | Performed by: SURGERY

## 2017-06-14 PROCEDURE — 25000003 PHARM REV CODE 250: Performed by: SURGERY

## 2017-06-14 PROCEDURE — 63600175 PHARM REV CODE 636 W HCPCS: Performed by: SURGERY

## 2017-06-14 PROCEDURE — 37000009 HC ANESTHESIA EA ADD 15 MINS: Performed by: SURGERY

## 2017-06-14 PROCEDURE — 25500020 PHARM REV CODE 255: Performed by: SURGERY

## 2017-06-14 PROCEDURE — 82962 GLUCOSE BLOOD TEST: CPT | Performed by: SURGERY

## 2017-06-14 PROCEDURE — 36000709 HC OR TIME LEV III EA ADD 15 MIN: Performed by: SURGERY

## 2017-06-14 PROCEDURE — 20600001 HC STEP DOWN PRIVATE ROOM

## 2017-06-14 PROCEDURE — 27201037 HC PRESSURE MONITORING SET UP

## 2017-06-14 PROCEDURE — 25000003 PHARM REV CODE 250: Performed by: NURSE ANESTHETIST, CERTIFIED REGISTERED

## 2017-06-14 PROCEDURE — 63600175 PHARM REV CODE 636 W HCPCS: Performed by: NURSE ANESTHETIST, CERTIFIED REGISTERED

## 2017-06-14 PROCEDURE — 94770 HC EXHALED C02 TEST: CPT

## 2017-06-14 PROCEDURE — D9220A PRA ANESTHESIA: Mod: ANES,,, | Performed by: ANESTHESIOLOGY

## 2017-06-14 PROCEDURE — 27201423 OPTIME MED/SURG SUP & DEVICES STERILE SUPPLY: Performed by: SURGERY

## 2017-06-14 PROCEDURE — 71000039 HC RECOVERY, EACH ADD'L HOUR: Performed by: SURGERY

## 2017-06-14 PROCEDURE — D9220A PRA ANESTHESIA: Mod: CRNA,,, | Performed by: NURSE ANESTHETIST, CERTIFIED REGISTERED

## 2017-06-14 PROCEDURE — 36000708 HC OR TIME LEV III 1ST 15 MIN: Performed by: SURGERY

## 2017-06-14 PROCEDURE — 37000008 HC ANESTHESIA 1ST 15 MINUTES: Performed by: SURGERY

## 2017-06-14 PROCEDURE — 94799 UNLISTED PULMONARY SVC/PX: CPT

## 2017-06-14 PROCEDURE — B41GYZZ FLUOROSCOPY OF LEFT LOWER EXTREMITY ARTERIES USING OTHER CONTRAST: ICD-10-PCS | Performed by: SURGERY

## 2017-06-14 RX ORDER — CEFAZOLIN SODIUM 2 G/50ML
2 SOLUTION INTRAVENOUS ONCE
Status: COMPLETED | OUTPATIENT
Start: 2017-06-14 | End: 2017-06-14

## 2017-06-14 RX ORDER — IBUPROFEN 200 MG
24 TABLET ORAL
Status: DISCONTINUED | OUTPATIENT
Start: 2017-06-14 | End: 2017-06-16 | Stop reason: HOSPADM

## 2017-06-14 RX ORDER — ONDANSETRON 2 MG/ML
INJECTION INTRAMUSCULAR; INTRAVENOUS
Status: DISCONTINUED | OUTPATIENT
Start: 2017-06-14 | End: 2017-06-14

## 2017-06-14 RX ORDER — LIDOCAINE HCL/PF 100 MG/5ML
SYRINGE (ML) INTRAVENOUS
Status: DISCONTINUED | OUTPATIENT
Start: 2017-06-14 | End: 2017-06-14

## 2017-06-14 RX ORDER — SODIUM CHLORIDE 9 MG/ML
INJECTION, SOLUTION INTRAVENOUS CONTINUOUS PRN
Status: DISCONTINUED | OUTPATIENT
Start: 2017-06-14 | End: 2017-06-14

## 2017-06-14 RX ORDER — MIDAZOLAM HYDROCHLORIDE 1 MG/ML
INJECTION, SOLUTION INTRAMUSCULAR; INTRAVENOUS
Status: DISCONTINUED | OUTPATIENT
Start: 2017-06-14 | End: 2017-06-14

## 2017-06-14 RX ORDER — FUROSEMIDE 40 MG/1
40 TABLET ORAL DAILY
Status: DISCONTINUED | OUTPATIENT
Start: 2017-06-15 | End: 2017-06-15

## 2017-06-14 RX ORDER — NAPROXEN SODIUM 220 MG/1
81 TABLET, FILM COATED ORAL DAILY
Status: DISCONTINUED | OUTPATIENT
Start: 2017-06-14 | End: 2017-06-16 | Stop reason: HOSPADM

## 2017-06-14 RX ORDER — INSULIN ASPART 100 [IU]/ML
1-10 INJECTION, SOLUTION INTRAVENOUS; SUBCUTANEOUS
Status: DISCONTINUED | OUTPATIENT
Start: 2017-06-14 | End: 2017-06-16 | Stop reason: HOSPADM

## 2017-06-14 RX ORDER — NALOXONE HCL 0.4 MG/ML
0.02 VIAL (ML) INJECTION
Status: DISCONTINUED | OUTPATIENT
Start: 2017-06-14 | End: 2017-06-15

## 2017-06-14 RX ORDER — SODIUM CHLORIDE 9 MG/ML
INJECTION, SOLUTION INTRAVENOUS CONTINUOUS
Status: DISCONTINUED | OUTPATIENT
Start: 2017-06-14 | End: 2017-06-14

## 2017-06-14 RX ORDER — IODIXANOL 320 MG/ML
INJECTION, SOLUTION INTRAVASCULAR
Status: DISCONTINUED | OUTPATIENT
Start: 2017-06-14 | End: 2017-06-14

## 2017-06-14 RX ORDER — ISOSORBIDE DINITRATE AND HYDRALAZINE HYDROCHLORIDE 37.5; 2 MG/1; MG/1
1 TABLET ORAL 3 TIMES DAILY
Status: DISCONTINUED | OUTPATIENT
Start: 2017-06-15 | End: 2017-06-15

## 2017-06-14 RX ORDER — IBUPROFEN 200 MG
16 TABLET ORAL
Status: DISCONTINUED | OUTPATIENT
Start: 2017-06-14 | End: 2017-06-16 | Stop reason: HOSPADM

## 2017-06-14 RX ORDER — KETAMINE HYDROCHLORIDE 100 MG/ML
INJECTION, SOLUTION INTRAMUSCULAR; INTRAVENOUS
Status: DISCONTINUED | OUTPATIENT
Start: 2017-06-14 | End: 2017-06-14

## 2017-06-14 RX ORDER — GABAPENTIN 300 MG/1
300 CAPSULE ORAL 2 TIMES DAILY
Status: DISCONTINUED | OUTPATIENT
Start: 2017-06-14 | End: 2017-06-16 | Stop reason: HOSPADM

## 2017-06-14 RX ORDER — ROCURONIUM BROMIDE 10 MG/ML
INJECTION, SOLUTION INTRAVENOUS
Status: DISCONTINUED | OUTPATIENT
Start: 2017-06-14 | End: 2017-06-14

## 2017-06-14 RX ORDER — ATORVASTATIN CALCIUM 20 MG/1
80 TABLET, FILM COATED ORAL DAILY
Status: DISCONTINUED | OUTPATIENT
Start: 2017-06-14 | End: 2017-06-16 | Stop reason: HOSPADM

## 2017-06-14 RX ORDER — PANTOPRAZOLE SODIUM 40 MG/1
40 TABLET, DELAYED RELEASE ORAL DAILY
Status: DISCONTINUED | OUTPATIENT
Start: 2017-06-14 | End: 2017-06-16 | Stop reason: HOSPADM

## 2017-06-14 RX ORDER — PROPOFOL 10 MG/ML
VIAL (ML) INTRAVENOUS
Status: DISCONTINUED | OUTPATIENT
Start: 2017-06-14 | End: 2017-06-14

## 2017-06-14 RX ORDER — LIDOCAINE HYDROCHLORIDE 10 MG/ML
1 INJECTION, SOLUTION EPIDURAL; INFILTRATION; INTRACAUDAL; PERINEURAL ONCE
Status: COMPLETED | OUTPATIENT
Start: 2017-06-14 | End: 2017-06-14

## 2017-06-14 RX ORDER — METOPROLOL SUCCINATE 50 MG/1
50 TABLET, EXTENDED RELEASE ORAL DAILY
Status: DISCONTINUED | OUTPATIENT
Start: 2017-06-14 | End: 2017-06-15

## 2017-06-14 RX ORDER — PHENYLEPHRINE HYDROCHLORIDE 10 MG/ML
INJECTION INTRAVENOUS
Status: DISCONTINUED | OUTPATIENT
Start: 2017-06-14 | End: 2017-06-14

## 2017-06-14 RX ORDER — CLOPIDOGREL BISULFATE 75 MG/1
75 TABLET ORAL DAILY
Status: DISCONTINUED | OUTPATIENT
Start: 2017-06-15 | End: 2017-06-16 | Stop reason: HOSPADM

## 2017-06-14 RX ORDER — FENTANYL CITRATE 50 UG/ML
INJECTION, SOLUTION INTRAMUSCULAR; INTRAVENOUS
Status: DISCONTINUED | OUTPATIENT
Start: 2017-06-14 | End: 2017-06-14

## 2017-06-14 RX ORDER — HEPARIN SODIUM 1000 [USP'U]/ML
INJECTION, SOLUTION INTRAVENOUS; SUBCUTANEOUS
Status: DISCONTINUED | OUTPATIENT
Start: 2017-06-14 | End: 2017-06-14

## 2017-06-14 RX ORDER — ONDANSETRON 2 MG/ML
4 INJECTION INTRAMUSCULAR; INTRAVENOUS EVERY 12 HOURS PRN
Status: DISCONTINUED | OUTPATIENT
Start: 2017-06-14 | End: 2017-06-16 | Stop reason: HOSPADM

## 2017-06-14 RX ORDER — HEPARIN SODIUM 5000 [USP'U]/ML
5000 INJECTION, SOLUTION INTRAVENOUS; SUBCUTANEOUS EVERY 8 HOURS
Status: DISCONTINUED | OUTPATIENT
Start: 2017-06-15 | End: 2017-06-16 | Stop reason: HOSPADM

## 2017-06-14 RX ORDER — CEFAZOLIN SODIUM 2 G/50ML
2 SOLUTION INTRAVENOUS
Status: COMPLETED | OUTPATIENT
Start: 2017-06-14 | End: 2017-06-15

## 2017-06-14 RX ORDER — SODIUM CHLORIDE 9 MG/ML
INJECTION, SOLUTION INTRAVENOUS CONTINUOUS
Status: DISCONTINUED | OUTPATIENT
Start: 2017-06-14 | End: 2017-06-15

## 2017-06-14 RX ORDER — GLUCAGON 1 MG
1 KIT INJECTION
Status: DISCONTINUED | OUTPATIENT
Start: 2017-06-14 | End: 2017-06-16 | Stop reason: HOSPADM

## 2017-06-14 RX ORDER — HYDROMORPHONE HCL IN 0.9% NACL 6 MG/30 ML
PATIENT CONTROLLED ANALGESIA SYRINGE INTRAVENOUS CONTINUOUS
Status: DISCONTINUED | OUTPATIENT
Start: 2017-06-14 | End: 2017-06-15

## 2017-06-14 RX ORDER — ACETAMINOPHEN 10 MG/ML
INJECTION, SOLUTION INTRAVENOUS
Status: DISCONTINUED | OUTPATIENT
Start: 2017-06-14 | End: 2017-06-14

## 2017-06-14 RX ORDER — PROTAMINE SULFATE 10 MG/ML
INJECTION, SOLUTION INTRAVENOUS
Status: DISCONTINUED | OUTPATIENT
Start: 2017-06-14 | End: 2017-06-14

## 2017-06-14 RX ADMIN — KETAMINE HYDROCHLORIDE 30 MG: 100 INJECTION, SOLUTION, CONCENTRATE INTRAMUSCULAR; INTRAVENOUS at 12:06

## 2017-06-14 RX ADMIN — SODIUM CHLORIDE: 0.9 INJECTION, SOLUTION INTRAVENOUS at 11:06

## 2017-06-14 RX ADMIN — SODIUM CHLORIDE: 0.9 INJECTION, SOLUTION INTRAVENOUS at 08:06

## 2017-06-14 RX ADMIN — PHENYLEPHRINE HYDROCHLORIDE 100 MCG: 10 INJECTION INTRAVENOUS at 11:06

## 2017-06-14 RX ADMIN — Medication: at 01:06

## 2017-06-14 RX ADMIN — CEFAZOLIN SODIUM 2 G: 2 SOLUTION INTRAVENOUS at 08:06

## 2017-06-14 RX ADMIN — PHENYLEPHRINE HYDROCHLORIDE 200 MCG: 10 INJECTION INTRAVENOUS at 08:06

## 2017-06-14 RX ADMIN — PHENYLEPHRINE HYDROCHLORIDE 100 MCG: 10 INJECTION INTRAVENOUS at 12:06

## 2017-06-14 RX ADMIN — KETAMINE HYDROCHLORIDE 30 MG: 100 INJECTION, SOLUTION, CONCENTRATE INTRAMUSCULAR; INTRAVENOUS at 10:06

## 2017-06-14 RX ADMIN — EPHEDRINE SULFATE 10 MG: 50 INJECTION, SOLUTION INTRAMUSCULAR; INTRAVENOUS; SUBCUTANEOUS at 10:06

## 2017-06-14 RX ADMIN — INSULIN DETEMIR 16 UNITS: 100 INJECTION, SOLUTION SUBCUTANEOUS at 08:06

## 2017-06-14 RX ADMIN — LIDOCAINE HYDROCHLORIDE 2 MG: 10 INJECTION, SOLUTION EPIDURAL; INFILTRATION; INTRACAUDAL; PERINEURAL at 06:06

## 2017-06-14 RX ADMIN — PROTAMINE SULFATE 5 MG: 10 INJECTION, SOLUTION INTRAVENOUS at 11:06

## 2017-06-14 RX ADMIN — EPHEDRINE SULFATE 5 MG: 50 INJECTION, SOLUTION INTRAMUSCULAR; INTRAVENOUS; SUBCUTANEOUS at 10:06

## 2017-06-14 RX ADMIN — HEPARIN SODIUM 10000 UNITS: 1000 INJECTION, SOLUTION INTRAVENOUS; SUBCUTANEOUS at 08:06

## 2017-06-14 RX ADMIN — FENTANYL CITRATE 100 MCG: 50 INJECTION, SOLUTION INTRAMUSCULAR; INTRAVENOUS at 08:06

## 2017-06-14 RX ADMIN — EPHEDRINE SULFATE 5 MG: 50 INJECTION, SOLUTION INTRAMUSCULAR; INTRAVENOUS; SUBCUTANEOUS at 09:06

## 2017-06-14 RX ADMIN — EPHEDRINE SULFATE 5 MG: 50 INJECTION, SOLUTION INTRAMUSCULAR; INTRAVENOUS; SUBCUTANEOUS at 11:06

## 2017-06-14 RX ADMIN — HEPARIN SODIUM 10000 UNITS: 1000 INJECTION, SOLUTION INTRAVENOUS; SUBCUTANEOUS at 10:06

## 2017-06-14 RX ADMIN — HEPARIN SODIUM 4000 UNITS: 1000 INJECTION, SOLUTION INTRAVENOUS; SUBCUTANEOUS at 10:06

## 2017-06-14 RX ADMIN — PROTAMINE SULFATE 45 MG: 10 INJECTION, SOLUTION INTRAVENOUS at 12:06

## 2017-06-14 RX ADMIN — ONDANSETRON 4 MG: 2 INJECTION INTRAMUSCULAR; INTRAVENOUS at 11:06

## 2017-06-14 RX ADMIN — MIDAZOLAM HYDROCHLORIDE 2 MG: 1 INJECTION, SOLUTION INTRAMUSCULAR; INTRAVENOUS at 08:06

## 2017-06-14 RX ADMIN — ROCURONIUM BROMIDE 50 MG: 10 INJECTION, SOLUTION INTRAVENOUS at 08:06

## 2017-06-14 RX ADMIN — GABAPENTIN 300 MG: 300 CAPSULE ORAL at 08:06

## 2017-06-14 RX ADMIN — LIDOCAINE HYDROCHLORIDE 100 MG: 20 INJECTION, SOLUTION INTRAVENOUS at 08:06

## 2017-06-14 RX ADMIN — PROPOFOL 100 MG: 10 INJECTION, EMULSION INTRAVENOUS at 08:06

## 2017-06-14 RX ADMIN — SODIUM CHLORIDE: 0.9 INJECTION, SOLUTION INTRAVENOUS at 06:06

## 2017-06-14 RX ADMIN — ACETAMINOPHEN 1000 MG: 10 INJECTION, SOLUTION INTRAVENOUS at 10:06

## 2017-06-14 RX ADMIN — SODIUM CHLORIDE: 0.9 INJECTION, SOLUTION INTRAVENOUS at 01:06

## 2017-06-14 RX ADMIN — PROPOFOL 30 MG: 10 INJECTION, EMULSION INTRAVENOUS at 12:06

## 2017-06-14 RX ADMIN — DEXTROSE 2 G: 50 INJECTION, SOLUTION INTRAVENOUS at 06:06

## 2017-06-14 RX ADMIN — IODIXANOL 15 ML: 320 INJECTION, SOLUTION INTRAVASCULAR at 12:06

## 2017-06-14 RX ADMIN — EPHEDRINE SULFATE 5 MG: 50 INJECTION, SOLUTION INTRAMUSCULAR; INTRAVENOUS; SUBCUTANEOUS at 12:06

## 2017-06-14 RX ADMIN — FENTANYL CITRATE 50 MCG: 50 INJECTION, SOLUTION INTRAMUSCULAR; INTRAVENOUS at 10:06

## 2017-06-14 NOTE — ANESTHESIA POSTPROCEDURE EVALUATION
"Anesthesia Post Evaluation    Patient: Chau Rodarte    Procedure(s) Performed: Procedure(s) (LRB):  [id number] (Left)    Final Anesthesia Type: general  Patient location during evaluation: PACU  Patient participation: Yes- Able to Participate  Level of consciousness: awake and alert, oriented and awake  Post-procedure vital signs: reviewed and stable  Pain management: adequate  Airway patency: patent  PONV status at discharge: No PONV  Anesthetic complications: no      Cardiovascular status: blood pressure returned to baseline, stable and hemodynamically stable  Respiratory status: unassisted, spontaneous ventilation and room air  Hydration status: euvolemic  Follow-up not needed.        Visit Vitals  /63   Pulse 66   Temp 36.4 °C (97.6 °F) (Axillary)   Resp 18   Ht 5' 9" (1.753 m)   Wt 103.9 kg (229 lb)   SpO2 100%   BMI 33.82 kg/m²       Pain/Miroslava Score: Pain Assessment Performed: Yes (6/14/2017  2:18 PM)  Presence of Pain: denies (6/14/2017  2:18 PM)  Pain Rating Prior to Med Admin: 0 (6/14/2017  1:31 PM)  Miroslava Score: 10 (6/14/2017  2:18 PM)      "

## 2017-06-14 NOTE — PLAN OF CARE
VSS. Patient states pain is tolerable. No N&V. Teds/SCD's in place throughout duration in PACU. Transferred to the next Phase of Care.

## 2017-06-14 NOTE — TRANSFER OF CARE
"Anesthesia Transfer of Care Note    Patient: Chau Rodarte    Procedure(s) Performed: Procedure(s) (LRB):  [id number] (Left)    Patient location: PACU    Anesthesia Type: general    Transport from OR: Transported from OR on 6-10 L/min O2 by face mask with adequate spontaneous ventilation    Post pain: adequate analgesia    Post assessment: no apparent anesthetic complications    Post vital signs: stable    Level of consciousness: awake, alert and oriented    Nausea/Vomiting: no nausea/vomiting    Complications: none    Transfer of care protocol was followed      Last vitals:   Visit Vitals  /63 (BP Location: Right arm, Patient Position: Lying, BP Method: Automatic)   Pulse 69   Temp 36.7 °C (98.1 °F) (Oral)   Resp 20   Ht 5' 9" (1.753 m)   Wt 103.9 kg (229 lb)   SpO2 100%   BMI 33.82 kg/m²     "

## 2017-06-14 NOTE — ANESTHESIA RELEASE NOTE
"Anesthesia Release from PACU Note    Patient: Chau Rodarte    Procedure(s) Performed: Procedure(s) (LRB):  [id number] (Left)    Anesthesia type: general    Post pain: Adequate analgesia    Post assessment: no apparent anesthetic complications, tolerated procedure well and no evidence of recall    Last Vitals:   Visit Vitals  /63   Pulse 66   Temp 36.4 °C (97.6 °F) (Axillary)   Resp 18   Ht 5' 9" (1.753 m)   Wt 103.9 kg (229 lb)   SpO2 100%   BMI 33.82 kg/m²       Post vital signs: stable    Level of consciousness: awake, alert  and oriented    Nausea/Vomiting: no nausea/no vomiting    Complications: none    Airway Patency: patent    Respiratory: unassisted, spontaneous ventilation, room air    Cardiovascular: stable and blood pressure at baseline    Hydration: euvolemic  "

## 2017-06-14 NOTE — PLAN OF CARE
Orders reviewed, consents verified. Lab results reviewed. accucheck 99. Pre-op care complete. Family at bedside. Anesthesia consent and site marking pending.

## 2017-06-14 NOTE — H&P (VIEW-ONLY)
Patient ID: Chau Rodarte is a 67 y.o. male.    I. HISTORY     Chief Complaint: Ulcers of bilateral feet    HPI hCau Rodarte is a 67 y.o. male with PAD s/p L SFA stent in May 2012 and R Fem-AK Pop bypass in June 2012 with PTFE presents for eval of bilateral ulcers of the feet.    Both revascularization procedures were done for claudication, at [facility].  He was seen by us in 2014, at which point he had MARIO of .42 on the right and .64 on the L.  Despite that, he could ambulate well.  US suggested that SFA stent was occluded, but that bypass was patent.  CTA was ordered to evaluate the RLE vasculature, but he apparently did not have that done.    He returns today urged by his PCP and his podiatrist.  He was hospitalized in November 2016 for CHF and required cardiac stent x 3.  EF has since improved to 45%.  He was started on coumadin, which precipitated a GI bleed.    After the hospitalization for GI bleed, he noted pain and redness to his feet, 1.5 months ago.  Podiatry diagnosed him with ulcers of the Left heel and under his Right big toe.  Both of these were debrided by Dr. Bland.  Pictures are in the notes.  The left heel ulcer appears worse. Currently, he does have claudication at 1-2 blocks.    Leftt LE angio with flush SFA occlusion and occlused stents to BK-pop. Returns to discuss bypass.    Review of Systems   Constitution: Negative for chills and fever.   HENT: Negative for congestion and ear discharge.    Eyes: Negative for discharge and double vision.   Cardiovascular: Positive for claudication and dyspnea on exertion. Negative for chest pain and cyanosis.   Respiratory: Negative for cough and hemoptysis.    Endocrine: Negative.    Hematologic/Lymphatic: Negative.    Skin: Positive for poor wound healing. Negative for color change and dry skin.   Musculoskeletal: Positive for arthritis, joint pain and stiffness.   Gastrointestinal: Negative.  Negative for abdominal pain, nausea and vomiting.    Neurological: Positive for numbness and paresthesias. Negative for dizziness and focal weakness.   Psychiatric/Behavioral: Negative.    Allergic/Immunologic: Negative.        II. PHYSICAL EXAM     Physical Exam   Constitutional: He is oriented to person, place, and time. He appears well-developed and well-nourished.   HENT:   Head: Normocephalic and atraumatic.   Eyes: Conjunctivae are normal. Pupils are equal, round, and reactive to light.   Neck: Normal range of motion. Neck supple.   Cardiovascular: Normal rate and regular rhythm.    Pulmonary/Chest: Effort normal and breath sounds normal.   Abdominal: Soft. Bowel sounds are normal.   Neurological: He is alert and oriented to person, place, and time.   Football dressings in place to bilateral feet- these were not taken down    III. ASSESSMENT & PLAN (MEDICAL DECISION MAKING)     1. Atherosclerosis of native arteries of the extremities with ulceration    2. 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, NYHA class 2 and CKD stage 3    3. Coronary artery disease involving native coronary artery of native heart without angina pectoris    4. Pure hypercholesterolemia    5. Ischemic cardiomyopathy    6. Obesity (BMI 30-39.9)    7. Type 2 diabetes mellitus with diabetic polyneuropathy, with long-term current use of insulin    8. Type 2 diabetes mellitus with stage 4 chronic kidney disease, with long-term current use of insulin        Imaging Results:    MARIO:  R L   .38 (.42) .60 (.64)     US lower extremity arterial: Right bypass graft occluded.  No flow identified.                                                Left SFA and SFA stent shows no flow, suggesting occlusion.    Assessment/Diagnosis and Plan:    Chau Rodarte is a 67 y.o. male with bilateral tissue loss. Images reviewed in media tab.  Plan left Fem-BK pop bypass with GSV next Wednesday. He is high risk for surgery given his underlying cardiac disease. He is aware of the risk of cardiac  complications (~10%) including fatal or non-fatal MI, worsening heart failure or need for additional coronary intervention. Without revascularization he could lose his leg above the knee.     He and his wife agree to proceed. We will need to also evaluate his right leg to determine optimal treatment on that side as well.    Nehal William MD FACS [facility]  Vascular & Endovascular Surgery

## 2017-06-14 NOTE — OP NOTE
[facility]  Vascular Surgery  Operative Note    SUMMARY     Date of Procedure: 6/14/2017     Procedure:   1. Left Femoral to Below knee popliteal bypass with Non-reversed Left GSV  2. Left GSV vein harvest  3. 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     Operative Dictation:   After an informed consent was obtained the patient was brought to the operating room table and general anesthesia was administered. Appropriate preop antibiotics were administered. The patient was prepped and draped in standard sterile fashion. An ultrasound was used to linda out the course of the left lower extremity greater saphenous vein, which was suitable in size per ultrasound evaluation for use as a bypass conduit. Attention was turned to the left groin. A longitudinal incision was made in the left groin and the common femoral artery was dissected free from the surrounding tissue using electrocautery and sharp dissection. The left common femoral artery was then isolated with vessel loops. The dissection was continued inferiorly and the left SFA and Profunda arteries were identified, dissected free from the surrounding tissue and  isolated with vessel loops. Once this was freed an appropriate length, attention was turned to the left lower extremity below the knee. A longitudinal incision was made in the medial aspect of the left lower extremity below the knee. The gastrocnemius was retracted posteriorly and the soleus was taken off of posterior tibia. The left popliteal artery was identified and dissected free from the surrounding tissue. The popliteal artery was freed an appropriate distance to allow for bypass anastomosis and then isolated with vessel loops.  At this time a vertical incision was made over the medial aspect over the left lower extremity starting at the medial calf and the greater saphenous vein was dissected free with skip incisions along the length of the extremity. All branches were isolated and suture ligated. After the GSV was dissected from the groin to the distal calf incision, the proximal and distal aspect of the vein were suture ligated and the vein was placed in heparinized saline. Next, the vein was removed from the vein solution and distended with heparinized saline to ensure appropriate vein distention and no obvious bleeding branch points were identified.  The tunneler was then used to create an anatomic tunnel from the femoral artery to the popliteal artery. The patient was anticoagulated using IV heparin and ATC was checked and maintained > 250. A Jason subclavian clamp was placed to obtain proximal control of the common femoral artery. A profunda clamp was used to obtain distal control of the profunda artery. A longitudinal arteriotomy was made along the CFA extending onto the proximal profunda artery. The greater saphenous vein (non-reversed) was spatulated and was sutured in an end to side fashion using 6-0 Prolene in continuous fashion to the CFA. The bypass was clamped and the CFA and profunda clamps were released. The suture line was checked for bleeding noting appropriate hemostasis. Next, a  valvulotome was then used to perform a valvulotomy of the greater saphenous vein. The valvulotomy was passed twice successfully, and the clamps of the CFA were then released, allowing bleeding into the greater saphenous vein bypass and again ensuring appropriate orientation and absence of vessel twisting/kinking. The proposed anterior border of the bypass graft was marked to ensure appropriate orientation during tunneling and the GSV was passed through the tunnel.   The popliteal artery was then controlled proximally and distally with vascular clamps and a longitudinal arteriotomy was performed. The vein was then spatulated and the end-to-side anastomosis was performed using 6-0 prolene in a continuous fashion. Prior to completion of the anastomosis, the clamps were sequentially released allowing backward and forward bleeding to remove all air and debris from the lumen. The suture line was completed and then checked for bleeding noting appropriate hemostasis. Doppler was then used to assess the flow through the anastomosis and distally and was noted to be appropriate. The proximal vein bypass was then accessed with a micropuncture needle and mandril wire followed by placement of a  Micropuncture sheath. A left lower extremity angiogram was performed via the micropuncture sheath which demonstrated patency of the bypass with no evidence of twisting or kinking. There is patency of the left popliteal artery with patency of the left peroneal and left AT with  of the distal PT artery. AT and peroneal are main run off to the left foot. No additional intervention was felt to be necessary.  Extensive attention was paid to achieving hemostasis of all of the wound beds. The incisions were closed with 2-0 and 3-0 interrupted Vicryl sutures in layers and the skin was approximated using staples; the groin skin was reapproximated using 4-0 Monocryl sutures. The wounds were cleaned and dressed with sterile gauze and tegaderm  dressings. The patient was extubated and transferred to the pacu in stable condition                  Condition: Good    Disposition: PACU - hemodynamically stable.    Andie Armijo DO   Vascular Surgery Fellow  06/14/2017

## 2017-06-14 NOTE — NURSING TRANSFER
Nursing Transfer Note      6/14/2017     Transfer To: 611    Transfer via stretcher    Transfer with cardiac monitoring, ivf, pca pump, ETCO2    Transported by Michelle PCT    Medicines sent:None    Chart send with patient: Yes    Notified: spouse    Patient reassessed at: 6/14/17 1818    Upon arrival to floor: cardiac monitor applied, patient oriented to room, call bell in reach and bed in lowest position

## 2017-06-15 LAB
ALBUMIN SERPL BCP-MCNC: 2.7 G/DL
ALP SERPL-CCNC: 77 U/L
ALT SERPL W/O P-5'-P-CCNC: 6 U/L
ANION GAP SERPL CALC-SCNC: 13 MMOL/L
ANISOCYTOSIS BLD QL SMEAR: SLIGHT
AST SERPL-CCNC: 16 U/L
BASOPHILS # BLD AUTO: 0.04 K/UL
BASOPHILS NFR BLD: 0.4 %
BILIRUB SERPL-MCNC: 0.3 MG/DL
BUN SERPL-MCNC: 33 MG/DL
BURR CELLS BLD QL SMEAR: ABNORMAL
CALCIUM SERPL-MCNC: 7.9 MG/DL
CHLORIDE SERPL-SCNC: 108 MMOL/L
CO2 SERPL-SCNC: 20 MMOL/L
CREAT SERPL-MCNC: 2.7 MG/DL
DIFFERENTIAL METHOD: ABNORMAL
EOSINOPHIL # BLD AUTO: 0.1 K/UL
EOSINOPHIL NFR BLD: 1.2 %
ERYTHROCYTE [DISTWIDTH] IN BLOOD BY AUTOMATED COUNT: 14.9 %
EST. GFR  (AFRICAN AMERICAN): 26.8 ML/MIN/1.73 M^2
EST. GFR  (NON AFRICAN AMERICAN): 23.2 ML/MIN/1.73 M^2
GLUCOSE SERPL-MCNC: 127 MG/DL
HCT VFR BLD AUTO: 27.2 %
HGB BLD-MCNC: 8.7 G/DL
HYPOCHROMIA BLD QL SMEAR: ABNORMAL
LYMPHOCYTES # BLD AUTO: 1.3 K/UL
LYMPHOCYTES NFR BLD: 12.2 %
MAGNESIUM SERPL-MCNC: 1.9 MG/DL
MCH RBC QN AUTO: 27.8 PG
MCHC RBC AUTO-ENTMCNC: 32 %
MCV RBC AUTO: 87 FL
MONOCYTES # BLD AUTO: 1.2 K/UL
MONOCYTES NFR BLD: 11.4 %
NEUTROPHILS # BLD AUTO: 7.6 K/UL
NEUTROPHILS NFR BLD: 74.8 %
OVALOCYTES BLD QL SMEAR: ABNORMAL
PHOSPHATE SERPL-MCNC: 4.5 MG/DL
PLATELET # BLD AUTO: 251 K/UL
PMV BLD AUTO: 9.6 FL
POCT GLUCOSE: 140 MG/DL (ref 70–110)
POCT GLUCOSE: 157 MG/DL (ref 70–110)
POCT GLUCOSE: 169 MG/DL (ref 70–110)
POCT GLUCOSE: 181 MG/DL (ref 70–110)
POCT GLUCOSE: 191 MG/DL (ref 70–110)
POIKILOCYTOSIS BLD QL SMEAR: SLIGHT
POLYCHROMASIA BLD QL SMEAR: ABNORMAL
POTASSIUM SERPL-SCNC: 4.9 MMOL/L
PROT SERPL-MCNC: 6 G/DL
RBC # BLD AUTO: 3.13 M/UL
SCHISTOCYTES BLD QL SMEAR: ABNORMAL
SODIUM SERPL-SCNC: 141 MMOL/L
WBC # BLD AUTO: 10.22 K/UL

## 2017-06-15 PROCEDURE — 85025 COMPLETE CBC W/AUTO DIFF WBC: CPT

## 2017-06-15 PROCEDURE — 51798 US URINE CAPACITY MEASURE: CPT

## 2017-06-15 PROCEDURE — 84100 ASSAY OF PHOSPHORUS: CPT

## 2017-06-15 PROCEDURE — 20600001 HC STEP DOWN PRIVATE ROOM

## 2017-06-15 PROCEDURE — 83735 ASSAY OF MAGNESIUM: CPT

## 2017-06-15 PROCEDURE — 63600175 PHARM REV CODE 636 W HCPCS: Performed by: SURGERY

## 2017-06-15 PROCEDURE — 80053 COMPREHEN METABOLIC PANEL: CPT

## 2017-06-15 PROCEDURE — 25000003 PHARM REV CODE 250: Performed by: SURGERY

## 2017-06-15 RX ORDER — TAMSULOSIN HYDROCHLORIDE 0.4 MG/1
0.8 CAPSULE ORAL DAILY
Status: DISCONTINUED | OUTPATIENT
Start: 2017-06-15 | End: 2017-06-16 | Stop reason: HOSPADM

## 2017-06-15 RX ORDER — METOPROLOL SUCCINATE 50 MG/1
50 TABLET, EXTENDED RELEASE ORAL DAILY
Status: DISCONTINUED | OUTPATIENT
Start: 2017-06-16 | End: 2017-06-16 | Stop reason: HOSPADM

## 2017-06-15 RX ORDER — AMOXICILLIN 250 MG
1 CAPSULE ORAL DAILY
Status: DISCONTINUED | OUTPATIENT
Start: 2017-06-16 | End: 2017-06-16 | Stop reason: HOSPADM

## 2017-06-15 RX ORDER — HYDROCODONE BITARTRATE AND ACETAMINOPHEN 5; 325 MG/1; MG/1
1 TABLET ORAL EVERY 6 HOURS PRN
Status: DISCONTINUED | OUTPATIENT
Start: 2017-06-15 | End: 2017-06-15

## 2017-06-15 RX ORDER — HYDROMORPHONE HYDROCHLORIDE 1 MG/ML
0.5 INJECTION, SOLUTION INTRAMUSCULAR; INTRAVENOUS; SUBCUTANEOUS EVERY 6 HOURS PRN
Status: DISCONTINUED | OUTPATIENT
Start: 2017-06-15 | End: 2017-06-16 | Stop reason: HOSPADM

## 2017-06-15 RX ORDER — FUROSEMIDE 40 MG/1
40 TABLET ORAL DAILY
Status: DISCONTINUED | OUTPATIENT
Start: 2017-06-15 | End: 2017-06-16 | Stop reason: HOSPADM

## 2017-06-15 RX ORDER — HYDROCODONE BITARTRATE AND ACETAMINOPHEN 10; 325 MG/1; MG/1
1 TABLET ORAL EVERY 6 HOURS PRN
Status: DISCONTINUED | OUTPATIENT
Start: 2017-06-15 | End: 2017-06-16 | Stop reason: HOSPADM

## 2017-06-15 RX ORDER — HYDROCODONE BITARTRATE AND ACETAMINOPHEN 5; 325 MG/1; MG/1
1 TABLET ORAL EVERY 6 HOURS PRN
Status: DISCONTINUED | OUTPATIENT
Start: 2017-06-15 | End: 2017-06-16 | Stop reason: HOSPADM

## 2017-06-15 RX ADMIN — DEXTROSE 2 G: 50 INJECTION, SOLUTION INTRAVENOUS at 01:06

## 2017-06-15 RX ADMIN — HYDRALAZINE HYDROCHLORIDE AND ISOSORBIDE DINITRATE 1 TABLET: 37.5; 2 TABLET, FILM COATED ORAL at 06:06

## 2017-06-15 RX ADMIN — INSULIN ASPART 1 UNITS: 100 INJECTION, SOLUTION INTRAVENOUS; SUBCUTANEOUS at 11:06

## 2017-06-15 RX ADMIN — INSULIN DETEMIR 16 UNITS: 100 INJECTION, SOLUTION SUBCUTANEOUS at 10:06

## 2017-06-15 RX ADMIN — INSULIN ASPART 2 UNITS: 100 INJECTION, SOLUTION INTRAVENOUS; SUBCUTANEOUS at 06:06

## 2017-06-15 RX ADMIN — HYDROMORPHONE HYDROCHLORIDE 0.5 MG: 1 INJECTION, SOLUTION INTRAMUSCULAR; INTRAVENOUS; SUBCUTANEOUS at 10:06

## 2017-06-15 RX ADMIN — GABAPENTIN 300 MG: 300 CAPSULE ORAL at 08:06

## 2017-06-15 RX ADMIN — FUROSEMIDE 40 MG: 40 TABLET ORAL at 12:06

## 2017-06-15 RX ADMIN — HEPARIN SODIUM 5000 UNITS: 5000 INJECTION, SOLUTION INTRAVENOUS; SUBCUTANEOUS at 06:06

## 2017-06-15 RX ADMIN — TAMSULOSIN HYDROCHLORIDE 0.8 MG: 0.4 CAPSULE ORAL at 05:06

## 2017-06-15 RX ADMIN — GABAPENTIN 300 MG: 300 CAPSULE ORAL at 10:06

## 2017-06-15 RX ADMIN — PANTOPRAZOLE SODIUM 40 MG: 40 TABLET, DELAYED RELEASE ORAL at 08:06

## 2017-06-15 RX ADMIN — INSULIN ASPART 2 UNITS: 100 INJECTION, SOLUTION INTRAVENOUS; SUBCUTANEOUS at 12:06

## 2017-06-15 RX ADMIN — ATORVASTATIN CALCIUM 80 MG: 20 TABLET, FILM COATED ORAL at 08:06

## 2017-06-15 RX ADMIN — INSULIN ASPART 2 UNITS: 100 INJECTION, SOLUTION INTRAVENOUS; SUBCUTANEOUS at 08:06

## 2017-06-15 RX ADMIN — CLOPIDOGREL 75 MG: 75 TABLET, FILM COATED ORAL at 08:06

## 2017-06-15 RX ADMIN — HYDROCODONE BITARTRATE AND ACETAMINOPHEN 1 TABLET: 10; 325 TABLET ORAL at 05:06

## 2017-06-15 RX ADMIN — ASPIRIN 81 MG CHEWABLE TABLET 81 MG: 81 TABLET CHEWABLE at 08:06

## 2017-06-15 NOTE — PLAN OF CARE
Problem: Patient Care Overview  Goal: Plan of Care Review  POC reviewed with pt and spouse, both verbalized understanding. VSS. Pt AAox3. Pt denies pain during shift,  relieved with prescribed meds. Pt denies nausea during shift.  Pt tolerating l 2000 ada diet. Pt moving independently in bed.  Pt remains free of falls, injuries and trauma during shift. Right A line clean dry intact no pain or numbness. Saucedo intact and draining to gravity.  No distress noted at this time, will continue to monitor.

## 2017-06-15 NOTE — PLAN OF CARE
Riki Huynh MD     Extended Emergency Contact Information  Primary Emergency Contact: Vladimir Rodarte  Address: [address] [place] [address] [facility]  Mobile Phone: [phone]  Relation: Spouse     Payor: [employer] / Plan: [employer] CHOICES 65 / Product Type: Medicare Advantage /        [id number] - [place] - [address]  [address]  [place] [address]  Phone: [phone] Fax: [phone]       06/15/17 1454   Discharge Assessment   Assessment Type Discharge Planning Assessment   Confirmed/corrected address and phone number on facesheet? Yes   Assessment information obtained from? Patient   Expected Length of Stay (days) 3   Communicated expected length of stay with patient/caregiver yes   Prior to hospitilization cognitive status: Alert/Oriented   Prior to hospitalization functional status: Independent   Current cognitive status: Alert/Oriented   Current Functional Status: Independent   Arrived From home or self-care   Lives With spouse   Able to Return to Prior Arrangements yes   Is patient able to care for self after discharge? Yes   How many people do you have in your home that can help with your care after discharge? 1   Who are your caregiver(s) and their phone number(s)? Vladimir Rodarte [phone]   Patient's perception of discharge disposition home or selfcare   Readmission Within The Last 30 Days no previous admission in last 30 days   Patient currently being followed by outpatient case management? No   Patient currently receives home health services? No;Yes   Patient previously received home health services and would like to resume services if necessary? Yes   If yes, name of home health provider: [facility]   Does the patient currently use HME? Yes   Patient currently receives private duty nursing? No   Patient currently receives any other outside agency services? No   Equipment Currently Used at Home shower  chair   Do you have any problems affording any of your prescribed medications? No   Is the patient taking medications as prescribed? yes   Do you have any financial concerns preventing you from receiving the healthcare you need? No   Does the patient have transportation to healthcare appointments? Yes   Transportation Available family or friend will provide   On Dialysis? No   Does the patient receive services at the [facility]madin Clinic? No   Are there any open cases? No   Discharge Plan A Home with family;Home Health   Discharge Plan B Home with family   Patient/Family In Agreement With Plan yes

## 2017-06-15 NOTE — HOSPITAL COURSE
6/15/17:   1. Left Femoral to Below knee popliteal bypass with Non-reversed Left GSV  2. Left GSV vein harvest  3. Left lower extremity angiogram

## 2017-06-15 NOTE — CLINICAL REVIEW
I paged Dr. Coffey and informed her that the patient was due to void. I also told her that I bladder scanned the patient and he 419cc's of urine in his bladder. Dr. Coffey said to do an in and out cath and wait six hours. She also said that she will put in for flowmax.

## 2017-06-15 NOTE — BRIEF OP NOTE
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, NYHA class 2 and CKD stage 3 [I13.0, I50.20, N18.3]  Coronary artery disease involving native coronary artery of native heart without angina pectoris [I25.10]  Type 2 diabetes mellitus with stage 4 chronic kidney disease, with long-term current use of insulin [E11.22, N18.4, Z79.4]  Type 2 diabetes mellitus with diabetic polyneuropathy, with long-term current use of insulin [E11.42, Z79.4]    Post-op Diagnosis:  Post-Op Diagnosis Codes:     * Pure hypercholesterolemia [E78.00]     * Ischemic cardiomyopathy [I25.5]     * Obesity (BMI 30-39.9) [E66.9]     * Atherosclerosis of native arteries of the extremities with ulceration [I70.25]     * 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, NYHA class 2 and CKD stage 3 [I13.0, I50.20, N18.3]     * Coronary artery disease involving native coronary artery of native heart without angina pectoris [I25.10]     * Type 2 diabetes mellitus with stage 4 chronic kidney disease, with long-term current use of insulin [E11.22, N18.4, Z79.4]     * Type 2 diabetes mellitus with diabetic polyneuropathy, with long-term current use of insulin [E11.42, Z79.4]    Procedure:  Left fem-BK Pop with GSV     Left LE angiogram    Anesthesia: General    Description of Procedure: Bypass for left heel ulcer    Description of the findings of the procedure: Patent bypass on completion angio. Diseased AT and Peroneal to foot    Estimated Blood Loss: 200 mL         Specimens:   Specimen (12h  ago through future)    None

## 2017-06-15 NOTE — PROGRESS NOTES
[facility]  Vascular Surgery  Progress Note    Patient Name: Chau Rodarte  MRN: [id number]  Admission Date: 6/14/2017  Primary Care Provider: Riki Huynh MD    Subjective:     Interval History: patient seen and examined. No acute events overnight. Patient notes appropriate postop pain control. Patient tolerating po diet well. Saucedo catheter removed this am. Patient voiding spontaneously     Post-Op Info:  Procedure(s) (LRB):  [id number] (Left)   1 Day Post-Op       Medications:  Continuous Infusions:   hydromorphone in 0.9 % NaCl 6 mg/30 ml       Scheduled Meds:   aspirin  81 mg Oral Daily    atorvastatin  80 mg Oral Daily    clopidogrel  75 mg Oral Daily    furosemide  40 mg Oral Daily    gabapentin  300 mg Oral BID    heparin (porcine)  5,000 Units Subcutaneous Q8H    insulin detemir  16 Units Subcutaneous QHS    [START ON 6/16/2017] metoprolol succinate  50 mg Oral Daily    pantoprazole  40 mg Oral Daily     PRN Meds:dextrose 50%, dextrose 50%, glucagon (human recombinant), glucose, glucose, insulin aspart, naloxone, ondansetron     Objective:     Vital Signs (Most Recent):  Temp: 98.4 °F (36.9 °C) (06/15/17 1537)  Pulse: 82 (06/15/17 1537)  Resp: 18 (06/15/17 1537)  BP: 117/61 (06/15/17 1537)  SpO2: 97 % (06/15/17 1537) Vital Signs (24h Range):  Temp:  [96.8 °F (36 °C)-98.4 °F (36.9 °C)] 98.4 °F (36.9 °C)  Pulse:  [62-82] 82  Resp:  [4-20] 18  SpO2:  [97 %-100 %] 97 %  BP: ()/(52-71) 117/61  Arterial Line BP: (115-167)/(59-81) 138/64          Physical Exam   Constitutional: He is oriented to person, place, and time. He appears well-developed and well-nourished. No distress.   HENT:   Head: Normocephalic and atraumatic.   Nose: Nose normal.   Eyes: EOM are normal. Pupils are equal, round, and reactive to light.   Neck: Normal range of motion. No tracheal deviation present.   Cardiovascular: Normal rate and regular rhythm.    2+ femoral pulses, LLE with biphasic DP,  no PT appreciated on doppler exam    Pulmonary/Chest: Effort normal and breath sounds normal.   Abdominal: Soft. Bowel sounds are normal. He exhibits no distension. There is no tenderness.   Musculoskeletal:   Limited LLE ROM due to postop pain.   Left lower extremity surgical incision sites with dressings in place and minimal dressing saturation with serosanguinous fluid. Surgical wounds are appropriate  Tender to palpation   Left heel ulcer with overlying eschar   Neurological: He is alert and oriented to person, place, and time. No cranial nerve deficit.   Skin: Skin is warm and dry.   Psychiatric: He has a normal mood and affect. His behavior is normal. Judgment and thought content normal.       Significant Labs:  CBC:   Recent Labs  Lab 06/15/17  0353   WBC 10.22   RBC 3.13*   HGB 8.7*   HCT 27.2*      MCV 87   MCH 27.8   MCHC 32.0     CMP:   Recent Labs  Lab 06/15/17  0353   *   CALCIUM 7.9*   ALBUMIN 2.7*   PROT 6.0      K 4.9   CO2 20*      BUN 33*   CREATININE 2.7*   ALKPHOS 77   ALT 6*   AST 16   BILITOT 0.3     Coagulation: No results for input(s): INR, APTT in the last 168 hours.    Invalid input(s): PT        Significant Diagnostics:  No new imaging     Assessment/Plan:     AF (atrial fibrillation)    Continue to monitor hemodynamic status         Acute blood loss anemia    Continue to monitor H/H closely   Will transfuse as necessary         Coronary artery disease involving native coronary artery of native heart without angina pectoris    Continue ASA, Plavix and Statin daily         Chronic systolic heart failure    Continue home Lasix   Continue to monitor hemodynamics closely         Type 2 diabetes mellitus with diabetic polyneuropathy, with long-term current use of insulin    Continue blood glucose control   Continue insulin sliding scale   Diabetic diet as tolerated         Essential hypertension    Continue home antihypertensive medications         Skin ulcers of both  feet    Wound care consult to eval and treat for Left heel decubitus ulcer & right plantar ulceration         HLD (hyperlipidemia)    Continue daily statin         * Atherosclerosis of native arteries of the extremities with ulceration    S/p LLE Fem to BK Popliteal bypass with GSV   - Continue serial neurovascular monitoring  - Continue ASA, Plavix and Statin  - Continue pain control for postop pain with PCA   - PT/OT to eval and treat             Andie Armijo MD  Vascular Surgery  [facility]

## 2017-06-15 NOTE — PLAN OF CARE
Problem: Patient Care Overview  Goal: Plan of Care Review  I went over the plan of care with the patient and his wife. Pain managed with PCA pump. Neurovascular checks done every two hours. Patient was due to void. I did an In and Out Cath which 650 cc's of urine drained. Discontinued the patient's A-line, gauze dressing applied to right wrist. Patient remained free of falls and skin breakdown.

## 2017-06-15 NOTE — HPI
66y/o AAM with a PMhx of HTN, HLD, DM, CAD, Stroke, CKD and Atherosclerosis of the bilateral lower extremities with tissue loss.

## 2017-06-15 NOTE — ASSESSMENT & PLAN NOTE
S/p LLE Fem to BK Popliteal bypass with GSV   - Continue serial neurovascular monitoring  - Continue ASA, Plavix and Statin  - Continue pain control for postop pain with PCA   - PT/OT to eval and treat

## 2017-06-15 NOTE — PROGRESS NOTES
MD phone patient B/P low at this time 80/550s. Patient Asymptomatic at this time. New Orders followed. Will continue to monitor.

## 2017-06-15 NOTE — SUBJECTIVE & OBJECTIVE
Medications:  Continuous Infusions:   hydromorphone in 0.9 % NaCl 6 mg/30 ml       Scheduled Meds:   aspirin  81 mg Oral Daily    atorvastatin  80 mg Oral Daily    clopidogrel  75 mg Oral Daily    furosemide  40 mg Oral Daily    gabapentin  300 mg Oral BID    heparin (porcine)  5,000 Units Subcutaneous Q8H    insulin detemir  16 Units Subcutaneous QHS    [START ON 6/16/2017] metoprolol succinate  50 mg Oral Daily    pantoprazole  40 mg Oral Daily     PRN Meds:dextrose 50%, dextrose 50%, glucagon (human recombinant), glucose, glucose, insulin aspart, naloxone, ondansetron     Objective:     Vital Signs (Most Recent):  Temp: 98.4 °F (36.9 °C) (06/15/17 1537)  Pulse: 82 (06/15/17 1537)  Resp: 18 (06/15/17 1537)  BP: 117/61 (06/15/17 1537)  SpO2: 97 % (06/15/17 1537) Vital Signs (24h Range):  Temp:  [96.8 °F (36 °C)-98.4 °F (36.9 °C)] 98.4 °F (36.9 °C)  Pulse:  [62-82] 82  Resp:  [4-20] 18  SpO2:  [97 %-100 %] 97 %  BP: ()/(52-71) 117/61  Arterial Line BP: (115-167)/(59-81) 138/64          Physical Exam   Constitutional: He is oriented to person, place, and time. He appears well-developed and well-nourished. No distress.   HENT:   Head: Normocephalic and atraumatic.   Nose: Nose normal.   Eyes: EOM are normal. Pupils are equal, round, and reactive to light.   Neck: Normal range of motion. No tracheal deviation present.   Cardiovascular: Normal rate and regular rhythm.    2+ femoral pulses, LLE with biphasic DP, no PT appreciated on doppler exam    Pulmonary/Chest: Effort normal and breath sounds normal.   Abdominal: Soft. Bowel sounds are normal. He exhibits no distension. There is no tenderness.   Musculoskeletal:   Limited LLE ROM due to postop pain.   Left lower extremity surgical incision sites with dressings in place and minimal dressing saturation with serosanguinous fluid. Surgical wounds are appropriate  Tender to palpation   Left heel ulcer with overlying eschar   Neurological: He is alert and  oriented to person, place, and time. No cranial nerve deficit.   Skin: Skin is warm and dry.   Psychiatric: He has a normal mood and affect. His behavior is normal. Judgment and thought content normal.       Significant Labs:  CBC:   Recent Labs  Lab 06/15/17  0353   WBC 10.22   RBC 3.13*   HGB 8.7*   HCT 27.2*      MCV 87   MCH 27.8   MCHC 32.0     CMP:   Recent Labs  Lab 06/15/17  0353   *   CALCIUM 7.9*   ALBUMIN 2.7*   PROT 6.0      K 4.9   CO2 20*      BUN 33*   CREATININE 2.7*   ALKPHOS 77   ALT 6*   AST 16   BILITOT 0.3     Coagulation: No results for input(s): INR, APTT in the last 168 hours.    Invalid input(s): PT        Significant Diagnostics:  No new imaging

## 2017-06-16 VITALS
RESPIRATION RATE: 18 BRPM | DIASTOLIC BLOOD PRESSURE: 58 MMHG | TEMPERATURE: 99 F | SYSTOLIC BLOOD PRESSURE: 121 MMHG | WEIGHT: 229 LBS | BODY MASS INDEX: 33.92 KG/M2 | OXYGEN SATURATION: 97 % | HEART RATE: 76 BPM | HEIGHT: 69 IN

## 2017-06-16 LAB
ALBUMIN SERPL BCP-MCNC: 2.5 G/DL
ALP SERPL-CCNC: 83 U/L
ALT SERPL W/O P-5'-P-CCNC: <5 U/L
ANION GAP SERPL CALC-SCNC: 10 MMOL/L
AST SERPL-CCNC: 12 U/L
BASOPHILS # BLD AUTO: 0.03 K/UL
BASOPHILS NFR BLD: 0.2 %
BILIRUB SERPL-MCNC: 0.5 MG/DL
BUN SERPL-MCNC: 36 MG/DL
CALCIUM SERPL-MCNC: 8.3 MG/DL
CHLORIDE SERPL-SCNC: 107 MMOL/L
CO2 SERPL-SCNC: 21 MMOL/L
CREAT SERPL-MCNC: 2.5 MG/DL
DIFFERENTIAL METHOD: ABNORMAL
EOSINOPHIL # BLD AUTO: 0.1 K/UL
EOSINOPHIL NFR BLD: 1.1 %
ERYTHROCYTE [DISTWIDTH] IN BLOOD BY AUTOMATED COUNT: 14.9 %
EST. GFR  (AFRICAN AMERICAN): 29.4 ML/MIN/1.73 M^2
EST. GFR  (NON AFRICAN AMERICAN): 25.4 ML/MIN/1.73 M^2
GLUCOSE SERPL-MCNC: 130 MG/DL
HCT VFR BLD AUTO: 23.9 %
HGB BLD-MCNC: 7.6 G/DL
LYMPHOCYTES # BLD AUTO: 1.4 K/UL
LYMPHOCYTES NFR BLD: 11.7 %
MAGNESIUM SERPL-MCNC: 1.8 MG/DL
MCH RBC QN AUTO: 27.4 PG
MCHC RBC AUTO-ENTMCNC: 31.8 %
MCV RBC AUTO: 86 FL
MONOCYTES # BLD AUTO: 1.4 K/UL
MONOCYTES NFR BLD: 11.4 %
NEUTROPHILS # BLD AUTO: 9.2 K/UL
NEUTROPHILS NFR BLD: 75.2 %
PHOSPHATE SERPL-MCNC: 3.4 MG/DL
PLATELET # BLD AUTO: 231 K/UL
PMV BLD AUTO: 10 FL
POCT GLUCOSE: 155 MG/DL (ref 70–110)
POCT GLUCOSE: 157 MG/DL (ref 70–110)
POTASSIUM SERPL-SCNC: 4.3 MMOL/L
PROT SERPL-MCNC: 5.6 G/DL
RBC # BLD AUTO: 2.77 M/UL
SODIUM SERPL-SCNC: 138 MMOL/L
WBC # BLD AUTO: 12.24 K/UL

## 2017-06-16 PROCEDURE — 85025 COMPLETE CBC W/AUTO DIFF WBC: CPT

## 2017-06-16 PROCEDURE — 93926 LOWER EXTREMITY STUDY: CPT | Mod: 26,,, | Performed by: SURGERY

## 2017-06-16 PROCEDURE — 99223 1ST HOSP IP/OBS HIGH 75: CPT | Mod: ,,, | Performed by: PODIATRIST

## 2017-06-16 PROCEDURE — 80053 COMPREHEN METABOLIC PANEL: CPT

## 2017-06-16 PROCEDURE — 25000003 PHARM REV CODE 250: Performed by: SURGERY

## 2017-06-16 PROCEDURE — 84100 ASSAY OF PHOSPHORUS: CPT

## 2017-06-16 PROCEDURE — 36415 COLL VENOUS BLD VENIPUNCTURE: CPT

## 2017-06-16 PROCEDURE — 83735 ASSAY OF MAGNESIUM: CPT

## 2017-06-16 RX ORDER — OXYCODONE HYDROCHLORIDE 10 MG/1
10 TABLET ORAL EVERY 4 HOURS PRN
Qty: 60 TABLET | Refills: 0 | Status: ON HOLD | OUTPATIENT
Start: 2017-06-16 | End: 2017-06-30 | Stop reason: HOSPADM

## 2017-06-16 RX ORDER — TAMSULOSIN HYDROCHLORIDE 0.4 MG/1
0.4 CAPSULE ORAL DAILY
Qty: 30 CAPSULE | Refills: 0 | Status: SHIPPED | OUTPATIENT
Start: 2017-06-16 | End: 2019-01-14

## 2017-06-16 RX ORDER — HYDROCODONE BITARTRATE AND ACETAMINOPHEN 10; 325 MG/1; MG/1
1 TABLET ORAL EVERY 4 HOURS PRN
Qty: 60 TABLET | Refills: 0 | Status: ON HOLD | OUTPATIENT
Start: 2017-06-16 | End: 2017-06-30 | Stop reason: HOSPADM

## 2017-06-16 RX ORDER — AMOXICILLIN 250 MG
1 CAPSULE ORAL DAILY
Qty: 60 TABLET | Refills: 0 | Status: SHIPPED | OUTPATIENT
Start: 2017-06-16 | End: 2019-01-14

## 2017-06-16 RX ADMIN — FUROSEMIDE 40 MG: 40 TABLET ORAL at 09:06

## 2017-06-16 RX ADMIN — HYDROCODONE BITARTRATE AND ACETAMINOPHEN 1 TABLET: 5; 325 TABLET ORAL at 02:06

## 2017-06-16 RX ADMIN — PANTOPRAZOLE SODIUM 40 MG: 40 TABLET, DELAYED RELEASE ORAL at 09:06

## 2017-06-16 RX ADMIN — GABAPENTIN 300 MG: 300 CAPSULE ORAL at 09:06

## 2017-06-16 RX ADMIN — TAMSULOSIN HYDROCHLORIDE 0.8 MG: 0.4 CAPSULE ORAL at 09:06

## 2017-06-16 RX ADMIN — ATORVASTATIN CALCIUM 80 MG: 20 TABLET, FILM COATED ORAL at 09:06

## 2017-06-16 RX ADMIN — COLLAGENASE SANTYL: 250 OINTMENT TOPICAL at 09:06

## 2017-06-16 RX ADMIN — INSULIN ASPART 2 UNITS: 100 INJECTION, SOLUTION INTRAVENOUS; SUBCUTANEOUS at 09:06

## 2017-06-16 RX ADMIN — METOPROLOL SUCCINATE 50 MG: 50 TABLET, EXTENDED RELEASE ORAL at 09:06

## 2017-06-16 RX ADMIN — CLOPIDOGREL 75 MG: 75 TABLET, FILM COATED ORAL at 09:06

## 2017-06-16 RX ADMIN — ASPIRIN 81 MG CHEWABLE TABLET 81 MG: 81 TABLET CHEWABLE at 09:06

## 2017-06-16 RX ADMIN — STANDARDIZED SENNA CONCENTRATE AND DOCUSATE SODIUM 1 TABLET: 8.6; 5 TABLET, FILM COATED ORAL at 09:06

## 2017-06-16 NOTE — PROGRESS NOTES
Notified Dr. Wood, on call for surgery, that pt refused last night's dose of heparin stating that he almost bleed out in the past while on coumadin and does not want to take any blood thinners other than plavix and aspirin. MD verbalizes understanding standing stating that it is very important that pt takes this medication, to educate him on importance of taking heparin, and that if he does not take medication, he is at a higher risk for blood clot. Will relay this to pt. Will continue to monitor.

## 2017-06-16 NOTE — PROGRESS NOTES
Patient's , slides at 151: did not give insulin aspart d/t close proximity of last dose/risk of insulin stacking. WCTM blood glucose.

## 2017-06-16 NOTE — PLAN OF CARE
Sw following for d/c needs. Pt current with [facility] prior to admit. Sw will continue to follow.      UPDATE 1:25 PM   Sw spoke to MD. Team awaiting PT OT recs to inform d/c plan.     Lynette Solis LMSW [id number]

## 2017-06-16 NOTE — PROGRESS NOTES
Fellow nurse bladder scanned pt, while this nurse was tending to another pt. 336ml on bladder scan and pt states he is unable to urinate; Dr. Wood notified; order obtained to place huggins catheter. WCTM.

## 2017-06-16 NOTE — DISCHARGE SUMMARY
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    Procedure(s) (LRB):  [id number] (Left)     Hospital Course: Patient was admitted for LLE PAOD and tissue loss. He was taken to the OR on 6/15/17 and underwent a Left Femoral to BK Popliteal bypass with non-reversed LLE GSV. He tolerated the procedure well and was transferred to the PCU for postop recovery. On POD#1 the patient was noted to be doing well. He had adequate pain control and was started on a diabetic diet. His huggins catheter was removed but he experienced  Urinary retention which required replacement of the Huggins catheter. On POD#2 the patient was seen by podiatry due to his b/l foot wounds. Podiatry recommended continued local wound care & NWB of the b/l feet.  The patient was tolerating po diet, was able to transfer in a wheelchair without assistance and it was deemed safe and appropriate to discharge the patient home.     Consults:   Consults         Status Ordering Provider     Inpatient consult to Podiatry  Once     Provider:  (Not yet assigned)    Completed ANDIE ARMIJO          Significant Diagnostic Studies: Angiography: see op note     Pending Diagnostic Studies:     None        Final Active Diagnoses:    Diagnosis Date Noted POA    PRINCIPAL PROBLEM:  Atherosclerosis of native arteries of the extremities with ulceration [I70.25] 04/26/2017 Yes    AF (atrial fibrillation) [I48.91] 03/24/2017 Yes    Type 2 diabetes mellitus with stage 4 chronic kidney disease GFR 15-29 [E11.22, N18.4]  03/20/2017 Yes    Acute blood loss anemia [D62] 03/20/2017 Yes    Long term (current) use of anticoagulants [Z79.01] [Z79.01] 01/27/2017 Not Applicable    Coronary artery disease involving native coronary artery of native heart without angina pectoris [I25.10] 12/20/2016 Yes    Ischemic cardiomyopathy [I25.5] 12/20/2016 Yes    Chronic systolic heart failure [I50.22] 11/29/2016 Yes    Obesity (BMI 30-39.9) [E66.9] 11/29/2016 Yes     Chronic    Type 2 diabetes mellitus with diabetic polyneuropathy, with long-term current use of insulin [E11.42, Z79.4] 11/29/2016 Not Applicable    Essential hypertension [I10] 03/03/2014 Yes    Skin ulcers of both feet [L97.519, L97.529] 11/13/2013 Yes    HLD (hyperlipidemia) [E78.5] 10/24/2013 Yes      Problems Resolved During this Admission:    Diagnosis Date Noted Date Resolved POA      Discharged Condition: good    Disposition: Home or Self Care    Follow Up:  Follow-up Information     [facility] - Urology 4th Floor. Schedule an appointment as soon as possible for a visit in 1 week.    Specialty:  Urology  Why:  Saucedo catheter removal   Contact information:  [address] [facility] [address] [phone]  Additional information:  [facility] - 4th Floor           Nehal William MD In 1 week.    Specialty:  Vascular Surgery  Why:  For wound re-check  Contact information:  [address] [facility] [id number] [phone]             Schedule an appointment as soon as possible for a visit with Quirino Bland DPM.    Specialty:  Podiatry  Contact information:  [address] [facility] [id number] [phone]             Schedule an appointment as soon as possible for a visit with Riki Huynh MD.    Specialty:  Internal Medicine  Why:  For follow up of general medical health, Hypertension and blood glucose management   Contact information:  [address]  [facility] [id number] [phone]                 F/U with Dr. William of Vascular surgery  in 1 week  F/U with Dr. Bland of Podiatry in 1 week  F/U with [facility] in 1 week for Saucedo removal and voiding trial   F/U with patient's PCP upon hospital discharge for general medical care      Patient Instructions:     VAS Ankle Brachial Indices Resting   Standing Status: Future  Standing Exp. Date: 06/16/18     VAS US Doppler Arterial Leg Left   Standing Status: Future  Standing Exp. Date: 06/16/18     Diet general     Activity as tolerated     Shower on day dressing removed (No bath)     Keep surgical extremity elevated     Weight bearing restrictions (specify)   Order Comments: Non weight bearing to the b/l LE (ok for transfers)     Call MD for:  temperature >100.4     Call MD for:  persistent nausea and vomiting or diarrhea     Call MD for:  severe uncontrolled pain     Call MD for:  redness, tenderness, or signs of infection (pain, swelling, redness, odor or green/yellow discharge around incision site)     Call MD for:  difficulty breathing or increased cough     Call MD for:  severe persistent headache     Call MD for:  worsening rash     Call MD for:  persistent dizziness, light-headedness, or visual disturbances     Change dressing (specify)   Order Comments: Dressing change:   Left lower extremity surgical wounds: Remove dressings daily. Clean surgical incision sites with soap and water daily in the shower. No tub baths, swimming pools or standing water. Pat incisions dry. Cover groin surgical incision site (must keep clean and dry to prevent wound and bypass infection). Can cover the remaining left medial leg surgical incision sites with dressings as needed for drainage  Left foot wound: Clean daily and apply santyl to the left heel wound, cover with dry gauze and kerlex. Change daily   Right foot wound: clean daily and cover with padded sponge, dry gauze and kerlex. Change daily       Medications:  Reconciled Home Medications:   Current Discharge Medication List      START taking these medications     Details   hydrocodone-acetaminophen 10-325mg (NORCO)  mg Tab Take 1 tablet by mouth every 4 (four) hours as needed for Pain.  Qty: 60 tablet, Refills: 0      oxycodone (ROXICODONE) 10 mg Tab immediate release tablet Take 1 tablet (10 mg total) by mouth every 4 (four) hours as needed for Pain.  Qty: 60 tablet, Refills: 0      senna-docusate 8.6-50 mg (PERICOLACE) 8.6-50 mg per tablet Take 1 tablet by mouth once daily.  Qty: 60 tablet, Refills: 0      tamsulosin (FLOMAX) 0.4 mg Cp24 Take 1 capsule (0.4 mg total) by mouth once daily.  Qty: 30 capsule, Refills: 0         CONTINUE these medications which have NOT CHANGED    Details   aspirin 81 MG Chew Take 1 tablet (81 mg total) by mouth once daily.  Refills: 0      atorvastatin (LIPITOR) 80 MG tablet Take 1 tablet (80 mg total) by mouth once daily.  Qty: 90 tablet, Refills: 3      cholecalciferol, vitamin D3, (VITAMIN D3) 5,000 unit Tab Take 5,000 Units by mouth once daily.      clopidogrel (PLAVIX) 75 mg tablet Take 1 tablet (75 mg total) by mouth once daily.  Qty: 90 tablet, Refills: 3      collagenase (SANTYL) ointment Apply topically once daily.  Qty: 30 g, Refills: 0      diclofenac sodium 1 % Gel Apply 2 g topically 3 (three) times daily.  Qty: 100 g, Refills: 1      furosemide (LASIX) 40 MG tablet Take 1 tablet (40 mg total) by mouth once daily.  Qty: 30 tablet, Refills: 11      gabapentin (NEURONTIN) 300 MG capsule Take 300 mg by mouth 2 (two) times daily.  Refills: 0    Associated Diagnoses: Arterial embolism and thrombosis of lower extremity      insulin detemir (LEVEMIR FLEXTOUCH) 100 unit/mL (3 mL) SubQ InPn pen Inject 16 Units into the skin every evening.  Qty: 1 Box, Refills: 3    Associated Diagnoses: Type 2 diabetes mellitus with diabetic polyneuropathy, with long-term current use of insulin      isosorbide-hydrALAZINE 20-37.5 mg (BIDIL) 20-37.5 mg Tab Take 1 tablet by mouth 3 (three) times daily.  Qty: 90 tablet, Refills: 11      liraglutide  "0.6 mg/0.1 mL, 18 mg/3 mL, subq PNIJ (VICTOZA 2-SAGAR) 0.6 mg/0.1 mL (18 mg/3 mL) PnIj Inject 0.6 mg daily x1 week, then 1.2 mg daily x1 week, then 1.8 mg daily thereafter  Qty: 9 mL, Refills: 3    Associated Diagnoses: Type 2 diabetes mellitus without complication, with long-term current use of insulin      metoprolol succinate (TOPROL-XL) 50 MG 24 hr tablet Take 1 tablet (50 mg total) by mouth once daily.  Qty: 30 tablet, Refills: 11      pen needle, diabetic (BD ULTRA-FINE HANG PEN NEEDLES) 32 gauge x 5/32" Ndle Uses 2 times daily, on  insulin injections  Qty: 180 each, Refills: 4      pantoprazole (PROTONIX) 20 MG tablet Take 2 tablets (40 mg total) by mouth once daily.  Qty: 30 tablet, Refills: 11    Associated Diagnoses: Typical atrial flutter         STOP taking these medications       hydrocodone-acetaminophen 5-325mg (NORCO) 5-325 mg per tablet Comments:   Reason for Stopping:               Andie Armijo MD  Vascular Surgery  [facility]  "

## 2017-06-16 NOTE — ASSESSMENT & PLAN NOTE
S/p LLE Fem to BK Popliteal bypass with GSV   - Continue serial neurovascular monitoring  - Continue ASA, Plavix and Statin  - Continue pain control for postop pain with PCA   - PT/OT to eval and treat   - SW following for placement and needs

## 2017-06-16 NOTE — ASSESSMENT & PLAN NOTE
-Wound care consult to eval and treat for Left heel decubitus ulcer & right plantar ulceration  -Podiatry to see patient today  -Santyl topically over ulcers

## 2017-06-16 NOTE — PROGRESS NOTES
[facility]  Vascular Surgery  Progress Note    Patient Name: Chau Rodarte  MRN: [id number]  Admission Date: 6/14/2017  Primary Care Provider: Riki Huynh MD    Subjective:     Interval History: Required placement of huggins overnight. Pain controlled. Not ambulating. Tolerating diet well.    Post-Op Info:  Procedure(s) (LRB):  [id number] (Left)   2 Days Post-Op       Medications:  Continuous Infusions:   Scheduled Meds:   aspirin  81 mg Oral Daily    atorvastatin  80 mg Oral Daily    clopidogrel  75 mg Oral Daily    collagenase   Topical Daily    furosemide  40 mg Oral Daily    gabapentin  300 mg Oral BID    heparin (porcine)  5,000 Units Subcutaneous Q8H    insulin detemir  16 Units Subcutaneous QHS    metoprolol succinate  50 mg Oral Daily    pantoprazole  40 mg Oral Daily    senna-docusate 8.6-50 mg  1 tablet Oral Daily    tamsulosin  0.8 mg Oral Daily     PRN Meds:dextrose 50%, dextrose 50%, glucagon (human recombinant), glucose, glucose, hydrocodone-acetaminophen 10-325mg, hydrocodone-acetaminophen 5-325mg, HYDROmorphone, insulin aspart, ondansetron     Objective:     Vital Signs (Most Recent):  Temp: 98.7 °F (37.1 °C) (06/16/17 0420)  Pulse: 77 (06/16/17 0700)  Resp: 20 (06/16/17 0420)  BP: (!) 127/59 (06/16/17 0420)  SpO2: 100 % (06/16/17 0420) Vital Signs (24h Range):  Temp:  [98.4 °F (36.9 °C)-99.9 °F (37.7 °C)] 98.7 °F (37.1 °C)  Pulse:  [75-83] 77  Resp:  [18-20] 20  SpO2:  [97 %-100 %] 100 %  BP: (103-135)/(57-65) 127/59          Physical Exam   Constitutional: He is oriented to person, place, and time. He appears well-developed and well-nourished.   HENT:   Head: Normocephalic and atraumatic.   Eyes: EOM are normal.   Neck: Normal range of motion. Neck supple.   Cardiovascular: Normal rate and regular rhythm.    2+ femoral pulses, LLE with biphasic DP, no PT appreciated on doppler exam     Pulmonary/Chest: Effort normal and breath sounds normal.   Abdominal:  Soft. Bowel sounds are normal. He exhibits no distension. There is no tenderness.   Musculoskeletal:   Limited LLE ROM due to postop pain.   Left lower extremity surgical incision sites with dressings in place and minimal dressing saturation with serosanguinous fluid. Surgical wounds are appropriate  Tender to palpation   Left heel ulcer with overlying eschar      Neurological: He is alert and oriented to person, place, and time.   Skin: Skin is warm and dry.       Significant Labs:  BMP:   Recent Labs  Lab 06/16/17  0441   *      K 4.3      CO2 21*   BUN 36*   CREATININE 2.5*   CALCIUM 8.3*   MG 1.8     CBC:   Recent Labs  Lab 06/15/17  0353   WBC 10.22   RBC 3.13*   HGB 8.7*   HCT 27.2*      MCV 87   MCH 27.8   MCHC 32.0       Significant Diagnostics:  Pending arterial US    Assessment/Plan:     AF (atrial fibrillation)    -Continue to monitor hemodynamic status         Acute blood loss anemia    -Continue to monitor H/H closely   -Will transfuse as necessary         Type 2 diabetes mellitus with stage 4 chronic kidney disease GFR 15-29    -see above  -Flomax for urinary retention; keep huggins in place for now        Long term (current) use of anticoagulants [Z79.01]    -see above        Ischemic cardiomyopathy    -see above        Coronary artery disease involving native coronary artery of native heart without angina pectoris    -Continue ASA, plavix and statin        Obesity (BMI 30-39.9)    -No intervention necessary        Chronic systolic heart failure    -Continue home lasix  -Continue to monitor hemodynamics closely         Type 2 diabetes mellitus with diabetic polyneuropathy, with long-term current use of insulin    -ADA diet  -Continue SSI for BG control        Essential hypertension    -Continue home antihypertensive medications         Skin ulcers of both feet    -Wound care consult to eval and treat for Left heel decubitus ulcer & right plantar ulceration  -Podiatry to see  patient today  -Santyl topically over ulcers         HLD (hyperlipidemia)    -Continue daily statin         * Atherosclerosis of native arteries of the extremities with ulceration    S/p LLE Fem to BK Popliteal bypass with GSV   - Continue serial neurovascular monitoring  - Continue ASA, Plavix and Statin  - Continue pain control for postop pain with PCA   - PT/OT to eval and treat   - SW following for placement and needs            Eduarda Dominguez MD  Vascular Surgery  [facility]

## 2017-06-16 NOTE — PLAN OF CARE
Problem: Patient Care Overview  Goal: Plan of Care Review  Discharge instructions discussed with patient. Verbalizes understanding. Paperwork and prescriptions given to patient. Medications reconciled. R AC 18g IV and L Wrist IV d/c'd, catheters intact. Tolerated well. Pt informed that showering is okay tomorrow, but to not submerge incision, to maintain incisions clean with soap and water. Instructed against strenuous activity. VSS on RA. No acute distress noted. Awaiting transport.

## 2017-06-16 NOTE — PHYSICIAN QUERY
PT Name: Chau Rodarte  MR #: [id number]    Physician Query Form - Atrial Fibrillation Specificity     CDS/: Sapphire Santacruz RN,BSN,CDI            Contact information: [phone]     This form is a permanent document in the medical record.     Query Date: June 16, 2017    By submitting this query, we are merely seeking further clarification of documentation. Please utilize your independent clinical judgment when addressing the question(s) below.    The medical record contains the following:   Indicators     Supporting Clinical Findings Location in Medical Record   x Atrial Fibrillation Atrial Fibrillation   06/15/17 Vascular Surgery Progress Note by Dr.K. Armijo @ 3:54pm    EKG results     x Medication clopidogrel tablet 75 mg  :  Dose 75 mg  :  Oral  :  Daily  06/15/17 Order started@ 0900 MAR    Treatment      Other         Provider, please further specify the Atrial Fibrillation diagnosis.    [  XX] Chronic  [  ] Paroxysmal  [  ] Permanent  [  ] Persistent  [  ] Other (please specify): ____________________________  [  ] Clinically Undetermined    Please document in your progress notes daily for the duration of treatment until resolved, and include in your discharge summary.

## 2017-06-16 NOTE — PROGRESS NOTES
Much of the documentation for this visit was completed in the [facility] [employer] system. Please see the attached documentation for further details about this patient's care.     Quirino Bland DPM

## 2017-06-16 NOTE — SUBJECTIVE & OBJECTIVE
Medications:  Continuous Infusions:   Scheduled Meds:   aspirin  81 mg Oral Daily    atorvastatin  80 mg Oral Daily    clopidogrel  75 mg Oral Daily    collagenase   Topical Daily    furosemide  40 mg Oral Daily    gabapentin  300 mg Oral BID    heparin (porcine)  5,000 Units Subcutaneous Q8H    insulin detemir  16 Units Subcutaneous QHS    metoprolol succinate  50 mg Oral Daily    pantoprazole  40 mg Oral Daily    senna-docusate 8.6-50 mg  1 tablet Oral Daily    tamsulosin  0.8 mg Oral Daily     PRN Meds:dextrose 50%, dextrose 50%, glucagon (human recombinant), glucose, glucose, hydrocodone-acetaminophen 10-325mg, hydrocodone-acetaminophen 5-325mg, HYDROmorphone, insulin aspart, ondansetron     Objective:     Vital Signs (Most Recent):  Temp: 98.7 °F (37.1 °C) (06/16/17 0420)  Pulse: 77 (06/16/17 0700)  Resp: 20 (06/16/17 0420)  BP: (!) 127/59 (06/16/17 0420)  SpO2: 100 % (06/16/17 0420) Vital Signs (24h Range):  Temp:  [98.4 °F (36.9 °C)-99.9 °F (37.7 °C)] 98.7 °F (37.1 °C)  Pulse:  [75-83] 77  Resp:  [18-20] 20  SpO2:  [97 %-100 %] 100 %  BP: (103-135)/(57-65) 127/59          Physical Exam   Constitutional: He is oriented to person, place, and time. He appears well-developed and well-nourished.   HENT:   Head: Normocephalic and atraumatic.   Eyes: EOM are normal.   Neck: Normal range of motion. Neck supple.   Cardiovascular: Normal rate and regular rhythm.    2+ femoral pulses, LLE with biphasic DP, no PT appreciated on doppler exam     Pulmonary/Chest: Effort normal and breath sounds normal.   Abdominal: Soft. Bowel sounds are normal. He exhibits no distension. There is no tenderness.   Musculoskeletal:   Limited LLE ROM due to postop pain.   Left lower extremity surgical incision sites with dressings in place and minimal dressing saturation with serosanguinous fluid. Surgical wounds are appropriate  Tender to palpation   Left heel ulcer with overlying eschar      Neurological: He is alert and  oriented to person, place, and time.   Skin: Skin is warm and dry.       Significant Labs:  BMP:   Recent Labs  Lab 06/16/17  0441   *      K 4.3      CO2 21*   BUN 36*   CREATININE 2.5*   CALCIUM 8.3*   MG 1.8     CBC:   Recent Labs  Lab 06/15/17  0353   WBC 10.22   RBC 3.13*   HGB 8.7*   HCT 27.2*      MCV 87   MCH 27.8   MCHC 32.0       Significant Diagnostics:  Pending arterial US

## 2017-06-16 NOTE — PROGRESS NOTES
"Notified pt that the doctor strongly encourages pt to take heparin, that he is at high risk of having a blood clot at this time without this medication. Pt continues to refuse stating, "nope, I don't want to take a chance" and assured nurse that he is not going to take this medication. Spouse bedside at this time. WCTM.  "

## 2017-06-16 NOTE — CONSULTS
Consult Note  Podiatry    Consult Requested By: Nehal William MD  Reason for Consult: B/L LE wounds    SUBJECTIVE     History of Present Illness:  Chau Rodarte is a 67 y.o. male  has a past medical history of Acute on chronic systolic CHF (congestive heart failure); Anticoagulant long-term use; CKD (chronic kidney disease) stage 2, GFR 60-89 ml/min; Coronary artery disease; Encounter for blood transfusion; HTN (hypertension); Hyperlipidemia; NSTEMI (non-ST elevated myocardial infarction); Obesity (BMI 30-39.9); PVD (peripheral vascular disease); Stroke; Type 2 diabetes mellitus with diabetic peripheral angiopathy without gangrene, without long-term current use of insulin; and Type 2 diabetes mellitus with diabetic polyneuropathy, without long-term current use of insulin.  Admitted s/p Left Fem pop bypass by vascular surgery. Consulted for wound care reccs. Followed by podiatry last seen by Dr. mayers on 6/1/17. Pt. Reports that he received HH for bandage changes. Reports last bandage change this AM. Reports pain upon elevation to LLE. Bandages intact B/L feet     Scheduled Meds:   aspirin  81 mg Oral Daily    atorvastatin  80 mg Oral Daily    clopidogrel  75 mg Oral Daily    collagenase   Topical Daily    furosemide  40 mg Oral Daily    gabapentin  300 mg Oral BID    heparin (porcine)  5,000 Units Subcutaneous Q8H    insulin detemir  16 Units Subcutaneous QHS    metoprolol succinate  50 mg Oral Daily    pantoprazole  40 mg Oral Daily    senna-docusate 8.6-50 mg  1 tablet Oral Daily    tamsulosin  0.8 mg Oral Daily     Continuous Infusions:   PRN Meds:dextrose 50%, dextrose 50%, glucagon (human recombinant), glucose, glucose, hydrocodone-acetaminophen 10-325mg, hydrocodone-acetaminophen 5-325mg, HYDROmorphone, insulin aspart, ondansetron    Review of patient's allergies indicates:  No Known Allergies     Past Medical History:   Diagnosis Date    Acute on chronic systolic CHF (congestive heart failure)  11/29/2016    Anticoagulant long-term use     CKD (chronic kidney disease) stage 2, GFR 60-89 ml/min 2/21/2016    Coronary artery disease     Encounter for blood transfusion     HTN (hypertension) 3/3/2014    Hyperlipidemia 3/3/2014    NSTEMI (non-ST elevated myocardial infarction) 11/28/2016    Obesity (BMI 30-39.9) 11/29/2016    PVD (peripheral vascular disease)     Stroke     Type 2 diabetes mellitus with diabetic peripheral angiopathy without gangrene, without long-term current use of insulin 10/24/2013    Type 2 diabetes mellitus with diabetic polyneuropathy, without long-term current use of insulin 11/29/2016     Past Surgical History:   Procedure Laterality Date    CARDIAC SURGERY      COLONOSCOPY N/A 3/21/2017    Procedure: COLONOSCOPY;  Surgeon: Karissa Peck MD;  Location: [facility] ([address] [place] [address]);  Service: Endoscopy;  Laterality: N/A;    CORONARY ANGIOPLASTY WITH STENT PLACEMENT      FOOT NERVE GRAFT  11/2013    left leg stent      Loop Recorder placement      right leg bypass       Family History   Problem Relation Age of Onset    Diabetes Mother     Heart disease Father      Social History   Substance Use Topics    Smoking status: Former Smoker     Packs/day: 1.00     Years: 50.00     Quit date: 3/1/2011    Smokeless tobacco: Never Used    Alcohol use No       Review of Systems:  Constitutional: no fever or chills  Respiratory: no cough or shortness of breath  Cardiovascular: no chest pain or palpitations  Gastrointestinal: no nausea or vomiting, tolerating diet  Integument/Breast: history of prior ulcerations  Musculoskeletal: positive for arthralgias  Neurological: history of numbness or paresthesias in the feet    OBJECTIVE     Vital Signs (Most Recent):  Temp: 98.6 °F (37 °C) (06/16/17 1144)  Pulse: 77 (06/16/17 1144)  Resp: 18 (06/16/17 1144)  BP: (!) 153/67 (06/16/17 1144)  SpO2: 96 % (06/16/17 1144)    Vital Signs Range (Last 24H):  Temp:  [98.4 °F (36.9 °C)-99.9 °F (37.7  °C)]   Pulse:  [75-83]   Resp:  [16-20]   BP: (117-153)/(58-67)   SpO2:  [95 %-100 %]     Physical Exam:  Vascular: Pulses diminished B/L LE.  CRT < 3 sec to tips of toes.     Dermatologic:   Wound 1: Left posterior heel   Measurement: 3.5cmx4.9cmx0.1cm  Base: necrotic eschar base  Periwound skin:Hyperkeratotic   Drainage: sanguinous  Erythema: none  Probe: none, no bone exposed      Wound 2: Plantar submet one right   Measurement: 1.8cmx1.1cmx0.1cm  Base: fibrous/granular base 70/30  Periwound skin: hyperkeratosis   Drainage: serous  Erythema: none  Probe: none, no bone exposed    Wound 3: Right plantar lateral foot ulceration   Measurement: 3cmx2.5cm  Base: fibrous/granular base 70/30  Periwound skin: hyperkeratosis with rolled borders  Drainage: none  Erythema: mild  Probe: none, no bone exposed      Left 6/16/17      RLE 6/16/17      RLE 6/16/17        Musculoskeletal: Pain upon elevation to LLE.     Neurological: Light touch, proprioception, and sharp/dull sensation are all decreased bilaterally. Protective threshold with the [place]-Wienstein monofilament is decreased bilaterally.       Laboratory:  CBC:   Recent Labs  Lab 06/16/17  0442   WBC 12.24   RBC 2.77*   HGB 7.6*   HCT 23.9*      MCV 86   MCH 27.4   MCHC 31.8*     BMP:   Recent Labs  Lab 06/16/17  0441   *      K 4.3      CO2 21*   BUN 36*   CREATININE 2.5*   CALCIUM 8.3*   MG 1.8       Clinical Findings:  Ulcerations B/L LE stable no active signs of infection    ASSESSMENT/PLAN     Assessment:  Ulcerations B/L LE  PAD  Obesity  DM2 with peripheral neuropathy  CKD    Plan:  Ulcerations B/L LE  Stable no active signs of infection   Santyl applied to all ulcerations wrapped with kerlix.   Nursing orders placed for daily dressing changes.  F/u with podiatry Dr. Bland within 2-3 days of discharge.   Recommend HH orders below    Wound care Orders B/L LE. To be done every other day  1.Cleanse wounds B/L feet with saline  2. Apply  santyl to all wounds B/L LE  3. Apply krysten foam wrap with kerlix.     Weightbearing Status: Minimal WB B/L LE.   Offloading Device: DARCO shoe B/L     Nakia Lopez DPM PGY-2  Pager: [phone]        Resident Supervision:  I have personally taken the history and examined this patient and agree with the resident's note as stated above.   Roseline Jimenez DPM

## 2017-06-17 NOTE — PLAN OF CARE
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 [I70.25]  Yes    AF (atrial fibrillation) [I48.91]  Yes    Type 2 diabetes mellitus with stage 4 chronic kidney disease GFR 15-29 [E11.22, N18.4]  Yes    Acute blood loss anemia [D62]  Yes    Long term (current) use of anticoagulants [Z79.01] [Z79.01]  Not Applicable    Coronary artery disease involving native coronary artery of native heart without angina pectoris [I25.10]  Yes    Ischemic cardiomyopathy [I25.5]  Yes    Chronic systolic heart failure [I50.22]  Yes    Obesity (BMI 30-39.9) [E66.9]  Yes     Chronic    Type 2 diabetes mellitus with diabetic polyneuropathy, with long-term current use of insulin [E11.42, Z79.4]  Not Applicable    Essential hypertension [I10]  Yes    Skin ulcers of both feet [L97.519, L97.529]  Yes    HLD (hyperlipidemia) [E78.5]  Yes      Resolved Hospital Problems    Diagnosis Date Resolved POA   No resolved problems to display.       Future Appointments  Date Time Provider Department Center   6/27/2017 10:00 AM EKG, APPT [facility] EKG [facility]   6/27/2017 10:40 AM Cayden Moreno MD [facility] ARRHYTH [facility]   6/27/2017 1:00 PM Quirino Bland DPM [facility] WOUND [facility]     Follow-up Information     [facility] - Urology 4th Floor. Schedule an appointment as soon as possible for a visit in 1 week.    Specialty:  Urology  Why:  Saucedo catheter removal   Contact information:  [address]  [facility] [address] [phone]  Additional information:  Atrium - 4th Floor           Nehal William MD In 1 week.     Specialty:  Vascular Surgery  Why:  For wound re-check  Contact information:  [address]  [facility] [address]  [phone]             Schedule an appointment as soon as possible for a visit with Quirino Bland DPM.    Specialty:  Podiatry  Contact information:  [address] [address]  [phone]             Schedule an appointment as soon as possible for a visit with Riki Huynh MD.    Specialty:  Internal Medicine  Why:  For follow up of general medical health, Hypertension and blood glucose management   Contact information:  [address]  [place] [address]  [phone]                     I have seen and examined this patient face to face today. My clinical findings that support the need for the home health skilled services and home bound status are the following:  Medical restrictions requiring assistance of another human to leave home due to Wound care needs.    Allergies:Review of patient's allergies indicates:  No Known Allergies    Diet: regular diet    Activities: activity as tolerated    Nursing:   SN to complete comprehensive assessment including routine vital signs. Instruct on disease process and s/s of complications to report to MD. Review/verify medication list sent home with the patient at time of discharge  and instruct patient/caregiver as needed. Frequency may be adjusted depending on start of care date. If patient has enteral feeding tube (NG, PEG, J-tube, G-tube), flush tube before and after feeding and/or medication administration with 20-30 mL of water.    Notify MD if SBP > 160 or < 90; DBP > 90 or < 50; HR > 120 or < 50; Temp > 101; Other:   None       CONSULTS:    Physical Therapy to evaluate and treat. Evaluate for home safety and equipment needs; Establish/upgrade home exercise program. Perform / instruct on therapeutic exercises, gait training, transfer training, and Range of Motion.  Occupational Therapy to evaluate and treat. Evaluate home  environment for safety and equipment needs. Perform/Instruct on transfers, ADL training, ROM, and therapeutic exercises.   to evaluate for community resources/long-range planning.    MISCELLANEOUS CARE:  N/A    WOUND CARE ORDERS  Dressing change:   Left lower extremity surgical wounds: Remove dressings daily. Clean surgical incision sites with soap and water daily in the shower. No tub baths, swimming pools or standing water. Pat incisions dry. Cover groin surgical incision site (must keep clean and dry to prevent wound and bypass infection). Can cover the remaining left medial leg surgical incision sites with dressings as needed for drainage  Left foot wound: Clean daily and apply santyl to the left heel wound, cover with dry gauze and kerlex. Change daily   Right foot wound: clean daily and cover with padded sponge, dry gauze and kerlex. Change daily    Medications: Review discharge medications with patient and family and provide education.      Discharge Medication List as of 6/16/2017  5:31 PM      START taking these medications    Details   hydrocodone-acetaminophen 10-325mg (NORCO)  mg Tab Take 1 tablet by mouth every 4 (four) hours as needed for Pain., Starting Fri 6/16/2017, Print      oxycodone (ROXICODONE) 10 mg Tab immediate release tablet Take 1 tablet (10 mg total) by mouth every 4 (four) hours as needed for Pain., Starting Fri 6/16/2017, Print      senna-docusate 8.6-50 mg (PERICOLACE) 8.6-50 mg per tablet Take 1 tablet by mouth once daily., Starting Fri 6/16/2017, Print      tamsulosin (FLOMAX) 0.4 mg Cp24 Take 1 capsule (0.4 mg total) by mouth once daily., Starting Fri 6/16/2017, Until Sat 6/16/2018, Normal         CONTINUE these medications which have NOT CHANGED    Details   aspirin 81 MG Chew Take 1 tablet (81 mg total) by mouth once daily., Starting 12/7/2016, Until u 12/7/17, OTC      atorvastatin (LIPITOR) 80 MG tablet Take 1 tablet (80 mg total) by mouth once daily.,  "Starting 12/7/2016, Until Thu 12/7/17, Normal      cholecalciferol, vitamin D3, (VITAMIN D3) 5,000 unit Tab Take 5,000 Units by mouth once daily., Until Discontinued, Historical Med      clopidogrel (PLAVIX) 75 mg tablet Take 1 tablet (75 mg total) by mouth once daily., Starting 12/7/2016, Until Discontinued, Normal      collagenase (SANTYL) ointment Apply topically once daily., Starting 5/4/2017, Until Discontinued, Normal      diclofenac sodium 1 % Gel Apply 2 g topically 3 (three) times daily., Starting 4/7/2017, Until Discontinued, Normal      furosemide (LASIX) 40 MG tablet Take 1 tablet (40 mg total) by mouth once daily., Starting 12/7/2016, Until Thu 12/7/17, Normal      gabapentin (NEURONTIN) 300 MG capsule Take 300 mg by mouth 2 (two) times daily., Starting 3/22/2017, Until Discontinued, Historical Med      insulin detemir (LEVEMIR FLEXTOUCH) 100 unit/mL (3 mL) SubQ InPn pen Inject 16 Units into the skin every evening., Starting 5/16/2017, Until Discontinued, Normal      isosorbide-hydrALAZINE 20-37.5 mg (BIDIL) 20-37.5 mg Tab Take 1 tablet by mouth 3 (three) times daily., Starting 12/20/2016, Until Wed 12/20/17, Normal      liraglutide 0.6 mg/0.1 mL, 18 mg/3 mL, subq PNIJ (VICTOZA 2-SAGAR) 0.6 mg/0.1 mL (18 mg/3 mL) PnIj Inject 0.6 mg daily x1 week, then 1.2 mg daily x1 week, then 1.8 mg daily thereafter, Normal      metoprolol succinate (TOPROL-XL) 50 MG 24 hr tablet Take 1 tablet (50 mg total) by mouth once daily., Starting 12/20/2016, Until Wed 12/20/17, Normal      pantoprazole (PROTONIX) 20 MG tablet Take 2 tablets (40 mg total) by mouth once daily., Starting 3/22/2017, Until Thu 3/22/18, Normal      pen needle, diabetic (BD ULTRA-FINE HANG PEN NEEDLES) 32 gauge x 5/32" Ndle Uses 2 times daily, on  insulin injections, Normal         STOP taking these medications       hydrocodone-acetaminophen 5-325mg (NORCO) 5-325 mg per tablet Comments:   Reason for Stopping:               I certify that this patient " is confined to his home and needs intermittent skilled nursing care, physical therapy and occupational therapy.

## 2017-06-17 NOTE — PLAN OF CARE
9:56 AM. On call EDITH paged due to pts wife being concerned that there was no home health set up prior to pt leaving the hospital upon discharge during the week. EDITH unable to find written home health orders in pts chart. Per EDITH notes pt was current with [facility] prior to admission. EDITH attempting to contact  agency.    12:54 PM. EDITH spoke with Xavier at [facility], informed pt was active with them and need new orders. EDITH placed call to paging physician [phone] informed they are placing the HH orders now. Will send to [facility] once available.     1:20 PM. EDITH sent hh orders to N, oncall updated and expecting fax. Pt cleared to DC from the case management stance.       VIOLETTE Fairchild, DOMINGUEZ  [phone]

## 2017-06-19 ENCOUNTER — TELEPHONE (OUTPATIENT)
Dept: PODIATRY | Facility: CLINIC | Age: 67
End: 2017-06-19

## 2017-06-19 ENCOUNTER — HOSPITAL ENCOUNTER (OUTPATIENT)
Facility: HOSPITAL | Age: 67
LOS: 1 days | Discharge: NURSING, REHAB OR LONG-TERM CARE | End: 2017-06-22
Attending: EMERGENCY MEDICINE | Admitting: SURGERY
Payer: MEDICARE

## 2017-06-19 ENCOUNTER — TELEPHONE (OUTPATIENT)
Dept: VASCULAR SURGERY | Facility: CLINIC | Age: 67
End: 2017-06-19

## 2017-06-19 DIAGNOSIS — I73.9 PERIPHERAL VASCULAR DISEASE, UNSPECIFIED: ICD-10-CM

## 2017-06-19 DIAGNOSIS — Z95.828 S/P FEMORAL-POPLITEAL BYPASS SURGERY: ICD-10-CM

## 2017-06-19 DIAGNOSIS — I70.25 ATHEROSCLEROSIS OF NATIVE ARTERIES OF THE EXTREMITIES WITH ULCERATION: ICD-10-CM

## 2017-06-19 DIAGNOSIS — R53.1 WEAKNESS: ICD-10-CM

## 2017-06-19 DIAGNOSIS — E66.9 OBESITY (BMI 30-39.9): Primary | Chronic | ICD-10-CM

## 2017-06-19 LAB
ALBUMIN SERPL BCP-MCNC: 2.4 G/DL
ALP SERPL-CCNC: 84 U/L
ALT SERPL W/O P-5'-P-CCNC: 13 U/L
ANION GAP SERPL CALC-SCNC: 11 MMOL/L
AST SERPL-CCNC: 32 U/L
BASOPHILS # BLD AUTO: 0.03 K/UL
BASOPHILS NFR BLD: 0.3 %
BILIRUB SERPL-MCNC: 0.7 MG/DL
BUN SERPL-MCNC: 35 MG/DL
CALCIUM SERPL-MCNC: 8.6 MG/DL
CHLORIDE SERPL-SCNC: 105 MMOL/L
CO2 SERPL-SCNC: 24 MMOL/L
CREAT SERPL-MCNC: 2.3 MG/DL
DIFFERENTIAL METHOD: ABNORMAL
EOSINOPHIL # BLD AUTO: 0.1 K/UL
EOSINOPHIL NFR BLD: 1.2 %
ERYTHROCYTE [DISTWIDTH] IN BLOOD BY AUTOMATED COUNT: 14.6 %
EST. GFR  (AFRICAN AMERICAN): 32.5 ML/MIN/1.73 M^2
EST. GFR  (NON AFRICAN AMERICAN): 28.1 ML/MIN/1.73 M^2
GLUCOSE SERPL-MCNC: 87 MG/DL
HCT VFR BLD AUTO: 22.3 %
HGB BLD-MCNC: 7.2 G/DL
INR PPP: 1.1
LYMPHOCYTES # BLD AUTO: 1.5 K/UL
LYMPHOCYTES NFR BLD: 14.9 %
MCH RBC QN AUTO: 27 PG
MCHC RBC AUTO-ENTMCNC: 32.3 %
MCV RBC AUTO: 84 FL
MONOCYTES # BLD AUTO: 1.2 K/UL
MONOCYTES NFR BLD: 12 %
NEUTROPHILS # BLD AUTO: 7.1 K/UL
NEUTROPHILS NFR BLD: 71.2 %
PLATELET # BLD AUTO: 285 K/UL
PMV BLD AUTO: 9.5 FL
POCT GLUCOSE: 108 MG/DL (ref 70–110)
POCT GLUCOSE: 112 MG/DL (ref 70–110)
POTASSIUM SERPL-SCNC: 4.1 MMOL/L
PROT SERPL-MCNC: 6.2 G/DL
PROTHROMBIN TIME: 11.4 SEC
RBC # BLD AUTO: 2.67 M/UL
SODIUM SERPL-SCNC: 140 MMOL/L
WBC # BLD AUTO: 9.98 K/UL

## 2017-06-19 PROCEDURE — 99285 EMERGENCY DEPT VISIT HI MDM: CPT | Mod: 24,,, | Performed by: SURGERY

## 2017-06-19 PROCEDURE — 25000003 PHARM REV CODE 250: Performed by: SURGERY

## 2017-06-19 PROCEDURE — 82962 GLUCOSE BLOOD TEST: CPT

## 2017-06-19 PROCEDURE — 63600175 PHARM REV CODE 636 W HCPCS: Performed by: SURGERY

## 2017-06-19 PROCEDURE — G0378 HOSPITAL OBSERVATION PER HR: HCPCS

## 2017-06-19 PROCEDURE — 80053 COMPREHEN METABOLIC PANEL: CPT

## 2017-06-19 PROCEDURE — 85025 COMPLETE CBC W/AUTO DIFF WBC: CPT

## 2017-06-19 PROCEDURE — 99284 EMERGENCY DEPT VISIT MOD MDM: CPT

## 2017-06-19 PROCEDURE — 85610 PROTHROMBIN TIME: CPT

## 2017-06-19 RX ORDER — CLOPIDOGREL BISULFATE 75 MG/1
75 TABLET ORAL DAILY
Status: DISCONTINUED | OUTPATIENT
Start: 2017-06-20 | End: 2017-06-22 | Stop reason: HOSPADM

## 2017-06-19 RX ORDER — GABAPENTIN 300 MG/1
300 CAPSULE ORAL 2 TIMES DAILY
Status: DISCONTINUED | OUTPATIENT
Start: 2017-06-19 | End: 2017-06-22 | Stop reason: HOSPADM

## 2017-06-19 RX ORDER — AMOXICILLIN 250 MG
1 CAPSULE ORAL 2 TIMES DAILY
Status: DISCONTINUED | OUTPATIENT
Start: 2017-06-19 | End: 2017-06-19 | Stop reason: SDUPTHER

## 2017-06-19 RX ORDER — SODIUM CHLORIDE 0.9 % (FLUSH) 0.9 %
3 SYRINGE (ML) INJECTION EVERY 8 HOURS
Status: DISCONTINUED | OUTPATIENT
Start: 2017-06-19 | End: 2017-06-22 | Stop reason: HOSPADM

## 2017-06-19 RX ORDER — METOPROLOL SUCCINATE 50 MG/1
50 TABLET, EXTENDED RELEASE ORAL DAILY
Status: DISCONTINUED | OUTPATIENT
Start: 2017-06-20 | End: 2017-06-21

## 2017-06-19 RX ORDER — ATORVASTATIN CALCIUM 20 MG/1
80 TABLET, FILM COATED ORAL DAILY
Status: DISCONTINUED | OUTPATIENT
Start: 2017-06-20 | End: 2017-06-22 | Stop reason: HOSPADM

## 2017-06-19 RX ORDER — ISOSORBIDE DINITRATE AND HYDRALAZINE HYDROCHLORIDE 37.5; 2 MG/1; MG/1
1 TABLET ORAL 3 TIMES DAILY
Status: DISCONTINUED | OUTPATIENT
Start: 2017-06-19 | End: 2017-06-22 | Stop reason: HOSPADM

## 2017-06-19 RX ORDER — NAPROXEN SODIUM 220 MG/1
81 TABLET, FILM COATED ORAL DAILY
Status: DISCONTINUED | OUTPATIENT
Start: 2017-06-20 | End: 2017-06-22 | Stop reason: HOSPADM

## 2017-06-19 RX ORDER — ACETAMINOPHEN 500 MG
5000 TABLET ORAL DAILY
Status: DISCONTINUED | OUTPATIENT
Start: 2017-06-20 | End: 2017-06-22 | Stop reason: HOSPADM

## 2017-06-19 RX ORDER — AMOXICILLIN 250 MG
1 CAPSULE ORAL DAILY
Status: DISCONTINUED | OUTPATIENT
Start: 2017-06-20 | End: 2017-06-22 | Stop reason: HOSPADM

## 2017-06-19 RX ORDER — PANTOPRAZOLE SODIUM 40 MG/1
40 TABLET, DELAYED RELEASE ORAL DAILY
Status: DISCONTINUED | OUTPATIENT
Start: 2017-06-20 | End: 2017-06-22 | Stop reason: HOSPADM

## 2017-06-19 RX ORDER — HYDROCODONE BITARTRATE AND ACETAMINOPHEN 5; 325 MG/1; MG/1
1 TABLET ORAL EVERY 4 HOURS PRN
Status: DISCONTINUED | OUTPATIENT
Start: 2017-06-19 | End: 2017-06-22 | Stop reason: HOSPADM

## 2017-06-19 RX ORDER — TAMSULOSIN HYDROCHLORIDE 0.4 MG/1
0.4 CAPSULE ORAL DAILY
Status: DISCONTINUED | OUTPATIENT
Start: 2017-06-20 | End: 2017-06-22 | Stop reason: HOSPADM

## 2017-06-19 RX ORDER — FUROSEMIDE 40 MG/1
40 TABLET ORAL DAILY
Status: DISCONTINUED | OUTPATIENT
Start: 2017-06-20 | End: 2017-06-22 | Stop reason: HOSPADM

## 2017-06-19 RX ADMIN — Medication 3 ML: at 04:06

## 2017-06-19 RX ADMIN — INSULIN DETEMIR 16 UNITS: 100 INJECTION, SOLUTION SUBCUTANEOUS at 08:06

## 2017-06-19 RX ADMIN — GABAPENTIN 300 MG: 300 CAPSULE ORAL at 08:06

## 2017-06-19 RX ADMIN — HYDRALAZINE HYDROCHLORIDE AND ISOSORBIDE DINITRATE 1 TABLET: 37.5; 2 TABLET, FILM COATED ORAL at 09:06

## 2017-06-19 RX ADMIN — HYDROCODONE BITARTRATE AND ACETAMINOPHEN 1 TABLET: 5; 325 TABLET ORAL at 08:06

## 2017-06-19 NOTE — TELEPHONE ENCOUNTER
Pt wife called in reference to her   Chau Rodarte. She stated that his wound is dripping, pt is not moving his leg much and legs are swollen. Appt is scheduled for the pt to see Dr. William on Wed @ 3:00pm , I also told the pt she could have her  to come in to see another physician until then, but she said she will come into the ED today,

## 2017-06-19 NOTE — SUBJECTIVE & OBJECTIVE
(Not in a hospital admission)    Review of patient's allergies indicates:  No Known Allergies    Past Medical History:   Diagnosis Date    Acute on chronic systolic CHF (congestive heart failure) 11/29/2016    Anticoagulant long-term use     CKD (chronic kidney disease) stage 2, GFR 60-89 ml/min 2/21/2016    Coronary artery disease     Encounter for blood transfusion     HTN (hypertension) 3/3/2014    Hyperlipidemia 3/3/2014    NSTEMI (non-ST elevated myocardial infarction) 11/28/2016    Obesity (BMI 30-39.9) 11/29/2016    PVD (peripheral vascular disease)     Stroke     Type 2 diabetes mellitus with diabetic peripheral angiopathy without gangrene, without long-term current use of insulin 10/24/2013    Type 2 diabetes mellitus with diabetic polyneuropathy, without long-term current use of insulin 11/29/2016     Past Surgical History:   Procedure Laterality Date    CARDIAC SURGERY      COLONOSCOPY N/A 3/21/2017    Procedure: COLONOSCOPY;  Surgeon: Karissa Peck MD;  Location: [facility] ([address] [place] [address]);  Service: Endoscopy;  Laterality: N/A;    CORONARY ANGIOPLASTY WITH STENT PLACEMENT      FOOT NERVE GRAFT  11/2013    left leg stent      Loop Recorder placement      right leg bypass       Family History     Problem Relation (Age of Onset)    Diabetes Mother    Heart disease Father        Social History Main Topics    Smoking status: Former Smoker     Packs/day: 1.00     Years: 50.00     Quit date: 3/1/2011    Smokeless tobacco: Never Used    Alcohol use No    Drug use: No    Sexual activity: Yes     Partners: Female      Comment:  with 2 children 3 grand sons and 1 great grand daughter     Review of Systems   Constitutional: Negative for chills and fever.   HENT: Negative for facial swelling, hearing loss and nosebleeds.    Eyes: Negative for photophobia, redness and visual disturbance.   Respiratory: Negative for cough, chest tightness, shortness of breath and wheezing.     Cardiovascular: Positive for leg swelling. Negative for chest pain and palpitations.   Gastrointestinal: Negative for abdominal distention, abdominal pain, nausea and vomiting.   Genitourinary: Negative for difficulty urinating, dysuria, flank pain and hematuria.   Musculoskeletal: Positive for gait problem and myalgias. Negative for arthralgias.        Persistent bilateral foot wounds    Skin:        Blistering of the left medial leg around the surgical incision sites with clear yellow drainage    Neurological: Positive for weakness. Negative for dizziness, syncope, light-headedness, numbness and headaches.        + generalized weakness. Inability to perform ADLs. Family notes that they are unable to assist the patient with ADLs   Hematological: Negative.    Psychiatric/Behavioral: Negative.      Objective:     Vital Signs (Most Recent):  Temp: 98.6 °F (37 °C) (06/19/17 1132)  Pulse: 75 (06/19/17 1502)  Resp: 16 (06/19/17 1132)  BP: (!) 154/73 (06/19/17 1502)  SpO2: 100 % (06/19/17 1502) Vital Signs (24h Range):  Temp:  [98.6 °F (37 °C)] 98.6 °F (37 °C)  Pulse:  [75-82] 75  Resp:  [16] 16  SpO2:  [95 %-100 %] 100 %  BP: (108-156)/(53-89) 154/73     Weight: 103.9 kg (229 lb)  Body mass index is 33.82 kg/m².    Physical Exam   Constitutional: He is oriented to person, place, and time. He appears well-developed and well-nourished. No distress.   HENT:   Head: Normocephalic and atraumatic.   Nose: Nose normal.   Eyes: EOM are normal. Pupils are equal, round, and reactive to light. No scleral icterus.   Neck: Normal range of motion. No tracheal deviation present.   Cardiovascular: Normal rate and regular rhythm.    2+ femoral pulses, LLE with biphasic DP, no PT appreciated on doppler exam       Pulmonary/Chest: Effort normal and breath sounds normal. No respiratory distress. He has no wheezes.   Abdominal: Soft. Bowel sounds are normal. He exhibits no distension. There is no tenderness.   Musculoskeletal: He exhibits  edema and tenderness.   Limited LLE ROM due to postop pain.   Left lower extremity surgical incision sites with leidy-incisional blistering from tape from dressings with serous fluid drainage. The surgical incision itself is intact with staples in place with minimal serous drainage. Left groin surgical incision site with dermabond in place.  LLE surgical incision sites are appropriately tender to palpation   Left heel ulcer with overlying eschar   Right plantar diabetic pressure ulcer    Neurological: He is alert and oriented to person, place, and time. No cranial nerve deficit.   Skin: Skin is warm.   Wounds as stated above    Psychiatric: He has a normal mood and affect. His behavior is normal. Thought content normal.       Significant Labs:  CBC:   Recent Labs  Lab 06/19/17  1312   WBC 9.98   RBC 2.67*   HGB 7.2*   HCT 22.3*      MCV 84   MCH 27.0   MCHC 32.3     CMP:   Recent Labs  Lab 06/19/17  1312   GLU 87   CALCIUM 8.6*   ALBUMIN 2.4*   PROT 6.2      K 4.1   CO2 24      BUN 35*   CREATININE 2.3*   ALKPHOS 84   ALT 13   AST 32   BILITOT 0.7     Coagulation:   Recent Labs  Lab 06/19/17  1312   LABPROT 11.4   INR 1.1         Significant Diagnostics:  No new imaging

## 2017-06-19 NOTE — TELEPHONE ENCOUNTER
Patients wife is concerned since he is not walking that he may need rehab services. Please refer to message that was left with Nehal's office.

## 2017-06-19 NOTE — ED TRIAGE NOTES
Pt states he had vascular bypass to left leg on Wednesday - d/c to home on Friday - presents today with blisters to left leg near incision site. Incision intact with staples. Family reports pt is unable to care for self at home and needs additional assistance.

## 2017-06-19 NOTE — PROGRESS NOTES
Pt arrived to unit via JULIO CÉSAR gordon. Vitals taken and assessment performed. Both feet wrapped in Kerlex when patient arrived. Staples to left leg intact; small amount of drainage noted. Cardiac monitor placed on pt. Accu check performed. Fall reduction measures in place. Pt oriented to call bell and room service menu; understanding verbalized. No complaints of pain at this time.

## 2017-06-19 NOTE — ED PROVIDER NOTES
Encounter Date: 6/19/2017    SCRIBE #1 NOTE: I, Preethi Kohli, am scribing for, and in the presence of,  Dr. Carbajal. I have scribed the following portions of the note - the Resident attestation.       History     Chief Complaint   Patient presents with    Post-op Problem     had bypass surgery for circulation in leg on 6/14 and blistering noted to leg     Review of patient's allergies indicates:  No Known Allergies  Patient is a 66yo M with history of HTN, DM2, CAD, and stroke, who is s/p Left Fem/Pop bypass on 6/14 who presents for complaint of blistering on the left leg. Patient notes since his discharge, he has had difficulty ambulating in his home, and requiring assistance from his family, including his son who is in town to help post-op. The patient notes his main complaint is painless blistering around the tape of his stapled wound. Afebrile, and having consistent drainage from his surgical site. Pain has been constant and unchanged since his procedure.   The family pulled me aside separately and voiced concern that the patient cannot take care of himself at home, and his wife is unable to help him due to his size. He requires help with transports to the bathroom and from his bed, and his family report he was not discharged with any assistance in the form of home health or physical therapy.           Past Medical History:   Diagnosis Date    Acute on chronic systolic CHF (congestive heart failure) 11/29/2016    Anticoagulant long-term use     CKD (chronic kidney disease) stage 2, GFR 60-89 ml/min 2/21/2016    Coronary artery disease     Encounter for blood transfusion     HTN (hypertension) 3/3/2014    Hyperlipidemia 3/3/2014    NSTEMI (non-ST elevated myocardial infarction) 11/28/2016    Obesity (BMI 30-39.9) 11/29/2016    PVD (peripheral vascular disease)     Stroke     Type 2 diabetes mellitus with diabetic peripheral angiopathy without gangrene, without long-term current use of insulin  10/24/2013    Type 2 diabetes mellitus with diabetic polyneuropathy, without long-term current use of insulin 11/29/2016     Past Surgical History:   Procedure Laterality Date    CARDIAC SURGERY      COLONOSCOPY N/A 3/21/2017    Procedure: COLONOSCOPY;  Surgeon: Karissa Peck MD;  Location: [address];  Service: Endoscopy;  Laterality: N/A;    CORONARY ANGIOPLASTY WITH STENT PLACEMENT      FOOT NERVE GRAFT  11/2013    left leg stent      Loop Recorder placement      right leg bypass       Family History   Problem Relation Age of Onset    Diabetes Mother     Heart disease Father      Social History   Substance Use Topics    Smoking status: Former Smoker     Packs/day: 1.00     Years: 50.00     Quit date: 3/1/2011    Smokeless tobacco: Never Used    Alcohol use No     Review of Systems   Constitutional: Negative for chills and fever.   HENT: Negative for congestion, rhinorrhea and sore throat.    Eyes: Negative for visual disturbance.   Respiratory: Negative for cough and shortness of breath.    Cardiovascular: Negative for chest pain.   Gastrointestinal: Negative for abdominal pain, constipation, diarrhea, nausea and vomiting.   Genitourinary: Negative for dysuria and hematuria.   Musculoskeletal: Negative for back pain.   Skin: Positive for wound (Surgical wound with unchanged pain /drainage). Negative for rash.   Neurological: Negative for dizziness, syncope and headaches.       Physical Exam     Initial Vitals [06/19/17 1132]   BP Pulse Resp Temp SpO2   (!) 108/53 82 16 98.6 °F (37 °C) 95 %     Physical Exam    Nursing note and vitals reviewed.  Constitutional: He appears well-developed and well-nourished. He is not diaphoretic. No distress.   HENT:   Head: Normocephalic and atraumatic.   Mouth/Throat: No oropharyngeal exudate.   Eyes: Pupils are equal, round, and reactive to light. Right eye exhibits no discharge. Left eye exhibits no discharge. No scleral icterus.   Neck: No tracheal  deviation present. No JVD present.   Cardiovascular: Normal rate, regular rhythm, normal heart sounds and intact distal pulses. Exam reveals no friction rub.    No murmur heard.  Pulmonary/Chest: Breath sounds normal. No respiratory distress. He has no wheezes. He has no rhonchi. He has no rales.   Abdominal: Soft. Bowel sounds are normal. He exhibits no distension. There is no tenderness. There is no guarding.   Musculoskeletal: He exhibits no edema or tenderness.        Legs:  Lymphadenopathy:     He has no cervical adenopathy.   Neurological: He is alert and oriented to person, place, and time.   Skin: Skin is warm and dry. Capillary refill takes less than 2 seconds.   Psychiatric: He has a normal mood and affect.         ED Course   Procedures  Labs Reviewed   CBC W/ AUTO DIFFERENTIAL - Abnormal; Notable for the following:        Result Value    RBC 2.67 (*)     Hemoglobin 7.2 (*)     Hematocrit 22.3 (*)     RDW 14.6 (*)     Mono # 1.2 (*)     Lymph% 14.9 (*)     All other components within normal limits   COMPREHENSIVE METABOLIC PANEL - Abnormal; Notable for the following:     BUN, Bld 35 (*)     Creatinine 2.3 (*)     Calcium 8.6 (*)     Albumin 2.4 (*)     eGFR if  32.5 (*)     eGFR if non  28.1 (*)     All other components within normal limits   PROTIME-INR             Medical Decision Making:   History:   I obtained history from: someone other than patient.       <> Summary of History: Family assisted in history  Old Medical Records: I decided to obtain old medical records.  Clinical Tests:   Lab Tests: Ordered and Reviewed  Other:   I have discussed this case with another health care provider.       <> Summary of the Discussion: Vascular Surgery        APC / Resident Notes:   PGY-2 MDM:   -Patient is a 67yoM s/p fem/pop bypass on 6/14 presenting with a chief complaint of blistering at the surgical site, and family concerned for inability to take care of himself at home.  Vital signs stable, patient afebrile, non-tachycardic and non-hypoxic.   -Family note the patient cannot perform transfers by himself, and they are unable to help him adequately.   -Vascular notified and is to evaluate the patient. Will discuss case further with them, otherwise patient may require admission to Medicine for case management placement issues.  Workup ongoing, will continue to re-assess patient and update management as needed.     Rodney Mesa DO  [employer] EM/IM PGY-2  6/19/2017 12:55 PM       Patient Care Update:  -Admit orders placed by vascular surgery.     Rodney Mesa DO  [employer] EM/IM PGY-2  6/21/2017 1:58 PM          Scribe Attestation:   Scribe #1: I performed the above scribed service and the documentation accurately describes the services I performed. I attest to the accuracy of the note.    Attending Attestation:   Physician Attestation Statement for Resident:  As the supervising MD   Physician Attestation Statement: I have personally seen and examined this patient.   I agree with the above history. -: Patient presents with possible leg infection and failure to thrive.   As the supervising MD I agree with the above PE.    As the supervising MD I agree with the above treatment, course, plan, and disposition.  I have reviewed and agree with the residents interpretation of the following: lab data.          Physician Attestation for Scribe:  Physician Attestation Statement for Scribe #1: I, Dr. Carbajal, reviewed documentation, as scribed by Preethi Kohli in my presence, and it is both accurate and complete.                 ED Course     Clinical Impression:   The primary encounter diagnosis was Obesity (BMI 30-39.9). Diagnoses of Weakness, Peripheral vascular disease, unspecified, and Atherosclerosis of native arteries of the extremities with ulceration were also pertinent to this visit.          Rodney Mesa MD  Resident  06/19/17 6995       Nathanael Carbajal III, MD  06/21/17 6192

## 2017-06-19 NOTE — CONSULTS
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  - LLE surgical wounds healing appropriately => blistering is from adhesive allergy   - patient with generalized weakness with inability to perform ADLs and patient's family notes that they are not capable to providing enough/sufficient assistance for ADLs despite home health PT and request that patient be admitted and transferred to in-patient rehab   - Will admit patient to vascular surgery  - PT to eval and treat  - CM consult for assistance with in-patient rehab placement   - Continue local wound care         Subjective:     Chief Complaint/Reason for Admission: Weakness     History of Present Illness: 68y/o AAM with a PMhx of HTN, HLD, DM, CAD, Stroke, CKD and 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      (Not in a hospital admission)    Review of patient's allergies indicates:  No Known Allergies    Past Medical History:   Diagnosis Date    Acute on chronic systolic CHF (congestive heart failure) 11/29/2016    Anticoagulant long-term use      CKD (chronic kidney disease) stage 2, GFR 60-89 ml/min 2/21/2016    Coronary artery disease     Encounter for blood transfusion     HTN (hypertension) 3/3/2014    Hyperlipidemia 3/3/2014    NSTEMI (non-ST elevated myocardial infarction) 11/28/2016    Obesity (BMI 30-39.9) 11/29/2016    PVD (peripheral vascular disease)     Stroke     Type 2 diabetes mellitus with diabetic peripheral angiopathy without gangrene, without long-term current use of insulin 10/24/2013    Type 2 diabetes mellitus with diabetic polyneuropathy, without long-term current use of insulin 11/29/2016     Past Surgical History:   Procedure Laterality Date    CARDIAC SURGERY      COLONOSCOPY N/A 3/21/2017    Procedure: COLONOSCOPY;  Surgeon: Karissa Peck MD;  Location: [facility] ([address] [place] [address]);  Service: Endoscopy;  Laterality: N/A;    CORONARY ANGIOPLASTY WITH STENT PLACEMENT      FOOT NERVE GRAFT  11/2013    left leg stent      Loop Recorder placement      right leg bypass       Family History     Problem Relation (Age of Onset)    Diabetes Mother    Heart disease Father        Social History Main Topics    Smoking status: Former Smoker     Packs/day: 1.00     Years: 50.00     Quit date: 3/1/2011    Smokeless tobacco: Never Used    Alcohol use No    Drug use: No    Sexual activity: Yes     Partners: Female      Comment:  with 2 children 3 grand sons and 1 great grand daughter     Review of Systems   Constitutional: Negative for chills and fever.   HENT: Negative for facial swelling, hearing loss and nosebleeds.    Eyes: Negative for photophobia, redness and visual disturbance.   Respiratory: Negative for cough, chest tightness, shortness of breath and wheezing.    Cardiovascular: Positive for leg swelling. Negative for chest pain and palpitations.   Gastrointestinal: Negative for abdominal distention, abdominal pain, nausea and vomiting.   Genitourinary: Negative for difficulty urinating, dysuria, flank pain and  hematuria.   Musculoskeletal: Positive for gait problem and myalgias. Negative for arthralgias.        Persistent bilateral foot wounds    Skin:        Blistering of the left medial leg around the surgical incision sites with clear yellow drainage    Neurological: Positive for weakness. Negative for dizziness, syncope, light-headedness, numbness and headaches.        + generalized weakness. Inability to perform ADLs. Family notes that they are unable to assist the patient with ADLs   Hematological: Negative.    Psychiatric/Behavioral: Negative.      Objective:     Vital Signs (Most Recent):  Temp: 98.6 °F (37 °C) (06/19/17 1132)  Pulse: 75 (06/19/17 1502)  Resp: 16 (06/19/17 1132)  BP: (!) 154/73 (06/19/17 1502)  SpO2: 100 % (06/19/17 1502) Vital Signs (24h Range):  Temp:  [98.6 °F (37 °C)] 98.6 °F (37 °C)  Pulse:  [75-82] 75  Resp:  [16] 16  SpO2:  [95 %-100 %] 100 %  BP: (108-156)/(53-89) 154/73     Weight: 103.9 kg (229 lb)  Body mass index is 33.82 kg/m².    Physical Exam   Constitutional: He is oriented to person, place, and time. He appears well-developed and well-nourished. No distress.   HENT:   Head: Normocephalic and atraumatic.   Nose: Nose normal.   Eyes: EOM are normal. Pupils are equal, round, and reactive to light. No scleral icterus.   Neck: Normal range of motion. No tracheal deviation present.   Cardiovascular: Normal rate and regular rhythm.    2+ femoral pulses, LLE with biphasic DP, no PT appreciated on doppler exam       Pulmonary/Chest: Effort normal and breath sounds normal. No respiratory distress. He has no wheezes.   Abdominal: Soft. Bowel sounds are normal. He exhibits no distension. There is no tenderness.   Musculoskeletal: He exhibits edema and tenderness.   Limited LLE ROM due to postop pain.   Left lower extremity surgical incision sites with leidy-incisional blistering from tape from dressings with serous fluid drainage. The surgical incision itself is intact with staples in place  with minimal serous drainage. Left groin surgical incision site with dermabond in place.  LLE surgical incision sites are appropriately tender to palpation   Left heel ulcer with overlying eschar   Right plantar diabetic pressure ulcer    Neurological: He is alert and oriented to person, place, and time. No cranial nerve deficit.   Skin: Skin is warm.   Wounds as stated above    Psychiatric: He has a normal mood and affect. His behavior is normal. Thought content normal.       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  - LLE surgical wounds healing appropriately => blistering is from adhesive allergy   - patient with generalized weakness with inability to perform ADLs and patient's family notes that they are not capable to providing enough/sufficient assistance for ADLs despite home health PT and request that patient be admitted and transferred to in-patient rehab   - Will admit patient to vascular surgery  - PT to eval and treat  - CM consult for assistance with in-patient rehab placement   - Continue local wound care             Thank you for  your consult.     Andie Armijo MD  Vascular Surgery  [facility]

## 2017-06-19 NOTE — PLAN OF CARE
Problem: Patient Care Overview  Goal: Plan of Care Review  Outcome: Ongoing (interventions implemented as appropriate)  Plan of care reviewed with pt and understanding verbalized. Fall reduction measures in place, bed in lowest position, wheels locked, upper side rails raised. Cardiac rhythm being monitored. Accu check performed. Pt denies pain or discomfort.

## 2017-06-19 NOTE — ASSESSMENT & PLAN NOTE
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  - LLE surgical wounds healing appropriately => blistering is from adhesive allergy   - patient with generalized weakness with inability to perform ADLs and patient's family notes that they are not capable to providing enough/sufficient assistance for ADLs despite home health PT and request that patient be admitted and transferred to in-patient rehab   - Will admit patient to vascular surgery  - PT to eval and treat  - CM consult for assistance with in-patient rehab placement   - Continue local wound care

## 2017-06-19 NOTE — HPI
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

## 2017-06-19 NOTE — ED NOTES
Patient identifiers verified and correct for Chau Rodarte. Family is at  requesting pt be inpatient for rehab placement.     LOC: The patient is awake, alert and aware of environment with an appropriate affect, the patient is oriented x 3 and speaking appropriately.  APPEARANCE: Patient resting comfortably and in no acute distress, patient is clean and well groomed, patient's clothing is properly fastened.  SKIN: The skin is warm and dry, color consistent with ethnicity, patient has normal skin turgor and moist mucus membranes, incision noted to left thigh to calf. Staples intact serous oozing noted. Some scattered blistering noted with serous drainage. Calf is taut with some tension on staples. No redness noted. PT/DP intact per dopplar. Sensation intact. Able to move lower extr - both feet wrapped per wound care for ulcers.   MUSCULOSKELETAL: Patient moving all extremities spontaneously, no obvious swelling or deformities noted. Pt reports that he is unable to care for himself at home as he has trouble ambulating.   RESPIRATORY: Airway is open and patent, respirations are spontaneous, patient has a normal effort and rate, no accessory muscle use noted, bilateral breath sounds clear  CARDIAC: Patient has a normal rate and regular rhythm, no periphreal edema noted, capillary refill < 3 seconds.  ABDOMEN: Soft and non tender to palpation, no distention noted, normoactive bowel sounds present in all four quadrants.  NEUROLOGIC: PERRL, 3 mm bilaterally, eyes open spontaneously, behavior appropriate to situation, follows commands, facial expression symmetrical, bilateral hand grasp equal and even, purposeful motor response noted, normal sensation in all extremities when touched with a finger.

## 2017-06-19 NOTE — TELEPHONE ENCOUNTER
----- Message from Josephine Early sent at 6/19/2017  9:30 AM CDT -----  Contact: [phone]/ pt's wife Vladimir   Patient's wife says he is having issues after being discharged and would like to discuss it with you. Please advise.

## 2017-06-20 LAB
POCT GLUCOSE: 114 MG/DL (ref 70–110)
POCT GLUCOSE: 126 MG/DL (ref 70–110)
POCT GLUCOSE: 148 MG/DL (ref 70–110)
POCT GLUCOSE: 95 MG/DL (ref 70–110)

## 2017-06-20 PROCEDURE — 25000003 PHARM REV CODE 250: Performed by: SURGERY

## 2017-06-20 PROCEDURE — G0378 HOSPITAL OBSERVATION PER HR: HCPCS

## 2017-06-20 PROCEDURE — 82962 GLUCOSE BLOOD TEST: CPT

## 2017-06-20 PROCEDURE — G8979 MOBILITY GOAL STATUS: HCPCS | Mod: CI

## 2017-06-20 PROCEDURE — G8978 MOBILITY CURRENT STATUS: HCPCS | Mod: CJ

## 2017-06-20 PROCEDURE — 97161 PT EVAL LOW COMPLEX 20 MIN: CPT

## 2017-06-20 PROCEDURE — 97116 GAIT TRAINING THERAPY: CPT

## 2017-06-20 PROCEDURE — G8980 MOBILITY D/C STATUS: HCPCS | Mod: CJ

## 2017-06-20 RX ADMIN — HYDROCODONE BITARTRATE AND ACETAMINOPHEN 1 TABLET: 5; 325 TABLET ORAL at 03:06

## 2017-06-20 RX ADMIN — HYDRALAZINE HYDROCHLORIDE AND ISOSORBIDE DINITRATE 1 TABLET: 37.5; 2 TABLET, FILM COATED ORAL at 03:06

## 2017-06-20 RX ADMIN — Medication 3 ML: at 10:06

## 2017-06-20 RX ADMIN — CLOPIDOGREL 75 MG: 75 TABLET, FILM COATED ORAL at 09:06

## 2017-06-20 RX ADMIN — INSULIN DETEMIR 16 UNITS: 100 INJECTION, SOLUTION SUBCUTANEOUS at 09:06

## 2017-06-20 RX ADMIN — CHOLECALCIFEROL TAB 125 MCG (5000 UNIT) 5000 UNITS: 125 TAB at 11:06

## 2017-06-20 RX ADMIN — ASPIRIN 81 MG CHEWABLE TABLET 81 MG: 81 TABLET CHEWABLE at 09:06

## 2017-06-20 RX ADMIN — ATORVASTATIN CALCIUM 80 MG: 20 TABLET, FILM COATED ORAL at 09:06

## 2017-06-20 RX ADMIN — GABAPENTIN 300 MG: 300 CAPSULE ORAL at 09:06

## 2017-06-20 RX ADMIN — FUROSEMIDE 40 MG: 40 TABLET ORAL at 09:06

## 2017-06-20 RX ADMIN — Medication 3 ML: at 02:06

## 2017-06-20 RX ADMIN — HYDRALAZINE HYDROCHLORIDE AND ISOSORBIDE DINITRATE 1 TABLET: 37.5; 2 TABLET, FILM COATED ORAL at 05:06

## 2017-06-20 RX ADMIN — METOPROLOL SUCCINATE 50 MG: 50 TABLET, EXTENDED RELEASE ORAL at 09:06

## 2017-06-20 RX ADMIN — PANTOPRAZOLE SODIUM 40 MG: 40 TABLET, DELAYED RELEASE ORAL at 09:06

## 2017-06-20 RX ADMIN — TAMSULOSIN HYDROCHLORIDE 0.4 MG: 0.4 CAPSULE ORAL at 09:06

## 2017-06-20 RX ADMIN — Medication 3 ML: at 09:06

## 2017-06-20 RX ADMIN — HYDRALAZINE HYDROCHLORIDE AND ISOSORBIDE DINITRATE 1 TABLET: 37.5; 2 TABLET, FILM COATED ORAL at 09:06

## 2017-06-20 NOTE — PLAN OF CARE
Sw informed by MD that Pt is in need of placement. Sw awaiting PT OT recs to inform d/c plan.     Lynette Solis, DOMINGUEZ [id number]

## 2017-06-20 NOTE — PLAN OF CARE
Spoke with Dr. Gonzales, advised I spoke to the pt this morning and he would like to go home with a Rollator and resume his care with [facility] . Advised I will write the order for the Rollator, he will just need to co-sign, and he stated he would.

## 2017-06-20 NOTE — PROGRESS NOTES
Patient's wife states she does not feel like her  is capable of caring for himself while she is at work. Patient and spouse requesting to speak to MD. Dr. Armijo notified. States she will come by to speak with patient. Pt and spouse updated.

## 2017-06-20 NOTE — PLAN OF CARE
Problem: Patient Care Overview  Goal: Plan of Care Review  poc updated. Pt free from falls. Safety maintained. Diabetic teaching provided. No distress noted.

## 2017-06-20 NOTE — PLAN OF CARE
Mauricio informed by  that Pt is to d/c with a rollator and resumption [facility]. [facility] informed [facility] [phone] of sent orders and planned d/c today. Pt reaccepted by HH agency. Mauricio LVM for [facility] at [facility], requesting DME be delivered prior to d/c. Mauricio paged MD to inform team of addressed needs and readiness for d/c order.     Lynette Solis, DOMINGUEZ [id number]

## 2017-06-20 NOTE — PLAN OF CARE
Pt would like to resume services with [facility] and would like a Rollator at time of d/c.      06/20/17 1043   Discharge Assessment   Assessment Type Discharge Planning Assessment   Confirmed/corrected address and phone number on facesheet? Yes   Assessment information obtained from? Patient   Prior to hospitilization cognitive status: Alert/Oriented   Prior to hospitalization functional status: Independent;Assistive Equipment   Current cognitive status: Alert/Oriented   Current Functional Status: Independent;Assistive Equipment   Arrived From home or self-care   Lives With spouse   Able to Return to Prior Arrangements yes   Is patient able to care for self after discharge? Yes   How many people do you have in your home that can help with your care after discharge? 1   Who are your caregiver(s) and their phone number(s)? (Vladimir ( wife) [phone] or [phone])   Patient's perception of discharge disposition home health   Readmission Within The Last 30 Days previous discharge plan unsuccessful   Patient currently being followed by outpatient case management? No   Patient currently receives home health services? Yes   Patient previously received home health services and would like to resume services if necessary? Yes   If yes, name of home health provider: [facility]    Does the patient currently use HME? Yes   Patient currently receives private duty nursing? No   Patient currently receives any other outside agency services? No   Equipment Currently Used at Home walker, rolling   Do you have any problems affording any of your prescribed medications? No   Is the patient taking medications as prescribed? yes   Do you have any financial concerns preventing you from receiving the healthcare you need? No   Does the patient have transportation to healthcare appointments? Yes   On Dialysis? No   Does the patient receive services at the [facility]? No   Are there any open cases? No   Discharge Plan A Home;Home with  family;Home Health   Discharge Plan B Home;Home with family;Home Health   Patient/Family In Agreement With Plan yes

## 2017-06-20 NOTE — PT/OT/SLP EVAL
Physical Therapy  Evaluation/  DISCHARGE    Chau Rodarte   MRN: [id number]   Admitting Diagnosis: Weakness    PT Received On: 06/20/17  PT Start Time: 1031     PT Stop Time: 1103    PT Total Time (min): 32 min       Billable Minutes:  Evaluation 17 and Gait Training 15    Diagnosis: Weakness      Past Medical History:   Diagnosis Date    Acute on chronic systolic CHF (congestive heart failure) 11/29/2016    Anticoagulant long-term use     CKD (chronic kidney disease) stage 2, GFR 60-89 ml/min 2/21/2016    Coronary artery disease     Encounter for blood transfusion     HTN (hypertension) 3/3/2014    Hyperlipidemia 3/3/2014    NSTEMI (non-ST elevated myocardial infarction) 11/28/2016    Obesity (BMI 30-39.9) 11/29/2016    PVD (peripheral vascular disease)     Stroke     Type 2 diabetes mellitus with diabetic peripheral angiopathy without gangrene, without long-term current use of insulin 10/24/2013    Type 2 diabetes mellitus with diabetic polyneuropathy, without long-term current use of insulin 11/29/2016      Past Surgical History:   Procedure Laterality Date    CARDIAC SURGERY      COLONOSCOPY N/A 3/21/2017    Procedure: COLONOSCOPY;  Surgeon: Karissa Peck MD;  Location: [facility] ([address] [place] [address]);  Service: Endoscopy;  Laterality: N/A;    CORONARY ANGIOPLASTY WITH STENT PLACEMENT      FOOT NERVE GRAFT  11/2013    left leg stent      Loop Recorder placement      right leg bypass         Referring physician: Andie Armijo MD  Date referred to PT: 6/19/17    General Precautions: Standard, fall  Orthopedic Precautions: N/A   Braces: N/A       Do you have any cultural, spiritual, Yarsani conflicts, given your current situation?: no    Patient History:  Lives With: spouse  Living Arrangements: house  Home Accessibility: stairs to enter home  Home Layout: Able to live on 1st floor  Number of Stairs to Enter Home: 1  Stair Railings at Home: none  Transportation Available: family or friend will  "provide  Equipment Currently Used at Home: cane, straight, walker, rolling, wheelchair, shower chair, hospital bed  DME owned (not currently used): rolling walker, single point cane, shower chair, wheelchair and hospital bed    Previous Level of Function:  Ambulation Skills: independent  Transfer Skills: independent  ADL Skills: independent  Work/Leisure Activity: independent    Subjective:  Communicated with nursing prior to session.  "Its a little stiff." - regarding L LE during gait training    Chief Complaint: L LE stiffness  Patient goals: To go home    Pain/Comfort  Pain Rating 1: 2/10  Location - Side 1: Left  Location 1: leg  Pain Addressed 1: Nurse notified, Pre-medicate for activity      Objective:   Patient found with: telemetry, peripheral IV     Cognitive Exam:  Oriented to: Person, Place, Time and Situation    Follows Commands/attention: Follows multistep  commands  Communication: clear/fluent  Safety awareness/insight to disability: intact    Physical Exam:  Postural examination/scapula alignment: No postural abnormalities identified    Skin integrity: Wound L medial thigh to lower leg, site of Fem-pop bypass.  Areas of blistering in the L lower leg.   Edema: Pitting L >20 sec    Sensation:   Impaired  light/touch R toes    Upper Extremity Range of Motion:  Right Upper Extremity: WFL  Left Upper Extremity: WFL    Upper Extremity Strength:  Right Upper Extremity: WFL  Left Upper Extremity: WFL    Lower Extremity Range of Motion:  Right Lower Extremity: WFL  Left Lower Extremity: WFL    Lower Extremity Strength:  Right Lower Extremity: WFL  Left Lower Extremity: WFL     Fine motor coordination:  Intact  RLE heel shin and LLE heel shin    Gross motor coordination: WFL    Functional Mobility:  Bed Mobility:  Scooting/Bridging: Independent  Supine to Sit: Independent  Sit to Supine: Independent    Transfers:  Sit <> Stand Assistance: Supervision (Pt perf from bed and low desk chair)  Sit <> Stand Assistive " Device: Rolling Walker  Bed <> Chair Technique: Stand Pivot  Bed <> Chair Assistance: Contact Guard Assistance  Bed <> Chair Assistive Device: Rolling Walker    Gait:   Gait Distance: Pt amb 56' x 2 sets, with sitting rest break bn trials.  2 episodes of LOB to the R during gait training, with Jono to recover.  With B feet wrapped and protective foot gear donned.  Assistance 1: Minimum assistance, Contact Guard Assistance  Gait Assistive Device: Rolling walker  Gait Pattern: swing-to gait  Gait Deviation(s): decreased primo, increased time in double stance, decreased step length, decreased stride length, decreased weight-shifting ability (R lateral lean, inc WBing through UEs)    Balance:   Static Sit: GOOD: Takes MODERATE challenges from all directions  Dynamic Sit: GOOD-: Maintains balance through MODERATE excursions of active trunk movement,     Static Stand: FAIR: Maintains without assist but unable to take challenges  Dynamic stand: FAIR: Needs CONTACT GUARD during gait    Therapeutic Activities and Exercises:  Whiteboard updated  Deep and pursed lip breathing    AM-PAC 6 CLICK MOBILITY  How much help from another person does this patient currently need?   1 = Unable, Total/Dependent Assistance  2 = A lot, Maximum/Moderate Assistance  3 = A little, Minimum/Contact Guard/Supervision  4 = None, Modified [place]/Independent    Turning over in bed (including adjusting bedclothes, sheets and blankets)?: 4  Sitting down on and standing up from a chair with arms (e.g., wheelchair, bedside commode, etc.): 4  Moving from lying on back to sitting on the side of the bed?: 4  Moving to and from a bed to a chair (including a wheelchair)?: 3  Need to walk in hospital room?: 3  Climbing 3-5 steps with a railing?: 2  Total Score: 20     AM-PAC Raw Score CMS G-Code Modifier Level of Impairment Assistance   6 % Total / Unable   7 - 9 CM 80 - 100% Maximal Assist   10 - 14 CL 60 - 80% Moderate Assist   15 - 19 CK 40  - 60% Moderate Assist   20 - 22 CJ 20 - 40% Minimal Assist   23 CI 1-20% SBA / CGA   24 CH 0% Independent/ Mod I     Patient left supine with all lines intact, call button in reach and nursing notified.    Assessment:   Chau Rodarte is a 67 y.o. male with a medical diagnosis of Weakness and presents with impairments in balance with dynamic stance and gait.  Pt incurred two episodes of LOB during gait training, both times LOB to the R with Jono to recover.  Ed to pt on safety with gait.  Pt is to be discharged at this time with HHPT services following.      Rehab identified problem list/impairments: Rehab identified problem list/impairments: impaired endurance, impaired cardiopulmonary response to activity, weakness, gait instability, impaired balance, impaired sensation, edema, decreased safety awareness    Rehab potential is good.    Activity tolerance: Fair    Discharge recommendations: Discharge Facility/Level Of Care Needs: home health PT     Barriers to discharge: Barriers to Discharge: None    Equipment recommendations: Equipment Needed After Discharge: none     GOALS:    Physical Therapy Goals     Not on file                PLAN:    Patient to be seen   to address the above listed problems via    Plan of Care expires:    Plan of Care reviewed with: patient          Pooja Galicia, PT  06/20/2017

## 2017-06-20 NOTE — PROGRESS NOTES
Discussed with Dr Armijo. Mr Rodarte was non-ambulatory prior to bypass due to bilateral foot wounds. He may not qualify for rehab and may require SNF vs nursing home.    Nehal William MD FACS [facility]  Vascular & Endovascular Surgery

## 2017-06-20 NOTE — PLAN OF CARE
[facility]    HOME HEALTH ORDERS  FACE TO FACE ENCOUNTER    Patient Name: Chau Rodarte  YOB: 1950    PCP: Riki Huynh MD   PCP Address: [address] / [place] [address]  PCP Phone Number: [phone]  PCP Fax: [phone]    Encounter Date: 06/20/2017    Admit to Home Health    Diagnoses:  Active Hospital Problems    Diagnosis  POA    Weakness [R53.1]  Yes      Resolved Hospital Problems    Diagnosis Date Resolved POA   No resolved problems to display.       Future Appointments  Date Time Provider Department Center   6/21/2017 3:00 PM Nehal William MD [facility] VASCSUR [facility]   6/27/2017 10:00 AM EKG, APPT [facility] EKG [facility]   6/27/2017 10:40 AM Cayden Moreno MD [facility] ARRHYTH [facility]   6/27/2017 1:00 PM Quirino Bland DPM [facility] WOUND [facility]           I have seen and examined this patient face to face today. My clinical findings that support the need for the home health skilled services and home bound status are the following:  Weakness/numbness causing balance and gait disturbance due to Surgery making it taxing to leave home.    Allergies:Review of patient's allergies indicates:  No Known Allergies    Diet: regular diet    Activities: activity as tolerated    Nursing:   SN to complete comprehensive assessment including routine vital signs. Instruct on disease process and s/s of complications to report to MD. Review/verify medication list sent home with the patient at time of discharge  and instruct patient/caregiver as needed. Frequency may be adjusted depending on start of care date. If patient has enteral feeding tube (NG, PEG, J-tube, G-tube), flush tube before and after feeding and/or medication administration with 20-30 mL of water.    Notify MD if SBP > 160 or < 90; DBP > 90 or < 50; HR > 120 or < 50; Temp > 101; Other:   None       CONSULTS:    Physical Therapy to evaluate and treat. Evaluate for home safety and equipment needs; Establish/upgrade  home exercise program. Perform / instruct on therapeutic exercises, gait training, transfer training, and Range of Motion.  Occupational Therapy to evaluate and treat. Evaluate home environment for safety and equipment needs. Perform/Instruct on transfers, ADL training, ROM, and therapeutic exercises.   to evaluate for community resources/long-range planning.    MISCELLANEOUS CARE:  N/A    WOUND CARE ORDERS  Dressing change:   Left lower extremity surgical wounds: Remove dressings daily. Clean surgical incision sites with soap and water daily in the shower. No tub baths, swimming pools or standing water. Pat incisions dry. Cover groin surgical incision site (must keep clean and dry to prevent wound and bypass infection). Can cover the remaining left medial leg surgical incision sites with dressings as needed for drainage  Left foot wound: Clean daily and apply santyl to the left heel wound, cover with dry gauze and kerlex. Change daily   Right foot wound: clean daily and cover with padded sponge, dry gauze and kerlex. Change daily      Medications: Review discharge medications with patient and family and provide education.      Current Discharge Medication List      CONTINUE these medications which have NOT CHANGED    Details   aspirin 81 MG Chew Take 1 tablet (81 mg total) by mouth once daily.  Refills: 0      atorvastatin (LIPITOR) 80 MG tablet Take 1 tablet (80 mg total) by mouth once daily.  Qty: 90 tablet, Refills: 3      cholecalciferol, vitamin D3, (VITAMIN D3) 5,000 unit Tab Take 5,000 Units by mouth once daily.      clopidogrel (PLAVIX) 75 mg tablet Take 1 tablet (75 mg total) by mouth once daily.  Qty: 90 tablet, Refills: 3      collagenase (SANTYL) ointment Apply topically once daily.  Qty: 30 g, Refills: 0      diclofenac sodium 1 % Gel Apply 2 g topically 3 (three) times daily.  Qty: 100 g, Refills: 1      furosemide (LASIX) 40 MG tablet Take 1 tablet (40 mg total) by mouth once  "daily.  Qty: 30 tablet, Refills: 11      gabapentin (NEURONTIN) 300 MG capsule Take 300 mg by mouth 2 (two) times daily.  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  Qty: 60 tablet, Refills: 0      insulin detemir (LEVEMIR FLEXTOUCH) 100 unit/mL (3 mL) SubQ InPn pen Inject 16 Units into the skin every evening.  Qty: 1 Box, Refills: 3    Associated Diagnoses: Type 2 diabetes mellitus with diabetic polyneuropathy, with long-term current use of insulin      isosorbide-hydrALAZINE 20-37.5 mg (BIDIL) 20-37.5 mg Tab Take 1 tablet by mouth 3 (three) times daily.  Qty: 90 tablet, Refills: 11      liraglutide 0.6 mg/0.1 mL, 18 mg/3 mL, subq PNIJ (VICTOZA 2-SAGAR) 0.6 mg/0.1 mL (18 mg/3 mL) PnIj Inject 0.6 mg daily x1 week, then 1.2 mg daily x1 week, then 1.8 mg daily thereafter  Qty: 9 mL, Refills: 3    Associated Diagnoses: Type 2 diabetes mellitus without complication, with long-term current use of insulin      metoprolol succinate (TOPROL-XL) 50 MG 24 hr tablet Take 1 tablet (50 mg total) by mouth once daily.  Qty: 30 tablet, Refills: 11      oxycodone (ROXICODONE) 10 mg Tab immediate release tablet Take 1 tablet (10 mg total) by mouth every 4 (four) hours as needed for Pain.  Qty: 60 tablet, Refills: 0      pantoprazole (PROTONIX) 20 MG tablet Take 2 tablets (40 mg total) by mouth once daily.  Qty: 30 tablet, Refills: 11    Associated Diagnoses: Typical atrial flutter      pen needle, diabetic (BD ULTRA-FINE HANG PEN NEEDLES) 32 gauge x 5/32" Ndle Uses 2 times daily, on  insulin injections  Qty: 180 each, Refills: 4      senna-docusate 8.6-50 mg (PERICOLACE) 8.6-50 mg per tablet Take 1 tablet by mouth once daily.  Qty: 60 tablet, Refills: 0      tamsulosin (FLOMAX) 0.4 mg Cp24 Take 1 capsule (0.4 mg total) by mouth once daily.  Qty: 30 capsule, Refills: 0 "             I certify that this patient is confined to his home and needs intermittent skilled nursing care, physical therapy and occupational therapy.

## 2017-06-20 NOTE — PROGRESS NOTES
Patient and family spoke to Dr. Armijo but still feel patient will be unable to care for himself at home. Patient's wife would like for patient to stay another night and be evaluated by a different physical therapist on tomorrow . Dr. Armijo notified. Discharge will be cancelled.

## 2017-06-20 NOTE — PLAN OF CARE
Problem: Patient Care Overview  Goal: Plan of Care Review  Outcome: Outcome(s) achieved Date Met: 06/20/17  Plan of care reviewed with pt and understanding verbalized. Fall reduction measures in place, bed in lowest position, wheels locked, upper side rails raised. Cardiac rhythm being monitored. Pt denies pain or discomfort.

## 2017-06-20 NOTE — TELEPHONE ENCOUNTER
----- Message from Jennifer Hernandez sent at 6/20/2017  2:23 PM CDT -----  Patient's wife, Vladimir, called.   No. [phone]   Patient was readmitted into the hospital.  He is on Observation Floor.  Therapy was making patient walk today.  He is not suppose to walk because of blisters on his feet.   Please send an order stating that patient cannot walk.

## 2017-06-20 NOTE — PLAN OF CARE
06/20/17 1423   Final Note   Assessment Type Final Discharge Note   Discharge Disposition [facility]   Hospital Follow Up  Appt(s) scheduled? Yes   Offered [facility] -- Bedside Delivery? n/a   Discharge/Hospital Encounter Summary to ([facility]) PCP n/a   Referral to Outpatient Case Management complete? n/a   Referral to / orders for Home Health Complete? Yes   30 day supply of medicines given at discharge, if documented non-compliance / non-adherence? n/a   Any social issues identified prior to discharge? n/a   Did you assess the readiness or willingness of the family or caregiver to support self management of care? n/a   Right Care Referral Info   Post Acute Recommendation Home-care   Facility Name ([facility] )

## 2017-06-21 LAB
ANION GAP SERPL CALC-SCNC: 8 MMOL/L
ANISOCYTOSIS BLD QL SMEAR: SLIGHT
BASOPHILS # BLD AUTO: 0.05 K/UL
BASOPHILS NFR BLD: 0.6 %
BUN SERPL-MCNC: 42 MG/DL
CALCIUM SERPL-MCNC: 8.7 MG/DL
CHLORIDE SERPL-SCNC: 106 MMOL/L
CO2 SERPL-SCNC: 25 MMOL/L
CREAT SERPL-MCNC: 2.4 MG/DL
DIFFERENTIAL METHOD: ABNORMAL
EOSINOPHIL # BLD AUTO: 0.2 K/UL
EOSINOPHIL NFR BLD: 2.1 %
ERYTHROCYTE [DISTWIDTH] IN BLOOD BY AUTOMATED COUNT: 14.7 %
EST. GFR  (AFRICAN AMERICAN): 30.9 ML/MIN/1.73 M^2
EST. GFR  (NON AFRICAN AMERICAN): 26.7 ML/MIN/1.73 M^2
GLUCOSE SERPL-MCNC: 149 MG/DL
HCT VFR BLD AUTO: 22.2 %
HGB BLD-MCNC: 7 G/DL
HYPOCHROMIA BLD QL SMEAR: ABNORMAL
LYMPHOCYTES # BLD AUTO: 1.5 K/UL
LYMPHOCYTES NFR BLD: 16.9 %
MCH RBC QN AUTO: 26.5 PG
MCHC RBC AUTO-ENTMCNC: 31.5 %
MCV RBC AUTO: 84 FL
MONOCYTES # BLD AUTO: 0.7 K/UL
MONOCYTES NFR BLD: 7.8 %
NEUTROPHILS # BLD AUTO: 6.3 K/UL
NEUTROPHILS NFR BLD: 72.6 %
PLATELET # BLD AUTO: 319 K/UL
PLATELET BLD QL SMEAR: ABNORMAL
PMV BLD AUTO: 9.1 FL
POCT GLUCOSE: 124 MG/DL (ref 70–110)
POCT GLUCOSE: 172 MG/DL (ref 70–110)
POCT GLUCOSE: 175 MG/DL (ref 70–110)
POCT GLUCOSE: 181 MG/DL (ref 70–110)
POLYCHROMASIA BLD QL SMEAR: ABNORMAL
POTASSIUM SERPL-SCNC: 3.8 MMOL/L
RBC # BLD AUTO: 2.64 M/UL
SODIUM SERPL-SCNC: 139 MMOL/L
WBC # BLD AUTO: 8.68 K/UL

## 2017-06-21 PROCEDURE — 25000003 PHARM REV CODE 250: Performed by: SURGERY

## 2017-06-21 PROCEDURE — 80048 BASIC METABOLIC PNL TOTAL CA: CPT

## 2017-06-21 PROCEDURE — G8988 SELF CARE GOAL STATUS: HCPCS | Mod: CJ

## 2017-06-21 PROCEDURE — 97165 OT EVAL LOW COMPLEX 30 MIN: CPT

## 2017-06-21 PROCEDURE — 97542 WHEELCHAIR MNGMENT TRAINING: CPT | Mod: 59

## 2017-06-21 PROCEDURE — G8980 MOBILITY D/C STATUS: HCPCS | Mod: CK

## 2017-06-21 PROCEDURE — G8979 MOBILITY GOAL STATUS: HCPCS | Mod: CJ

## 2017-06-21 PROCEDURE — 82962 GLUCOSE BLOOD TEST: CPT

## 2017-06-21 PROCEDURE — G8987 SELF CARE CURRENT STATUS: HCPCS | Mod: CK

## 2017-06-21 PROCEDURE — G8978 MOBILITY CURRENT STATUS: HCPCS | Mod: CK

## 2017-06-21 PROCEDURE — 97164 PT RE-EVAL EST PLAN CARE: CPT

## 2017-06-21 PROCEDURE — 36415 COLL VENOUS BLD VENIPUNCTURE: CPT

## 2017-06-21 PROCEDURE — 97530 THERAPEUTIC ACTIVITIES: CPT

## 2017-06-21 PROCEDURE — 85025 COMPLETE CBC W/AUTO DIFF WBC: CPT

## 2017-06-21 PROCEDURE — G0378 HOSPITAL OBSERVATION PER HR: HCPCS

## 2017-06-21 RX ORDER — FERROUS SULFATE 325(65) MG
325 TABLET, DELAYED RELEASE (ENTERIC COATED) ORAL DAILY
Status: DISCONTINUED | OUTPATIENT
Start: 2017-06-22 | End: 2017-06-22 | Stop reason: HOSPADM

## 2017-06-21 RX ORDER — METOPROLOL SUCCINATE 25 MG/1
25 TABLET, EXTENDED RELEASE ORAL DAILY
Status: DISCONTINUED | OUTPATIENT
Start: 2017-06-22 | End: 2017-06-22 | Stop reason: HOSPADM

## 2017-06-21 RX ADMIN — HYDRALAZINE HYDROCHLORIDE AND ISOSORBIDE DINITRATE 1 TABLET: 37.5; 2 TABLET, FILM COATED ORAL at 10:06

## 2017-06-21 RX ADMIN — CLOPIDOGREL 75 MG: 75 TABLET, FILM COATED ORAL at 09:06

## 2017-06-21 RX ADMIN — HYDRALAZINE HYDROCHLORIDE AND ISOSORBIDE DINITRATE 1 TABLET: 37.5; 2 TABLET, FILM COATED ORAL at 06:06

## 2017-06-21 RX ADMIN — GABAPENTIN 300 MG: 300 CAPSULE ORAL at 09:06

## 2017-06-21 RX ADMIN — ATORVASTATIN CALCIUM 80 MG: 20 TABLET, FILM COATED ORAL at 09:06

## 2017-06-21 RX ADMIN — Medication 3 ML: at 06:06

## 2017-06-21 RX ADMIN — INSULIN DETEMIR 16 UNITS: 100 INJECTION, SOLUTION SUBCUTANEOUS at 10:06

## 2017-06-21 RX ADMIN — ASPIRIN 81 MG CHEWABLE TABLET 81 MG: 81 TABLET CHEWABLE at 09:06

## 2017-06-21 RX ADMIN — CHOLECALCIFEROL TAB 125 MCG (5000 UNIT) 5000 UNITS: 125 TAB at 09:06

## 2017-06-21 RX ADMIN — TAMSULOSIN HYDROCHLORIDE 0.4 MG: 0.4 CAPSULE ORAL at 09:06

## 2017-06-21 RX ADMIN — GABAPENTIN 300 MG: 300 CAPSULE ORAL at 10:06

## 2017-06-21 RX ADMIN — Medication 3 ML: at 10:06

## 2017-06-21 RX ADMIN — PANTOPRAZOLE SODIUM 40 MG: 40 TABLET, DELAYED RELEASE ORAL at 09:06

## 2017-06-21 NOTE — PROGRESS NOTES
[facility]  Vascular Surgery  Progress Note    Patient Name: Chau Rodarte  MRN: [id number]  Admission Date: 6/19/2017  Primary Care Provider: Riki Huynh MD    Subjective:     Interval History: No adverse events overnight. Patient's wife does not believe patient is able to do home health PT and desires for patient to go to a facility rather than go home since he will have minimal assistance while there.    Post-Op Info:  * No surgery found *         Prescriptions Prior to Admission   Medication Sig Dispense Refill Last Dose    aspirin 81 MG Chew Take 1 tablet (81 mg total) by mouth once daily.  0 6/19/2017    atorvastatin (LIPITOR) 80 MG tablet Take 1 tablet (80 mg total) by mouth once daily. 90 tablet 3 6/19/2017    cholecalciferol, vitamin D3, (VITAMIN D3) 5,000 unit Tab Take 5,000 Units by mouth once daily.   6/19/2017    clopidogrel (PLAVIX) 75 mg tablet Take 1 tablet (75 mg total) by mouth once daily. 90 tablet 3 6/19/2017    collagenase (SANTYL) ointment Apply topically once daily. 30 g 0 6/19/2017    diclofenac sodium 1 % Gel Apply 2 g topically 3 (three) times daily. 100 g 1 6/19/2017    furosemide (LASIX) 40 MG tablet Take 1 tablet (40 mg total) by mouth once daily. 30 tablet 11 6/19/2017    gabapentin (NEURONTIN) 300 MG capsule Take 300 mg by mouth 2 (two) times daily.  0 6/19/2017    hydrocodone-acetaminophen 10-325mg (NORCO)  mg Tab Take 1 tablet by mouth every 4 (four) hours as needed for Pain. 60 tablet 0 6/19/2017    insulin detemir (LEVEMIR FLEXTOUCH) 100 unit/mL (3 mL) SubQ InPn pen Inject 16 Units into the skin every evening. 1 Box 3 6/18/2017    isosorbide-hydrALAZINE 20-37.5 mg (BIDIL) 20-37.5 mg Tab Take 1 tablet by mouth 3 (three) times daily. 90 tablet 11 6/19/2017    liraglutide 0.6 mg/0.1 mL, 18 mg/3 mL, subq PNIJ (VICTOZA 2-SAGAR) 0.6 mg/0.1 mL (18 mg/3 mL) PnIj Inject 0.6 mg daily x1 week, then 1.2 mg daily x1 week, then 1.8 mg daily thereafter (Patient  "taking differently: Inject 1.8 mg into the skin once daily. er) 9 mL 3 6/19/2017    metoprolol succinate (TOPROL-XL) 50 MG 24 hr tablet Take 1 tablet (50 mg total) by mouth once daily. 30 tablet 11 6/19/2017    oxycodone (ROXICODONE) 10 mg Tab immediate release tablet Take 1 tablet (10 mg total) by mouth every 4 (four) hours as needed for Pain. 60 tablet 0 6/19/2017    pantoprazole (PROTONIX) 20 MG tablet Take 2 tablets (40 mg total) by mouth once daily. 30 tablet 11 6/19/2017    pen needle, diabetic (BD ULTRA-FINE HANG PEN NEEDLES) 32 gauge x 5/32" Ndle Uses 2 times daily, on  insulin injections 180 each 4 6/19/2017    senna-docusate 8.6-50 mg (PERICOLACE) 8.6-50 mg per tablet Take 1 tablet by mouth once daily. 60 tablet 0 6/19/2017    tamsulosin (FLOMAX) 0.4 mg Cp24 Take 1 capsule (0.4 mg total) by mouth once daily. 30 capsule 0 6/19/2017       Review of patient's allergies indicates:  No Known Allergies    Past Medical History:   Diagnosis Date    Acute on chronic systolic CHF (congestive heart failure) 11/29/2016    Anticoagulant long-term use     CKD (chronic kidney disease) stage 2, GFR 60-89 ml/min 2/21/2016    Coronary artery disease     Encounter for blood transfusion     HTN (hypertension) 3/3/2014    Hyperlipidemia 3/3/2014    NSTEMI (non-ST elevated myocardial infarction) 11/28/2016    Obesity (BMI 30-39.9) 11/29/2016    PVD (peripheral vascular disease)     Stroke     Type 2 diabetes mellitus with diabetic peripheral angiopathy without gangrene, without long-term current use of insulin 10/24/2013    Type 2 diabetes mellitus with diabetic polyneuropathy, without long-term current use of insulin 11/29/2016     Past Surgical History:   Procedure Laterality Date    CARDIAC SURGERY      COLONOSCOPY N/A 3/21/2017    Procedure: COLONOSCOPY;  Surgeon: Karissa Peck MD;  Location: [facility] ([address] [place] [address]);  Service: Endoscopy;  Laterality: N/A;    CORONARY ANGIOPLASTY WITH STENT PLACEMENT   "    FOOT NERVE GRAFT  11/2013    left leg stent      Loop Recorder placement      right leg bypass       Family History     Problem Relation (Age of Onset)    Diabetes Mother    Heart disease Father        Social History Main Topics    Smoking status: Former Smoker     Packs/day: 1.00     Years: 50.00     Quit date: 3/1/2011    Smokeless tobacco: Never Used    Alcohol use No    Drug use: No    Sexual activity: Yes     Partners: Female      Comment:  with 2 children 3 grand sons and 1 great grand daughter     Review of Systems   Constitutional: Negative for chills and fever.   HENT: Negative for facial swelling, hearing loss and nosebleeds.    Eyes: Negative for photophobia, redness and visual disturbance.   Respiratory: Negative for cough, chest tightness, shortness of breath and wheezing.    Cardiovascular: Positive for leg swelling. Negative for chest pain and palpitations.   Gastrointestinal: Negative for abdominal distention, abdominal pain, nausea and vomiting.   Genitourinary: Negative for difficulty urinating, dysuria, flank pain and hematuria.   Musculoskeletal: Positive for gait problem and myalgias. Negative for arthralgias.        Persistent bilateral foot wounds    Skin:        Blistering of the left medial leg around the surgical incision sites with clear yellow drainage    Neurological: Positive for weakness. Negative for dizziness, syncope, light-headedness, numbness and headaches.        + generalized weakness. Inability to perform ADLs. Family notes that they are unable to assist the patient with ADLs   Hematological: Negative.    Psychiatric/Behavioral: Negative.      Objective:     Vital Signs (Most Recent):  Temp: 97.6 °F (36.4 °C) (06/21/17 1200)  Pulse: 66 (06/21/17 1500)  Resp: 18 (06/21/17 1200)  BP: (!) 109/55 (06/21/17 1200)  SpO2: 97 % (06/21/17 1200) Vital Signs (24h Range):  Temp:  [96.3 °F (35.7 °C)-98.2 °F (36.8 °C)] 97.6 °F (36.4 °C)  Pulse:  [66-91] 66  Resp:  [18]  18  SpO2:  [92 %-97 %] 97 %  BP: ()/(51-69) 109/55     Weight: 103.9 kg (229 lb)  Body mass index is 33.82 kg/m².    Physical Exam   Constitutional: He is oriented to person, place, and time. He appears well-developed and well-nourished. No distress.   HENT:   Head: Normocephalic and atraumatic.   Nose: Nose normal.   Eyes: EOM are normal. Pupils are equal, round, and reactive to light. No scleral icterus.   Neck: Normal range of motion. Neck supple. No tracheal deviation present.   Cardiovascular: Normal rate and regular rhythm.    2+ femoral pulses, LLE with biphasic DP, no PT appreciated on doppler exam       Pulmonary/Chest: Effort normal and breath sounds normal. No respiratory distress. He has no wheezes.   Abdominal: Soft. Bowel sounds are normal. He exhibits no distension. There is no tenderness.   Musculoskeletal: He exhibits edema and tenderness.   Left lower extremity surgical incision sites with leidy-incisional blistering from tape from dressings with serous fluid drainage. The surgical incision itself is intact with staples in place with minimal serous drainage. Left groin surgical incision site with dermabond in place.  LLE surgical incision sites are appropriately tender to palpation   Left heel ulcer with overlying eschar   Right plantar diabetic pressure ulcer    Neurological: He is alert and oriented to person, place, and time. No cranial nerve deficit.   Skin: Skin is warm.   Wounds as stated above    Psychiatric: He has a normal mood and affect. His behavior is normal.       Significant Labs:  CBC:     Recent Labs  Lab 06/19/17  1312   WBC 9.98   RBC 2.67*   HGB 7.2*   HCT 22.3*      MCV 84   MCH 27.0   MCHC 32.3     CMP:     Recent Labs  Lab 06/19/17  1312   GLU 87   CALCIUM 8.6*   ALBUMIN 2.4*   PROT 6.2      K 4.1   CO2 24      BUN 35*   CREATININE 2.3*   ALKPHOS 84   ALT 13   AST 32   BILITOT 0.7     Coagulation:     Recent Labs  Lab 06/19/17  1312   LABPROT 11.4    INR 1.1         Significant Diagnostics:  No new imaging     Assessment/Plan:     Acute blood loss anemia    - Has history of anemia; iron meds restarted today; trend  - No need for transfusion right now        Type 2 diabetes mellitus with diabetic polyneuropathy, with long-term current use of insulin    - ADA diet  - Home levemir dose  - SSI as needed to maintain BG readings        Skin ulcers of both feet    - Seen and evaluated by podiatry on 6/16/17 during last admission; per their recs:  - Wound care Orders B/L LE. To be done every other day   1.Cleanse wounds B/L feet with saline   2. Apply santyl to all wounds B/L LE   3. Apply krysten foam wrap with kerlix.      Weightbearing Status: Minimal WB B/L LE.   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  - LLE surgical wounds healing appropriately => blistering is from adhesive allergy   - Patient with generalized weakness with inability to perform ADLs and patient's family notes that they are not capable to providing enough/sufficient assistance for ADLs despite home health   - Case management and SW following for placement; PT/OT consulted and following  - Continue local wound care             Eduarda Dominguez MD  Vascular Surgery  [facility]

## 2017-06-21 NOTE — ASSESSMENT & PLAN NOTE
S/p fem-pop bypass on 6/14/17    -Continue ASA, plavix and statin  -PT/OT following for needs for discharge

## 2017-06-21 NOTE — PLAN OF CARE
Problem: Occupational Therapy Goal  Goal: Occupational Therapy Goal  Goals to be met by: 6/28/2017     Patient will increase functional independence with ADLs by performing:    UE Dressing with Set-up Assistance.  LE Dressing with Moderate Assistance.  Grooming while standing with Minimal Assistance.  Toileting from bedside commode with Moderate Assistance for hygiene and clothing management.   Toilet transfer to toilet with Minimal Assistance.    Initiate OT POC    Comments: Marques Donohue OTR/L  6/21/2017

## 2017-06-21 NOTE — PT/OT/SLP RE-EVAL
Physical Therapy  Re-evaluation    Chau Rodarte   MRN: [id number]   Admitting Diagnosis: Weakness    PT Received On: 06/21/17  PT Start Time: 1122     PT Stop Time: 1203    PT Total Time (min): 41 min       Billable Minutes:  Re-eval 10, Therapeutic Activity 16 and Train/Wheelchair Management 15    Diagnosis: Weakness      Past Medical History:   Diagnosis Date    Acute on chronic systolic CHF (congestive heart failure) 11/29/2016    Anticoagulant long-term use     CKD (chronic kidney disease) stage 2, GFR 60-89 ml/min 2/21/2016    Coronary artery disease     Encounter for blood transfusion     HTN (hypertension) 3/3/2014    Hyperlipidemia 3/3/2014    NSTEMI (non-ST elevated myocardial infarction) 11/28/2016    Obesity (BMI 30-39.9) 11/29/2016    PVD (peripheral vascular disease)     Stroke     Type 2 diabetes mellitus with diabetic peripheral angiopathy without gangrene, without long-term current use of insulin 10/24/2013    Type 2 diabetes mellitus with diabetic polyneuropathy, without long-term current use of insulin 11/29/2016      Past Surgical History:   Procedure Laterality Date    CARDIAC SURGERY      COLONOSCOPY N/A 3/21/2017    Procedure: COLONOSCOPY;  Surgeon: Karissa Peck MD;  Location: [facility] ([address] [place] [address]);  Service: Endoscopy;  Laterality: N/A;    CORONARY ANGIOPLASTY WITH STENT PLACEMENT      FOOT NERVE GRAFT  11/2013    left leg stent      Loop Recorder placement      right leg bypass         Referring physician: Jim Gonzales MD  Date referred to PT: 6/21/2017    General Precautions: Standard, fall  Orthopedic Precautions:  ((R) heel WB and (L) forefoot WB only per vascular surgery, but podiatry had requested NWB (B) LE or no more than 10 steps at a time)   Braces:         Do you have any cultural, spiritual, Orthodoxy conflicts, given your current situation?: no    Patient History:  Lives With: spouse  Living Arrangements: house  Home Accessibility: stairs to enter  home  Home Layout: Able to live on 1st floor  Number of Stairs to Enter Home: 1  Stair Railings at Home: none  Transportation Available: family or friend will provide  Equipment Currently Used at Home: cane, straight, walker, rolling, wheelchair, shower chair, hospital bed      Previous Level of Function:  Ambulation Skills: independent  Transfer Skills: independent  ADL Skills: independent  Work/Leisure Activity: independent    Subjective:  Communicated with nursing prior to session.    Chief Complaint: weakness, dizziness, and (L) LE discomfort  Patient goals: to get stronger before going home    Pain/Comfort  Pain Rating 1:  (pt. did not rate)  Location - Side 1: Left  Location 1: leg  Pain Addressed 1: Reposition, Cessation of Activity    Objective:   Patient found with: peripheral IV     Cognitive Exam:  Oriented to: Person, Place, Time and Situation    Follows Commands/attention: Follows multistep  commands  Communication: clear/fluent  Safety awareness/insight to disability: intact    Physical Exam:  Postural examination/scapula alignment: No postural abnormalities identified    Skin integrity: Wound (B) feet  Edema: Moderate (L) LE    Sensation:   (L) LE tender to palpation    Upper Extremity Range of Motion:  Right Upper Extremity: WFL  Left Upper Extremity: WFL    Upper Extremity Strength:  Right Upper Extremity: WFL  Left Upper Extremity: WFL    Lower Extremity Range of Motion:  Right Lower Extremity: WFL  Left Lower Extremity: pain limited    Lower Extremity Strength:  Right Lower Extremity: WFL  Left Lower Extremity: pain limited     Fine motor coordination:  Intact    Gross motor coordination: WFL    Functional Mobility:  Bed Mobility:  Rolling/Turning to Left: Modified independent  Rolling/Turning Right: Modified independent  Scooting/Bridging: Supervision  Supine to Sit: Supervision  Sit to Supine: Stand by Assistance    Transfers:  Sit <> Stand Assistance: Stand By Assistance  Sit <> Stand Assistive  Device: No Assistive Device  Bed <> Chair Technique: Squat Pivot (bed to/from w/c)  Bed <> Chair Assistance: Contact Guard Assistance  Bed <> Chair Assistive Device: No Assistive Device    Gait:   Gait Distance: not attempted    Stairs:      Balance:   Static Sit: FAIR+: Able to take MINIMAL challenges from all directions  Dynamic Sit: FAIR+: Maintains balance through MINIMAL excursions of active trunk motion  Static Stand: FAIR+: Takes MINIMAL challenges from all directions  Dynamic stand: FAIR: Needs CONTACT GUARD during gait    Therapeutic Activities and Exercises:  Discussed WB status, home situation, DME needs, SNF vs. HH PT.    Pt. performed w/c mobility ~15' with Min A, requiring instruction for proper technique and w/c management.    AM-PAC 6 CLICK MOBILITY  How much help from another person does this patient currently need?   1 = Unable, Total/Dependent Assistance  2 = A lot, Maximum/Moderate Assistance  3 = A little, Minimum/Contact Guard/Supervision  4 = None, Modified [place]/Independent    Turning over in bed (including adjusting bedclothes, sheets and blankets)?: 4  Sitting down on and standing up from a chair with arms (e.g., wheelchair, bedside commode, etc.): 3  Moving from lying on back to sitting on the side of the bed?: 4  Moving to and from a bed to a chair (including a wheelchair)?: 3  Need to walk in hospital room?: 3  Climbing 3-5 steps with a railing?: 1  Total Score: 18     AM-PAC Raw Score CMS G-Code Modifier Level of Impairment Assistance   6 % Total / Unable   7 - 9 CM 80 - 100% Maximal Assist   10 - 14 CL 60 - 80% Moderate Assist   15 - 19 CK 40 - 60% Moderate Assist   20 - 22 CJ 20 - 40% Minimal Assist   23 CI 1-20% SBA / CGA   24 CH 0% Independent/ Mod I     Patient left supine with all lines intact and call button in reach.    Assessment:   Chau Rodarte is a 67 y.o. male with a medical diagnosis of Weakness and presents with dizziness, weakness, and (L) LE discomfort.  Pt.'s BP and H&H were low today, which may have affected performance. Pt. would benefit from short stay at SNF to improve endurance and w/c mobility due to being home alone during the day.    Rehab identified problem list/impairments: Rehab identified problem list/impairments: weakness, impaired endurance, impaired functional mobilty, gait instability, impaired balance, decreased lower extremity function, impaired skin, orthopedic precautions    Rehab potential is good.    Activity tolerance: Fair    Discharge recommendations: Discharge Facility/Level Of Care Needs: nursing facility, skilled     Barriers to discharge: Barriers to Discharge: Decreased caregiver support (alone during the day)    Equipment recommendations: Equipment Needed After Discharge: none     GOALS:    Physical Therapy Goals        Problem: Physical Therapy Goal    Goal Priority Disciplines Outcome Goal Variances Interventions   Physical Therapy Goal     PT/OT, PT Ongoing (interventions implemented as appropriate)     Description:  Goals to be met by: 2017     Patient will increase functional independence with mobility by performin. Supine to sit with Modified [place]  2. Sit to supine with Modified [place]  3. Sit to stand transfer with Modified [place]  4. Bed to chair transfer with Modified [place] with/without RW  5. Wheelchair propulsion x150 feet with Supervision using bilateral upper extremities  6. Lower extremity exercise program x15 reps per handout, with independence                      PLAN:    Patient to be seen 5 x/week to address the above listed problems via therapeutic activities, therapeutic exercises, gait training, wheelchair management/training  Plan of Care expires: 17  Plan of Care reviewed with: patient, spouse    Functional Assessment Tool Used: AM-PAC  Score: 18  Functional Limitation: Mobility: Walking and moving around  Mobility: Walking and Moving Around Current Status ():  CK  Mobility: Walking and Moving Around Goal Status (): TIMBO Lopez, PT  06/21/2017

## 2017-06-21 NOTE — PROGRESS NOTES
Notified on call surgery resident for Dr. William regarding pt's BP. Tech noted BP to be 91/51. RN went into pt's room and manual BP performed 98/60. HR 63. Pt reports feeling dizzy and weak. Orders given to hold Lasix. MD states he will overview pt's profile. Will continue to monitor closely.

## 2017-06-21 NOTE — PT/OT/SLP EVAL
Occupational Therapy  Evaluation    Chau Rodarte   MRN: [id number]   Admitting Diagnosis: Weakness    OT Date of Treatment: 06/21/17   OT Start Time: 1354  OT Stop Time: 1410  OT Total Time (min): 16 min    Billable Minutes:  Evaluation 16 minutes    Diagnosis: Weakness       Past Medical History:   Diagnosis Date    Acute on chronic systolic CHF (congestive heart failure) 11/29/2016    Anticoagulant long-term use     CKD (chronic kidney disease) stage 2, GFR 60-89 ml/min 2/21/2016    Coronary artery disease     Encounter for blood transfusion     HTN (hypertension) 3/3/2014    Hyperlipidemia 3/3/2014    NSTEMI (non-ST elevated myocardial infarction) 11/28/2016    Obesity (BMI 30-39.9) 11/29/2016    PVD (peripheral vascular disease)     Stroke     Type 2 diabetes mellitus with diabetic peripheral angiopathy without gangrene, without long-term current use of insulin 10/24/2013    Type 2 diabetes mellitus with diabetic polyneuropathy, without long-term current use of insulin 11/29/2016      Past Surgical History:   Procedure Laterality Date    CARDIAC SURGERY      COLONOSCOPY N/A 3/21/2017    Procedure: COLONOSCOPY;  Surgeon: Karissa Peck MD;  Location: [facility] ([address] [place] [address]);  Service: Endoscopy;  Laterality: N/A;    CORONARY ANGIOPLASTY WITH STENT PLACEMENT      FOOT NERVE GRAFT  11/2013    left leg stent      Loop Recorder placement      right leg bypass         General Precautions: Standard, fall  Orthopedic Precautions:  (Per vascular suregery MD Pt is WBAT to LLE but preferably TTWB d/t pain in L heel; Pt is NWB to R toes but able to wb at R heel)  Braces:      Patient History:  Living Environment  Lives With: spouse  Number of Stairs to Enter Home: 1  Transportation Available: family or friend will provide  Living Environment Comment: Pt lives in a [facility] w/ spouse and 1 step to enter. PLOF pt euqired assist and device w/ mobility and ADL'S.  Equipment Currently Used at Home: cane, straight,  walker, rolling, wheelchair, shower chair    Prior level of function:   Bed Mobility/Transfers: needs device and assist  Bathing: needs device and assist  Lower Body Dressing: needs assist  Toileting: needs assist  Home Management Skills: unable to perform        Dominant hand: right    Subjective:  Communicated with nurse prior to session.  Pt agreeable to skilled OT services.  Chief Complaint: debility   Patient/Family stated goals: Improve overall functioning    Pain/Comfort  Pain Rating 1: 0/10  Pain Addressed 1: Cessation of Activity  Pain Rating Post-Intervention 1: 4/10    Objective:  Patient found with: telemetry, peripheral IV  Pt received w/ HOB elevated. Writing therapist consulted vascular surgery regarding pt BLE WB status. Per Vascular surgery MD, pt is WBAT for LLE but recommends TTWB d/t pt increase in pain w/ wb through heel. Pt is NWB for R toes and forefoot but can WB on R heel.    Cognitive Exam:  Oriented to: Person, Place, Time and Situation  Follows Commands/attention: Follows multistep  commands  Communication: clear/fluent  Memory:  No Deficits noted  Safety awareness/insight to disability: intact  Coping skills/emotional control: Appropriate to situation    Visual/perceptual:  Intact    Physical Exam:  Postural examination/scapula alignment: No postural abnormalities identified  Skin integrity: Wound BLE   Edema: None noted     Sensation:   Intact    Upper Extremity Range of Motion:  Right Upper Extremity: WFL  Left Upper Extremity: WFL    Upper Extremity Strength:  Right Upper Extremity: WFL  Left Upper Extremity: WFL   Strength: WFL    Fine motor coordination:   Intact    Gross motor coordination: WFL    Functional Mobility:  Bed Mobility:  Rolling/Turning to Left: With side rail, Stand by assistance  Scooting/Bridging: Supervision  Supine to Sit: WIth side rail  Sit to Supine: Stand by Assistance    Transfers:  Sit <> Stand Assistance: Minimum Assistance  Sit <> Stand Assistive  "Device: Rolling Walker    Functional Ambulation: Pt took 2 steps forward and backwards at min a w/ RW. Pt had increased pain w/ ambulation d/t inability to sufficiently maintain WB status.    Activities of Daily Living:  LE Dressing Level of Assistance:  (max a to don/doff shoes)      Balance:   Static Sit: GOOD: Takes MODERATE challenges from all directions  Dynamic Sit: GOOD-: Maintains balance through MODERATE excursions of active trunk movement,     Static Stand: FAIR+: Takes MINIMAL challenges from all directions  Dynamic stand: FAIR: Needs CONTACT GUARD during gait    Therapeutic Activities and Exercises:  Pt educated on POC  Pt educated on WB status per vascular surgery MD.     AM-PAC 6 CLICK ADL  How much help from another person does this patient currently need?  1 = Unable, Total/Dependent Assistance  2 = A lot, Maximum/Moderate Assistance  3 = A little, Minimum/Contact Guard/Supervision  4 = None, Modified [place]/Independent    Putting on and taking off regular lower body clothing? : 1  Bathing (including washing, rinsing, drying)?: 2  Toileting, which includes using toilet, bedpan, or urinal? : 2  Putting on and taking off regular upper body clothing?: 4  Taking care of personal grooming such as brushing teeth?: 4  Eating meals?: 4  Total Score: 17    AM-PAC Raw Score CMS "G-Code Modifier Level of Impairment Assistance   6 % Total / Unable   7 - 9 CM 80 - 100% Maximal Assist   10-14 CL 60 - 80% Moderate Assist   15 - 19 CK 40 - 60% Moderate Assist   20 - 22 CJ 20 - 40% Minimal Assist   23 CI 1-20% SBA / CGA   24 CH 0% Independent/ Mod I       Patient left HOB elevated with call button in reach    Assessment:  Chau Rodarte is a 67 y.o. male with a medical diagnosis of Weakness and presents with impairments listed below. Pt is displaying decreased ability to ambulate and perform ADL's d/t decreased overall functioning  and BLE WB status. Pt displays good rehab potential d/t strong BUE and high " motivation. Pt would benefit from SNF placement to improve physical and occupation functioning and reassume life roles. Pt would benefit from skilled OT services to improve independence and overall occupational functioning.    Rehab identified problem list/impairments: Rehab identified problem list/impairments: weakness, impaired endurance, impaired self care skills, impaired functional mobilty, gait instability, impaired balance, decreased lower extremity function, orthopedic precautions, impaired skin    Rehab potential is good.    Activity tolerance: Good    Discharge recommendations: Discharge Facility/Level Of Care Needs: nursing facility, skilled     Barriers to discharge: Barriers to Discharge: None    Equipment recommendations: tbd     GOALS:    Occupational Therapy Goals        Problem: Occupational Therapy Goal    Goal Priority Disciplines Outcome Interventions   Occupational Therapy Goal     OT, PT/OT     Description:  Goals to be met by: 6/28/2017     Patient will increase functional independence with ADLs by performing:    UE Dressing with Set-up Assistance.  LE Dressing with Moderate Assistance.  Grooming while standing with Minimal Assistance.  Toileting from bedside commode with Moderate Assistance for hygiene and clothing management.   Toilet transfer to toilet with Minimal Assistance.                      PLAN:  Patient to be seen 4 x/week to address the above listed problems via therapeutic activities, therapeutic exercises, self-care/home management, wheelchair management/training  Plan of Care expires: 07/21/17  Plan of Care reviewed with: patient    Marques Donohue, OT  06/21/2017

## 2017-06-21 NOTE — PLAN OF CARE
Mauricio met with Pt at the bedside. Mauricio provided list of SNFs covered by Pt's insurance for Pt to choose from. Pt stated he will not d/c to NH based SNF. Mauricio informed Pt that [facility] is only non NH SNF option. Pt only consented to referral being sent to [facility]. Mauricio informed Pt that he will need to be accepted by SNF and PHN will need to approve a SNF stay. Pt verbalized understanding. Maur[facility] Mauricio informed CM and MD of above. [facility] consulted. OT consulted. OT must see Pt in order for SNF to accept.    UPDATE 1:39 PM   Mauricio informed SE SEGUNDO  Ph: [phone] of new d/c plan.     Lynette Solis LMSW [phone]

## 2017-06-21 NOTE — PLAN OF CARE
Problem: Patient Care Overview  Goal: Plan of Care Review  Plan of care reviewed with patient. No distress noted at this time. Pt free from pain this shift. Wounds intact at this time. Fall precautions maintained with bed in lowest position, wheels locked, and call bell within reach. Pt wife at bedside. Cardiac and blood glucose monitoring maintained.  Will continue to monitor closely.

## 2017-06-21 NOTE — PLAN OF CARE
Pt d/w Dr. Gonzales, wife requesting a different PT eval pt and CM to place pt in a SNF. Advised currently pt has no criteria for SNF admission and will ask a different PT to see pt ASAP this a.m. To please re-consult PT, he stated he would.     0855- Spoke to Natalie in therapy dept, advised her of the above and she stated she would call me back to let me know who was available to see the pt.     1028- Spoke to Natalie in the therapy dept again, asked if they were able to staff this pt for a re-eval, and she stated Katy will call me back.     1046- Spoke to Chau with PT, explained the reason for the re-eval, he stated he would go see the pt now.     1218- Recv'd a call from Chau PT, he stated the pt is stating his Podiatrist does not want him bearing weight on his feet, he is not feeling well 2/2 BP issues, therefore he did not preform as well with PT today as he did yesterday. He stated at this time he is recommending ST SNF placement for W/C mobility training. Asked he please write his note as soon as possible so a SNF referral can be made.     1226- Spoke to EDITH Solis, advised her of the above, she stated she will get choices from the pt and make the referrals.     1238- Spoke to Rizwana SHARMA, she stated she has spoken with the pt, he does not want a NH, and is requesting a referral to [facility]. Advised I would place the order, but we will need an OT eval as well. She stated she will page vascular for the order.     1300- Spoke with Pj (OT), advised new consult orders placed, asked if he or someone from OT could see pt today for SNF recs, he stated he would try to get down there shortly.     1448- Spoke to Chau with PT, advised him SNF and insurance CM needed his note to eval for SNF acceptance,he stated he will be completing his note shortly.     1515- Recv'd a call from Chau and he stated his note has been completed.     1520- Spoke to Lynda with [facility], she stated she is reviewing this pt for admission and  there are some clinical concerns they would like addressed. She stated the pt has had a drop in H/H, drop in BP, and c/o weakness/dizziness. Advised her I would speak with the team and let her know what the POC is.     1540- Spoke with Dr. Dominguez, advised her PT/OT are rec SNF for this pt and that [facility] has the above concerns. She stated at this time they are monitoring the pt. Requested she please consult podiatry for wound care and weight bearing status for SNF orders, she stated she would.     1608- Spoke to EDITH Solis, advised her of the above.     Pt is under review for acceptance to [facility]. If he is accepted auth will have to be obtained from N.

## 2017-06-21 NOTE — PLAN OF CARE
Problem: Patient Care Overview  Goal: Plan of Care Review  Outcome: Ongoing (interventions implemented as appropriate)  Fall precautions maintained. Side rails raised x2. Bed in lowest position with wheels locked.  Accu-check monitored AC/HS. Telemetry monitoring maintained. Pt changes position in bed independently. Will continue to monitor.

## 2017-06-21 NOTE — ASSESSMENT & PLAN NOTE
- Seen and evaluated by podiatry on 6/16/17 during last admission; per their recs:  - Wound care Orders B/L LE. To be done every other day   1.Cleanse wounds B/L feet with saline   2. Apply santyl to all wounds B/L LE   3. Apply krysten foam wrap with kerlix.      Weightbearing Status: Minimal WB B/L LE.   Offloading Device: DARCO shoe B/L

## 2017-06-21 NOTE — ASSESSMENT & PLAN NOTE
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  - LLE surgical wounds healing appropriately => blistering is from adhesive allergy   - Patient with generalized weakness with inability to perform ADLs and patient's family notes that they are not capable to providing enough/sufficient assistance for ADLs despite home health   - Case management and SW following for placement; PT/OT consulted and following  - Continue local wound care

## 2017-06-21 NOTE — PLAN OF CARE
Problem: Physical Therapy Goal  Goal: Physical Therapy Goal  Goals to be met by: 2017     Patient will increase functional independence with mobility by performin. Supine to sit with Modified [place]  2. Sit to supine with Modified [place]  3. Sit to stand transfer with Modified [place]  4. Bed to chair transfer with Modified [place] with/without RW  5. Wheelchair propulsion x150 feet with Supervision using bilateral upper extremities  6. Lower extremity exercise program x15 reps per handout, with independence    Outcome: Ongoing (interventions implemented as appropriate)  Goals set

## 2017-06-22 ENCOUNTER — HOSPITAL ENCOUNTER (INPATIENT)
Facility: HOSPITAL | Age: 67
LOS: 8 days | Discharge: HOME HEALTH | DRG: 949 | End: 2017-06-30
Attending: INTERNAL MEDICINE | Admitting: INTERNAL MEDICINE
Payer: MEDICARE

## 2017-06-22 VITALS
DIASTOLIC BLOOD PRESSURE: 59 MMHG | HEART RATE: 67 BPM | BODY MASS INDEX: 33.92 KG/M2 | WEIGHT: 229 LBS | TEMPERATURE: 98 F | HEIGHT: 69 IN | RESPIRATION RATE: 18 BRPM | SYSTOLIC BLOOD PRESSURE: 120 MMHG | OXYGEN SATURATION: 95 %

## 2017-06-22 DIAGNOSIS — R53.81 DEBILITY: ICD-10-CM

## 2017-06-22 DIAGNOSIS — R53.1 WEAKNESS: ICD-10-CM

## 2017-06-22 DIAGNOSIS — I13.0 BENIGN HYPERTENSIVE HEART AND KIDNEY DISEASE WITH SYSTOLIC CHF, NYHA CLASS 2 AND CKD STAGE 3: ICD-10-CM

## 2017-06-22 DIAGNOSIS — E11.621 DIABETIC ULCER OF HEEL ASSOCIATED WITH TYPE 2 DIABETES MELLITUS, UNSPECIFIED LATERALITY, UNSPECIFIED ULCER STAGE: Primary | ICD-10-CM

## 2017-06-22 DIAGNOSIS — I25.10 CORONARY ARTERY DISEASE INVOLVING NATIVE CORONARY ARTERY OF NATIVE HEART WITHOUT ANGINA PECTORIS: ICD-10-CM

## 2017-06-22 DIAGNOSIS — N18.30 BENIGN HYPERTENSIVE HEART AND KIDNEY DISEASE WITH SYSTOLIC CHF, NYHA CLASS 2 AND CKD STAGE 3: ICD-10-CM

## 2017-06-22 DIAGNOSIS — I50.20 BENIGN HYPERTENSIVE HEART AND KIDNEY DISEASE WITH SYSTOLIC CHF, NYHA CLASS 2 AND CKD STAGE 3: ICD-10-CM

## 2017-06-22 DIAGNOSIS — E11.42 TYPE 2 DIABETES MELLITUS WITH DIABETIC POLYNEUROPATHY, WITH LONG-TERM CURRENT USE OF INSULIN: ICD-10-CM

## 2017-06-22 DIAGNOSIS — E78.00 PURE HYPERCHOLESTEROLEMIA: ICD-10-CM

## 2017-06-22 DIAGNOSIS — N40.0 BENIGN PROSTATIC HYPERPLASIA, PRESENCE OF LOWER URINARY TRACT SYMPTOMS UNSPECIFIED, UNSPECIFIED MORPHOLOGY: ICD-10-CM

## 2017-06-22 DIAGNOSIS — L97.529 SKIN ULCERS OF BOTH FEET: ICD-10-CM

## 2017-06-22 DIAGNOSIS — I48.91 ATRIAL FIBRILLATION, UNSPECIFIED TYPE: ICD-10-CM

## 2017-06-22 DIAGNOSIS — L97.519 SKIN ULCERS OF BOTH FEET: ICD-10-CM

## 2017-06-22 DIAGNOSIS — L97.409 DIABETIC ULCER OF HEEL ASSOCIATED WITH TYPE 2 DIABETES MELLITUS, UNSPECIFIED LATERALITY, UNSPECIFIED ULCER STAGE: Primary | ICD-10-CM

## 2017-06-22 DIAGNOSIS — Z79.4 TYPE 2 DIABETES MELLITUS WITH DIABETIC POLYNEUROPATHY, WITH LONG-TERM CURRENT USE OF INSULIN: ICD-10-CM

## 2017-06-22 DIAGNOSIS — E11.621 DIABETIC ULCER OF LEFT HEEL ASSOCIATED WITH TYPE 2 DIABETES MELLITUS, UNSPECIFIED ULCER STAGE: ICD-10-CM

## 2017-06-22 DIAGNOSIS — D62 ACUTE BLOOD LOSS ANEMIA: ICD-10-CM

## 2017-06-22 DIAGNOSIS — I10 ESSENTIAL HYPERTENSION: ICD-10-CM

## 2017-06-22 DIAGNOSIS — L97.419 DIABETIC ULCER OF RIGHT HEEL ASSOCIATED WITH TYPE 2 DIABETES MELLITUS, UNSPECIFIED ULCER STAGE: ICD-10-CM

## 2017-06-22 DIAGNOSIS — L97.429 DIABETIC ULCER OF LEFT HEEL ASSOCIATED WITH TYPE 2 DIABETES MELLITUS, UNSPECIFIED ULCER STAGE: ICD-10-CM

## 2017-06-22 DIAGNOSIS — E11.621 DIABETIC ULCER OF RIGHT HEEL ASSOCIATED WITH TYPE 2 DIABETES MELLITUS, UNSPECIFIED ULCER STAGE: ICD-10-CM

## 2017-06-22 DIAGNOSIS — Z95.828 S/P FEMORAL-POPLITEAL BYPASS SURGERY: ICD-10-CM

## 2017-06-22 LAB
ABO + RH BLD: NORMAL
ANION GAP SERPL CALC-SCNC: 10 MMOL/L
BASOPHILS # BLD AUTO: 0.03 K/UL
BASOPHILS NFR BLD: 0.3 %
BLD GP AB SCN CELLS X3 SERPL QL: NORMAL
BLD PROD TYP BPU: NORMAL
BLD PROD TYP BPU: NORMAL
BLOOD UNIT EXPIRATION DATE: NORMAL
BLOOD UNIT EXPIRATION DATE: NORMAL
BLOOD UNIT TYPE CODE: 5100
BLOOD UNIT TYPE CODE: 5100
BLOOD UNIT TYPE: NORMAL
BLOOD UNIT TYPE: NORMAL
BUN SERPL-MCNC: 41 MG/DL
CALCIUM SERPL-MCNC: 8.7 MG/DL
CHLORIDE SERPL-SCNC: 108 MMOL/L
CO2 SERPL-SCNC: 24 MMOL/L
CODING SYSTEM: NORMAL
CODING SYSTEM: NORMAL
CREAT SERPL-MCNC: 2.3 MG/DL
DIFFERENTIAL METHOD: ABNORMAL
DISPENSE STATUS: NORMAL
DISPENSE STATUS: NORMAL
EOSINOPHIL # BLD AUTO: 0.2 K/UL
EOSINOPHIL NFR BLD: 2.5 %
ERYTHROCYTE [DISTWIDTH] IN BLOOD BY AUTOMATED COUNT: 14.5 %
EST. GFR  (AFRICAN AMERICAN): 32.5 ML/MIN/1.73 M^2
EST. GFR  (NON AFRICAN AMERICAN): 28.1 ML/MIN/1.73 M^2
GLUCOSE SERPL-MCNC: 130 MG/DL
HCT VFR BLD AUTO: 21.6 %
HGB BLD-MCNC: 6.9 G/DL
LYMPHOCYTES # BLD AUTO: 1.5 K/UL
LYMPHOCYTES NFR BLD: 16.6 %
MCH RBC QN AUTO: 27.1 PG
MCHC RBC AUTO-ENTMCNC: 31.9 %
MCV RBC AUTO: 85 FL
MONOCYTES # BLD AUTO: 0.9 K/UL
MONOCYTES NFR BLD: 9.9 %
NEUTROPHILS # BLD AUTO: 6.3 K/UL
NEUTROPHILS NFR BLD: 70.7 %
PLATELET # BLD AUTO: 357 K/UL
PLATELET BLD QL SMEAR: ABNORMAL
PMV BLD AUTO: 9.5 FL
POCT GLUCOSE: 150 MG/DL (ref 70–110)
POCT GLUCOSE: 174 MG/DL (ref 70–110)
POCT GLUCOSE: 193 MG/DL (ref 70–110)
POTASSIUM SERPL-SCNC: 4 MMOL/L
RBC # BLD AUTO: 2.55 M/UL
SODIUM SERPL-SCNC: 142 MMOL/L
TRANS ERYTHROCYTES VOL PATIENT: NORMAL ML
TRANS ERYTHROCYTES VOL PATIENT: NORMAL ML
WBC # BLD AUTO: 8.9 K/UL

## 2017-06-22 PROCEDURE — 25000003 PHARM REV CODE 250: Performed by: SURGERY

## 2017-06-22 PROCEDURE — G0378 HOSPITAL OBSERVATION PER HR: HCPCS

## 2017-06-22 PROCEDURE — 36415 COLL VENOUS BLD VENIPUNCTURE: CPT

## 2017-06-22 PROCEDURE — 86900 BLOOD TYPING SEROLOGIC ABO: CPT

## 2017-06-22 PROCEDURE — 25000003 PHARM REV CODE 250: Performed by: STUDENT IN AN ORGANIZED HEALTH CARE EDUCATION/TRAINING PROGRAM

## 2017-06-22 PROCEDURE — 63600175 PHARM REV CODE 636 W HCPCS: Performed by: STUDENT IN AN ORGANIZED HEALTH CARE EDUCATION/TRAINING PROGRAM

## 2017-06-22 PROCEDURE — P9021 RED BLOOD CELLS UNIT: HCPCS

## 2017-06-22 PROCEDURE — 82962 GLUCOSE BLOOD TEST: CPT

## 2017-06-22 PROCEDURE — 80048 BASIC METABOLIC PNL TOTAL CA: CPT

## 2017-06-22 PROCEDURE — 86901 BLOOD TYPING SEROLOGIC RH(D): CPT

## 2017-06-22 PROCEDURE — 86920 COMPATIBILITY TEST SPIN: CPT

## 2017-06-22 PROCEDURE — 12000000 HC SNF SEMI-PRIVATE ROOM

## 2017-06-22 PROCEDURE — 36430 TRANSFUSION BLD/BLD COMPNT: CPT

## 2017-06-22 PROCEDURE — 85025 COMPLETE CBC W/AUTO DIFF WBC: CPT

## 2017-06-22 RX ORDER — PANTOPRAZOLE SODIUM 40 MG/1
40 TABLET, DELAYED RELEASE ORAL DAILY
Status: DISCONTINUED | OUTPATIENT
Start: 2017-06-23 | End: 2017-06-30 | Stop reason: HOSPADM

## 2017-06-22 RX ORDER — ISOSORBIDE DINITRATE AND HYDRALAZINE HYDROCHLORIDE 37.5; 2 MG/1; MG/1
1 TABLET ORAL 3 TIMES DAILY
Status: CANCELLED | OUTPATIENT
Start: 2017-06-22

## 2017-06-22 RX ORDER — ISOSORBIDE DINITRATE AND HYDRALAZINE HYDROCHLORIDE 37.5; 2 MG/1; MG/1
1 TABLET ORAL 3 TIMES DAILY
Status: DISCONTINUED | OUTPATIENT
Start: 2017-06-22 | End: 2017-06-27

## 2017-06-22 RX ORDER — RAMELTEON 8 MG/1
8 TABLET ORAL NIGHTLY PRN
Status: CANCELLED | OUTPATIENT
Start: 2017-06-22

## 2017-06-22 RX ORDER — GABAPENTIN 300 MG/1
300 CAPSULE ORAL 2 TIMES DAILY
Status: CANCELLED | OUTPATIENT
Start: 2017-06-22

## 2017-06-22 RX ORDER — MAG HYDROX/ALUMINUM HYD/SIMETH 200-200-20
15 SUSPENSION, ORAL (FINAL DOSE FORM) ORAL EVERY 6 HOURS PRN
Status: CANCELLED | OUTPATIENT
Start: 2017-06-22

## 2017-06-22 RX ORDER — ACETAMINOPHEN 500 MG
5000 TABLET ORAL DAILY
Status: CANCELLED | OUTPATIENT
Start: 2017-06-23

## 2017-06-22 RX ORDER — ACETAMINOPHEN 325 MG/1
650 TABLET ORAL EVERY 6 HOURS PRN
Status: DISCONTINUED | OUTPATIENT
Start: 2017-06-22 | End: 2017-06-30 | Stop reason: HOSPADM

## 2017-06-22 RX ORDER — PANTOPRAZOLE SODIUM 40 MG/1
40 TABLET, DELAYED RELEASE ORAL DAILY
Status: CANCELLED | OUTPATIENT
Start: 2017-06-23

## 2017-06-22 RX ORDER — ATORVASTATIN CALCIUM 20 MG/1
80 TABLET, FILM COATED ORAL DAILY
Status: CANCELLED | OUTPATIENT
Start: 2017-06-23

## 2017-06-22 RX ORDER — CLOPIDOGREL BISULFATE 75 MG/1
75 TABLET ORAL DAILY
Status: DISCONTINUED | OUTPATIENT
Start: 2017-06-23 | End: 2017-06-30 | Stop reason: HOSPADM

## 2017-06-22 RX ORDER — FUROSEMIDE 40 MG/1
40 TABLET ORAL DAILY
Status: DISCONTINUED | OUTPATIENT
Start: 2017-06-23 | End: 2017-06-27

## 2017-06-22 RX ORDER — AMOXICILLIN 250 MG
1 CAPSULE ORAL DAILY
Status: DISCONTINUED | OUTPATIENT
Start: 2017-06-23 | End: 2017-06-22

## 2017-06-22 RX ORDER — AMOXICILLIN 250 MG
1 CAPSULE ORAL 2 TIMES DAILY
Status: CANCELLED | OUTPATIENT
Start: 2017-06-22

## 2017-06-22 RX ORDER — HYDROCODONE BITARTRATE AND ACETAMINOPHEN 5; 325 MG/1; MG/1
1 TABLET ORAL EVERY 4 HOURS PRN
Status: DISCONTINUED | OUTPATIENT
Start: 2017-06-22 | End: 2017-06-30 | Stop reason: HOSPADM

## 2017-06-22 RX ORDER — NAPROXEN SODIUM 220 MG/1
81 TABLET, FILM COATED ORAL DAILY
Status: DISCONTINUED | OUTPATIENT
Start: 2017-06-23 | End: 2017-06-30 | Stop reason: HOSPADM

## 2017-06-22 RX ORDER — ACETAMINOPHEN 500 MG
5000 TABLET ORAL DAILY
Status: DISCONTINUED | OUTPATIENT
Start: 2017-06-23 | End: 2017-06-23

## 2017-06-22 RX ORDER — FERROUS SULFATE 325(65) MG
325 TABLET, DELAYED RELEASE (ENTERIC COATED) ORAL DAILY
Status: DISCONTINUED | OUTPATIENT
Start: 2017-06-23 | End: 2017-06-30 | Stop reason: HOSPADM

## 2017-06-22 RX ORDER — GABAPENTIN 300 MG/1
300 CAPSULE ORAL 2 TIMES DAILY
Status: DISCONTINUED | OUTPATIENT
Start: 2017-06-22 | End: 2017-06-30 | Stop reason: HOSPADM

## 2017-06-22 RX ORDER — SODIUM CHLORIDE 0.9 % (FLUSH) 0.9 %
3 SYRINGE (ML) INJECTION EVERY 8 HOURS
Status: CANCELLED | OUTPATIENT
Start: 2017-06-22

## 2017-06-22 RX ORDER — NAPROXEN SODIUM 220 MG/1
81 TABLET, FILM COATED ORAL DAILY
Status: CANCELLED | OUTPATIENT
Start: 2017-06-23

## 2017-06-22 RX ORDER — MAG HYDROX/ALUMINUM HYD/SIMETH 200-200-20
15 SUSPENSION, ORAL (FINAL DOSE FORM) ORAL EVERY 6 HOURS PRN
Status: DISCONTINUED | OUTPATIENT
Start: 2017-06-22 | End: 2017-06-30 | Stop reason: HOSPADM

## 2017-06-22 RX ORDER — METOPROLOL SUCCINATE 25 MG/1
25 TABLET, EXTENDED RELEASE ORAL DAILY
Status: CANCELLED | OUTPATIENT
Start: 2017-06-23

## 2017-06-22 RX ORDER — RAMELTEON 8 MG/1
8 TABLET ORAL NIGHTLY PRN
Status: DISCONTINUED | OUTPATIENT
Start: 2017-06-22 | End: 2017-06-30 | Stop reason: HOSPADM

## 2017-06-22 RX ORDER — HYDROCODONE BITARTRATE AND ACETAMINOPHEN 500; 5 MG/1; MG/1
TABLET ORAL
Status: CANCELLED | OUTPATIENT
Start: 2017-06-22

## 2017-06-22 RX ORDER — FERROUS SULFATE 325(65) MG
325 TABLET, DELAYED RELEASE (ENTERIC COATED) ORAL DAILY
Status: CANCELLED | OUTPATIENT
Start: 2017-06-23

## 2017-06-22 RX ORDER — METOPROLOL SUCCINATE 25 MG/1
25 TABLET, EXTENDED RELEASE ORAL DAILY
Status: DISCONTINUED | OUTPATIENT
Start: 2017-06-23 | End: 2017-06-30 | Stop reason: HOSPADM

## 2017-06-22 RX ORDER — AMOXICILLIN 250 MG
1 CAPSULE ORAL 2 TIMES DAILY
Status: DISCONTINUED | OUTPATIENT
Start: 2017-06-22 | End: 2017-06-30 | Stop reason: HOSPADM

## 2017-06-22 RX ORDER — HYDROCODONE BITARTRATE AND ACETAMINOPHEN 5; 325 MG/1; MG/1
1 TABLET ORAL EVERY 4 HOURS PRN
Status: CANCELLED | OUTPATIENT
Start: 2017-06-22

## 2017-06-22 RX ORDER — ACETAMINOPHEN 325 MG/1
650 TABLET ORAL EVERY 6 HOURS PRN
Status: CANCELLED | OUTPATIENT
Start: 2017-06-22

## 2017-06-22 RX ORDER — TAMSULOSIN HYDROCHLORIDE 0.4 MG/1
0.4 CAPSULE ORAL DAILY
Status: CANCELLED | OUTPATIENT
Start: 2017-06-23

## 2017-06-22 RX ORDER — CLOPIDOGREL BISULFATE 75 MG/1
75 TABLET ORAL DAILY
Status: CANCELLED | OUTPATIENT
Start: 2017-06-23

## 2017-06-22 RX ORDER — ATORVASTATIN CALCIUM 20 MG/1
80 TABLET, FILM COATED ORAL DAILY
Status: DISCONTINUED | OUTPATIENT
Start: 2017-06-23 | End: 2017-06-30 | Stop reason: HOSPADM

## 2017-06-22 RX ORDER — FUROSEMIDE 40 MG/1
40 TABLET ORAL DAILY
Status: CANCELLED | OUTPATIENT
Start: 2017-06-23

## 2017-06-22 RX ORDER — TAMSULOSIN HYDROCHLORIDE 0.4 MG/1
0.4 CAPSULE ORAL DAILY
Status: DISCONTINUED | OUTPATIENT
Start: 2017-06-23 | End: 2017-06-30 | Stop reason: HOSPADM

## 2017-06-22 RX ORDER — AMOXICILLIN 250 MG
1 CAPSULE ORAL DAILY
Status: CANCELLED | OUTPATIENT
Start: 2017-06-23

## 2017-06-22 RX ORDER — HYDROCODONE BITARTRATE AND ACETAMINOPHEN 500; 5 MG/1; MG/1
TABLET ORAL
Status: DISCONTINUED | OUTPATIENT
Start: 2017-06-22 | End: 2017-06-22 | Stop reason: HOSPADM

## 2017-06-22 RX ADMIN — HYDRALAZINE HYDROCHLORIDE AND ISOSORBIDE DINITRATE 1 TABLET: 37.5; 2 TABLET, FILM COATED ORAL at 05:06

## 2017-06-22 RX ADMIN — GABAPENTIN 300 MG: 300 CAPSULE ORAL at 11:06

## 2017-06-22 RX ADMIN — HYDROCODONE BITARTRATE AND ACETAMINOPHEN 1 TABLET: 5; 325 TABLET ORAL at 10:06

## 2017-06-22 RX ADMIN — TAMSULOSIN HYDROCHLORIDE 0.4 MG: 0.4 CAPSULE ORAL at 09:06

## 2017-06-22 RX ADMIN — PANTOPRAZOLE SODIUM 40 MG: 40 TABLET, DELAYED RELEASE ORAL at 09:06

## 2017-06-22 RX ADMIN — ATORVASTATIN CALCIUM 80 MG: 20 TABLET, FILM COATED ORAL at 09:06

## 2017-06-22 RX ADMIN — CLOPIDOGREL 75 MG: 75 TABLET, FILM COATED ORAL at 09:06

## 2017-06-22 RX ADMIN — FUROSEMIDE 40 MG: 40 TABLET ORAL at 09:06

## 2017-06-22 RX ADMIN — ASPIRIN 81 MG CHEWABLE TABLET 81 MG: 81 TABLET CHEWABLE at 09:06

## 2017-06-22 RX ADMIN — INSULIN DETEMIR 16 UNITS: 100 INJECTION, SOLUTION SUBCUTANEOUS at 11:06

## 2017-06-22 RX ADMIN — CHOLECALCIFEROL TAB 125 MCG (5000 UNIT) 5000 UNITS: 125 TAB at 09:06

## 2017-06-22 RX ADMIN — HYDRALAZINE HYDROCHLORIDE AND ISOSORBIDE DINITRATE 1 TABLET: 37.5; 2 TABLET, FILM COATED ORAL at 03:06

## 2017-06-22 RX ADMIN — FERROUS SULFATE TAB EC 325 MG (65 MG FE EQUIVALENT) 325 MG: 325 (65 FE) TABLET DELAYED RESPONSE at 09:06

## 2017-06-22 RX ADMIN — Medication 3 ML: at 03:06

## 2017-06-22 RX ADMIN — METOPROLOL SUCCINATE 25 MG: 25 TABLET, EXTENDED RELEASE ORAL at 09:06

## 2017-06-22 RX ADMIN — GABAPENTIN 300 MG: 300 CAPSULE ORAL at 09:06

## 2017-06-22 NOTE — PLAN OF CARE
Pt received auth from N. Pt still has not been approved by [facility] to transfer today. [facility] has concerns regarding Pt's BP and H/H, as documented in MONSERRAT Tipton's note on 6/21. Mauricio LVM for Lynda, informing her of auth. Mauricio awaiting call from [facility] regarding whether Pt can transfer today.     Mauricio also informed by CM that Pt is requesting larger DME. Mauricio informed Pt's RN yesterday, who informed Pt, that Pt does not qualify for a bariatric Rolator, as he is under 300 lbs. Pt will need to pay for larger Rolator out of pocket. Mauricio informed CM of this issue. Mauricio re-informed Pt of lack of coverage for bariatric DME. Pt requested out of pocket cost. Mauricio contacted Marialuisa at [facility]. Marialuisa stated the cost of the standard will be covered by Pt's insurance and Pt will pay the difference. Mauricio requested chavarria for Pt. Marialuisa stated bariatric Rolators cost $248.00, Pt's insurance will likely charge Pt $100 out of pocket. However, Pt cannot receive any DME right now, as he is now discharging to SNF, not home. Marialuisa to have DME removed from Pt room. Mauricio informed Pt. Pt verbalized understanding. Mauricio will continue to follow.      UPDATE 11:00 AM   Mauricio received call from Lynda at [facility]. Pt can transfer after he receives the blood transfusion. RN can call report to [phone] once Pt is ready to d/c. Mauricio scheduled transport with DAILY in Will Call.     Lynette Solis, DOMINGUEZ [phone]

## 2017-06-22 NOTE — PLAN OF CARE
Problem: Patient Care Overview  Goal: Plan of Care Review  Outcome: Ongoing (interventions implemented as appropriate)  Plan of care reviewed with patient. No distress noted at this time. Fall precautions maintained with bed alarm on, wheels locked, and bed in lowest position. Cardiac and blood glucose monitoring maintained. Blood infusing as ordered. Will continue to monitor closely.

## 2017-06-22 NOTE — ASSESSMENT & PLAN NOTE
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  - LLE surgical wounds healing appropriately => blistering is from adhesive allergy   - Patient with generalized weakness with inability to perform ADLs and patient's family notes that they are not capable to providing enough/sufficient assistance for ADLs despite home health   - Case management and SW following for placement; PT/OT consulted and following - Patient now qualifies for SNF  - Continue local wound care

## 2017-06-22 NOTE — NURSING
Pt heard falling to floor and patient family member calling for help. Upon entering room patient being assisted up by two nurses. Pt stated he slipped on floor due to his cloth booties, he hit his knees and his arms, he did not hit his head. Vascular Surgery on call notified. No new orders given. Report given to oncoming CHUNG Trevizo.

## 2017-06-22 NOTE — PLAN OF CARE
CM noted pt pending [facility] acceptance and PHN authorization in notes.  CM called PHN requesting status of authorization speaking with Lynette SAHNI Stating he has been approved for SNF and would be notifying [facility].  CM called Rizwana SHARMA, leaving voice message of above authorization approval and [facility] to be notified.  MONSERRAT notified OBS RN, Meenu, of above information and should be getting call for report with room assignment.  EDITH to continue follow with transfer process.

## 2017-06-22 NOTE — PLAN OF CARE
CM in to see pt explaining IMM form with pt initialing form and CM placed in medical chart.  Pt states a rolling walker was delivered to him and it is too small.  CM will notify SW, Rizwana, of possible exchange on walker.   at pt bedside taking blood sample with floor RN, Meenu, stating his Hgb dropped below 7 with service notified ordering 2 units blood.  Pt to get transfusion prior to transfer to [facility].

## 2017-06-22 NOTE — CONSULTS
Consult received on patient. Per Vascular surgery notes, Vascular surgery following podiatry's recommendations from last podiatry visit. Will defer to podiatry's recommendations. Nursing to continue care.

## 2017-06-22 NOTE — PLAN OF CARE
Mauricio requested [facility] d/c readmit Gen SNF orders from MD.     UPDATE 12:42 PM   Mauricio LVM for Lynda at [facility]. Sw awaiting call back.     Lynette Solis, DOMINGUEZ [phone]

## 2017-06-22 NOTE — PLAN OF CARE
Mauricio informed by Lynda that orders are incorrect. Mauricio informed Dr. Gonzales, requested d/c readmit Gen SNF orders. Transport is in WILL CALL with [facility].     UPDATE 3:42 PM   Mauricio informed by Lynda that orders are correct. RN can call report to [phone]. Mauricio informed Pt's RN. Sw scheduled ASAP  with [facility]. SPD will not be able to  until 5:45 PM. Sw left call report info in Epic sticky note.     UPDATE 4:05 PM   Sw received call from RN, stating Pt  needs to be pushed back to 8PM  due to duration of blood transfusion. Mauricio informed Lynda at [facility]. Sw changed  time with [facility]. No other Sw needs identified at this time.      Lynette Solis, DOMINGUEZ [phone]

## 2017-06-22 NOTE — PROGRESS NOTES
Report called to Dmitry Babin RN.  No distress noted at this time. Will continue to monitor closely.

## 2017-06-22 NOTE — PROGRESS NOTES
[facility]  Vascular Surgery  Progress Note    Patient Name: Chau Rodarte  MRN: [id number]  Admission Date: 6/19/2017  Primary Care Provider: Riki Huynh MD    Subjective:     Interval History: NAEON.  Awaiting SNF placement     Post-Op Info:  * No surgery found *         Prescriptions Prior to Admission   Medication Sig Dispense Refill Last Dose    aspirin 81 MG Chew Take 1 tablet (81 mg total) by mouth once daily.  0 6/19/2017    atorvastatin (LIPITOR) 80 MG tablet Take 1 tablet (80 mg total) by mouth once daily. 90 tablet 3 6/19/2017    cholecalciferol, vitamin D3, (VITAMIN D3) 5,000 unit Tab Take 5,000 Units by mouth once daily.   6/19/2017    clopidogrel (PLAVIX) 75 mg tablet Take 1 tablet (75 mg total) by mouth once daily. 90 tablet 3 6/19/2017    collagenase (SANTYL) ointment Apply topically once daily. 30 g 0 6/19/2017    diclofenac sodium 1 % Gel Apply 2 g topically 3 (three) times daily. 100 g 1 6/19/2017    furosemide (LASIX) 40 MG tablet Take 1 tablet (40 mg total) by mouth once daily. 30 tablet 11 6/19/2017    gabapentin (NEURONTIN) 300 MG capsule Take 300 mg by mouth 2 (two) times daily.  0 6/19/2017    hydrocodone-acetaminophen 10-325mg (NORCO)  mg Tab Take 1 tablet by mouth every 4 (four) hours as needed for Pain. 60 tablet 0 6/19/2017    insulin detemir (LEVEMIR FLEXTOUCH) 100 unit/mL (3 mL) SubQ InPn pen Inject 16 Units into the skin every evening. 1 Box 3 6/18/2017    isosorbide-hydrALAZINE 20-37.5 mg (BIDIL) 20-37.5 mg Tab Take 1 tablet by mouth 3 (three) times daily. 90 tablet 11 6/19/2017    liraglutide 0.6 mg/0.1 mL, 18 mg/3 mL, subq PNIJ (VICTOZA 2-SAGAR) 0.6 mg/0.1 mL (18 mg/3 mL) PnIj Inject 0.6 mg daily x1 week, then 1.2 mg daily x1 week, then 1.8 mg daily thereafter (Patient taking differently: Inject 1.8 mg into the skin once daily. er) 9 mL 3 6/19/2017    metoprolol succinate (TOPROL-XL) 50 MG 24 hr tablet Take 1 tablet (50 mg total) by mouth once  "daily. 30 tablet 11 6/19/2017    oxycodone (ROXICODONE) 10 mg Tab immediate release tablet Take 1 tablet (10 mg total) by mouth every 4 (four) hours as needed for Pain. 60 tablet 0 6/19/2017    pantoprazole (PROTONIX) 20 MG tablet Take 2 tablets (40 mg total) by mouth once daily. 30 tablet 11 6/19/2017    pen needle, diabetic (BD ULTRA-FINE HANG PEN NEEDLES) 32 gauge x 5/32" Ndle Uses 2 times daily, on  insulin injections 180 each 4 6/19/2017    senna-docusate 8.6-50 mg (PERICOLACE) 8.6-50 mg per tablet Take 1 tablet by mouth once daily. 60 tablet 0 6/19/2017    tamsulosin (FLOMAX) 0.4 mg Cp24 Take 1 capsule (0.4 mg total) by mouth once daily. 30 capsule 0 6/19/2017       Review of patient's allergies indicates:  No Known Allergies    Past Medical History:   Diagnosis Date    Acute on chronic systolic CHF (congestive heart failure) 11/29/2016    Anticoagulant long-term use     CKD (chronic kidney disease) stage 2, GFR 60-89 ml/min 2/21/2016    Coronary artery disease     Encounter for blood transfusion     HTN (hypertension) 3/3/2014    Hyperlipidemia 3/3/2014    NSTEMI (non-ST elevated myocardial infarction) 11/28/2016    Obesity (BMI 30-39.9) 11/29/2016    PVD (peripheral vascular disease)     Stroke     Type 2 diabetes mellitus with diabetic peripheral angiopathy without gangrene, without long-term current use of insulin 10/24/2013    Type 2 diabetes mellitus with diabetic polyneuropathy, without long-term current use of insulin 11/29/2016     Past Surgical History:   Procedure Laterality Date    CARDIAC SURGERY      COLONOSCOPY N/A 3/21/2017    Procedure: COLONOSCOPY;  Surgeon: Karissa Peck MD;  Location: [facility] ([address] [place] [address]);  Service: Endoscopy;  Laterality: N/A;    CORONARY ANGIOPLASTY WITH STENT PLACEMENT      FOOT NERVE GRAFT  11/2013    left leg stent      Loop Recorder placement      right leg bypass       Family History     Problem Relation (Age of Onset)    Diabetes Mother    " Heart disease Father        Social History Main Topics    Smoking status: Former Smoker     Packs/day: 1.00     Years: 50.00     Quit date: 3/1/2011    Smokeless tobacco: Never Used    Alcohol use No    Drug use: No    Sexual activity: Yes     Partners: Female      Comment:  with 2 children 3 grand sons and 1 great grand daughter     Review of Systems   Constitutional: Negative for chills and fever.   HENT: Negative for facial swelling, hearing loss and nosebleeds.    Eyes: Negative for photophobia, redness and visual disturbance.   Respiratory: Negative for cough, chest tightness, shortness of breath and wheezing.    Cardiovascular: Positive for leg swelling. Negative for chest pain and palpitations.   Gastrointestinal: Negative for abdominal distention, abdominal pain, nausea and vomiting.   Genitourinary: Negative for difficulty urinating, dysuria, flank pain and hematuria.   Musculoskeletal: Positive for gait problem and myalgias. Negative for arthralgias.        Persistent bilateral foot wounds    Skin:        Blistering of the left medial leg around the surgical incision sites with clear yellow drainage    Neurological: Positive for weakness. Negative for dizziness, syncope, light-headedness, numbness and headaches.        + generalized weakness. Inability to perform ADLs. Family notes that they are unable to assist the patient with ADLs   Hematological: Negative.    Psychiatric/Behavioral: Negative.      Objective:     Vital Signs (Most Recent):  Temp: 97.1 °F (36.2 °C) (06/22/17 0545)  Pulse: 72 (06/22/17 0545)  Resp: 18 (06/22/17 0545)  BP: 125/61 (06/22/17 0545)  SpO2: 97 % (06/22/17 0545) Vital Signs (24h Range):  Temp:  [97 °F (36.1 °C)-98.5 °F (36.9 °C)] 97.1 °F (36.2 °C)  Pulse:  [66-91] 72  Resp:  [18-20] 18  SpO2:  [92 %-99 %] 97 %  BP: ()/(50-74) 125/61     Weight: 103.9 kg (229 lb)  Body mass index is 33.82 kg/m².    Physical Exam   Constitutional: He is oriented to person, place,  and time. He appears well-developed and well-nourished. No distress.   HENT:   Head: Normocephalic and atraumatic.   Nose: Nose normal.   Eyes: EOM are normal. Pupils are equal, round, and reactive to light. No scleral icterus.   Neck: Normal range of motion. Neck supple. No tracheal deviation present.   Cardiovascular: Normal rate and regular rhythm.    2+ femoral pulses, LLE with biphasic DP, no PT appreciated on doppler exam       Pulmonary/Chest: Effort normal and breath sounds normal. No respiratory distress. He has no wheezes.   Abdominal: Soft. Bowel sounds are normal. He exhibits no distension. There is no tenderness.   Musculoskeletal: He exhibits edema and tenderness.   Left lower extremity surgical incision sites with leidy-incisional blistering from tape from dressings with serous fluid drainage. The surgical incision itself is intact with staples in place with minimal serous drainage. Left groin surgical incision site with dermabond in place.  LLE surgical incision sites are appropriately tender to palpation   Left heel ulcer with overlying eschar   Right plantar diabetic pressure ulcer    Neurological: He is alert and oriented to person, place, and time. No cranial nerve deficit.   Skin: Skin is warm.   Wounds as stated above    Psychiatric: He has a normal mood and affect. His behavior is normal.       Significant Labs:  CBC:     Recent Labs  Lab 06/21/17  1740 06/22/17  0443   WBC 8.68  --    RBC 2.64* 2.55*   HGB 7.0* 6.9*   HCT 22.2* 21.6*    357*   MCV 84 85   MCH 26.5* 27.1   MCHC 31.5* 31.9*     CMP:     Recent Labs  Lab 06/19/17  1312 06/21/17  1740   GLU 87 149*   CALCIUM 8.6* 8.7   ALBUMIN 2.4*  --    PROT 6.2  --     139   K 4.1 3.8   CO2 24 25    106   BUN 35* 42*   CREATININE 2.3* 2.4*   ALKPHOS 84  --    ALT 13  --    AST 32  --    BILITOT 0.7  --      Coagulation:     Recent Labs  Lab 06/19/17  1312   LABPROT 11.4   INR 1.1         Significant Diagnostics:  No new  imaging     Assessment/Plan:     Acute blood loss anemia    - Has history of anemia; iron meds restarted yesterday  - in setting of history of CAD with H/H less than 7/21 ---> will give 2u PRBCs        Type 2 diabetes mellitus with diabetic polyneuropathy, with long-term current use of insulin    - ADA diet  - Home levemir dose  - SSI as needed to maintain BG readings        Skin ulcers of both feet    - Seen and evaluated by podiatry on 6/16/17 during last admission; per their recs:  - Wound care Orders B/L LE. To be done every other day   1.Cleanse wounds B/L feet with saline   2. Apply santyl to all wounds B/L LE   3. Apply krysten foam wrap with kerlix.      Weightbearing Status: Minimal WB B/L LE.   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  - LLE surgical wounds healing appropriately => blistering is from adhesive allergy   - Patient with generalized weakness with inability to perform ADLs and patient's family notes that they are not capable to providing enough/sufficient assistance for ADLs despite home health   - Case management and SW following for placement; PT/OT consulted and following - Patient now qualifies for SNF  - Continue local wound care             Jim Gonzales MD  Vascular Surgery  [facility]

## 2017-06-22 NOTE — PLAN OF CARE
Call received from Marialuisa @ 10:35a ( from Janet @ 10:37a) equipment picked up from patient @ 10:49am. [employer]ator.

## 2017-06-22 NOTE — SUBJECTIVE & OBJECTIVE
Prescriptions Prior to Admission   Medication Sig Dispense Refill Last Dose    aspirin 81 MG Chew Take 1 tablet (81 mg total) by mouth once daily.  0 6/19/2017    atorvastatin (LIPITOR) 80 MG tablet Take 1 tablet (80 mg total) by mouth once daily. 90 tablet 3 6/19/2017    cholecalciferol, vitamin D3, (VITAMIN D3) 5,000 unit Tab Take 5,000 Units by mouth once daily.   6/19/2017    clopidogrel (PLAVIX) 75 mg tablet Take 1 tablet (75 mg total) by mouth once daily. 90 tablet 3 6/19/2017    collagenase (SANTYL) ointment Apply topically once daily. 30 g 0 6/19/2017    diclofenac sodium 1 % Gel Apply 2 g topically 3 (three) times daily. 100 g 1 6/19/2017    furosemide (LASIX) 40 MG tablet Take 1 tablet (40 mg total) by mouth once daily. 30 tablet 11 6/19/2017    gabapentin (NEURONTIN) 300 MG capsule Take 300 mg by mouth 2 (two) times daily.  0 6/19/2017    hydrocodone-acetaminophen 10-325mg (NORCO)  mg Tab Take 1 tablet by mouth every 4 (four) hours as needed for Pain. 60 tablet 0 6/19/2017    insulin detemir (LEVEMIR FLEXTOUCH) 100 unit/mL (3 mL) SubQ InPn pen Inject 16 Units into the skin every evening. 1 Box 3 6/18/2017    isosorbide-hydrALAZINE 20-37.5 mg (BIDIL) 20-37.5 mg Tab Take 1 tablet by mouth 3 (three) times daily. 90 tablet 11 6/19/2017    liraglutide 0.6 mg/0.1 mL, 18 mg/3 mL, subq PNIJ (VICTOZA 2-SAGAR) 0.6 mg/0.1 mL (18 mg/3 mL) PnIj Inject 0.6 mg daily x1 week, then 1.2 mg daily x1 week, then 1.8 mg daily thereafter (Patient taking differently: Inject 1.8 mg into the skin once daily. er) 9 mL 3 6/19/2017    metoprolol succinate (TOPROL-XL) 50 MG 24 hr tablet Take 1 tablet (50 mg total) by mouth once daily. 30 tablet 11 6/19/2017    oxycodone (ROXICODONE) 10 mg Tab immediate release tablet Take 1 tablet (10 mg total) by mouth every 4 (four) hours as needed for Pain. 60 tablet 0 6/19/2017    pantoprazole (PROTONIX) 20 MG tablet Take 2 tablets (40 mg total) by mouth once daily. 30 tablet 11  "6/19/2017    pen needle, diabetic (BD ULTRA-FINE HANG PEN NEEDLES) 32 gauge x 5/32" Ndle Uses 2 times daily, on  insulin injections 180 each 4 6/19/2017    senna-docusate 8.6-50 mg (PERICOLACE) 8.6-50 mg per tablet Take 1 tablet by mouth once daily. 60 tablet 0 6/19/2017    tamsulosin (FLOMAX) 0.4 mg Cp24 Take 1 capsule (0.4 mg total) by mouth once daily. 30 capsule 0 6/19/2017       Review of patient's allergies indicates:  No Known Allergies    Past Medical History:   Diagnosis Date    Acute on chronic systolic CHF (congestive heart failure) 11/29/2016    Anticoagulant long-term use     CKD (chronic kidney disease) stage 2, GFR 60-89 ml/min 2/21/2016    Coronary artery disease     Encounter for blood transfusion     HTN (hypertension) 3/3/2014    Hyperlipidemia 3/3/2014    NSTEMI (non-ST elevated myocardial infarction) 11/28/2016    Obesity (BMI 30-39.9) 11/29/2016    PVD (peripheral vascular disease)     Stroke     Type 2 diabetes mellitus with diabetic peripheral angiopathy without gangrene, without long-term current use of insulin 10/24/2013    Type 2 diabetes mellitus with diabetic polyneuropathy, without long-term current use of insulin 11/29/2016     Past Surgical History:   Procedure Laterality Date    CARDIAC SURGERY      COLONOSCOPY N/A 3/21/2017    Procedure: COLONOSCOPY;  Surgeon: Karissa Peck MD;  Location: [address];  Service: Endoscopy;  Laterality: N/A;    CORONARY ANGIOPLASTY WITH STENT PLACEMENT      FOOT NERVE GRAFT  11/2013    left leg stent      Loop Recorder placement      right leg bypass       Family History     Problem Relation (Age of Onset)    Diabetes Mother    Heart disease Father        Social History Main Topics    Smoking status: Former Smoker     Packs/day: 1.00     Years: 50.00     Quit date: 3/1/2011    Smokeless tobacco: Never Used    Alcohol use No    Drug use: No    Sexual activity: Yes     Partners: Female      Comment:  with 2 " children 3 grand sons and 1 great grand daughter     Review of Systems   Constitutional: Negative for chills and fever.   HENT: Negative for facial swelling, hearing loss and nosebleeds.    Eyes: Negative for photophobia, redness and visual disturbance.   Respiratory: Negative for cough, chest tightness, shortness of breath and wheezing.    Cardiovascular: Positive for leg swelling. Negative for chest pain and palpitations.   Gastrointestinal: Negative for abdominal distention, abdominal pain, nausea and vomiting.   Genitourinary: Negative for difficulty urinating, dysuria, flank pain and hematuria.   Musculoskeletal: Positive for gait problem and myalgias. Negative for arthralgias.        Persistent bilateral foot wounds    Skin:        Blistering of the left medial leg around the surgical incision sites with clear yellow drainage    Neurological: Positive for weakness. Negative for dizziness, syncope, light-headedness, numbness and headaches.        + generalized weakness. Inability to perform ADLs. Family notes that they are unable to assist the patient with ADLs   Hematological: Negative.    Psychiatric/Behavioral: Negative.      Objective:     Vital Signs (Most Recent):  Temp: 97.1 °F (36.2 °C) (06/22/17 0545)  Pulse: 72 (06/22/17 0545)  Resp: 18 (06/22/17 0545)  BP: 125/61 (06/22/17 0545)  SpO2: 97 % (06/22/17 0545) Vital Signs (24h Range):  Temp:  [97 °F (36.1 °C)-98.5 °F (36.9 °C)] 97.1 °F (36.2 °C)  Pulse:  [66-91] 72  Resp:  [18-20] 18  SpO2:  [92 %-99 %] 97 %  BP: ()/(50-74) 125/61     Weight: 103.9 kg (229 lb)  Body mass index is 33.82 kg/m².    Physical Exam   Constitutional: He is oriented to person, place, and time. He appears well-developed and well-nourished. No distress.   HENT:   Head: Normocephalic and atraumatic.   Nose: Nose normal.   Eyes: EOM are normal. Pupils are equal, round, and reactive to light. No scleral icterus.   Neck: Normal range of motion. Neck supple. No tracheal deviation  present.   Cardiovascular: Normal rate and regular rhythm.    2+ femoral pulses, LLE with biphasic DP, no PT appreciated on doppler exam       Pulmonary/Chest: Effort normal and breath sounds normal. No respiratory distress. He has no wheezes.   Abdominal: Soft. Bowel sounds are normal. He exhibits no distension. There is no tenderness.   Musculoskeletal: He exhibits edema and tenderness.   Left lower extremity surgical incision sites with leidy-incisional blistering from tape from dressings with serous fluid drainage. The surgical incision itself is intact with staples in place with minimal serous drainage. Left groin surgical incision site with dermabond in place.  LLE surgical incision sites are appropriately tender to palpation   Left heel ulcer with overlying eschar   Right plantar diabetic pressure ulcer    Neurological: He is alert and oriented to person, place, and time. No cranial nerve deficit.   Skin: Skin is warm.   Wounds as stated above    Psychiatric: He has a normal mood and affect. His behavior is normal.       Significant Labs:  CBC:     Recent Labs  Lab 06/21/17  1740 06/22/17  0443   WBC 8.68  --    RBC 2.64* 2.55*   HGB 7.0* 6.9*   HCT 22.2* 21.6*    357*   MCV 84 85   MCH 26.5* 27.1   MCHC 31.5* 31.9*     CMP:     Recent Labs  Lab 06/19/17  1312 06/21/17  1740   GLU 87 149*   CALCIUM 8.6* 8.7   ALBUMIN 2.4*  --    PROT 6.2  --     139   K 4.1 3.8   CO2 24 25    106   BUN 35* 42*   CREATININE 2.3* 2.4*   ALKPHOS 84  --    ALT 13  --    AST 32  --    BILITOT 0.7  --      Coagulation:     Recent Labs  Lab 06/19/17  1312   LABPROT 11.4   INR 1.1         Significant Diagnostics:  No new imaging

## 2017-06-23 PROBLEM — N40.0 BPH (BENIGN PROSTATIC HYPERTROPHY): Status: ACTIVE | Noted: 2017-06-23

## 2017-06-23 PROBLEM — L97.409 ULCER OF HEEL DUE TO DIABETES MELLITUS: Status: ACTIVE | Noted: 2017-06-23

## 2017-06-23 PROBLEM — R53.81 DEBILITY: Status: ACTIVE | Noted: 2017-06-19

## 2017-06-23 PROBLEM — E11.621 ULCER OF HEEL DUE TO DIABETES MELLITUS: Status: ACTIVE | Noted: 2017-06-23

## 2017-06-23 LAB
ANION GAP SERPL CALC-SCNC: 7 MMOL/L
BASOPHILS # BLD AUTO: 0.03 K/UL
BASOPHILS NFR BLD: 0.3 %
BUN SERPL-MCNC: 33 MG/DL
CALCIUM SERPL-MCNC: 9 MG/DL
CHLORIDE SERPL-SCNC: 109 MMOL/L
CO2 SERPL-SCNC: 26 MMOL/L
CREAT SERPL-MCNC: 2.1 MG/DL
DIFFERENTIAL METHOD: ABNORMAL
EOSINOPHIL # BLD AUTO: 0.2 K/UL
EOSINOPHIL NFR BLD: 2.2 %
ERYTHROCYTE [DISTWIDTH] IN BLOOD BY AUTOMATED COUNT: 14.9 %
EST. GFR  (AFRICAN AMERICAN): 36.3 ML/MIN/1.73 M^2
EST. GFR  (NON AFRICAN AMERICAN): 31.4 ML/MIN/1.73 M^2
GLUCOSE SERPL-MCNC: 139 MG/DL
HCT VFR BLD AUTO: 28.4 %
HGB BLD-MCNC: 9 G/DL
LYMPHOCYTES # BLD AUTO: 1.2 K/UL
LYMPHOCYTES NFR BLD: 13.4 %
MCH RBC QN AUTO: 28 PG
MCHC RBC AUTO-ENTMCNC: 31.7 %
MCV RBC AUTO: 88 FL
MONOCYTES # BLD AUTO: 0.9 K/UL
MONOCYTES NFR BLD: 9.6 %
NEUTROPHILS # BLD AUTO: 6.7 K/UL
NEUTROPHILS NFR BLD: 74.5 %
PLATELET # BLD AUTO: 410 K/UL
PMV BLD AUTO: 9.8 FL
POCT GLUCOSE: 148 MG/DL (ref 70–110)
POCT GLUCOSE: 148 MG/DL (ref 70–110)
POCT GLUCOSE: 173 MG/DL (ref 70–110)
POCT GLUCOSE: 193 MG/DL (ref 70–110)
POTASSIUM SERPL-SCNC: 4.1 MMOL/L
RBC # BLD AUTO: 3.22 M/UL
SODIUM SERPL-SCNC: 142 MMOL/L
WBC # BLD AUTO: 9.05 K/UL

## 2017-06-23 PROCEDURE — 80048 BASIC METABOLIC PNL TOTAL CA: CPT

## 2017-06-23 PROCEDURE — 99306 1ST NF CARE HIGH MDM 50: CPT | Mod: ,,, | Performed by: INTERNAL MEDICINE

## 2017-06-23 PROCEDURE — 85025 COMPLETE CBC W/AUTO DIFF WBC: CPT

## 2017-06-23 PROCEDURE — 97535 SELF CARE MNGMENT TRAINING: CPT

## 2017-06-23 PROCEDURE — 97110 THERAPEUTIC EXERCISES: CPT

## 2017-06-23 PROCEDURE — 25000003 PHARM REV CODE 250: Performed by: STUDENT IN AN ORGANIZED HEALTH CARE EDUCATION/TRAINING PROGRAM

## 2017-06-23 PROCEDURE — 12000000 HC SNF SEMI-PRIVATE ROOM

## 2017-06-23 PROCEDURE — 36415 COLL VENOUS BLD VENIPUNCTURE: CPT

## 2017-06-23 PROCEDURE — 97165 OT EVAL LOW COMPLEX 30 MIN: CPT

## 2017-06-23 PROCEDURE — 97162 PT EVAL MOD COMPLEX 30 MIN: CPT

## 2017-06-23 RX ORDER — ACETAMINOPHEN 500 MG
5000 TABLET ORAL WEEKLY
Status: DISCONTINUED | OUTPATIENT
Start: 2017-06-24 | End: 2017-06-30 | Stop reason: HOSPADM

## 2017-06-23 RX ADMIN — GABAPENTIN 300 MG: 300 CAPSULE ORAL at 08:06

## 2017-06-23 RX ADMIN — METOPROLOL SUCCINATE 25 MG: 25 TABLET, EXTENDED RELEASE ORAL at 08:06

## 2017-06-23 RX ADMIN — HYDRALAZINE HYDROCHLORIDE AND ISOSORBIDE DINITRATE 1 TABLET: 37.5; 2 TABLET, FILM COATED ORAL at 09:06

## 2017-06-23 RX ADMIN — CHOLECALCIFEROL TAB 125 MCG (5000 UNIT) 5000 UNITS: 125 TAB at 08:06

## 2017-06-23 RX ADMIN — HYDRALAZINE HYDROCHLORIDE AND ISOSORBIDE DINITRATE 1 TABLET: 37.5; 2 TABLET, FILM COATED ORAL at 01:06

## 2017-06-23 RX ADMIN — HYDROCODONE BITARTRATE AND ACETAMINOPHEN 1 TABLET: 5; 325 TABLET ORAL at 09:06

## 2017-06-23 RX ADMIN — ATORVASTATIN CALCIUM 80 MG: 20 TABLET, FILM COATED ORAL at 08:06

## 2017-06-23 RX ADMIN — STANDARDIZED SENNA CONCENTRATE AND DOCUSATE SODIUM 1 TABLET: 8.6; 5 TABLET, FILM COATED ORAL at 09:06

## 2017-06-23 RX ADMIN — CLOPIDOGREL BISULFATE 75 MG: 75 TABLET ORAL at 08:06

## 2017-06-23 RX ADMIN — TAMSULOSIN HYDROCHLORIDE 0.4 MG: 0.4 CAPSULE ORAL at 08:06

## 2017-06-23 RX ADMIN — ASPIRIN 81 MG CHEWABLE TABLET 81 MG: 81 TABLET CHEWABLE at 08:06

## 2017-06-23 RX ADMIN — INSULIN DETEMIR 16 UNITS: 100 INJECTION, SOLUTION SUBCUTANEOUS at 09:06

## 2017-06-23 RX ADMIN — FUROSEMIDE 40 MG: 40 TABLET ORAL at 08:06

## 2017-06-23 RX ADMIN — PANTOPRAZOLE SODIUM 40 MG: 40 TABLET, DELAYED RELEASE ORAL at 08:06

## 2017-06-23 RX ADMIN — HYDRALAZINE HYDROCHLORIDE AND ISOSORBIDE DINITRATE 1 TABLET: 37.5; 2 TABLET, FILM COATED ORAL at 05:06

## 2017-06-23 RX ADMIN — COLLAGENASE SANTYL: 250 OINTMENT TOPICAL at 08:06

## 2017-06-23 RX ADMIN — FERROUS SULFATE TAB EC 325 MG (65 MG FE EQUIVALENT) 325 MG: 325 (65 FE) TABLET DELAYED RESPONSE at 08:06

## 2017-06-23 RX ADMIN — GABAPENTIN 300 MG: 300 CAPSULE ORAL at 09:06

## 2017-06-23 NOTE — HPI
Chief Complaint/Reason for Admission: Debility    History of Present Illness:  Patient is a 67 y.o. male with DM2, HTN, CAD, Stroke, CKD3 who presents to SNF after hospitalization due to contact dermatitis to tape and generalized weakness.  He was initially admitted on 6/14-6/16 for left femoral to below knee popliteal bypass by Dr. William. He was discharged to home with home health.  The patient has bilateral foot ulcers and is followed by Podiatry as well.  The patient developed blisters along tape line at home over left LE incisions.  He was allowed to ambulate to the bathroom at home but was otherwise NWB.  He complains of pain to right medial thigh since surgery that is relieved with hydrocodone/APAP.  He did have a fall in the hospital after right foot slipped.  Ulcers and DTI present since 4/2017 per podiatry records. The patient has been admitted to SNF for ongoing PT/OT due to insufficient progress to go home safely from the hospital.

## 2017-06-23 NOTE — H&P
[facility]  History & Physical    Patient Name: Chau Rodarte  MRN: [id number]  Code Status: Full Code  Admission Date: 6/22/2017  Attending Physician: Anay Montoya MD   Primary Care Provider: Riki Huynh MD    Subjective:     Principal Problem:Debility    No chief complaint on file.       HPI: Chief Complaint/Reason for Admission: Debility    History of Present Illness:  Patient is a 67 y.o. male with DM2, HTN, CAD, Stroke, CKD3 who presents to SNF after hospitalization due to contact dermatitis to tape and generalized weakness.  He was initially admitted on 6/14-6/16 for left femoral to below knee popliteal bypass by Dr. William. He was discharged to home with home health.  The patient has bilateral foot ulcers and is followed by Podiatry as well.  The patient developed blisters along tape line at home over left LE incisions.  He was allowed to ambulate to the bathroom at home but was otherwise NWB.  He complains of pain to right medial thigh since surgery that is relieved with hydrocodone/APAP.  He did have a fall in the hospital after right foot slipped.  Ulcers and DTI present since 4/2017 per podiatry records.     The patient has been admitted to SNF for ongoing PT/OT due to insufficient progress to go home safely from the hospital.    Events from last hospital admit reviewed.    Past Medical History:   Diagnosis Date    Acute on chronic systolic CHF (congestive heart failure) 11/29/2016    Anticoagulant long-term use     CKD (chronic kidney disease) stage 2, GFR 60-89 ml/min 2/21/2016    Coronary artery disease     Encounter for blood transfusion     HTN (hypertension) 3/3/2014    Hyperlipidemia 3/3/2014    NSTEMI (non-ST elevated myocardial infarction) 11/28/2016    Obesity (BMI 30-39.9) 11/29/2016    PVD (peripheral vascular disease)     Stroke     Type 2 diabetes mellitus with diabetic peripheral angiopathy without gangrene, without long-term current use of insulin 10/24/2013     Type 2 diabetes mellitus with diabetic polyneuropathy, without long-term current use of insulin 11/29/2016       Past Surgical History:   Procedure Laterality Date    CARDIAC SURGERY      COLONOSCOPY N/A 3/21/2017    Procedure: COLONOSCOPY;  Surgeon: Karissa Peck MD;  Location: [facility] ([address] [place] [address]);  Service: Endoscopy;  Laterality: N/A;    CORONARY ANGIOPLASTY WITH STENT PLACEMENT      FOOT NERVE GRAFT  11/2013    left leg stent      Loop Recorder placement      right leg bypass         Review of patient's allergies indicates:  No Known Allergies    Current Facility-Administered Medications on File Prior to Encounter   Medication    [DISCONTINUED] 0.9%  NaCl infusion (for blood administration)    [DISCONTINUED] aspirin chewable tablet 81 mg    [DISCONTINUED] atorvastatin tablet 80 mg    [DISCONTINUED] cholecalciferol (vitamin D3) 5,000 unit tablet 5,000 Units    [DISCONTINUED] clopidogrel tablet 75 mg    [DISCONTINUED] collagenase ointment    [DISCONTINUED] ferrous sulfate EC tablet 325 mg    [DISCONTINUED] furosemide tablet 40 mg    [DISCONTINUED] gabapentin capsule 300 mg    [DISCONTINUED] hydrocodone-acetaminophen 5-325mg per tablet 1 tablet    [DISCONTINUED] insulin detemir pen 16 Units    [DISCONTINUED] isosorbide-hydrALAZINE 20-37.5 mg per tablet 1 tablet    [DISCONTINUED] metoprolol succinate (TOPROL-XL) 24 hr tablet 25 mg    [DISCONTINUED] pantoprazole EC tablet 40 mg    [DISCONTINUED] senna-docusate 8.6-50 mg per tablet 1 tablet    [DISCONTINUED] sodium chloride 0.9% flush 3 mL    [DISCONTINUED] tamsulosin 24 hr capsule 0.4 mg     Current Outpatient Prescriptions on File Prior to Encounter   Medication Sig    aspirin 81 MG Chew Take 1 tablet (81 mg total) by mouth once daily.    atorvastatin (LIPITOR) 80 MG tablet Take 1 tablet (80 mg total) by mouth once daily.    cholecalciferol, vitamin D3, (VITAMIN D3) 5,000 unit Tab Take 5,000 Units by mouth once daily.     "clopidogrel (PLAVIX) 75 mg tablet Take 1 tablet (75 mg total) by mouth once daily.    collagenase (SANTYL) ointment Apply topically once daily.    diclofenac sodium 1 % Gel Apply 2 g topically 3 (three) times daily.    furosemide (LASIX) 40 MG tablet Take 1 tablet (40 mg total) by mouth once daily.    gabapentin (NEURONTIN) 300 MG capsule Take 300 mg by mouth 2 (two) times daily.    hydrocodone-acetaminophen 10-325mg (NORCO)  mg Tab Take 1 tablet by mouth every 4 (four) hours as needed for Pain.    insulin detemir (LEVEMIR FLEXTOUCH) 100 unit/mL (3 mL) SubQ InPn pen Inject 16 Units into the skin every evening.    isosorbide-hydrALAZINE 20-37.5 mg (BIDIL) 20-37.5 mg Tab Take 1 tablet by mouth 3 (three) times daily.    liraglutide 0.6 mg/0.1 mL, 18 mg/3 mL, subq PNIJ (VICTOZA 2-SAGAR) 0.6 mg/0.1 mL (18 mg/3 mL) PnIj Inject 0.6 mg daily x1 week, then 1.2 mg daily x1 week, then 1.8 mg daily thereafter (Patient taking differently: Inject 1.8 mg into the skin once daily. er)    metoprolol succinate (TOPROL-XL) 50 MG 24 hr tablet Take 1 tablet (50 mg total) by mouth once daily.    oxycodone (ROXICODONE) 10 mg Tab immediate release tablet Take 1 tablet (10 mg total) by mouth every 4 (four) hours as needed for Pain.    pantoprazole (PROTONIX) 20 MG tablet Take 2 tablets (40 mg total) by mouth once daily.    pen needle, diabetic (BD ULTRA-FINE HANG PEN NEEDLES) 32 gauge x 5/32" Ndle Uses 2 times daily, on  insulin injections    senna-docusate 8.6-50 mg (PERICOLACE) 8.6-50 mg per tablet Take 1 tablet by mouth once daily.    tamsulosin (FLOMAX) 0.4 mg Cp24 Take 1 capsule (0.4 mg total) by mouth once daily.     Family History     Problem Relation (Age of Onset)    Diabetes Mother    Heart disease Father        Social History Main Topics    Smoking status: Former Smoker     Packs/day: 1.00     Years: 50.00     Quit date: 3/1/2011    Smokeless tobacco: Never Used    Alcohol use No    Drug use: No    Sexual " activity: Yes     Partners: Female      Comment:  with 2 children 3 grand sons and 1 great grand daughter     Review of Systems   Constitutional: no fever or chills  Eyes: no visual changes  ENT: no nasal congestion or sore throat  Respiratory: no cough or shortness of breath  Cardiovascular: no chest pain or palpitations  Gastrointestinal: no nausea or vomiting, no abdominal pain; last BM: 6/21  Genitourinary: no hematuria or dysuria  Integument/Breast: no rash or pruritis  Hematologic/Lymphatic: no easy bruising or lymphadenopathy  Allergy/Immunology: no postnasal drip  Musculoskeletal: no arthralgias or myalgias  Neurological: no seizures or tremors  Behavioral/Psych: no auditory or visual hallucinations  Endocrine: no heat or cold intolerance    Objective:     Vital Signs (Most Recent):  Temp: 98.3 °F (36.8 °C) (06/23/17 0800)  Pulse: 70 (06/23/17 1300)  Resp: 18 (06/23/17 1300)  BP: (!) 145/70 (06/23/17 1300)  SpO2: 98 % (06/23/17 0800) Vital Signs (24h Range):  Temp:  [96.3 °F (35.7 °C)-98.3 °F (36.8 °C)] 98.3 °F (36.8 °C)  Pulse:  [65-76] 70  Resp:  [16-18] 18  SpO2:  [95 %-98 %] 98 %  BP: (105-151)/(56-89) 145/70     Weight: 105.1 kg (231 lb 11.3 oz)  Body mass index is 34.22 kg/m².    Physical Exam  General: well developed, well nourished, no distress  HENT: Head:normocephalic, atraumatic. Ears:bilateral external ear canals normal. Nose: Nares normal. Septum midline. Mucosa normal. Throat: lips, mucosa, and tongue normal and no throat erythema.  Eyes: conjunctivae/corneas clear.  EOM normal  Neck: supple, symmetrical, trachea midline, no JVD and thyroid not enlarged.   Lungs:  clear to auscultation bilaterally and normal respiratory effort  Cardiovascular: Heart: regular rate and rhythm, S1, S2 normal, no murmur, click, rub or gallop. Chest Wall: no tenderness. Extremities: no cyanosis, trace BLE edema, no clubbing. Pulses: 2+ and symmetric.  Abdomen/Rectal: Abdomen: soft, non-tender non-distended;  bowel sounds normal; no masses,  no organomegaly.   Skin: Skin color, texture, turgor normal. Incisions to left groin, thigh, leg intact with no drainage; erythema immediately along staple/suture lines; healing wound from previous blister to left leg; feet with numerous ulcers -  Right MTP with slough, right heet with eschar, left heel with eschar  Musculoskeletal:no clubbing, cyanosis  Lymph Nodes: No cervical or supraclavicular adenopathy  Neurologic: Normal strength and tone. No focal numbness or weakness  Psych/Behavioral:  Alert and oriented, appropriate affect.    Significant Labs:   Recent Results (from the past 24 hour(s))   POCT glucose    Collection Time: 06/22/17 11:05 PM   Result Value Ref Range    POCT Glucose 174 (H) 70 - 110 mg/dL   Basic metabolic panel    Collection Time: 06/23/17  6:13 AM   Result Value Ref Range    Sodium 142 136 - 145 mmol/L    Potassium 4.1 3.5 - 5.1 mmol/L    Chloride 109 95 - 110 mmol/L    CO2 26 23 - 29 mmol/L    Glucose 139 (H) 70 - 110 mg/dL    BUN, Bld 33 (H) 8 - 23 mg/dL    Creatinine 2.1 (H) 0.5 - 1.4 mg/dL    Calcium 9.0 8.7 - 10.5 mg/dL    Anion Gap 7 (L) 8 - 16 mmol/L    eGFR if African American 36.3 (A) >60 mL/min/1.73 m^2    eGFR if non  31.4 (A) >60 mL/min/1.73 m^2   CBC auto differential    Collection Time: 06/23/17  6:13 AM   Result Value Ref Range    WBC 9.05 3.90 - 12.70 K/uL    RBC 3.22 (L) 4.60 - 6.20 M/uL    Hemoglobin 9.0 (L) 14.0 - 18.0 g/dL    Hematocrit 28.4 (L) 40.0 - 54.0 %    MCV 88 82 - 98 fL    MCH 28.0 27.0 - 31.0 pg    MCHC 31.7 (L) 32.0 - 36.0 %    RDW 14.9 (H) 11.5 - 14.5 %    Platelets 410 (H) 150 - 350 K/uL    MPV 9.8 9.2 - 12.9 fL    Gran # 6.7 1.8 - 7.7 K/uL    Lymph # 1.2 1.0 - 4.8 K/uL    Mono # 0.9 0.3 - 1.0 K/uL    Eos # 0.2 0.0 - 0.5 K/uL    Baso # 0.03 0.00 - 0.20 K/uL    Gran% 74.5 (H) 38.0 - 73.0 %    Lymph% 13.4 (L) 18.0 - 48.0 %    Mono% 9.6 4.0 - 15.0 %    Eosinophil% 2.2 0.0 - 8.0 %    Basophil% 0.3 0.0 - 1.9 %     Differential Method Automated    POCT glucose    Collection Time: 06/23/17  7:23 AM   Result Value Ref Range    POCT Glucose 148 (H) 70 - 110 mg/dL   POCT glucose    Collection Time: 06/23/17 11:42 AM   Result Value Ref Range    POCT Glucose 173 (H) 70 - 110 mg/dL       Recent Labs  Lab 06/21/17  1740 06/22/17  0443 06/23/17  0613   WBC 8.68 8.90 9.05   HGB 7.0* 6.9* 9.0*   HCT 22.2* 21.6* 28.4*    357* 410*       Recent Labs  Lab 06/19/17  1312 06/21/17  1740 06/22/17  0443 06/23/17  0613    139 142 142   K 4.1 3.8 4.0 4.1    106 108 109   CO2 24 25 24 26   BUN 35* 42* 41* 33*   CREATININE 2.3* 2.4* 2.3* 2.1*   CALCIUM 8.6* 8.7 8.7 9.0   PROT 6.2  --   --   --    BILITOT 0.7  --   --   --    ALKPHOS 84  --   --   --    ALT 13  --   --   --    AST 32  --   --   --            Assessment/Plan:     BPH (benign prostatic hypertrophy)    Chronic   Continue therapy with tamsulosin to treat        Ulcer of heel due to diabetes mellitus    contiue santyl to bilateral heels and 1st right MTP to treat as per podiatry:  1.Cleanse wounds B/L feet with saline  2. Apply santyl to all wounds B/L LE  3. Apply krysten foam wrap with kerlix.   Keep f/u with podiatry as scheduled        AF (atrial fibrillation)    Rate controlled with metoprolol to treat which will be continued  -will continue to monitor and adjust regimen as necessary  -patient not on chronic anticoagulation due to previous GI bleeding  -patient with ILR from 3/24/2017        Acute blood loss anemia    S/p 2 units PRBC after surgery  Continue empiric iron therapy   Evaluate with iron studies with next labs        Type 2 diabetes mellitus with diabetic polyneuropathy, with long-term current use of insulin    Hemoglobin A1C   Date Value Ref Range Status   05/09/2017 6.4 (H) 4.5 - 6.2 % Final   02/27/2017 7.1 (H) 4.5 - 6.2 % Final   01/12/2017 8.2 (H) 4.5 - 6.2 % Final   Chronic and controlled  Continue therapy with levemir insulin  Monitor with  accuchecks and continue SSNI prn hyperglycemia  Continue diabetic diet  -will continue to monitor and adjust regimen as necessary  Continue gabapentin to treat polyneuropathy        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  Monitor with daily weights and continue lasix to maintain euvolemia  Continue cardiac diet   Cr is at baseline of 2.1; will continue to monitor with BIW labs; renally adjust meds and avoid nephrotoxins        S/P femoral-popliteal bypass surgery    Keep incisions dry and monitor for erythema or drainage  No tape to skin due to dermatitis.   Continue medical therapy with ASA, clopidogrel  -continue hydrocodone/APAP to treat pain  -continue senokot-s to prevent constipation        HLD (hyperlipidemia)    Chronic  Continue atorvastatin to treat        * Debility    -continue PT/OT to increase ambulation, ADL performance and endurance  -continue early mobility for DVT prophylaxis; patient refuses heparin products due to history of bleeding; SCD's as tolerated to right leg  -continue fall precautions  -continue senokot-s to prevent constipation; hold for frequent or loose stooling        Patient's care plan and discharge planning will be discussed by the SNF team in IDT meeting weekly. Medications to be reviewed and discussed with the SNF unit clinical pharmacist.    Future Appointments  Date Time Provider Department Center   6/27/2017 10:00 AM EKG, APPT [facility] EKG [facility]   6/27/2017 10:40 AM Cayden Moreon MD [facility] ARRHYTH [facility]   6/27/2017 1:00 PM Quirino Bland DPM [facility] WOUND [facility]     Change cholecalciferol to weekly therapy as last vit D was normal.    Anay Montoya MD  [facility]

## 2017-06-23 NOTE — HOSPITAL COURSE
6/22/17: Patient admitted to SNF for ongoing therapy follow a hospitalization for contact dermatitis and generalized weakness.  6/26/17: Patient seen at bedside, denies pain, doing well, labs reviewed.  6/27/17: hypotensive, decreased lasix, stopped hydralazine, had cardiology and podiatry appointment today  6/28/17: -130s, continue with BP regimen, contacted podiatry to clarify recommendations  6/29/17: orders clarified with wound care/podiatry orders we faxed to SNF spoke with Sirisha  6/30/17: patient wishing to be discharge, spoke with therapy--therapy feels patient is safe to be discharge home and able to perform required transfer, discharge today

## 2017-06-23 NOTE — PLAN OF CARE
Problem: Physical Therapy Goal  Goal: Physical Therapy Goal  Goals to be met by: 9 days     Patient will increase functional independence with mobility by performin. Supine to sit with Modified [place]  2. Sit to supine with Modified [place]  3. Bed to chair transfer with Supervision w/o AD via scoot pivot or squat pivot transfer  4. Wheelchair propulsion x150 feet with Supervision using bilateral uppper extremities  5. Lower extremity exercise program x20 reps per handout, with assistance as needed    PT eval completed. Pt will begin PT POC.    Ana Fallon, PT  2017

## 2017-06-23 NOTE — PT/OT/SLP EVAL
PhysicalTherapy   Evaluation    Chau Rodarte   MRN: [id number]     PT Received On: 06/23/17  PT Start Time: 0930     PT Stop Time: 1000    PT Total Time (min): 30 min       Billable Minutes:  Evaluation 10, Therapeutic Exercise 20 and Total Time 20    Diagnosis: (L) femoral bypass sx 6/14, multiple (B) foot wounds  Past Medical History:   Diagnosis Date    Acute on chronic systolic CHF (congestive heart failure) 11/29/2016    Anticoagulant long-term use     CKD (chronic kidney disease) stage 2, GFR 60-89 ml/min 2/21/2016    Coronary artery disease     Encounter for blood transfusion     HTN (hypertension) 3/3/2014    Hyperlipidemia 3/3/2014    NSTEMI (non-ST elevated myocardial infarction) 11/28/2016    Obesity (BMI 30-39.9) 11/29/2016    PVD (peripheral vascular disease)     Stroke     Type 2 diabetes mellitus with diabetic peripheral angiopathy without gangrene, without long-term current use of insulin 10/24/2013    Type 2 diabetes mellitus with diabetic polyneuropathy, without long-term current use of insulin 11/29/2016      Past Surgical History:   Procedure Laterality Date    CARDIAC SURGERY      COLONOSCOPY N/A 3/21/2017    Procedure: COLONOSCOPY;  Surgeon: Karissa Peck MD;  Location: [facility] ([address] [place] [address]);  Service: Endoscopy;  Laterality: N/A;    CORONARY ANGIOPLASTY WITH STENT PLACEMENT      FOOT NERVE GRAFT  11/2013    left leg stent      Loop Recorder placement      right leg bypass           General Precautions: Standard, fall  Orthopedic Precautions:  (Minimal WB for transfers and SHORT ambulation trials only ~5ft to get to bathroom w/ (B) Darco Shoes)   Braces: N/A    Do you have any cultural, spiritual, Advent conflicts, given your current situation?: no    Patient History:  Lives With: spouse  Living Arrangements: house  Home Accessibility: stairs to enter home  Home Layout: Able to live on 1st floor  Number of Stairs to Enter Home: 1  Stair Railings at Home: none  Transportation  Available: family or friend will provide  Living Environment Comment: Pt lives w/ his wife in a 1SH w/ 1 SHARONA and a tub/shower combo. Pt is home alone when his wife is at work.   Equipment Currently Used at Home: cane, straight, shower chair, walker, rolling, wheelchair (pt's HOB is adjustable)  DME owned (not currently used): none    Previous Level of Function: Pt PTA was only walking very short household ambulations w/o an AD. Pt would walk from his bed to his recliner and remain there t/o the day. Pt required min assistance from wife for dressing. Pt denied any falls previous to admit.  Ambulation Skills: independent  Transfer Skills: independent  ADL Skills: needs device    Subjective:  Pt agreeable to session.  Chief Complaint: LLE pain/tenderness  Patient goals: to be as IND as possible- be able to transfer IND    Pain/Comfort  Pain Rating 1: 8/10  Location - Side 1: Left  Location - Orientation 1: generalized  Location 1: leg  Pain Addressed 1: Pre-medicate for activity, Reposition  Pain Rating Post-Intervention 1: 8/10    Objective:  Patient found sitting in w/c     Cognitive Exam:  Oriented to: Person, Place, Time and Situation  Follows Commands/attention: Follows multistep  commands  Communication: clear/fluent  Safety awareness/insight to disability: intact    Physical Exam:  Postural examination/scapula alignment: No postural abnormalities identified    Skin integrity: Wound (B) feet w/ dressing, LLE incision w/ staples from femoral bypass  Edema: Mild LLE    Sensation:   Intact    Upper Extremity Range of Motion:  Right Upper Extremity: WFL  Left Upper Extremity: WFL    Upper Extremity Strength:  Right Upper Extremity: WFL  Left Upper Extremity: WFL    Lower Extremity Range of Motion:  Right Lower Extremity: WFL  Left Lower Extremity: WFL except knee and ankle limited due to wounds/incision    Lower Extremity Strength:  Right Lower Extremity: WFL  Left Lower Extremity: HF 2+/5, KE 2+/5, KF 2+/5, ankle  not tested    Functional Status:  MDS G  Bed Mobility Functional Status: S-SBA  Transfer Functional Status: S-SBA  Locomotion on Unit Functional Status: total(A)  Eval Only: Number of L/E limb <4/5 MMT: 2  Surface-to-surface transfer (transfer between bed and chair or wheelchair): Not steady, but able to stabilize without staff assistance    Bed Mobility: vc's and inc'd time required for bed mobility  Sit>Supine:on mat w/ SBA  Supine>Sit: on mat w/ SBA    Transfers:  Sit<>Stand: not performed  SQPT: w/c<>EOM and w/c>EOB, all w/o AD w/ close SBA for safety, PT assisted w/ w/c prep including arm rests, leg rests, and breaks  Cueing for minimal WB     Gait:  Not attempted on eval    Wheelchair Mobility:  Patient propels w/c 75ft w/ BUE and SPV, limited by fatigue     Therex:  Supine therex 2x15 reps (GS, SAQ, AP)  Seated modified sit-ups w/ red theraball posterior to pt, 2x15 reps    Balance:  Static and dynamic sitting balance EOM Renato    Additional Treatment:  Pt educated on minimal WB BLE w/ Darcos for transfers and short amb trials only. Pt verb understanding.   Pt educated on PT role and POC.    Patient left sitting EOB with call button in reach.    Assessment:  Chau Rodarte is a 67 y.o. male with a medical diagnosis of (L) femoral bypass sx 6/14 and multiple (B) foot wounds.  Pt presents w/ previous pertinent co-morbidities and personal factors including PVD and very limited mobility PTA. Pt currently presents w/ the below mentioned deficits requiring SBA for transfers. He is only allowed to amb short amb trials (~5ft) to/from bathroom w/ Darco shoes and Minimal WB. Pt's clinical presentation is evolving. These factors classify pt as a moderate complexity evaluation. Pt is appropriate for skilled PT and will begin PT POC.    Rehab identified problem list/impairments: weakness, impaired endurance, impaired self care skills, impaired functional mobilty, impaired balance, decreased lower extremity function, pain,  decreased ROM, impaired skin, orthopedic precautions    Rehab potential is fair.    Activity tolerance: Good    Discharge recommendations: home with home health     Barriers to discharge: Decreased caregiver support    Equipment recommendations: bath bench, bedside commode     GOALS:    Physical Therapy Goals        Problem: Physical Therapy Goal    Goal Priority Disciplines Outcome Goal Variances Interventions   Physical Therapy Goal     PT/OT, PT      Description:  Goals to be met by: 9 days     Patient will increase functional independence with mobility by performin. Supine to sit with Modified [place]  2. Sit to supine with Modified [place]  3. Bed to chair transfer with Supervision w/o AD via scoot pivot or squat pivot transfer  4. Wheelchair propulsion x150 feet with Supervision using bilateral uppper extremities  5. Lower extremity exercise program x20 reps per handout, with assistance as needed  6. Amb 5ft (do not exceed) w/ RW and SPV, w/ (B) Darco shoes.                       PLAN:    Patient to be seen 5 x/week  to address the above listed problems via therapeutic activities, therapeutic exercises, wheelchair management/training  Plan of Care Expires: 17    Ana Fallon, PT 2017

## 2017-06-23 NOTE — PLAN OF CARE
Problem: Occupational Therapy Goal  Goal: Occupational Therapy Goal  Goals to be met by: 9 days     Patient will increase functional independence with ADLs by performing:    LE Dressing with Supervision w/ AE as needed.  Toileting from bedside commode with Supervision for hygiene and clothing management.   Bathing from  edge of bed with Supervision.  Toilet transfer to bedside commode with Supervision w/ AD or slide board as needed.  Upper extremity exercise program x10 reps per handout, with independence.    Outcome: Ongoing (interventions implemented as appropriate)  Goals set as above.

## 2017-06-23 NOTE — PROGRESS NOTES
Discharge instruction given to patient. No distress noted at this time. Blood transfusion complete. Peripheral IV removed intact. Cardiac monitor removed. Bilat LE wounds dressing changes completed. Family at bedside. Wheelchair van at bedside. Pt left unit without incident.

## 2017-06-23 NOTE — PT/OT/SLP DISCHARGE
Physical Therapy Discharge Summary    Chau Rodarte  MRN: [id number]   Weakness   Patient Discharged from acute Physical Therapy on 2017.  Please refer to prior PT noted date on 2017 for functional status.     Assessment:   Patient has not met goals.  GOALS:    Physical Therapy Goals        Problem: Physical Therapy Goal    Goal Priority Disciplines Outcome Goal Variances Interventions   Physical Therapy Goal     PT/OT, PT Ongoing (interventions implemented as appropriate)     Description:  Goals to be met by: 2017     Patient will increase functional independence with mobility by performin. Supine to sit with Modified [place]  2. Sit to supine with Modified [place]  3. Sit to stand transfer with Modified [place]  4. Bed to chair transfer with Modified [place] with/without RW  5. Wheelchair propulsion x150 feet with Supervision using bilateral upper extremities  6. Lower extremity exercise program x15 reps per handout, with independence                    Reasons for Discontinuation of Therapy Services  Transfer to alternate level of care.      Plan:  Patient Discharged to: Skilled Nursing Facility.    Chau Lopez, PT  2017

## 2017-06-23 NOTE — PT/OT/SLP EVAL
Occupational Therapy  Evaluation    Chau Rodarte   MRN: [id number]   Admitting Diagnosis: weakness, s/p Left Femoral to BK Popliteal bypass with non-reversed LLE GSV on 6/15/17     OT Date of Treatment: 06/23/17      Billable Minutes:  Evaluation 1  Self Care/Home Management 45    Past Medical History:   Diagnosis Date    Acute on chronic systolic CHF (congestive heart failure) 11/29/2016    Anticoagulant long-term use     CKD (chronic kidney disease) stage 2, GFR 60-89 ml/min 2/21/2016    Coronary artery disease     Encounter for blood transfusion     HTN (hypertension) 3/3/2014    Hyperlipidemia 3/3/2014    NSTEMI (non-ST elevated myocardial infarction) 11/28/2016    Obesity (BMI 30-39.9) 11/29/2016    PVD (peripheral vascular disease)     Stroke     Type 2 diabetes mellitus with diabetic peripheral angiopathy without gangrene, without long-term current use of insulin 10/24/2013    Type 2 diabetes mellitus with diabetic polyneuropathy, without long-term current use of insulin 11/29/2016      Past Surgical History:   Procedure Laterality Date    CARDIAC SURGERY      COLONOSCOPY N/A 3/21/2017    Procedure: COLONOSCOPY;  Surgeon: Karissa Peck MD;  Location: [facility] ([address] [place] [address]);  Service: Endoscopy;  Laterality: N/A;    CORONARY ANGIOPLASTY WITH STENT PLACEMENT      FOOT NERVE GRAFT  11/2013    left leg stent      Loop Recorder placement      right leg bypass     General Precautions: Standard, fall  Orthopedic Precautions:  (NWB (L) heel, minimal WB for transfers only w/ (B) Darcos)  Braces: N/A    Do you have any cultural, spiritual, Latter day conflicts, given your current situation?: no     Patient History:  Living Environment Comment: Pt lives w/ his wife in a 1SH w/ 1 SHARONA and a tub/shower combo. Pt is home alone when his wife is at work. Pt was receiving some (A) w/ ADL prior to admit. HH NSG 3x/week for dressing changes.  Equipment Currently Used at Home: cane, straight, shower chair, walker,  "rolling, wheelchair    Dominant hand: right    Subjective:  "I'm not having any pain."  Chief Complaint: difficulty reaching feet  Patient/Family stated goals: improve overall function    Pain/Comfort  Pain Rating 1: 0/10  Pain Rating Post-Intervention 1: 0/10    Objective:   Patient found supine in bed.    Cognitive Exam:  Oriented to: Person, Place, Time and Situation  Follows Commands/attention: Follows multistep  commands  Communication: clear/fluent  Memory:  No Deficits noted  Safety awareness/insight to disability: intact  Coping skills/emotional control: Appropriate to situation    Sensation:   Intact    Upper Extremity Range of Motion:  Right Upper Extremity: WNL  Left Upper Extremity: WNL    Upper Extremity Strength:  Right Upper Extremity: WNL  Left Upper Extremity: WNL   Strength: WNL    Fine motor coordination:   Intact    Gross motor coordination: WFL    Functional Status:  MDS G  Bed Mobility Functional Status: SBA for sup to sit  Transfer Functional Status: Min (A) for sit to stand w/ RW and stand pivot w/ RW  Dressing Functional Status: UBD - set up, LBD - mod(A)  Eating Functional Status: Set up  Toilet Use Functional Status: Min (A) - simulated  Personal Hygiene Functional Status: Set up (A) seated   Bathing Functional Status: Min (A) seated EOB  Eval Only: Number of U/E limb <4/5 MMT: 0  Moving from seated to standing position: Not steady, only able to stabilize with staff assistance  Moving on and off the toilet: Not steady, only able to stabilize with staff assistance  Surface-to-surface transfer (transfer between bed and chair or wheelchair): Not steady, only able to stabilize with staff assistance    Additional Treatment:  OT eval. Pt performed ADL as listed above.    Patient left up in chair with call button in reach    Assessment:  Chau Rodarte is a 67 y.o. male with a medical diagnosis of weakness, s/p Left Femoral to BK Popliteal bypass with non-reversed LLE GSV on 6/15/17. Pt will " benefit from OT services to increase (I) and safety w/ ADL.    Rehab identified problem list/impairments: weakness, impaired endurance, impaired self care skills, impaired functional mobilty, impaired balance, decreased lower extremity function, decreased safety awareness, impaired skin, orthopedic precautions    Rehab potential is good    Activity tolerance: Good    Discharge recommendations: home with home health     Barriers to discharge: Decreased caregiver support     Equipment recommendations: bedside commode, bath bench     GOALS:    Occupational Therapy Goals        Problem: Occupational Therapy Goal    Goal Priority Disciplines Outcome Interventions   Occupational Therapy Goal     OT, PT/OT Ongoing (interventions implemented as appropriate)    Description:  Goals to be met by: 9 days     Patient will increase functional independence with ADLs by performing:    LE Dressing with Supervision w/ AE as needed.  Toileting from bedside commode with Supervision for hygiene and clothing management.   Bathing from  edge of bed with Supervision.  Toilet transfer to bedside commode with Supervision w/ AD or slide board as needed.  Upper extremity exercise program x10 reps per handout, with independence.                  PLAN: Patient to be seen 5 x/week to address the above listed problems via self-care/home management, therapeutic activities, therapeutic exercises  Plan of Care expires: 07/23/17  Plan of Care reviewed with: patient    KULDEEP Hoffmann  06/23/2017

## 2017-06-23 NOTE — SUBJECTIVE & OBJECTIVE
Past Medical History:   Diagnosis Date    Acute on chronic systolic CHF (congestive heart failure) 11/29/2016    Anticoagulant long-term use     CKD (chronic kidney disease) stage 2, GFR 60-89 ml/min 2/21/2016    Coronary artery disease     Encounter for blood transfusion     HTN (hypertension) 3/3/2014    Hyperlipidemia 3/3/2014    NSTEMI (non-ST elevated myocardial infarction) 11/28/2016    Obesity (BMI 30-39.9) 11/29/2016    PVD (peripheral vascular disease)     Stroke     Type 2 diabetes mellitus with diabetic peripheral angiopathy without gangrene, without long-term current use of insulin 10/24/2013    Type 2 diabetes mellitus with diabetic polyneuropathy, without long-term current use of insulin 11/29/2016       Past Surgical History:   Procedure Laterality Date    CARDIAC SURGERY      COLONOSCOPY N/A 3/21/2017    Procedure: COLONOSCOPY;  Surgeon: Karissa Peck MD;  Location: [facility] ([address] [place] [address]);  Service: Endoscopy;  Laterality: N/A;    CORONARY ANGIOPLASTY WITH STENT PLACEMENT      FOOT NERVE GRAFT  11/2013    left leg stent      Loop Recorder placement      right leg bypass         Review of patient's allergies indicates:  No Known Allergies    Current Facility-Administered Medications on File Prior to Encounter   Medication    [DISCONTINUED] 0.9%  NaCl infusion (for blood administration)    [DISCONTINUED] aspirin chewable tablet 81 mg    [DISCONTINUED] atorvastatin tablet 80 mg    [DISCONTINUED] cholecalciferol (vitamin D3) 5,000 unit tablet 5,000 Units    [DISCONTINUED] clopidogrel tablet 75 mg    [DISCONTINUED] collagenase ointment    [DISCONTINUED] ferrous sulfate EC tablet 325 mg    [DISCONTINUED] furosemide tablet 40 mg    [DISCONTINUED] gabapentin capsule 300 mg    [DISCONTINUED] hydrocodone-acetaminophen 5-325mg per tablet 1 tablet    [DISCONTINUED] insulin detemir pen 16 Units    [DISCONTINUED] isosorbide-hydrALAZINE 20-37.5 mg per tablet 1 tablet     [DISCONTINUED] metoprolol succinate (TOPROL-XL) 24 hr tablet 25 mg    [DISCONTINUED] pantoprazole EC tablet 40 mg    [DISCONTINUED] senna-docusate 8.6-50 mg per tablet 1 tablet    [DISCONTINUED] sodium chloride 0.9% flush 3 mL    [DISCONTINUED] tamsulosin 24 hr capsule 0.4 mg     Current Outpatient Prescriptions on File Prior to Encounter   Medication Sig    aspirin 81 MG Chew Take 1 tablet (81 mg total) by mouth once daily.    atorvastatin (LIPITOR) 80 MG tablet Take 1 tablet (80 mg total) by mouth once daily.    cholecalciferol, vitamin D3, (VITAMIN D3) 5,000 unit Tab Take 5,000 Units by mouth once daily.    clopidogrel (PLAVIX) 75 mg tablet Take 1 tablet (75 mg total) by mouth once daily.    collagenase (SANTYL) ointment Apply topically once daily.    diclofenac sodium 1 % Gel Apply 2 g topically 3 (three) times daily.    furosemide (LASIX) 40 MG tablet Take 1 tablet (40 mg total) by mouth once daily.    gabapentin (NEURONTIN) 300 MG capsule Take 300 mg by mouth 2 (two) times daily.    hydrocodone-acetaminophen 10-325mg (NORCO)  mg Tab Take 1 tablet by mouth every 4 (four) hours as needed for Pain.    insulin detemir (LEVEMIR FLEXTOUCH) 100 unit/mL (3 mL) SubQ InPn pen Inject 16 Units into the skin every evening.    isosorbide-hydrALAZINE 20-37.5 mg (BIDIL) 20-37.5 mg Tab Take 1 tablet by mouth 3 (three) times daily.    liraglutide 0.6 mg/0.1 mL, 18 mg/3 mL, subq PNIJ (VICTOZA 2-SAGAR) 0.6 mg/0.1 mL (18 mg/3 mL) PnIj Inject 0.6 mg daily x1 week, then 1.2 mg daily x1 week, then 1.8 mg daily thereafter (Patient taking differently: Inject 1.8 mg into the skin once daily. er)    metoprolol succinate (TOPROL-XL) 50 MG 24 hr tablet Take 1 tablet (50 mg total) by mouth once daily.    oxycodone (ROXICODONE) 10 mg Tab immediate release tablet Take 1 tablet (10 mg total) by mouth every 4 (four) hours as needed for Pain.    pantoprazole (PROTONIX) 20 MG tablet Take 2 tablets (40 mg total) by mouth  "once daily.    pen needle, diabetic (BD ULTRA-FINE HANG PEN NEEDLES) 32 gauge x 5/32" Ndle Uses 2 times daily, on  insulin injections    senna-docusate 8.6-50 mg (PERICOLACE) 8.6-50 mg per tablet Take 1 tablet by mouth once daily.    tamsulosin (FLOMAX) 0.4 mg Cp24 Take 1 capsule (0.4 mg total) by mouth once daily.     Family History     Problem Relation (Age of Onset)    Diabetes Mother    Heart disease Father        Social History Main Topics    Smoking status: Former Smoker     Packs/day: 1.00     Years: 50.00     Quit date: 3/1/2011    Smokeless tobacco: Never Used    Alcohol use No    Drug use: No    Sexual activity: Yes     Partners: Female      Comment:  with 2 children 3 grand sons and 1 great grand daughter     Review of Systems   Constitutional: no fever or chills  Eyes: no visual changes  ENT: no nasal congestion or sore throat  Respiratory: no cough or shortness of breath  Cardiovascular: no chest pain or palpitations  Gastrointestinal: no nausea or vomiting, no abdominal pain; last BM: 6/21  Genitourinary: no hematuria or dysuria  Integument/Breast: no rash or pruritis  Hematologic/Lymphatic: no easy bruising or lymphadenopathy  Allergy/Immunology: no postnasal drip  Musculoskeletal: no arthralgias or myalgias  Neurological: no seizures or tremors  Behavioral/Psych: no auditory or visual hallucinations  Endocrine: no heat or cold intolerance    Objective:     Vital Signs (Most Recent):  Temp: 98.3 °F (36.8 °C) (06/23/17 0800)  Pulse: 70 (06/23/17 1300)  Resp: 18 (06/23/17 1300)  BP: (!) 145/70 (06/23/17 1300)  SpO2: 98 % (06/23/17 0800) Vital Signs (24h Range):  Temp:  [96.3 °F (35.7 °C)-98.3 °F (36.8 °C)] 98.3 °F (36.8 °C)  Pulse:  [65-76] 70  Resp:  [16-18] 18  SpO2:  [95 %-98 %] 98 %  BP: (105-151)/(56-89) 145/70     Weight: 105.1 kg (231 lb 11.3 oz)  Body mass index is 34.22 kg/m².    Physical Exam  General: well developed, well nourished, no distress  HENT: Head:normocephalic, " atraumatic. Ears:bilateral external ear canals normal. Nose: Nares normal. Septum midline. Mucosa normal. Throat: lips, mucosa, and tongue normal and no throat erythema.  Eyes: conjunctivae/corneas clear.  EOM normal  Neck: supple, symmetrical, trachea midline, no JVD and thyroid not enlarged.   Lungs:  clear to auscultation bilaterally and normal respiratory effort  Cardiovascular: Heart: regular rate and rhythm, S1, S2 normal, no murmur, click, rub or gallop. Chest Wall: no tenderness. Extremities: no cyanosis, trace BLE edema, no clubbing. Pulses: 2+ and symmetric.  Abdomen/Rectal: Abdomen: soft, non-tender non-distended; bowel sounds normal; no masses,  no organomegaly.   Skin: Skin color, texture, turgor normal. Incisions to left groin, thigh, leg intact with no drainage; erythema immediately along staple/suture lines; healing wound from previous blister to left leg; feet with numerous ulcers -  Right MTP with slough, right heet with eschar, left heel with eschar  Musculoskeletal:no clubbing, cyanosis  Lymph Nodes: No cervical or supraclavicular adenopathy  Neurologic: Normal strength and tone. No focal numbness or weakness  Psych/Behavioral:  Alert and oriented, appropriate affect.    Significant Labs:   Recent Results (from the past 24 hour(s))   POCT glucose    Collection Time: 06/22/17 11:05 PM   Result Value Ref Range    POCT Glucose 174 (H) 70 - 110 mg/dL   Basic metabolic panel    Collection Time: 06/23/17  6:13 AM   Result Value Ref Range    Sodium 142 136 - 145 mmol/L    Potassium 4.1 3.5 - 5.1 mmol/L    Chloride 109 95 - 110 mmol/L    CO2 26 23 - 29 mmol/L    Glucose 139 (H) 70 - 110 mg/dL    BUN, Bld 33 (H) 8 - 23 mg/dL    Creatinine 2.1 (H) 0.5 - 1.4 mg/dL    Calcium 9.0 8.7 - 10.5 mg/dL    Anion Gap 7 (L) 8 - 16 mmol/L    eGFR if African American 36.3 (A) >60 mL/min/1.73 m^2    eGFR if non  31.4 (A) >60 mL/min/1.73 m^2   CBC auto differential    Collection Time: 06/23/17  6:13 AM    Result Value Ref Range    WBC 9.05 3.90 - 12.70 K/uL    RBC 3.22 (L) 4.60 - 6.20 M/uL    Hemoglobin 9.0 (L) 14.0 - 18.0 g/dL    Hematocrit 28.4 (L) 40.0 - 54.0 %    MCV 88 82 - 98 fL    MCH 28.0 27.0 - 31.0 pg    MCHC 31.7 (L) 32.0 - 36.0 %    RDW 14.9 (H) 11.5 - 14.5 %    Platelets 410 (H) 150 - 350 K/uL    MPV 9.8 9.2 - 12.9 fL    Gran # 6.7 1.8 - 7.7 K/uL    Lymph # 1.2 1.0 - 4.8 K/uL    Mono # 0.9 0.3 - 1.0 K/uL    Eos # 0.2 0.0 - 0.5 K/uL    Baso # 0.03 0.00 - 0.20 K/uL    Gran% 74.5 (H) 38.0 - 73.0 %    Lymph% 13.4 (L) 18.0 - 48.0 %    Mono% 9.6 4.0 - 15.0 %    Eosinophil% 2.2 0.0 - 8.0 %    Basophil% 0.3 0.0 - 1.9 %    Differential Method Automated    POCT glucose    Collection Time: 06/23/17  7:23 AM   Result Value Ref Range    POCT Glucose 148 (H) 70 - 110 mg/dL   POCT glucose    Collection Time: 06/23/17 11:42 AM   Result Value Ref Range    POCT Glucose 173 (H) 70 - 110 mg/dL       Recent Labs  Lab 06/21/17  1740 06/22/17  0443 06/23/17  0613   WBC 8.68 8.90 9.05   HGB 7.0* 6.9* 9.0*   HCT 22.2* 21.6* 28.4*    357* 410*       Recent Labs  Lab 06/19/17  1312 06/21/17  1740 06/22/17  0443 06/23/17  0613    139 142 142   K 4.1 3.8 4.0 4.1    106 108 109   CO2 24 25 24 26   BUN 35* 42* 41* 33*   CREATININE 2.3* 2.4* 2.3* 2.1*   CALCIUM 8.6* 8.7 8.7 9.0   PROT 6.2  --   --   --    BILITOT 0.7  --   --   --    ALKPHOS 84  --   --   --    ALT 13  --   --   --    AST 32  --   --   --

## 2017-06-23 NOTE — DISCHARGE SUMMARY
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      Hospital Course: 6/19/17: Readmission for generalized weakness and inability to safely perform ADLs   Discharged to SNF on 6/22/17 in stable condition.     Consults:   Consults         Status Ordering Provider     Inpatient consult to Social Work  Once     Provider:  (Not yet assigned)    Completed BENNIE WELLER     Inpatient consult to Vascular Surgery  Once     Specialty:  Vascular Surgery  Provider:  Nehal William MD    Completed WANDER AMAYA          Significant Diagnostic Studies: See hospital record     Pending Diagnostic Studies:     None        Final Active Diagnoses:    Diagnosis Date Noted POA    PRINCIPAL PROBLEM:  Weakness [R53.1] 06/19/2017 Yes    Acute blood loss anemia [D62] 03/20/2017 Yes    Type 2 diabetes mellitus with diabetic polyneuropathy, with long-term current use of insulin [E11.42, Z79.4] 11/29/2016 Not Applicable    Skin ulcers of both feet [L97.519, L97.529] 11/13/2013 Yes    S/P femoral-popliteal bypass surgery [Z95.828] 10/24/2013 Not Applicable      Problems Resolved During this Admission:    Diagnosis Date Noted Date Resolved POA      Discharged Condition: good    Disposition: Skilled Nursing Facility    Follow  "Up:  Follow-up Information     Nehal William MD In 2 weeks.    Specialty:  Vascular Surgery  Contact information:  [address]  [facility] [id number]  [phone]                   Patient Instructions:     WALKER FOR HOME USE   Order Specific Question Answer Comments   Type of Walker: Rollator    With wheels? Yes    Height: 5' 9" (1.753 m)    Weight: 103.9 kg (229 lb)    Length of need (1-99 months): 99    Does patient have medical equipment at home? walker, rolling    Please check all that apply: Patient's condition impairs ambulation.    Please check all that apply: Patient is unable to safely ambulate without equipment.    Please check all that apply: Patient needs help to get in and out of chair.    V[facility]: [facility] to deliver to pts bedside   Expected Date of Delivery: 6/20/2017      Diet general     Call MD for:  temperature >100.4     Call MD for:  redness, tenderness, or signs of infection (pain, swelling, redness, odor or green/yellow discharge around incision site)     Call MD for:  severe uncontrolled pain       Medications:  Reconciled Home Medications:   Discharge Medication List as of 6/20/2017  2:48 PM      CONTINUE these medications which have NOT CHANGED    Details   aspirin 81 MG Chew Take 1 tablet (81 mg total) by mouth once daily., Starting 12/7/2016, Until Thu 12/7/17, OTC      atorvastatin (LIPITOR) 80 MG tablet Take 1 tablet (80 mg total) by mouth once daily., Starting 12/7/2016, Until Thu 12/7/17, Normal      cholecalciferol, vitamin D3, (VITAMIN D3) 5,000 unit Tab Take 5,000 Units by mouth once daily., Until Discontinued, Historical Med      clopidogrel (PLAVIX) 75 mg tablet Take 1 tablet (75 mg total) by mouth once daily., Starting 12/7/2016, Until Discontinued, Normal      collagenase (SANTYL) ointment Apply topically once daily., Starting 5/4/2017, Until Discontinued, Normal      diclofenac sodium 1 % Gel Apply 2 g topically 3 (three) times daily., Starting 4/7/2017, " "Until Discontinued, Normal      furosemide (LASIX) 40 MG tablet Take 1 tablet (40 mg total) by mouth once daily., Starting 12/7/2016, Until Thu 12/7/17, Normal      gabapentin (NEURONTIN) 300 MG capsule Take 300 mg by mouth 2 (two) times daily., Starting 3/22/2017, Until Discontinued, Historical Med      hydrocodone-acetaminophen 10-325mg (NORCO)  mg Tab Take 1 tablet by mouth every 4 (four) hours as needed for Pain., Starting Fri 6/16/2017, Print      insulin detemir (LEVEMIR FLEXTOUCH) 100 unit/mL (3 mL) SubQ InPn pen Inject 16 Units into the skin every evening., Starting 5/16/2017, Until Discontinued, Normal      isosorbide-hydrALAZINE 20-37.5 mg (BIDIL) 20-37.5 mg Tab Take 1 tablet by mouth 3 (three) times daily., Starting 12/20/2016, Until Wed 12/20/17, Normal      liraglutide 0.6 mg/0.1 mL, 18 mg/3 mL, subq PNIJ (VICTOZA 2-SAGAR) 0.6 mg/0.1 mL (18 mg/3 mL) PnIj Inject 0.6 mg daily x1 week, then 1.2 mg daily x1 week, then 1.8 mg daily thereafter, Normal      metoprolol succinate (TOPROL-XL) 50 MG 24 hr tablet Take 1 tablet (50 mg total) by mouth once daily., Starting 12/20/2016, Until Wed 12/20/17, Normal      oxycodone (ROXICODONE) 10 mg Tab immediate release tablet Take 1 tablet (10 mg total) by mouth every 4 (four) hours as needed for Pain., Starting Fri 6/16/2017, Print      pantoprazole (PROTONIX) 20 MG tablet Take 2 tablets (40 mg total) by mouth once daily., Starting 3/22/2017, Until Thu 3/22/18, Normal      pen needle, diabetic (BD ULTRA-FINE HANG PEN NEEDLES) 32 gauge x 5/32" Ndle Uses 2 times daily, on  insulin injections, Normal      senna-docusate 8.6-50 mg (PERICOLACE) 8.6-50 mg per tablet Take 1 tablet by mouth once daily., Starting Fri 6/16/2017, Print      tamsulosin (FLOMAX) 0.4 mg Cp24 Take 1 capsule (0.4 mg total) by mouth once daily., Starting Fri 6/16/2017, Until Sat 6/16/2018, Normal             Jim Gonzales MD  Vascular Surgery  [facility]  "

## 2017-06-23 NOTE — CLINICAL REVIEW
Clinical Pharmacy Chart Review Note    Admit Date: 6/22/2017   LOS: 1 day     Chau Rodarte is a 67 y.o. male admitted to SNF for PT/OT after hospitalization for generalized weakness.     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     Review of patient's allergies indicates:  No Known Allergies  Patient Active Problem List    Diagnosis Date Noted    Ulcer of heel due to diabetes mellitus 06/23/2017    BPH (benign prostatic hypertrophy) 06/23/2017    Debility 06/19/2017    Preop examination     Atherosclerosis of native arteries of the extremities with ulceration 04/26/2017    Peripheral vascular disease, unspecified     AF (atrial fibrillation) 03/24/2017    Lower GI bleed 03/20/2017    Type 2 diabetes mellitus with stage 4 chronic kidney disease GFR 15-29 03/20/2017    Acute blood loss anemia 03/20/2017    Long term (current) use of anticoagulants [Z79.01] 01/27/2017    Typical atrial flutter 01/26/2017    Cryptogenic stroke, remote history (2006) 01/26/2017    Coronary artery disease involving native coronary artery of native heart without angina pectoris 12/20/2016    Ischemic cardiomyopathy 12/20/2016    CORRY (acute kidney injury) 12/06/2016    Cardiomegaly 12/05/2016    Type 2 diabetes mellitus with  diabetic polyneuropathy, with long-term current use of insulin 11/29/2016    Chronic systolic heart failure 11/29/2016    Obesity (BMI 30-39.9) 11/29/2016    NSTEMI (non-ST elevated myocardial infarction) 11/28/2016    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, NYHA class 2 and CKD stage 3 02/21/2016    Essential hypertension 03/03/2014    Skin ulcers of both feet 11/13/2013    HLD (hyperlipidemia) 10/24/2013    S/P femoral-popliteal bypass surgery 10/24/2013       Scheduled Meds:    aspirin  81 mg Oral Daily    atorvastatin  80 mg Oral Daily    [START ON 6/24/2017] cholecalciferol (vitamin D3)  5,000 Units Oral Weekly    clopidogrel  75 mg Oral Daily    collagenase   Topical Daily    ferrous sulfate  325 mg Oral Daily    furosemide  40 mg Oral Daily    gabapentin  300 mg Oral BID    insulin detemir  16 Units Subcutaneous QHS    isosorbide-hydrALAZINE 20-37.5 mg  1 tablet Oral TID    metoprolol succinate  25 mg Oral Daily    pantoprazole  40 mg Oral Daily    senna-docusate 8.6-50 mg  1 tablet Oral BID    tamsulosin  0.4 mg Oral Daily     Continuous Infusions:    PRN Meds: acetaminophen, aluminum-magnesium hydroxide-simethicone, hydrocodone-acetaminophen 5-325mg, ramelteon    OBJECTIVE:     Vital Signs (Last 24H)  Temp:  [96.8 °F (36 °C)-98.3 °F (36.8 °C)]   Pulse:  [66-76]   Resp:  [16-18]   BP: (105-145)/(56-89)   SpO2:  [95 %-98 %]     Laboratory:  CBC:   Recent Labs  Lab 06/21/17 1740 06/22/17 0443 06/23/17  0613   WBC 8.68 8.90 9.05   RBC 2.64* 2.55* 3.22*   HGB 7.0* 6.9* 9.0*   HCT 22.2* 21.6* 28.4*    357* 410*   MCV 84 85 88   MCH 26.5* 27.1 28.0   MCHC 31.5* 31.9* 31.7*     BMP:   Recent Labs  Lab 06/21/17 1740 06/22/17 0443 06/23/17  0613   * 130* 139*    142 142   K 3.8 4.0 4.1    108 109   CO2 25 24 26   BUN 42* 41* 33*   CREATININE 2.4* 2.3* 2.1*   CALCIUM 8.7 8.7 9.0     CMP:   Recent Labs  Lab 06/19/17  1312 06/21/17  6680  06/22/17  0443 06/23/17  0613   GLU 87 149* 130* 139*   CALCIUM 8.6* 8.7 8.7 9.0   ALBUMIN 2.4*  --   --   --    PROT 6.2  --   --   --     139 142 142   K 4.1 3.8 4.0 4.1   CO2 24 25 24 26    106 108 109   BUN 35* 42* 41* 33*   CREATININE 2.3* 2.4* 2.3* 2.1*   ALKPHOS 84  --   --   --    ALT 13  --   --   --    AST 32  --   --   --    BILITOT 0.7  --   --   --      LFTs:   Recent Labs  Lab 06/19/17  1312   ALT 13   AST 32   ALKPHOS 84   BILITOT 0.7   PROT 6.2   ALBUMIN 2.4*     Coagulation:   Recent Labs  Lab 06/19/17  1312   INR 1.1     Cardiac markers: No results for input(s): CKMB, TROPONINT, MYOGLOBIN in the last 168 hours.  ABGs: No results for input(s): PH, PCO2, PO2, HCO3, POCSATURATED, BE in the last 168 hours.  Microbiology Results (last 7 days)     ** No results found for the last 168 hours. **        Specimen (12h ago through future)    None        No results for input(s): COLORU, CLARITYU, SPECGRAV, PHUR, PROTEINUA, GLUCOSEU, BILIRUBINCON, BLOODU, WBCU, RBCU, BACTERIA, MUCUS, NITRITE, LEUKOCYTESUR, UROBILINOGEN, HYALINECASTS in the last 168 hours.  Others: No results for input(s): [id number], TSH, T4FREE, LABLIPI in the last 168 hours.    Invalid input(s): A1C    ASSESSMENT/PLAN:      Debility   --PT/OT  --bowel regimen for constipation; hold for loose or frequent stools  --DVT prophylaxis: early ambulation; patient refuses heparin products due to history of bleeding; SCD's as tolerated to right leg      Ulcer of heel due to diabetes mellitus  --wound care, santyl oint BID   --f/u with podiatry as scheduled     BPH (benign prostatic hypertrophy)  --tamsulosin XR 0.4 mg daily   Monitor orthostatic hypotension     AF (atrial fibrillation)   --toprol XL 25 mg daily; HR 66-76  --not on chronic anticoagulation due to h/o GI bleed     Acute blood loss anemia/h/o lower GI bleed  --s/p 2 units PRBC  --ferrous sulfate  mg daily   --pantoprazole 40 mg daily    Monitor Hgb 9.0, Hct 28.4     Type 2 diabetes mellitus with diabetic polyneuropathy, with long-term current use of insulin   Lab Results   Component Value Date    LABA1C 9.9 (H) 10/21/2016    HGBA1C 6.4 (H) 05/09/2017     POCT Glucose   Date Value Ref Range Status   06/23/2017 148 (H) 70 - 110 mg/dL Final   06/23/2017 173 (H) 70 - 110 mg/dL Final   06/23/2017 148 (H) 70 - 110 mg/dL Final   06/22/2017 174 (H) 70 - 110 mg/dL Final   06/22/2017 193 (H) 70 - 110 mg/dL Final   06/22/2017 150 (H) 70 - 110 mg/dL Final   06/21/2017 175 (H) 70 - 110 mg/dL Final   06/21/2017 172 (H) 70 - 110 mg/dL Final   06/21/2017 181 (H) 70 - 110 mg/dL Final   06/21/2017 124 (H) 70 - 110 mg/dL Final   06/20/2017 148 (H) 70 - 110 mg/dL Final   --levemir 16 units qHS   Monitor BG as prescribed and adjust regimen as necessary    --gabapentin 300 mg BID (neuropathy)    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   Monitor weight, volume status, electrolytes, renal function, BP, HR, EF 45% 03/2017     S/P femoral-popliteal bypass surgery/Coronary artery disease involving native coronary artery of native heart without angina pectoris   --ASA 81 mg daily, plavix 75 mg daily    --toprol XL 25 mg daily  --atorvastatin 80 mg daily   --norco 5-325 mg q4h prn moderate pain (pain management)  --bowel regimen for opioid induced constipation     HLD (hyperlipidemia)  --atorvastatin 80 mg daily   Lab Results   Component Value Date    CHOL 110 (L) 01/12/2017    CHOL 113 (L) 11/29/2016    CHOL 122 (L) 10/21/2016     Lab Results   Component Value Date    HDL 25 (L) 01/12/2017    HDL 31 (L) 11/29/2016    HDL 32 (L) 10/21/2016     Lab Results   Component Value Date    LDLCALC 72.2 01/12/2017    LDLCALC  72.0 11/29/2016    LDLCALC 76 10/21/2016     Lab Results   Component Value Date    TRIG 64 01/12/2017    TRIG 50 11/29/2016    TRIG 71 10/21/2016     Lab Results   Component Value Date    CHOLHDL 22.7 01/12/2017    CHOLHDL 27.4 11/29/2016    CHOLHDL 3.8 10/21/2016         --nutritional supplement: vitamin D3 5000 units weekly; Vit D lvl 54 on 5/9/17     Monitor BMP/CBC/Vitals

## 2017-06-24 LAB
POCT GLUCOSE: 186 MG/DL (ref 70–110)
POCT GLUCOSE: 284 MG/DL (ref 70–110)

## 2017-06-24 PROCEDURE — 25000003 PHARM REV CODE 250: Performed by: INTERNAL MEDICINE

## 2017-06-24 PROCEDURE — 25000003 PHARM REV CODE 250: Performed by: STUDENT IN AN ORGANIZED HEALTH CARE EDUCATION/TRAINING PROGRAM

## 2017-06-24 PROCEDURE — 12000000 HC SNF SEMI-PRIVATE ROOM

## 2017-06-24 RX ADMIN — PANTOPRAZOLE SODIUM 40 MG: 40 TABLET, DELAYED RELEASE ORAL at 09:06

## 2017-06-24 RX ADMIN — HYDROCODONE BITARTRATE AND ACETAMINOPHEN 1 TABLET: 5; 325 TABLET ORAL at 09:06

## 2017-06-24 RX ADMIN — CLOPIDOGREL BISULFATE 75 MG: 75 TABLET ORAL at 09:06

## 2017-06-24 RX ADMIN — HYDRALAZINE HYDROCHLORIDE AND ISOSORBIDE DINITRATE 1 TABLET: 37.5; 2 TABLET, FILM COATED ORAL at 05:06

## 2017-06-24 RX ADMIN — TAMSULOSIN HYDROCHLORIDE 0.4 MG: 0.4 CAPSULE ORAL at 09:06

## 2017-06-24 RX ADMIN — STANDARDIZED SENNA CONCENTRATE AND DOCUSATE SODIUM 1 TABLET: 8.6; 5 TABLET, FILM COATED ORAL at 09:06

## 2017-06-24 RX ADMIN — FERROUS SULFATE TAB EC 325 MG (65 MG FE EQUIVALENT) 325 MG: 325 (65 FE) TABLET DELAYED RESPONSE at 09:06

## 2017-06-24 RX ADMIN — HYDRALAZINE HYDROCHLORIDE AND ISOSORBIDE DINITRATE 1 TABLET: 37.5; 2 TABLET, FILM COATED ORAL at 01:06

## 2017-06-24 RX ADMIN — METOPROLOL SUCCINATE 25 MG: 25 TABLET, EXTENDED RELEASE ORAL at 09:06

## 2017-06-24 RX ADMIN — INSULIN DETEMIR 16 UNITS: 100 INJECTION, SOLUTION SUBCUTANEOUS at 09:06

## 2017-06-24 RX ADMIN — ATORVASTATIN CALCIUM 80 MG: 20 TABLET, FILM COATED ORAL at 09:06

## 2017-06-24 RX ADMIN — HYDRALAZINE HYDROCHLORIDE AND ISOSORBIDE DINITRATE 1 TABLET: 37.5; 2 TABLET, FILM COATED ORAL at 09:06

## 2017-06-24 RX ADMIN — GABAPENTIN 300 MG: 300 CAPSULE ORAL at 09:06

## 2017-06-24 RX ADMIN — ASPIRIN 81 MG CHEWABLE TABLET 81 MG: 81 TABLET CHEWABLE at 09:06

## 2017-06-24 RX ADMIN — CHOLECALCIFEROL TAB 125 MCG (5000 UNIT) 5000 UNITS: 125 TAB at 12:06

## 2017-06-24 RX ADMIN — FUROSEMIDE 40 MG: 40 TABLET ORAL at 09:06

## 2017-06-24 NOTE — PLAN OF CARE
Problem: Fall Risk (Adult)  Goal: Absence of Falls  Patient will demonstrate the desired outcomes by discharge/transition of care.   Outcome: Ongoing (interventions implemented as appropriate)  .

## 2017-06-24 NOTE — PLAN OF CARE
Problem: Patient Care Overview  Goal: Plan of Care Review  Outcome: Ongoing (interventions implemented as appropriate)  BLOOD GLUCOSES MONITORED.FALL PRECAUTIONS MAINTAINED NO INJURIES NOTED.NO PRESSURE ULCERS NOTED.

## 2017-06-25 LAB
POCT GLUCOSE: 152 MG/DL (ref 70–110)
POCT GLUCOSE: 193 MG/DL (ref 70–110)
POCT GLUCOSE: 196 MG/DL (ref 70–110)
POCT GLUCOSE: 257 MG/DL (ref 70–110)

## 2017-06-25 PROCEDURE — 97530 THERAPEUTIC ACTIVITIES: CPT

## 2017-06-25 PROCEDURE — 25000003 PHARM REV CODE 250: Performed by: STUDENT IN AN ORGANIZED HEALTH CARE EDUCATION/TRAINING PROGRAM

## 2017-06-25 PROCEDURE — 97110 THERAPEUTIC EXERCISES: CPT

## 2017-06-25 PROCEDURE — 12000000 HC SNF SEMI-PRIVATE ROOM

## 2017-06-25 RX ORDER — IBUPROFEN 200 MG
24 TABLET ORAL
Status: DISCONTINUED | OUTPATIENT
Start: 2017-06-25 | End: 2017-06-30 | Stop reason: HOSPADM

## 2017-06-25 RX ORDER — GLUCAGON 1 MG
1 KIT INJECTION
Status: DISCONTINUED | OUTPATIENT
Start: 2017-06-25 | End: 2017-06-30 | Stop reason: HOSPADM

## 2017-06-25 RX ORDER — IBUPROFEN 200 MG
16 TABLET ORAL
Status: DISCONTINUED | OUTPATIENT
Start: 2017-06-25 | End: 2017-06-30 | Stop reason: HOSPADM

## 2017-06-25 RX ADMIN — TAMSULOSIN HYDROCHLORIDE 0.4 MG: 0.4 CAPSULE ORAL at 08:06

## 2017-06-25 RX ADMIN — METOPROLOL SUCCINATE 25 MG: 25 TABLET, EXTENDED RELEASE ORAL at 08:06

## 2017-06-25 RX ADMIN — ASPIRIN 81 MG CHEWABLE TABLET 81 MG: 81 TABLET CHEWABLE at 08:06

## 2017-06-25 RX ADMIN — HYDRALAZINE HYDROCHLORIDE AND ISOSORBIDE DINITRATE 1 TABLET: 37.5; 2 TABLET, FILM COATED ORAL at 02:06

## 2017-06-25 RX ADMIN — HYDRALAZINE HYDROCHLORIDE AND ISOSORBIDE DINITRATE 1 TABLET: 37.5; 2 TABLET, FILM COATED ORAL at 05:06

## 2017-06-25 RX ADMIN — FERROUS SULFATE TAB EC 325 MG (65 MG FE EQUIVALENT) 325 MG: 325 (65 FE) TABLET DELAYED RESPONSE at 08:06

## 2017-06-25 RX ADMIN — ATORVASTATIN CALCIUM 80 MG: 20 TABLET, FILM COATED ORAL at 08:06

## 2017-06-25 RX ADMIN — CLOPIDOGREL BISULFATE 75 MG: 75 TABLET ORAL at 08:06

## 2017-06-25 RX ADMIN — PANTOPRAZOLE SODIUM 40 MG: 40 TABLET, DELAYED RELEASE ORAL at 08:06

## 2017-06-25 RX ADMIN — GABAPENTIN 300 MG: 300 CAPSULE ORAL at 09:06

## 2017-06-25 RX ADMIN — GABAPENTIN 300 MG: 300 CAPSULE ORAL at 08:06

## 2017-06-25 RX ADMIN — INSULIN DETEMIR 16 UNITS: 100 INJECTION, SOLUTION SUBCUTANEOUS at 09:06

## 2017-06-25 RX ADMIN — FUROSEMIDE 40 MG: 40 TABLET ORAL at 08:06

## 2017-06-25 RX ADMIN — HYDRALAZINE HYDROCHLORIDE AND ISOSORBIDE DINITRATE 1 TABLET: 37.5; 2 TABLET, FILM COATED ORAL at 09:06

## 2017-06-25 NOTE — PLAN OF CARE
Problem: Physical Therapy Goal  Goal: Physical Therapy Goal  Goals to be met by: 9 days     Patient will increase functional independence with mobility by performin. Supine to sit with Modified [place] Met 17  2. Sit to supine with Modified [place] Met 17  3. Bed to chair transfer with Supervision w/o AD via scoot pivot or squat pivot transfer   4. Wheelchair propulsion x150 feet with Supervision using bilateral uppper extremities Met 17  5. Lower extremity exercise program x20 reps per handout, with assistance as needed  6. Ambulate 15ft (do not exceed) w/ RW and SPV w/ Darco shoes.       Outcome: Ongoing (interventions implemented as appropriate)  3 goals met today

## 2017-06-25 NOTE — PT/OT/SLP PROGRESS
Physical Therapy  Treatment    Chau Rodarte   MRN: [id number]   Admitting Diagnosis: Debility    PT Received On: 06/25/17  Total Time (min): 45       Billable Minutes:  Therapeutic Activity 10 and Therapeutic Exercise 35    Treatment Type: Treatment  PT/PTA: PT             General Precautions: Standard, fall  Orthopedic Precautions:  (minimal WB t/f and short amb dist (<5ft) w/(B) Darcos)   Braces: N/A    Do you have any cultural, spiritual, Mosque conflicts, given your current situation?: no    Subjective:  Communicated with pt prior to session. Pt reported that he was having some soreness in L leg     Pain/Comfort  Pain Rating 1: 0/10    Objective:  Patient found lying in bed.      Functional Status:  MDS G  Bed Mobility Functional Status: mod(I) - (I)  Transfer Functional Status: S-SBA          Bed Mobility:  Sit>Supine:mod I on mat   Supine>Sit: mod I on mat and bed    Transfers:  Sit<>Stand: SBA to supervision from bed, wheelchair, and mat  Stand Pivot Transfer: SBA to supervision for squat pivot transfer wheelchair <> mat  Pt wearing Darco shoes during treatment session.     Wheelchair Mobility:  Patient propels w/c 150 feet x2 with mod I including making turns, navigating in gym and hallways, and backing into spaces in room     Therex:  Supine therex including quad sets and glute sets x20, heel slides 2x10 on L and x20 on R, SLR 2x10 with assistance required for LLE, hip abduction 2x10 on L and x20 on R, SAQ x20 on RLE  Seated LLE LAQ 2x10    Balance:  Pt was able to stand to adjust clothing without UE support with SBA without LOB    Additional Treatment:  Scapular retraction 2x20    Patient left sitting in wheelchair at bedside with PCT present.    Assessment:  Chau Rodarte is a 67 y.o. male with a medical diagnosis of Debility.  Pt demonstrates good safety awareness and stability during transitional movements. Pt continues to require SBA to supervision for transitional movements due to slight unsteadiness on  his feet and fatigue. Pt will benefit from continued PT treatment to address remaining impairments and improve functional mobility.     Rehab identified problem list/impairments: weakness, impaired endurance, impaired self care skills, impaired functional mobilty, impaired balance, decreased lower extremity function, pain, decreased ROM, impaired skin, orthopedic precautions    Rehab potential is good.    Activity tolerance: Good    Discharge recommendations: home with home health     Barriers to discharge: Decreased caregiver support    Equipment recommendations: bath bench, bedside commode     GOALS:    Physical Therapy Goals        Problem: Physical Therapy Goal    Goal Priority Disciplines Outcome Goal Variances Interventions   Physical Therapy Goal     PT/OT, PT Ongoing (interventions implemented as appropriate)     Description:  Goals to be met by: 9 days     Patient will increase functional independence with mobility by performin. Supine to sit with Modified [place] Met 17  2. Sit to supine with Modified [place] Met 17  3. Bed to chair transfer with Supervision w/o AD via scoot pivot or squat pivot transfer   4. Wheelchair propulsion x150 feet with Supervision using bilateral uppper extremities Met 17  5. Lower extremity exercise program x20 reps per handout, with assistance as needed  6. Ambulate 15ft (do not exceed) w/ RW and SPV w/ Darco shoes.                         PLAN:    Patient to be seen 5 x/week  to address the above listed problems via therapeutic activities, therapeutic exercises, wheelchair management/training  Plan of Care expires: 17  Plan of Care reviewed with: patient    Michaelle CARLTON Marie, PT  2017

## 2017-06-25 NOTE — PLAN OF CARE
Problem: Fall Risk (Adult)  Goal: Absence of Falls  Patient will demonstrate the desired outcomes by discharge/transition of care.   Outcome: Ongoing (interventions implemented as appropriate)  Pt lying in bed, ndn, call light in reach. Pt is free of falls or injuries this shift. Pt's family at bedside.

## 2017-06-26 LAB
ANION GAP SERPL CALC-SCNC: 10 MMOL/L
BASOPHILS # BLD AUTO: 0.03 K/UL
BASOPHILS NFR BLD: 0.3 %
BUN SERPL-MCNC: 34 MG/DL
CALCIUM SERPL-MCNC: 8.7 MG/DL
CHLORIDE SERPL-SCNC: 106 MMOL/L
CO2 SERPL-SCNC: 25 MMOL/L
CREAT SERPL-MCNC: 2.4 MG/DL
DIFFERENTIAL METHOD: ABNORMAL
EOSINOPHIL # BLD AUTO: 0.3 K/UL
EOSINOPHIL NFR BLD: 3 %
ERYTHROCYTE [DISTWIDTH] IN BLOOD BY AUTOMATED COUNT: 15.3 %
EST. GFR  (AFRICAN AMERICAN): 30.9 ML/MIN/1.73 M^2
EST. GFR  (NON AFRICAN AMERICAN): 26.7 ML/MIN/1.73 M^2
ESTIMATED AVG GLUCOSE: 128 MG/DL
FERRITIN SERPL-MCNC: 107 NG/ML
GLUCOSE SERPL-MCNC: 122 MG/DL
HBA1C MFR BLD HPLC: 6.1 %
HCT VFR BLD AUTO: 27.8 %
HGB BLD-MCNC: 8.7 G/DL
IRON SERPL-MCNC: 25 UG/DL
LYMPHOCYTES # BLD AUTO: 1.3 K/UL
LYMPHOCYTES NFR BLD: 12.7 %
MAGNESIUM SERPL-MCNC: 1.6 MG/DL
MCH RBC QN AUTO: 28 PG
MCHC RBC AUTO-ENTMCNC: 31.3 %
MCV RBC AUTO: 89 FL
MONOCYTES # BLD AUTO: 1 K/UL
MONOCYTES NFR BLD: 10.1 %
NEUTROPHILS # BLD AUTO: 7.4 K/UL
NEUTROPHILS NFR BLD: 73.9 %
PHOSPHATE SERPL-MCNC: 3.2 MG/DL
PLATELET # BLD AUTO: 465 K/UL
PMV BLD AUTO: 9.6 FL
POCT GLUCOSE: 144 MG/DL (ref 70–110)
POCT GLUCOSE: 189 MG/DL (ref 70–110)
POCT GLUCOSE: 224 MG/DL (ref 70–110)
POCT GLUCOSE: 285 MG/DL (ref 70–110)
POTASSIUM SERPL-SCNC: 4.1 MMOL/L
RBC # BLD AUTO: 3.11 M/UL
SATURATED IRON: 11 %
SODIUM SERPL-SCNC: 141 MMOL/L
TOTAL IRON BINDING CAPACITY: 231 UG/DL
TRANSFERRIN SERPL-MCNC: 156 MG/DL
WBC # BLD AUTO: 10 K/UL

## 2017-06-26 PROCEDURE — 97535 SELF CARE MNGMENT TRAINING: CPT

## 2017-06-26 PROCEDURE — 82728 ASSAY OF FERRITIN: CPT

## 2017-06-26 PROCEDURE — 12000000 HC SNF SEMI-PRIVATE ROOM

## 2017-06-26 PROCEDURE — 83036 HEMOGLOBIN GLYCOSYLATED A1C: CPT

## 2017-06-26 PROCEDURE — 63600175 PHARM REV CODE 636 W HCPCS: Performed by: NURSE PRACTITIONER

## 2017-06-26 PROCEDURE — 83540 ASSAY OF IRON: CPT

## 2017-06-26 PROCEDURE — 85025 COMPLETE CBC W/AUTO DIFF WBC: CPT

## 2017-06-26 PROCEDURE — 83735 ASSAY OF MAGNESIUM: CPT

## 2017-06-26 PROCEDURE — 84466 ASSAY OF TRANSFERRIN: CPT

## 2017-06-26 PROCEDURE — 84100 ASSAY OF PHOSPHORUS: CPT

## 2017-06-26 PROCEDURE — 80048 BASIC METABOLIC PNL TOTAL CA: CPT

## 2017-06-26 PROCEDURE — 97110 THERAPEUTIC EXERCISES: CPT

## 2017-06-26 PROCEDURE — 25000003 PHARM REV CODE 250: Performed by: STUDENT IN AN ORGANIZED HEALTH CARE EDUCATION/TRAINING PROGRAM

## 2017-06-26 PROCEDURE — 36415 COLL VENOUS BLD VENIPUNCTURE: CPT

## 2017-06-26 PROCEDURE — 99309 SBSQ NF CARE MODERATE MDM 30: CPT | Mod: ,,, | Performed by: NURSE PRACTITIONER

## 2017-06-26 RX ORDER — INSULIN ASPART 100 [IU]/ML
0-5 INJECTION, SOLUTION INTRAVENOUS; SUBCUTANEOUS
Status: DISCONTINUED | OUTPATIENT
Start: 2017-06-26 | End: 2017-06-30 | Stop reason: HOSPADM

## 2017-06-26 RX ADMIN — PANTOPRAZOLE SODIUM 40 MG: 40 TABLET, DELAYED RELEASE ORAL at 08:06

## 2017-06-26 RX ADMIN — ATORVASTATIN CALCIUM 80 MG: 20 TABLET, FILM COATED ORAL at 08:06

## 2017-06-26 RX ADMIN — HYDRALAZINE HYDROCHLORIDE AND ISOSORBIDE DINITRATE 1 TABLET: 37.5; 2 TABLET, FILM COATED ORAL at 05:06

## 2017-06-26 RX ADMIN — GABAPENTIN 300 MG: 300 CAPSULE ORAL at 09:06

## 2017-06-26 RX ADMIN — INSULIN ASPART 1 UNITS: 100 INJECTION, SOLUTION INTRAVENOUS; SUBCUTANEOUS at 09:06

## 2017-06-26 RX ADMIN — FUROSEMIDE 40 MG: 40 TABLET ORAL at 08:06

## 2017-06-26 RX ADMIN — ASPIRIN 81 MG CHEWABLE TABLET 81 MG: 81 TABLET CHEWABLE at 08:06

## 2017-06-26 RX ADMIN — INSULIN ASPART 2 UNITS: 100 INJECTION, SOLUTION INTRAVENOUS; SUBCUTANEOUS at 06:06

## 2017-06-26 RX ADMIN — CLOPIDOGREL BISULFATE 75 MG: 75 TABLET ORAL at 08:06

## 2017-06-26 RX ADMIN — METOPROLOL SUCCINATE 25 MG: 25 TABLET, EXTENDED RELEASE ORAL at 08:06

## 2017-06-26 RX ADMIN — INSULIN DETEMIR 16 UNITS: 100 INJECTION, SOLUTION SUBCUTANEOUS at 09:06

## 2017-06-26 RX ADMIN — COLLAGENASE SANTYL: 250 OINTMENT TOPICAL at 08:06

## 2017-06-26 RX ADMIN — FERROUS SULFATE TAB EC 325 MG (65 MG FE EQUIVALENT) 325 MG: 325 (65 FE) TABLET DELAYED RESPONSE at 08:06

## 2017-06-26 RX ADMIN — TAMSULOSIN HYDROCHLORIDE 0.4 MG: 0.4 CAPSULE ORAL at 08:06

## 2017-06-26 RX ADMIN — GABAPENTIN 300 MG: 300 CAPSULE ORAL at 08:06

## 2017-06-26 NOTE — PT/OT/SLP PROGRESS
Physical Therapy  Treatment    Chau Rodarte   MRN: [id number]   Admitting Diagnosis: Debility, L femoral bipass, B wounds on feet (L heel, R first metatarsal)    PT Received On: 06/26/17  Total Time (min): 38       Billable Minutes:  Therapeutic Exercise 38    Treatment Type: Treatment  PT/PTA: PT             General Precautions: Standard, fall  Orthopedic Precautions:  (minimal WB for t/f and short amb dist (<15ft) w/(B) Darcos)   Braces: N/A    Do you have any cultural, spiritual, Scientology conflicts, given your current situation?: no    Subjective:  Communicated with pt prior to session.      Pain/Comfort  Pain Rating 1: 6/10  Location - Side 1: Left  Location - Orientation 1: generalized  Location 1: leg (staples)  Pain Addressed 1: Distraction  Pain Rating Post-Intervention 1: 6/10  Pain Rating 2: 6/10  Location - Side 2: Left  Location - Orientation 2: generalized  Location 2: foot  Pain Addressed 2: Cessation of Activity  Pain Rating Post-Intervention 2: 6/10    Objective:  Patient found seated in wheelchair.         Functional Status:  MDS G  Bed Mobility Functional Status: S-SBA  Transfer Functional Status: S-SBA  Locomotion on Unit Functional Status: S-SBA  Locomotion Off Unit Functional Status: S-SBA          Bed Mobility:  Sit>Supine: supervision (edge of mat, bed)  Supine>Sit: Minimal assistance with management of the trunk.    Transfers:  Scoot pivot (edge of mat<>wheelchair; wheelchair>edge of bed)-supervision with set-up.    Wheelchair Mobility:  Patient propels w/c 100 feet with BUEs      Therex:  Supine- heel slides (10 reps LLE, 2x20 reps RLE), hip abduction (RLE only, 2x20 reps RLE), quad sets (20 reps BLE)  Seated- hip flexion, long arc quads, ankle pumps with quad/hamstring co-contraction x 20 reps BLE  Modified abdominal crunches - 2x20 reps     Patient left supine with call button in reach.    Assessment:  Chau Rodarte is a 67 y.o. male with a medical diagnosis of Debility.  Mr. Rodarte was able to  perform the exercises listed above.  He will benefit from further PT services.     Rehab identified problem list/impairments: weakness, impaired endurance, impaired self care skills, impaired functional mobilty, impaired balance, decreased lower extremity function, pain, decreased ROM, impaired skin, orthopedic precautions    Rehab potential is good.    Activity tolerance: Fair    Discharge recommendations: home with home health     Barriers to discharge: Decreased caregiver support    Equipment recommendations: bath bench, bedside commode     GOALS:    Physical Therapy Goals        Problem: Physical Therapy Goal    Goal Priority Disciplines Outcome Goal Variances Interventions   Physical Therapy Goal     PT/OT, PT Ongoing (interventions implemented as appropriate)     Description:  Goals to be met by: 9 days     Patient will increase functional independence with mobility by performin. Supine to sit with Modified [place] Met 17  2. Sit to supine with Modified [place] Met 17  3. Bed to chair transfer with Supervision w/o AD via scoot pivot or squat pivot transfer met (2017)   4. Wheelchair propulsion x150 feet with Supervision using bilateral uppper extremities Met 17  5. Lower extremity exercise program x20 reps per handout, with assistance as needed   6. Ambulate 15ft (do not exceed) w/ RW and SPV w/ Darco shoes.                          PLAN:    Patient to be seen 5 x/week  to address the above listed problems via therapeutic activities, therapeutic exercises, wheelchair management/training  Plan of Care expires: 17  Plan of Care reviewed with: patient    Jolie TOMASA Saeed, PT  2017

## 2017-06-26 NOTE — PROGRESS NOTES
06/26/2017  10:26 AM  Discharge Planning Assessment    Payor: [employer] MANAGED MEDICARE / Plan: [employer] CHOICES 65 / Product Type: Medicare Advantage /     Expected length of stay:  [] 7 days   [x] 10 days  [] 14 days   [] 21 days   [] > 30 days    Communicated expected length of stay with patient/caregiver:  [x] Yes   [] No    Anticipated discharge date:  6/30/2017    Assessment information obtained from:  [x] Patient   [] Caregiver     Arrived from:   [x] Home   [] Assisted Living    [] Nursing Home   [] SNF   [] Rehab  [] LTACH   [] Group Home   [] Foster Care   [] Psych   [] Shelter   [] Homeless   [] Transfer  [] Correctional Facility  [] Name of Facility:      Patient currently lives with:    [] Alone   [x] Spouse   [] Daughter   [] Son   [] Grandparents   []  Parents   [] Siblings   [] Friends   [] Domestic Partner   [] Facility Resident      [] Foster Home    [] Other:       Extended Emergency Contact Information  Primary Emergency Contact: Vladimir Rodarte  Address: [address] [place] [address] [place] [facility]  Mobile Phone: [phone]  Relation: Spouse     Prior to hospitalization cognitive status:   [x] Alert/Oriented  [] No Deficits [] Risk of Harm to Self/Others   [] Not Oriented to Person   [] Not Oriented to Place   [] Not Oriented to Time   [] Coma/Sedated/Intubated  [] Judgement Impaired    []  Unable to Assess   [] Inappropriate Behavior  [] Infant/Toddler    Prior to hospitalization functional status:   [x] Independent   [x] Assistive Equipment   [] Assistive Person    [] Completely Dependent  [] Infant/Toddler/Child Appropriate   [] Infant/Toddler/Child Delayed    []  Adolescent     Current cognitive status:   [x] Alert/Oriented  [] No Deficits [] Risk of Harm to Self/Others   [] Not Oriented to Person   [] Not Oriented to Place   [] Not Oriented to Time   [] Coma/Sedated/Intubated  [] Judgement Impaired    []  Unable to Assess   [] Inappropriate Behavior  []  Infant/Toddler    Current functional status:     [] Independent   [x] Assistive Equipment   [x] Assistive Person     [] Completely Dependent   [] Infant/Toddler/Child Appropriate   [] Infant/Toddler/Child Delayed     [] Adolescent     Capacity to Care for Self:   Is patient able to return to prior living arrangements after discharge: [x] Yes  [] No     Is patient able to care for self after discharge?   [] Yes   [x] No     [] Pediatric     Does the patient have family/friends to assist after discharge?:  [x] Yes   [] No    [] N/A   Comments:  Spouse      Patient/caregiver perception of discharge disposition:   [] Home   [x] Home with Family  [x] Home Health   [] SNF   [] Rehab   [] LTAC    []  New Nursing Home Placement  [] Return to Nursing Home    [] Shelter     [] Assisted Living  [] Foster Home   [] Other:      Readmit:   Has patient esteban in the hospital in the last 30 days? [] Yes   [x] No     If YES, was patient admitted for the same reason?  [] Yes   [] No       Home Health:   Patient currently receives home health services?:   [x] Yes   [] No     Patient previously received home health services and would like to resume services if necessary   [x] Yes   [] No      If YES, name of home health provider: [facility]    DME:   Patient currently uses DME:   [x] Yes   [] No        List of equipment currently used:     [x] Wheelchair   [] Standard Walker  [x] Rolling Walker  []  Rollator    [] Oxygen    [] Portable oxygen   [] Nebulizer    [] Apnea Monitor    [] Crutches  [] Hospital Bed   []  Lift Device   [] Scooter [x] Cane     [] Prosthesis   []  BSC   [] Tub Bench   [] Catheter Supplies    [] Ostomy Supplies   [] Trach Supplies     [] Suction Machine        [] Home Vent    [] Bipap   [x] Other: shower chair        Medications:    Can the patient afford all prescribed medications?  [x] Yes   [] No     If NO, what medication:       Is the patient taking medications as prescribed?    [x] Yes   []  No    Financial Concerns:   Does the patient have any financial concerns?   [] Yes   [x] No      If YES, what are the concerns:      Transportation:   Does the patient have transportation to healthcare appointments?   [x] Yes   [] No     If YES, what means of transportation does the patient have?   [x] Car   [x] Family/Friend  [] Bicycle   [] Motorcycle   [] Public Transportation [] Ambulance[] Wheelchair van   [] Name of Provider    Dialysis:   Does the patient currently receive dialysis?   [] Yes   [x] No     Riki Huynh MD   [address]  [place] [address]  [phone]   [phone]         APS/CPS involved in the case:  [] Yes   [x] No   If YES, name of :     If YES, phone number of :        Discharge Plan A:  [] Home   [x] Home with Family  [x] Home Health   [] SNF   [] Rehab   [] LTAC   []  New Nursing Home Placement  [] Return to Nursing Home    [] Assisted Living    [] Shelter     [] Private Duty Nursing   [] Foster Home    [] Psych    [] Early [facility]  [] WIC       [] Home Hospice   [] Inpatient Hospice   [] Other    Discharge Plan B:  [] Home   [] Home with Family  [] Home Health   [] SNF   [] Rehab   [] LTAC  []  New Nursing Home Placement   [] Return to  Nursing Home    [] Assisted Living   [] Shelter  [] Private Duty Nursing   [] Foster Home     [] Psych     [] Early Steps  [] WIC    [] Home Hospice     [] Inpatient Hospice   [] Other     [x] Patient and family in agreement with discharge plan.    Rounded with NP Hillary, pharmacist Maricarmen, and pharmacy student. Patient AAO, denies pain . Discharge plan is to return home with assist of spouse. Informed patient therapy recommends a 9 day SNF stay and that we (MD, nurse, therapy, SW,CM) meet today to review his case and come up with a discharge date.  Patient stated understanding. Patient stated using [facility] in the past and would like to use them again upon discharge from SNF. Informed patient of cardiology appt  tomorrow with Dr Moreno at 10a and with podiatry Dr Bland at 1pm in [place]. Patient stated understanding. CM and SW will continue to follow for any additional needs.    Elise Johnston RN, CM Skilled  [id number]

## 2017-06-26 NOTE — PLAN OF CARE
Problem: Occupational Therapy Goal  Goal: Occupational Therapy Goal  Goals to be met by: 9 days     Patient will increase functional independence with ADLs by performing:    LE Dressing with Supervision w/ AE as needed.  Toileting from bedside commode with Supervision for hygiene and clothing management.   Bathing from  edge of bed with Supervision.  Toilet transfer to bedside commode with Supervision w/ AD or slide board as needed.  Upper extremity exercise program x10 reps per handout, with independence.     Outcome: Ongoing (interventions implemented as appropriate)  Goals remain appropriate.

## 2017-06-26 NOTE — PROGRESS NOTES
[facility]  Progress Note    Patient Name: Chau Rodarte  MRN: [id number]  Code Status: Full Code  Admission Date: 6/22/2017  Length of Stay: 4 days  Attending Physician: Anay Montoya MD  Primary Care Provider: Riki Huynh MD    Subjective:     Principal Problem:Debility    HPI:  Chief Complaint/Reason for Admission: Debility    History of Present Illness:  Patient is a 67 y.o. male with DM2, HTN, CAD, Stroke, CKD3 who presents to SNF after hospitalization due to contact dermatitis to tape and generalized weakness.  He was initially admitted on 6/14-6/16 for left femoral to below knee popliteal bypass by Dr. William. He was discharged to home with home health.  The patient has bilateral foot ulcers and is followed by Podiatry as well.  The patient developed blisters along tape line at home over left LE incisions.  He was allowed to ambulate to the bathroom at home but was otherwise NWB.  He complains of pain to right medial thigh since surgery that is relieved with hydrocodone/APAP.  He did have a fall in the hospital after right foot slipped.  Ulcers and DTI present since 4/2017 per podiatry records. The patient has been admitted to SNF for ongoing PT/OT due to insufficient progress to go home safely from the hospital.    Hospital Course:  6/22/17: Patient admitted to SNF for ongoing therapy follow a hospitalization for contact dermatitis and generalized weakness.  6/26/17: Patient seen at bedside, denies pain, doing well, labs reviewed.    Interval History: Patient seen at bedside, denies pain, overnight uneventful, slept well.    Review of Systems   Constitutional: Negative for appetite change, chills, fatigue and fever.   HENT: Negative for trouble swallowing.    Respiratory: Negative for cough, chest tightness, shortness of breath and wheezing.    Cardiovascular: Negative for chest pain, palpitations and leg swelling.   Gastrointestinal: Negative for abdominal pain, constipation, diarrhea and  nausea.   Genitourinary: Negative for difficulty urinating, frequency and urgency.   Musculoskeletal: Negative for arthralgias and myalgias.   Skin: Negative for rash.   Neurological: Negative for dizziness, weakness, light-headedness and headaches.   Psychiatric/Behavioral: Negative for sleep disturbance.     Scheduled Meds:   aspirin  81 mg Oral Daily    atorvastatin  80 mg Oral Daily    cholecalciferol (vitamin D3)  5,000 Units Oral Weekly    clopidogrel  75 mg Oral Daily    collagenase   Topical Daily    ferrous sulfate  325 mg Oral Daily    furosemide  40 mg Oral Daily    gabapentin  300 mg Oral BID    insulin detemir  16 Units Subcutaneous QHS    isosorbide-hydrALAZINE 20-37.5 mg  1 tablet Oral TID    metoprolol succinate  25 mg Oral Daily    pantoprazole  40 mg Oral Daily    senna-docusate 8.6-50 mg  1 tablet Oral BID    tamsulosin  0.4 mg Oral Daily     Continuous Infusions:   PRN Meds:.acetaminophen, aluminum-magnesium hydroxide-simethicone, dextrose 50%, dextrose 50%, glucagon (human recombinant), glucose, glucose, hydrocodone-acetaminophen 5-325mg, insulin aspart, ramelteon    Objective:     Vital Signs (Most Recent):  Temp: 98.3 °F (36.8 °C) (06/26/17 0730)  Pulse: 72 (06/26/17 1023)  Resp: 19 (06/26/17 1023)  BP: (!) 95/50 (nurse practioner Andrew fay. pt asymptomatic.) (06/26/17 1023)  SpO2: 96 % (06/26/17 0730) Vital Signs (24h Range):  Temp:  [98.3 °F (36.8 °C)-98.5 °F (36.9 °C)] 98.3 °F (36.8 °C)  Pulse:  [72-78] 72  Resp:  [19-20] 19  SpO2:  [96 %] 96 %  BP: ()/(50-90) 95/50     Weight: 104.7 kg (230 lb 13.2 oz)  Body mass index is 34.09 kg/m².    Intake/Output Summary (Last 24 hours) at 06/26/17 1625  Last data filed at 06/25/17 1800   Gross per 24 hour   Intake              360 ml   Output                0 ml   Net              360 ml      Physical Exam   Constitutional: He is oriented to person, place, and time. He appears well-developed and well-nourished. No distress.    Cardiovascular: Normal rate, regular rhythm and normal heart sounds.  Exam reveals no gallop and no friction rub.    No murmur heard.  Pulmonary/Chest: Effort normal and breath sounds normal. No respiratory distress. He has no wheezes. He has no rales.   Abdominal: Soft. Bowel sounds are normal. He exhibits no distension. There is no tenderness.   Musculoskeletal: Normal range of motion. He exhibits no edema or tenderness.   Neurological: He is alert and oriented to person, place, and time.   Skin: Skin is warm and dry. No rash noted. He is not diaphoretic. No cyanosis. Nails show no clubbing.   Incisions to left groin, thigh, leg intact with no drainage; erythema immediately along staple/suture lines; healing wound from previous blister to left leg; feet with numerous ulcers--dressing to bilateral feet c/d/i    Psychiatric: He has a normal mood and affect. His behavior is normal.       Significant Labs:    Recent Labs  Lab 06/22/17  0443 06/23/17  0613 06/26/17  0510   WBC 8.90 9.05 10.00   HGB 6.9* 9.0* 8.7*   HCT 21.6* 28.4* 27.8*   * 410* 465*         Recent Labs  Lab 06/22/17  0443 06/23/17  0613 06/26/17  0510    142 141   K 4.0 4.1 4.1    109 106   CO2 24 26 25   BUN 41* 33* 34*   CREATININE 2.3* 2.1* 2.4*   CALCIUM 8.7 9.0 8.7     Lab Results   Component Value Date    LABPROT 11.4 06/19/2017    ALBUMIN 2.4 (L) 06/19/2017     Lab Results   Component Value Date    CALCIUM 8.7 06/26/2017    PHOS 3.2 06/26/2017     POCT Glucose   Date Value Ref Range Status   06/26/2017 189 (H) 70 - 110 mg/dL Final   06/26/2017 144 (H) 70 - 110 mg/dL Final   06/25/2017 257 (H) 70 - 110 mg/dL Final   06/25/2017 152 (H) 70 - 110 mg/dL Final   06/25/2017 193 (H) 70 - 110 mg/dL Final   06/25/2017 196 (H) 70 - 110 mg/dL Final   06/24/2017 284 (H) 70 - 110 mg/dL Final   06/24/2017 186 (H) 70 - 110 mg/dL Final   06/23/2017 193 (H) 70 - 110 mg/dL Final   06/23/2017 148 (H) 70 - 110 mg/dL Final     Hemoglobin A1C    Date Value Ref Range Status   05/09/2017 6.4 (H) 4.5 - 6.2 % Final   02/27/2017 7.1 (H) 4.5 - 6.2 % Final   01/12/2017 8.2 (H) 4.5 - 6.2 % Final     Results for ERNESTINE AGUIRRE (MRN [id number]) as of 6/26/2017 16:28   Ref. Range 6/26/2017 05:10   Magnesium Latest Ref Range: 1.6 - 2.6 mg/dL 1.6     Significant Imaging: n/a    Assessment/Plan:      * Debility    -continue PT/OT to increase ambulation, ADL performance and endurance  -continue early mobility for DVT prophylaxis; patient refuses heparin products due to history of bleeding; SCD's as tolerated to right leg  -continue fall precautions  -continue senokot-s to prevent constipation; hold for frequent or loose stooling  -pain regimen: hydrocodone/ APAP 5-325mg q4hrs prn pain        S/P femoral-popliteal bypass surgery    -Keep incisions dry and monitor for erythema or drainage  -No tape to skin due to dermatitis.   -Continue ASA 81mg daily  -continue clopidogrel 75mg daily  -continue hydrocodone/APAP to treat pain  -continue senokot-s to prevent constipation        Type 2 diabetes mellitus with diabetic polyneuropathy, with long-term current use of insulin    -Chronic and controlled  -Continue therapy with levemir 16units nightly  -Monitor with accuchecks   -continue SSNI prn hyperglycemia  -hypo/hyperglycemic protocol   -Continue diabetic diet  -will continue to monitor and adjust regimen as necessary  -Continue gabapentin  300mg bid to treat polyneuropathy        BPH (benign prostatic hypertrophy)    -Chronic   -Continue tamsulosin 0.4mg nightly        Ulcer of heel due to diabetes mellitus    -contiue santyl to bilateral heels and 1st right MTP to treat as per podiatry:  1.Cleanse wounds B/L feet with saline  2. Apply santyl to all wounds B/L LE  3. Apply krysten foam wrap with kerlix.   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, NYHA class 2 and CKD stage 3    -HTN controlled and CHF compensated   -continue metoprolol 24hr  25mg daily  -continue bidil 20-37.5mg tid  -added holding parameters  -Monitor with daily weights and continue lasix 40mg daily to maintain euvolemia  -Continue cardiac diet   -Cr is at baseline; will continue to monitor with BIW labs; renally adjust meds and avoid nephrotoxins        HLD (hyperlipidemia)    -Chronic  -Continue atorvastatin 80mg daily          Future Appointments  Date Time Provider Department Center   6/27/2017 10:00 AM EKG, APPT [facility] EKG [facility]   6/27/2017 10:40 AM Cayden Moreno MD [facility] ARRHYTH [facility]   6/27/2017 1:00 PM Quirino Bland DPM [facility]     Hillary Morales NP  [facility]

## 2017-06-26 NOTE — PT/OT/SLP PROGRESS
"Occupational Therapy  Treatment    Chau Rodarte   MRN: [id number]   Admitting Diagnosis: Debility    OT Date of Treatment: 06/26/17  Total Time (min): 50 min    Billable Minutes:  Self Care/Home Management 15 and Therapeutic Exercise 35    General Precautions: Standard, fall  Orthopedic Precautions:  (NWB (L) heel, minimal WB for transfers only w/ (B) Darcos)  Braces: N/A    Do you have any cultural, spiritual, Tenriism conflicts, given your current situation?: no    Subjective:  "I fell in the hospital."    Pain/Comfort  Pain Rating 1: 0/10  Pain Rating Post-Intervention 1: 0/10    Objective:  Patient found supine in bed.    Functional Status:  MDS G  Bed Mobility Functional Status: mod(I) for sup to sit  Transfer Functional Status: CGA for sit to stand from EOB, stand pivot w/ RW  Dressing Functional Status: LBD - CGA     OT Exercises: UE Ergometer x15 min  Rickshaw 10# x10 min  Lat pull downs 30# x10 min    Additional Treatment:  Pt instructed on and performed WC management of leg rests and breaks w/ min (A).     Patient left up in chair with P.T. present    ASSESSMENT:  Chau Rodarte is a 67 y.o. male with a medical diagnosis of Debility. Pt req's cues for safety w/ t/f's.    Rehab identified problem list/impairments: weakness, impaired endurance, impaired self care skills, impaired functional mobilty, impaired balance, decreased lower extremity function, decreased safety awareness, impaired skin, orthopedic precautions    Rehab potential is good    Activity tolerance: Good    Discharge recommendations: home with home health     Barriers to discharge: Decreased caregiver support     Equipment recommendations: bedside commode, bath bench     GOALS:    Occupational Therapy Goals        Problem: Occupational Therapy Goal    Goal Priority Disciplines Outcome Interventions   Occupational Therapy Goal     OT, PT/OT Ongoing (interventions implemented as appropriate)    Description:  Goals to be met by: 9 days     Patient " will increase functional independence with ADLs by performing:    LE Dressing with Supervision w/ AE as needed.  Toileting from bedside commode with Supervision for hygiene and clothing management.   Bathing from  edge of bed with Supervision.  Toilet transfer to bedside commode with Supervision w/ AD or slide board as needed.  Upper extremity exercise program x10 reps per handout, with independence.                  Plan:  Patient to be seen 5 x/week to address the above listed problems via self-care/home management, therapeutic activities, therapeutic exercises  Plan of Care expires: 07/23/17  Plan of Care reviewed with: patient    KULDEEP Hoffmann  06/26/2017

## 2017-06-26 NOTE — PT/OT/SLP PROGRESS
Occupational Therapy  Treatment    Chau Rodarte   MRN: [id number]   Admitting Diagnosis: Debility    OT Date of Treatment: 06/25/17  Total Time (min): 30 min    Billable Minutes:  Therapeutic Activity 30    General Precautions: Standard, fall  Orthopedic Precautions:  (NWB (L) heel, minimal WB for transfers only w/ (B) Darcos)  Braces: N/A    Do you have any cultural, spiritual, Oriental orthodox conflicts, given your current situation?: no    Subjective:  Communicated with nursing prior to session.  Pts wife was present following session and reported concern re: his LOS due to her working. OT reported this LOS would be discussed in IDT on 6/26/17. OT educated his wife in the importance of his having home modifications re: clear hallways, taking up throw rugs, assessing doorway widths and ensuring accessibility of his bathroom doorway prior to d/c to home.     Objective:   Pt was seen for education/ training in bed mobility, bed positioning, functional tf training and ADL retraining including toileting and LB dressing.    OT Exercises: OT addressed the importance of adhering to BUE strengthening program including use of 3# dumbbells 2x15 reps for shoulder presses,  presses, shoulder forward flexion and bicep curls while seated on EOB.    Additional Treatment:  OT educated pt on importance of proper floating of B heels at all times due to skin breakdown in a vascularly compromised LLE. Also addressed such with pts wife.    Patient left up in chair with call button in reach    ASSESSMENT:  Chau Rodarte is a 67 y.o. male with a medical diagnosis of Debility who has the potential for returning to home via w/c until cleared for WBing through both services.     Rehab identified problem list/impairments: weakness, impaired endurance, impaired self care skills, impaired functional mobilty, impaired balance, decreased lower extremity function, decreased safety awareness, impaired skin, orthopedic precautions    Rehab potential  is good    Activity tolerance: Good    Discharge recommendations: home with home health     Barriers to discharge: Decreased caregiver support     Equipment recommendations: bedside commode, bath bench     GOALS:    Occupational Therapy Goals        Problem: Occupational Therapy Goal    Goal Priority Disciplines Outcome Interventions   Occupational Therapy Goal     OT, PT/OT Ongoing (interventions implemented as appropriate)    Description:  Goals to be met by: 9 days     Patient will increase functional independence with ADLs by performing:    LE Dressing with Supervision w/ AE as needed.  Toileting from bedside commode with Supervision for hygiene and clothing management.   Bathing from  edge of bed with Supervision.  Toilet transfer to bedside commode with Supervision w/ AD or slide board as needed.  Upper extremity exercise program x10 reps per handout, with independence.                      Plan:  Patient to be seen 5 x/week to address the above listed problems via self-care/home management, therapeutic activities, therapeutic exercises  Plan of Care expires: 07/23/17  Plan of Care reviewed with: patient    Suzy Fatoumata, OT  06/25/2017

## 2017-06-26 NOTE — PLAN OF CARE
Problem: Physical Therapy Goal  Goal: Physical Therapy Goal  Goals to be met by: 9 days     Patient will increase functional independence with mobility by performin. Supine to sit with Modified [place] Met 17  2. Sit to supine with Modified [place] Met 17  3. Bed to chair transfer with Supervision w/o AD via scoot pivot or squat pivot transfer met (2017)   4. Wheelchair propulsion x150 feet with Supervision using bilateral uppper extremities Met 17  5. Lower extremity exercise program x20 reps per handout, with assistance as needed   6. Ambulate 15ft (do not exceed) w/ RW and SPV w/ [employer]co shoes.        Outcome: Ongoing (interventions implemented as appropriate)  Met one goal.

## 2017-06-26 NOTE — SUBJECTIVE & OBJECTIVE
Hospital Course:  6/22/17: Patient admitted to SNF for ongoing therapy follow a hospitalization for contact dermatitis and generalized weakness.  6/26/17: Patient seen at bedside, denies pain, doing well, labs reviewed.    Interval History: Patient seen at bedside, denies pain, overnight uneventful, slept well.    Review of Systems   Constitutional: Negative for appetite change, chills, fatigue and fever.   HENT: Negative for trouble swallowing.    Respiratory: Negative for cough, chest tightness, shortness of breath and wheezing.    Cardiovascular: Negative for chest pain, palpitations and leg swelling.   Gastrointestinal: Negative for abdominal pain, constipation, diarrhea and nausea.   Genitourinary: Negative for difficulty urinating, frequency and urgency.   Musculoskeletal: Negative for arthralgias and myalgias.   Skin: Negative for rash.   Neurological: Negative for dizziness, weakness, light-headedness and headaches.   Psychiatric/Behavioral: Negative for sleep disturbance.     Scheduled Meds:   aspirin  81 mg Oral Daily    atorvastatin  80 mg Oral Daily    cholecalciferol (vitamin D3)  5,000 Units Oral Weekly    clopidogrel  75 mg Oral Daily    collagenase   Topical Daily    ferrous sulfate  325 mg Oral Daily    furosemide  40 mg Oral Daily    gabapentin  300 mg Oral BID    insulin detemir  16 Units Subcutaneous QHS    isosorbide-hydrALAZINE 20-37.5 mg  1 tablet Oral TID    metoprolol succinate  25 mg Oral Daily    pantoprazole  40 mg Oral Daily    senna-docusate 8.6-50 mg  1 tablet Oral BID    tamsulosin  0.4 mg Oral Daily     Continuous Infusions:   PRN Meds:.acetaminophen, aluminum-magnesium hydroxide-simethicone, dextrose 50%, dextrose 50%, glucagon (human recombinant), glucose, glucose, hydrocodone-acetaminophen 5-325mg, insulin aspart, ramelteon    Objective:     Vital Signs (Most Recent):  Temp: 98.3 °F (36.8 °C) (06/26/17 0730)  Pulse: 72 (06/26/17 1023)  Resp: 19 (06/26/17 1023)  BP:  (!) 95/50 (nurse practioner Cook informed. pt asymptomatic.) (06/26/17 1023)  SpO2: 96 % (06/26/17 0730) Vital Signs (24h Range):  Temp:  [98.3 °F (36.8 °C)-98.5 °F (36.9 °C)] 98.3 °F (36.8 °C)  Pulse:  [72-78] 72  Resp:  [19-20] 19  SpO2:  [96 %] 96 %  BP: ()/(50-90) 95/50     Weight: 104.7 kg (230 lb 13.2 oz)  Body mass index is 34.09 kg/m².    Intake/Output Summary (Last 24 hours) at 06/26/17 1625  Last data filed at 06/25/17 1800   Gross per 24 hour   Intake              360 ml   Output                0 ml   Net              360 ml      Physical Exam   Constitutional: He is oriented to person, place, and time. He appears well-developed and well-nourished. No distress.   Cardiovascular: Normal rate, regular rhythm and normal heart sounds.  Exam reveals no gallop and no friction rub.    No murmur heard.  Pulmonary/Chest: Effort normal and breath sounds normal. No respiratory distress. He has no wheezes. He has no rales.   Abdominal: Soft. Bowel sounds are normal. He exhibits no distension. There is no tenderness.   Musculoskeletal: Normal range of motion. He exhibits no edema or tenderness.   Neurological: He is alert and oriented to person, place, and time.   Skin: Skin is warm and dry. No rash noted. He is not diaphoretic. No cyanosis. Nails show no clubbing.   Incisions to left groin, thigh, leg intact with no drainage; erythema immediately along staple/suture lines; healing wound from previous blister to left leg; feet with numerous ulcers--dressing to bilateral feet c/d/i    Psychiatric: He has a normal mood and affect. His behavior is normal.       Significant Labs:    Recent Labs  Lab 06/22/17 0443 06/23/17  0613 06/26/17  0510   WBC 8.90 9.05 10.00   HGB 6.9* 9.0* 8.7*   HCT 21.6* 28.4* 27.8*   * 410* 465*         Recent Labs  Lab 06/22/17 0443 06/23/17  0613 06/26/17  0510    142 141   K 4.0 4.1 4.1    109 106   CO2 24 26 25   BUN 41* 33* 34*   CREATININE 2.3* 2.1* 2.4*    CALCIUM 8.7 9.0 8.7     Lab Results   Component Value Date    LABPROT 11.4 06/19/2017    ALBUMIN 2.4 (L) 06/19/2017     Lab Results   Component Value Date    CALCIUM 8.7 06/26/2017    PHOS 3.2 06/26/2017     POCT Glucose   Date Value Ref Range Status   06/26/2017 189 (H) 70 - 110 mg/dL Final   06/26/2017 144 (H) 70 - 110 mg/dL Final   06/25/2017 257 (H) 70 - 110 mg/dL Final   06/25/2017 152 (H) 70 - 110 mg/dL Final   06/25/2017 193 (H) 70 - 110 mg/dL Final   06/25/2017 196 (H) 70 - 110 mg/dL Final   06/24/2017 284 (H) 70 - 110 mg/dL Final   06/24/2017 186 (H) 70 - 110 mg/dL Final   06/23/2017 193 (H) 70 - 110 mg/dL Final   06/23/2017 148 (H) 70 - 110 mg/dL Final     Hemoglobin A1C   Date Value Ref Range Status   05/09/2017 6.4 (H) 4.5 - 6.2 % Final   02/27/2017 7.1 (H) 4.5 - 6.2 % Final   01/12/2017 8.2 (H) 4.5 - 6.2 % Final     Results for ERNESTINE AGUIRRE (MRN [id number]) as of 6/26/2017 16:28   Ref. Range 6/26/2017 05:10   Magnesium Latest Ref Range: 1.6 - 2.6 mg/dL 1.6     Significant Imaging: n/a

## 2017-06-27 ENCOUNTER — HOSPITAL ENCOUNTER (OUTPATIENT)
Dept: WOUND CARE | Facility: HOSPITAL | Age: 67
Discharge: HOME | End: 2017-06-27
Attending: PODIATRIST
Payer: MEDICARE

## 2017-06-27 ENCOUNTER — OFFICE VISIT (OUTPATIENT)
Dept: ELECTROPHYSIOLOGY | Facility: CLINIC | Age: 67
DRG: 949 | End: 2017-06-27
Attending: INTERNAL MEDICINE
Payer: MEDICARE

## 2017-06-27 VITALS — DIASTOLIC BLOOD PRESSURE: 65 MMHG | HEART RATE: 73 BPM | TEMPERATURE: 99 F | SYSTOLIC BLOOD PRESSURE: 150 MMHG

## 2017-06-27 VITALS
SYSTOLIC BLOOD PRESSURE: 116 MMHG | HEIGHT: 69 IN | BODY MASS INDEX: 33.76 KG/M2 | HEART RATE: 78 BPM | WEIGHT: 227.94 LBS | DIASTOLIC BLOOD PRESSURE: 66 MMHG

## 2017-06-27 DIAGNOSIS — I63.9 CRYPTOGENIC STROKE: Primary | Chronic | ICD-10-CM

## 2017-06-27 DIAGNOSIS — I70.25 ATHEROSCLEROSIS OF NATIVE ARTERIES OF THE EXTREMITIES WITH ULCERATION: ICD-10-CM

## 2017-06-27 DIAGNOSIS — I73.9 PERIPHERAL VASCULAR DISEASE, UNSPECIFIED: ICD-10-CM

## 2017-06-27 DIAGNOSIS — E11.42 TYPE 2 DIABETES MELLITUS WITH DIABETIC POLYNEUROPATHY, WITH LONG-TERM CURRENT USE OF INSULIN: ICD-10-CM

## 2017-06-27 DIAGNOSIS — I48.3 TYPICAL ATRIAL FLUTTER: Chronic | ICD-10-CM

## 2017-06-27 DIAGNOSIS — Z79.4 TYPE 2 DIABETES MELLITUS WITH DIABETIC POLYNEUROPATHY, WITH LONG-TERM CURRENT USE OF INSULIN: ICD-10-CM

## 2017-06-27 DIAGNOSIS — I70.235 ATHEROSCLEROSIS OF NATIVE ARTERY OF BOTH LOWER EXTREMITIES WITH BILATERAL ULCERATION OF OTHER PART OF FEET: Primary | ICD-10-CM

## 2017-06-27 DIAGNOSIS — I10 ESSENTIAL HYPERTENSION: ICD-10-CM

## 2017-06-27 DIAGNOSIS — E11.621 DIABETIC ULCER OF HEEL ASSOCIATED WITH TYPE 2 DIABETES MELLITUS, UNSPECIFIED LATERALITY, UNSPECIFIED ULCER STAGE: ICD-10-CM

## 2017-06-27 DIAGNOSIS — E11.51 TYPE 2 DIABETES MELLITUS WITH PERIPHERAL CIRCULATORY DISORDER: ICD-10-CM

## 2017-06-27 DIAGNOSIS — L97.409 DIABETIC ULCER OF HEEL ASSOCIATED WITH TYPE 2 DIABETES MELLITUS, UNSPECIFIED LATERALITY, UNSPECIFIED ULCER STAGE: ICD-10-CM

## 2017-06-27 DIAGNOSIS — I70.245 ATHEROSCLEROSIS OF NATIVE ARTERY OF BOTH LOWER EXTREMITIES WITH BILATERAL ULCERATION OF OTHER PART OF FEET: Primary | ICD-10-CM

## 2017-06-27 DIAGNOSIS — I25.10 CORONARY ARTERY DISEASE INVOLVING NATIVE CORONARY ARTERY OF NATIVE HEART WITHOUT ANGINA PECTORIS: ICD-10-CM

## 2017-06-27 PROBLEM — I48.91 AF (ATRIAL FIBRILLATION): Status: RESOLVED | Noted: 2017-03-24 | Resolved: 2017-06-27

## 2017-06-27 LAB
POCT GLUCOSE: 144 MG/DL (ref 70–110)
POCT GLUCOSE: 210 MG/DL (ref 70–110)
POCT GLUCOSE: 225 MG/DL (ref 70–110)

## 2017-06-27 PROCEDURE — 99499 UNLISTED E&M SERVICE: CPT | Mod: S$GLB,,, | Performed by: INTERNAL MEDICINE

## 2017-06-27 PROCEDURE — 97597 DBRDMT OPN WND 1ST 20 CM/<: CPT

## 2017-06-27 PROCEDURE — 27201912 HC WOUND CARE DEBRIDEMENT SUPPLIES

## 2017-06-27 PROCEDURE — 3066F NEPHROPATHY DOC TX: CPT | Mod: S$GLB,,, | Performed by: INTERNAL MEDICINE

## 2017-06-27 PROCEDURE — 11042 DBRDMT SUBQ TIS 1ST 20SQCM/<: CPT | Mod: ,,, | Performed by: PODIATRIST

## 2017-06-27 PROCEDURE — 1126F AMNT PAIN NOTED NONE PRSNT: CPT | Mod: S$GLB,,, | Performed by: INTERNAL MEDICINE

## 2017-06-27 PROCEDURE — 1159F MED LIST DOCD IN RCRD: CPT | Mod: S$GLB,,, | Performed by: INTERNAL MEDICINE

## 2017-06-27 PROCEDURE — 25000003 PHARM REV CODE 250: Performed by: STUDENT IN AN ORGANIZED HEALTH CARE EDUCATION/TRAINING PROGRAM

## 2017-06-27 PROCEDURE — 11042 DBRDMT SUBQ TIS 1ST 20SQCM/<: CPT

## 2017-06-27 PROCEDURE — 11042 PR DEBRIDEMENT, SKIN, SUB-Q TISSUE,=<20 SQ CM: ICD-10-PCS | Mod: ,,, | Performed by: PODIATRIST

## 2017-06-27 PROCEDURE — 25000003 PHARM REV CODE 250: Performed by: NURSE PRACTITIONER

## 2017-06-27 PROCEDURE — 99213 OFFICE O/P EST LOW 20 MIN: CPT | Mod: S$GLB,,, | Performed by: INTERNAL MEDICINE

## 2017-06-27 PROCEDURE — 12000000 HC SNF SEMI-PRIVATE ROOM

## 2017-06-27 PROCEDURE — 3044F HG A1C LEVEL LT 7.0%: CPT | Mod: S$GLB,,, | Performed by: INTERNAL MEDICINE

## 2017-06-27 PROCEDURE — 99999 PR PBB SHADOW E&M-EST. PATIENT-LVL IV: CPT | Mod: PBBFAC,,, | Performed by: INTERNAL MEDICINE

## 2017-06-27 RX ORDER — ISOSORBIDE DINITRATE 20 MG/1
20 TABLET ORAL 3 TIMES DAILY
Status: DISCONTINUED | OUTPATIENT
Start: 2017-06-27 | End: 2017-06-30 | Stop reason: HOSPADM

## 2017-06-27 RX ORDER — HYDRALAZINE HYDROCHLORIDE 25 MG/1
25 TABLET, FILM COATED ORAL EVERY 8 HOURS PRN
Status: DISCONTINUED | OUTPATIENT
Start: 2017-06-27 | End: 2017-06-30 | Stop reason: HOSPADM

## 2017-06-27 RX ORDER — FUROSEMIDE 20 MG/1
20 TABLET ORAL DAILY
Status: DISCONTINUED | OUTPATIENT
Start: 2017-06-28 | End: 2017-06-30

## 2017-06-27 RX ADMIN — STANDARDIZED SENNA CONCENTRATE AND DOCUSATE SODIUM 1 TABLET: 8.6; 5 TABLET, FILM COATED ORAL at 09:06

## 2017-06-27 RX ADMIN — RAMELTEON 8 MG: 8 TABLET, FILM COATED ORAL at 09:06

## 2017-06-27 RX ADMIN — ISOSORBIDE DINITRATE 20 MG: 20 TABLET ORAL at 09:06

## 2017-06-27 RX ADMIN — HYDRALAZINE HYDROCHLORIDE AND ISOSORBIDE DINITRATE 1 TABLET: 37.5; 2 TABLET, FILM COATED ORAL at 12:06

## 2017-06-27 RX ADMIN — INSULIN ASPART 1 UNITS: 100 INJECTION, SOLUTION INTRAVENOUS; SUBCUTANEOUS at 09:06

## 2017-06-27 RX ADMIN — INSULIN DETEMIR 16 UNITS: 100 INJECTION, SOLUTION SUBCUTANEOUS at 09:06

## 2017-06-27 RX ADMIN — INSULIN ASPART 2 UNITS: 100 INJECTION, SOLUTION INTRAVENOUS; SUBCUTANEOUS at 06:06

## 2017-06-27 RX ADMIN — GABAPENTIN 300 MG: 300 CAPSULE ORAL at 09:06

## 2017-06-27 RX ADMIN — HYDROCODONE BITARTRATE AND ACETAMINOPHEN 1 TABLET: 5; 325 TABLET ORAL at 09:06

## 2017-06-27 RX ADMIN — HYDROCODONE BITARTRATE AND ACETAMINOPHEN 1 TABLET: 5; 325 TABLET ORAL at 06:06

## 2017-06-27 NOTE — PATIENT INSTRUCTIONS
Instructed to keep dressings dry and intact and to call [facility] PRN complications such as increased pain, increased swelling, increased redness, and/or fever.

## 2017-06-27 NOTE — PT/OT/SLP PROGRESS
Occupational Therapy  Treatment    Patient not seen today due to out of facility for appointment. Will reschedule missed treatment.

## 2017-06-27 NOTE — LETTER
June 27, 2017      Nehal William MD  [address]  [facility] [address]           Jayme Lisbeth - Arrhythmia  [address]  [facility] [address]  Phone: [phone]  Fax: [phone]          Patient: Chau Rodarte   MR Number: [id number]   YOB: 1950   Date of Visit: 6/27/2017       Dear Dr. Nehal William:    Thank you for referring Chau Rodarte to me for evaluation. Attached you will find relevant portions of my assessment and plan of care.    If you have questions, please do not hesitate to call me. I look forward to following Chau Rodarte along with you.    Sincerely,    Cayden Moreno MD    Enclosure  CC:  No Recipients    If you would like to receive this communication electronically, please contact [email or web address] or [phone] to request more information on [employer] access.    For providers and/or their staff who would like to refer a patient to [facility], please contact us through our one-stop-shop provider referral line, [facility], [place], at [phone].    If you feel you have received this communication in error or would no longer like to receive these types of communications, please e-mail [email or web address]

## 2017-06-27 NOTE — PROGRESS NOTES
Pt awake in bed at this time. Large amt serosang fluid noted to bed linen. Drainage noted to small area of incision to L shin. Staples remain intact. Incision well approximated. Site cleaned with normal saline, covered with sterile gauze and wrapped with kerlix. Will communicate to Charge Nurse and oncoming shift nurse.

## 2017-06-27 NOTE — PROGRESS NOTES
Pt sitting at edge of the bed, when patient laid down in bed notice a small streak of blood to mid lower leg. This nurse cleaned site with sterile normal saline , no active or further bleeding noted. Site sarkis. Patient instructed to call prn. Site has staples intact.charge nurse informed.

## 2017-06-27 NOTE — PT/OT/SLP PROGRESS
Physical Therapy      Chau Rodarte  MRN: [id number]    Patient not seen today secondary to being away at appointment all day  . Will follow-up tomorrow. No charges posted to account.    Ana Fallon, PT   6/27/2017

## 2017-06-27 NOTE — PROGRESS NOTES
Subjective:    Patient ID:  Chau Rodarte is a 67 y.o. male who presents for follow-up of typical atrial flutter    Referring Cardiologists: Meño Gan MD, Yanick Holland MD and Robson Sims MD  HPI    Prior Hx:  I had the pleasure of seeing Mr. Rodarte today in our electrophysiology clinic in consultation for his newly discovered atrial arrhythmia. As you are aware he is a pleasant 66 year-old man with extensive vascular disease including right femoral-popliteal bypass and left SFA stent, ischemic cardiomyopathy with prior LVEF of 35% and currently 20-25% s/p multivessel PCI 12/5/2016 (PCI to prox-LAD/LCX, distal LMCA and mid LAD) with NYHA class II symptoms, chronic kidney disease stage 3, cryptogenic stroke, hypertension and poorly controlled diabetes mellitus. He was admitted in November of 2016 for progressive heart failure symptoms. He was also found to have a NSTEMI in this setting. He was diuresed and underwent multi-vessel PCI as stated, after turned down for surgery. He was feeling better and was undergoing cardiac rehab at the [facility]. He was noted to not be in atrial flutter at that time.  He had no significant symptoms and was referred here for further evaluation. He has no history of palpitations. He does report a stroke of unknown etiology in 2006. His symptoms were facial droop, dysarthria and right sided weakness. He had been on aspirin/plavix since. He had never been diagnosed with an atrial arrhythmia.    He underwent RFA of the CTI on 2/3/2017.  He was on anticoagulation and plan was to stop 4 weeks after the procedure and then implant an ILR for AF surveillance due to risk of AF as well as risk of bleeding on aspirin/plavix and anticoagulation.  In the interim he was admitted with a major GI bleed.  He underwent ILR implant on 3/24/2017.     I reviewed all available electrocardiograms in EPIC which disclose normal sinus rhythm until 1/25/2017 electrocardiogram which revealed atrial  flutter with variable AV conduction (ventricular rate of 84 bpm).     ECHO 3/2017 CONCLUSIONS     1 - Eccentric hypertrophy.     2 - Mildly depressed left ventricular systolic function (EF 45-50%).     3 - Normal right ventricular systolic function .     4 - Grade I left ventricular diastolic dysfunction.     5 - Mildly enlarged ascending aorta.     Interim Hx:  Mr. Rodarte presents for 3 month post ILR implant follow-up.  No AF has been noted.  He underwent left leg bypass surgery earlier this month and is now at [facility].      My interpretation of today's in clinic ECG is sinus rhythm.    Review of Systems   Constitution: Positive for weakness. Negative for malaise/fatigue.   HENT: Negative.  Negative for congestion.    Eyes: Negative.  Negative for blurred vision.   Cardiovascular: Positive for leg swelling. Negative for chest pain, dyspnea on exertion, irregular heartbeat, near-syncope and palpitations.   Respiratory: Negative.  Negative for cough and shortness of breath.    Endocrine: Negative.    Hematologic/Lymphatic: Negative for bleeding problem. Does not bruise/bleed easily.   Musculoskeletal:        Tenderness at surgical site   Gastrointestinal: Negative for abdominal pain, hematochezia and melena.   Genitourinary: Negative.    Neurological: Negative for dizziness, focal weakness and light-headedness.   Psychiatric/Behavioral: Negative.         Objective:    Physical Exam   Constitutional: He is oriented to person, place, and time. He appears well-developed and well-nourished. No distress.   HENT:   Head: Normocephalic and atraumatic.   Eyes: Conjunctivae are normal. Right eye exhibits no discharge. Left eye exhibits no discharge. No scleral icterus.   Neck: Neck supple. No JVD present.   Cardiovascular: Normal rate, regular rhythm and normal heart sounds.  Exam reveals no gallop and no friction rub.    No murmur heard.  Pulmonary/Chest: Effort normal and breath sounds normal. No respiratory distress. He  has no wheezes. He has no rales.   ILR site well healed   Abdominal: Soft. Bowel sounds are normal. He exhibits no distension. There is no tenderness.   Musculoskeletal: He exhibits edema and tenderness.   Neurological: He is alert and oriented to person, place, and time.   Skin: He is not diaphoretic.   Psychiatric: He has a normal mood and affect. His behavior is normal. Judgment and thought content normal.   Vitals reviewed.        Assessment:       1. Cryptogenic stroke, remote history (2006)    2. Type 2 diabetes mellitus with diabetic polyneuropathy, with long-term current use of insulin    3. Atherosclerosis of native arteries of the extremities with ulceration    4. Coronary artery disease involving native coronary artery of native heart without angina pectoris    5. Essential hypertension    6. Typical atrial flutter    7. Peripheral vascular disease, unspecified         Plan:       In summary, Mr. Rodarte is a pleasant 66 year-old man with extensive vascular disease including right femoral-popliteal bypass and left SFA stent, ischemic cardiomyopathy with prior LVEF of 20-25% now improved to 45% s/p multivessel PCI 12/5/2016 (PCI to prox-LAD/LCX, distal LMCA and mid LAD) with NYHA class II symptoms, chronic kidney disease stage 3, cryptogenic stroke, hypertension and poorly controlled diabetes mellitus who presents for evaluation of newly diagnosed typical counterclockwise atrial flutter. He had no perceived symptoms at the time of diagnosis. However his RECXP2SQCv score is 7 carrying a 14% per year risk of stroke. He is s/p RFA of CTI and ILR implant for AF surveillance due to prior stroke and high bleeding risk (recent hospitalization for major GI bleed).  He is now only of aspirin/plavix.  No AF seen on ILR.  Continue monthly remote transmissions.  Follow-up with me in 1 year, sooner prn.    Thank you for allowing me to participate in the care of this patient. Please do not hesitate to call me with any  questions or concerns.    Cayden Moreno MD, PhD  Cardiac Electrophysiology

## 2017-06-27 NOTE — PROGRESS NOTES
Much of the documentation for this visit was completed in the [facility] system. Please see the attached documentation for further details about this patient's care.     Lilia Stanley LPN

## 2017-06-27 NOTE — PLAN OF CARE
Problem: Patient Care Overview  Goal: Plan of Care Review  Outcome: Ongoing (interventions implemented as appropriate)   06/27/17 0600   Coping/Psychosocial   Plan Of Care Reviewed With patient   Pt awake and alert during shift. NAD noted. Slept well during night. No c/o pain or discomfort. Remains free from falls. Safety measures maintained. Call light used for assistance. Bed in lowest position. Call light in reach.    Problem: Fall Risk (Adult)  Goal: Identify Related Risk Factors and Signs and Symptoms  Related risk factors and signs and symptoms are identified upon initiation of Human Response Clinical Practice Guideline (CPG)   Outcome: Ongoing (interventions implemented as appropriate)   06/27/17 0600   Fall Risk   Related Risk Factors (Fall Risk) gait/mobility problems;slipper/uneven surfaces     Goal: Absence of Falls  Patient will demonstrate the desired outcomes by discharge/transition of care.   Outcome: Ongoing (interventions implemented as appropriate)   06/27/17 0600   Fall Risk (Adult)   Absence of Falls making progress toward outcome

## 2017-06-28 ENCOUNTER — TELEPHONE (OUTPATIENT)
Dept: PODIATRY | Facility: CLINIC | Age: 67
End: 2017-06-28

## 2017-06-28 LAB
POCT GLUCOSE: 144 MG/DL (ref 70–110)
POCT GLUCOSE: 152 MG/DL (ref 70–110)
POCT GLUCOSE: 153 MG/DL (ref 70–110)
POCT GLUCOSE: 160 MG/DL (ref 70–110)

## 2017-06-28 PROCEDURE — 97110 THERAPEUTIC EXERCISES: CPT

## 2017-06-28 PROCEDURE — 97530 THERAPEUTIC ACTIVITIES: CPT

## 2017-06-28 PROCEDURE — 12000000 HC SNF SEMI-PRIVATE ROOM

## 2017-06-28 PROCEDURE — 25000003 PHARM REV CODE 250: Performed by: NURSE PRACTITIONER

## 2017-06-28 PROCEDURE — 25000003 PHARM REV CODE 250: Performed by: STUDENT IN AN ORGANIZED HEALTH CARE EDUCATION/TRAINING PROGRAM

## 2017-06-28 PROCEDURE — 99307 SBSQ NF CARE SF MDM 10: CPT | Mod: ,,, | Performed by: NURSE PRACTITIONER

## 2017-06-28 RX ADMIN — PANTOPRAZOLE SODIUM 40 MG: 40 TABLET, DELAYED RELEASE ORAL at 08:06

## 2017-06-28 RX ADMIN — FUROSEMIDE 20 MG: 20 TABLET ORAL at 08:06

## 2017-06-28 RX ADMIN — TAMSULOSIN HYDROCHLORIDE 0.4 MG: 0.4 CAPSULE ORAL at 08:06

## 2017-06-28 RX ADMIN — INSULIN DETEMIR 16 UNITS: 100 INJECTION, SOLUTION SUBCUTANEOUS at 09:06

## 2017-06-28 RX ADMIN — CLOPIDOGREL BISULFATE 75 MG: 75 TABLET ORAL at 08:06

## 2017-06-28 RX ADMIN — FERROUS SULFATE TAB EC 325 MG (65 MG FE EQUIVALENT) 325 MG: 325 (65 FE) TABLET DELAYED RESPONSE at 08:06

## 2017-06-28 RX ADMIN — GABAPENTIN 300 MG: 300 CAPSULE ORAL at 08:06

## 2017-06-28 RX ADMIN — HYDROCODONE BITARTRATE AND ACETAMINOPHEN 1 TABLET: 5; 325 TABLET ORAL at 08:06

## 2017-06-28 RX ADMIN — ISOSORBIDE DINITRATE 20 MG: 20 TABLET ORAL at 09:06

## 2017-06-28 RX ADMIN — GABAPENTIN 300 MG: 300 CAPSULE ORAL at 09:06

## 2017-06-28 RX ADMIN — ISOSORBIDE DINITRATE 20 MG: 20 TABLET ORAL at 06:06

## 2017-06-28 RX ADMIN — ISOSORBIDE DINITRATE 20 MG: 20 TABLET ORAL at 02:06

## 2017-06-28 RX ADMIN — ATORVASTATIN CALCIUM 80 MG: 20 TABLET, FILM COATED ORAL at 08:06

## 2017-06-28 RX ADMIN — HYDROCODONE BITARTRATE AND ACETAMINOPHEN 1 TABLET: 5; 325 TABLET ORAL at 07:06

## 2017-06-28 RX ADMIN — METOPROLOL SUCCINATE 25 MG: 25 TABLET, EXTENDED RELEASE ORAL at 08:06

## 2017-06-28 RX ADMIN — ASPIRIN 81 MG CHEWABLE TABLET 81 MG: 81 TABLET CHEWABLE at 08:06

## 2017-06-28 NOTE — PLAN OF CARE
Problem: Occupational Therapy Goal  Goal: Occupational Therapy Goal  Goals to be met by: 9 days     Patient will increase functional independence with ADLs by performing:    LE Dressing with Supervision w/ AE as needed.  Toileting from bedside commode with Supervision for hygiene and clothing management.   Bathing from  edge of bed with Supervision.  Toilet transfer to bedside commode with Supervision w/ AD or slide board as needed.  Upper extremity exercise program x10 reps per handout, with independence.     Outcome: Ongoing (interventions implemented as appropriate)  Progressing. KULDEEP Montoya  6/28/2017

## 2017-06-28 NOTE — TELEPHONE ENCOUNTER
----- Message from Barbie Glory sent at 6/28/2017  3:07 PM CDT -----  Contact: Koby Nurse Practitioner [facility], [phone] [phone]  Called in regards to doctor's visit yesterday, states she needs detailed documentation, any additional therapy with wound care. Please advise.

## 2017-06-28 NOTE — PROGRESS NOTES
[facility]  Progress Note    Patient Name: Chau Rodarte  MRN: [id number]  Code Status: Prior  Admission Date: 6/22/2017  Length of Stay: 6 days  Attending Physician: Adela Talbot MD  Primary Care Provider: Riki Huynh MD    Subjective:     Principal Problem:Debility    Chief Complaint/Reason for Admission: Debility    History of Present Illness:  Patient is a 67 y.o. male with DM2, HTN, CAD, Stroke, CKD3 who presents to SNF after hospitalization due to contact dermatitis to tape and generalized weakness.  He was initially admitted on 6/14-6/16 for left femoral to below knee popliteal bypass by Dr. William. He was discharged to home with home health.  The patient has bilateral foot ulcers and is followed by Podiatry as well.  The patient developed blisters along tape line at home over left LE incisions.  He was allowed to ambulate to the bathroom at home but was otherwise NWB.  He complains of pain to right medial thigh since surgery that is relieved with hydrocodone/APAP.  He did have a fall in the hospital after right foot slipped.  Ulcers and DTI present since 4/2017 per podiatry records. The patient has been admitted to SNF for ongoing PT/OT due to insufficient progress to go home safely from the hospital.    Hospital Course:  6/22/17: Patient admitted to SNF for ongoing therapy follow a hospitalization for contact dermatitis and generalized weakness.  6/26/17: Patient seen at bedside, denies pain, doing well, labs reviewed.  6/27/17: hypotensive, decreased lasix, stopped hydralazine, had cardiology and podiatry appointment today  6/28/17: -130s, continue with BP regimen, contacted podiatry to clarify recommendations    Assessment/Plan:      * Debility    -continue PT/OT to increase ambulation, ADL performance and endurance  -continue early mobility for DVT prophylaxis; patient refuses heparin products due to history of bleeding; SCD's as tolerated to right leg  -continue fall  precautions  -continue senokot-s to prevent constipation; hold for frequent or loose stooling  -pain regimen: hydrocodone/ APAP 5-325mg q4hrs prn pain        Ulcer of heel due to diabetes mellitus    -contiue santyl to bilateral heels and 1st right MTP to treat as per podiatry:  1.Cleanse wounds B/L feet with saline  2. Apply santyl to all wounds B/L LE  3. Apply krysten foam wrap with kerlix.   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, NYHA class 2 and CKD stage 3    -hypotensive  -CHF compensated   -continue metoprolol 24hr  25mg daily  -was on bidil 20-37.5mg tid, discontinue hydralazine portion, continue with isosorbide dinitrate 20mg tid  -continue holding parameters  -PRN hydralazine added  -Monitor with daily weights and reduced lasix 40mg daily to 20mg daily maintain euvolemia--weight stable  -Continue cardiac diet   -Cr is at baseline; will continue to monitor with BIW labs; renally adjust meds and avoid nephrotoxins          Future Appointments  Date Time Provider Department Center   7/10/2017 8:00 AM [facility]   7/11/2017 2:15 PM Quirino Bland DPM [facility]   7/11/2017 2:30 PM Quirino Bland DPM [facility]       Hillary Morales NP  [facility]

## 2017-06-28 NOTE — PT/OT/SLP PROGRESS
Occupational Therapy  Treatment    Chau Rodarte   MRN: [id number]   Admitting Diagnosis: Debility    OT Date of Treatment: 06/28/17  Total Time (min): 38 min    Billable Minutes:  Therapeutic Exercise 38    General Precautions: Standard, fall  Orthopedic Precautions:  (NWB (L) heel, minimal WB for transfers only w/ (B) Darcos)  Braces: N/A    Do you have any cultural, spiritual, Mosque conflicts, given your current situation?: no    Subjective:  Communicated with pt prior to session.  She wore me out this morning    Pain/Comfort  Pain Rating 1: 4/10  Location - Side 1: Left  Location 1: leg  Pain Addressed 1: Pre-medicate for activity  Pain Rating Post-Intervention 1: 4/10    Objective:  Patient found with:  (supine in bed)    Functional Status:  MDS G  Bed Mobility Functional Status: mod(I) - (I) (supine to and from sit)  Transfer Functional Status: S-SBA (bed to and from w/c no AD)  Locomotion on Unit Functional Status: S-SBA  Locomotion Off Unit Functional Status: S-SBA (w/c mobility to and from room and gym >200ft)  Dressing Functional Status:  (total to isai/doff darco shoes)          OT Exercises: To increase UE strength for transfers, w/c mobility, functional endurance and use of walker     Rickshaw 25# 25 x 4  Lat pull downs 30# 15 x 3; reverse  pull down 30# 15 x 3  Rowing 10# 15 x 4   Bicep curls 15# 15 x 3  Chest press with black theraband and yellow dowel seated in w/c 15 x 3      Patient left supine with call button in reach    ASSESSMENT:  Pt tolerated tx and demonstrated increased UE strength and endurance and indep with w/c mobility and transfers. Cont OT to increase functional indep    Rehab identified problem list/impairments: weakness, impaired endurance, impaired self care skills, impaired functional mobilty, impaired balance, decreased lower extremity function, decreased safety awareness, impaired skin, orthopedic precautions    Rehab potential is good    Activity tolerance: Good    Discharge  recommendations: home with home health     Barriers to discharge: Decreased caregiver support     Equipment recommendations: bedside commode, bath bench     GOALS:    Occupational Therapy Goals        Problem: Occupational Therapy Goal    Goal Priority Disciplines Outcome Interventions   Occupational Therapy Goal     OT, PT/OT Ongoing (interventions implemented as appropriate)    Description:  Goals to be met by: 9 days     Patient will increase functional independence with ADLs by performing:    LE Dressing with Supervision w/ AE as needed.  Toileting from bedside commode with Supervision for hygiene and clothing management.   Bathing from  edge of bed with Supervision.  Toilet transfer to bedside commode with Supervision w/ AD or slide board as needed.  Upper extremity exercise program x10 reps per handout, with independence.                      Plan:  Patient to be seen 5 x/week to address the above listed problems via self-care/home management, therapeutic activities, therapeutic exercises  Plan of Care expires: 07/23/17  Plan of Care reviewed with: patient    KULDEEP Montoya  06/28/2017

## 2017-06-28 NOTE — PLAN OF CARE
Problem: Patient Care Overview  Goal: Plan of Care Review  Outcome: Ongoing (interventions implemented as appropriate)  Pt a a o x 4, vss, resp effort even and unlabored. Voids per Urinal. Call bell within reach,  Side rails up x 3, Pt has remained free from falls. Will continue to monitor.   06/28/17 2255   Coping/Psychosocial   Plan Of Care Reviewed With patient

## 2017-06-28 NOTE — PT/OT/SLP PROGRESS
"Physical Therapy  Treatment    Chau Rodarte   MRN: [id number]   Admitting Diagnosis: Debility    PT Received On: 06/28/17  Total Time (min): 39       Billable Minutes:  Therapeutic Activity 16, Therapeutic Exercise 23 and Total Time 39    Treatment Type: Treatment  PT/PTA: PT     PTA Visit Number: 0       General Precautions: Standard, fall  Orthopedic Precautions:  (minimal WB t/f and short amb dist (<5ft) w/(B) Darcos)   Braces: N/A    Do you have any cultural, spiritual, Voodoo conflicts, given your current situation?: no    Subjective:  Pt agreeable to session but stating "I can't do much today."    Pain/Comfort  Pain Rating 1: 0/10    Objective:  Patient found supine in bed  w/ (B) foot dressings     Functional Status:  MDS G  Bed Mobility Functional Status: mod(I) - (I)  Transfer Functional Status: S-SBA  Locomotion on Unit Functional Status: S-SBA    Bed Mobility:  Sit>Supine:on flat bed w/o rail, Renato  Supine>Sit: on flat bed w/o rail, Renato    Transfers:  Stand Pivot Transfer: EOB<>w/c and w/c<>EOM, all w/o AD w/ SPV for safety  Pt performed w/c parts management prior to all transfers including (w/c positioning, brakes, and leg rests), pt required SBA for w/c parts management    Gait:  Pt refused short amb trial due to pain and BLE swelling     Wheelchair Mobility:  Patient propels w/c 300ft w/ BUE and SPV, multiple short rest breaks required due to BUE fatigue     Therex:  Seated core therex 2x15 reps (modified crunches and trunk rotations w/ red theraball)  Seated BLE therex 2x10-15 reps (HF, LAQ, AP)  Seated BUE therex 2x15 reps (Bicep Curls) w/ 5# weight    Additional Treatment:  Seated UBE x10min to inc BUE strength and endurance, multiple short rest breaks required due to fatigue    Patient left supine with call button in reach.    Assessment:  Chau Rodarte is a 67 y.o. male with a medical diagnosis of Debility.  Pt limited t/o session by fatigue along w/ decreased strength and endurance. He required " rest breaks t/o the session for recovery. Pt declined short amb trial due to pain and BLE swelling. Pt will continue PT POC.    Rehab identified problem list/impairments: weakness, impaired endurance, impaired self care skills, impaired functional mobilty, impaired balance, decreased lower extremity function, pain, decreased ROM, impaired skin, orthopedic precautions    Rehab potential is good.    Activity tolerance: Good    Discharge recommendations: home with home health     Barriers to discharge: Decreased caregiver support    Equipment recommendations: bath bench, bedside commode     GOALS:    Physical Therapy Goals        Problem: Physical Therapy Goal    Goal Priority Disciplines Outcome Goal Variances Interventions   Physical Therapy Goal     PT/OT, PT Ongoing (interventions implemented as appropriate)     Description:  Goals to be met by: 9 days     Patient will increase functional independence with mobility by performin. Supine to sit with Modified [place] Met 17  2. Sit to supine with Modified [place] Met 17  3. Bed to chair transfer with Supervision w/o AD via scoot pivot or squat pivot transfer met (2017)   4. Wheelchair propulsion x150 feet with Supervision using bilateral uppper extremities Met 17  5. Lower extremity exercise program x20 reps per handout, with assistance as needed   6. Ambulate 5ft (do not exceed) w/ RW and SPV w/ Darco shoes.                           PLAN:    Patient to be seen 5 x/week  to address the above listed problems via therapeutic activities, therapeutic exercises, wheelchair management/training  Plan of Care expires: 17  Plan of Care reviewed with: patient    Ana Guerreroroseann, PT  2017

## 2017-06-28 NOTE — PLAN OF CARE
Problem: Physical Therapy Goal  Goal: Physical Therapy Goal  Goals to be met by: 9 days     Patient will increase functional independence with mobility by performin. Supine to sit with Modified [place] Met 17  2. Sit to supine with Modified [place] Met 17  3. Bed to chair transfer with Supervision w/o AD via scoot pivot or squat pivot transfer met (2017)   4. Wheelchair propulsion x150 feet with Supervision using bilateral uppper extremities Met 17  5. Lower extremity exercise program x20 reps per handout, with assistance as needed   6. Ambulate 5ft (do not exceed) w/ RW and SPV w/ Darco shoes.          LTGs remain appropriate. Pt will continue PT POC.    Ana Fallon, PT  2017

## 2017-06-29 LAB
ANION GAP SERPL CALC-SCNC: 8 MMOL/L
BASOPHILS # BLD AUTO: 0.06 K/UL
BASOPHILS NFR BLD: 0.6 %
BUN SERPL-MCNC: 38 MG/DL
CALCIUM SERPL-MCNC: 8.7 MG/DL
CHLORIDE SERPL-SCNC: 107 MMOL/L
CO2 SERPL-SCNC: 26 MMOL/L
CREAT SERPL-MCNC: 2.1 MG/DL
DIFFERENTIAL METHOD: ABNORMAL
EOSINOPHIL # BLD AUTO: 0.3 K/UL
EOSINOPHIL NFR BLD: 3.4 %
ERYTHROCYTE [DISTWIDTH] IN BLOOD BY AUTOMATED COUNT: 15.5 %
EST. GFR  (AFRICAN AMERICAN): 36.3 ML/MIN/1.73 M^2
EST. GFR  (NON AFRICAN AMERICAN): 31.4 ML/MIN/1.73 M^2
GLUCOSE SERPL-MCNC: 111 MG/DL
HCT VFR BLD AUTO: 27.9 %
HGB BLD-MCNC: 8.6 G/DL
LYMPHOCYTES # BLD AUTO: 2.5 K/UL
LYMPHOCYTES NFR BLD: 25.5 %
MAGNESIUM SERPL-MCNC: 1.8 MG/DL
MCH RBC QN AUTO: 27.7 PG
MCHC RBC AUTO-ENTMCNC: 30.8 %
MCV RBC AUTO: 90 FL
MONOCYTES # BLD AUTO: 1.1 K/UL
MONOCYTES NFR BLD: 11.1 %
NEUTROPHILS # BLD AUTO: 5.9 K/UL
NEUTROPHILS NFR BLD: 59.4 %
PHOSPHATE SERPL-MCNC: 3.7 MG/DL
PLATELET # BLD AUTO: 463 K/UL
PMV BLD AUTO: 9.9 FL
POCT GLUCOSE: 125 MG/DL (ref 70–110)
POCT GLUCOSE: 182 MG/DL (ref 70–110)
POCT GLUCOSE: 190 MG/DL (ref 70–110)
POTASSIUM SERPL-SCNC: 4.5 MMOL/L
RBC # BLD AUTO: 3.1 M/UL
SODIUM SERPL-SCNC: 141 MMOL/L
WBC # BLD AUTO: 9.89 K/UL

## 2017-06-29 PROCEDURE — 36415 COLL VENOUS BLD VENIPUNCTURE: CPT

## 2017-06-29 PROCEDURE — 84100 ASSAY OF PHOSPHORUS: CPT

## 2017-06-29 PROCEDURE — 25000003 PHARM REV CODE 250: Performed by: NURSE PRACTITIONER

## 2017-06-29 PROCEDURE — 80048 BASIC METABOLIC PNL TOTAL CA: CPT

## 2017-06-29 PROCEDURE — 12000000 HC SNF SEMI-PRIVATE ROOM

## 2017-06-29 PROCEDURE — 97110 THERAPEUTIC EXERCISES: CPT

## 2017-06-29 PROCEDURE — 83735 ASSAY OF MAGNESIUM: CPT

## 2017-06-29 PROCEDURE — 25000003 PHARM REV CODE 250: Performed by: STUDENT IN AN ORGANIZED HEALTH CARE EDUCATION/TRAINING PROGRAM

## 2017-06-29 PROCEDURE — 99900058 HC 022 PAID UNDER SNF PPS

## 2017-06-29 PROCEDURE — 97535 SELF CARE MNGMENT TRAINING: CPT

## 2017-06-29 PROCEDURE — 85025 COMPLETE CBC W/AUTO DIFF WBC: CPT

## 2017-06-29 PROCEDURE — 97530 THERAPEUTIC ACTIVITIES: CPT

## 2017-06-29 RX ADMIN — METOPROLOL SUCCINATE 25 MG: 25 TABLET, EXTENDED RELEASE ORAL at 09:06

## 2017-06-29 RX ADMIN — FERROUS SULFATE TAB EC 325 MG (65 MG FE EQUIVALENT) 325 MG: 325 (65 FE) TABLET DELAYED RESPONSE at 09:06

## 2017-06-29 RX ADMIN — STANDARDIZED SENNA CONCENTRATE AND DOCUSATE SODIUM 1 TABLET: 8.6; 5 TABLET, FILM COATED ORAL at 09:06

## 2017-06-29 RX ADMIN — HYDROCODONE BITARTRATE AND ACETAMINOPHEN 1 TABLET: 5; 325 TABLET ORAL at 09:06

## 2017-06-29 RX ADMIN — CLOPIDOGREL BISULFATE 75 MG: 75 TABLET ORAL at 09:06

## 2017-06-29 RX ADMIN — ISOSORBIDE DINITRATE 20 MG: 20 TABLET ORAL at 05:06

## 2017-06-29 RX ADMIN — GABAPENTIN 300 MG: 300 CAPSULE ORAL at 09:06

## 2017-06-29 RX ADMIN — ATORVASTATIN CALCIUM 80 MG: 20 TABLET, FILM COATED ORAL at 09:06

## 2017-06-29 RX ADMIN — INSULIN DETEMIR 16 UNITS: 100 INJECTION, SOLUTION SUBCUTANEOUS at 09:06

## 2017-06-29 RX ADMIN — ASPIRIN 81 MG CHEWABLE TABLET 81 MG: 81 TABLET CHEWABLE at 09:06

## 2017-06-29 RX ADMIN — TAMSULOSIN HYDROCHLORIDE 0.4 MG: 0.4 CAPSULE ORAL at 09:06

## 2017-06-29 RX ADMIN — PANTOPRAZOLE SODIUM 40 MG: 40 TABLET, DELAYED RELEASE ORAL at 09:06

## 2017-06-29 RX ADMIN — FUROSEMIDE 20 MG: 20 TABLET ORAL at 09:06

## 2017-06-29 RX ADMIN — ISOSORBIDE DINITRATE 20 MG: 20 TABLET ORAL at 02:06

## 2017-06-29 RX ADMIN — ISOSORBIDE DINITRATE 20 MG: 20 TABLET ORAL at 09:06

## 2017-06-29 RX ADMIN — HYDROCODONE BITARTRATE AND ACETAMINOPHEN 1 TABLET: 5; 325 TABLET ORAL at 07:06

## 2017-06-29 NOTE — PLAN OF CARE
Problem: Physical Therapy Goal  Goal: Physical Therapy Goal  Goals to be met by: 9 days     Patient will increase functional independence with mobility by performin. Supine to sit with Modified [place] Met 17  2. Sit to supine with Modified [place] Met 17  3. Bed to chair transfer with Supervision w/o AD via scoot pivot or squat pivot transfer met (2017)   4. Wheelchair propulsion x150 feet with Supervision using bilateral uppper extremities Met 17  5. Lower extremity exercise program x20 reps per handout, with assistance as needed   6. Ambulate 5ft (do not exceed) w/ RW and SPV w/ Darco shoes.          Goals remain appropriate at time. Continue with PT POC as indicated.

## 2017-06-29 NOTE — PLAN OF CARE
Problem: Patient Care Overview  Goal: Plan of Care Review  Outcome: Ongoing (interventions implemented as appropriate)  Pt a a o x 4, vss, resp effort even and unlabored, voids per urinal. Call bell within reach, side rails up x 3,  Pt has remained free from falls. Will continue to monitor.   06/29/17 0474   Coping/Psychosocial   Plan Of Care Reviewed With patient

## 2017-06-29 NOTE — PT/OT/SLP PROGRESS
Physical Therapy  Treatment    Chau Rodarte   MRN: [id number]   Admitting Diagnosis: Debility    PT Received On: 06/29/17  Total Time (min): 40       Billable Minutes:  Therapeutic Activity 10 and Therapeutic Exercise 30    Treatment Type: Treatment  PT/PTA: PTA     PTA Visit Number: 1       General Precautions: Standard, fall  Orthopedic Precautions:  (minimal WB t/f and short amb dist (<5ft) w/(B) Darcos)   Braces: N/A    Do you have any cultural, spiritual, Scientology conflicts, given your current situation?: no    Subjective:  Communicated with nursing prior to session.  Pt agreed to work with therapy.     Pain/Comfort  Pain Rating 1: 0/10  Pain Rating Post-Intervention 1: 0/10    Objective:  Patient found supine in bed.        Functional Status:    Bed Mobility:  Sit>Supine:NP  Supine>Sit: Mod I on bed.    Transfers:  Stand Pivot Transfer: bed to w/c w/o AD with SPV for pt safety.     Gait:  Not performed on this date 2/2 pt refusal.     Therex:  Seated BLE therex 2x20 reps: LAQ, Hip Flexion, and GS  Seated BUE therex 2x20 reps with 5# dowel: Bicep Curls, Chest Press, Shoulder Flexion w/ elbows in extension.     Additional Treatment:  UBE x15 min without any rest breaks throughout activity.   Donned Darco shoes at start of treatment session.     Patient left up in chair with call button in reach.    Assessment:  Chau Rodarte is a 67 y.o. male with a medical diagnosis of Debility.  Pt tolerated treatment well, and will continue to benefit from PT intervention at this time. Continue with PT POC as indicated.    Rehab identified problem list/impairments: weakness, impaired endurance, impaired self care skills, decreased lower extremity function, decreased safety awareness, impaired skin, orthopedic precautions    Rehab potential is good.    Activity tolerance: Good    Discharge recommendations: home with home health     Barriers to discharge: Decreased caregiver support    Equipment recommendations: bedside commode,  bath bench     GOALS:    Physical Therapy Goals        Problem: Physical Therapy Goal    Goal Priority Disciplines Outcome Goal Variances Interventions   Physical Therapy Goal     PT/OT, PT Ongoing (interventions implemented as appropriate)     Description:  Goals to be met by: 9 days     Patient will increase functional independence with mobility by performin. Supine to sit with Modified [place] Met 17  2. Sit to supine with Modified [place] Met 17  3. Bed to chair transfer with Supervision w/o AD via scoot pivot or squat pivot transfer met (2017)   4. Wheelchair propulsion x150 feet with Supervision using bilateral uppper extremities Met 17  5. Lower extremity exercise program x20 reps per handout, with assistance as needed   6. Ambulate 5ft (do not exceed) w/ RW and SPV w/ Darco shoes.                           PLAN:    Patient to be seen 5 x/week  to address the above listed problems via therapeutic activities, therapeutic exercises, wheelchair management/training  Plan of Care expires: 17  Plan of Care reviewed with: patient    Lynette Douglas, PTA  2017

## 2017-06-29 NOTE — NURSING
"Pt refused weight. Stated that his foot doctor told him "no standing at all". Will continue to monitor  "

## 2017-06-29 NOTE — PT/OT/SLP PROGRESS
"Occupational Therapy  Treatment    Chau Rodarte   MRN: [id number]   Admitting Diagnosis: Debility    OT Date of Treatment: 06/29/17  Total Time (min): 15 min    Billable Minutes:  Self Care/Home Management 15    General Precautions: Standard, fall  Orthopedic Precautions:  (NWB (L) heel, minimal WB for transfers only w/ (B) Darcos)  Braces: N/A    Do you have any cultural, spiritual, Caodaism conflicts, given your current situation?: no    Subjective:  "I would like to go home tomorrow."    Pain/Comfort  Pain Rating 1: 0/10  Pain Rating Post-Intervention 1: 0/10    Objective:  Patient found supine in bed.    Functional Status:  MDS G  Bed Mobility Functional Status: mod(I) for sup to sit  Transfer Functional Status: SBA for squat pivot from EOB <> BSC  Dressing Functional Status: LBD - SBA  Toilet Use Functional Status: SBA on BSC    Patient left seated EOB with call button in reach    ASSESSMENT:  Chau Rodarte is a 67 y.o. male with a medical diagnosis of Debility. Pt performing at mostly SBA level.    Rehab identified problem list/impairments: weakness, impaired endurance, impaired self care skills, impaired functional mobilty, impaired balance, decreased lower extremity function, decreased safety awareness, impaired skin, orthopedic precautions    Rehab potential is good    Activity tolerance: Good    Discharge recommendations: home with home health     Barriers to discharge: Decreased caregiver support     Equipment recommendations: bedside commode, bath bench     GOALS:    Occupational Therapy Goals        Problem: Occupational Therapy Goal    Goal Priority Disciplines Outcome Interventions   Occupational Therapy Goal     OT, PT/OT Ongoing (interventions implemented as appropriate)    Description:  Goals to be met by: 9 days     Patient will increase functional independence with ADLs by performing:    LE Dressing with Supervision w/ AE as needed.  Toileting from bedside commode with Supervision for hygiene and " clothing management.   Bathing from  edge of bed with Supervision.  Toilet transfer to bedside commode with Supervision w/ AD or slide board as needed.  Upper extremity exercise program x10 reps per handout, with independence.                  Plan:  Patient to be seen 5 x/week to address the above listed problems via self-care/home management, therapeutic activities, therapeutic exercises  Plan of Care expires: 07/23/17  Plan of Care reviewed with: patient    KULDEEP Hoffmann  06/29/2017

## 2017-06-30 ENCOUNTER — PATIENT OUTREACH (OUTPATIENT)
Dept: ADMINISTRATIVE | Facility: CLINIC | Age: 67
End: 2017-06-30

## 2017-06-30 VITALS
RESPIRATION RATE: 18 BRPM | SYSTOLIC BLOOD PRESSURE: 131 MMHG | TEMPERATURE: 98 F | DIASTOLIC BLOOD PRESSURE: 65 MMHG | WEIGHT: 227.5 LBS | BODY MASS INDEX: 33.69 KG/M2 | HEART RATE: 68 BPM | HEIGHT: 69 IN | OXYGEN SATURATION: 97 %

## 2017-06-30 LAB
POCT GLUCOSE: 127 MG/DL (ref 70–110)
POCT GLUCOSE: 148 MG/DL (ref 70–110)
POCT GLUCOSE: 151 MG/DL (ref 70–110)

## 2017-06-30 PROCEDURE — 97530 THERAPEUTIC ACTIVITIES: CPT

## 2017-06-30 PROCEDURE — 97110 THERAPEUTIC EXERCISES: CPT

## 2017-06-30 PROCEDURE — 25000003 PHARM REV CODE 250: Performed by: STUDENT IN AN ORGANIZED HEALTH CARE EDUCATION/TRAINING PROGRAM

## 2017-06-30 PROCEDURE — 25000003 PHARM REV CODE 250: Performed by: NURSE PRACTITIONER

## 2017-06-30 PROCEDURE — 97802 MEDICAL NUTRITION INDIV IN: CPT

## 2017-06-30 PROCEDURE — 99316 NF DSCHRG MGMT 30 MIN+: CPT | Mod: ,,, | Performed by: HOSPITALIST

## 2017-06-30 RX ORDER — FUROSEMIDE 40 MG/1
40 TABLET ORAL DAILY
Status: DISCONTINUED | OUTPATIENT
Start: 2017-07-01 | End: 2017-06-30 | Stop reason: HOSPADM

## 2017-06-30 RX ORDER — HYDROCODONE BITARTRATE AND ACETAMINOPHEN 5; 325 MG/1; MG/1
1 TABLET ORAL EVERY 4 HOURS PRN
Qty: 25 TABLET | Refills: 0 | Status: SHIPPED | OUTPATIENT
Start: 2017-06-30 | End: 2017-10-17

## 2017-06-30 RX ORDER — ISOSORBIDE DINITRATE 20 MG/1
20 TABLET ORAL 3 TIMES DAILY
Qty: 90 TABLET | Refills: 1 | Status: SHIPPED | OUTPATIENT
Start: 2017-06-30 | End: 2018-01-08 | Stop reason: SDUPTHER

## 2017-06-30 RX ORDER — METOPROLOL SUCCINATE 25 MG/1
25 TABLET, EXTENDED RELEASE ORAL DAILY
Qty: 30 TABLET | Refills: 1 | Status: SHIPPED | OUTPATIENT
Start: 2017-06-30 | End: 2017-10-13 | Stop reason: SDUPTHER

## 2017-06-30 RX ORDER — FERROUS SULFATE 325(65) MG
325 TABLET, DELAYED RELEASE (ENTERIC COATED) ORAL DAILY
Refills: 0 | COMMUNITY
Start: 2017-06-30 | End: 2019-01-14

## 2017-06-30 RX ADMIN — GABAPENTIN 300 MG: 300 CAPSULE ORAL at 08:06

## 2017-06-30 RX ADMIN — ATORVASTATIN CALCIUM 80 MG: 20 TABLET, FILM COATED ORAL at 08:06

## 2017-06-30 RX ADMIN — TAMSULOSIN HYDROCHLORIDE 0.4 MG: 0.4 CAPSULE ORAL at 08:06

## 2017-06-30 RX ADMIN — ASPIRIN 81 MG CHEWABLE TABLET 81 MG: 81 TABLET CHEWABLE at 08:06

## 2017-06-30 RX ADMIN — ISOSORBIDE DINITRATE 20 MG: 20 TABLET ORAL at 05:06

## 2017-06-30 RX ADMIN — METOPROLOL SUCCINATE 25 MG: 25 TABLET, EXTENDED RELEASE ORAL at 08:06

## 2017-06-30 RX ADMIN — ISOSORBIDE DINITRATE 20 MG: 20 TABLET ORAL at 01:06

## 2017-06-30 RX ADMIN — PANTOPRAZOLE SODIUM 40 MG: 40 TABLET, DELAYED RELEASE ORAL at 08:06

## 2017-06-30 RX ADMIN — CLOPIDOGREL BISULFATE 75 MG: 75 TABLET ORAL at 08:06

## 2017-06-30 RX ADMIN — FERROUS SULFATE TAB EC 325 MG (65 MG FE EQUIVALENT) 325 MG: 325 (65 FE) TABLET DELAYED RESPONSE at 08:06

## 2017-06-30 RX ADMIN — HYDROCODONE BITARTRATE AND ACETAMINOPHEN 1 TABLET: 5; 325 TABLET ORAL at 02:06

## 2017-06-30 RX ADMIN — FUROSEMIDE 20 MG: 20 TABLET ORAL at 08:06

## 2017-06-30 RX ADMIN — HYDROCODONE BITARTRATE AND ACETAMINOPHEN 1 TABLET: 5; 325 TABLET ORAL at 05:06

## 2017-06-30 NOTE — DISCHARGE SUMMARY
[facility]  Discharge Summary      Patient Name: Chau Rodarte  MRN: [id number]  Admission Date: 6/22/2017  Hospital Length of Stay: 8 days  Discharge Date and Time:  06/30/2017 12:52 PM  Attending Physician: Adela Talbot MD   Discharging Provider: Hillary Morales NP  Primary Care Provider: Riki Huynh MD     Chief Complaint/Reason for Admission: Debility    History of Present Illness:  Patient is a 67 y.o. male with DM2, HTN, CAD, Stroke, CKD3 who presents to SNF after hospitalization due to contact dermatitis to tape and generalized weakness.  He was initially admitted on 6/14-6/16 for left femoral to below knee popliteal bypass by Dr. William. He was discharged to home with home health.  The patient has bilateral foot ulcers and is followed by Podiatry as well.  The patient developed blisters along tape line at home over left LE incisions.  He was allowed to ambulate to the bathroom at home but was otherwise NWB.  He complains of pain to right medial thigh since surgery that is relieved with hydrocodone/APAP.  He did have a fall in the hospital after right foot slipped.  Ulcers and DTI present since 4/2017 per podiatry records. The patient has been admitted to SNF for ongoing PT/OT due to insufficient progress to go home safely from the hospital.    Hospital Course:   6/22/17: Patient admitted to SNF for ongoing therapy follow a hospitalization for contact dermatitis and generalized weakness.  6/26/17: Patient seen at bedside, denies pain, doing well, labs reviewed.  6/27/17: hypotensive, decreased lasix, stopped hydralazine, had cardiology and podiatry appointment today  6/28/17: -130s, continue with BP regimen, contacted podiatry to clarify recommendations  6/29/17: orders clarified with wound care/podiatry orders we faxed to SNF spoke with Sirisha  6/30/17: patient wishing to be discharge, spoke with therapy--therapy feels patient is safe to be discharge home and able to perform  required transfer, discharge today       Significant Diagnostic Studies:     Recent Labs  Lab 06/26/17  0510 06/29/17  0502   WBC 10.00 9.89   HGB 8.7* 8.6*   HCT 27.8* 27.9*   * 463*       Recent Labs  Lab 06/26/17  0510 06/29/17  0502    141   K 4.1 4.5    107   CO2 25 26   BUN 34* 38*   CREATININE 2.4* 2.1*   CALCIUM 8.7 8.7     Lab Results   Component Value Date    LABPROT 11.4 06/19/2017    ALBUMIN 2.4 (L) 06/19/2017     Lab Results   Component Value Date    CALCIUM 8.7 06/29/2017    PHOS 3.7 06/29/2017   Results for ERNESTINE AGUIRRE (MRN [id number]) as of 6/30/2017 12:52   Ref. Range 6/26/2017 05:10 6/29/2017 05:02   Magnesium Latest Ref Range: 1.6 - 2.6 mg/dL 1.6 1.8     Final Active Diagnoses:    Diagnosis Date Noted POA    PRINCIPAL PROBLEM:  Debility [R53.81] 06/19/2017 Yes    S/P femoral-popliteal bypass surgery [Z95.828] 10/24/2013 Not Applicable    Type 2 diabetes mellitus with diabetic polyneuropathy, with long-term current use of insulin [E11.42, Z79.4] 11/29/2016 Not Applicable    Ulcer of heel due to diabetes mellitus [E11.621, L97.409] 06/23/2017 Yes    BPH (benign prostatic hypertrophy) [N40.0] 06/23/2017 Unknown    Acute blood loss anemia [D62] 03/20/2017 Yes    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, NYHA class 2 and CKD stage 3 [I13.0, I50.20, N18.3] 02/21/2016 Yes    HLD (hyperlipidemia) [E78.5] 10/24/2013 Yes      Problems Resolved During this Admission:    Diagnosis Date Noted Date Resolved POA    AF (atrial fibrillation) [I48.91] 03/24/2017 06/27/2017 Yes      * Debility    -continue PT/OT to increase ambulation, ADL performance and endurance  -continue early mobility for DVT prophylaxis; patient refuses heparin products due to history of bleeding; SCD's as tolerated to right leg  -continue fall precautions  -continue senokot-s to prevent constipation; hold for frequent or loose stooling  -pain regimen: hydrocodone/ APAP 5-325mg q4hrs prn pain         S/P femoral-popliteal bypass surgery    -Keep incisions dry and monitor for erythema or drainage  -No tape to skin due to dermatitis.   -Continue ASA 81mg daily at home  -continue clopidogrel 75mg daily at home  -continue hydrocodone/APAP to treat pain at home  -continue senokot-s to prevent constipation        Type 2 diabetes mellitus with diabetic polyneuropathy, with long-term current use of insulin    -Chronic and controlled  -Continue therapy with levemir 16units nightly at home  -Monitor with accuchecks   -continue SSNI prn hyperglycemia  -hypo/hyperglycemic protocol   -Continue diabetic diet  -will continue to monitor and adjust regimen as necessary  -Continue gabapentin  300mg bid to treat polyneuropathy at home        BPH (benign prostatic hypertrophy)    -Chronic   -Continue tamsulosin 0.4mg nightly at home        Ulcer of heel due to diabetes mellitus    -continue recommendations per podiatry next f/u on 7/11        Acute blood loss anemia    -S/p 2 units PRBC after surgery  -Continue empiric iron therapy, iron studies with iron deficiency from blood loss        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, NYHA class 2 and CKD stage 3    -hypotensive  -CHF compensated   -continue metoprolol 24hr  25mg daily at home  -was on bidil 20-37.5mg tid, discontinue hydralazine portion, continue with isosorbide dinitrate 20mg tid at home  -continue holding parameters  -PRN hydralazine added  -continue lasix 40mg daily at home  -Continue cardiac diet   -Cr is at baseline; will continue to monitor with BIW labs; renally adjust meds and avoid nephrotoxins        HLD (hyperlipidemia)    -Chronic  -Continue atorvastatin 80mg daily at home        PT note, CATY Cole, PTA 6/30/17  Functional Status:  Bed Mobility:  Sit>Supine:NP  Supine>Sit: Mod I  Transfers:  Stand Pivot Transfer: SPV bed to w/c w/o AD.  Gait:  Not performed on this date.  Wheelchair Mobility:  Patient propels w/c 300 ft with BUEs  "SPV.    Therex:  Seated BUE therex 2x20 with 5# dowel: bicep curls, chest press, shoulder flexion with elbows in extension.   Additional Treatment:  UBE x15 min for endurance.   Discharge recommendations: home with home health    JUNAID Low 6/29/17  Functional Status:  MDS G  Bed Mobility Functional Status: mod(I) for sup to sit  Transfer Functional Status: SBA for squat pivot from EOB <> BSC  Dressing Functional Status: LBD - SBA  Toilet Use Functional Status: SBA on BSC  Discharge recommendations: home with home health       Discharged Condition: stable    Disposition: Home-Health Care [facility]    Follow Up:  Follow-up Information     Giles Galvin MD. Go on 7/11/2017.    Specialty:  Internal Medicine  Why:  Hospital Follow-up  Contact information:  [address]  [facility] [address]  [phone]             Quirino Bland DPM. Go on 7/11/2017.    Specialty:  Podiatry  Why:  Wound care  Contact information:  [address] [place] [address]  [phone]                 Future Appointments  Date Time Provider Department Center   7/10/2017 8:00 AM HOME MONITOR DEVICE CHECK, [facility] [address]   7/11/2017 9:30 AM Giles Galvin MD [facility] [address] [facility]   7/11/2017 2:15 PM Quirino Bland DPM [facility]   7/11/2017 2:30 PM Quirino Bland DPM [facility]     Patient Instructions:     3 IN 1 COMMODE FOR HOME USE   Order Specific Question Answer Comments   Type: Drop arm    Height: 5' 9" (1.753 m)    Weight: 103.2kg    Does patient have medical equipment at home? cane, straight    Does patient have medical equipment at home? shower chair    Does patient have medical equipment at home? walker, rolling    Does patient have medical equipment at home? wheelchair    Length of need (1-99 months): 12      WALKER FOR HOME USE   Order Specific Question Answer Comments   Type of Walker: Rollator    With wheels? Yes    Height: 5' 9" (1.753 m)    Weight: " 103.2kg    Length of need (1-99 months): 12    Does patient have medical equipment at home? cane, straight    Does patient have medical equipment at home? shower chair    Does patient have medical equipment at home? walker, rolling    Does patient have medical equipment at home? wheelchair    Please check all that apply: Patient's condition impairs ambulation.    Please check all that apply: Patient is unable to safely ambulate without equipment.      Diet general     Weight bearing restrictions (specify)   Order Comments: Cannot walk more than 15feet     Call MD for:  temperature >100.4     Call MD for:  persistent nausea and vomiting or diarrhea     Call MD for:  severe uncontrolled pain     Call MD for:  redness, tenderness, or signs of infection (pain, swelling, redness, odor or green/yellow discharge around incision site)     Call MD for:  difficulty breathing or increased cough     Call MD for:  severe persistent headache     Call MD for:  persistent dizziness, light-headedness, or visual disturbances     Call MD for:  increased confusion or weakness       Medications:  Reconciled Home Medications:   Current Discharge Medication List      START taking these medications    Details   ferrous sulfate 325 (65 FE) MG EC tablet Take 1 tablet (325 mg total) by mouth once daily.  Refills: 0      hydrocodone-acetaminophen 5-325mg (NORCO) 5-325 mg per tablet Take 1 tablet by mouth every 4 (four) hours as needed.  Qty: 25 tablet, Refills: 0      isosorbide dinitrate (ISORDIL) 20 MG tablet Take 1 tablet (20 mg total) by mouth 3 (three) times daily.  Qty: 90 tablet, Refills: 1         CONTINUE these medications which have CHANGED    Details   insulin detemir (LEVEMIR FLEXTOUCH) 100 unit/mL (3 mL) SubQ InPn pen Inject 16 Units into the skin every evening.  Qty: 1 Box, Refills: 3    Associated Diagnoses: Type 2 diabetes mellitus with diabetic polyneuropathy, with long-term current use of insulin      metoprolol succinate  "(TOPROL-XL) 25 MG 24 hr tablet Take 1 tablet (25 mg total) by mouth once daily.  Qty: 30 tablet, Refills: 1         CONTINUE these medications which have NOT CHANGED    Details   aspirin 81 MG Chew Take 1 tablet (81 mg total) by mouth once daily.  Refills: 0      atorvastatin (LIPITOR) 80 MG tablet Take 1 tablet (80 mg total) by mouth once daily.  Qty: 90 tablet, Refills: 3      cholecalciferol, vitamin D3, (VITAMIN D3) 5,000 unit Tab Take 5,000 Units by mouth once daily.      clopidogrel (PLAVIX) 75 mg tablet Take 1 tablet (75 mg total) by mouth once daily.  Qty: 90 tablet, Refills: 3      collagenase (SANTYL) ointment Apply topically once daily.  Qty: 30 g, Refills: 0      diclofenac sodium 1 % Gel Apply 2 g topically 3 (three) times daily.  Qty: 100 g, Refills: 1      furosemide (LASIX) 40 MG tablet Take 1 tablet (40 mg total) by mouth once daily.  Qty: 30 tablet, Refills: 11      gabapentin (NEURONTIN) 300 MG capsule Take 300 mg by mouth 2 (two) times daily.  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  Qty: 30 tablet, Refills: 11    Associated Diagnoses: Typical atrial flutter      pen needle, diabetic (BD ULTRA-FINE HANG PEN NEEDLES) 32 gauge x 5/32" Ndle Uses 2 times daily, on  insulin injections  Qty: 180 each, Refills: 4      senna-docusate 8.6-50 mg (PERICOLACE) 8.6-50 mg per tablet Take 1 tablet by mouth once daily.  Qty: 60 tablet, Refills: 0      tamsulosin (FLOMAX) 0.4 mg Cp24 Take 1 capsule (0.4 mg total) by mouth once daily.  Qty: 30 capsule, Refills: 0 "         STOP taking these medications       hydrocodone-acetaminophen 10-325mg (NORCO)  mg Tab Comments:   Reason for Stopping:         isosorbide-hydrALAZINE 20-37.5 mg (BIDIL) 20-37.5 mg Tab Comments:   Reason for Stopping:         oxycodone (ROXICODONE) 10 mg Tab immediate release tablet Comments:   Reason for Stopping:             Time spent on the discharge of patient: 35 minutes    Hillary Morales NP  [facility]

## 2017-06-30 NOTE — PLAN OF CARE
[facility] HOME HEALTH ORDERS  FACE TO FACE ENCOUNTER    Patient Name: Chau Rodarte  YOB: 1950    PCP: Riki Huynh MD   PCP Address: [address] / [place] [address]  PCP Phone Number: [phone]  PCP Fax: [phone]    Encounter Date: 06/30/2017    Admit to Home Health    Diagnoses:  Active Hospital Problems    Diagnosis  POA    *Debility [R53.81]  Yes     Priority: 1 - High    S/P femoral-popliteal bypass surgery [Z95.828]  Not Applicable     Priority: 2      Right leg      Type 2 diabetes mellitus with diabetic polyneuropathy, with long-term current use of insulin [E11.42, Z79.4]  Not Applicable     Priority: 3     Ulcer of heel due to diabetes mellitus [E11.621, L97.409]  Yes    BPH (benign prostatic hypertrophy) [N40.0]  Unknown    Acute blood loss anemia [D62]  Yes    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, NYHA class 2 and CKD stage 3 [I13.0, I50.20, N18.3]  Yes    HLD (hyperlipidemia) [E78.5]  Yes      Resolved Hospital Problems    Diagnosis Date Resolved POA    AF (atrial fibrillation) [I48.91] 06/27/2017 Yes       Future Appointments  Date Time Provider Department Center   7/10/2017 8:00 AM HOME MONITOR DEVICE CHECK, [facility]   7/11/2017 9:30 AM Giles Galvin MD [facility]   7/11/2017 2:15 PM Quirino Bland DPM [facility]   7/11/2017 2:30 PM Quirino Bland DPM [facility]     Follow-up Information     Giles Galvin MD. Go on 7/11/2017.    Specialty:  Internal Medicine  Why:  Hospital Follow-up  Contact information:  [address]  [place] [address]  [phone]             Quirino Bland DPM. Go on 7/11/2017.    Specialty:  Podiatry  Why:  Wound care  Contact information:  [address] [place] [address]  [phone]                 Future Appointments  Date Time Provider Department Center   7/10/2017 8:00 AM HOME MONITOR DEVICE  CHECK, [facility] [address]   7/11/2017 9:30 AM Giles Galvin MD [facility] [address] PC[facility]   7/11/2017 2:15 PM Quirino Bland DPM [facility]   7/11/2017 2:30 PM Quirino Bland DPM [facility]     I have seen and examined this patient face to face today. My clinical findings that support the need for the home health skilled services and home bound status are the following:  Weakness/numbness causing balance and gait disturbance due to Weakness/Debility making it taxing to leave home.    Allergies:  Review of patient's allergies indicates:   Allergen Reactions    Adhesive tape-silicones Blisters       Diet: cardiac diet, diabetic diet: 2000 calorie and low fat, low cholesterol diet    Activities: activity as tolerated and no lifting, Driving, or Strenuous exercise---Patient cannot walk more than 15 feet at one given time and can only ambulate for bathroom breaks    Nursing:   SN to complete comprehensive assessment including routine vital signs. Instruct on disease process and s/s of complications to report to MD. Review/verify medication list sent home with the patient at time of discharge  and instruct patient/caregiver as needed. Frequency may be adjusted depending on start of care date.    Notify MD if SBP > 160 or < 90; DBP > 90 or < 50; HR > 120 or < 50; Temp > 101      CONSULTS:    Physical Therapy to evaluate and treat. Evaluate for home safety and equipment needs; Establish/upgrade home exercise program. Perform / instruct on therapeutic exercises, gait training, transfer training, and Range of Motion.  Occupational Therapy to evaluate and treat. Evaluate home environment for safety and equipment needs. Perform/Instruct on transfers, ADL training, ROM, and therapeutic exercises.  Aide to provide assistance with personal care, ADLs, and vital signs.    MISCELLANEOUS CARE:  Diabetic Care:   SN to perform and educate Diabetic management with blood glucose monitoring:,  Fingerstick blood sugar AC and HS and Report CBG < 60 or > 350 to physician.    WOUND CARE ORDERS  **These orders are from [facility]  Right , Medial, Plantar Foot  Change dressing M-W-F  Cleanse wound with Normal Saline, apply santyl to wound, cover with Xeroform , Shakeel Foam x2, secure with 3 fan folded cast padding (football dressing), flexi net and blue boot    LEFT HEEL  Change Every M-W-F  Cleanse wound with normal saline, apply santyl to wound and cover with Xeroform, cover heel with 3 Shakeel foam with 3 fam folded cast padding (football dressing), flexinet and blue bootie    RIGHT, LATERAL HEEL  Change every M-W-F  Cleanse wound with normal saline, apply betadine to wound and cover with 2 Shakeel foam dressing, secure with fan folded cast padding x3 (football dressing) flexi net and blue bootie    RIGHT, MEDIAL GREAT TOE  Change every M-W-F  Cleanse wound with Normal Saline, apply iodosorb to wound and cover with Telfa pad secure with small conform and 3 folded cast padding (football dressing), flexi net and blue bootie    LEFT, MEDIAL LOWER LEG  Change dressing M-W-F  Cleanse wound with normal saline, cover small draining area on incisional line (medial aspect of left calf) with Xeroform and secure with 1/2 cast padding, leave remaining incisional line open to air    Medications: Review discharge medications with patient and family and provide education.      Current Discharge Medication List      START taking these medications    Details   ferrous sulfate 325 (65 FE) MG EC tablet Take 1 tablet (325 mg total) by mouth once daily.  Refills: 0      hydrocodone-acetaminophen 5-325mg (NORCO) 5-325 mg per tablet Take 1 tablet by mouth every 4 (four) hours as needed.  Qty: 25 tablet, Refills: 0      isosorbide dinitrate (ISORDIL) 20 MG tablet Take 1 tablet (20 mg total) by mouth 3 (three) times daily.  Qty: 90 tablet, Refills: 1         CONTINUE these medications which have CHANGED     "Details   insulin detemir (LEVEMIR FLEXTOUCH) 100 unit/mL (3 mL) SubQ InPn pen Inject 16 Units into the skin every evening.  Qty: 1 Box, Refills: 3    Associated Diagnoses: Type 2 diabetes mellitus with diabetic polyneuropathy, with long-term current use of insulin      metoprolol succinate (TOPROL-XL) 25 MG 24 hr tablet Take 1 tablet (25 mg total) by mouth once daily.  Qty: 30 tablet, Refills: 1         CONTINUE these medications which have NOT CHANGED    Details   aspirin 81 MG Chew Take 1 tablet (81 mg total) by mouth once daily.  Refills: 0      atorvastatin (LIPITOR) 80 MG tablet Take 1 tablet (80 mg total) by mouth once daily.  Qty: 90 tablet, Refills: 3      cholecalciferol, vitamin D3, (VITAMIN D3) 5,000 unit Tab Take 5,000 Units by mouth once daily.      clopidogrel (PLAVIX) 75 mg tablet Take 1 tablet (75 mg total) by mouth once daily.  Qty: 90 tablet, Refills: 3      collagenase (SANTYL) ointment Apply topically once daily.  Qty: 30 g, Refills: 0      diclofenac sodium 1 % Gel Apply 2 g topically 3 (three) times daily.  Qty: 100 g, Refills: 1      furosemide (LASIX) 40 MG tablet Take 1 tablet (40 mg total) by mouth once daily.  Qty: 30 tablet, Refills: 11      gabapentin (NEURONTIN) 300 MG capsule Take 300 mg by mouth 2 (two) times daily.  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  Qty: 30 tablet, Refills: 11    Associated Diagnoses: Typical atrial flutter      pen needle, diabetic (BD ULTRA-FINE HANG PEN NEEDLES) 32 gauge x 5/32" Ndle Uses 2 times daily, " on  insulin injections  Qty: 180 each, Refills: 4      senna-docusate 8.6-50 mg (PERICOLACE) 8.6-50 mg per tablet Take 1 tablet by mouth once daily.  Qty: 60 tablet, Refills: 0      tamsulosin (FLOMAX) 0.4 mg Cp24 Take 1 capsule (0.4 mg total) by mouth once daily.  Qty: 30 capsule, Refills: 0         STOP taking these medications       hydrocodone-acetaminophen 10-325mg (NORCO)  mg Tab Comments:   Reason for Stopping:         isosorbide-hydrALAZINE 20-37.5 mg (BIDIL) 20-37.5 mg Tab Comments:   Reason for Stopping:         oxycodone (ROXICODONE) 10 mg Tab immediate release tablet Comments:   Reason for Stopping:               I certify that this patient is confined to his home and needs intermittent skilled nursing care, physical therapy and occupational therapy.

## 2017-06-30 NOTE — PROGRESS NOTES
Pt has order for daily weight, pt also has orders for NWB. Pt declined to have weight this morning. Bed scale was accurate,

## 2017-06-30 NOTE — PLAN OF CARE
Patient to discharge home today with assist of family. Patient set up with [facility] per EDITH Beach.     Future Appointments  Date Time Provider Department Center   7/10/2017 8:00 AM HOME MONITOR DEVICE CHECK, [facility] RIZWAN Bob   7/11/2017 9:30 AM Giles Galvin MD [facility] Jayme Bob PCW   7/11/2017 2:15 PM Quirino Bland DPM [facility]   7/11/2017 2:30 PM Quirino Bland DPM [facility]        06/30/17 1345   Final Note   Assessment Type Final Discharge Note   Discharge Disposition Home   Discharge planning education complete? Yes   What phone number can be called within the next 1-3 days to see how you are doing after discharge? [phone]   Hospital Follow Up  Appt(s) scheduled? Yes   Discharge plans and expectations educations in teach back method with documentation complete? Yes   Discharge/Hospital Encounter Summary to (non-[facility]) PCP n/a   Referral to / orders for Home Health Complete? Yes   Any social issues identified prior to discharge? No   Did you assess the readiness or willingness of the family or caregiver to support self management of care? Yes     Elise Johnston RN, CM Skilled  [id number]

## 2017-06-30 NOTE — PT/OT/SLP PROGRESS
Physical Therapy  Treatment    Chau Rodarte   MRN: [id number]   Admitting Diagnosis: Debility    PT Received On: 06/30/17  Total Time (min): 42       Billable Minutes:  Therapeutic Activity 12 and Therapeutic Exercise 30    Treatment Type: Treatment  PT/PTA: PTA     PTA Visit Number: 2       General Precautions: Standard, fall  Orthopedic Precautions:  (minimal WB t/f and short amb dist (<5ft) w/(B) Darcos)   Braces: N/A    Do you have any cultural, spiritual, Zoroastrianism conflicts, given your current situation?: no    Subjective:  Communicated with nursing prior to session.  Pt agreed to work with therapy.     Pain/Comfort  Pain Rating 1: 0/10  Pain Rating Post-Intervention 1: 0/10    Objective:  Patient found supine in bed.          Functional Status:      Bed Mobility:  Sit>Supine:NP  Supine>Sit: Mod I    Transfers:  Stand Pivot Transfer: SPV bed to w/c w/o AD.    Gait:  Not performed on this date.     Wheelchair Mobility:  Patient propels w/c 300 ft with BUEs SPV.      Therex:  Seated BUE therex 2x20 with 5# dowel: bicep curls, chest press, shoulder flexion with elbows in extension.    Additional Treatment:  UBE x15 min for endurance.     Patient left up in chair with call button in reach.    Assessment:  Chau Rodarte is a 67 y.o. male with a medical diagnosis of Debility.  Pt tolerated treatment well, and will continue to benefit from PT intervention at this time. Continue with PT POC as indicated.    Rehab identified problem list/impairments: weakness, impaired endurance, impaired self care skills, decreased lower extremity function, decreased safety awareness, impaired skin, orthopedic precautions    Rehab potential is good.    Activity tolerance: Good    Discharge recommendations: home with home health     Barriers to discharge: Decreased caregiver support    Equipment recommendations: bedside commode, bath bench     GOALS:    Physical Therapy Goals        Problem: Physical Therapy Goal    Goal Priority  Disciplines Outcome Goal Variances Interventions   Physical Therapy Goal     PT/OT, PT Ongoing (interventions implemented as appropriate)     Description:  Goals to be met by: 9 days     Patient will increase functional independence with mobility by performin. Supine to sit with Modified [place] Met 17  2. Sit to supine with Modified [place] Met 17  3. Bed to chair transfer with Supervision w/o AD via scoot pivot or squat pivot transfer met (2017)   4. Wheelchair propulsion x150 feet with Supervision using bilateral uppper extremities Met 17  5. Lower extremity exercise program x20 reps per handout, with assistance as needed. Met 2017  6. Ambulate 5ft (do not exceed) w/ RW and SPV w/ Darco shoes.                            PLAN:    Patient to be seen 5 x/week  to address the above listed problems via therapeutic activities, therapeutic exercises, wheelchair management/training  Plan of Care expires: 17  Plan of Care reviewed with: patient    Lynette Douglas, PTA  2017

## 2017-06-30 NOTE — NURSING
DISCHARGE INSTRUCTIONS GIVEN AND PATIENT UNDERSTANDS.PRESCRIPTION GAVE TO PATIENT FOR VICODIN AND PATIENT TOOK PERSONAL BELONGINGS.DISCHARGED BY WHEELCHAIR ACCOMPANIED BY NURSE AND PCT TO LOBBY AND WIFE TO HOME.I

## 2017-06-30 NOTE — PLAN OF CARE
Problem: Patient Care Overview  Goal: Plan of Care Review  Outcome: Ongoing (interventions implemented as appropriate)  BLOOD GLUCOSES MONITORED.NO PRESSURE ULCERS NOTED.FALL PRECAUTIONS MAINTAINED NO INJURIES NOTED.

## 2017-06-30 NOTE — TREATMENT PLAN
Rehab Services' DME recommendations        Chau Rodarte   MRN: [id number]        [x]Rollator                                                         [x]3 in 1 commode: [x]Standard            [x]Drop arm                                                                 [x]Home Health:  [x]OT [x]PT []SLP   [] MSW   [] Aide    []SN         ________________________________________________________________________    Lynette Cole, DOUG 6/30/2017

## 2017-06-30 NOTE — PROGRESS NOTES
Nutrition Note    Patient triggered for length of stay. Diet order is 2000 diabetic cardiac.  Admitted for Debility, diabetic heel ulcer    Hx DM, CKD, Obesity , CAD, HTN    Meds reviewed  LABS reviewed  HgA1c 6.4    Pt reports he is discharging today. Counseled patient on needs for increased by 20-30% more protein for healing and patient agrees he plans to follow all the recommendations he has received during hospital stay.

## 2017-06-30 NOTE — PT/OT/SLP DISCHARGE
Occupational Therapy Discharge Summary    Chau Rodarte  MRN: [id number]   Debility   Patient Discharged from acute Occupational Therapy on 6/29/17  Please refer to prior OT note dated on6/29/17 for functional status.     Assessment:   Goals partially met.  GOALS:    Occupational Therapy Goals        Problem: Occupational Therapy Goal    Goal Priority Disciplines Outcome Interventions   Occupational Therapy Goal     OT, PT/OT Ongoing (interventions implemented as appropriate)    Description:  Goals to be met by: 9 days     Patient will increase functional independence with ADLs by performing:    LE Dressing with Supervision w/ AE as needed.  Toileting from bedside commode with Supervision for hygiene and clothing management.   Bathing from  edge of bed with Supervision.  Toilet transfer to bedside commode with Supervision w/ AD or slide board as needed.  Upper extremity exercise program x10 reps per handout, with independence.                    Reasons for Discontinuation of Therapy Services  Satisfactory goal achievement.      Plan:  Patient Discharged to: Home with Home Health Service.

## 2017-06-30 NOTE — PLAN OF CARE
Problem: Occupational Therapy Goal  Goal: Occupational Therapy Goal  Goals to be met by: 9 days     Patient will increase functional independence with ADLs by performing:    LE Dressing with Supervision w/ AE as needed. Partially Met  Toileting from bedside commode with Supervision for hygiene and clothing management. Partially Met  Bathing from  edge of bed with Supervision. Partially Met  Toilet transfer to bedside commode with Supervision w/ AD or slide board as needed. Partially Met  Upper extremity exercise program x10 reps per handout, with independence. Partially Met     Outcome: Outcome(s) achieved Date Met: 06/30/17  Goals partially met with patient requiring SBA for tasks using AE (per chart review)  KULDEEP Zaidi  6/30/2017

## 2017-06-30 NOTE — PLAN OF CARE
Problem: Physical Therapy Goal  Goal: Physical Therapy Goal  Goals to be met by: 9 days     Patient will increase functional independence with mobility by performin. Supine to sit with Modified [place] Met 17  2. Sit to supine with Modified [place] Met 17  3. Bed to chair transfer with Supervision w/o AD via scoot pivot or squat pivot transfer met (2017)   4. Wheelchair propulsion x150 feet with Supervision using bilateral uppper extremities Met 17  5. Lower extremity exercise program x20 reps per handout, with assistance as needed. Met 2017  6. Ambulate 5ft (do not exceed) w/ RW and SPV w/ Darco shoes.          Goals remain appropriate at time. Continue with PT POC as indicated.

## 2017-06-30 NOTE — PLAN OF CARE
Problem: Patient Care Overview  Goal: Plan of Care Review  Outcome: Ongoing (interventions implemented as appropriate)   06/30/17 0244   Coping/Psychosocial   Plan Of Care Reviewed With patient       Problem: Pressure Ulcer Risk (Cameron Scale) (Adult,Obstetrics,Pediatric)  Intervention: Prevent/Minimize Sheer/Friction Injuries   06/30/17 0244   Skin Interventions   Pressure Reduction Devices heel offloading device utilized;positioning supports utilized;pressure-redistributing mattress utilized   Pressure Reduction Techniques frequent weight shift encouraged;positioned off wounds;heels elevated off bed       Goal: Identify Related Risk Factors and Signs and Symptoms  Related risk factors and signs and symptoms are identified upon initiation of Human Response Clinical Practice Guideline (CPG)   Outcome: Ongoing (interventions implemented as appropriate)   06/30/17 0244   Pressure Ulcer Risk (Cameron Scale)   Related Risk Factors (Pressure Ulcer Risk (Cameron Scale)) mobility impaired;tissue perfusion altered;hospitalization prolonged

## 2017-07-03 ENCOUNTER — PATIENT OUTREACH (OUTPATIENT)
Dept: ADMINISTRATIVE | Facility: CLINIC | Age: 67
End: 2017-07-03

## 2017-07-03 NOTE — PATIENT INSTRUCTIONS
Diabetes (General Information)  Diabetes is a long-term health problem. It means your body does not make enough insulin. Or it may mean that your body cannot use the insulin it makes. Insulin is a hormone in your body. It lets blood sugar (glucose) reach the cells in your body. All of your cells need glucose for fuel.  When you have diabetes, the glucose in your blood builds up because it cannot get into the cells. This buildup is called high blood sugar (hyperglycemia).  Your blood sugar level depends on several things. It depends on what kind of food you eat and how much of it you eat. It also depends on how much exercise you get, and how much insulin you have in your body. Eating too much of the wrong kinds of food or not taking diabetes medicine on time can cause high blood sugar. Infections can cause high blood sugar even if you are taking medicines correctly.  These things can also cause low blood sugar:  · Missing meals  · Not eating enough food  · Taking too much diabetes medicine  Diabetes can cause serious problems over time if you do not get treated. These problems include heart disease, stroke, kidney failure, and blindness. They also include nerve pain or loss of feeling in your legs and feet, and gangrene of the feet. By keeping your blood sugar under control you can prevent or delay these problems.  Normal blood sugar levels are 80 to 100 before a meal and less than 180 in the 1 to 2 hours after a meal.  Home care  Follow these guidelines when caring for yourself at home:  · Follow the diet your healthcare provider gives you. Take insulin or other diabetes medicine exactly as told to.  · Watch your blood sugar as you are told to. Keep a log of your results. This will help your provider change your medicines to keep your blood sugar under control.  · Try to reach your ideal weight. You may be able to cut back on or not have to take diabetes medicine if you eat the right foods and get exercise.  · Do  not smoke. Smoking worsens the effects of diabetes on your circulation. You are much more likely to have a heart attack if you have diabetes and you smoke.  · Take good care of your feet. If you have lost feeling in your feet, you may not see an injury or infection. Check your feet and between your toes at least once a week.  · Wear a medical alert bracelet or necklace, or carry a card in your wallet that says you have diabetes. This will help healthcare providers give you the right care if you get very ill and cannot tell them that you have diabetes.  Sick day plan  If you get a cold, the flu, or a bacterial or viral infection, take these steps:  · Look at your diabetes sick plan and call your healthcare provider as you were told to. You may need to call your provider right away if:  ¨ Your blood sugar is above 240 while taking your diabetes medicine  ¨ Your urine ketone levels are above normal or high  ¨ You have been vomiting more than 6 hours  ¨ You have trouble breathing or your breath ha s a fruity smell  ¨ You have a high fever  ¨ You have a fever for several days and you are not getting better  ¨ You get light-headed and are sleepier than usual  · Keep taking your diabetes pills (oral medicine) even if you have been vomiting and are feeling sick. Call your provider right away because you may need insulin to lower your blood sugar until you recover from your illness.  · Keep taking your insulin even if you have been vomiting and are feeling sick. Call your provider right away to ask if you need to change your insulin dose. This will depend on your blood sugar results.  · Check your blood sugar every 2 to 4 hours, or at least 4 times a day.  · Check your ketones often. If you are vomiting and having diarrhea, watch them more often.  · Do not skip meals. Try to eat small meals on a regular schedule. Do this even if you do not feel like eating.  · Drink water or other liquids that do not have caffeine or  calories. This will keep you from getting dehydrated. If you are nauseated or vomiting, takes small sips every 5 minutes. To prevent dehydration try to drink a cup (8 ounces) of fluids every hour while you are awake.  General care  Always bring a source of fast-acting sugar with you in case you have symptoms of low blood sugar (below 70). At the first sign of low blood sugar, eat or drink 15 to 20 grams of fast-acting sugar to raise your blood sugar. Examples are:  · 3 to 4 glucose tablets. You can buy these at most [facility].  · 4 ounces (1/2 cup) of regular (not diet) soft drinks  · 4 ounces (1/2 cup) of any fruit juice  · 8 ounces (1 cup) of milk  · 5 to 6 pieces of hard candy  · 1 tablespoon of honey  Check your blood sugar 15 minutes after treating yourself. If it is still below 70, take 15 to 20 more grams of fast-acting sugar. Test again in 15 minutes. If it returns to normal (70 or above), eat a snack or meal to keep your blood sugar in a safe range. If it stays low, call your doctor or go to an emergency room.  Follow-up care  Follow-up with your healthcare provider, or as advised. For more information about diabetes, visit the American Diabetes Association website at www.diabetes.org or call 373-820-7606.  When to seek medical advice  Call your healthcare provider right away if you have any of these symptoms of high blood sugar:  · Frequent urination  · Dizziness  · Drowsiness  · Thirst  · Headache  · Nausea or vomiting  · Abdominal pain  · Eyesight changes  · Fast breathing  · Confusion or loss of consciousness  Also call your provider right away if you have any of these signs of low blood sugar:  · Fatigue  · Headache  · Shakes  · Excess sweating  · Hunger  · Feeling anxious or restless  · Eyesight changes  · Drowsiness  · Weakness  · Confusion or loss of consciousness  Call 911  Call for emergency help right away if any of these occur:  · Chest pain or shortness of breath  · Dizziness or  fainting  · Weakness of an arm or leg or one side of the face  · Trouble speaking or seeing   Date Last Reviewed: 6/1/2016  © 1480-6193 [employer]. [address] [place] [address], [place] [address]. All rights reserved. This information is not intended as a substitute for professional medical care. Always follow your healthcare professional's instructions.

## 2017-07-07 ENCOUNTER — TELEPHONE (OUTPATIENT)
Dept: ELECTROPHYSIOLOGY | Facility: CLINIC | Age: 67
End: 2017-07-07

## 2017-07-11 ENCOUNTER — HOSPITAL ENCOUNTER (OUTPATIENT)
Dept: WOUND CARE | Facility: HOSPITAL | Age: 67
Discharge: HOME | End: 2017-07-11
Attending: PODIATRIST
Payer: MEDICARE

## 2017-07-11 DIAGNOSIS — E11.9 TYPE 2 DIABETES MELLITUS WITHOUT COMPLICATION, WITH LONG-TERM CURRENT USE OF INSULIN: ICD-10-CM

## 2017-07-11 DIAGNOSIS — E11.621 DIABETIC ULCER OF HEEL ASSOCIATED WITH TYPE 2 DIABETES MELLITUS, UNSPECIFIED LATERALITY, UNSPECIFIED ULCER STAGE: ICD-10-CM

## 2017-07-11 DIAGNOSIS — L97.519 SKIN ULCERS OF BOTH FEET: ICD-10-CM

## 2017-07-11 DIAGNOSIS — Z79.4 TYPE 2 DIABETES MELLITUS WITHOUT COMPLICATION, WITH LONG-TERM CURRENT USE OF INSULIN: ICD-10-CM

## 2017-07-11 DIAGNOSIS — E11.51 TYPE 2 DIABETES MELLITUS WITH PERIPHERAL CIRCULATORY DISORDER: Primary | ICD-10-CM

## 2017-07-11 DIAGNOSIS — I73.9 PERIPHERAL VASCULAR DISEASE, UNSPECIFIED: ICD-10-CM

## 2017-07-11 DIAGNOSIS — I70.245 ATHEROSCLEROSIS OF NATIVE ARTERY OF BOTH LOWER EXTREMITIES WITH BILATERAL ULCERATION OF OTHER PART OF FEET: Primary | ICD-10-CM

## 2017-07-11 DIAGNOSIS — Z79.4 TYPE 2 DIABETES MELLITUS WITH STAGE 4 CHRONIC KIDNEY DISEASE, WITH LONG-TERM CURRENT USE OF INSULIN: ICD-10-CM

## 2017-07-11 DIAGNOSIS — E11.22 TYPE 2 DIABETES MELLITUS WITH STAGE 4 CHRONIC KIDNEY DISEASE, WITH LONG-TERM CURRENT USE OF INSULIN: ICD-10-CM

## 2017-07-11 DIAGNOSIS — L97.529 SKIN ULCERS OF BOTH FEET: ICD-10-CM

## 2017-07-11 DIAGNOSIS — N18.4 TYPE 2 DIABETES MELLITUS WITH STAGE 4 CHRONIC KIDNEY DISEASE, WITH LONG-TERM CURRENT USE OF INSULIN: ICD-10-CM

## 2017-07-11 DIAGNOSIS — I70.235 ATHEROSCLEROSIS OF NATIVE ARTERY OF BOTH LOWER EXTREMITIES WITH BILATERAL ULCERATION OF OTHER PART OF FEET: Primary | ICD-10-CM

## 2017-07-11 DIAGNOSIS — L97.409 DIABETIC ULCER OF HEEL ASSOCIATED WITH TYPE 2 DIABETES MELLITUS, UNSPECIFIED LATERALITY, UNSPECIFIED ULCER STAGE: ICD-10-CM

## 2017-07-11 DIAGNOSIS — E11.51 TYPE 2 DIABETES MELLITUS WITH PERIPHERAL CIRCULATORY DISORDER: ICD-10-CM

## 2017-07-11 PROCEDURE — 97597 DBRDMT OPN WND 1ST 20 CM/<: CPT | Mod: 59,,, | Performed by: PODIATRIST

## 2017-07-11 PROCEDURE — 11042 PR DEBRIDEMENT, SKIN, SUB-Q TISSUE,=<20 SQ CM: ICD-10-PCS | Mod: 59,,, | Performed by: PODIATRIST

## 2017-07-11 PROCEDURE — 11042 DBRDMT SUBQ TIS 1ST 20SQCM/<: CPT | Mod: 59,,, | Performed by: PODIATRIST

## 2017-07-11 PROCEDURE — 27201912 HC WOUND CARE DEBRIDEMENT SUPPLIES

## 2017-07-11 PROCEDURE — 11042 DBRDMT SUBQ TIS 1ST 20SQCM/<: CPT

## 2017-07-11 PROCEDURE — 15271 SKIN SUB GRAFT TRNK/ARM/LEG: CPT

## 2017-07-11 PROCEDURE — 15275 SKIN SUB GRAFT FACE/NK/HF/G: CPT

## 2017-07-11 PROCEDURE — 97597 DBRDMT OPN WND 1ST 20 CM/<: CPT

## 2017-07-11 PROCEDURE — 15275 SKIN SUB GRAFT FACE/NK/HF/G: CPT | Mod: ,,, | Performed by: PODIATRIST

## 2017-07-11 PROCEDURE — 15275 PR SKIN SUB GRAFT FACE/NK/HF/G UP TO 100 SQCM: ICD-10-PCS | Mod: ,,, | Performed by: PODIATRIST

## 2017-07-11 PROCEDURE — 97597 PR DEBRIDEMENT OPEN WOUND 20 SQ CM<: ICD-10-PCS | Mod: 59,,, | Performed by: PODIATRIST

## 2017-07-11 NOTE — PATIENT INSTRUCTIONS
Instructed to keep all dressings dry and intact and to call [facility] PRN complications such as increased pain, increased swelling, increased redness, increased drainage, new skin breakdown and/or fever.

## 2017-07-13 ENCOUNTER — HOSPITAL ENCOUNTER (OUTPATIENT)
Dept: VASCULAR SURGERY | Facility: CLINIC | Age: 67
Discharge: HOME | End: 2017-07-13
Attending: SURGERY
Payer: MEDICARE

## 2017-07-13 ENCOUNTER — TELEPHONE (OUTPATIENT)
Dept: ENDOCRINOLOGY | Facility: CLINIC | Age: 67
End: 2017-07-13

## 2017-07-13 ENCOUNTER — OFFICE VISIT (OUTPATIENT)
Dept: VASCULAR SURGERY | Facility: CLINIC | Age: 67
End: 2017-07-13
Payer: MEDICARE

## 2017-07-13 VITALS
HEART RATE: 75 BPM | BODY MASS INDEX: 33.33 KG/M2 | DIASTOLIC BLOOD PRESSURE: 68 MMHG | SYSTOLIC BLOOD PRESSURE: 103 MMHG | WEIGHT: 225 LBS | HEIGHT: 69 IN | TEMPERATURE: 96 F

## 2017-07-13 DIAGNOSIS — I13.0 BENIGN HYPERTENSIVE HEART AND KIDNEY DISEASE WITH SYSTOLIC CHF, NYHA CLASS 2 AND CKD STAGE 3: ICD-10-CM

## 2017-07-13 DIAGNOSIS — I70.25 ATHEROSCLEROSIS OF NATIVE ARTERIES OF THE EXTREMITIES WITH ULCERATION: Primary | ICD-10-CM

## 2017-07-13 DIAGNOSIS — N18.4 TYPE 2 DIABETES MELLITUS WITH STAGE 4 CHRONIC KIDNEY DISEASE, WITH LONG-TERM CURRENT USE OF INSULIN: ICD-10-CM

## 2017-07-13 DIAGNOSIS — I73.9 PAD (PERIPHERAL ARTERY DISEASE): ICD-10-CM

## 2017-07-13 DIAGNOSIS — I50.20 BENIGN HYPERTENSIVE HEART AND KIDNEY DISEASE WITH SYSTOLIC CHF, NYHA CLASS 2 AND CKD STAGE 3: ICD-10-CM

## 2017-07-13 DIAGNOSIS — N18.30 BENIGN HYPERTENSIVE HEART AND KIDNEY DISEASE WITH SYSTOLIC CHF, NYHA CLASS 2 AND CKD STAGE 3: ICD-10-CM

## 2017-07-13 DIAGNOSIS — I73.9 PAD (PERIPHERAL ARTERY DISEASE): Primary | ICD-10-CM

## 2017-07-13 DIAGNOSIS — Z79.4 TYPE 2 DIABETES MELLITUS WITH STAGE 4 CHRONIC KIDNEY DISEASE, WITH LONG-TERM CURRENT USE OF INSULIN: ICD-10-CM

## 2017-07-13 DIAGNOSIS — E11.22 TYPE 2 DIABETES MELLITUS WITH STAGE 4 CHRONIC KIDNEY DISEASE, WITH LONG-TERM CURRENT USE OF INSULIN: ICD-10-CM

## 2017-07-13 PROCEDURE — 1126F AMNT PAIN NOTED NONE PRSNT: CPT | Mod: S$GLB,,, | Performed by: SURGERY

## 2017-07-13 PROCEDURE — 3066F NEPHROPATHY DOC TX: CPT | Mod: S$GLB,,, | Performed by: SURGERY

## 2017-07-13 PROCEDURE — 93923 UPR/LXTR ART STDY 3+ LVLS: CPT | Mod: S$GLB,,, | Performed by: SURGERY

## 2017-07-13 PROCEDURE — 99999 PR PBB SHADOW E&M-EST. PATIENT-LVL V: CPT | Mod: PBBFAC,,, | Performed by: SURGERY

## 2017-07-13 PROCEDURE — 3044F HG A1C LEVEL LT 7.0%: CPT | Mod: S$GLB,,, | Performed by: SURGERY

## 2017-07-13 PROCEDURE — 99214 OFFICE O/P EST MOD 30 MIN: CPT | Mod: 24,S$GLB,, | Performed by: SURGERY

## 2017-07-13 PROCEDURE — 93926 LOWER EXTREMITY STUDY: CPT | Mod: S$GLB,,, | Performed by: SURGERY

## 2017-07-13 PROCEDURE — 1159F MED LIST DOCD IN RCRD: CPT | Mod: S$GLB,,, | Performed by: SURGERY

## 2017-07-13 RX ORDER — LIDOCAINE HYDROCHLORIDE 10 MG/ML
1 INJECTION, SOLUTION EPIDURAL; INFILTRATION; INTRACAUDAL; PERINEURAL ONCE
Status: CANCELLED | OUTPATIENT
Start: 2017-07-13 | End: 2017-07-13

## 2017-07-13 RX ORDER — SODIUM CHLORIDE 9 MG/ML
INJECTION, SOLUTION INTRAVENOUS CONTINUOUS
Status: CANCELLED | OUTPATIENT
Start: 2017-07-13

## 2017-07-13 NOTE — PROGRESS NOTES
Patient ID: Chau Rodarte is a 67 y.o. male.    I. HISTORY     Chief Complaint: Ulcers of bilateral feet    HPI   Chau Rodarte is a 67 y.o. male with PAD s/p L SFA stent in May 2012 and R Fem-AK Pop bypass in 2012 with PTFE presents for eval of bilateral ulcers of the feet.    Both revascularization procedures were done for claudication, at [facility].  He was seen by us in , at which point he had MARIO of .42 on the right and .64 on the L.  Despite that, he could ambulate well.  US suggested that SFA stent was occluded, but that bypass was patent.  CTA was ordered to evaluate the RLE vasculature, but he apparently did not have that done.    Underwent left Fem-BK pop bypass for left foot ulcers in . He is recovering and ready to proceed with right leg bypass    Review of Systems   Constitution: Negative for chills and fever.   HENT: Negative for congestion and ear discharge.    Eyes: Negative for discharge and double vision.   Cardiovascular: Positive for claudication and dyspnea on exertion. Negative for chest pain and cyanosis.   Respiratory: Negative for cough and hemoptysis.    Endocrine: Negative.    Hematologic/Lymphatic: Negative.    Skin: Positive for poor wound healing. Negative for color change and dry skin.   Musculoskeletal: Positive for arthritis, joint pain and stiffness.   Gastrointestinal: Negative.  Negative for abdominal pain, nausea and vomiting.   Neurological: Positive for numbness and paresthesias. Negative for dizziness and focal weakness.   Psychiatric/Behavioral: Negative.    Allergic/Immunologic: Negative.        II. PHYSICAL EXAM   Right Arm BP - Sittin/66 (17 0854)  Left Arm BP - Sittin/68 (17 0854)  Pulse: 75  Temp: 96.2 °F (35.7 °C)    Physical Exam   Constitutional: He is oriented to person, place, and time. He appears well-developed and well-nourished.   HENT:   Head: Normocephalic and atraumatic.   Eyes: Conjunctivae are normal. Pupils are equal,  round, and reactive to light.   Neck: Normal range of motion. Neck supple.   Cardiovascular: Normal rate and regular rhythm.    Pulmonary/Chest: Effort normal and breath sounds normal.   Abdominal: Soft. Bowel sounds are normal.   Neurological: He is alert and oriented to person, place, and time.   Football dressings in place to bilateral feet- these were not taken down    III. ASSESSMENT & PLAN (MEDICAL DECISION MAKING)     1. Atherosclerosis of native arteries of the extremities with ulceration    2. 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, NYHA class 2 and CKD stage 3    3. Type 2 diabetes mellitus with stage 4 chronic kidney disease, with long-term current use of insulin        Imaging Results:     Post bypass studies pending    MARIO:  R L   .38 (.42) .60 (.64)     US lower extremity arterial: Right bypass graft occluded.  No flow identified.                                                Left SFA and SFA stent shows no flow, suggesting occlusion.    Assessment/Diagnosis and Plan:    Chau Rodarte is a 67 y.o. male with bilateral tissue loss. Images reviewed in media tab.  Recovering from left fem-pop bypass    Plan angio tomorrow to eval for right LE bypass, likely fem-BK pop with prosthetic    Nehal William MD FACS [facility]  Vascular & Endovascular Surgery

## 2017-07-13 NOTE — TELEPHONE ENCOUNTER
----- Message from Jorge Aguilarr sent at 7/13/2017 12:49 PM CDT -----  Contact: sheron/ MACEY/ [phone]   Type: Rx Clarification/ Additional Information/ Questions    Medication: liraglutide 0.6 mg/0.1 mL, 18 mg/3 mL, subq PNIJ (VICTOZA 3-SAGAR) 0.6 mg/0.1 mL (18 mg/3 mL) PnIj    What questions do you have about the medication, if any?    What information is needed?    Pharmacy number:    Pharmacy Contact Name:    Comments: Pharmacy would like to request clarification on the directions for the medication above.  Please call and advise.    Thank you

## 2017-07-14 ENCOUNTER — SURGERY (OUTPATIENT)
Age: 67
End: 2017-07-14

## 2017-07-14 ENCOUNTER — HOSPITAL ENCOUNTER (OUTPATIENT)
Facility: HOSPITAL | Age: 67
Discharge: HOME | End: 2017-07-14
Attending: SURGERY | Admitting: SURGERY
Payer: MEDICARE

## 2017-07-14 VITALS
TEMPERATURE: 98 F | DIASTOLIC BLOOD PRESSURE: 75 MMHG | OXYGEN SATURATION: 100 % | WEIGHT: 224 LBS | RESPIRATION RATE: 20 BRPM | HEIGHT: 69 IN | SYSTOLIC BLOOD PRESSURE: 138 MMHG | BODY MASS INDEX: 33.18 KG/M2 | HEART RATE: 90 BPM

## 2017-07-14 DIAGNOSIS — I13.0 BENIGN HYPERTENSIVE HEART AND KIDNEY DISEASE WITH SYSTOLIC CHF, NYHA CLASS 2 AND CKD STAGE 3: ICD-10-CM

## 2017-07-14 DIAGNOSIS — I70.25 ATHEROSCLEROSIS OF NATIVE ARTERIES OF THE EXTREMITIES WITH ULCERATION: ICD-10-CM

## 2017-07-14 DIAGNOSIS — Z79.4 TYPE 2 DIABETES MELLITUS WITH STAGE 4 CHRONIC KIDNEY DISEASE, WITH LONG-TERM CURRENT USE OF INSULIN: ICD-10-CM

## 2017-07-14 DIAGNOSIS — N18.30 BENIGN HYPERTENSIVE HEART AND KIDNEY DISEASE WITH SYSTOLIC CHF, NYHA CLASS 2 AND CKD STAGE 3: ICD-10-CM

## 2017-07-14 DIAGNOSIS — E11.22 TYPE 2 DIABETES MELLITUS WITH STAGE 4 CHRONIC KIDNEY DISEASE, WITH LONG-TERM CURRENT USE OF INSULIN: ICD-10-CM

## 2017-07-14 DIAGNOSIS — N18.4 TYPE 2 DIABETES MELLITUS WITH STAGE 4 CHRONIC KIDNEY DISEASE, WITH LONG-TERM CURRENT USE OF INSULIN: ICD-10-CM

## 2017-07-14 DIAGNOSIS — I50.20 BENIGN HYPERTENSIVE HEART AND KIDNEY DISEASE WITH SYSTOLIC CHF, NYHA CLASS 2 AND CKD STAGE 3: ICD-10-CM

## 2017-07-14 LAB
POCT GLUCOSE: 77 MG/DL (ref 70–110)
POCT GLUCOSE: 94 MG/DL (ref 70–110)

## 2017-07-14 PROCEDURE — 82962 GLUCOSE BLOOD TEST: CPT

## 2017-07-14 PROCEDURE — 76937 US GUIDE VASCULAR ACCESS: CPT | Mod: 26,,, | Performed by: SURGERY

## 2017-07-14 PROCEDURE — 75710 ARTERY X-RAYS ARM/LEG: CPT | Mod: 26,,, | Performed by: SURGERY

## 2017-07-14 PROCEDURE — 99152 MOD SED SAME PHYS/QHP 5/>YRS: CPT | Mod: ,,, | Performed by: SURGERY

## 2017-07-14 PROCEDURE — 36140 INTRO NDL ICATH UPR/LXTR ART: CPT | Mod: ,,, | Performed by: SURGERY

## 2017-07-14 PROCEDURE — 25000003 PHARM REV CODE 250: Performed by: SURGERY

## 2017-07-14 PROCEDURE — 63600175 PHARM REV CODE 636 W HCPCS

## 2017-07-14 PROCEDURE — 99152 MOD SED SAME PHYS/QHP 5/>YRS: CPT

## 2017-07-14 PROCEDURE — 25000003 PHARM REV CODE 250

## 2017-07-14 PROCEDURE — C1894 INTRO/SHEATH, NON-LASER: HCPCS

## 2017-07-14 PROCEDURE — C1760 CLOSURE DEV, VASC: HCPCS

## 2017-07-14 RX ORDER — CEFAZOLIN SODIUM 2 G/50ML
2 SOLUTION INTRAVENOUS
Status: DISCONTINUED | OUTPATIENT
Start: 2017-07-14 | End: 2017-07-14 | Stop reason: HOSPADM

## 2017-07-14 RX ORDER — LIDOCAINE HYDROCHLORIDE 10 MG/ML
1 INJECTION, SOLUTION EPIDURAL; INFILTRATION; INTRACAUDAL; PERINEURAL ONCE
Status: DISCONTINUED | OUTPATIENT
Start: 2017-07-14 | End: 2017-07-14 | Stop reason: HOSPADM

## 2017-07-14 RX ORDER — SODIUM CHLORIDE 9 MG/ML
INJECTION, SOLUTION INTRAVENOUS CONTINUOUS
Status: DISCONTINUED | OUTPATIENT
Start: 2017-07-14 | End: 2017-07-14 | Stop reason: HOSPADM

## 2017-07-14 RX ADMIN — SODIUM CHLORIDE: 0.9 INJECTION, SOLUTION INTRAVENOUS at 08:07

## 2017-07-14 NOTE — INTERVAL H&P NOTE
The patient has been examined and the H&P has been reviewed:    I concur with the findings and no changes have occurred since H&P was written.   To OR for angiogram to evaluate right LE bypass.     Anesthesia/Surgery risks, benefits and alternative options discussed and understood by patient/family.      Past Surgical History:   Procedure Laterality Date    CARDIAC SURGERY      COLONOSCOPY N/A 3/21/2017    Procedure: COLONOSCOPY;  Surgeon: Karissa Peck MD;  Location: [facility] ([address] [place] [address]);  Service: Endoscopy;  Laterality: N/A;    CORONARY ANGIOPLASTY WITH STENT PLACEMENT      FOOT NERVE GRAFT  11/2013    left leg stent      Loop Recorder placement      right leg bypass       Past Medical History:   Diagnosis Date    Acute on chronic systolic CHF (congestive heart failure) 11/29/2016    Anticoagulant long-term use     CKD (chronic kidney disease) stage 2, GFR 60-89 ml/min 2/21/2016    Coronary artery disease     Encounter for blood transfusion     HTN (hypertension) 3/3/2014    Hyperlipidemia 3/3/2014    NSTEMI (non-ST elevated myocardial infarction) 11/28/2016    Obesity (BMI 30-39.9) 11/29/2016    PVD (peripheral vascular disease)     Stroke     Type 2 diabetes mellitus with diabetic peripheral angiopathy without gangrene, without long-term current use of insulin 10/24/2013    Type 2 diabetes mellitus with diabetic polyneuropathy, without long-term current use of insulin 11/29/2016     Review of patient's allergies indicates:   Allergen Reactions    Adhesive tape-silicones Blisters     No current facility-administered medications on file prior to encounter.      Current Outpatient Prescriptions on File Prior to Encounter   Medication Sig Dispense Refill    aspirin 81 MG Chew Take 1 tablet (81 mg total) by mouth once daily.  0    atorvastatin (LIPITOR) 80 MG tablet Take 1 tablet (80 mg total) by mouth once daily. 90 tablet 3    cholecalciferol, vitamin D3, (VITAMIN D3) 5,000 unit Tab Take  "5,000 Units by mouth once daily.      clopidogrel (PLAVIX) 75 mg tablet Take 1 tablet (75 mg total) by mouth once daily. 90 tablet 3    ferrous sulfate 325 (65 FE) MG EC tablet Take 1 tablet (325 mg total) by mouth once daily.  0    furosemide (LASIX) 40 MG tablet Take 1 tablet (40 mg total) by mouth once daily. 30 tablet 11    gabapentin (NEURONTIN) 300 MG capsule Take 300 mg by mouth 2 (two) times daily.  0    hydrocodone-acetaminophen 5-325mg (NORCO) 5-325 mg per tablet Take 1 tablet by mouth every 4 (four) hours as needed. 25 tablet 0    insulin detemir (LEVEMIR FLEXTOUCH) 100 unit/mL (3 mL) SubQ InPn pen Inject 16 Units into the skin every evening. 1 Box 3    isosorbide dinitrate (ISORDIL) 20 MG tablet Take 1 tablet (20 mg total) by mouth 3 (three) times daily. 90 tablet 1    metoprolol succinate (TOPROL-XL) 25 MG 24 hr tablet Take 1 tablet (25 mg total) by mouth once daily. 30 tablet 1    pantoprazole (PROTONIX) 20 MG tablet Take 2 tablets (40 mg total) by mouth once daily. 30 tablet 11    senna-docusate 8.6-50 mg (PERICOLACE) 8.6-50 mg per tablet Take 1 tablet by mouth once daily. 60 tablet 0    tamsulosin (FLOMAX) 0.4 mg Cp24 Take 1 capsule (0.4 mg total) by mouth once daily. 30 capsule 0    collagenase (SANTYL) ointment Apply topically once daily. 30 g 0    diclofenac sodium 1 % Gel Apply 2 g topically 3 (three) times daily. 100 g 1    liraglutide 0.6 mg/0.1 mL, 18 mg/3 mL, subq PNIJ (VICTOZA 3-SAGAR) 0.6 mg/0.1 mL (18 mg/3 mL) PnIj Inject 1.2 mg into the skin once daily. 9 Syringe 2    pen needle, diabetic (BD ULTRA-FINE HANG PEN NEEDLES) 32 gauge x 5/32" Ndle Uses 2 times daily, on  insulin injections 180 each 4         Active Hospital Problems    Diagnosis  POA    Atherosclerosis of native arteries of the extremities with ulceration [I70.25]  Yes      Resolved Hospital Problems    Diagnosis Date Resolved POA   No resolved problems to display.     "

## 2017-07-14 NOTE — H&P (VIEW-ONLY)
Patient ID: Chau Rodarte is a 67 y.o. male.    I. HISTORY     Chief Complaint: Ulcers of bilateral feet    HPI   Chau Rodarte is a 67 y.o. male with PAD s/p L SFA stent in May 2012 and R Fem-AK Pop bypass in 2012 with PTFE presents for eval of bilateral ulcers of the feet.    Both revascularization procedures were done for claudication, at [facility].  He was seen by us in , at which point he had MARIO of .42 on the right and .64 on the L.  Despite that, he could ambulate well.  US suggested that SFA stent was occluded, but that bypass was patent.  CTA was ordered to evaluate the RLE vasculature, but he apparently did not have that done.    Underwent left Fem-BK pop bypass for left foot ulcers in . He is recovering and ready to proceed with right leg bypass    Review of Systems   Constitution: Negative for chills and fever.   HENT: Negative for congestion and ear discharge.    Eyes: Negative for discharge and double vision.   Cardiovascular: Positive for claudication and dyspnea on exertion. Negative for chest pain and cyanosis.   Respiratory: Negative for cough and hemoptysis.    Endocrine: Negative.    Hematologic/Lymphatic: Negative.    Skin: Positive for poor wound healing. Negative for color change and dry skin.   Musculoskeletal: Positive for arthritis, joint pain and stiffness.   Gastrointestinal: Negative.  Negative for abdominal pain, nausea and vomiting.   Neurological: Positive for numbness and paresthesias. Negative for dizziness and focal weakness.   Psychiatric/Behavioral: Negative.    Allergic/Immunologic: Negative.        II. PHYSICAL EXAM   Right Arm BP - Sittin/66 (17 0854)  Left Arm BP - Sittin/68 (17 0854)  Pulse: 75  Temp: 96.2 °F (35.7 °C)    Physical Exam   Constitutional: He is oriented to person, place, and time. He appears well-developed and well-nourished.   HENT:   Head: Normocephalic and atraumatic.   Eyes: Conjunctivae are normal. Pupils are equal,  round, and reactive to light.   Neck: Normal range of motion. Neck supple.   Cardiovascular: Normal rate and regular rhythm.    Pulmonary/Chest: Effort normal and breath sounds normal.   Abdominal: Soft. Bowel sounds are normal.   Neurological: He is alert and oriented to person, place, and time.   Football dressings in place to bilateral feet- these were not taken down    III. ASSESSMENT & PLAN (MEDICAL DECISION MAKING)     1. Atherosclerosis of native arteries of the extremities with ulceration    2. 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, NYHA class 2 and CKD stage 3    3. Type 2 diabetes mellitus with stage 4 chronic kidney disease, with long-term current use of insulin        Imaging Results:     Post bypass studies pending    MARIO:  R L   .38 (.42) .60 (.64)     US lower extremity arterial: Right bypass graft occluded.  No flow identified.                                                Left SFA and SFA stent shows no flow, suggesting occlusion.    Assessment/Diagnosis and Plan:    Chau Rodarte is a 67 y.o. male with bilateral tissue loss. Images reviewed in media tab.  Recovering from left fem-pop bypass    Plan angio tomorrow to eval for right LE bypass, likely fem-BK pop with prosthetic    Nehal William MD FACS [facility]  Vascular & Endovascular Surgery

## 2017-07-16 ENCOUNTER — TELEPHONE (OUTPATIENT)
Dept: ELECTROPHYSIOLOGY | Facility: CLINIC | Age: 67
End: 2017-07-16

## 2017-07-16 NOTE — DISCHARGE SUMMARY
[facility]  Vascular Surgery  Discharge Summary      Patient Name: Chau Rodarte  MRN: [id number]  Admission Date: 7/14/2017  Hospital Length of Stay: 0 days  Discharge Date and Time: 7/14/2017  1:38 PM  Attending Physician: Nehal William MD  Discharging Provider: Fabby Keith MD  Primary Care Provider: Riki Huynh MD    HPI:   Mr. Rodarte presents for diagnostic cath lab angiogram to evaluate right LE bypass.     Procedure(s) (LRB):  ANGIOGRAM-EXTREMITY- Right LOWER CO2, Diagnostic only, Right CFA access (Right)     Hospital Course: 7/14/17: Successful diagnostic angio- Patient tolerated procedure well and discharged home    Consults: none    Significant Diagnostic Studies: POCT Glucose: 95  No other diagnosis studies    Pending Diagnostic Studies:     None        Final Active Diagnoses:    Diagnosis Date Noted POA    Atherosclerosis of native arteries of the extremities with ulceration [I70.25] 04/26/2017 Yes      Problems Resolved During this Admission:    Diagnosis Date Noted Date Resolved POA      Discharged Condition: stable    Disposition: Home or Self Care      Patient Instructions:     Diet general     Call MD for:  temperature >100.4     Call MD for:  persistent nausea and vomiting     Call MD for:  severe uncontrolled pain     Call MD for:  difficulty breathing, headache or visual disturbances     Call MD for:  redness, tenderness, or signs of infection (pain, swelling, redness, odor or green/yellow discharge around incision site)     Call MD for:  hives     Call MD for:  persistent dizziness or light-headedness     Call MD for:  extreme fatigue     Remove dressing in 48 hours       Medications:  Reconciled Home Medications:   Discharge Medication List as of 7/14/2017 12:52 PM      CONTINUE these medications which have NOT CHANGED    Details   aspirin 81 MG Chew Take 1 tablet (81 mg total) by mouth once daily., Starting 12/7/2016, Until Thu 12/7/17, OTC      atorvastatin  (LIPITOR) 80 MG tablet Take 1 tablet (80 mg total) by mouth once daily., Starting 12/7/2016, Until Thu 12/7/17, Normal      cholecalciferol, vitamin D3, (VITAMIN D3) 5,000 unit Tab Take 5,000 Units by mouth once daily., Until Discontinued, Historical Med      clopidogrel (PLAVIX) 75 mg tablet Take 1 tablet (75 mg total) by mouth once daily., Starting 12/7/2016, Until Discontinued, Normal      collagenase (SANTYL) ointment Apply topically once daily., Starting 5/4/2017, Until Discontinued, Normal      diclofenac sodium 1 % Gel Apply 2 g topically 3 (three) times daily., Starting 4/7/2017, Until Discontinued, Normal      ferrous sulfate 325 (65 FE) MG EC tablet Take 1 tablet (325 mg total) by mouth once daily., Starting Fri 6/30/2017, OTC      furosemide (LASIX) 40 MG tablet Take 1 tablet (40 mg total) by mouth once daily., Starting 12/7/2016, Until Thu 12/7/17, Normal      gabapentin (NEURONTIN) 300 MG capsule Take 300 mg by mouth 2 (two) times daily., Starting 3/22/2017, Until Discontinued, Historical Med      hydrocodone-acetaminophen 5-325mg (NORCO) 5-325 mg per tablet Take 1 tablet by mouth every 4 (four) hours as needed., Starting Fri 6/30/2017, Print      insulin detemir (LEVEMIR FLEXTOUCH) 100 unit/mL (3 mL) SubQ InPn pen Inject 16 Units into the skin every evening., Starting Fri 6/30/2017, Normal      isosorbide dinitrate (ISORDIL) 20 MG tablet Take 1 tablet (20 mg total) by mouth 3 (three) times daily., Starting Fri 6/30/2017, Until Sat 6/30/2018, Normal      liraglutide 0.6 mg/0.1 mL, 18 mg/3 mL, subq PNIJ (VICTOZA 3-SAGAR) 0.6 mg/0.1 mL (18 mg/3 mL) PnIj Inject 1.2 mg into the skin once daily., Starting Wed 7/12/2017, Normal      metoprolol succinate (TOPROL-XL) 25 MG 24 hr tablet Take 1 tablet (25 mg total) by mouth once daily., Starting Fri 6/30/2017, Until Sat 6/30/2018, Normal      pantoprazole (PROTONIX) 20 MG tablet Take 2 tablets (40 mg total) by mouth once daily., Starting 3/22/2017, Until u  "3/22/18, Normal      pen needle, diabetic (BD ULTRA-FINE HANG PEN NEEDLES) 32 gauge x 5/32" Ndle Uses 2 times daily, on  insulin injections, Normal      senna-docusate 8.6-50 mg (PERICOLACE) 8.6-50 mg per tablet Take 1 tablet by mouth once daily., Starting Fri 6/16/2017, Print      tamsulosin (FLOMAX) 0.4 mg Cp24 Take 1 capsule (0.4 mg total) by mouth once daily., Starting Fri 6/16/2017, Until Sat 6/16/2018, Normal             Fabby Keith MD  Vascular Surgery  [facility]  "

## 2017-07-17 ENCOUNTER — CLINICAL SUPPORT (OUTPATIENT)
Dept: ELECTROPHYSIOLOGY | Facility: CLINIC | Age: 67
End: 2017-07-17
Payer: MEDICARE

## 2017-07-17 ENCOUNTER — TELEPHONE (OUTPATIENT)
Dept: CARDIOLOGY | Facility: HOSPITAL | Age: 67
End: 2017-07-17

## 2017-07-17 DIAGNOSIS — I63.9 CRYPTOGENIC STROKE: ICD-10-CM

## 2017-07-17 DIAGNOSIS — Z95.818 STATUS POST PLACEMENT OF IMPLANTABLE LOOP RECORDER: ICD-10-CM

## 2017-07-17 PROCEDURE — 93299 LOOP RECORDER REMOTE: CPT | Mod: S$GLB,,, | Performed by: INTERNAL MEDICINE

## 2017-07-17 PROCEDURE — 93297 REM INTERROG DEV EVAL ICPMS: CPT | Mod: S$GLB,,, | Performed by: INTERNAL MEDICINE

## 2017-07-17 NOTE — TELEPHONE ENCOUNTER
Called Mr. Rodarte about the alert we received about 4 hours of atrial fibrillation.  He did not notice this.  He unfortunately is [id number] score of 7 with a prior stroke.  He also has had a major GI bleed requiring prolonged hospitalization.  He does not want to try anticoagulation again. I discussed potential Watchman procedure for which he would need a month of coumadin after implant, however he does not want to risk it, even for stroke protection.  I will have him see Dr. Bobby with CT surgery about a potential NICKO clip.  He desires to wait after his lower extremity bypass surgery is done.  Will request him to be seen in Dr. Bobby' office in 3-4 weeks and with me the following week.

## 2017-07-18 ENCOUNTER — TELEPHONE (OUTPATIENT)
Dept: ELECTROPHYSIOLOGY | Facility: CLINIC | Age: 67
End: 2017-07-18

## 2017-07-18 ENCOUNTER — OFFICE VISIT (OUTPATIENT)
Dept: INTERNAL MEDICINE | Facility: CLINIC | Age: 67
End: 2017-07-18
Payer: MEDICARE

## 2017-07-18 VITALS
BODY MASS INDEX: 30.34 KG/M2 | DIASTOLIC BLOOD PRESSURE: 53 MMHG | HEIGHT: 67 IN | HEART RATE: 68 BPM | SYSTOLIC BLOOD PRESSURE: 82 MMHG | WEIGHT: 193.31 LBS

## 2017-07-18 DIAGNOSIS — Z79.4 TYPE 2 DIABETES MELLITUS WITH STAGE 4 CHRONIC KIDNEY DISEASE, WITH LONG-TERM CURRENT USE OF INSULIN: Primary | ICD-10-CM

## 2017-07-18 DIAGNOSIS — E11.22 TYPE 2 DIABETES MELLITUS WITH STAGE 4 CHRONIC KIDNEY DISEASE, WITH LONG-TERM CURRENT USE OF INSULIN: Primary | ICD-10-CM

## 2017-07-18 DIAGNOSIS — N18.4 TYPE 2 DIABETES MELLITUS WITH STAGE 4 CHRONIC KIDNEY DISEASE, WITH LONG-TERM CURRENT USE OF INSULIN: Primary | ICD-10-CM

## 2017-07-18 PROCEDURE — 3066F NEPHROPATHY DOC TX: CPT | Mod: S$GLB,,, | Performed by: INTERNAL MEDICINE

## 2017-07-18 PROCEDURE — 99214 OFFICE O/P EST MOD 30 MIN: CPT | Mod: S$GLB,,, | Performed by: INTERNAL MEDICINE

## 2017-07-18 PROCEDURE — 1159F MED LIST DOCD IN RCRD: CPT | Mod: S$GLB,,, | Performed by: INTERNAL MEDICINE

## 2017-07-18 PROCEDURE — 1126F AMNT PAIN NOTED NONE PRSNT: CPT | Mod: S$GLB,,, | Performed by: INTERNAL MEDICINE

## 2017-07-18 PROCEDURE — 3044F HG A1C LEVEL LT 7.0%: CPT | Mod: S$GLB,,, | Performed by: INTERNAL MEDICINE

## 2017-07-18 PROCEDURE — 99499 UNLISTED E&M SERVICE: CPT | Mod: S$GLB,,, | Performed by: INTERNAL MEDICINE

## 2017-07-18 PROCEDURE — 99999 PR PBB SHADOW E&M-EST. PATIENT-LVL III: CPT | Mod: PBBFAC,,, | Performed by: INTERNAL MEDICINE

## 2017-07-18 NOTE — TELEPHONE ENCOUNTER
Called pt and spouse to arrange follow up with Onelia Bobby & Josh. Apts scheduled and clinic contact info given to pt's spouse to call back with any questions/concerns or if needs to reschedule.

## 2017-07-18 NOTE — PROGRESS NOTES
Subjective:       Patient ID: Chau Rodarte is a 67 y.o. male.    Chief Complaint: Hospital Follow Up    Since last visit had L leg bypass.    No new issues.    Sores on his feet are improving/healing.    No blood in stools, but stools are dark from iron. No melena.  Bowels are OK. appetite is OK as well.    Taking meds as rxed.    DM2:  good med adherence  no changed Diet  < 140 AM sugars  <140s Post-prandial sugars  chronic Numbness in feet  Stable/healing sores in feet  no Visual changes  no Polyuria/polydipsia.          Review of Systems   Constitutional: Positive for activity change. Negative for fatigue, fever and unexpected weight change.   HENT: Negative for congestion.    Respiratory: Negative for cough, chest tightness, shortness of breath and wheezing.    Cardiovascular: Negative for chest pain, palpitations and leg swelling.   Gastrointestinal: Negative for abdominal distention, abdominal pain, anal bleeding, blood in stool (resolved), nausea and vomiting.   Genitourinary: Negative for decreased urine volume and difficulty urinating.   Musculoskeletal: Negative for arthralgias and neck pain.   Skin: Positive for rash and wound.   Neurological: Negative for tremors, syncope, weakness and numbness.   Hematological: Negative for adenopathy. Does not bruise/bleed easily.   Psychiatric/Behavioral: Negative for confusion.       Objective:      Physical Exam   Constitutional: He appears well-developed and well-nourished. No distress.   Nontoxic appearing   HENT:   Head: Normocephalic and atraumatic.   Mouth/Throat: No oropharyngeal exudate.   Eyes: EOM are normal. Pupils are equal, round, and reactive to light. No scleral icterus.   Cardiovascular: Normal rate, regular rhythm and normal heart sounds.    Pulmonary/Chest: Effort normal. He has wheezes (mild).   Abdominal: Soft. Bowel sounds are normal. He exhibits no distension and no mass. There is no tenderness. There is no rebound and no guarding. No hernia.    Musculoskeletal:   bilat feet bandaged   Skin: He is not diaphoretic.   Psychiatric: He has a normal mood and affect. His behavior is normal.       Assessment:       1. Type 2 diabetes mellitus with stage 4 chronic kidney disease, with long-term current use of insulin        Plan:       Chau DAMIAN was seen today for hospital follow up.    Diagnoses and all orders for this visit:    Type 2 diabetes mellitus with stage 4 chronic kidney disease, with long-term current use of insulin  -     Ambulatory Referral to Optometry  F/u with endocrin for management    Health Maintenance       Date Due Completion Date    Hepatitis C Screening 1950 ---    TETANUS VACCINE 04/20/1967 ---    Zoster Vaccine 04/20/2009 ---    Abdominal Aortic Aneurysm Screening 04/20/2014 ---    Eye Exam 07/18/2016 7/18/2015 (Done)    Override on 7/18/2015: Done    Override on 3/29/2014: Done    PROSTATE-SPECIFIC ANTIGEN 06/22/2017 6/22/2016    Influenza Vaccine 08/01/2017 12/7/2016    Hemoglobin A1c 09/26/2017 6/26/2017    Lipid Panel 01/12/2018 1/12/2017    Pneumococcal (65+) (2 of 2 - PPSV23) 05/10/2018 5/10/2017    Foot Exam 06/15/2018 6/15/2017    Override on 10/23/2015: Done (Tyra Hoover)    Colonoscopy 03/21/2022 3/21/2017      Offered AAA screening, he declines for now.    Anemia, taking iron.    Requested Viagra like med, explained cannot with nitrates    Next blood work hep c screening.    PAD, seeing Dr. William and plan for R leg bypass in 4 days.    Return in about 6 months (around 1/18/2018).

## 2017-07-19 ENCOUNTER — HOSPITAL ENCOUNTER (OUTPATIENT)
Dept: WOUND CARE | Facility: HOSPITAL | Age: 67
Discharge: HOME | End: 2017-07-19
Attending: PODIATRIST
Payer: MEDICARE

## 2017-07-19 VITALS
WEIGHT: 193 LBS | TEMPERATURE: 98 F | DIASTOLIC BLOOD PRESSURE: 57 MMHG | HEART RATE: 81 BPM | SYSTOLIC BLOOD PRESSURE: 107 MMHG | BODY MASS INDEX: 30.29 KG/M2 | HEIGHT: 67 IN

## 2017-07-19 DIAGNOSIS — E11.51 TYPE 2 DIABETES MELLITUS WITH PERIPHERAL CIRCULATORY DISORDER: ICD-10-CM

## 2017-07-19 DIAGNOSIS — E11.621 DIABETIC ULCER OF HEEL ASSOCIATED WITH TYPE 2 DIABETES MELLITUS, UNSPECIFIED LATERALITY, UNSPECIFIED ULCER STAGE: ICD-10-CM

## 2017-07-19 DIAGNOSIS — I73.9 PERIPHERAL VASCULAR DISEASE, UNSPECIFIED: Primary | ICD-10-CM

## 2017-07-19 DIAGNOSIS — L97.409 DIABETIC ULCER OF HEEL ASSOCIATED WITH TYPE 2 DIABETES MELLITUS, UNSPECIFIED LATERALITY, UNSPECIFIED ULCER STAGE: ICD-10-CM

## 2017-07-19 PROCEDURE — 15275 SKIN SUB GRAFT FACE/NK/HF/G: CPT

## 2017-07-19 PROCEDURE — 15275 SKIN SUB GRAFT FACE/NK/HF/G: CPT | Mod: ,,, | Performed by: PODIATRIST

## 2017-07-19 PROCEDURE — 15275 PR SKIN SUB GRAFT FACE/NK/HF/G UP TO 100 SQCM: ICD-10-PCS | Mod: ,,, | Performed by: PODIATRIST

## 2017-07-19 NOTE — PATIENT INSTRUCTIONS
Instructed to keep all dressing dry and intact and to call [facility] PRN complications such as increased pain, increased swelling, increased drainage, increased redness and/or fever.

## 2017-07-19 NOTE — BRIEF OP NOTE
[facility]  Brief Operative Note    SUMMARY     Surgery Date: 7/14/2017     Surgeon(s) and Role:     * Nehal William MD - Primary    Assisting Surgeon: None    Pre-op Diagnosis:  Atherosclerosis of native arteries of the extremities with ulceration [I70.25]  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, NYHA class 2 and CKD stage 3 [I13.0, I50.20, N18.3]  Type 2 diabetes mellitus with stage 4 chronic kidney disease, with long-term current use of insulin [E11.22, N18.4, Z79.4]    Post-op Diagnosis:  Post-Op Diagnosis Codes:     * Atherosclerosis of native arteries of the extremities with ulceration [I70.25]     * 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, NYHA class 2 and CKD stage 3 [I13.0, I50.20, N18.3]     * Type 2 diabetes mellitus with stage 4 chronic kidney disease, with long-term current use of insulin [E11.22, N18.4, Z79.4]    Procedure: Right LE Angiogram with CO2    US Right CFA access    Anesthesia: RN IV Sedation    Description of Procedure: Angio for bypass planning    Description of the findings of the procedure: Patent mid/distal Peroneal.    Estimated Blood Loss: <5ml         Specimens:   Specimen (12h ago through future)    None

## 2017-07-19 NOTE — OP NOTE
DATE OF PROCEDURE:  07/14/2017.    PREOPERATIVE DIAGNOSES:  1.  Atherosclerosis of the right leg with right foot ulceration.  2.  Class II heart failure.  3.  Stage III kidney disease.    POSTOPERATIVE DIAGNOSES:  1.  Atherosclerosis of the right leg with right foot ulceration.  2.  Class II heart failure.  3.  Stage III kidney disease.    PROCEDURES:  1.  Right lower extremity angiogram with carbon dioxide gas.  2.  Ultrasound-guided right common femoral artery access.    SURGEON:  Nehal William M.D.    ASSISTANT:  None.    ANESTHESIA:  Local plus sedation.    FINDINGS:  Right lower extremity angiogram for bypass planning.  Patent mid   distal peroneal with occluded SFA and popliteal and tibial peritoneal trunk   proximally.  Discussed bypass with the patient.    ESTIMATED BLOOD LOSS:  Less than 5 mL.    COMPLICATIONS:  None.    SPECIMENS:  None.    DISPOSITION:  Home.    HISTORY:  Mr. Rodarte is a 67-year-old gentleman with severe chronic kidney   disease, diabetes, and bilateral foot ulcerations.  He had previously undergone   a left leg bypass, from which he is recovering.  He needs a right leg bypass for   right foot tissue loss.  He was counseled on benefits and risks of an angiogram   for bypass planning.  He understood these risks and gave informed consent to   proceed.    PROCEDURE IN DETAIL:  After obtaining consent, he was brought to the Cath Lab.    Intravenous moderate sedation was administered.  I was present throughout the   administration of the moderate sedation and monitored the patient's   cardiorespiratory status.  Total moderate sedation time was approximately 30   minutes.  Please see the ____ procedure record for medications and dosage given.    We accessed the right common femoral artery using ultrasound-guided access to   vessels widely patent suitable for endovascular procedure and recording was   placed in the chart.  I placed a 4-Palestinian micropuncture catheter, followed by a   5-Palestinian  angiographic sheath using a Seldinger technique.  We used carbon   dioxide gas, performed a right lower extremity angiogram, and showed the right   common femoral, external iliac, and right profunda femoris were widely patent   without proximal or distal stenosis.  The SFA was occluded at its origin as was   a previous fem-pop bypass.  Distally, the SFA was occluded throughout its   length, and below-knee and above-knee popliteal were occluded.  The   tibioperoneal trunk also did not reconstitute.  A short segment of anterior   tibial artery reconstituted in the proximal leg.  The runoff to the foot was via   a small diseased peroneal, which reconstituted from muscular collaterals in the   mid distal leg.  Based on these findings, we removed our sheath.  A Mynx   closure device was used.  Hemostasis was achieved.  We will discuss with Parish   Cayden the possibility of a right leg bypass.      MARI/VIKKI  dd: 07/19/2017 13:09:08 (CDT)  td: 07/19/2017 15:31:05 (CDT)  Doc ID   #[id number]  Job ID #[id number]    CC:

## 2017-07-24 ENCOUNTER — TELEPHONE (OUTPATIENT)
Dept: OPTOMETRY | Facility: CLINIC | Age: 67
End: 2017-07-24

## 2017-07-25 ENCOUNTER — OFFICE VISIT (OUTPATIENT)
Dept: OPTOMETRY | Facility: CLINIC | Age: 67
End: 2017-07-25
Payer: MEDICARE

## 2017-07-25 DIAGNOSIS — H52.31 ANISOMETROPIA: ICD-10-CM

## 2017-07-25 DIAGNOSIS — H25.13 NUCLEAR SCLEROTIC CATARACT OF BOTH EYES: ICD-10-CM

## 2017-07-25 DIAGNOSIS — H52.4 REGULAR ASTIGMATISM WITH PRESBYOPIA, BILATERAL: ICD-10-CM

## 2017-07-25 DIAGNOSIS — E11.9 TYPE 2 DIABETES MELLITUS WITHOUT RETINOPATHY: Primary | ICD-10-CM

## 2017-07-25 DIAGNOSIS — H52.223 REGULAR ASTIGMATISM WITH PRESBYOPIA, BILATERAL: ICD-10-CM

## 2017-07-25 DIAGNOSIS — H04.123 BILATERAL DRY EYES: ICD-10-CM

## 2017-07-25 DIAGNOSIS — Z79.4 CURRENT USE OF INSULIN: ICD-10-CM

## 2017-07-25 PROCEDURE — 92015 DETERMINE REFRACTIVE STATE: CPT | Mod: S$GLB,,, | Performed by: OPTOMETRIST

## 2017-07-25 PROCEDURE — 92004 COMPRE OPH EXAM NEW PT 1/>: CPT | Mod: S$GLB,,, | Performed by: OPTOMETRIST

## 2017-07-25 PROCEDURE — 99999 PR PBB SHADOW E&M-EST. PATIENT-LVL II: CPT | Mod: PBBFAC,,, | Performed by: OPTOMETRIST

## 2017-07-25 PROCEDURE — 99499 UNLISTED E&M SERVICE: CPT | Mod: S$GLB,,, | Performed by: OPTOMETRIST

## 2017-07-25 NOTE — PROGRESS NOTES
HPI     Mr. Chau Rodarte was referred by Dr. Riki Huynh for Diabetic Eye Exam    He reports clear vision all ranges with glasses. He requests refraction   today.    (-)drops  (-)flashes  (-)floaters  (-)diplopia    Diabetic yes   mg/dL yesterday (7/24/2017)  Hemoglobin A1C       Date                     Value               Ref Range             Status                06/26/2017               6.1 (H)             4.0 - 5.6 %           Final                  05/09/2017               6.4 (H)             4.5 - 6.2 %           Final                  02/27/2017               7.1 (H)             4.5 - 6.2 %           Final              ----------    OCULAR HISTORY  Last Eye Exam July 2016  (-)eye surgery   (-)diagnosed or treated for any eye conditions or diseases no    FAMILY HISTORY  (+)Glaucoma: mother        Last edited by Ivet Drew, OD on 7/25/2017  9:25 AM. (History)            Assessment /Plan     For exam results, see Encounter Report.    Type 2 diabetes mellitus without retinopathy  Current use of insulin   No retinopathy noted OU (to extent seen; poor views due to cataracts and dry eyes). Continue management of DM as directed by PCP. Monitor with DFE in 1 year, or RTC immediately with any vision changes.     Nuclear sclerotic cataract of both eyes   Contributing to reduced best-corrected visual acuity (OD 20/20-1, OS 20/30). Pt opts to defer cataract surgery for the next year due to other health concerns.    Bilateral dry eyes   Contributing to reduced best-corrected visual acuity. Recommended artificial tears TID OU.    Anisometropia  Regular astigmatism with presbyopia, bilateral   Small change in refractive error OU. New glasses prescription released, adaptation expected.  New glasses optional.    Advised pt that refractive error will change after cataract surgery.  Eyeglass Final Rx     Eyeglass Final Rx       Sphere Cylinder Axis Add    Right +0.75 +1.00 010 +2.25    Left -0.50 +1.25 140 +2.25     Expiration Date:  7/26/2018    Opposite signs correct                   RTC 1 year, or sooner prn  Okay to RTC at pt's convenience for cataract evaluation with any ophthalmologist

## 2017-07-25 NOTE — Clinical Note
Dear Dr. Huynh,  Thank you for referring Mr. Rodarte for a diabetic eye examination; there is no retinopathy and I will continue to monitor yearly. Please let me know if you have questions.  Sincerely, Ivet Drew OD

## 2017-07-25 NOTE — PATIENT INSTRUCTIONS
Thank you very much for coming in for your eye examination. It was a pleasure working with you today. Below is some information you might find helpful.           Cataract Surgery FAQs  Frequently Asked Questions    My doctor told me I have a cataract     What do I do next?   Relax. Cataracts are a normal part of aging, and cataract surgery is among the safest and most common procedures performed today.  Most patients who have had cataract surgery report significant improvement in their quality of life because of their improved vision, with a speedy recovery and minimal discomfort or inconvenience.    Your optometrist will recommend a cataract surgeon who will examine your eyes, evaluate the need for surgery, and discuss the details with you and your family.     What exactly is a cataract?   A cataract is simply a darkening or clouding of the eyes internal lens, which blurs your vision.  It is not a film which grows over the eye, but rather a degenerative process which causes the eyes normally clear lens to become cloudy or hazy.     Because this degenerative process occurs slowly over many years, we generally wait until the cataract begins to significantly impact your vision, before recommend surgery.                     How is cataract surgery performed?  Cataract surgery involves removal of the dark or cloudy lens from the eye, and implantation of a new, clear lens implant or IOL.  Cataract surgery is a lens replacement surgery.          Modern cataract surgery is performed as a same-day surgery and typically takes about 15 minutes to complete.  It is performed while you are awake, but you will be medicated so that you are relaxed and comfortable. Of course, the eye is anesthetized so that you dont feel any discomfort, and many people only vaguely remember the actual procedure, recalling only lights and the sensation of moisture on the eye.     Although is takes about a week to fully heal, most people are  able to see better and resume their normal activities the very next day!  You will need to use eye drops for about one month after your surgery.     Will I need glasses after cataract surgery?   The purpose of cataract surgery is to improve the overall quality of your vision, but it does not necessarily eliminate the need for glasses. Although some people dont need to wear glasses after standard cataract surgery, most people will still need to wear glasses for certain tasks.  If you are interested in minimizing or even eliminating the need for glasses, you should ask your surgeon about Refractive Cataract Surgery.    What is Refractive Cataract Surgery?   Refractive Cataract Surgery refers to the use of additional techniques performed either at the time of your cataract surgery or soon afterwards, designed to minimize and often eliminate your need for glasses.  Technologies such as LASIK, Astigmatism Correcting Incisions, Multifocal, Accommodating, or Toric lens implants can all be used, depending on the particular needs of each patient. Only your surgeon can determine if you are a candidate and which techniques are appropriate for your goals and your eye.                         LASIK                Multifocal IOL         Will my insurance cover these additional procedures?   Although your insurance will fully cover your cataract surgery, any other additional procedures -designed solely to eliminate the need for glasses- are considered elective (not medically necessary), and therefore not covered by your insurance.  Rest assured that your vision will be better after cataract surgery, in either case.  You simply need to decide whether minimizing your dependence on glasses is worth the additional investment in your eyes.     View a 1hr video discussing cataract surgery with live patient questions and answers on the [facility] Web Site (Keywords: [employer]  "Cataract):    [email or web address]    ==============================================    DRY EYES     Dry eyes is a common condition. It can be caused by an insufficient volume of tears, or by tears that do not spread evenly over the cornea. An uneven tear film can also cause vision to blur intermittently.   Dry eyes are often associated with burning, stinging, and/or red eyes.As we age, the eyes usually produce fewer tears and result in decreased normal eye lubrication. Paradoxically, dry eye can make eyes water. When they water, that watery production actually makes the dry eye situation worse because the watery tears do not lubricate the eye well at all. Watery tears really serve to flush foreign material out of the eye, not to lubricate. Unfortunately, when the eyes get really dry they become irritated and stimulate the formation of watery tears.   Lubricants, in the form of drops or ointments, are the principal treatment for dry eyes. The following are recommendations for managing your dry eye condition:    1) Use over-the-counter artificial tears or lubricants (such as Systane Balance, Soothe XP, Refresh Optive, Blink, or Genteal), 1 drop in each eye 3 to 4 times a day. Please avoid drops that advertise redness relief since these can be unhealthy for your eyes and can cause permanent redness if used long-term.     It is best to take artificial tears on a schedule, like you would for a medication. Taking them routinely at breakfast, lunch, and dinner for example will provide better relief and better use of the tear drop than taking them whenever your eyes "feel" dry.    2) Optional use of over-the-counter lubricating gels (such as Genteal Gel, Systane Gel, or Refresh PM) applied to the inside of the lower lid of both eyes at bedtime. This gel is very thick and may cause blurred vision, so we recommend you use it before bedtime. In the morning you can gently wipe your eyes " with a damp washcloth to remove any residual gel.    3) Warm compresses applied to the closed eyelids twice per day, followed with lid massage.    4) Always drink an adequate amount of water daily (4-6 cups a day).    5) 1000 mg of good quality fish oil (labeled DHA and/or EPA). Flaxseed oil can also be beneficial. Do not take fish oil if you are currently taking a medication called warfarin or coumadin.    6) Use a humidifier in your bedroom.    7) If the dryness continues there may be additional options of punctal plugs, or prescription dry eye drops such as Restasis or Xiidra. Punctal plugs are medical devices inserted into the puncta or the drain of the eye to block some of your natural tears from draining off the eye. Restasis and Xiidra are prescription eye drops that, over time, may help your body make more tears naturally.    It is important to maintain this treatment plan until your symptoms have improved. Once improved, you can reduce the frequency of lubricants and warm compresses. If the symptoms recur, then apply as needed for comfort.

## 2017-07-25 NOTE — LETTER
July 25, 2017      Riki Huynh MD  [address]  [facility] [address]           [facility]  [address]  [facility] [address]  Phone: [phone]          Patient: Chau Rodarte   MR Number: [id number]   YOB: 1950   Date of Visit: 7/25/2017       Dear Dr. Riki Huynh:    Thank you for referring Chau Rodarte to me for evaluation. Attached you will find relevant portions of my assessment and plan of care.    If you have questions, please do not hesitate to call me. I look forward to following Chau Rodarte along with you.    Sincerely,    Ivet Drew, OD    Enclosure  CC:  No Recipients    If you would like to receive this communication electronically, please contact [email or web address] or [phone] to request more information on [employer] Link access.    For providers and/or their staff who would like to refer a patient to [facility], please contact us through our one-stop-shop provider referral line, [facility] , at [phone].    If you feel you have received this communication in error or would no longer like to receive these types of communications, please e-mail [email or web address]

## 2017-07-26 ENCOUNTER — OFFICE VISIT (OUTPATIENT)
Dept: VASCULAR SURGERY | Facility: CLINIC | Age: 67
End: 2017-07-26
Payer: MEDICARE

## 2017-07-26 VITALS
TEMPERATURE: 98 F | HEART RATE: 78 BPM | HEIGHT: 69 IN | BODY MASS INDEX: 32.58 KG/M2 | WEIGHT: 220 LBS | DIASTOLIC BLOOD PRESSURE: 68 MMHG | SYSTOLIC BLOOD PRESSURE: 115 MMHG

## 2017-07-26 DIAGNOSIS — E11.22 TYPE 2 DIABETES MELLITUS WITH STAGE 4 CHRONIC KIDNEY DISEASE, UNSPECIFIED LONG TERM INSULIN USE STATUS: ICD-10-CM

## 2017-07-26 DIAGNOSIS — I70.25 ATHEROSCLEROSIS OF NATIVE ARTERIES OF THE EXTREMITIES WITH ULCERATION: Primary | ICD-10-CM

## 2017-07-26 DIAGNOSIS — N18.4 TYPE 2 DIABETES MELLITUS WITH STAGE 4 CHRONIC KIDNEY DISEASE, UNSPECIFIED LONG TERM INSULIN USE STATUS: ICD-10-CM

## 2017-07-26 PROCEDURE — 99024 POSTOP FOLLOW-UP VISIT: CPT | Mod: S$GLB,,, | Performed by: SURGERY

## 2017-07-26 PROCEDURE — 99999 PR PBB SHADOW E&M-EST. PATIENT-LVL III: CPT | Mod: PBBFAC,,, | Performed by: SURGERY

## 2017-07-26 NOTE — PROGRESS NOTES
Patient ID: Chau Rodarte is a 67 y.o. male.    I. HISTORY     Chief Complaint: Ulcers of bilateral feet    HPI Chau Rodarte is a 67 y.o. male with CKD, DM, and bilateral foot ulcerations s/p Right LE angiogram with CO2 gas (7/14/17) and left fem pop bypass (6/14/17) who is here to discuss further planning.  He has a history of PAD s/p L SFA stent in May 2012 and R Fem-AK Pop bypass in June 2012 with PTFE and now has bilateral foot ulcers. Both feet are wrapped and diabetic shoes in place. Dr. Bland recently placed a graft to the left heel which is healing well according to the patient. The left groin and medial calf surgical incisions are without drainage but patient noted an odor to the wounds recently. He denies fever, chills, night sweats, or fatigue.     Historical note:  Both revascularization procedures were done for claudication, at [facility].  He was seen by us in 2014, at which point he had MARIO of .42 on the right and .64 on the L.  Despite that, he could ambulate well.  US suggested that SFA stent was occluded, but that bypass was patent.  CTA was ordered to evaluate the RLE vasculature, but he apparently did not have that done.      Review of Systems   Constitution: Negative for chills, fever, weakness, malaise/fatigue and night sweats.   HENT: Negative for congestion and ear discharge.    Eyes: Negative for discharge and double vision.   Cardiovascular: Positive for claudication and dyspnea on exertion. Negative for chest pain, cyanosis and leg swelling.   Respiratory: Negative for cough and hemoptysis.    Endocrine: Negative.    Hematologic/Lymphatic: Negative.    Skin: Positive for poor wound healing. Negative for color change and dry skin.   Musculoskeletal: Positive for arthritis, joint pain and stiffness.   Gastrointestinal: Negative.  Negative for abdominal pain, nausea and vomiting.   Neurological: Positive for numbness and paresthesias. Negative for dizziness and focal weakness.    Psychiatric/Behavioral: Negative.    Allergic/Immunologic: Negative.        II. PHYSICAL EXAM              Physical Exam   Constitutional: He is oriented to person, place, and time. He appears well-developed and well-nourished.   HENT:   Head: Normocephalic and atraumatic.   Eyes: Conjunctivae are normal. Pupils are equal, round, and reactive to light.   Neck: Normal range of motion. Neck supple.   Cardiovascular: Normal rate and regular rhythm.    Pulmonary/Chest: Effort normal and breath sounds normal.   Abdominal: Soft. Bowel sounds are normal.   Musculoskeletal: He exhibits edema.   Neurological: He is alert and oriented to person, place, and time.   Skin:        Football dressings in place to bilateral feet- these were not taken down    III. ASSESSMENT & PLAN (MEDICAL DECISION MAKING)     1. Atherosclerosis of native arteries of the extremities with ulceration    2. Type 2 diabetes mellitus with stage 4 chronic kidney disease, unspecified long term insulin use status        Imaging Results:     Post bypass studies pending    MARIO:  R L   .38 (.42) .60 (.64)     US lower extremity arterial: Right bypass graft occluded.  No flow identified.                                                Left SFA and SFA stent shows no flow, suggesting occlusion.    Assessment/Diagnosis and Plan:    hCau Rodarte is a 67 y.o. male with bilateral tissue loss. Images reviewed in media tab.  Recovering from left fem-pop bypass    1. Wash left femoral and popliteal surgical incisions with soap and water.  Pat dry.  Apply medihoney and cover with gauze.   2. F/u 3 months with repeat MARIO and LE duplex    Not a candidate for RLE bypass due to lack of conduit and very poor tibial target  Lengthy discussion with Mr Rodarte and his wife regarding this decision.  Likely will need major amputation on the right in the furture    Nehal William MD FACS [facility]  Vascular & Endovascular Surgery

## 2017-08-02 ENCOUNTER — HOSPITAL ENCOUNTER (OUTPATIENT)
Dept: WOUND CARE | Facility: HOSPITAL | Age: 67
Discharge: HOME | End: 2017-08-02
Attending: PODIATRIST
Payer: MEDICARE

## 2017-08-02 ENCOUNTER — HOSPITAL ENCOUNTER (OUTPATIENT)
Dept: WOUND CARE | Facility: HOSPITAL | Age: 67
Discharge: HOME | End: 2017-08-02
Attending: SURGERY
Payer: MEDICARE

## 2017-08-02 VITALS
TEMPERATURE: 98 F | WEIGHT: 220 LBS | HEART RATE: 75 BPM | BODY MASS INDEX: 32.58 KG/M2 | DIASTOLIC BLOOD PRESSURE: 65 MMHG | HEIGHT: 69 IN | SYSTOLIC BLOOD PRESSURE: 138 MMHG

## 2017-08-02 DIAGNOSIS — I73.9 PERIPHERAL VASCULAR DISEASE, UNSPECIFIED: Primary | ICD-10-CM

## 2017-08-02 DIAGNOSIS — L97.409 DIABETIC ULCER OF HEEL ASSOCIATED WITH TYPE 2 DIABETES MELLITUS, UNSPECIFIED LATERALITY, UNSPECIFIED ULCER STAGE: Primary | ICD-10-CM

## 2017-08-02 DIAGNOSIS — I73.9 PERIPHERAL VASCULAR DISEASE, UNSPECIFIED: ICD-10-CM

## 2017-08-02 DIAGNOSIS — E11.621 DIABETIC ULCER OF TOE OF RIGHT FOOT ASSOCIATED WITH TYPE 2 DIABETES MELLITUS, WITH FAT LAYER EXPOSED: ICD-10-CM

## 2017-08-02 DIAGNOSIS — E11.621 DIABETIC ULCER OF HEEL ASSOCIATED WITH TYPE 2 DIABETES MELLITUS, UNSPECIFIED LATERALITY, UNSPECIFIED ULCER STAGE: Primary | ICD-10-CM

## 2017-08-02 DIAGNOSIS — E11.42 TYPE 2 DIABETES MELLITUS WITH POLYNEUROPATHY: ICD-10-CM

## 2017-08-02 DIAGNOSIS — L97.512 DIABETIC ULCER OF TOE OF RIGHT FOOT ASSOCIATED WITH TYPE 2 DIABETES MELLITUS, WITH FAT LAYER EXPOSED: ICD-10-CM

## 2017-08-02 DIAGNOSIS — E11.51 TYPE 2 DIABETES MELLITUS WITH PERIPHERAL CIRCULATORY DISORDER: ICD-10-CM

## 2017-08-02 PROCEDURE — 11042 DBRDMT SUBQ TIS 1ST 20SQCM/<: CPT

## 2017-08-02 PROCEDURE — 87075 CULTR BACTERIA EXCEPT BLOOD: CPT | Mod: 59

## 2017-08-02 PROCEDURE — 11042 PR DEBRIDEMENT, SKIN, SUB-Q TISSUE,=<20 SQ CM: ICD-10-PCS | Mod: 59,,, | Performed by: PODIATRIST

## 2017-08-02 PROCEDURE — 87077 CULTURE AEROBIC IDENTIFY: CPT | Mod: 59

## 2017-08-02 PROCEDURE — 11042 DBRDMT SUBQ TIS 1ST 20SQCM/<: CPT | Mod: 59,,, | Performed by: PODIATRIST

## 2017-08-02 PROCEDURE — 87075 CULTR BACTERIA EXCEPT BLOOD: CPT

## 2017-08-02 PROCEDURE — 87186 SC STD MICRODIL/AGAR DIL: CPT

## 2017-08-02 PROCEDURE — 99214 OFFICE O/P EST MOD 30 MIN: CPT | Mod: 25

## 2017-08-02 PROCEDURE — 27201912 HC WOUND CARE DEBRIDEMENT SUPPLIES

## 2017-08-02 PROCEDURE — 87070 CULTURE OTHR SPECIMN AEROBIC: CPT | Mod: 59

## 2017-08-02 PROCEDURE — 15271 SKIN SUB GRAFT TRNK/ARM/LEG: CPT

## 2017-08-02 PROCEDURE — 87070 CULTURE OTHR SPECIMN AEROBIC: CPT

## 2017-08-02 PROCEDURE — 15275 PR SKIN SUB GRAFT FACE/NK/HF/G UP TO 100 SQCM: ICD-10-PCS | Mod: ,,, | Performed by: PODIATRIST

## 2017-08-02 PROCEDURE — 15275 SKIN SUB GRAFT FACE/NK/HF/G: CPT | Mod: ,,, | Performed by: PODIATRIST

## 2017-08-02 PROCEDURE — 15275 SKIN SUB GRAFT FACE/NK/HF/G: CPT

## 2017-08-02 PROCEDURE — 87186 SC STD MICRODIL/AGAR DIL: CPT | Mod: 59

## 2017-08-02 PROCEDURE — 29581 APPL MULTLAYER CMPRN SYS LEG: CPT

## 2017-08-02 NOTE — PROGRESS NOTES
Much of the documentation for this visit was completed in the [employer] system. Please see the attached documentation for further details about this patient's care.     Lynette Garcia RN

## 2017-08-05 LAB
BACTERIA SPEC AEROBE CULT: NORMAL

## 2017-08-08 LAB — BACTERIA SPEC ANAEROBE CULT: NORMAL

## 2017-08-09 ENCOUNTER — HOSPITAL ENCOUNTER (OUTPATIENT)
Dept: WOUND CARE | Facility: HOSPITAL | Age: 67
Discharge: HOME | End: 2017-08-09
Attending: PODIATRIST
Payer: MEDICARE

## 2017-08-09 ENCOUNTER — HOSPITAL ENCOUNTER (OUTPATIENT)
Dept: WOUND CARE | Facility: HOSPITAL | Age: 67
Discharge: HOME | End: 2017-08-09
Attending: SURGERY
Payer: MEDICARE

## 2017-08-09 VITALS
WEIGHT: 220 LBS | BODY MASS INDEX: 32.58 KG/M2 | DIASTOLIC BLOOD PRESSURE: 69 MMHG | HEART RATE: 72 BPM | SYSTOLIC BLOOD PRESSURE: 133 MMHG | HEIGHT: 69 IN | TEMPERATURE: 98 F

## 2017-08-09 DIAGNOSIS — E11.621 DIABETIC ULCER OF HEEL ASSOCIATED WITH TYPE 2 DIABETES MELLITUS, UNSPECIFIED LATERALITY, UNSPECIFIED ULCER STAGE: Primary | ICD-10-CM

## 2017-08-09 DIAGNOSIS — L97.409 DIABETIC ULCER OF HEEL ASSOCIATED WITH TYPE 2 DIABETES MELLITUS, UNSPECIFIED LATERALITY, UNSPECIFIED ULCER STAGE: Primary | ICD-10-CM

## 2017-08-09 PROCEDURE — 11042 DBRDMT SUBQ TIS 1ST 20SQCM/<: CPT

## 2017-08-09 PROCEDURE — 27201912 HC WOUND CARE DEBRIDEMENT SUPPLIES

## 2017-08-09 PROCEDURE — 29581 APPL MULTLAYER CMPRN SYS LEG: CPT

## 2017-08-09 PROCEDURE — 11044 DBRDMT BONE 1ST 20 SQ CM/<: CPT

## 2017-08-09 NOTE — PROGRESS NOTES
Much of the documentation for this visit was completed in the [employer] system. Please see the attached documentation for further details about this patient's care.     Bartolo Yates MD

## 2017-08-10 LAB — BACTERIA SPEC ANAEROBE CULT: NORMAL

## 2017-08-12 RX ORDER — TAMSULOSIN HYDROCHLORIDE 0.4 MG/1
0.4 CAPSULE ORAL DAILY
Qty: 30 CAPSULE | Refills: 0 | OUTPATIENT
Start: 2017-08-12

## 2017-08-16 ENCOUNTER — HOSPITAL ENCOUNTER (OUTPATIENT)
Dept: WOUND CARE | Facility: HOSPITAL | Age: 67
Discharge: HOME | End: 2017-08-16
Attending: SURGERY
Payer: MEDICARE

## 2017-08-16 VITALS
BODY MASS INDEX: 32.58 KG/M2 | HEART RATE: 65 BPM | TEMPERATURE: 98 F | SYSTOLIC BLOOD PRESSURE: 115 MMHG | HEIGHT: 69 IN | DIASTOLIC BLOOD PRESSURE: 58 MMHG | WEIGHT: 220 LBS

## 2017-08-16 DIAGNOSIS — E11.621 DIABETIC ULCER OF HEEL ASSOCIATED WITH TYPE 2 DIABETES MELLITUS, UNSPECIFIED LATERALITY, UNSPECIFIED ULCER STAGE: Primary | ICD-10-CM

## 2017-08-16 DIAGNOSIS — L97.512 DIABETIC ULCER OF TOE OF RIGHT FOOT ASSOCIATED WITH TYPE 2 DIABETES MELLITUS, WITH FAT LAYER EXPOSED: ICD-10-CM

## 2017-08-16 DIAGNOSIS — L97.409 DIABETIC ULCER OF HEEL ASSOCIATED WITH TYPE 2 DIABETES MELLITUS, UNSPECIFIED LATERALITY, UNSPECIFIED ULCER STAGE: Primary | ICD-10-CM

## 2017-08-16 DIAGNOSIS — I73.9 PERIPHERAL VASCULAR DISEASE, UNSPECIFIED: ICD-10-CM

## 2017-08-16 DIAGNOSIS — E11.621 DIABETIC ULCER OF TOE OF RIGHT FOOT ASSOCIATED WITH TYPE 2 DIABETES MELLITUS, WITH FAT LAYER EXPOSED: ICD-10-CM

## 2017-08-16 PROCEDURE — 27201912 HC WOUND CARE DEBRIDEMENT SUPPLIES

## 2017-08-16 PROCEDURE — 11042 DBRDMT SUBQ TIS 1ST 20SQCM/<: CPT

## 2017-08-16 PROCEDURE — 29581 APPL MULTLAYER CMPRN SYS LEG: CPT

## 2017-08-16 RX ORDER — CLINDAMYCIN HYDROCHLORIDE 300 MG/1
300 CAPSULE ORAL 3 TIMES DAILY
Qty: 30 CAPSULE | Refills: 1 | Status: SHIPPED | OUTPATIENT
Start: 2017-08-16 | End: 2019-01-14

## 2017-08-16 RX ORDER — CIPROFLOXACIN 500 MG/1
500 TABLET ORAL 2 TIMES DAILY
Qty: 20 TABLET | Refills: 1 | Status: SHIPPED | OUTPATIENT
Start: 2017-08-16 | End: 2017-09-29

## 2017-08-18 ENCOUNTER — CLINICAL SUPPORT (OUTPATIENT)
Dept: ELECTROPHYSIOLOGY | Facility: CLINIC | Age: 67
End: 2017-08-18
Payer: MEDICARE

## 2017-08-18 DIAGNOSIS — Z95.818 STATUS POST PLACEMENT OF IMPLANTABLE LOOP RECORDER: ICD-10-CM

## 2017-08-18 DIAGNOSIS — I63.9 CRYPTOGENIC STROKE: ICD-10-CM

## 2017-08-23 ENCOUNTER — HOSPITAL ENCOUNTER (OUTPATIENT)
Dept: WOUND CARE | Facility: HOSPITAL | Age: 67
Discharge: HOME | End: 2017-08-23
Attending: SURGERY
Payer: MEDICARE

## 2017-08-23 ENCOUNTER — HOSPITAL ENCOUNTER (OUTPATIENT)
Dept: WOUND CARE | Facility: HOSPITAL | Age: 67
Discharge: HOME | End: 2017-08-23
Attending: PODIATRIST
Payer: MEDICARE

## 2017-08-23 ENCOUNTER — OFFICE VISIT (OUTPATIENT)
Dept: CARDIOTHORACIC SURGERY | Facility: CLINIC | Age: 67
End: 2017-08-23
Payer: MEDICARE

## 2017-08-23 VITALS
WEIGHT: 227 LBS | DIASTOLIC BLOOD PRESSURE: 66 MMHG | SYSTOLIC BLOOD PRESSURE: 141 MMHG | BODY MASS INDEX: 33.62 KG/M2 | HEIGHT: 69 IN | TEMPERATURE: 98 F | HEART RATE: 77 BPM

## 2017-08-23 VITALS
HEIGHT: 69 IN | BODY MASS INDEX: 33.67 KG/M2 | DIASTOLIC BLOOD PRESSURE: 67 MMHG | OXYGEN SATURATION: 98 % | WEIGHT: 227.31 LBS | TEMPERATURE: 98 F | SYSTOLIC BLOOD PRESSURE: 124 MMHG | HEART RATE: 81 BPM

## 2017-08-23 DIAGNOSIS — E11.52 TYPE 2 DIABETES MELLITUS WITH DIABETIC PERIPHERAL ANGIOPATHY AND GANGRENE, WITHOUT LONG-TERM CURRENT USE OF INSULIN: Primary | ICD-10-CM

## 2017-08-23 DIAGNOSIS — E11.621 DIABETIC ULCER OF HEEL ASSOCIATED WITH TYPE 2 DIABETES MELLITUS, UNSPECIFIED LATERALITY, UNSPECIFIED ULCER STAGE: ICD-10-CM

## 2017-08-23 DIAGNOSIS — T81.89XD NONHEALING SURGICAL WOUND, SUBSEQUENT ENCOUNTER: ICD-10-CM

## 2017-08-23 DIAGNOSIS — L97.519 DIABETIC ULCER OF OTHER PART OF RIGHT FOOT ASSOCIATED WITH TYPE 2 DIABETES MELLITUS, UNSPECIFIED ULCER STAGE: ICD-10-CM

## 2017-08-23 DIAGNOSIS — Z87.2 HEALED ULCER OF LEFT FOOT ON EXAMINATION: ICD-10-CM

## 2017-08-23 DIAGNOSIS — E11.621 DIABETIC ULCER OF OTHER PART OF RIGHT FOOT ASSOCIATED WITH TYPE 2 DIABETES MELLITUS, UNSPECIFIED ULCER STAGE: ICD-10-CM

## 2017-08-23 DIAGNOSIS — L97.409 DIABETIC ULCER OF HEEL ASSOCIATED WITH TYPE 2 DIABETES MELLITUS, UNSPECIFIED LATERALITY, UNSPECIFIED ULCER STAGE: ICD-10-CM

## 2017-08-23 DIAGNOSIS — L89.610 DECUBITUS ULCER, HEEL, RIGHT, UNSTAGEABLE: ICD-10-CM

## 2017-08-23 DIAGNOSIS — I48.3 TYPICAL ATRIAL FLUTTER: Primary | Chronic | ICD-10-CM

## 2017-08-23 DIAGNOSIS — I73.9 PERIPHERAL VASCULAR DISEASE, UNSPECIFIED: Primary | ICD-10-CM

## 2017-08-23 PROCEDURE — 27201912 HC WOUND CARE DEBRIDEMENT SUPPLIES

## 2017-08-23 PROCEDURE — 99499 UNLISTED E&M SERVICE: CPT | Mod: S$GLB,,, | Performed by: THORACIC SURGERY (CARDIOTHORACIC VASCULAR SURGERY)

## 2017-08-23 PROCEDURE — 29581 APPL MULTLAYER CMPRN SYS LEG: CPT

## 2017-08-23 PROCEDURE — 99214 OFFICE O/P EST MOD 30 MIN: CPT | Mod: S$GLB,,, | Performed by: THORACIC SURGERY (CARDIOTHORACIC VASCULAR SURGERY)

## 2017-08-23 PROCEDURE — 3074F SYST BP LT 130 MM HG: CPT | Mod: S$GLB,,, | Performed by: THORACIC SURGERY (CARDIOTHORACIC VASCULAR SURGERY)

## 2017-08-23 PROCEDURE — 1159F MED LIST DOCD IN RCRD: CPT | Mod: S$GLB,,, | Performed by: THORACIC SURGERY (CARDIOTHORACIC VASCULAR SURGERY)

## 2017-08-23 PROCEDURE — 11042 PR DEBRIDEMENT, SKIN, SUB-Q TISSUE,=<20 SQ CM: ICD-10-PCS | Mod: ,,, | Performed by: PODIATRIST

## 2017-08-23 PROCEDURE — 11042 DBRDMT SUBQ TIS 1ST 20SQCM/<: CPT | Mod: ,,, | Performed by: PODIATRIST

## 2017-08-23 PROCEDURE — 1126F AMNT PAIN NOTED NONE PRSNT: CPT | Mod: S$GLB,,, | Performed by: THORACIC SURGERY (CARDIOTHORACIC VASCULAR SURGERY)

## 2017-08-23 PROCEDURE — 3008F BODY MASS INDEX DOCD: CPT | Mod: S$GLB,,, | Performed by: THORACIC SURGERY (CARDIOTHORACIC VASCULAR SURGERY)

## 2017-08-23 PROCEDURE — 3078F DIAST BP <80 MM HG: CPT | Mod: S$GLB,,, | Performed by: THORACIC SURGERY (CARDIOTHORACIC VASCULAR SURGERY)

## 2017-08-23 PROCEDURE — 11042 DBRDMT SUBQ TIS 1ST 20SQCM/<: CPT

## 2017-08-23 PROCEDURE — 99999 PR PBB SHADOW E&M-EST. PATIENT-LVL V: CPT | Mod: PBBFAC,,, | Performed by: THORACIC SURGERY (CARDIOTHORACIC VASCULAR SURGERY)

## 2017-08-23 NOTE — PROGRESS NOTES
History & Physical    SUBJECTIVE:     History of Present Illness:  Patient is a 67 y.o. male presents with PMH of A.Fib, embolic stroke 2006, CKD, DM, and bilateral foot ulcerations s/p Right LE angiogram with CO2 gas (7/14/17)  left fem pop bypass (6/14/17)and PAD s/p L SFA stent in May 2012. PT here today for surgical evaluation for NICKO clip. Dr. Moreno consulted for clip due to severe  Pt is currently in NSR.     Chief Complaint   Patient presents with    Consult       Review of patient's allergies indicates:   Allergen Reactions    Adhesive tape-silicones Blisters       Current Outpatient Prescriptions   Medication Sig Dispense Refill    aspirin 81 MG Chew Take 1 tablet (81 mg total) by mouth once daily.  0    atorvastatin (LIPITOR) 80 MG tablet Take 1 tablet (80 mg total) by mouth once daily. 90 tablet 3    cholecalciferol, vitamin D3, (VITAMIN D3) 5,000 unit Tab Take 5,000 Units by mouth once daily.      ciprofloxacin HCl (CIPRO) 500 MG tablet Take 1 tablet (500 mg total) by mouth 2 (two) times daily. 20 tablet 1    clindamycin (CLEOCIN) 300 MG capsule Take 1 capsule (300 mg total) by mouth 3 (three) times daily. 30 capsule 1    clopidogrel (PLAVIX) 75 mg tablet Take 1 tablet (75 mg total) by mouth once daily. 90 tablet 3    collagenase (SANTYL) ointment Apply topically once daily. 30 g 0    collagenase (SANTYL) ointment Apply topically once daily. Apply locally daily with xeroform to the leg and groin wound. 15 g 1    diclofenac sodium 1 % Gel Apply 2 g topically 3 (three) times daily. 100 g 1    ferrous sulfate 325 (65 FE) MG EC tablet Take 1 tablet (325 mg total) by mouth once daily.  0    furosemide (LASIX) 40 MG tablet Take 1 tablet (40 mg total) by mouth once daily. 30 tablet 11    gabapentin (NEURONTIN) 300 MG capsule Take 300 mg by mouth 2 (two) times daily.  0    hydrocodone-acetaminophen 5-325mg (NORCO) 5-325 mg per tablet Take 1 tablet by mouth every 4 (four) hours as needed. 25  "tablet 0    insulin detemir (LEVEMIR FLEXTOUCH) 100 unit/mL (3 mL) SubQ InPn pen Inject 16 Units into the skin every evening. 1 Box 3    isosorbide dinitrate (ISORDIL) 20 MG tablet Take 1 tablet (20 mg total) by mouth 3 (three) times daily. 90 tablet 1    liraglutide 0.6 mg/0.1 mL, 18 mg/3 mL, subq PNIJ (VICTOZA 3-SAGAR) 0.6 mg/0.1 mL (18 mg/3 mL) PnIj Inject 1.2 mg into the skin once daily. 9 Syringe 2    metoprolol succinate (TOPROL-XL) 25 MG 24 hr tablet Take 1 tablet (25 mg total) by mouth once daily. 30 tablet 1    pantoprazole (PROTONIX) 20 MG tablet Take 2 tablets (40 mg total) by mouth once daily. 30 tablet 11    pen needle, diabetic (BD ULTRA-FINE HANG PEN NEEDLES) 32 gauge x 5/32" Ndle Uses 2 times daily, on  insulin injections 180 each 4    senna-docusate 8.6-50 mg (PERICOLACE) 8.6-50 mg per tablet Take 1 tablet by mouth once daily. 60 tablet 0    tamsulosin (FLOMAX) 0.4 mg Cp24 Take 1 capsule (0.4 mg total) by mouth once daily. 30 capsule 0     No current facility-administered medications for this visit.        Past Medical History:   Diagnosis Date    Acute on chronic systolic CHF (congestive heart failure) 11/29/2016    Anticoagulant long-term use     CKD (chronic kidney disease) stage 2, GFR 60-89 ml/min 2/21/2016    Coronary artery disease     Encounter for blood transfusion     HTN (hypertension) 3/3/2014    Hyperlipidemia 3/3/2014    NSTEMI (non-ST elevated myocardial infarction) 11/28/2016    Obesity (BMI 30-39.9) 11/29/2016    PVD (peripheral vascular disease)     Stroke     Type 2 diabetes mellitus with diabetic peripheral angiopathy without gangrene, without long-term current use of insulin 10/24/2013    Type 2 diabetes mellitus with diabetic polyneuropathy, without long-term current use of insulin 11/29/2016     Past Surgical History:   Procedure Laterality Date    CARDIAC SURGERY      COLONOSCOPY N/A 3/21/2017    Procedure: COLONOSCOPY;  Surgeon: Karissa Peck MD;  " "Location: [facility] ([address] [place] [address]);  Service: Endoscopy;  Laterality: N/A;    CORONARY ANGIOPLASTY WITH STENT PLACEMENT      FOOT NERVE GRAFT  11/2013    left leg stent      Loop Recorder placement      right leg bypass       Family History   Problem Relation Age of Onset    Diabetes Mother     Glaucoma Mother     Cataracts Mother     Heart disease Father     Blindness Maternal Grandmother     Macular degeneration Neg Hx     Retinal detachment Neg Hx     Strabismus Neg Hx     Amblyopia Neg Hx      Social History   Substance Use Topics    Smoking status: Former Smoker     Packs/day: 1.00     Years: 50.00     Quit date: 3/1/2011    Smokeless tobacco: Never Used    Alcohol use No        Review of Systems:  Review of Systems   Constitutional: Negative for activity change.   Respiratory: Negative for cough and shortness of breath.    Cardiovascular: Negative for chest pain, palpitations and leg swelling.   Gastrointestinal: Negative for abdominal pain, nausea and vomiting.   Endocrine: Negative for polydipsia, polyphagia and polyuria.   Genitourinary: Negative for dysuria.   Musculoskeletal: Positive for gait problem.   Skin: Negative for rash.   Allergic/Immunologic: Negative for immunocompromised state.   Neurological: Negative for dizziness and weakness.   Hematological: Does not bruise/bleed easily.   Psychiatric/Behavioral: Negative for behavioral problems.       OBJECTIVE:     Vital Signs (Most Recent)  Temp: 98.2 °F (36.8 °C) (08/23/17 0931)  Pulse: 81 (08/23/17 0931)  BP: 124/67 (08/23/17 0931)  SpO2: 98 % (08/23/17 0931)  5' 9" (1.753 m)  103.1 kg (227 lb 4.8 oz)     Physical Exam:  Physical Exam   Constitutional: He is oriented to person, place, and time. He appears well-developed and well-nourished.   HENT:   Head: Normocephalic.   Eyes: Pupils are equal, round, and reactive to light.   Cardiovascular: Normal rate, regular rhythm and normal heart sounds.    Pulmonary/Chest: Effort normal. "   Abdominal: Soft.   Musculoskeletal: Normal range of motion.   Neurological: He is alert and oriented to person, place, and time.   Skin: Skin is warm and dry. Capillary refill takes less than 2 seconds.   Psychiatric: He has a normal mood and affect.       ASSESSMENT/PLAN:   Patient is a 67 y.o. male presents with PMH of A.Fib, embolic stroke 2006, CKD, DM, and bilateral foot ulcerations s/p Right LE angiogram with CO2 gas (7/14/17)  left fem pop bypass (6/14/17)and PAD s/p L SFA stent in May 2012. PT here today for surgical evaluation for NICKO clip    PLAN: I spoke to him in detail about afib and stroke risk. He is asymptomatic and has advanced diabetic foot ulcers. I explained the thorascopic clip procedure to him and would like to have this when he gets through his hyperbaric treatments. He will decide if intraop LIANE shows clot if he wants us to proceed with sternotomy and removal of NICKO by that approach.

## 2017-08-23 NOTE — LETTER
August 23, 2017      Cayden Moreno MD  [address]  [facility] [address]           [facility]  [address]  [facility] [address]  Phone: [phone]          Patient: Chau Rodarte   MR Number: [id number]   YOB: 1950   Date of Visit: 8/23/2017       Dear Dr. Cayden Moreno:    Thank you for referring Chau Rodarte to me for evaluation. Attached you will find relevant portions of my assessment and plan of care.    If you have questions, please do not hesitate to call me. I look forward to following Chau Rodarte along with you.    Sincerely,    Robson Bobby MD    Enclosure  CC:  No Recipients    If you would like to receive this communication electronically, please contact [email or web address] or [phone] to request more information on [employer] Link access.    For providers and/or their staff who would like to refer a patient to [facility], please contact us through our one-stop-shop provider referral line, [facility], at [phone].    If you feel you have received this communication in error or would no longer like to receive these types of communications, please e-mail [email or web address]

## 2017-08-28 ENCOUNTER — TELEPHONE (OUTPATIENT)
Dept: PODIATRY | Facility: CLINIC | Age: 67
End: 2017-08-28

## 2017-08-28 ENCOUNTER — OFFICE VISIT (OUTPATIENT)
Dept: CARDIOLOGY | Facility: CLINIC | Age: 67
End: 2017-08-28
Payer: MEDICARE

## 2017-08-28 ENCOUNTER — LAB VISIT (OUTPATIENT)
Dept: LAB | Facility: HOSPITAL | Age: 67
End: 2017-08-28
Attending: INTERNAL MEDICINE
Payer: MEDICARE

## 2017-08-28 VITALS
BODY MASS INDEX: 37.82 KG/M2 | DIASTOLIC BLOOD PRESSURE: 66 MMHG | HEIGHT: 65 IN | HEART RATE: 76 BPM | WEIGHT: 227 LBS | SYSTOLIC BLOOD PRESSURE: 97 MMHG

## 2017-08-28 DIAGNOSIS — I50.22 CHRONIC SYSTOLIC HEART FAILURE: ICD-10-CM

## 2017-08-28 DIAGNOSIS — L97.402 DIABETIC ULCER OF HEEL ASSOCIATED WITH TYPE 2 DIABETES MELLITUS, WITH FAT LAYER EXPOSED, UNSPECIFIED LATERALITY: Primary | ICD-10-CM

## 2017-08-28 DIAGNOSIS — I13.0 BENIGN HYPERTENSIVE HEART AND KIDNEY DISEASE WITH SYSTOLIC CHF, NYHA CLASS 2 AND CKD STAGE 3: ICD-10-CM

## 2017-08-28 DIAGNOSIS — Z95.828 S/P FEMORAL-POPLITEAL BYPASS SURGERY: ICD-10-CM

## 2017-08-28 DIAGNOSIS — N18.30 BENIGN HYPERTENSIVE HEART AND KIDNEY DISEASE WITH SYSTOLIC CHF, NYHA CLASS 2 AND CKD STAGE 3: ICD-10-CM

## 2017-08-28 DIAGNOSIS — I25.10 CORONARY ARTERY DISEASE INVOLVING NATIVE CORONARY ARTERY OF NATIVE HEART WITHOUT ANGINA PECTORIS: ICD-10-CM

## 2017-08-28 DIAGNOSIS — E11.621 DIABETIC ULCER OF HEEL ASSOCIATED WITH TYPE 2 DIABETES MELLITUS, WITH FAT LAYER EXPOSED, UNSPECIFIED LATERALITY: Primary | ICD-10-CM

## 2017-08-28 DIAGNOSIS — I73.9 PAD (PERIPHERAL ARTERY DISEASE): ICD-10-CM

## 2017-08-28 DIAGNOSIS — I50.20 BENIGN HYPERTENSIVE HEART AND KIDNEY DISEASE WITH SYSTOLIC CHF, NYHA CLASS 2 AND CKD STAGE 3: ICD-10-CM

## 2017-08-28 DIAGNOSIS — I70.25 ATHEROSCLEROSIS OF NATIVE ARTERIES OF THE EXTREMITIES WITH ULCERATION: ICD-10-CM

## 2017-08-28 LAB
ANION GAP SERPL CALC-SCNC: 6 MMOL/L
BUN SERPL-MCNC: 22 MG/DL
CALCIUM SERPL-MCNC: 9 MG/DL
CHLORIDE SERPL-SCNC: 100 MMOL/L
CO2 SERPL-SCNC: 34 MMOL/L
CREAT SERPL-MCNC: 2.6 MG/DL
EST. GFR  (AFRICAN AMERICAN): 28 ML/MIN/1.73 M^2
EST. GFR  (NON AFRICAN AMERICAN): 24 ML/MIN/1.73 M^2
GLUCOSE SERPL-MCNC: 98 MG/DL
POTASSIUM SERPL-SCNC: 4.1 MMOL/L
SODIUM SERPL-SCNC: 140 MMOL/L

## 2017-08-28 PROCEDURE — 3078F DIAST BP <80 MM HG: CPT | Mod: S$GLB,,, | Performed by: INTERNAL MEDICINE

## 2017-08-28 PROCEDURE — 3008F BODY MASS INDEX DOCD: CPT | Mod: S$GLB,,, | Performed by: INTERNAL MEDICINE

## 2017-08-28 PROCEDURE — 3066F NEPHROPATHY DOC TX: CPT | Mod: S$GLB,,, | Performed by: INTERNAL MEDICINE

## 2017-08-28 PROCEDURE — 99999 PR PBB SHADOW E&M-EST. PATIENT-LVL III: CPT | Mod: PBBFAC,,, | Performed by: INTERNAL MEDICINE

## 2017-08-28 PROCEDURE — 80048 BASIC METABOLIC PNL TOTAL CA: CPT

## 2017-08-28 PROCEDURE — 36415 COLL VENOUS BLD VENIPUNCTURE: CPT

## 2017-08-28 PROCEDURE — 1159F MED LIST DOCD IN RCRD: CPT | Mod: S$GLB,,, | Performed by: INTERNAL MEDICINE

## 2017-08-28 PROCEDURE — 3044F HG A1C LEVEL LT 7.0%: CPT | Mod: S$GLB,,, | Performed by: INTERNAL MEDICINE

## 2017-08-28 PROCEDURE — 99214 OFFICE O/P EST MOD 30 MIN: CPT | Mod: S$GLB,,, | Performed by: INTERNAL MEDICINE

## 2017-08-28 PROCEDURE — 3074F SYST BP LT 130 MM HG: CPT | Mod: S$GLB,,, | Performed by: INTERNAL MEDICINE

## 2017-08-28 PROCEDURE — 1126F AMNT PAIN NOTED NONE PRSNT: CPT | Mod: S$GLB,,, | Performed by: INTERNAL MEDICINE

## 2017-08-28 RX ORDER — DIPHENHYDRAMINE HCL 25 MG
50 CAPSULE ORAL ONCE
Status: CANCELLED | OUTPATIENT
Start: 2017-08-28 | End: 2017-08-28

## 2017-08-28 RX ORDER — SODIUM CHLORIDE 9 MG/ML
INJECTION, SOLUTION INTRAVENOUS CONTINUOUS
Status: CANCELLED | OUTPATIENT
Start: 2017-08-28

## 2017-08-28 NOTE — PATIENT INSTRUCTIONS
Leg Artery Emergencies: Critical Limb Ischemia (CLI)  Critical limb ischemia (CLI) is a condition that can occur over time when your leg arteries are damaged. It is a severe form of peripheral arterial disease (PAD). PAD is caused when leg arteries are narrowed, reducing blood flow. If blood flow to the toe, foot, or leg is completely blocked, the tissue begins to die (gangrene). If this happens, you need medical care right away to restore blood flow and possibly save the leg. But even with the best medical care, it might not be possible to save a severely affected limb.  When do you need emergency care?    CLI can get worse and cause an urgent problem. For example, if you have a wound, it may not heal. This can lead to gangrene. Go to the emergency room right away if you have any of these symptoms:  · A wound that is foul smelling, draining pus, or discolored  · Severe foot or leg pain that occurs suddenly without injury, especially if the foot or leg is cold or numb  Call your healthcare provider if:  · You have a cut or ulcer that does not heal  · You have foot or leg pain that happens when walking but goes away with rest. This may be a sign of blocked arteries.  How is critical limb ischemia diagnosed?  Certain tests may be done to find out if you have CLI. First your provider will carefully examine you, checking for pulses at several places. Other common tests include:  · Ankle-brachial index (MARIO). The blood pressure in your ankle is compared with the blood pressure in your arm.  · Duplex ultrasound. Harmless sound waves are used to create images of blood flow in your legs.  · Arteriography. X-ray dye (contrast medium) is injected into the artery using a thin, flexible tube (catheter). This allows blood vessels to be seen easily on X-rays.  How is critical limb ischemia treated?  Possible treatments for CLI include:  · Dissolving or removing a blood clot. To dissolve a clot, a tube (catheter) is put into an  artery in your groin. Clot-busting medicine is put into the tube to dissolve the clot. Or surgery may be done to remove the clot. A cut (incision) is made in the artery at the blocked area. The clot is then removed.  · Angioplasty. A tiny, uninflated balloon is sent to the narrowed area by a catheter. It is then inflated to widen the artery. The balloon is deflated and removed.  · Stenting. After angioplasty, a tiny wire mesh tube (stent) may be put in the artery to help hold it open. The stent is also put in using a catheter.  · Endarterectomy. An incision is made in the artery at the blocked area. The material that blocks the artery is then removed from artery walls.  · Peripheral bypass surgery. A natural or artificial graft is used to bypass the blocked area.  · Amputation. If left untreated, the affected area may have to be removed (amputated).  How can critical limb ischemia emergencies be prevented?  Know the signs and symptoms of a leg artery emergency. If you have diabetes or poor blood circulation, check your feet daily for wounds, sores, blisters, and color changes. If you smoke, stop smoking.  Date Last Reviewed: 6/1/2016  © 1414-8365 [employer]. [address] [place] [address], [place] [address]. All rights reserved. This information is not intended as a substitute for professional medical care. Always follow your healthcare professional's instructions.

## 2017-08-28 NOTE — PROGRESS NOTES
Subjective:   Patient ID:  Chau Rodarte is a 67 y.o. male who presents for evaluation and treatment of Peripheral Arterial Disease; non healing ulcer of R foot; and possible revascularization      HPI:       He is here with his wife by recommendation of Dr. Bland (podiatry) for possible revascularization of right leg for non healing wounds that started in 4/2017. He has a history of PAD. S/p L pSFA PTAS with 8.0 x 40 mm Lifestent post dilated with 7 mm balloon (5/2012), S/p R fem-pop 2012 at [facility] with 6 mm PTFE graft (Occluded on US and Angio in 2017), and S/p L fem-bk POP bypass-GSV 6/2017. He has CAD s/p multivessel PCI, HTN, HLP, DM, ICM with EF 45%, PAF with embolic CVA 2006, s/p CTI RFA 2/2017, and ILR for afib surveillance. He is on aspirin and plavix for DAPT. He is taking clindamycin and cipro for abx      Peripheral angiograms from 5/2012, 5/2017, and 7/2017 were reviewed      R SFA  with poorly visualized POP and infrapopliteal vessels  L SFA  with 2 vessel run off via AT and PER         Care team:    Baldo-pop  Smith-vas sx  Dvorin-pcp  Josh/Rich-card        Patient Active Problem List    Diagnosis Date Noted    Diabetic ulcer of heel associated with type 2 diabetes mellitus     PAD (peripheral artery disease)          S/p L pSFA PTAS with 8.0 x 40 mm Lifestent    Post dilated with 7 mm balloon    S/p R fem-pop 2012 at [facility]   6 mm PTFE graft   Occluded on US and Angio in 2017       S/p L fem-bk POP bypass-GSV 6/2017           Ulcer of heel due to diabetes mellitus 06/23/2017    BPH (benign prostatic hypertrophy) 06/23/2017    Debility 06/19/2017    Preop examination     Atherosclerosis of native arteries of the extremities with ulceration 04/26/2017    Lower GI bleed 03/20/2017    Type 2 diabetes mellitus with stage 4 chronic kidney disease GFR 15-29 03/20/2017    Acute blood loss anemia 03/20/2017    Long term (current) use of anticoagulants [Z79.01] 01/27/2017    Typical  atrial flutter 2017    Cryptogenic stroke, remote history (2006) 2017    Coronary artery disease involving native coronary artery of native heart without angina pectoris 2016    Ischemic cardiomyopathy 2016    CORRY (acute kidney injury) 2016    Cardiomegaly 2016    Type 2 diabetes mellitus with diabetic polyneuropathy, with long-term current use of insulin 2016    Chronic systolic heart failure 2016    Obesity (BMI 30-39.9) 2016    NSTEMI (non-ST elevated myocardial infarction) 2016    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, NYHA class 2 and CKD stage 3 2016    Essential hypertension 2014    Skin ulcers of both feet 2013    HLD (hyperlipidemia) 10/24/2013    S/P femoral-popliteal bypass surgery 10/24/2013         R fem-pop in       L fem-pop in                    Right Arm BP - Sittin/66  Left Arm BP - Sittin/66        LABS    LAST HbA1c  Lab Results   Component Value Date    HGBA1C 6.1 (H) 2017       Lipid panel  Lab Results   Component Value Date    CHOL 110 (L) 2017    CHOL 113 (L) 2016    CHOL 122 (L) 10/21/2016     Lab Results   Component Value Date    HDL 25 (L) 2017    HDL 31 (L) 2016    HDL 32 (L) 10/21/2016     Lab Results   Component Value Date    LDLCALC 72.2 2017    LDLCALC 72.0 2016    LDLCALC 76 10/21/2016     Lab Results   Component Value Date    TRIG 64 2017    TRIG 50 2016    TRIG 71 10/21/2016     Lab Results   Component Value Date    CHOLHDL 22.7 2017    CHOLHDL 27.4 2016    CHOLHDL 3.8 10/21/2016            Review of Systems   Constitution: Negative for diaphoresis, weakness, night sweats, weight gain and weight loss.   HENT: Negative for congestion.    Eyes: Negative for blurred vision, discharge and double vision.   Cardiovascular: Negative for chest pain, claudication, cyanosis, dyspnea on  exertion, irregular heartbeat, leg swelling, near-syncope, orthopnea, palpitations, paroxysmal nocturnal dyspnea and syncope.   Respiratory: Negative for cough, shortness of breath and wheezing.    Endocrine: Negative for cold intolerance, heat intolerance and polyphagia.   Hematologic/Lymphatic: Negative for adenopathy and bleeding problem. Does not bruise/bleed easily.   Skin: Positive for poor wound healing. Negative for dry skin and nail changes.   Musculoskeletal: Negative for arthritis, back pain, falls, joint pain, myalgias and neck pain.   Gastrointestinal: Negative for bloating, abdominal pain, change in bowel habit and constipation.   Genitourinary: Negative for bladder incontinence, dysuria, flank pain, genital sores and missed menses.   Neurological: Negative for aphonia, brief paralysis, difficulty with concentration and dizziness.   Psychiatric/Behavioral: Negative for altered mental status and memory loss. The patient does not have insomnia.    Allergic/Immunologic: Negative for environmental allergies.       Objective:   Physical Exam   Constitutional: He is oriented to person, place, and time. He appears well-developed and well-nourished. He is not intubated.   HENT:   Head: Normocephalic and atraumatic.   Right Ear: External ear normal.   Left Ear: External ear normal.   Mouth/Throat: Oropharynx is clear and moist.   Eyes: Conjunctivae and EOM are normal. Pupils are equal, round, and reactive to light. Right eye exhibits no discharge. Left eye exhibits no discharge. No scleral icterus.   Neck: Normal range of motion. Neck supple. Normal carotid pulses, no hepatojugular reflux and no JVD present. Carotid bruit is not present. No tracheal deviation present. No thyromegaly present.   Cardiovascular: Normal rate, regular rhythm, S1 normal and S2 normal.   No extrasystoles are present. PMI is not displaced.  Exam reveals no gallop, no S3, no distant heart sounds, no friction rub and no midsystolic  click.    Murmur heard.   Systolic murmur is present with a grade of 2/6  at the upper right sternal border  Pulses:       Carotid pulses are 2+ on the right side, and 2+ on the left side.       Radial pulses are 2+ on the right side, and 2+ on the left side.        Femoral pulses are 2+ on the right side, and 2+ on the left side.       Popliteal pulses are 0 on the right side, and 2+ on the left side.        Dorsalis pedis pulses are 0 on the right side. Left dorsalis pedis pulse not accessible.        Posterior tibial pulses are 0 on the right side. Left posterior tibial pulse not accessible.       Unable to assess L foot pedal pulses-L leg is wrapped      Faint to monophasic R DP without a doppler PT signal      Monophasic R POP doppler signal      Biphasic L POP doppler signal   Pulmonary/Chest: Effort normal and breath sounds normal. No accessory muscle usage or stridor. No apnea, no tachypnea and no bradypnea. He is not intubated. No respiratory distress. He has no decreased breath sounds. He has no wheezes. He has no rales. He exhibits no tenderness and no bony tenderness.   Abdominal: He exhibits no distension, no pulsatile liver, no abdominal bruit, no ascites, no pulsatile midline mass and no mass. There is no tenderness. There is no rebound and no guarding.   Musculoskeletal: Normal range of motion. He exhibits no edema or tenderness.   Lymphadenopathy:     He has no cervical adenopathy.   Neurological: He is alert and oriented to person, place, and time. He has normal reflexes. No cranial nerve deficit. Coordination normal.   Skin: Skin is warm. No rash noted. No erythema. No pallor.       L leg wrapped from foot to the knee        R great wound with necrosis  R first metatarsal plantar surface wound with fat exposure  R heel wound with fat exposure      See images         Psychiatric: He has a normal mood and affect. His behavior is normal. Judgment and thought content normal.      8/28/2017                  Assessment:     1. Diabetic ulcer of heel associated with type 2 diabetes mellitus, with fat layer exposed, unspecified laterality    2. PAD (peripheral artery disease)    3. S/P femoral-popliteal bypass surgery    4. Atherosclerosis of native arteries of the extremities with ulceration    5. Coronary artery disease involving native coronary artery of native heart without angina pectoris    6. Chronic systolic heart failure    7. 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, NYHA class 2 and CKD stage 3        Plan:         Diagnostic angiogram   R radial access with 4-5 slender sheath   150 cm Navicross   Minimal contrast use in view of CKD        After diagnostic angiogram we will determine his anatomy and revascularization strategy        Hold lasix and lisinopril 24 hours prior to case  Aggressive hydration prior to peripheral angiogram          Continue with current medical plan and lifestyle changes.  Return sooner for concerns or questions. If symptoms persist go to the ED  I have reviewed all pertinent data on this patient       I have reviewed the patient's medical history in detail and updated the computerized patient record.    Orders Placed This Encounter   Procedures    Basic metabolic panel     Standing Status:   Future     Number of Occurrences:   1     Standing Expiration Date:   10/27/2018    Case Request-Cath Lab: AORTOGRAM WITH RUNOFF     Standing Status:   Standing     Number of Occurrences:   1     Order Specific Question:   CPT Code:     Answer:   OR  ANGIO AORTOBIFEMORAL W CATH [[id number]]       Follow up as scheduled. Return sooner for concerns or questions            He expressed verbal understanding and agreed with the plan        Patient's Medications   New Prescriptions    No medications on file   Previous Medications    ASPIRIN 81 MG CHEW    Take 1 tablet (81 mg total) by mouth once daily.    ATORVASTATIN (LIPITOR) 80 MG TABLET    Take 1  "tablet (80 mg total) by mouth once daily.    CHOLECALCIFEROL, VITAMIN D3, (VITAMIN D3) 5,000 UNIT TAB    Take 5,000 Units by mouth once daily.    CIPROFLOXACIN HCL (CIPRO) 500 MG TABLET    Take 1 tablet (500 mg total) by mouth 2 (two) times daily.    CLINDAMYCIN (CLEOCIN) 300 MG CAPSULE    Take 1 capsule (300 mg total) by mouth 3 (three) times daily.    CLOPIDOGREL (PLAVIX) 75 MG TABLET    Take 1 tablet (75 mg total) by mouth once daily.    COLLAGENASE (SANTYL) OINTMENT    Apply topically once daily.    COLLAGENASE (SANTYL) OINTMENT    Apply topically once daily. Apply locally daily with xeroform to the leg and groin wound.    DICLOFENAC SODIUM 1 % GEL    Apply 2 g topically 3 (three) times daily.    FERROUS SULFATE 325 (65 FE) MG EC TABLET    Take 1 tablet (325 mg total) by mouth once daily.    FUROSEMIDE (LASIX) 40 MG TABLET    Take 1 tablet (40 mg total) by mouth once daily.    GABAPENTIN (NEURONTIN) 300 MG CAPSULE    Take 300 mg by mouth 2 (two) times daily.    HYDROCODONE-ACETAMINOPHEN 5-325MG (NORCO) 5-325 MG PER TABLET    Take 1 tablet by mouth every 4 (four) hours as needed.    INSULIN DETEMIR (LEVEMIR FLEXTOUCH) 100 UNIT/ML (3 ML) SUBQ INPN PEN    Inject 16 Units into the skin every evening.    ISOSORBIDE DINITRATE (ISORDIL) 20 MG TABLET    Take 1 tablet (20 mg total) by mouth 3 (three) times daily.    LIRAGLUTIDE 0.6 MG/0.1 ML, 18 MG/3 ML, SUBQ PNIJ (VICTOZA 3-SAGAR) 0.6 MG/0.1 ML (18 MG/3 ML) PNIJ    Inject 1.2 mg into the skin once daily.    METOPROLOL SUCCINATE (TOPROL-XL) 25 MG 24 HR TABLET    Take 1 tablet (25 mg total) by mouth once daily.    PANTOPRAZOLE (PROTONIX) 20 MG TABLET    Take 2 tablets (40 mg total) by mouth once daily.    PEN NEEDLE, DIABETIC (BD ULTRA-FINE HANG PEN NEEDLES) 32 GAUGE X 5/32" NDLE    Uses 2 times daily, on  insulin injections    SENNA-DOCUSATE 8.6-50 MG (PERICOLACE) 8.6-50 MG PER TABLET    Take 1 tablet by mouth once daily.    TAMSULOSIN (FLOMAX) 0.4 MG CP24    Take 1 " capsule (0.4 mg total) by mouth once daily.   Modified Medications    No medications on file   Discontinued Medications    No medications on file

## 2017-08-28 NOTE — TELEPHONE ENCOUNTER
----- Message from Barbie Holder sent at 8/28/2017  9:36 AM CDT -----  Contact: [place], spouse, [phone]  Called in requesting to speak with Dr. Bland regarding patient's case.  Please advise.

## 2017-08-28 NOTE — TELEPHONE ENCOUNTER
Discussed case with spouse and re-iterated necessity to attend appointment with Dr. Epps today. Dr. William has currently recommended AKA. Discussed that HBO was denied and that an appeal would not work due to extent of the PAD and need for revascularization.

## 2017-08-29 ENCOUNTER — HOSPITAL ENCOUNTER (OUTPATIENT)
Dept: WOUND CARE | Facility: HOSPITAL | Age: 67
Discharge: HOME | End: 2017-08-29
Attending: PODIATRIST
Payer: MEDICARE

## 2017-08-29 VITALS
SYSTOLIC BLOOD PRESSURE: 109 MMHG | WEIGHT: 227 LBS | TEMPERATURE: 98 F | HEIGHT: 65 IN | DIASTOLIC BLOOD PRESSURE: 66 MMHG | BODY MASS INDEX: 37.82 KG/M2 | HEART RATE: 72 BPM

## 2017-08-29 DIAGNOSIS — T81.89XD NONHEALING SURGICAL WOUND, SUBSEQUENT ENCOUNTER: ICD-10-CM

## 2017-08-29 DIAGNOSIS — E11.621 DIABETIC ULCER OF OTHER PART OF RIGHT FOOT ASSOCIATED WITH TYPE 2 DIABETES MELLITUS, UNSPECIFIED ULCER STAGE: ICD-10-CM

## 2017-08-29 DIAGNOSIS — E11.52 TYPE 2 DIABETES MELLITUS WITH DIABETIC PERIPHERAL ANGIOPATHY AND GANGRENE, WITHOUT LONG-TERM CURRENT USE OF INSULIN: Primary | ICD-10-CM

## 2017-08-29 DIAGNOSIS — L97.519 DIABETIC ULCER OF OTHER PART OF RIGHT FOOT ASSOCIATED WITH TYPE 2 DIABETES MELLITUS, UNSPECIFIED ULCER STAGE: ICD-10-CM

## 2017-08-29 DIAGNOSIS — L89.610 DECUBITUS ULCER, HEEL, RIGHT, UNSTAGEABLE: ICD-10-CM

## 2017-08-29 PROCEDURE — 11042 DBRDMT SUBQ TIS 1ST 20SQCM/<: CPT

## 2017-08-29 PROCEDURE — 11042 PR DEBRIDEMENT, SKIN, SUB-Q TISSUE,=<20 SQ CM: ICD-10-PCS | Mod: ,,, | Performed by: PODIATRIST

## 2017-08-29 PROCEDURE — 11042 DBRDMT SUBQ TIS 1ST 20SQCM/<: CPT | Mod: ,,, | Performed by: PODIATRIST

## 2017-08-29 PROCEDURE — 27201912 HC WOUND CARE DEBRIDEMENT SUPPLIES

## 2017-08-29 PROCEDURE — 97597 PR DEBRIDEMENT OPEN WOUND 20 SQ CM<: ICD-10-PCS | Mod: 59,,, | Performed by: PODIATRIST

## 2017-08-29 PROCEDURE — 29581 APPL MULTLAYER CMPRN SYS LEG: CPT

## 2017-08-29 PROCEDURE — 97597 DBRDMT OPN WND 1ST 20 CM/<: CPT | Mod: 59,,, | Performed by: PODIATRIST

## 2017-08-29 PROCEDURE — 97597 DBRDMT OPN WND 1ST 20 CM/<: CPT

## 2017-08-31 ENCOUNTER — HOSPITAL ENCOUNTER (OUTPATIENT)
Dept: CARDIOLOGY | Facility: CLINIC | Age: 67
Discharge: HOME | End: 2017-08-31
Payer: MEDICARE

## 2017-08-31 ENCOUNTER — OFFICE VISIT (OUTPATIENT)
Dept: ELECTROPHYSIOLOGY | Facility: CLINIC | Age: 67
End: 2017-08-31
Payer: MEDICARE

## 2017-08-31 VITALS
HEIGHT: 69 IN | WEIGHT: 225 LBS | BODY MASS INDEX: 33.33 KG/M2 | HEART RATE: 73 BPM | SYSTOLIC BLOOD PRESSURE: 124 MMHG | DIASTOLIC BLOOD PRESSURE: 76 MMHG

## 2017-08-31 DIAGNOSIS — I48.3 TYPICAL ATRIAL FLUTTER: Chronic | ICD-10-CM

## 2017-08-31 DIAGNOSIS — Z95.828 S/P FEMORAL-POPLITEAL BYPASS SURGERY: ICD-10-CM

## 2017-08-31 DIAGNOSIS — Z79.4 TYPE 2 DIABETES MELLITUS WITH DIABETIC POLYNEUROPATHY, WITH LONG-TERM CURRENT USE OF INSULIN: ICD-10-CM

## 2017-08-31 DIAGNOSIS — I48.92 ATRIAL FLUTTER, UNSPECIFIED TYPE: ICD-10-CM

## 2017-08-31 DIAGNOSIS — E11.42 TYPE 2 DIABETES MELLITUS WITH DIABETIC POLYNEUROPATHY, WITH LONG-TERM CURRENT USE OF INSULIN: ICD-10-CM

## 2017-08-31 DIAGNOSIS — I25.10 CORONARY ARTERY DISEASE INVOLVING NATIVE CORONARY ARTERY OF NATIVE HEART WITHOUT ANGINA PECTORIS: ICD-10-CM

## 2017-08-31 DIAGNOSIS — N18.30 BENIGN HYPERTENSIVE HEART AND KIDNEY DISEASE WITH SYSTOLIC CHF, NYHA CLASS 2 AND CKD STAGE 3: ICD-10-CM

## 2017-08-31 DIAGNOSIS — I63.9 CRYPTOGENIC STROKE: Chronic | ICD-10-CM

## 2017-08-31 DIAGNOSIS — E78.00 PURE HYPERCHOLESTEROLEMIA: ICD-10-CM

## 2017-08-31 DIAGNOSIS — I73.9 PERIPHERAL VASCULAR DISEASE, UNSPECIFIED: ICD-10-CM

## 2017-08-31 DIAGNOSIS — I48.0 PAROXYSMAL ATRIAL FIBRILLATION: Primary | ICD-10-CM

## 2017-08-31 DIAGNOSIS — I25.5 ISCHEMIC CARDIOMYOPATHY: ICD-10-CM

## 2017-08-31 DIAGNOSIS — I50.20 BENIGN HYPERTENSIVE HEART AND KIDNEY DISEASE WITH SYSTOLIC CHF, NYHA CLASS 2 AND CKD STAGE 3: ICD-10-CM

## 2017-08-31 DIAGNOSIS — I13.0 BENIGN HYPERTENSIVE HEART AND KIDNEY DISEASE WITH SYSTOLIC CHF, NYHA CLASS 2 AND CKD STAGE 3: ICD-10-CM

## 2017-08-31 DIAGNOSIS — E66.01 SEVERE OBESITY (BMI 35.0-35.9 WITH COMORBIDITY): ICD-10-CM

## 2017-08-31 PROCEDURE — 3066F NEPHROPATHY DOC TX: CPT | Mod: S$GLB,,, | Performed by: INTERNAL MEDICINE

## 2017-08-31 PROCEDURE — 3078F DIAST BP <80 MM HG: CPT | Mod: S$GLB,,, | Performed by: INTERNAL MEDICINE

## 2017-08-31 PROCEDURE — 99999 PR PBB SHADOW E&M-EST. PATIENT-LVL III: CPT | Mod: PBBFAC,,, | Performed by: INTERNAL MEDICINE

## 2017-08-31 PROCEDURE — 99214 OFFICE O/P EST MOD 30 MIN: CPT | Mod: S$GLB,,, | Performed by: INTERNAL MEDICINE

## 2017-08-31 PROCEDURE — 3074F SYST BP LT 130 MM HG: CPT | Mod: S$GLB,,, | Performed by: INTERNAL MEDICINE

## 2017-08-31 PROCEDURE — 1159F MED LIST DOCD IN RCRD: CPT | Mod: S$GLB,,, | Performed by: INTERNAL MEDICINE

## 2017-08-31 PROCEDURE — 99499 UNLISTED E&M SERVICE: CPT | Mod: S$GLB,,, | Performed by: INTERNAL MEDICINE

## 2017-08-31 PROCEDURE — 3008F BODY MASS INDEX DOCD: CPT | Mod: S$GLB,,, | Performed by: INTERNAL MEDICINE

## 2017-08-31 PROCEDURE — 1126F AMNT PAIN NOTED NONE PRSNT: CPT | Mod: S$GLB,,, | Performed by: INTERNAL MEDICINE

## 2017-08-31 PROCEDURE — 3044F HG A1C LEVEL LT 7.0%: CPT | Mod: S$GLB,,, | Performed by: INTERNAL MEDICINE

## 2017-08-31 PROCEDURE — 93000 ELECTROCARDIOGRAM COMPLETE: CPT | Mod: S$GLB,,, | Performed by: INTERNAL MEDICINE

## 2017-08-31 NOTE — PROGRESS NOTES
Subjective:    Patient ID:  Chau Rodarte is a 67 y.o. male who presents for follow-up of Atrial Fibrillation    Referring Cardiologists: Meño Gan MD, Yanick Holland MD and Robson Sims MD    HPI    Prior Hx:  I had the pleasure of seeing Mr. Rodarte today in our electrophysiology clinic in follow-up for his atrial arrhythmia. As you are aware he is a pleasant 67 year-old man with extensive vascular disease including right femoral-popliteal bypass and left SFA stent, ischemic cardiomyopathy with prior LVEF of 20-25% which improved to 45-50% s/p multivessel PCI 12/5/2016 (PCI to prox-LAD/LCX, distal LMCA and mid LAD) with NYHA class II symptoms, chronic kidney disease stage 3, cryptogenic stroke, hypertension and poorly controlled diabetes mellitus. He was admitted in November of 2016 for progressive heart failure symptoms. He was also found to have a NSTEMI in this setting. He was diuresed and underwent multi-vessel PCI as stated, after turned down for surgery. He was feeling better and was undergoing cardiac rehab at the [facility]. He was noted to not be in atrial flutter at that time.  He had no significant symptoms and was referred here for further evaluation. He had no history of palpitations. He does report a stroke of unknown etiology in 2006. His symptoms were facial droop, dysarthria and right sided weakness. He had been on aspirin/plavix since. He had never been diagnosed with an atrial arrhythmia.     He underwent RFA of the CTI on 2/3/2017.  He was on anticoagulation and plan was to stop 4 weeks after the procedure and then implant an ILR for AF surveillance due to risk of AF as well as risk of bleeding on aspirin/plavix and anticoagulation.  In the interim he was admitted with a major GI bleed.  He underwent ILR implant on 3/24/2017.        ECHO 3/2017 CONCLUSIONS     1 - Eccentric hypertrophy.     2 - Mildly depressed left ventricular systolic function (EF 45-50%).     3 - Normal right  ventricular systolic function .     4 - Grade I left ventricular diastolic dysfunction.     5 - Mildly enlarged ascending aorta.      Interim Hx:  ILR recently noted 4 hours of asymptomatic atrial fibrillation. We discussed resuming anticoagulation over the telephone however Mr. Rodarte declined due to his prior major GI bleed requiring hospitalization and blood transfusions. We discussed Watchman procedure however he was unwilling to take the anticoagulation 4 weeks after due to bleeding risk. We then discussed surgical NICKO resection for which he saw Dr. Bobby and is agreeable to once he finishes therapy for non-healing right foot wounds. He is to have an angiogram by Dr. Epps.     My interpretation of today's in clinic ECG is sinus rhythm.    Review of Systems   Constitution: Negative for fever, weakness and malaise/fatigue.   HENT: Negative.    Eyes: Negative.    Cardiovascular: Negative for chest pain, dyspnea on exertion, irregular heartbeat, near-syncope, orthopnea and palpitations.   Respiratory: Negative.  Negative for cough and shortness of breath.    Hematologic/Lymphatic: Negative for bleeding problem. Bruises/bleeds easily.   Skin: Positive for poor wound healing.   Gastrointestinal: Negative for hematochezia and melena.   Neurological: Negative for focal weakness and light-headedness.        Objective:    Physical Exam   Constitutional: He is oriented to person, place, and time. He appears well-developed and well-nourished. No distress.   HENT:   Head: Normocephalic and atraumatic.   Eyes: Conjunctivae are normal. Right eye exhibits no discharge. Left eye exhibits no discharge.   Neck: Neck supple. No JVD present.   Cardiovascular: Normal rate and regular rhythm.  Exam reveals no gallop and no friction rub.    No murmur heard.  Pulmonary/Chest: Effort normal and breath sounds normal. No respiratory distress. He has no wheezes. He has no rales.   Abdominal: Soft. Bowel sounds are normal. He exhibits no  distension. There is no tenderness.   Neurological: He is alert and oriented to person, place, and time.   Skin: Skin is warm and dry. He is not diaphoretic.   Psychiatric: He has a normal mood and affect. His behavior is normal. Judgment and thought content normal.   Vitals reviewed.        Assessment:       1. Paroxysmal atrial fibrillation    2. Typical atrial flutter    3. Cryptogenic stroke, remote history (2006)    4. Peripheral vascular disease, unspecified    5. S/P femoral-popliteal bypass surgery    6. Pure hypercholesterolemia    7. Ischemic cardiomyopathy    8. Coronary artery disease involving native coronary artery of native heart without angina pectoris    9. 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, NYHA class 2 and CKD stage 3    10. Type 2 diabetes mellitus with diabetic polyneuropathy, with long-term current use of insulin    11. Severe obesity (BMI 35.0-35.9 with comorbidity)         Plan:       In summary, Mr. Rodarte is a pleasant 66 year-old man with extensive vascular disease including right femoral-popliteal bypass and left SFA stent with non-healing right foot wounds, ischemic cardiomyopathy with prior LVEF of 20-25% now improved to 45% s/p multivessel PCI 12/5/2016 (PCI to prox-LAD/LCX, distal LMCA and mid LAD) with NYHA class II symptoms, chronic kidney disease stage 3, cryptogenic stroke, hypertension and poorly controlled diabetes mellitus who presents for follow-up for his atrial arrhythmias and is s/p RFA of the CTI for typical atrial flutter followed by ILR implant for potential occult AF surveillance. He is off anticoagulation due to major GI bleed requiring blood transfusions and he does not want to resume this due to bleeding risk. He is a candidate for left atrial appendage clip by Dr. Bobby however they are waiting for further treatment and resolution of his left foot wounds. He understands he is at a stroke risk.    RTC in 6 months, sooner if needed. Continue  monthly ILR remote checks    Thank you for allowing me to participate in the care of this patient. Please do not hesitate to call me with any questions or concerns.    Cayden Moreno MD, PhD  Cardiac Electrophysiology

## 2017-09-07 ENCOUNTER — HOSPITAL ENCOUNTER (OUTPATIENT)
Facility: HOSPITAL | Age: 67
Discharge: HOME | End: 2017-09-07
Attending: EMERGENCY MEDICINE | Admitting: INTERNAL MEDICINE
Payer: MEDICARE

## 2017-09-07 ENCOUNTER — SURGERY (OUTPATIENT)
Age: 67
End: 2017-09-07

## 2017-09-07 ENCOUNTER — HOSPITAL ENCOUNTER (OUTPATIENT)
Dept: WOUND CARE | Facility: HOSPITAL | Age: 67
Discharge: HOME | End: 2017-09-07

## 2017-09-07 VITALS
BODY MASS INDEX: 33.33 KG/M2 | WEIGHT: 225 LBS | HEART RATE: 72 BPM | DIASTOLIC BLOOD PRESSURE: 80 MMHG | OXYGEN SATURATION: 98 % | RESPIRATION RATE: 22 BRPM | SYSTOLIC BLOOD PRESSURE: 182 MMHG | TEMPERATURE: 98 F | HEIGHT: 69 IN

## 2017-09-07 DIAGNOSIS — L97.402 DIABETIC ULCER OF HEEL ASSOCIATED WITH TYPE 2 DIABETES MELLITUS, WITH FAT LAYER EXPOSED, UNSPECIFIED LATERALITY: ICD-10-CM

## 2017-09-07 DIAGNOSIS — E10.621 DIABETIC ULCER OF RIGHT HEEL ASSOCIATED WITH TYPE 1 DIABETES MELLITUS, UNSPECIFIED ULCER STAGE: Primary | ICD-10-CM

## 2017-09-07 DIAGNOSIS — E11.621 DIABETIC ULCER OF HEEL ASSOCIATED WITH TYPE 2 DIABETES MELLITUS, WITH FAT LAYER EXPOSED, UNSPECIFIED LATERALITY: ICD-10-CM

## 2017-09-07 DIAGNOSIS — I73.9 PAD (PERIPHERAL ARTERY DISEASE): ICD-10-CM

## 2017-09-07 DIAGNOSIS — L97.419 DIABETIC ULCER OF RIGHT HEEL ASSOCIATED WITH TYPE 1 DIABETES MELLITUS, UNSPECIFIED ULCER STAGE: Primary | ICD-10-CM

## 2017-09-07 LAB
ALBUMIN SERPL BCP-MCNC: 3.3 G/DL
ALP SERPL-CCNC: 111 U/L
ALT SERPL W/O P-5'-P-CCNC: 10 U/L
ANION GAP SERPL CALC-SCNC: 11 MMOL/L
AST SERPL-CCNC: 18 U/L
BASOPHILS # BLD AUTO: 0.06 K/UL
BASOPHILS NFR BLD: 0.5 %
BILIRUB SERPL-MCNC: 0.5 MG/DL
BILIRUB UR QL STRIP: NEGATIVE
BUN SERPL-MCNC: 33 MG/DL
CALCIUM SERPL-MCNC: 9.2 MG/DL
CHLORIDE SERPL-SCNC: 103 MMOL/L
CLARITY UR: CLEAR
CO2 SERPL-SCNC: 27 MMOL/L
COLOR UR: YELLOW
CREAT SERPL-MCNC: 2.6 MG/DL
DIFFERENTIAL METHOD: ABNORMAL
EOSINOPHIL # BLD AUTO: 0.5 K/UL
EOSINOPHIL NFR BLD: 4.1 %
ERYTHROCYTE [DISTWIDTH] IN BLOOD BY AUTOMATED COUNT: 16.3 %
EST. GFR  (AFRICAN AMERICAN): 28 ML/MIN/1.73 M^2
EST. GFR  (NON AFRICAN AMERICAN): 24 ML/MIN/1.73 M^2
GLUCOSE SERPL-MCNC: 102 MG/DL
GLUCOSE UR QL STRIP: NEGATIVE
HCT VFR BLD AUTO: 34.9 %
HGB BLD-MCNC: 11 G/DL
HGB UR QL STRIP: NEGATIVE
KETONES UR QL STRIP: NEGATIVE
LEUKOCYTE ESTERASE UR QL STRIP: NEGATIVE
LYMPHOCYTES # BLD AUTO: 2.3 K/UL
LYMPHOCYTES NFR BLD: 19.8 %
MCH RBC QN AUTO: 27.4 PG
MCHC RBC AUTO-ENTMCNC: 31.5 G/DL
MCV RBC AUTO: 87 FL
MONOCYTES # BLD AUTO: 0.7 K/UL
MONOCYTES NFR BLD: 6.2 %
NEUTROPHILS # BLD AUTO: 8 K/UL
NEUTROPHILS NFR BLD: 69.1 %
NITRITE UR QL STRIP: NEGATIVE
PERIPHERAL STENOSIS: ABNORMAL
PH UR STRIP: 6 [PH] (ref 5–8)
PLATELET # BLD AUTO: 361 K/UL
PMV BLD AUTO: 9.1 FL
POCT GLUCOSE: 113 MG/DL (ref 70–110)
POTASSIUM SERPL-SCNC: 4.6 MMOL/L
PROT SERPL-MCNC: 7.6 G/DL
PROT UR QL STRIP: NEGATIVE
RBC # BLD AUTO: 4.02 M/UL
SODIUM SERPL-SCNC: 141 MMOL/L
SP GR UR STRIP: 1.02 (ref 1–1.03)
URN SPEC COLLECT METH UR: NORMAL
UROBILINOGEN UR STRIP-ACNC: NEGATIVE EU/DL
WBC # BLD AUTO: 11.51 K/UL

## 2017-09-07 PROCEDURE — 25000003 PHARM REV CODE 250

## 2017-09-07 PROCEDURE — 81003 URINALYSIS AUTO W/O SCOPE: CPT

## 2017-09-07 PROCEDURE — 99284 EMERGENCY DEPT VISIT MOD MDM: CPT | Mod: 25,27

## 2017-09-07 PROCEDURE — 25000003 PHARM REV CODE 250: Performed by: INTERNAL MEDICINE

## 2017-09-07 PROCEDURE — 25500020 PHARM REV CODE 255

## 2017-09-07 PROCEDURE — 85025 COMPLETE CBC W/AUTO DIFF WBC: CPT

## 2017-09-07 PROCEDURE — 82962 GLUCOSE BLOOD TEST: CPT

## 2017-09-07 PROCEDURE — 63600175 PHARM REV CODE 636 W HCPCS

## 2017-09-07 PROCEDURE — 99214 OFFICE O/P EST MOD 30 MIN: CPT

## 2017-09-07 PROCEDURE — 80053 COMPREHEN METABOLIC PANEL: CPT

## 2017-09-07 PROCEDURE — 25000003 PHARM REV CODE 250: Performed by: NURSE PRACTITIONER

## 2017-09-07 PROCEDURE — G0378 HOSPITAL OBSERVATION PER HR: HCPCS

## 2017-09-07 RX ORDER — DIPHENHYDRAMINE HCL 50 MG
50 CAPSULE ORAL ONCE
Status: DISCONTINUED | OUTPATIENT
Start: 2017-09-07 | End: 2017-09-07 | Stop reason: HOSPADM

## 2017-09-07 RX ORDER — CLOPIDOGREL BISULFATE 75 MG/1
75 TABLET ORAL DAILY
Status: DISCONTINUED | OUTPATIENT
Start: 2017-09-07 | End: 2017-09-07 | Stop reason: HOSPADM

## 2017-09-07 RX ORDER — SODIUM CHLORIDE 9 MG/ML
5 INJECTION, SOLUTION INTRAVENOUS CONTINUOUS
Status: DISCONTINUED | OUTPATIENT
Start: 2017-09-07 | End: 2017-09-07 | Stop reason: HOSPADM

## 2017-09-07 RX ORDER — HYDROCODONE BITARTRATE AND ACETAMINOPHEN 5; 325 MG/1; MG/1
1 TABLET ORAL EVERY 4 HOURS PRN
Status: DISCONTINUED | OUTPATIENT
Start: 2017-09-07 | End: 2017-09-07 | Stop reason: HOSPADM

## 2017-09-07 RX ORDER — AMOXICILLIN 250 MG
1 CAPSULE ORAL DAILY
Status: DISCONTINUED | OUTPATIENT
Start: 2017-09-07 | End: 2017-09-07 | Stop reason: HOSPADM

## 2017-09-07 RX ORDER — RAMELTEON 8 MG/1
8 TABLET ORAL NIGHTLY PRN
Status: DISCONTINUED | OUTPATIENT
Start: 2017-09-07 | End: 2017-09-07 | Stop reason: HOSPADM

## 2017-09-07 RX ORDER — ONDANSETRON 8 MG/1
8 TABLET, ORALLY DISINTEGRATING ORAL EVERY 8 HOURS PRN
Status: DISCONTINUED | OUTPATIENT
Start: 2017-09-07 | End: 2017-09-07 | Stop reason: HOSPADM

## 2017-09-07 RX ORDER — GABAPENTIN 300 MG/1
300 CAPSULE ORAL 2 TIMES DAILY
Status: DISCONTINUED | OUTPATIENT
Start: 2017-09-07 | End: 2017-09-07 | Stop reason: HOSPADM

## 2017-09-07 RX ORDER — FERROUS SULFATE 325(65) MG
325 TABLET, DELAYED RELEASE (ENTERIC COATED) ORAL DAILY
Status: DISCONTINUED | OUTPATIENT
Start: 2017-09-07 | End: 2017-09-07 | Stop reason: HOSPADM

## 2017-09-07 RX ORDER — TAMSULOSIN HYDROCHLORIDE 0.4 MG/1
0.4 CAPSULE ORAL DAILY
Status: DISCONTINUED | OUTPATIENT
Start: 2017-09-07 | End: 2017-09-07 | Stop reason: HOSPADM

## 2017-09-07 RX ORDER — CIPROFLOXACIN 500 MG/1
500 TABLET ORAL 2 TIMES DAILY
Status: DISCONTINUED | OUTPATIENT
Start: 2017-09-07 | End: 2017-09-07 | Stop reason: HOSPADM

## 2017-09-07 RX ORDER — SODIUM CHLORIDE 0.9 % (FLUSH) 0.9 %
3 SYRINGE (ML) INJECTION EVERY 8 HOURS
Status: DISCONTINUED | OUTPATIENT
Start: 2017-09-07 | End: 2017-09-07 | Stop reason: HOSPADM

## 2017-09-07 RX ORDER — METOPROLOL SUCCINATE 25 MG/1
25 TABLET, EXTENDED RELEASE ORAL DAILY
Status: DISCONTINUED | OUTPATIENT
Start: 2017-09-07 | End: 2017-09-07 | Stop reason: HOSPADM

## 2017-09-07 RX ORDER — CLINDAMYCIN HYDROCHLORIDE 150 MG/1
300 CAPSULE ORAL 3 TIMES DAILY
Status: DISCONTINUED | OUTPATIENT
Start: 2017-09-07 | End: 2017-09-07 | Stop reason: HOSPADM

## 2017-09-07 RX ORDER — ACETAMINOPHEN 325 MG/1
650 TABLET ORAL EVERY 6 HOURS PRN
Status: DISCONTINUED | OUTPATIENT
Start: 2017-09-07 | End: 2017-09-07 | Stop reason: HOSPADM

## 2017-09-07 RX ORDER — PANTOPRAZOLE SODIUM 40 MG/1
40 TABLET, DELAYED RELEASE ORAL DAILY
Status: DISCONTINUED | OUTPATIENT
Start: 2017-09-07 | End: 2017-09-07 | Stop reason: HOSPADM

## 2017-09-07 RX ORDER — SODIUM CHLORIDE 9 MG/ML
INJECTION, SOLUTION INTRAVENOUS CONTINUOUS
Status: DISCONTINUED | OUTPATIENT
Start: 2017-09-07 | End: 2017-09-07 | Stop reason: HOSPADM

## 2017-09-07 RX ORDER — ATORVASTATIN CALCIUM 40 MG/1
80 TABLET, FILM COATED ORAL DAILY
Status: DISCONTINUED | OUTPATIENT
Start: 2017-09-07 | End: 2017-09-07 | Stop reason: HOSPADM

## 2017-09-07 RX ORDER — ISOSORBIDE DINITRATE 20 MG/1
20 TABLET ORAL 3 TIMES DAILY
Status: DISCONTINUED | OUTPATIENT
Start: 2017-09-07 | End: 2017-09-07 | Stop reason: HOSPADM

## 2017-09-07 RX ADMIN — HYDROCODONE BITARTRATE AND ACETAMINOPHEN 1 TABLET: 5; 325 TABLET ORAL at 11:09

## 2017-09-07 RX ADMIN — SODIUM CHLORIDE 5 ML/KG/HR: 0.9 INJECTION, SOLUTION INTRAVENOUS at 04:09

## 2017-09-07 NOTE — ED PROVIDER NOTES
Encounter Date: 9/7/2017       History     Chief Complaint   Patient presents with    Foot Pain     right foot diabetic ulcer with increase in pain last night, denies fall or injury to foot; patient to have cardiac procedure today here by Dr Taveras     Patient is a 67-year-old male with diabetes and severe peripheral artery disease who complains of severe pain to his right foot do to a diabetic ulcer.  Patient says he is scheduled this morning for an angiogram by Dr. Epps.  He denies fever or chills.  No nausea or vomiting.      The history is provided by the patient.     Review of patient's allergies indicates:   Allergen Reactions    Adhesive tape-silicones Blisters     Past Medical History:   Diagnosis Date    Acute on chronic systolic CHF (congestive heart failure) 11/29/2016    Anticoagulant long-term use     CKD (chronic kidney disease) stage 2, GFR 60-89 ml/min 2/21/2016    Coronary artery disease     Encounter for blood transfusion     HTN (hypertension) 3/3/2014    Hyperlipidemia 3/3/2014    NSTEMI (non-ST elevated myocardial infarction) 11/28/2016    Obesity (BMI 30-39.9) 11/29/2016    PVD (peripheral vascular disease)     Stroke     Type 2 diabetes mellitus with diabetic peripheral angiopathy without gangrene, without long-term current use of insulin 10/24/2013    Type 2 diabetes mellitus with diabetic polyneuropathy, without long-term current use of insulin 11/29/2016     Past Surgical History:   Procedure Laterality Date    CARDIAC SURGERY      COLONOSCOPY N/A 3/21/2017    Procedure: COLONOSCOPY;  Surgeon: Karissa Peck MD;  Location: [address]);  Service: Endoscopy;  Laterality: N/A;    CORONARY ANGIOPLASTY WITH STENT PLACEMENT      FOOT NERVE GRAFT  11/2013    left leg stent      Loop Recorder placement      right leg bypass       Family History   Problem Relation Age of Onset    Diabetes Mother     Glaucoma Mother     Cataracts Mother     Heart disease Father      Blindness Maternal Grandmother     Macular degeneration Neg Hx     Retinal detachment Neg Hx     Strabismus Neg Hx     Amblyopia Neg Hx      Social History   Substance Use Topics    Smoking status: Former Smoker     Packs/day: 1.00     Years: 50.00     Quit date: 3/1/2011    Smokeless tobacco: Never Used    Alcohol use No     Review of Systems   Constitutional: Negative for chills and fever.   Respiratory: Negative for shortness of breath.    Cardiovascular: Negative for chest pain.   Gastrointestinal: Negative for nausea and vomiting.   Musculoskeletal:        Right foot pain.   Skin: Positive for wound.   All other systems reviewed and are negative.      Physical Exam     Initial Vitals [09/07/17 0839]   BP Pulse Resp Temp SpO2   (!) 166/74 72 20 98.3 °F (36.8 °C) 98 %      MAP       104.67         Physical Exam    Nursing note and vitals reviewed.  Constitutional: No distress.   HENT:   Head: Atraumatic.   Eyes: EOM are normal.   Neck: Normal range of motion. Neck supple.   Cardiovascular: Normal rate, regular rhythm and normal heart sounds.   Pulmonary/Chest: Breath sounds normal.   Abdominal: Soft. There is no tenderness.   Musculoskeletal: Normal range of motion.   Eschar noted to the right heel.  There is also a wound of the right great toe with no current bleeding or discharge.  DP and PT pulses are not palpable of the right foot, although the foot is warm.   Neurological: He is alert and oriented to person, place, and time.   Skin: Skin is warm and dry.   Psychiatric: His behavior is normal. Thought content normal.         ED Course   Procedures  Labs Reviewed   CBC W/ AUTO DIFFERENTIAL - Abnormal; Notable for the following:        Result Value    RBC 4.02 (*)     Hemoglobin 11.0 (*)     Hematocrit 34.9 (*)     MCHC 31.5 (*)     RDW 16.3 (*)     Platelets 361 (*)     MPV 9.1 (*)     Gran # 8.0 (*)     All other components within normal limits   COMPREHENSIVE METABOLIC PANEL - Abnormal; Notable  for the following:     BUN, Bld 33 (*)     Creatinine 2.6 (*)     Albumin 3.3 (*)     eGFR if  28 (*)     eGFR if non  24 (*)     All other components within normal limits   POCT GLUCOSE - Abnormal; Notable for the following:     POCT Glucose 113 (*)     All other components within normal limits   URINALYSIS                               ED Course      Clinical Impression:   The primary encounter diagnosis was Diabetic ulcer of right heel associated with type 1 diabetes mellitus, unspecified ulcer stage. A diagnosis of PAD (peripheral artery disease) was also pertinent to this visit.                           Daryl Garcia MD  09/07/17 [id number]

## 2017-09-07 NOTE — ED NOTES
Spoke with ARY Parmar; states will be putting in some basic orders and possibly be moving pt up to pre/post procedure area while awaiting procedure.

## 2017-09-07 NOTE — ED NOTES
APPEARANCE: Alert, oriented and in no acute distress.  CARDIAC: Normal rate and rhythm, no murmur heard.   RESPIRATORY:Normal rate and effort, breath sounds clear bilaterally throughout chest. Respirations are equal and unlabored no obvious signs of distress.  GASTRO: soft, bowel sounds normal, no tenderness, no abdominal distention.  MUSC: Full ROM. No bony tenderness or soft tissue tenderness. No obvious deformity.  SKIN: Skin is warm and dry, normal skin turgor, mucous membranes moist.  NEURO: 5/5 strength major flexors/extensors bilaterally. Sensory intact to light touch bilaterally. Treynor coma scale: eyes open spontaneously-4, oriented & converses-5, obeys commands-6. No neurological abnormalities.   MENTAL STATUS: awake, alert and aware of environment.  EYE: PERRL, both eyes: pupils brisk and reactive to light. Normal size.  ENT: EARS: no obvious drainage. NOSE: no active bleeding.

## 2017-09-07 NOTE — NURSING
2cc air removed from right radial vasc band.  No hematoma or bleeding noted.  Will continue to monitor per protocol.

## 2017-09-07 NOTE — H&P (VIEW-ONLY)
Subjective:   Patient ID:  Chau Rodarte is a 67 y.o. male who presents for evaluation and treatment of Peripheral Arterial Disease; non healing ulcer of R foot; and possible revascularization      HPI:       He is here with his wife by recommendation of Dr. Bland (podiatry) for possible revascularization of right leg for non healing wounds that started in 4/2017. He has a history of PAD. S/p L pSFA PTAS with 8.0 x 40 mm Lifestent post dilated with 7 mm balloon (5/2012), S/p R fem-pop 2012 at [facility] with 6 mm PTFE graft (Occluded on US and Angio in 2017), and S/p L fem-bk POP bypass-GSV 6/2017. He has CAD s/p multivessel PCI, HTN, HLP, DM, ICM with EF 45%, PAF with embolic CVA 2006, s/p CTI RFA 2/2017, and ILR for afib surveillance. He is on aspirin and plavix for DAPT. He is taking clindamycin and cipro for abx      Peripheral angiograms from 5/2012, 5/2017, and 7/2017 were reviewed      R SFA  with poorly visualized POP and infrapopliteal vessels  L SFA  with 2 vessel run off via AT and PER         Care team:    Baldo-pop  Smith-vas sx  Dvorin-pcp  Josh/Rich-card        Patient Active Problem List    Diagnosis Date Noted    Diabetic ulcer of heel associated with type 2 diabetes mellitus     PAD (peripheral artery disease)          S/p L pSFA PTAS with 8.0 x 40 mm Lifestent    Post dilated with 7 mm balloon    S/p R fem-pop 2012 at [facility]   6 mm PTFE graft   Occluded on US and Angio in 2017       S/p L fem-bk POP bypass-GSV 6/2017           Ulcer of heel due to diabetes mellitus 06/23/2017    BPH (benign prostatic hypertrophy) 06/23/2017    Debility 06/19/2017    Preop examination     Atherosclerosis of native arteries of the extremities with ulceration 04/26/2017    Lower GI bleed 03/20/2017    Type 2 diabetes mellitus with stage 4 chronic kidney disease GFR 15-29 03/20/2017    Acute blood loss anemia 03/20/2017    Long term (current) use of anticoagulants [Z79.01] 01/27/2017    Typical  atrial flutter 2017    Cryptogenic stroke, remote history (2006) 2017    Coronary artery disease involving native coronary artery of native heart without angina pectoris 2016    Ischemic cardiomyopathy 2016    CORRY (acute kidney injury) 2016    Cardiomegaly 2016    Type 2 diabetes mellitus with diabetic polyneuropathy, with long-term current use of insulin 2016    Chronic systolic heart failure 2016    Obesity (BMI 30-39.9) 2016    NSTEMI (non-ST elevated myocardial infarction) 2016    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, NYHA class 2 and CKD stage 3 2016    Essential hypertension 2014    Skin ulcers of both feet 2013    HLD (hyperlipidemia) 10/24/2013    S/P femoral-popliteal bypass surgery 10/24/2013         R fem-pop in       L fem-pop in                    Right Arm BP - Sittin/66  Left Arm BP - Sittin/66        LABS    LAST HbA1c  Lab Results   Component Value Date    HGBA1C 6.1 (H) 2017       Lipid panel  Lab Results   Component Value Date    CHOL 110 (L) 2017    CHOL 113 (L) 2016    CHOL 122 (L) 10/21/2016     Lab Results   Component Value Date    HDL 25 (L) 2017    HDL 31 (L) 2016    HDL 32 (L) 10/21/2016     Lab Results   Component Value Date    LDLCALC 72.2 2017    LDLCALC 72.0 2016    LDLCALC 76 10/21/2016     Lab Results   Component Value Date    TRIG 64 2017    TRIG 50 2016    TRIG 71 10/21/2016     Lab Results   Component Value Date    CHOLHDL 22.7 2017    CHOLHDL 27.4 2016    CHOLHDL 3.8 10/21/2016            Review of Systems   Constitution: Negative for diaphoresis, weakness, night sweats, weight gain and weight loss.   HENT: Negative for congestion.    Eyes: Negative for blurred vision, discharge and double vision.   Cardiovascular: Negative for chest pain, claudication, cyanosis, dyspnea on  exertion, irregular heartbeat, leg swelling, near-syncope, orthopnea, palpitations, paroxysmal nocturnal dyspnea and syncope.   Respiratory: Negative for cough, shortness of breath and wheezing.    Endocrine: Negative for cold intolerance, heat intolerance and polyphagia.   Hematologic/Lymphatic: Negative for adenopathy and bleeding problem. Does not bruise/bleed easily.   Skin: Positive for poor wound healing. Negative for dry skin and nail changes.   Musculoskeletal: Negative for arthritis, back pain, falls, joint pain, myalgias and neck pain.   Gastrointestinal: Negative for bloating, abdominal pain, change in bowel habit and constipation.   Genitourinary: Negative for bladder incontinence, dysuria, flank pain, genital sores and missed menses.   Neurological: Negative for aphonia, brief paralysis, difficulty with concentration and dizziness.   Psychiatric/Behavioral: Negative for altered mental status and memory loss. The patient does not have insomnia.    Allergic/Immunologic: Negative for environmental allergies.       Objective:   Physical Exam   Constitutional: He is oriented to person, place, and time. He appears well-developed and well-nourished. He is not intubated.   HENT:   Head: Normocephalic and atraumatic.   Right Ear: External ear normal.   Left Ear: External ear normal.   Mouth/Throat: Oropharynx is clear and moist.   Eyes: Conjunctivae and EOM are normal. Pupils are equal, round, and reactive to light. Right eye exhibits no discharge. Left eye exhibits no discharge. No scleral icterus.   Neck: Normal range of motion. Neck supple. Normal carotid pulses, no hepatojugular reflux and no JVD present. Carotid bruit is not present. No tracheal deviation present. No thyromegaly present.   Cardiovascular: Normal rate, regular rhythm, S1 normal and S2 normal.   No extrasystoles are present. PMI is not displaced.  Exam reveals no gallop, no S3, no distant heart sounds, no friction rub and no midsystolic  click.    Murmur heard.   Systolic murmur is present with a grade of 2/6  at the upper right sternal border  Pulses:       Carotid pulses are 2+ on the right side, and 2+ on the left side.       Radial pulses are 2+ on the right side, and 2+ on the left side.        Femoral pulses are 2+ on the right side, and 2+ on the left side.       Popliteal pulses are 0 on the right side, and 2+ on the left side.        Dorsalis pedis pulses are 0 on the right side. Left dorsalis pedis pulse not accessible.        Posterior tibial pulses are 0 on the right side. Left posterior tibial pulse not accessible.       Unable to assess L foot pedal pulses-L leg is wrapped      Faint to monophasic R DP without a doppler PT signal      Monophasic R POP doppler signal      Biphasic L POP doppler signal   Pulmonary/Chest: Effort normal and breath sounds normal. No accessory muscle usage or stridor. No apnea, no tachypnea and no bradypnea. He is not intubated. No respiratory distress. He has no decreased breath sounds. He has no wheezes. He has no rales. He exhibits no tenderness and no bony tenderness.   Abdominal: He exhibits no distension, no pulsatile liver, no abdominal bruit, no ascites, no pulsatile midline mass and no mass. There is no tenderness. There is no rebound and no guarding.   Musculoskeletal: Normal range of motion. He exhibits no edema or tenderness.   Lymphadenopathy:     He has no cervical adenopathy.   Neurological: He is alert and oriented to person, place, and time. He has normal reflexes. No cranial nerve deficit. Coordination normal.   Skin: Skin is warm. No rash noted. No erythema. No pallor.       L leg wrapped from foot to the knee        R great wound with necrosis  R first metatarsal plantar surface wound with fat exposure  R heel wound with fat exposure      See images         Psychiatric: He has a normal mood and affect. His behavior is normal. Judgment and thought content normal.      8/28/2017                  Assessment:     1. Diabetic ulcer of heel associated with type 2 diabetes mellitus, with fat layer exposed, unspecified laterality    2. PAD (peripheral artery disease)    3. S/P femoral-popliteal bypass surgery    4. Atherosclerosis of native arteries of the extremities with ulceration    5. Coronary artery disease involving native coronary artery of native heart without angina pectoris    6. Chronic systolic heart failure    7. 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, NYHA class 2 and CKD stage 3        Plan:         Diagnostic angiogram   R radial access with 4-5 slender sheath   150 cm Navicross   Minimal contrast use in view of CKD        After diagnostic angiogram we will determine his anatomy and revascularization strategy        Hold lasix and lisinopril 24 hours prior to case  Aggressive hydration prior to peripheral angiogram          Continue with current medical plan and lifestyle changes.  Return sooner for concerns or questions. If symptoms persist go to the ED  I have reviewed all pertinent data on this patient       I have reviewed the patient's medical history in detail and updated the computerized patient record.    Orders Placed This Encounter   Procedures    Basic metabolic panel     Standing Status:   Future     Number of Occurrences:   1     Standing Expiration Date:   10/27/2018    Case Request-Cath Lab: AORTOGRAM WITH RUNOFF     Standing Status:   Standing     Number of Occurrences:   1     Order Specific Question:   CPT Code:     Answer:   [place]  ANGIO AORTOBIFEMORAL W CATH [[id number]]       Follow up as scheduled. Return sooner for concerns or questions            He expressed verbal understanding and agreed with the plan        Patient's Medications   New Prescriptions    No medications on file   Previous Medications    ASPIRIN 81 MG CHEW    Take 1 tablet (81 mg total) by mouth once daily.    ATORVASTATIN (LIPITOR) 80 MG TABLET    Take 1  "tablet (80 mg total) by mouth once daily.    CHOLECALCIFEROL, VITAMIN D3, (VITAMIN D3) 5,000 UNIT TAB    Take 5,000 Units by mouth once daily.    CIPROFLOXACIN HCL (CIPRO) 500 MG TABLET    Take 1 tablet (500 mg total) by mouth 2 (two) times daily.    CLINDAMYCIN (CLEOCIN) 300 MG CAPSULE    Take 1 capsule (300 mg total) by mouth 3 (three) times daily.    CLOPIDOGREL (PLAVIX) 75 MG TABLET    Take 1 tablet (75 mg total) by mouth once daily.    COLLAGENASE (SANTYL) OINTMENT    Apply topically once daily.    COLLAGENASE (SANTYL) OINTMENT    Apply topically once daily. Apply locally daily with xeroform to the leg and groin wound.    DICLOFENAC SODIUM 1 % GEL    Apply 2 g topically 3 (three) times daily.    FERROUS SULFATE 325 (65 FE) MG EC TABLET    Take 1 tablet (325 mg total) by mouth once daily.    FUROSEMIDE (LASIX) 40 MG TABLET    Take 1 tablet (40 mg total) by mouth once daily.    GABAPENTIN (NEURONTIN) 300 MG CAPSULE    Take 300 mg by mouth 2 (two) times daily.    HYDROCODONE-ACETAMINOPHEN 5-325MG (NORCO) 5-325 MG PER TABLET    Take 1 tablet by mouth every 4 (four) hours as needed.    INSULIN DETEMIR (LEVEMIR FLEXTOUCH) 100 UNIT/ML (3 ML) SUBQ INPN PEN    Inject 16 Units into the skin every evening.    ISOSORBIDE DINITRATE (ISORDIL) 20 MG TABLET    Take 1 tablet (20 mg total) by mouth 3 (three) times daily.    LIRAGLUTIDE 0.6 MG/0.1 ML, 18 MG/3 ML, SUBQ PNIJ (VICTOZA 3-SAGAR) 0.6 MG/0.1 ML (18 MG/3 ML) PNIJ    Inject 1.2 mg into the skin once daily.    METOPROLOL SUCCINATE (TOPROL-XL) 25 MG 24 HR TABLET    Take 1 tablet (25 mg total) by mouth once daily.    PANTOPRAZOLE (PROTONIX) 20 MG TABLET    Take 2 tablets (40 mg total) by mouth once daily.    PEN NEEDLE, DIABETIC (BD ULTRA-FINE HANG PEN NEEDLES) 32 GAUGE X 5/32" NDLE    Uses 2 times daily, on  insulin injections    SENNA-DOCUSATE 8.6-50 MG (PERICOLACE) 8.6-50 MG PER TABLET    Take 1 tablet by mouth once daily.    TAMSULOSIN (FLOMAX) 0.4 MG CP24    Take 1 " capsule (0.4 mg total) by mouth once daily.   Modified Medications    No medications on file   Discontinued Medications    No medications on file

## 2017-09-07 NOTE — PROGRESS NOTES
S/p selective R leg angiogram        R CFA patent  R SFA and POP   AT and PER reconstitution proximally  DP is the only vessel providing flow to plantar arch          Plan:        Return for R SFA and POP   Access via L CFA and retrograde R AT

## 2017-09-07 NOTE — ED NOTES
67 year old male presents to ed with chief complaint of right foot pain secondary to dm type two. Patient states hx of neuropathy related to dm. Denies injury to foot. Patient has hx of ulcers on foot that are treated by wound care and home health nurse. Denies fever chills sob chest pain.

## 2017-09-07 NOTE — INTERVAL H&P NOTE
The patient has been examined and the H&P has been reviewed:        Anesthesia/Surgery risks, benefits and alternative options discussed and understood by patient/family.          Active Hospital Problems    Diagnosis  POA    *Diabetic ulcer of heel associated with type 2 diabetes mellitus, with fat layer exposed [E11.621, L97.402]  Unknown     Priority: High     Added automatically from request for surgery [id number]      Diabetic ulcer of right heel associated with type 1 diabetes mellitus [E10.621, L97.419]  Yes    PAD (peripheral artery disease) [I73.9]  Yes         S/p L pSFA PTAS with 8.0 x 40 mm Lifestent    Post dilated with 7 mm balloon    S/p R fem-pop 2012 at [facility]   6 mm PTFE graft   Occluded on US and Angio in 2017       S/p L fem-bk POP bypass-GSV 6/2017             Resolved Hospital Problems    Diagnosis Date Resolved POA   No resolved problems to display.         He is here for diagnostic peripheral angiogram      Risks, benefits and alternatives of peripheral catheterization and possible intervention were discussed with the patient. All questions were answered and informed consent obtained.     I discussed the importance of compliance with dual antiplatelet therapy with the patient to prevent acute or late stent thrombosis with premature discontinuation of the therapy.

## 2017-09-07 NOTE — NURSING
Patient discharged to home as ordered.right radial site CDI, no hematoma, no bleeding noted.  All discharge instructions, printed materials  given to patient. Patient instructed on follow-up appointment as ordered. Patient verbalized understanding of and agreement with all discharge instructions given. Pt c/d home via private vehicle with wife.

## 2017-09-07 NOTE — ED NOTES
Report received. Care assumed. Pt AAOx4. Respirations even and unlabored. Pt VSS. Will continue to monitor. Will continue to monitor.

## 2017-09-08 ENCOUNTER — TELEPHONE (OUTPATIENT)
Dept: CARDIOLOGY | Facility: HOSPITAL | Age: 67
End: 2017-09-08

## 2017-09-08 DIAGNOSIS — I70.229 CRITICAL LOWER LIMB ISCHEMIA: Primary | ICD-10-CM

## 2017-09-08 RX ORDER — SODIUM CHLORIDE 9 MG/ML
INJECTION, SOLUTION INTRAVENOUS CONTINUOUS
Status: CANCELLED | OUTPATIENT
Start: 2017-09-08

## 2017-09-08 RX ORDER — DIPHENHYDRAMINE HCL 25 MG
50 CAPSULE ORAL ONCE
Status: CANCELLED | OUTPATIENT
Start: 2017-09-08 | End: 2017-09-08

## 2017-09-08 NOTE — TELEPHONE ENCOUNTER
Please call him      He needs labs on Wednesday 9/13/2017 am      His case will be Friday 9/15/2017      Thanks      ZN

## 2017-09-12 ENCOUNTER — TELEPHONE (OUTPATIENT)
Dept: CARDIOLOGY | Facility: HOSPITAL | Age: 67
End: 2017-09-12

## 2017-09-12 ENCOUNTER — HOSPITAL ENCOUNTER (OUTPATIENT)
Dept: WOUND CARE | Facility: HOSPITAL | Age: 67
Discharge: HOME | End: 2017-09-12
Attending: PODIATRIST
Payer: MEDICARE

## 2017-09-12 ENCOUNTER — TELEPHONE (OUTPATIENT)
Dept: CARDIOLOGY | Facility: CLINIC | Age: 67
End: 2017-09-12

## 2017-09-12 VITALS
TEMPERATURE: 98 F | BODY MASS INDEX: 33.33 KG/M2 | SYSTOLIC BLOOD PRESSURE: 158 MMHG | HEART RATE: 71 BPM | HEIGHT: 69 IN | WEIGHT: 225 LBS | DIASTOLIC BLOOD PRESSURE: 71 MMHG

## 2017-09-12 DIAGNOSIS — T81.89XD NONHEALING SURGICAL WOUND, SUBSEQUENT ENCOUNTER: ICD-10-CM

## 2017-09-12 DIAGNOSIS — L97.519 DIABETIC ULCER OF OTHER PART OF RIGHT FOOT ASSOCIATED WITH TYPE 2 DIABETES MELLITUS, UNSPECIFIED ULCER STAGE: Primary | ICD-10-CM

## 2017-09-12 DIAGNOSIS — E11.621 DIABETIC ULCER OF OTHER PART OF RIGHT FOOT ASSOCIATED WITH TYPE 2 DIABETES MELLITUS, UNSPECIFIED ULCER STAGE: Primary | ICD-10-CM

## 2017-09-12 DIAGNOSIS — I25.10 CORONARY ATHEROSCLEROSIS OF UNSPECIFIED TYPE OF VESSEL, NATIVE OR GRAFT: Primary | ICD-10-CM

## 2017-09-12 DIAGNOSIS — E11.52 TYPE 2 DIABETES MELLITUS WITH DIABETIC PERIPHERAL ANGIOPATHY AND GANGRENE, WITHOUT LONG-TERM CURRENT USE OF INSULIN: ICD-10-CM

## 2017-09-12 PROCEDURE — 11042 DBRDMT SUBQ TIS 1ST 20SQCM/<: CPT | Mod: ,,, | Performed by: PODIATRIST

## 2017-09-12 PROCEDURE — 27201912 HC WOUND CARE DEBRIDEMENT SUPPLIES

## 2017-09-12 PROCEDURE — 11042 PR DEBRIDEMENT, SKIN, SUB-Q TISSUE,=<20 SQ CM: ICD-10-PCS | Mod: ,,, | Performed by: PODIATRIST

## 2017-09-12 PROCEDURE — 11042 DBRDMT SUBQ TIS 1ST 20SQCM/<: CPT

## 2017-09-12 NOTE — TELEPHONE ENCOUNTER
----- Message from Inge Peterson sent at 9/12/2017  9:51 AM CDT -----  Good Morning,     Please advise if procedure on 9/15/17 is medically urgent.    Thank you,     Inge

## 2017-09-12 NOTE — PRE ADMISSION SCREENING
Called and spoke w pt, pre procedure instructions given, arrival time 1030am. Npo past 6am. Verbalized understanding and denies questions.

## 2017-09-13 ENCOUNTER — TELEPHONE (OUTPATIENT)
Dept: CARDIOLOGY | Facility: CLINIC | Age: 67
End: 2017-09-13

## 2017-09-13 NOTE — TELEPHONE ENCOUNTER
Spoke to patients wife, she would like to know if his procedure is still on for Friday 9/15.      Informed patient a nurse from the cath lab will contact her later today

## 2017-09-13 NOTE — TELEPHONE ENCOUNTER
----- Message from Jina Pinto sent at 9/13/2017 10:46 AM CDT -----  Contact: Vladimir wife [phone]  Patient is calling to talk to nurse concerning his procedure. Please advice

## 2017-09-15 ENCOUNTER — HOSPITAL ENCOUNTER (OUTPATIENT)
Facility: HOSPITAL | Age: 67
Discharge: HOME | End: 2017-09-16
Attending: INTERNAL MEDICINE | Admitting: INTERNAL MEDICINE
Payer: MEDICARE

## 2017-09-15 ENCOUNTER — SURGERY (OUTPATIENT)
Age: 67
End: 2017-09-15

## 2017-09-15 DIAGNOSIS — I70.229 CRITICAL LOWER LIMB ISCHEMIA: ICD-10-CM

## 2017-09-15 DIAGNOSIS — E78.5 HYPERLIPIDEMIA: ICD-10-CM

## 2017-09-15 LAB
ANION GAP SERPL CALC-SCNC: 8 MMOL/L
BUN SERPL-MCNC: 28 MG/DL
CALCIUM SERPL-MCNC: 8.8 MG/DL
CHLORIDE SERPL-SCNC: 103 MMOL/L
CO2 SERPL-SCNC: 30 MMOL/L
CREAT SERPL-MCNC: 2.1 MG/DL
ERYTHROCYTE [DISTWIDTH] IN BLOOD BY AUTOMATED COUNT: 16.8 %
EST. GFR  (AFRICAN AMERICAN): 36 ML/MIN/1.73 M^2
EST. GFR  (NON AFRICAN AMERICAN): 31 ML/MIN/1.73 M^2
GLUCOSE SERPL-MCNC: 135 MG/DL
HCT VFR BLD AUTO: 32.3 %
HGB BLD-MCNC: 10.1 G/DL
MCH RBC QN AUTO: 27.6 PG
MCHC RBC AUTO-ENTMCNC: 31.3 G/DL
MCV RBC AUTO: 88 FL
PLATELET # BLD AUTO: 296 K/UL
PMV BLD AUTO: 9.6 FL
POTASSIUM SERPL-SCNC: 4 MMOL/L
RBC # BLD AUTO: 3.66 M/UL
SODIUM SERPL-SCNC: 141 MMOL/L
WBC # BLD AUTO: 9.22 K/UL

## 2017-09-15 PROCEDURE — C1769 GUIDE WIRE: HCPCS

## 2017-09-15 PROCEDURE — 63600175 PHARM REV CODE 636 W HCPCS

## 2017-09-15 PROCEDURE — 25000003 PHARM REV CODE 250

## 2017-09-15 PROCEDURE — C1753 CATH, INTRAVAS ULTRASOUND: HCPCS

## 2017-09-15 PROCEDURE — 93005 ELECTROCARDIOGRAM TRACING: CPT

## 2017-09-15 PROCEDURE — 36415 COLL VENOUS BLD VENIPUNCTURE: CPT

## 2017-09-15 PROCEDURE — 25000003 PHARM REV CODE 250: Performed by: INTERNAL MEDICINE

## 2017-09-15 PROCEDURE — 37226 CATH LAB PROCEDURE: CPT | Mod: 51,22,RT, | Performed by: INTERNAL MEDICINE

## 2017-09-15 PROCEDURE — 27000190 HC CPAP FULL FACE MASK W/VALVE

## 2017-09-15 PROCEDURE — 99152 MOD SED SAME PHYS/QHP 5/>YRS: CPT | Mod: ,,, | Performed by: INTERNAL MEDICINE

## 2017-09-15 PROCEDURE — 37230 CATH LAB PROCEDURE: CPT | Mod: 22,RT,, | Performed by: INTERNAL MEDICINE

## 2017-09-15 PROCEDURE — 27000221 HC OXYGEN, UP TO 24 HOURS

## 2017-09-15 PROCEDURE — 85027 COMPLETE CBC AUTOMATED: CPT

## 2017-09-15 PROCEDURE — C1876 STENT, NON-COA/NON-COV W/DEL: HCPCS

## 2017-09-15 PROCEDURE — 25500020 PHARM REV CODE 255

## 2017-09-15 PROCEDURE — 99900035 HC TECH TIME PER 15 MIN (STAT)

## 2017-09-15 PROCEDURE — 80048 BASIC METABOLIC PNL TOTAL CA: CPT

## 2017-09-15 PROCEDURE — 37252 INTRVASC US NONCORONARY 1ST: CPT | Mod: ,,, | Performed by: INTERNAL MEDICINE

## 2017-09-15 PROCEDURE — 94660 CPAP INITIATION&MGMT: CPT

## 2017-09-15 PROCEDURE — 37253 INTRVASC US NONCORONARY ADDL: CPT | Mod: ,,, | Performed by: INTERNAL MEDICINE

## 2017-09-15 RX ORDER — SODIUM CHLORIDE 9 MG/ML
INJECTION, SOLUTION INTRAVENOUS CONTINUOUS
Status: DISCONTINUED | OUTPATIENT
Start: 2017-09-15 | End: 2017-09-16 | Stop reason: HOSPADM

## 2017-09-15 RX ORDER — GABAPENTIN 300 MG/1
300 CAPSULE ORAL 2 TIMES DAILY
Status: CANCELLED | OUTPATIENT
Start: 2017-09-15

## 2017-09-15 RX ORDER — CIPROFLOXACIN 500 MG/1
500 TABLET ORAL 2 TIMES DAILY
Status: CANCELLED | OUTPATIENT
Start: 2017-09-15

## 2017-09-15 RX ORDER — SODIUM CHLORIDE 9 MG/ML
125 INJECTION, SOLUTION INTRAVENOUS CONTINUOUS
Status: ACTIVE | OUTPATIENT
Start: 2017-09-15 | End: 2017-09-16

## 2017-09-15 RX ORDER — CLINDAMYCIN HYDROCHLORIDE 150 MG/1
300 CAPSULE ORAL 3 TIMES DAILY
Status: CANCELLED | OUTPATIENT
Start: 2017-09-15

## 2017-09-15 RX ORDER — ISOSORBIDE DINITRATE 20 MG/1
20 TABLET ORAL 3 TIMES DAILY
Status: CANCELLED | OUTPATIENT
Start: 2017-09-15

## 2017-09-15 RX ORDER — CLOPIDOGREL BISULFATE 75 MG/1
300 TABLET ORAL ONCE
Status: COMPLETED | OUTPATIENT
Start: 2017-09-15 | End: 2017-09-15

## 2017-09-15 RX ORDER — HYDROCODONE BITARTRATE AND ACETAMINOPHEN 5; 325 MG/1; MG/1
1 TABLET ORAL EVERY 4 HOURS PRN
Status: DISCONTINUED | OUTPATIENT
Start: 2017-09-15 | End: 2017-09-16 | Stop reason: HOSPADM

## 2017-09-15 RX ORDER — ASPIRIN 81 MG/1
81 TABLET ORAL DAILY
Status: DISCONTINUED | OUTPATIENT
Start: 2017-09-16 | End: 2017-09-16 | Stop reason: HOSPADM

## 2017-09-15 RX ORDER — DIPHENHYDRAMINE HCL 50 MG
50 CAPSULE ORAL ONCE
Status: DISCONTINUED | OUTPATIENT
Start: 2017-09-15 | End: 2017-09-15 | Stop reason: HOSPADM

## 2017-09-15 RX ORDER — TAMSULOSIN HYDROCHLORIDE 0.4 MG/1
0.4 CAPSULE ORAL DAILY
Status: DISCONTINUED | OUTPATIENT
Start: 2017-09-15 | End: 2017-09-16 | Stop reason: HOSPADM

## 2017-09-15 RX ORDER — TAMSULOSIN HYDROCHLORIDE 0.4 MG/1
0.4 CAPSULE ORAL DAILY
Status: CANCELLED | OUTPATIENT
Start: 2017-09-16

## 2017-09-15 RX ORDER — ATORVASTATIN CALCIUM 40 MG/1
80 TABLET, FILM COATED ORAL DAILY
Status: CANCELLED | OUTPATIENT
Start: 2017-09-16

## 2017-09-15 RX ORDER — METOPROLOL SUCCINATE 25 MG/1
25 TABLET, EXTENDED RELEASE ORAL DAILY
Status: CANCELLED | OUTPATIENT
Start: 2017-09-16

## 2017-09-15 RX ORDER — ACETAMINOPHEN 325 MG/1
650 TABLET ORAL EVERY 6 HOURS PRN
Status: DISCONTINUED | OUTPATIENT
Start: 2017-09-15 | End: 2017-09-16 | Stop reason: HOSPADM

## 2017-09-15 RX ORDER — CLOPIDOGREL BISULFATE 75 MG/1
75 TABLET ORAL DAILY
Status: CANCELLED | OUTPATIENT
Start: 2017-09-16

## 2017-09-15 RX ORDER — HYDROCODONE BITARTRATE AND ACETAMINOPHEN 5; 325 MG/1; MG/1
1 TABLET ORAL EVERY 4 HOURS PRN
Status: CANCELLED | OUTPATIENT
Start: 2017-09-15

## 2017-09-15 RX ORDER — CLOPIDOGREL BISULFATE 75 MG/1
300 TABLET ORAL ONCE
Status: DISCONTINUED | OUTPATIENT
Start: 2017-09-15 | End: 2017-09-15

## 2017-09-15 RX ADMIN — SODIUM CHLORIDE: 0.9 INJECTION, SOLUTION INTRAVENOUS at 10:09

## 2017-09-15 RX ADMIN — TAMSULOSIN HYDROCHLORIDE 0.4 MG: 0.4 CAPSULE ORAL at 11:09

## 2017-09-15 RX ADMIN — SODIUM CHLORIDE 125 ML/HR: 0.9 INJECTION, SOLUTION INTRAVENOUS at 08:09

## 2017-09-15 RX ADMIN — CLOPIDOGREL BISULFATE 300 MG: 75 TABLET ORAL at 11:09

## 2017-09-15 NOTE — H&P
Subjective:   Patient ID:  Chau Rodarte is a 67 y.o. male who presents for evaluation and treatment of Peripheral Arterial Disease; non healing ulcer of R foot; and possible revascularization        HPI:         He is here with his wife by recommendation of Dr. Bland (podiatry) for possible revascularization of right leg for non healing wounds that started in 4/2017. He has a history of PAD. S/p L pSFA PTAS with 8.0 x 40 mm Lifestent post dilated with 7 mm balloon (5/2012), S/p R fem-pop 2012 at [facility] with 6 mm PTFE graft (Occluded on US and Angio in 2017), and S/p L fem-bk POP bypass-GSV 6/2017. He has CAD s/p multivessel PCI, HTN, HLP, DM, ICM with EF 45%, PAF with embolic CVA 2006, s/p CTI RFA 2/2017, and ILR for afib surveillance. He is on aspirin and plavix for DAPT. He is taking clindamycin and cipro for abx        Peripheral angiograms from 5/2012, 5/2017, and 7/2017 were reviewed        R SFA  with poorly visualized POP and infrapopliteal vessels  L SFA  with 2 vessel run off via AT and PER                      Care team:     Baldo-pop  Smith-vas sx  Dvorin-pcp  Josh/Rich-card                 Patient Active Problem List     Diagnosis Date Noted    Diabetic ulcer of heel associated with type 2 diabetes mellitus      PAD (peripheral artery disease)               S/p L pSFA PTAS with 8.0 x 40 mm Lifestent               Post dilated with 7 mm balloon     S/p R fem-pop 2012 at [facility]              6 mm PTFE graft              Occluded on US and Angio in 2017                   S/p L fem-bk POP bypass-GSV 6/2017                     Ulcer of heel due to diabetes mellitus 06/23/2017    BPH (benign prostatic hypertrophy) 06/23/2017    Debility 06/19/2017    Preop examination      Atherosclerosis of native arteries of the extremities with ulceration 04/26/2017    Lower GI bleed 03/20/2017    Type 2 diabetes mellitus with stage 4 chronic kidney disease GFR 15-29 03/20/2017    Acute blood loss  anemia 2017    Long term (current) use of anticoagulants [Z79.01] 2017    Typical atrial flutter 2017    Cryptogenic stroke, remote history () 2017    Coronary artery disease involving native coronary artery of native heart without angina pectoris 2016    Ischemic cardiomyopathy 2016    CORRY (acute kidney injury) 2016    Cardiomegaly 2016    Type 2 diabetes mellitus with diabetic polyneuropathy, with long-term current use of insulin 2016    Chronic systolic heart failure 2016    Obesity (BMI 30-39.9) 2016    NSTEMI (non-ST elevated myocardial infarction) 2016    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, NYHA class 2 and CKD stage 3 2016    Essential hypertension 2014    Skin ulcers of both feet 2013    HLD (hyperlipidemia) 10/24/2013    S/P femoral-popliteal bypass surgery 10/24/2013             R fem-pop in         L fem-pop in                         Right Arm BP - Sittin/66  Left Arm BP - Sittin/66           LABS     LAST HbA1c  Lab Results   Component Value Date     HGBA1C 6.1 (H) 2017         Lipid panel        Lab Results   Component Value Date     CHOL 110 (L) 2017     CHOL 113 (L) 2016     CHOL 122 (L) 10/21/2016            Lab Results   Component Value Date     HDL 25 (L) 2017     HDL 31 (L) 2016     HDL 32 (L) 10/21/2016            Lab Results   Component Value Date     LDLCALC 72.2 2017     LDLCALC 72.0 2016     LDLCALC 76 10/21/2016            Lab Results   Component Value Date     TRIG 64 2017     TRIG 50 2016     TRIG 71 10/21/2016            Lab Results   Component Value Date     CHOLHDL 22.7 2017     CHOLHDL 27.4 2016     CHOLHDL 3.8 10/21/2016               Review of Systems   Constitution: Negative for diaphoresis, weakness, night sweats, weight gain and weight loss.   HENT: Negative for  congestion.    Eyes: Negative for blurred vision, discharge and double vision.   Cardiovascular: Negative for chest pain, claudication, cyanosis, dyspnea on exertion, irregular heartbeat, leg swelling, near-syncope, orthopnea, palpitations, paroxysmal nocturnal dyspnea and syncope.   Respiratory: Negative for cough, shortness of breath and wheezing.    Endocrine: Negative for cold intolerance, heat intolerance and polyphagia.   Hematologic/Lymphatic: Negative for adenopathy and bleeding problem. Does not bruise/bleed easily.   Skin: Positive for poor wound healing. Negative for dry skin and nail changes.   Musculoskeletal: Negative for arthritis, back pain, falls, joint pain, myalgias and neck pain.   Gastrointestinal: Negative for bloating, abdominal pain, change in bowel habit and constipation.   Genitourinary: Negative for bladder incontinence, dysuria, flank pain, genital sores and missed menses.   Neurological: Negative for aphonia, brief paralysis, difficulty with concentration and dizziness.   Psychiatric/Behavioral: Negative for altered mental status and memory loss. The patient does not have insomnia.    Allergic/Immunologic: Negative for environmental allergies.         Objective:   Physical Exam   Constitutional: He is oriented to person, place, and time. He appears well-developed and well-nourished. He is not intubated.   HENT:   Head: Normocephalic and atraumatic.   Right Ear: External ear normal.   Left Ear: External ear normal.   Mouth/Throat: Oropharynx is clear and moist.   Eyes: Conjunctivae and EOM are normal. Pupils are equal, round, and reactive to light. Right eye exhibits no discharge. Left eye exhibits no discharge. No scleral icterus.   Neck: Normal range of motion. Neck supple. Normal carotid pulses, no hepatojugular reflux and no JVD present. Carotid bruit is not present. No tracheal deviation present. No thyromegaly present.   Cardiovascular: Normal rate, regular rhythm, S1 normal and S2  normal.   No extrasystoles are present. PMI is not displaced.  Exam reveals no gallop, no S3, no distant heart sounds, no friction rub and no midsystolic click.    Murmur heard.   Systolic murmur is present with a grade of 2/6  at the upper right sternal border  Pulses:       Carotid pulses are 2+ on the right side, and 2+ on the left side.       Radial pulses are 2+ on the right side, and 2+ on the left side.        Femoral pulses are 2+ on the right side, and 2+ on the left side.       Popliteal pulses are 0 on the right side, and 2+ on the left side.        Dorsalis pedis pulses are 0 on the right side. Left dorsalis pedis pulse not accessible.        Posterior tibial pulses are 0 on the right side. Left posterior tibial pulse not accessible.       Unable to assess L foot pedal pulses-L leg is wrapped      Faint to monophasic R DP without a doppler PT signal      Monophasic R POP doppler signal      Biphasic L POP doppler signal   Pulmonary/Chest: Effort normal and breath sounds normal. No accessory muscle usage or stridor. No apnea, no tachypnea and no bradypnea. He is not intubated. No respiratory distress. He has no decreased breath sounds. He has no wheezes. He has no rales. He exhibits no tenderness and no bony tenderness.   Abdominal: He exhibits no distension, no pulsatile liver, no abdominal bruit, no ascites, no pulsatile midline mass and no mass. There is no tenderness. There is no rebound and no guarding.   Musculoskeletal: Normal range of motion. He exhibits no edema or tenderness.   Lymphadenopathy:     He has no cervical adenopathy.   Neurological: He is alert and oriented to person, place, and time. He has normal reflexes. No cranial nerve deficit. Coordination normal.   Skin: Skin is warm. No rash noted. No erythema. No pallor.       L leg wrapped from foot to the knee        R great wound with necrosis  R first metatarsal plantar surface wound with fat exposure  R heel wound with fat  exposure      See images         Psychiatric: He has a normal mood and affect. His behavior is normal. Judgment and thought content normal.      8/28/2017                       Assessment:      1. Diabetic ulcer of heel associated with type 2 diabetes mellitus, with fat layer exposed, unspecified laterality    2. PAD (peripheral artery disease)    3. S/P femoral-popliteal bypass surgery    4. Atherosclerosis of native arteries of the extremities with ulceration    5. Coronary artery disease involving native coronary artery of native heart without angina pectoris    6. Chronic systolic heart failure    7. 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, NYHA class 2 and CKD stage 3          Plan:            Diagnostic angiogram              R radial access with 4-5 slender sheath              150 cm Navicross              Minimal contrast use in view of CKD                               After diagnostic angiogram we will determine his anatomy and revascularization strategy           Hold lasix and lisinopril 24 hours prior to case  Aggressive hydration prior to peripheral angiogram              Continue with current medical plan and lifestyle changes.  Return sooner for concerns or questions. If symptoms persist go to the ED  I have reviewed all pertinent data on this patient         I have reviewed the patient's medical history in detail and updated the computerized patient record.           Orders Placed This Encounter   Procedures    Basic metabolic panel       Standing Status:   Future       Number of Occurrences:   1       Standing Expiration Date:   10/27/2018    Case Request-Cath Lab: AORTOGRAM WITH RUNOFF       Standing Status:   Standing       Number of Occurrences:   1       Order Specific Question:   CPT Code:       Answer:   MO  ANGIO AORTOBIFEMORAL W CATH [95251]         Follow up as scheduled. Return sooner for concerns or questions                 He expressed verbal understanding and  agreed with the plan                Patient's Medications   New Prescriptions     No medications on file   Previous Medications     ASPIRIN 81 MG CHEW    Take 1 tablet (81 mg total) by mouth once daily.     ATORVASTATIN (LIPITOR) 80 MG TABLET    Take 1 tablet (80 mg total) by mouth once daily.     CHOLECALCIFEROL, VITAMIN D3, (VITAMIN D3) 5,000 UNIT TAB    Take 5,000 Units by mouth once daily.     CIPROFLOXACIN HCL (CIPRO) 500 MG TABLET    Take 1 tablet (500 mg total) by mouth 2 (two) times daily.     CLINDAMYCIN (CLEOCIN) 300 MG CAPSULE    Take 1 capsule (300 mg total) by mouth 3 (three) times daily.     CLOPIDOGREL (PLAVIX) 75 MG TABLET    Take 1 tablet (75 mg total) by mouth once daily.     COLLAGENASE (SANTYL) OINTMENT    Apply topically once daily.     COLLAGENASE (SANTYL) OINTMENT    Apply topically once daily. Apply locally daily with xeroform to the leg and groin wound.     DICLOFENAC SODIUM 1 % GEL    Apply 2 g topically 3 (three) times daily.     FERROUS SULFATE 325 (65 FE) MG EC TABLET    Take 1 tablet (325 mg total) by mouth once daily.     FUROSEMIDE (LASIX) 40 MG TABLET    Take 1 tablet (40 mg total) by mouth once daily.     GABAPENTIN (NEURONTIN) 300 MG CAPSULE    Take 300 mg by mouth 2 (two) times daily.     HYDROCODONE-ACETAMINOPHEN 5-325MG (NORCO) 5-325 MG PER TABLET    Take 1 tablet by mouth every 4 (four) hours as needed.     INSULIN DETEMIR (LEVEMIR FLEXTOUCH) 100 UNIT/ML (3 ML) SUBQ INPN PEN    Inject 16 Units into the skin every evening.     ISOSORBIDE DINITRATE (ISORDIL) 20 MG TABLET    Take 1 tablet (20 mg total) by mouth 3 (three) times daily.     LIRAGLUTIDE 0.6 MG/0.1 ML, 18 MG/3 ML, SUBQ PNIJ (VICTOZA 3-SAGAR) 0.6 MG/0.1 ML (18 MG/3 ML) PNIJ    Inject 1.2 mg into the skin once daily.     METOPROLOL SUCCINATE (TOPROL-XL) 25 MG 24 HR TABLET    Take 1 tablet (25 mg total) by mouth once daily.     PANTOPRAZOLE (PROTONIX) 20 MG TABLET    Take 2 tablets (40 mg total) by mouth once daily.     PEN  "NEEDLE, DIABETIC (BD ULTRA-FINE HANG PEN NEEDLES) 32 GAUGE X 5/32" NDLE    Uses 2 times daily, on  insulin injections     SENNA-DOCUSATE 8.6-50 MG (PERICOLACE) 8.6-50 MG PER TABLET    Take 1 tablet by mouth once daily.     TAMSULOSIN (FLOMAX) 0.4 MG CP24    Take 1 capsule (0.4 mg total) by mouth once daily.   Modified Medications     No medications on file   Discontinued Medications     No medications on file            Diagnostic angiogram 9/7/2017      B. Summary/Post-Operative Diagnosis       S/p selective right leg angiogram.    Patent right CFA and profunda.    Righ SFA and POP  with infrapopliteal reconstitution.    AT and PER reconstitution from profunda collaterals.    DP is the main vessel providing flow to plantar arch.          G. Recommendations     1. ASA 81mg.  2. Clopidogrel (Plavix) for one year.  3. Statin therapy.  4. Return for rigth SFA and POP  intervention  5. Access via crossover left CFA 7 fr and retrograde right AT            Risks, benefits and alternatives of peripheral catheterization and possible intervention were discussed with the patient. All questions were answered and informed consent obtained.     I discussed the importance of compliance with dual antiplatelet therapy with the patient to prevent acute or late stent thrombosis with premature discontinuation of the therapy.    "

## 2017-09-16 VITALS
HEART RATE: 82 BPM | RESPIRATION RATE: 20 BRPM | TEMPERATURE: 99 F | DIASTOLIC BLOOD PRESSURE: 57 MMHG | SYSTOLIC BLOOD PRESSURE: 121 MMHG | BODY MASS INDEX: 33.33 KG/M2 | HEIGHT: 69 IN | OXYGEN SATURATION: 98 % | WEIGHT: 225 LBS

## 2017-09-16 LAB
POCT GLUCOSE: 185 MG/DL (ref 70–110)
POCT GLUCOSE: 90 MG/DL (ref 70–110)

## 2017-09-16 PROCEDURE — 94660 CPAP INITIATION&MGMT: CPT

## 2017-09-16 PROCEDURE — 25000003 PHARM REV CODE 250: Performed by: INTERNAL MEDICINE

## 2017-09-16 PROCEDURE — 94761 N-INVAS EAR/PLS OXIMETRY MLT: CPT

## 2017-09-16 RX ADMIN — SODIUM CHLORIDE 125 ML/HR: 0.9 INJECTION, SOLUTION INTRAVENOUS at 01:09

## 2017-09-16 RX ADMIN — ASPIRIN 81 MG: 81 TABLET, COATED ORAL at 09:09

## 2017-09-16 RX ADMIN — TAMSULOSIN HYDROCHLORIDE 0.4 MG: 0.4 CAPSULE ORAL at 09:09

## 2017-09-16 RX ADMIN — SODIUM CHLORIDE 125 ML/HR: 0.9 INJECTION, SOLUTION INTRAVENOUS at 09:09

## 2017-09-16 NOTE — PLAN OF CARE
Problem: Patient Care Overview  Goal: Plan of Care Review  Outcome: Ongoing (interventions implemented as appropriate)  Pt on RA with sats of 100. Will continue to monitor.

## 2017-09-16 NOTE — PLAN OF CARE
Discharge orders noted, no HH or HME ordered.    Future Appointments  Date Time Provider Department Center   9/19/2017 3:00 PM Quirino Bland DPM [facility] WOUND [facility]   9/26/2017 11:20 AM Noble Mitchell MD [facility] NEPHRO [facility]          09/16/17 1511   Final Note   Assessment Type Final Discharge Note   Discharge Disposition Home   What phone number can be called within the next 1-3 days to see how you are doing after discharge? [phone]   Hospital Follow Up  Appt(s) scheduled? No  (offices closed, [place] to follow up)   Right Care Referral Info   Post Acute Recommendation No Care     Amina Francis, RN Transitional Navigator  [phone]

## 2017-09-16 NOTE — PLAN OF CARE
Problem: Patient Care Overview  Goal: Plan of Care Review  Outcome: Outcome(s) achieved Date Met: 09/16/17  Pt discharged, pulses present rt foot, foot warm, wound care done, to see wound care on Monday, no pain

## 2017-09-16 NOTE — PLAN OF CARE
Pt is discharged, per Dr Raines, wound care to areas on rt foot, per instructions of patient, adapted to our supplies, foot wrapped in kerlix wrap, foot shields placed on both feet

## 2017-09-16 NOTE — PROGRESS NOTES
ATC testing performed noted, testing in progress result, = 180. Chrissie the Cardiology nurse called and testing results given to her. She said she will call out Isabel to come and pull the sheath.  2140 MD Deluca called and informed on the testing. He was informed that Isabel is coming to pull the sheath. MD also informed on patient bladder was scanned and noted >670cc of urine is in his bladder and a Saucedo was suggested. New orders given and removal at 0600 on 9/16/17

## 2017-09-16 NOTE — PLAN OF CARE
Pt discharged via wheelchair with wife , pt has all belongings, no co of pain, discharge instructions given to him and wife, understood, will start back on insulin this evening

## 2017-09-16 NOTE — PROGRESS NOTES
Pressure released, noted no bleeding noted, close observation ongoing B/P remain good  Without any complications.

## 2017-09-16 NOTE — DISCHARGE SUMMARY
[facility]  Cardiology  Discharge Summary      Patient Name: Chau Rodarte  MRN: [id number]  Admission Date: 9/15/2017  Hospital Length of Stay: 0 days  Discharge Date and Time:  09/16/2017 2:47 PM  Attending Physician: Bonifacio Epps MD  Discharging Provider: Antony Raines MD  Primary Care Physician: Riki Huynh MD    HPI: PTA of RSFA/Pop/CHRISTEL successful.  Recovered uneventfully.  Discharged home.      Procedure(s) (LRB):  CHRONIC TOTAL OCCULUSION REPAIR-PERIPHRAL (N/A)     Indwelling Lines/Drains at time of discharge:  Lines/Drains/Airways          No matching active lines, drains, or airways          Hospital Course (synopsis of major diagnoses, care, treatment, and services provided during the course of the hospital stay): Successful PTA 9/15/17.  Labs:   BMP:   Recent Labs  Lab 09/15/17  1001   *      K 4.0      CO2 30*   BUN 28*   CREATININE 2.1*   CALCIUM 8.8   , CMP   Recent Labs  Lab 09/15/17  1001      K 4.0      CO2 30*   *   BUN 28*   CREATININE 2.1*   CALCIUM 8.8   ANIONGAP 8   ESTGFRAFRICA 36*   EGFRNONAA 31*    and CBC   Recent Labs  Lab 09/15/17  1001   WBC 9.22   HGB 10.1*   HCT 32.3*        Consults: Podiatry    Significant Diagnostic Studies:     Pending Diagnostic Studies:     None          Final Active Diagnoses:    Diagnosis Date Noted POA    Critical lower limb ischemia [I99.8] 09/15/2017 Yes      Problems Resolved During this Admission:    Diagnosis Date Noted Date Resolved POA       Discharged Condition: good    Follow Up:  Follow-up Information     Bonifacio Epps MD In 2 weeks.    Specialties:  Cardiology, INTERVENTIONAL CARDIOLOGY  Contact information:  [address] [place] [address]  [address]  [facility] [address] [phone]                 Patient Instructions:   No discharge procedures on file.  Medications:  Reconciled Home Medications:   Current Discharge Medication List      CONTINUE these medications which have NOT CHANGED     "Details   aspirin 81 MG Chew Take 1 tablet (81 mg total) by mouth once daily.  Refills: 0      atorvastatin (LIPITOR) 80 MG tablet Take 1 tablet (80 mg total) by mouth once daily.  Qty: 90 tablet, Refills: 3      cholecalciferol, vitamin D3, (VITAMIN D3) 5,000 unit Tab Take 5,000 Units by mouth once daily.      clopidogrel (PLAVIX) 75 mg tablet Take 1 tablet (75 mg total) by mouth once daily.  Qty: 90 tablet, Refills: 3      collagenase (SANTYL) ointment Apply topically once daily.  Qty: 30 g, Refills: 0      ferrous sulfate 325 (65 FE) MG EC tablet Take 1 tablet (325 mg total) by mouth once daily.  Refills: 0      furosemide (LASIX) 40 MG tablet Take 1 tablet (40 mg total) by mouth once daily.  Qty: 30 tablet, Refills: 11      gabapentin (NEURONTIN) 300 MG capsule Take 300 mg by mouth 2 (two) times daily.  Refills: 0    Associated Diagnoses: Arterial embolism and thrombosis of lower extremity      hydrocodone-acetaminophen 5-325mg (NORCO) 5-325 mg per tablet Take 1 tablet by mouth every 4 (four) hours as needed.  Qty: 25 tablet, Refills: 0      insulin detemir (LEVEMIR FLEXTOUCH) 100 unit/mL (3 mL) SubQ InPn pen Inject 16 Units into the skin every evening.  Qty: 1 Box, Refills: 3    Associated Diagnoses: Type 2 diabetes mellitus with diabetic polyneuropathy, with long-term current use of insulin      isosorbide dinitrate (ISORDIL) 20 MG tablet Take 1 tablet (20 mg total) by mouth 3 (three) times daily.  Qty: 90 tablet, Refills: 1      metoprolol succinate (TOPROL-XL) 25 MG 24 hr tablet Take 1 tablet (25 mg total) by mouth once daily.  Qty: 30 tablet, Refills: 1      pantoprazole (PROTONIX) 20 MG tablet Take 2 tablets (40 mg total) by mouth once daily.  Qty: 30 tablet, Refills: 11    Associated Diagnoses: Typical atrial flutter      pen needle, diabetic (BD ULTRA-FINE HANG PEN NEEDLES) 32 gauge x 5/32" Ndle Uses 2 times daily, on  insulin injections  Qty: 180 each, Refills: 4      senna-docusate 8.6-50 mg " (PERICOLACE) 8.6-50 mg per tablet Take 1 tablet by mouth once daily.  Qty: 60 tablet, Refills: 0      tamsulosin (FLOMAX) 0.4 mg Cp24 Take 1 capsule (0.4 mg total) by mouth once daily.  Qty: 30 capsule, Refills: 0      ciprofloxacin HCl (CIPRO) 500 MG tablet Take 1 tablet (500 mg total) by mouth 2 (two) times daily.  Qty: 20 tablet, Refills: 1      clindamycin (CLEOCIN) 300 MG capsule Take 1 capsule (300 mg total) by mouth 3 (three) times daily.  Qty: 30 capsule, Refills: 1      diclofenac sodium 1 % Gel Apply 2 g topically 3 (three) times daily.  Qty: 100 g, Refills: 1      liraglutide 0.6 mg/0.1 mL, 18 mg/3 mL, subq PNIJ (VICTOZA 3-SAGAR) 0.6 mg/0.1 mL (18 mg/3 mL) PnIj Inject 1.2 mg into the skin once daily.  Qty: 9 Syringe, Refills: 2    Associated Diagnoses: Type 2 diabetes mellitus without complication, with long-term current use of insulin             Time spent on the discharge of patient: 15 minutes    Antony Raines MD  Cardiology  [facility]

## 2017-09-16 NOTE — PLAN OF CARE
Problem: Fall Risk (Adult)  Goal: Identify Related Risk Factors and Signs and Symptoms  Related risk factors and signs and symptoms are identified upon initiation of Human Response Clinical Practice Guideline (CPG)   Patient will remain free of falls, bed alarm on and in progress.

## 2017-09-16 NOTE — PLAN OF CARE
No co of pain, dressing left groin with old drainage, no increase , rt foot and toes warm, pedal and post tibial pulses  Weakly palpated but strong with doppler, diabetic ulcers rt great toe, post upper foot and heel, being treated for months now, left calf to toes wrapped in compression wrap and netting, left toes cool to touch, toes misshapen  And discolored, as are on rt, unable to assess pulses in foot but femoral and popliteal pulses present. Rt foot elevated off bed

## 2017-09-16 NOTE — PROGRESS NOTES
S/p complex R leg intervention for CLI            Dual accesses L CFA and R retrograde AT        Reverse CART with 5.0 x 100 mm and retrograde Astato 30 wire  IVUS guidance for sizing of stents      PTAS of proximal and ostial AT with 3.0 x 38 and 4.0 x 38 mm Resolute SOULEYMANE  PTAS of popliteal with 5.5 x 100 mm Supera stent  PTAS of SFA    Distal with 6.0 x 150 Supera   Mid with 7 x 100 Zilver,   Prox with 80 x 100 mm Zilver   Ostial with 8 x 40 mm Zilver       He tolerated the procedure well          Plan        Aspirin  Plavix  Statin   Acei  Wound care        Surveillance ultrasound in 7 days then 1,3.6, and 12 months  Yearly thereafter if stable

## 2017-09-17 ENCOUNTER — TELEPHONE (OUTPATIENT)
Dept: CARDIOLOGY | Facility: CLINIC | Age: 67
End: 2017-09-17

## 2017-09-17 DIAGNOSIS — N18.9 CHRONIC KIDNEY DISEASE, UNSPECIFIED CKD STAGE: Primary | ICD-10-CM

## 2017-09-18 ENCOUNTER — CLINICAL SUPPORT (OUTPATIENT)
Dept: ELECTROPHYSIOLOGY | Facility: CLINIC | Age: 67
End: 2017-09-18
Payer: MEDICARE

## 2017-09-18 DIAGNOSIS — Z95.818 STATUS POST PLACEMENT OF IMPLANTABLE LOOP RECORDER: ICD-10-CM

## 2017-09-18 DIAGNOSIS — I63.9 CRYPTOGENIC STROKE: ICD-10-CM

## 2017-09-18 NOTE — TELEPHONE ENCOUNTER
Please call him        Tell him to hold lasix for the next 3 days        He needs to drink plenty of water over the next few days to assure his kidney function remains stable after the procedure        He will have a BMP on Friday        Thanks      ZN

## 2017-09-19 ENCOUNTER — HOSPITAL ENCOUNTER (OUTPATIENT)
Dept: WOUND CARE | Facility: HOSPITAL | Age: 67
Discharge: HOME | End: 2017-09-19
Attending: PODIATRIST
Payer: MEDICARE

## 2017-09-19 VITALS
WEIGHT: 225 LBS | TEMPERATURE: 98 F | DIASTOLIC BLOOD PRESSURE: 59 MMHG | SYSTOLIC BLOOD PRESSURE: 123 MMHG | HEIGHT: 69 IN | BODY MASS INDEX: 33.33 KG/M2 | HEART RATE: 73 BPM

## 2017-09-19 DIAGNOSIS — T81.89XD NONHEALING SURGICAL WOUND, SUBSEQUENT ENCOUNTER: ICD-10-CM

## 2017-09-19 DIAGNOSIS — I96 GANGRENE OF TOE OF RIGHT FOOT: ICD-10-CM

## 2017-09-19 DIAGNOSIS — L97.519 DIABETIC ULCER OF OTHER PART OF RIGHT FOOT ASSOCIATED WITH TYPE 2 DIABETES MELLITUS, UNSPECIFIED ULCER STAGE: Primary | ICD-10-CM

## 2017-09-19 DIAGNOSIS — E11.51 TYPE 2 DIABETES MELLITUS WITH PERIPHERAL CIRCULATORY DISORDER: ICD-10-CM

## 2017-09-19 DIAGNOSIS — L89.610 DECUBITUS ULCER, HEEL, RIGHT, UNSTAGEABLE: ICD-10-CM

## 2017-09-19 DIAGNOSIS — E11.621 DIABETIC ULCER OF OTHER PART OF RIGHT FOOT ASSOCIATED WITH TYPE 2 DIABETES MELLITUS, UNSPECIFIED ULCER STAGE: Primary | ICD-10-CM

## 2017-09-19 PROCEDURE — 11042 DBRDMT SUBQ TIS 1ST 20SQCM/<: CPT

## 2017-09-19 PROCEDURE — 27201912 HC WOUND CARE DEBRIDEMENT SUPPLIES

## 2017-09-19 PROCEDURE — 11042 PR DEBRIDEMENT, SKIN, SUB-Q TISSUE,=<20 SQ CM: ICD-10-PCS | Mod: ,,, | Performed by: PODIATRIST

## 2017-09-19 PROCEDURE — 11042 DBRDMT SUBQ TIS 1ST 20SQCM/<: CPT | Mod: ,,, | Performed by: PODIATRIST

## 2017-09-19 PROCEDURE — 29581 APPL MULTLAYER CMPRN SYS LEG: CPT

## 2017-09-19 RX ORDER — LIDOCAINE HYDROCHLORIDE 10 MG/ML
20 INJECTION INFILTRATION; PERINEURAL ONCE
Status: DISCONTINUED | OUTPATIENT
Start: 2017-09-19 | End: 2017-10-24

## 2017-09-20 ENCOUNTER — TELEPHONE (OUTPATIENT)
Dept: PODIATRY | Facility: CLINIC | Age: 67
End: 2017-09-20

## 2017-09-20 ENCOUNTER — TELEPHONE (OUTPATIENT)
Dept: INTERNAL MEDICINE | Facility: CLINIC | Age: 67
End: 2017-09-20

## 2017-09-20 LAB
POC ACTIVATED CLOTTING TIME K: 180 SEC (ref 74–137)
POC ACTIVATED CLOTTING TIME K: 186 SEC (ref 74–137)
POC ACTIVATED CLOTTING TIME K: 197 SEC (ref 74–137)
POC ACTIVATED CLOTTING TIME K: 208 SEC (ref 74–137)
POC ACTIVATED CLOTTING TIME K: 208 SEC (ref 74–137)
POC ACTIVATED CLOTTING TIME K: 213 SEC (ref 74–137)
POC ACTIVATED CLOTTING TIME K: 219 SEC (ref 74–137)
POC ACTIVATED CLOTTING TIME K: 224 SEC (ref 74–137)
POC ACTIVATED CLOTTING TIME K: 235 SEC (ref 74–137)
POC ACTIVATED CLOTTING TIME K: 241 SEC (ref 74–137)
SAMPLE: ABNORMAL

## 2017-09-20 NOTE — TELEPHONE ENCOUNTER
Spoke with Sara with [facility] the Rx was prescribed by pt's Podiatrist and she will contact that office for more information.

## 2017-09-20 NOTE — TELEPHONE ENCOUNTER
----- Message from Jones Kenney sent at 9/20/2017  9:33 AM CDT -----  Contact: Sara from [facility]/ [phone]  Sara called in with questions about patient's medication shown below due to his renal function issue.    diclofenac sodium 1 % Gel    Please call and advise.

## 2017-09-20 NOTE — TELEPHONE ENCOUNTER
----- Message from Omega Anderson sent at 9/20/2017  9:15 AM CDT -----  Contact: [facility] at [phone]  Sara requesting a call back to receive more information on pt's script for diclofenac sodium 1 % Gel.

## 2017-09-22 ENCOUNTER — LAB VISIT (OUTPATIENT)
Dept: LAB | Facility: HOSPITAL | Age: 67
End: 2017-09-22
Attending: INTERNAL MEDICINE
Payer: MEDICARE

## 2017-09-22 ENCOUNTER — TELEPHONE (OUTPATIENT)
Dept: INTERNAL MEDICINE | Facility: CLINIC | Age: 67
End: 2017-09-22

## 2017-09-22 DIAGNOSIS — N18.9 CHRONIC KIDNEY DISEASE, UNSPECIFIED CKD STAGE: ICD-10-CM

## 2017-09-22 LAB
ANION GAP SERPL CALC-SCNC: 8 MMOL/L
BUN SERPL-MCNC: 22 MG/DL
CALCIUM SERPL-MCNC: 8.6 MG/DL
CHLORIDE SERPL-SCNC: 105 MMOL/L
CO2 SERPL-SCNC: 27 MMOL/L
CREAT SERPL-MCNC: 2 MG/DL
EST. GFR  (AFRICAN AMERICAN): 39 ML/MIN/1.73 M^2
EST. GFR  (NON AFRICAN AMERICAN): 33 ML/MIN/1.73 M^2
GLUCOSE SERPL-MCNC: 94 MG/DL
POTASSIUM SERPL-SCNC: 4.4 MMOL/L
SODIUM SERPL-SCNC: 140 MMOL/L

## 2017-09-22 PROCEDURE — 80048 BASIC METABOLIC PNL TOTAL CA: CPT

## 2017-09-22 PROCEDURE — 36415 COLL VENOUS BLD VENIPUNCTURE: CPT

## 2017-09-23 ENCOUNTER — TELEPHONE (OUTPATIENT)
Dept: ENDOSCOPY | Facility: HOSPITAL | Age: 67
End: 2017-09-23

## 2017-09-26 ENCOUNTER — HOSPITAL ENCOUNTER (OUTPATIENT)
Dept: WOUND CARE | Facility: HOSPITAL | Age: 67
Discharge: HOME | End: 2017-09-26
Attending: PODIATRIST
Payer: MEDICARE

## 2017-09-26 ENCOUNTER — OFFICE VISIT (OUTPATIENT)
Dept: NEPHROLOGY | Facility: CLINIC | Age: 67
End: 2017-09-26
Payer: MEDICARE

## 2017-09-26 ENCOUNTER — TELEPHONE (OUTPATIENT)
Dept: CARDIOLOGY | Facility: HOSPITAL | Age: 67
End: 2017-09-26

## 2017-09-26 VITALS
DIASTOLIC BLOOD PRESSURE: 60 MMHG | HEIGHT: 69 IN | SYSTOLIC BLOOD PRESSURE: 100 MMHG | BODY MASS INDEX: 34.12 KG/M2 | OXYGEN SATURATION: 98 % | WEIGHT: 230.38 LBS | HEART RATE: 72 BPM

## 2017-09-26 VITALS — TEMPERATURE: 97 F | SYSTOLIC BLOOD PRESSURE: 111 MMHG | HEART RATE: 75 BPM | DIASTOLIC BLOOD PRESSURE: 58 MMHG

## 2017-09-26 DIAGNOSIS — E11.51 TYPE 2 DIABETES MELLITUS WITH PERIPHERAL CIRCULATORY DISORDER: ICD-10-CM

## 2017-09-26 DIAGNOSIS — L89.610 DECUBITUS ULCER, HEEL, RIGHT, UNSTAGEABLE: ICD-10-CM

## 2017-09-26 DIAGNOSIS — E11.621 DIABETIC ULCER OF OTHER PART OF RIGHT FOOT ASSOCIATED WITH TYPE 2 DIABETES MELLITUS, UNSPECIFIED ULCER STAGE: Primary | ICD-10-CM

## 2017-09-26 DIAGNOSIS — E11.21 DIABETIC NEPHROPATHY ASSOCIATED WITH TYPE 2 DIABETES MELLITUS: ICD-10-CM

## 2017-09-26 DIAGNOSIS — I73.9 PAD (PERIPHERAL ARTERY DISEASE): ICD-10-CM

## 2017-09-26 DIAGNOSIS — M86.9 OSTEOMYELITIS OF TOE OF RIGHT FOOT: ICD-10-CM

## 2017-09-26 DIAGNOSIS — I73.9 PAD (PERIPHERAL ARTERY DISEASE): Primary | ICD-10-CM

## 2017-09-26 DIAGNOSIS — I50.22 CHRONIC SYSTOLIC HEART FAILURE: ICD-10-CM

## 2017-09-26 DIAGNOSIS — R80.9 PROTEINURIA, UNSPECIFIED TYPE: ICD-10-CM

## 2017-09-26 DIAGNOSIS — I25.10 CORONARY ARTERY DISEASE, ANGINA PRESENCE UNSPECIFIED, UNSPECIFIED VESSEL OR LESION TYPE, UNSPECIFIED WHETHER NATIVE OR TRANSPLANTED HEART: ICD-10-CM

## 2017-09-26 DIAGNOSIS — N18.30 CHRONIC KIDNEY DISEASE (CKD), STAGE III (MODERATE): Primary | ICD-10-CM

## 2017-09-26 DIAGNOSIS — L97.519 DIABETIC ULCER OF OTHER PART OF RIGHT FOOT ASSOCIATED WITH TYPE 2 DIABETES MELLITUS, UNSPECIFIED ULCER STAGE: Primary | ICD-10-CM

## 2017-09-26 DIAGNOSIS — I96 GANGRENE OF TOE OF RIGHT FOOT: ICD-10-CM

## 2017-09-26 PROCEDURE — 99214 OFFICE O/P EST MOD 30 MIN: CPT | Mod: S$GLB,,, | Performed by: INTERNAL MEDICINE

## 2017-09-26 PROCEDURE — 87186 SC STD MICRODIL/AGAR DIL: CPT

## 2017-09-26 PROCEDURE — 3008F BODY MASS INDEX DOCD: CPT | Mod: S$GLB,,, | Performed by: INTERNAL MEDICINE

## 2017-09-26 PROCEDURE — 11044 DBRDMT BONE 1ST 20 SQ CM/<: CPT

## 2017-09-26 PROCEDURE — 1159F MED LIST DOCD IN RCRD: CPT | Mod: S$GLB,,, | Performed by: INTERNAL MEDICINE

## 2017-09-26 PROCEDURE — 87070 CULTURE OTHR SPECIMN AEROBIC: CPT

## 2017-09-26 PROCEDURE — 87075 CULTR BACTERIA EXCEPT BLOOD: CPT

## 2017-09-26 PROCEDURE — 99999 PR PBB SHADOW E&M-EST. PATIENT-LVL IV: CPT | Mod: PBBFAC,,, | Performed by: INTERNAL MEDICINE

## 2017-09-26 PROCEDURE — 11042 DBRDMT SUBQ TIS 1ST 20SQCM/<: CPT

## 2017-09-26 PROCEDURE — 99499 UNLISTED E&M SERVICE: CPT | Mod: S$GLB,,, | Performed by: INTERNAL MEDICINE

## 2017-09-26 PROCEDURE — 11043 DBRDMT MUSC&/FSCA 1ST 20/<: CPT

## 2017-09-26 PROCEDURE — 27201912 HC WOUND CARE DEBRIDEMENT SUPPLIES

## 2017-09-26 PROCEDURE — 87077 CULTURE AEROBIC IDENTIFY: CPT

## 2017-09-26 PROCEDURE — 11044 PR DEBRIDEMENT, SKIN, SUB-Q TISSUE,MUSCLE,BONE,=<20 SQ CM: ICD-10-PCS | Mod: ,,, | Performed by: PODIATRIST

## 2017-09-26 PROCEDURE — 3078F DIAST BP <80 MM HG: CPT | Mod: S$GLB,,, | Performed by: INTERNAL MEDICINE

## 2017-09-26 PROCEDURE — 11044 DBRDMT BONE 1ST 20 SQ CM/<: CPT | Mod: ,,, | Performed by: PODIATRIST

## 2017-09-26 PROCEDURE — 1126F AMNT PAIN NOTED NONE PRSNT: CPT | Mod: S$GLB,,, | Performed by: INTERNAL MEDICINE

## 2017-09-26 PROCEDURE — 3074F SYST BP LT 130 MM HG: CPT | Mod: S$GLB,,, | Performed by: INTERNAL MEDICINE

## 2017-09-26 NOTE — PROGRESS NOTES
Your renal function remained stable after the procedure         We will obtain an arterial ultrasound prior to your follow up visit        Thanks          ZN

## 2017-09-26 NOTE — PROGRESS NOTES
Patient is here today for follow up evaluation of CKD IV. Last seen in renal office 6/12/17. Baseline Cr 2.5-2.8 mg/dl  His most recent lab (9/22/17) Cr 2.0 mg/dl; eGFR 33 ml/min; potassium; 4.4 mmol/L There is a hx  Of hypertension and diabetes; complicated by nephropathy an d neuropathy; last A1c; 6.1%.. He has PVD; CAD (s/p MI) and chronic systolic heart failure. Today he has no complaints    ROS;  Obese gentleman; no acute distress; oriented x 3  Mood and Affect;   Appropriate  Otherwise non-contributory  Exam  HEENT; Grossly intact;   CHEST; Clear P&A  HEART; RR; S1&S2 no murmur rub gallops  ABD; BS(+);non-tender; (-)CVAT  EXT; (-)Edema    Impression  CKD III Interval improvement in renal parameters  Diabetes At or below goal A1c;; asymptomatic  Hypertension Satisfactory control    Plan  Repeat labs 2 mo  Return Visit; 4 mo

## 2017-09-29 ENCOUNTER — TELEPHONE (OUTPATIENT)
Dept: PODIATRY | Facility: CLINIC | Age: 67
End: 2017-09-29

## 2017-09-29 ENCOUNTER — PATIENT MESSAGE (OUTPATIENT)
Dept: PODIATRY | Facility: CLINIC | Age: 67
End: 2017-09-29

## 2017-09-29 LAB
BACTERIA SPEC AEROBE CULT: NORMAL
BACTERIA SPEC AEROBE CULT: NORMAL

## 2017-09-29 RX ORDER — CIPROFLOXACIN 500 MG/1
500 TABLET ORAL 2 TIMES DAILY
Qty: 20 TABLET | Refills: 0 | Status: SHIPPED | OUTPATIENT
Start: 2017-09-29 | End: 2017-10-11

## 2017-10-02 LAB — BACTERIA SPEC ANAEROBE CULT: NORMAL

## 2017-10-02 RX ORDER — METOPROLOL SUCCINATE 25 MG/1
25 TABLET, EXTENDED RELEASE ORAL DAILY
Qty: 30 TABLET | Refills: 1 | OUTPATIENT
Start: 2017-10-02 | End: 2018-10-02

## 2017-10-03 ENCOUNTER — HOSPITAL ENCOUNTER (OUTPATIENT)
Dept: WOUND CARE | Facility: HOSPITAL | Age: 67
Discharge: HOME | End: 2017-10-03
Attending: PODIATRIST
Payer: MEDICARE

## 2017-10-03 VITALS
TEMPERATURE: 99 F | SYSTOLIC BLOOD PRESSURE: 146 MMHG | DIASTOLIC BLOOD PRESSURE: 78 MMHG | HEIGHT: 69 IN | BODY MASS INDEX: 34.07 KG/M2 | WEIGHT: 230 LBS | HEART RATE: 80 BPM

## 2017-10-03 DIAGNOSIS — M86.171 ACUTE OSTEOMYELITIS OF TOE, RIGHT: ICD-10-CM

## 2017-10-03 DIAGNOSIS — L89.610 DECUBITUS ULCER, HEEL, RIGHT, UNSTAGEABLE: ICD-10-CM

## 2017-10-03 DIAGNOSIS — E11.621 DIABETIC ULCER OF OTHER PART OF RIGHT FOOT ASSOCIATED WITH TYPE 2 DIABETES MELLITUS, UNSPECIFIED ULCER STAGE: Primary | ICD-10-CM

## 2017-10-03 DIAGNOSIS — L97.519 DIABETIC ULCER OF OTHER PART OF RIGHT FOOT ASSOCIATED WITH TYPE 2 DIABETES MELLITUS, UNSPECIFIED ULCER STAGE: Primary | ICD-10-CM

## 2017-10-03 DIAGNOSIS — L97.514 DIABETIC ULCER OF TOE OF RIGHT FOOT ASSOCIATED WITH TYPE 2 DIABETES MELLITUS, WITH NECROSIS OF BONE: ICD-10-CM

## 2017-10-03 DIAGNOSIS — E11.621 DIABETIC ULCER OF TOE OF RIGHT FOOT ASSOCIATED WITH TYPE 2 DIABETES MELLITUS, WITH NECROSIS OF BONE: ICD-10-CM

## 2017-10-03 PROCEDURE — 99214 OFFICE O/P EST MOD 30 MIN: CPT

## 2017-10-03 PROCEDURE — 11042 DBRDMT SUBQ TIS 1ST 20SQCM/<: CPT

## 2017-10-03 PROCEDURE — 99213 OFFICE O/P EST LOW 20 MIN: CPT | Mod: ,,, | Performed by: PODIATRIST

## 2017-10-03 PROCEDURE — 27201912 HC WOUND CARE DEBRIDEMENT SUPPLIES

## 2017-10-03 PROCEDURE — 99213 PR OFFICE/OUTPT VISIT, EST, LEVL III, 20-29 MIN: ICD-10-PCS | Mod: ,,, | Performed by: PODIATRIST

## 2017-10-03 RX ORDER — LIDOCAINE HYDROCHLORIDE 10 MG/ML
20 INJECTION INFILTRATION; PERINEURAL
Status: DISCONTINUED | OUTPATIENT
Start: 2017-10-03 | End: 2017-10-17

## 2017-10-09 ENCOUNTER — HOSPITAL ENCOUNTER (OUTPATIENT)
Dept: RADIOLOGY | Facility: HOSPITAL | Age: 67
Discharge: HOME | End: 2017-10-09
Attending: PODIATRIST
Payer: MEDICARE

## 2017-10-09 ENCOUNTER — HOSPITAL ENCOUNTER (OUTPATIENT)
Dept: RADIOLOGY | Facility: HOSPITAL | Age: 67
Discharge: HOME | End: 2017-10-09
Attending: INTERNAL MEDICINE
Payer: MEDICARE

## 2017-10-09 DIAGNOSIS — I96 GANGRENE OF TOE OF RIGHT FOOT: ICD-10-CM

## 2017-10-09 DIAGNOSIS — L97.519 DIABETIC ULCER OF OTHER PART OF RIGHT FOOT ASSOCIATED WITH TYPE 2 DIABETES MELLITUS, UNSPECIFIED ULCER STAGE: ICD-10-CM

## 2017-10-09 DIAGNOSIS — L89.610 DECUBITUS ULCER, HEEL, RIGHT, UNSTAGEABLE: ICD-10-CM

## 2017-10-09 DIAGNOSIS — E11.621 DIABETIC ULCER OF OTHER PART OF RIGHT FOOT ASSOCIATED WITH TYPE 2 DIABETES MELLITUS, UNSPECIFIED ULCER STAGE: ICD-10-CM

## 2017-10-09 DIAGNOSIS — M86.9 OSTEOMYELITIS OF TOE OF RIGHT FOOT: ICD-10-CM

## 2017-10-09 DIAGNOSIS — I73.9 PAD (PERIPHERAL ARTERY DISEASE): ICD-10-CM

## 2017-10-09 PROCEDURE — 73718 MRI LOWER EXTREMITY W/O DYE: CPT | Mod: 26,RT,, | Performed by: RADIOLOGY

## 2017-10-09 PROCEDURE — 93925 LOWER EXTREMITY STUDY: CPT | Mod: 26,,, | Performed by: RADIOLOGY

## 2017-10-09 PROCEDURE — 73718 MRI LOWER EXTREMITY W/O DYE: CPT | Mod: TC,RT

## 2017-10-09 PROCEDURE — 93925 LOWER EXTREMITY STUDY: CPT | Mod: TC

## 2017-10-10 ENCOUNTER — HOSPITAL ENCOUNTER (OUTPATIENT)
Dept: WOUND CARE | Facility: HOSPITAL | Age: 67
Discharge: HOME | End: 2017-10-10
Attending: PODIATRIST
Payer: MEDICARE

## 2017-10-10 VITALS
WEIGHT: 230 LBS | BODY MASS INDEX: 34.07 KG/M2 | SYSTOLIC BLOOD PRESSURE: 139 MMHG | DIASTOLIC BLOOD PRESSURE: 68 MMHG | TEMPERATURE: 98 F | HEIGHT: 69 IN | HEART RATE: 80 BPM

## 2017-10-10 DIAGNOSIS — E11.621 DIABETIC ULCER OF RIGHT MIDFOOT ASSOCIATED WITH TYPE 2 DIABETES MELLITUS, WITH MUSCLE INVOLVEMENT WITHOUT EVIDENCE OF NECROSIS: Primary | ICD-10-CM

## 2017-10-10 DIAGNOSIS — E11.621 DIABETIC ULCER OF RIGHT HEEL ASSOCIATED WITH TYPE 2 DIABETES MELLITUS, WITH FAT LAYER EXPOSED: ICD-10-CM

## 2017-10-10 DIAGNOSIS — E11.51 TYPE 2 DIABETES MELLITUS WITH PERIPHERAL CIRCULATORY DISORDER: ICD-10-CM

## 2017-10-10 DIAGNOSIS — L97.415 DIABETIC ULCER OF RIGHT MIDFOOT ASSOCIATED WITH TYPE 2 DIABETES MELLITUS, WITH MUSCLE INVOLVEMENT WITHOUT EVIDENCE OF NECROSIS: Primary | ICD-10-CM

## 2017-10-10 DIAGNOSIS — L97.514 DIABETIC ULCER OF TOE OF RIGHT FOOT ASSOCIATED WITH TYPE 2 DIABETES MELLITUS, WITH NECROSIS OF BONE: ICD-10-CM

## 2017-10-10 DIAGNOSIS — E11.621 DIABETIC ULCER OF TOE OF RIGHT FOOT ASSOCIATED WITH TYPE 2 DIABETES MELLITUS, WITH NECROSIS OF BONE: ICD-10-CM

## 2017-10-10 DIAGNOSIS — L97.412 DIABETIC ULCER OF RIGHT HEEL ASSOCIATED WITH TYPE 2 DIABETES MELLITUS, WITH FAT LAYER EXPOSED: ICD-10-CM

## 2017-10-10 DIAGNOSIS — M86.9 OSTEOMYELITIS OF TOE OF RIGHT FOOT: ICD-10-CM

## 2017-10-10 PROCEDURE — 11044 PR DEBRIDEMENT, SKIN, SUB-Q TISSUE,MUSCLE,BONE,=<20 SQ CM: ICD-10-PCS | Mod: ,,, | Performed by: PODIATRIST

## 2017-10-10 PROCEDURE — 11044 DBRDMT BONE 1ST 20 SQ CM/<: CPT | Mod: ,,, | Performed by: PODIATRIST

## 2017-10-10 PROCEDURE — 11046 DBRDMT MUSC&/FSCA EA ADDL: CPT

## 2017-10-10 PROCEDURE — 11044 DBRDMT BONE 1ST 20 SQ CM/<: CPT

## 2017-10-10 PROCEDURE — 11042 DBRDMT SUBQ TIS 1ST 20SQCM/<: CPT

## 2017-10-10 PROCEDURE — 27201912 HC WOUND CARE DEBRIDEMENT SUPPLIES

## 2017-10-10 NOTE — PROGRESS NOTES
Your ultrasound revealed patent stents on the right leg  The bypass graft on the left is patent but there could be blockage downstream        We will discuss future plans during your follow up visit        Thanks        ZN

## 2017-10-11 ENCOUNTER — INITIAL CONSULT (OUTPATIENT)
Dept: INFECTIOUS DISEASES | Facility: CLINIC | Age: 67
End: 2017-10-11
Payer: MEDICARE

## 2017-10-11 VITALS
HEART RATE: 86 BPM | OXYGEN SATURATION: 98 % | DIASTOLIC BLOOD PRESSURE: 82 MMHG | WEIGHT: 224.19 LBS | HEIGHT: 69 IN | SYSTOLIC BLOOD PRESSURE: 132 MMHG | BODY MASS INDEX: 33.2 KG/M2

## 2017-10-11 DIAGNOSIS — M86.9 OSTEOMYELITIS, UNSPECIFIED SITE, UNSPECIFIED TYPE: Primary | ICD-10-CM

## 2017-10-11 LAB
PERIPHERAL STENOSIS: ABNORMAL
PERIPHERAL STENT: YES

## 2017-10-11 PROCEDURE — 99214 OFFICE O/P EST MOD 30 MIN: CPT | Mod: S$GLB,,, | Performed by: INTERNAL MEDICINE

## 2017-10-11 PROCEDURE — 99999 PR PBB SHADOW E&M-EST. PATIENT-LVL III: CPT | Mod: PBBFAC,,,

## 2017-10-11 RX ORDER — CIPROFLOXACIN 750 MG/1
750 TABLET, FILM COATED ORAL EVERY 12 HOURS
Qty: 60 TABLET | Refills: 1 | Status: SHIPPED | OUTPATIENT
Start: 2017-10-11 | End: 2018-03-05

## 2017-10-11 NOTE — PROGRESS NOTES
"Subjective:      Patient ID: Cahu Rodarte is a 67 y.o. male.    Chief Complaint:Foot Injury (foot ulcer debridement)      History of Present Illness  HPI     Foot Injury    Additional comments: foot ulcer debridement       Last edited by Malathi Clarke LPN on 10/11/2017  4:08 PM. (History)          66 yo man patient here for ID evaluation due to diabetic foot ulcers. He developed ulcers to his feet 4/24/17 in March 2017. He has been treated by Dr. Bland in the [facility].He is s/p fem pop by pass to left lower extremity in June 2017. Seen by Dr. Bland this week and has an MRI ordered: "findings concerning for osteomalacia [osteomyelitis?] affecting the first distal phalanx and second metatarsal."    Patient denies any fever or chills. Has HH for wound care as well.    Review of Systems   Constitution: Negative for chills and fever.   All other systems reviewed and are negative.    Objective:   Physical Exam   Constitutional: He is oriented to person, place, and time. He appears well-developed and well-nourished. No distress.   HENT:   Head: Normocephalic and atraumatic.   Eyes: Conjunctivae and EOM are normal. Pupils are equal, round, and reactive to light.   Musculoskeletal: Normal range of motion. He exhibits tenderness and deformity.   Undressed and examined. Ulcer 1st toe clean without visible bone. Medial ulcer - clean. Heel ulcer - clean.   Neurological: He is alert and oriented to person, place, and time.   Skin: He is not diaphoretic.   Psychiatric: He has a normal mood and affect. His behavior is normal. Judgment and thought content normal.   Nursing note and vitals reviewed.    Aerobic Bacterial Culture PSEUDOMONAS AERUGINOSA Few    Aerobic Bacterial Culture DIPHTHEROIDS Many    Resulting Agency [facility]   Susceptibility      Pseudomonas aeruginosa     CULTURE, AEROBIC  (SPECIFY SOURCE)     Amikacin <=16 "><=16  Sensitive     Cefepime <=8 "><=8  Sensitive     Ciprofloxacin <=1 "><=1  Sensitive     " "Gentamicin <=4 "><=4  Sensitive     Meropenem <=4 "><=4  Sensitive     Piperacillin/Tazo <=16 "><=16  Sensitive     Tobramycin <=4 "><=4  Sensitive           Assessment/Plan:       Osteomyelitis of the distal phalanx, 1st toe  - Pseudomonas and diphtheroids by last culture  - will start full-dose Cipro given improving renal function  - would repeat inflammatory markers and BMP in 2-4 weeks  - would shoot for 6 weeks of abx  - RTC as needed    "

## 2017-10-13 ENCOUNTER — TELEPHONE (OUTPATIENT)
Dept: INTERNAL MEDICINE | Facility: CLINIC | Age: 67
End: 2017-10-13

## 2017-10-13 RX ORDER — METOPROLOL SUCCINATE 25 MG/1
25 TABLET, EXTENDED RELEASE ORAL DAILY
Qty: 90 TABLET | Refills: 11 | Status: SHIPPED | OUTPATIENT
Start: 2017-10-13 | End: 2019-01-05 | Stop reason: SDUPTHER

## 2017-10-13 NOTE — TELEPHONE ENCOUNTER
----- Message from Vipul Hearn MA sent at 10/13/2017  9:58 AM CDT -----  Contact: Elise willis/ [facility] - [phone]   Patient's BP is elevated. 135/90 Left Arm - 147/96 Right Arm and asymptomatic. Please call. Thanks!

## 2017-10-13 NOTE — TELEPHONE ENCOUNTER
Spoke with Elise, she stated that while over for wound care the pt's B/P was elevated. Elise stated that the pt was not having any symptoms or complaining, he is stable and told her his B/P fluctuates. Elise wanted to inform the office of the elevated B/P.

## 2017-10-13 NOTE — TELEPHONE ENCOUNTER
Hi, I appreciate the message. I recommend we keep meds as is for her hypertension. But I would like to hear again if his pressures continue to be elevated.    His pressures at [facility] appts this week were OK --  BP Readings from Last 3 Encounters:   10/11/17 132/82   10/10/17 139/68   10/03/17 (!) 146/78         Let me know if nurse has any more questions.  Thank you, Riki Huynh

## 2017-10-13 NOTE — TELEPHONE ENCOUNTER
----- Message from Taylor Mcintosh sent at 10/13/2017 10:04 AM CDT -----  Contact: self [phone]  Type: Rx    Name of medication(s):  metoprolol succinate (TOPROL-XL) 25 MG 24 hr tablet     Is this a refill? New rx? Refill      Who prescribed medication?    Pharmacy Name, Phone, & Location: [facility] [phone]     Comments: please advise , Thanks !

## 2017-10-16 ENCOUNTER — OFFICE VISIT (OUTPATIENT)
Dept: CARDIOLOGY | Facility: CLINIC | Age: 67
End: 2017-10-16
Payer: MEDICARE

## 2017-10-16 VITALS
BODY MASS INDEX: 33.33 KG/M2 | HEIGHT: 69 IN | HEART RATE: 68 BPM | DIASTOLIC BLOOD PRESSURE: 73 MMHG | WEIGHT: 225 LBS | SYSTOLIC BLOOD PRESSURE: 114 MMHG

## 2017-10-16 DIAGNOSIS — E11.621 DIABETIC ULCER OF RIGHT MIDFOOT ASSOCIATED WITH TYPE 2 DIABETES MELLITUS, WITH MUSCLE INVOLVEMENT WITHOUT EVIDENCE OF NECROSIS: ICD-10-CM

## 2017-10-16 DIAGNOSIS — I50.22 CHRONIC SYSTOLIC HEART FAILURE: ICD-10-CM

## 2017-10-16 DIAGNOSIS — I25.10 CORONARY ARTERY DISEASE INVOLVING NATIVE CORONARY ARTERY OF NATIVE HEART WITHOUT ANGINA PECTORIS: ICD-10-CM

## 2017-10-16 DIAGNOSIS — I10 ESSENTIAL HYPERTENSION: ICD-10-CM

## 2017-10-16 DIAGNOSIS — E78.2 MIXED HYPERLIPIDEMIA: ICD-10-CM

## 2017-10-16 DIAGNOSIS — I73.9 PAD (PERIPHERAL ARTERY DISEASE): Primary | ICD-10-CM

## 2017-10-16 DIAGNOSIS — N18.30 BENIGN HYPERTENSIVE HEART AND KIDNEY DISEASE WITH SYSTOLIC CHF, NYHA CLASS 2 AND CKD STAGE 3: ICD-10-CM

## 2017-10-16 DIAGNOSIS — I48.0 PAROXYSMAL ATRIAL FIBRILLATION: ICD-10-CM

## 2017-10-16 DIAGNOSIS — I70.229 CRITICAL LOWER LIMB ISCHEMIA: ICD-10-CM

## 2017-10-16 DIAGNOSIS — I50.20 BENIGN HYPERTENSIVE HEART AND KIDNEY DISEASE WITH SYSTOLIC CHF, NYHA CLASS 2 AND CKD STAGE 3: ICD-10-CM

## 2017-10-16 DIAGNOSIS — I13.0 BENIGN HYPERTENSIVE HEART AND KIDNEY DISEASE WITH SYSTOLIC CHF, NYHA CLASS 2 AND CKD STAGE 3: ICD-10-CM

## 2017-10-16 DIAGNOSIS — L97.415 DIABETIC ULCER OF RIGHT MIDFOOT ASSOCIATED WITH TYPE 2 DIABETES MELLITUS, WITH MUSCLE INVOLVEMENT WITHOUT EVIDENCE OF NECROSIS: ICD-10-CM

## 2017-10-16 DIAGNOSIS — Z95.828 S/P FEMORAL-POPLITEAL BYPASS SURGERY: ICD-10-CM

## 2017-10-16 DIAGNOSIS — M86.171 ACUTE OSTEOMYELITIS OF TOE, RIGHT: ICD-10-CM

## 2017-10-16 PROCEDURE — 99215 OFFICE O/P EST HI 40 MIN: CPT | Mod: S$GLB,,, | Performed by: INTERNAL MEDICINE

## 2017-10-16 PROCEDURE — 99999 PR PBB SHADOW E&M-EST. PATIENT-LVL III: CPT | Mod: PBBFAC,,, | Performed by: INTERNAL MEDICINE

## 2017-10-16 PROCEDURE — 99499 UNLISTED E&M SERVICE: CPT | Mod: S$GLB,,, | Performed by: INTERNAL MEDICINE

## 2017-10-16 NOTE — PATIENT INSTRUCTIONS
Leg Artery Emergencies: Critical Limb Ischemia (CLI)  Critical limb ischemia (CLI) is a condition that can occur over time when your leg arteries are damaged. It is a severe form of peripheral arterial disease (PAD). PAD is caused when leg arteries are narrowed, reducing blood flow. If blood flow to the toe, foot, or leg is completely blocked, the tissue begins to die (gangrene). If this happens, you need medical care right away to restore blood flow and possibly save the leg. But even with the best medical care, it might not be possible to save a severely affected limb.  When do you need emergency care?    CLI can get worse and cause an urgent problem. For example, if you have a wound, it may not heal. This can lead to gangrene. Go to the emergency room right away if you have any of these symptoms:  · A wound that is foul smelling, draining pus, or discolored  · Severe foot or leg pain that occurs suddenly without injury, especially if the foot or leg is cold or numb  Call your healthcare provider if:  · You have a cut or ulcer that does not heal  · You have foot or leg pain that happens when walking but goes away with rest. This may be a sign of blocked arteries.  How is critical limb ischemia diagnosed?  Certain tests may be done to find out if you have CLI. First your provider will carefully examine you, checking for pulses at several places. Other common tests include:  · Ankle-brachial index (MARIO). The blood pressure in your ankle is compared with the blood pressure in your arm.  · Duplex ultrasound. Harmless sound waves are used to create images of blood flow in your legs.  · Arteriography. X-ray dye (contrast medium) is injected into the artery using a thin, flexible tube (catheter). This allows blood vessels to be seen easily on X-rays.  How is critical limb ischemia treated?  Possible treatments for CLI include:  · Dissolving or removing a blood clot. To dissolve a clot, a tube (catheter) is put into an  artery in your groin. Clot-busting medicine is put into the tube to dissolve the clot. Or surgery may be done to remove the clot. A cut (incision) is made in the artery at the blocked area. The clot is then removed.  · Angioplasty. A tiny, uninflated balloon is sent to the narrowed area by a catheter. It is then inflated to widen the artery. The balloon is deflated and removed.  · Stenting. After angioplasty, a tiny wire mesh tube (stent) may be put in the artery to help hold it open. The stent is also put in using a catheter.  · Endarterectomy. An incision is made in the artery at the blocked area. The material that blocks the artery is then removed from artery walls.  · Peripheral bypass surgery. A natural or artificial graft is used to bypass the blocked area.  · Amputation. If left untreated, the affected area may have to be removed (amputated).  How can critical limb ischemia emergencies be prevented?  Know the signs and symptoms of a leg artery emergency. If you have diabetes or poor blood circulation, check your feet daily for wounds, sores, blisters, and color changes. If you smoke, stop smoking.  Date Last Reviewed: 6/1/2016  © 4748-0511 [employer]. [address] [place] [address], [place] [address]. All rights reserved. This information is not intended as a substitute for professional medical care. Always follow your healthcare professional's instructions.

## 2017-10-16 NOTE — PROGRESS NOTES
Subjective:   Patient ID:  Chau Rodarte is a 67 y.o. male who presents for evaluation and treatment of Non-healing Wound; Peripheral Arterial Disease; and s/p revascularization      HPI:       He is here with his wife by recommendation of Dr. Bland (podiatry) for follow up after revascularization for R foot CLI. He has an extensive history of PAD. S/p L pSFA PTAS with 8.0 x 40 mm Lifestent post dilated with 7 mm balloon (5/2012), S/p R fem-pop 2012 at [facility] with 6 mm PTFE graft (Occluded on US and Angio in 2017), and S/p L fem-bk POP bypass-V 6/2017. His L heel wound healed. He was not an ideal surgical candidate for another bypass on the R leg. He had revascularization of R SFA and POP  with re-establishment of flow to his foot. This was a complex IVUS guided intervention that required dual L retrograde cross over CFA and R AT retrograde accesses. Reverse CART was performed in the popliteal artery with a 5.0 x 100 mm balloon and retrograde Astato 30 wire. Thereafter he had multiple stents. Surveillance ultrasound on 11/8/2017 revealed patent SFA and POP stents.          PTAS prox & ostial AT with 3.0 x 38 and 4.0 x 38 mm Resolute SOULEYMANE.    PTAS of popliteal with 5.5 x 100 mm Supera stent.     PTAS of dSFA with 6.0 x 150 Supera     PTAS of mSFA with 7 x 100 Zilver,    PTAS prox SFA with 80 x 100 mm Zilver    PTAS ostial SFA with 8 x 40 mm Zilver.     2 vessel run off via AT and PER  with filling of plantar arch.          He also has CAD s/p multivessel PCI, HTN, HLP, DM, ICM with EF 45%, PAF with embolic CVA 2006, s/p CTI RFA 2/2017, and ILR for afib surveillance. He is on aspirin and plavix for DAPT. He is taking clindamycin and cipro for abx           Care team:    Baldo-pop  Smith-vas sx  Dvorin-pcp  Josh/Rich-card        Patient Active Problem List    Diagnosis Date Noted    Critical lower limb ischemia 09/15/2017     Priority: High    Diabetic ulcer of right midfoot associated with type 2 diabetes  mellitus, with muscle involvement without evidence of necrosis 09/07/2017     Priority: High     Added automatically from request for surgery [id number]      Decubitus ulcer, heel, right, unstageable     Acute osteomyelitis of toe, right     Diabetic ulcer of toe of right foot associated with type 2 diabetes mellitus, with necrosis of bone     Nonhealing surgical wound     Diabetic ulcer of right heel associated with type 1 diabetes mellitus 09/07/2017    Diabetic ulcer of heel associated with type 2 diabetes mellitus     PAD (peripheral artery disease)          S/p L pSFA PTAS with 8.0 x 40 mm Lifestent    Post dilated with 7 mm balloon    S/p R fem-pop 2012 at [facility]   6 mm PTFE graft   Occluded on US and Angio in 2017       S/p L fem-bk POP bypass-GSV 6/2017      Selective R leg angiogram 9/7/2017     R CFA patent   R SFA and POP    AT and PER reconstitution proximally   DP is the only vessel providing flow to plantar arch      S/p peripheral intervention 9/15/2017       RCART with 5.0 x 100 mm and retrograde Astato 30 wire.     PTAS of AT with 3.0 x 38 and 4.0 x 38 mm Resolute SOULEYMANE.    PTAS of popliteal with 5.5 x 100 mm Supera stent.     PTAS of dSFA with 6.0 x 150 Supera     PTAS of mSFA with 7 x 100 Zilver,    PTAS prox SFA with 80 x 100 mm Zilver    PTAS ostial SFA with 8 x 40 mm Zilver.     2 vessel run off via AT and PER  with filling of plantar arch.                   Ulcer of heel due to diabetes mellitus 06/23/2017    BPH (benign prostatic hypertrophy) 06/23/2017    Debility 06/19/2017    Preop examination     Atherosclerosis of native arteries of the extremities with ulceration 04/26/2017    Paroxysmal atrial fibrillation 03/24/2017    Lower GI bleed 03/20/2017    Type 2 diabetes mellitus with diabetic peripheral angiopathy and gangrene, without long-term current use of insulin 03/20/2017    Acute blood loss anemia 03/20/2017    Long term (current) use of anticoagulants [Z79.01]  01/27/2017    Typical atrial flutter, s/p ablation 01/26/2017    Cryptogenic stroke, remote history (2006) 01/26/2017    Coronary artery disease involving native coronary artery of native heart without angina pectoris 12/20/2016    Ischemic cardiomyopathy 12/20/2016    CORRY (acute kidney injury) 12/06/2016    Cardiomegaly 12/05/2016    Type 2 diabetes mellitus with diabetic polyneuropathy, with long-term current use of insulin 11/29/2016    Chronic systolic heart failure 11/29/2016    Severe obesity (BMI 35.0-35.9 with comorbidity) 11/29/2016    NSTEMI (non-ST elevated myocardial infarction) 11/28/2016    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, NYHA class 2 and CKD stage 3 02/21/2016    Essential hypertension 03/03/2014    Diabetic foot ulcer 11/13/2013    HLD (hyperlipidemia) 10/24/2013    S/P femoral-popliteal bypass surgery 10/24/2013         R fem-pop in 2012      L fem-pop in 2017                   Left Arm BP - Sitting: (P) 108/68        LABS    LAST HbA1c  Lab Results   Component Value Date    HGBA1C 6.1 (H) 06/26/2017       Lipid panel  Lab Results   Component Value Date    CHOL 110 (L) 01/12/2017    CHOL 113 (L) 11/29/2016    CHOL 122 (L) 10/21/2016     Lab Results   Component Value Date    HDL 25 (L) 01/12/2017    HDL 31 (L) 11/29/2016    HDL 32 (L) 10/21/2016     Lab Results   Component Value Date    LDLCALC 72.2 01/12/2017    LDLCALC 72.0 11/29/2016    LDLCALC 76 10/21/2016     Lab Results   Component Value Date    TRIG 64 01/12/2017    TRIG 50 11/29/2016    TRIG 71 10/21/2016     Lab Results   Component Value Date    CHOLHDL 22.7 01/12/2017    CHOLHDL 27.4 11/29/2016    CHOLHDL 3.8 10/21/2016            Review of Systems   Constitution: Negative for diaphoresis, weakness, night sweats, weight gain and weight loss.   HENT: Negative for congestion.    Eyes: Negative for blurred vision, discharge and double vision.   Cardiovascular: Negative for chest pain,  claudication, cyanosis, dyspnea on exertion, irregular heartbeat, leg swelling, near-syncope, orthopnea, palpitations, paroxysmal nocturnal dyspnea and syncope.   Respiratory: Negative for cough, shortness of breath and wheezing.    Endocrine: Negative for cold intolerance, heat intolerance and polyphagia.   Hematologic/Lymphatic: Negative for adenopathy and bleeding problem. Does not bruise/bleed easily.   Skin: Positive for poor wound healing. Negative for dry skin and nail changes.   Musculoskeletal: Negative for arthritis, back pain, falls, joint pain, myalgias and neck pain.   Gastrointestinal: Negative for bloating, abdominal pain, change in bowel habit and constipation.   Genitourinary: Negative for bladder incontinence, dysuria, flank pain, genital sores and missed menses.   Neurological: Negative for aphonia, brief paralysis, difficulty with concentration and dizziness.   Psychiatric/Behavioral: Negative for altered mental status and memory loss. The patient does not have insomnia.    Allergic/Immunologic: Negative for environmental allergies.       Objective:   Physical Exam   Constitutional: He is oriented to person, place, and time. He appears well-developed and well-nourished. He is not intubated.   HENT:   Head: Normocephalic and atraumatic.   Right Ear: External ear normal.   Left Ear: External ear normal.   Mouth/Throat: Oropharynx is clear and moist.   Eyes: Conjunctivae and EOM are normal. Pupils are equal, round, and reactive to light. Right eye exhibits no discharge. Left eye exhibits no discharge. No scleral icterus.   Neck: Normal range of motion. Neck supple. Normal carotid pulses, no hepatojugular reflux and no JVD present. Carotid bruit is not present. No tracheal deviation present. No thyromegaly present.   Cardiovascular: Normal rate, regular rhythm, S1 normal and S2 normal.   No extrasystoles are present. PMI is not displaced.  Exam reveals no gallop, no S3, no distant heart sounds, no  friction rub and no midsystolic click.    Murmur heard.   Systolic murmur is present with a grade of 2/6  at the upper right sternal border  Pulses:       Carotid pulses are 2+ on the right side, and 2+ on the left side.       Radial pulses are 2+ on the right side, and 2+ on the left side.        Femoral pulses are 2+ on the right side, and 2+ on the left side.       Popliteal pulses are 2+ on the right side, and 2+ on the left side.        Dorsalis pedis pulses are 1+ on the right side. Left dorsalis pedis pulse not accessible.        Posterior tibial pulses are 0 on the right side. Left posterior tibial pulse not accessible.       Triphasic R POP with triphasic R DP and monophasic R PT doppler signals      Biphasic L POP with monophasic L DP and faint L PT doppler signals        Pulmonary/Chest: Effort normal and breath sounds normal. No accessory muscle usage or stridor. No apnea, no tachypnea and no bradypnea. He is not intubated. No respiratory distress. He has no decreased breath sounds. He has no wheezes. He has no rales. He exhibits no tenderness and no bony tenderness.   Abdominal: He exhibits no distension, no pulsatile liver, no abdominal bruit, no ascites, no pulsatile midline mass and no mass. There is no tenderness. There is no rebound and no guarding.   Musculoskeletal: Normal range of motion. He exhibits no edema or tenderness.   Lymphadenopathy:     He has no cervical adenopathy.   Neurological: He is alert and oriented to person, place, and time. He has normal reflexes. No cranial nerve deficit. Coordination normal.   Skin: Skin is warm. No rash noted. No erythema. No pallor.       L heel wound-healed         R great wound healing with granulating tissue      R first metatarsal plantar surface wound with fat exposure with surrounding granulating tissue      R heel wound with granulating tissue       See images         Psychiatric: He has a normal mood and affect. His behavior is normal. Judgment  and thought content normal.     8/28/2017              10/15/2017    Left heel       Right heel      R lateral great toe     right great toe          Assessment:     1. PAD (peripheral artery disease)    2. Critical lower limb ischemia    3. Mixed hyperlipidemia    4. S/P femoral-popliteal bypass surgery    5. Essential hypertension    6. 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, NYHA class 2 and CKD stage 3    7. Chronic systolic heart failure    8. Paroxysmal atrial fibrillation    9. Coronary artery disease involving native coronary artery of native heart without angina pectoris    10. Acute osteomyelitis of toe, right    11. Diabetic ulcer of right midfoot associated with type 2 diabetes mellitus, with muscle involvement without evidence of necrosis        Plan:         He is doing well regarding his wounds        Left heel wound healed after skin graft and L fem-pop      R foot wounds (heel, great, and lateral first MT) healing         Surveillance ultrasound in 4 weeks  Follow up in 4 weeks      Continue with wound care  Continue with antibiotics      Risk factors modification          Continue with current medical plan and lifestyle changes.  Return sooner for concerns or questions. If symptoms persist go to the ED  I have reviewed all pertinent data on this patient       I have reviewed the patient's medical history in detail and updated the computerized patient record.    Orders Placed This Encounter   Procedures    US Lower Extremity Arteries Bilateral     Standing Status:   Future     Standing Expiration Date:   10/16/2018       Follow up as scheduled. Return sooner for concerns or questions            He expressed verbal understanding and agreed with the plan        Patient's Medications   New Prescriptions    No medications on file   Previous Medications    ASPIRIN 81 MG CHEW    Take 1 tablet (81 mg total) by mouth once daily.    ATORVASTATIN (LIPITOR) 80 MG TABLET    Take 1  "tablet (80 mg total) by mouth once daily.    CHOLECALCIFEROL, VITAMIN D3, (VITAMIN D3) 5,000 UNIT TAB    Take 5,000 Units by mouth once daily.    CIPROFLOXACIN HCL (CIPRO) 750 MG TABLET    Take 1 tablet (750 mg total) by mouth every 12 (twelve) hours.    CLINDAMYCIN (CLEOCIN) 300 MG CAPSULE    Take 1 capsule (300 mg total) by mouth 3 (three) times daily.    CLOPIDOGREL (PLAVIX) 75 MG TABLET    Take 1 tablet (75 mg total) by mouth once daily.    COLLAGENASE (SANTYL) OINTMENT    Apply topically once daily.    DICLOFENAC SODIUM 1 % GEL    Apply 2 g topically 3 (three) times daily.    FERROUS SULFATE 325 (65 FE) MG EC TABLET    Take 1 tablet (325 mg total) by mouth once daily.    FUROSEMIDE (LASIX) 40 MG TABLET    Take 1 tablet (40 mg total) by mouth once daily.    GABAPENTIN (NEURONTIN) 300 MG CAPSULE    Take 300 mg by mouth 2 (two) times daily.    HYDROCODONE-ACETAMINOPHEN 5-325MG (NORCO) 5-325 MG PER TABLET    Take 1 tablet by mouth every 4 (four) hours as needed.    INSULIN DETEMIR (LEVEMIR FLEXTOUCH) 100 UNIT/ML (3 ML) SUBQ INPN PEN    Inject 16 Units into the skin every evening.    ISOSORBIDE DINITRATE (ISORDIL) 20 MG TABLET    Take 1 tablet (20 mg total) by mouth 3 (three) times daily.    LIRAGLUTIDE 0.6 MG/0.1 ML, 18 MG/3 ML, SUBQ PNIJ (VICTOZA 3-SAGAR) 0.6 MG/0.1 ML (18 MG/3 ML) PNIJ    Inject 1.2 mg into the skin once daily.    METOPROLOL SUCCINATE (TOPROL-XL) 25 MG 24 HR TABLET    Take 1 tablet (25 mg total) by mouth once daily.    PEN NEEDLE, DIABETIC (BD ULTRA-FINE HANG PEN NEEDLES) 32 GAUGE X 5/32" NDLE    Uses 2 times daily, on  insulin injections    SENNA-DOCUSATE 8.6-50 MG (PERICOLACE) 8.6-50 MG PER TABLET    Take 1 tablet by mouth once daily.    TAMSULOSIN (FLOMAX) 0.4 MG CP24    Take 1 capsule (0.4 mg total) by mouth once daily.   Modified Medications    No medications on file   Discontinued Medications    No medications on file      "

## 2017-10-17 ENCOUNTER — HOSPITAL ENCOUNTER (OUTPATIENT)
Dept: WOUND CARE | Facility: HOSPITAL | Age: 67
Discharge: HOME | End: 2017-10-17
Attending: PODIATRIST
Payer: MEDICARE

## 2017-10-17 ENCOUNTER — CLINICAL SUPPORT (OUTPATIENT)
Dept: ELECTROPHYSIOLOGY | Facility: CLINIC | Age: 67
End: 2017-10-17
Payer: MEDICARE

## 2017-10-17 VITALS
BODY MASS INDEX: 33.33 KG/M2 | DIASTOLIC BLOOD PRESSURE: 56 MMHG | TEMPERATURE: 98 F | HEIGHT: 69 IN | HEART RATE: 72 BPM | SYSTOLIC BLOOD PRESSURE: 108 MMHG | WEIGHT: 225 LBS

## 2017-10-17 DIAGNOSIS — I73.9 PAD (PERIPHERAL ARTERY DISEASE): ICD-10-CM

## 2017-10-17 DIAGNOSIS — E08.621 DIABETIC ULCER OF TOE OF RIGHT FOOT ASSOCIATED WITH DIABETES MELLITUS DUE TO UNDERLYING CONDITION, WITH BONE INVOLVEMENT WITHOUT EVIDENCE OF NECROSIS: ICD-10-CM

## 2017-10-17 DIAGNOSIS — I63.9 CRYPTOGENIC STROKE: ICD-10-CM

## 2017-10-17 DIAGNOSIS — L97.516 DIABETIC ULCER OF TOE OF RIGHT FOOT ASSOCIATED WITH DIABETES MELLITUS DUE TO UNDERLYING CONDITION, WITH BONE INVOLVEMENT WITHOUT EVIDENCE OF NECROSIS: ICD-10-CM

## 2017-10-17 DIAGNOSIS — E08.621 DIABETIC ULCER OF OTHER PART OF RIGHT FOOT ASSOCIATED WITH DIABETES MELLITUS DUE TO UNDERLYING CONDITION, WITH NECROSIS OF MUSCLE: ICD-10-CM

## 2017-10-17 DIAGNOSIS — L97.513 DIABETIC ULCER OF OTHER PART OF RIGHT FOOT ASSOCIATED WITH DIABETES MELLITUS DUE TO UNDERLYING CONDITION, WITH NECROSIS OF MUSCLE: ICD-10-CM

## 2017-10-17 DIAGNOSIS — E11.51 TYPE 2 DIABETES MELLITUS WITH PERIPHERAL CIRCULATORY DISORDER: Primary | ICD-10-CM

## 2017-10-17 DIAGNOSIS — E11.621 DIABETIC ULCER OF RIGHT HEEL ASSOCIATED WITH TYPE 2 DIABETES MELLITUS, WITH FAT LAYER EXPOSED: ICD-10-CM

## 2017-10-17 DIAGNOSIS — Z95.818 STATUS POST PLACEMENT OF IMPLANTABLE LOOP RECORDER: ICD-10-CM

## 2017-10-17 DIAGNOSIS — L97.412 DIABETIC ULCER OF RIGHT HEEL ASSOCIATED WITH TYPE 2 DIABETES MELLITUS, WITH FAT LAYER EXPOSED: ICD-10-CM

## 2017-10-17 PROCEDURE — 11043 DBRDMT MUSC&/FSCA 1ST 20/<: CPT | Mod: 59

## 2017-10-17 PROCEDURE — 15271 SKIN SUB GRAFT TRNK/ARM/LEG: CPT

## 2017-10-17 PROCEDURE — 87076 CULTURE ANAEROBE IDENT EACH: CPT

## 2017-10-17 PROCEDURE — 87070 CULTURE OTHR SPECIMN AEROBIC: CPT

## 2017-10-17 PROCEDURE — 11043 PR DEBRIDEMENT, SKIN, SUB-Q TISSUE,MUSCLE,=<20 SQ CM: ICD-10-PCS | Mod: 59,,, | Performed by: PODIATRIST

## 2017-10-17 PROCEDURE — 87075 CULTR BACTERIA EXCEPT BLOOD: CPT

## 2017-10-17 PROCEDURE — 15275 PR SKIN SUB GRAFT FACE/NK/HF/G UP TO 100 SQCM: ICD-10-PCS | Mod: 51,,, | Performed by: PODIATRIST

## 2017-10-17 PROCEDURE — 87186 SC STD MICRODIL/AGAR DIL: CPT

## 2017-10-17 PROCEDURE — 27201912 HC WOUND CARE DEBRIDEMENT SUPPLIES

## 2017-10-17 PROCEDURE — 15275 SKIN SUB GRAFT FACE/NK/HF/G: CPT

## 2017-10-17 PROCEDURE — 87077 CULTURE AEROBIC IDENTIFY: CPT

## 2017-10-17 PROCEDURE — 11043 DBRDMT MUSC&/FSCA 1ST 20/<: CPT | Mod: 59,,, | Performed by: PODIATRIST

## 2017-10-17 PROCEDURE — 11043 DBRDMT MUSC&/FSCA 1ST 20/<: CPT

## 2017-10-17 PROCEDURE — 15275 SKIN SUB GRAFT FACE/NK/HF/G: CPT | Mod: 51,,, | Performed by: PODIATRIST

## 2017-10-17 RX ORDER — LIDOCAINE HYDROCHLORIDE 10 MG/ML
20 INJECTION INFILTRATION; PERINEURAL
Status: DISCONTINUED | OUTPATIENT
Start: 2017-10-17 | End: 2017-10-24

## 2017-10-17 RX ORDER — OXYCODONE AND ACETAMINOPHEN 10; 325 MG/1; MG/1
1 TABLET ORAL EVERY 6 HOURS PRN
Qty: 30 TABLET | Refills: 0 | Status: SHIPPED | OUTPATIENT
Start: 2017-10-17 | End: 2018-04-03

## 2017-10-21 LAB — BACTERIA SPEC AEROBE CULT: NORMAL

## 2017-10-23 LAB — BACTERIA SPEC ANAEROBE CULT: NORMAL

## 2017-10-24 ENCOUNTER — HOSPITAL ENCOUNTER (OUTPATIENT)
Dept: WOUND CARE | Facility: HOSPITAL | Age: 67
Discharge: HOME | End: 2017-10-24
Attending: PODIATRIST
Payer: MEDICARE

## 2017-10-24 VITALS — HEART RATE: 77 BPM | SYSTOLIC BLOOD PRESSURE: 97 MMHG | TEMPERATURE: 98 F | DIASTOLIC BLOOD PRESSURE: 60 MMHG

## 2017-10-24 DIAGNOSIS — E11.621 DIABETIC ULCER OF RIGHT HEEL ASSOCIATED WITH TYPE 2 DIABETES MELLITUS, WITH FAT LAYER EXPOSED: Primary | ICD-10-CM

## 2017-10-24 DIAGNOSIS — E08.621 DIABETIC ULCER OF TOE OF RIGHT FOOT ASSOCIATED WITH DIABETES MELLITUS DUE TO UNDERLYING CONDITION, WITH BONE INVOLVEMENT WITHOUT EVIDENCE OF NECROSIS: ICD-10-CM

## 2017-10-24 DIAGNOSIS — L97.516 DIABETIC ULCER OF TOE OF RIGHT FOOT ASSOCIATED WITH DIABETES MELLITUS DUE TO UNDERLYING CONDITION, WITH BONE INVOLVEMENT WITHOUT EVIDENCE OF NECROSIS: ICD-10-CM

## 2017-10-24 DIAGNOSIS — E11.51 TYPE 2 DIABETES MELLITUS WITH PERIPHERAL CIRCULATORY DISORDER: ICD-10-CM

## 2017-10-24 DIAGNOSIS — E11.621 DIABETIC ULCER OF RIGHT MIDFOOT ASSOCIATED WITH TYPE 2 DIABETES MELLITUS, WITH MUSCLE INVOLVEMENT WITHOUT EVIDENCE OF NECROSIS: ICD-10-CM

## 2017-10-24 DIAGNOSIS — L97.415 DIABETIC ULCER OF RIGHT MIDFOOT ASSOCIATED WITH TYPE 2 DIABETES MELLITUS, WITH MUSCLE INVOLVEMENT WITHOUT EVIDENCE OF NECROSIS: ICD-10-CM

## 2017-10-24 DIAGNOSIS — L97.412 DIABETIC ULCER OF RIGHT HEEL ASSOCIATED WITH TYPE 2 DIABETES MELLITUS, WITH FAT LAYER EXPOSED: Primary | ICD-10-CM

## 2017-10-24 PROCEDURE — 15275 SKIN SUB GRAFT FACE/NK/HF/G: CPT | Mod: ,,, | Performed by: PODIATRIST

## 2017-10-24 PROCEDURE — 11042 DBRDMT SUBQ TIS 1ST 20SQCM/<: CPT | Mod: 59,,, | Performed by: PODIATRIST

## 2017-10-24 PROCEDURE — 15275 SKIN SUB GRAFT FACE/NK/HF/G: CPT

## 2017-10-24 PROCEDURE — 27201912 HC WOUND CARE DEBRIDEMENT SUPPLIES

## 2017-10-24 PROCEDURE — 15275 PR SKIN SUB GRAFT FACE/NK/HF/G UP TO 100 SQCM: ICD-10-PCS | Mod: ,,, | Performed by: PODIATRIST

## 2017-10-24 PROCEDURE — 11042 DBRDMT SUBQ TIS 1ST 20SQCM/<: CPT | Mod: 59

## 2017-10-24 PROCEDURE — 11042 PR DEBRIDEMENT, SKIN, SUB-Q TISSUE,=<20 SQ CM: ICD-10-PCS | Mod: 59,,, | Performed by: PODIATRIST

## 2017-10-24 RX ORDER — LIDOCAINE HYDROCHLORIDE 10 MG/ML
20 INJECTION INFILTRATION; PERINEURAL ONCE
Status: DISCONTINUED | OUTPATIENT
Start: 2017-10-24 | End: 2017-10-31

## 2017-10-30 ENCOUNTER — HOSPITAL ENCOUNTER (OUTPATIENT)
Dept: RADIOLOGY | Facility: HOSPITAL | Age: 67
Discharge: HOME | End: 2017-10-30
Attending: INTERNAL MEDICINE
Payer: MEDICARE

## 2017-10-30 DIAGNOSIS — I73.9 PAD (PERIPHERAL ARTERY DISEASE): ICD-10-CM

## 2017-10-30 PROCEDURE — 93925 LOWER EXTREMITY STUDY: CPT | Mod: 26,,, | Performed by: RADIOLOGY

## 2017-10-30 PROCEDURE — 93925 LOWER EXTREMITY STUDY: CPT | Mod: TC

## 2017-10-31 ENCOUNTER — HOSPITAL ENCOUNTER (OUTPATIENT)
Dept: WOUND CARE | Facility: HOSPITAL | Age: 67
Discharge: HOME | End: 2017-10-31
Attending: PODIATRIST
Payer: MEDICARE

## 2017-10-31 VITALS
HEIGHT: 69 IN | BODY MASS INDEX: 33.33 KG/M2 | WEIGHT: 225 LBS | HEART RATE: 68 BPM | SYSTOLIC BLOOD PRESSURE: 118 MMHG | DIASTOLIC BLOOD PRESSURE: 62 MMHG | TEMPERATURE: 98 F

## 2017-10-31 DIAGNOSIS — L97.415 DIABETIC ULCER OF RIGHT MIDFOOT ASSOCIATED WITH TYPE 2 DIABETES MELLITUS, WITH MUSCLE INVOLVEMENT WITHOUT EVIDENCE OF NECROSIS: ICD-10-CM

## 2017-10-31 DIAGNOSIS — L97.516 DIABETIC ULCER OF TOE OF RIGHT FOOT ASSOCIATED WITH DIABETES MELLITUS DUE TO UNDERLYING CONDITION, WITH BONE INVOLVEMENT WITHOUT EVIDENCE OF NECROSIS: ICD-10-CM

## 2017-10-31 DIAGNOSIS — I73.9 PAD (PERIPHERAL ARTERY DISEASE): ICD-10-CM

## 2017-10-31 DIAGNOSIS — E08.621 DIABETIC ULCER OF TOE OF RIGHT FOOT ASSOCIATED WITH DIABETES MELLITUS DUE TO UNDERLYING CONDITION, WITH BONE INVOLVEMENT WITHOUT EVIDENCE OF NECROSIS: ICD-10-CM

## 2017-10-31 DIAGNOSIS — E11.621 DIABETIC ULCER OF RIGHT MIDFOOT ASSOCIATED WITH TYPE 2 DIABETES MELLITUS, WITH MUSCLE INVOLVEMENT WITHOUT EVIDENCE OF NECROSIS: ICD-10-CM

## 2017-10-31 DIAGNOSIS — L97.412 DIABETIC ULCER OF RIGHT HEEL ASSOCIATED WITH TYPE 2 DIABETES MELLITUS, WITH FAT LAYER EXPOSED: Primary | ICD-10-CM

## 2017-10-31 DIAGNOSIS — E11.621 DIABETIC ULCER OF RIGHT HEEL ASSOCIATED WITH TYPE 2 DIABETES MELLITUS, WITH FAT LAYER EXPOSED: Primary | ICD-10-CM

## 2017-10-31 PROCEDURE — 11043 DBRDMT MUSC&/FSCA 1ST 20/<: CPT | Mod: 59,,, | Performed by: PODIATRIST

## 2017-10-31 PROCEDURE — 11043 PR DEBRIDEMENT, SKIN, SUB-Q TISSUE,MUSCLE,=<20 SQ CM: ICD-10-PCS | Mod: 59,,, | Performed by: PODIATRIST

## 2017-10-31 PROCEDURE — 15275 SKIN SUB GRAFT FACE/NK/HF/G: CPT

## 2017-10-31 PROCEDURE — 11043 DBRDMT MUSC&/FSCA 1ST 20/<: CPT

## 2017-10-31 PROCEDURE — 15275 SKIN SUB GRAFT FACE/NK/HF/G: CPT | Mod: ,,, | Performed by: PODIATRIST

## 2017-10-31 PROCEDURE — 27201912 HC WOUND CARE DEBRIDEMENT SUPPLIES

## 2017-10-31 PROCEDURE — 11042 DBRDMT SUBQ TIS 1ST 20SQCM/<: CPT

## 2017-10-31 PROCEDURE — 15275 PR SKIN SUB GRAFT FACE/NK/HF/G UP TO 100 SQCM: ICD-10-PCS | Mod: ,,, | Performed by: PODIATRIST

## 2017-10-31 RX ORDER — LIDOCAINE HYDROCHLORIDE 10 MG/ML
20 INJECTION INFILTRATION; PERINEURAL ONCE
Status: DISCONTINUED | OUTPATIENT
Start: 2017-10-31 | End: 2019-01-01 | Stop reason: HOSPADM

## 2017-11-07 ENCOUNTER — HOSPITAL ENCOUNTER (OUTPATIENT)
Dept: WOUND CARE | Facility: HOSPITAL | Age: 67
Discharge: HOME | End: 2017-11-07
Attending: PODIATRIST
Payer: MEDICARE

## 2017-11-07 VITALS
BODY MASS INDEX: 33.33 KG/M2 | WEIGHT: 225 LBS | HEART RATE: 74 BPM | TEMPERATURE: 98 F | DIASTOLIC BLOOD PRESSURE: 79 MMHG | HEIGHT: 69 IN | SYSTOLIC BLOOD PRESSURE: 136 MMHG

## 2017-11-07 DIAGNOSIS — M86.171 ACUTE OSTEOMYELITIS OF TOE, RIGHT: ICD-10-CM

## 2017-11-07 DIAGNOSIS — L97.516 DIABETIC ULCER OF TOE OF RIGHT FOOT ASSOCIATED WITH DIABETES MELLITUS DUE TO UNDERLYING CONDITION, WITH BONE INVOLVEMENT WITHOUT EVIDENCE OF NECROSIS: ICD-10-CM

## 2017-11-07 DIAGNOSIS — E11.621 DIABETIC ULCER OF RIGHT HEEL ASSOCIATED WITH TYPE 2 DIABETES MELLITUS, WITH FAT LAYER EXPOSED: Primary | ICD-10-CM

## 2017-11-07 DIAGNOSIS — L97.415 DIABETIC ULCER OF RIGHT MIDFOOT ASSOCIATED WITH TYPE 2 DIABETES MELLITUS, WITH MUSCLE INVOLVEMENT WITHOUT EVIDENCE OF NECROSIS: ICD-10-CM

## 2017-11-07 DIAGNOSIS — I73.9 PAD (PERIPHERAL ARTERY DISEASE): ICD-10-CM

## 2017-11-07 DIAGNOSIS — L97.412 DIABETIC ULCER OF RIGHT HEEL ASSOCIATED WITH TYPE 2 DIABETES MELLITUS, WITH FAT LAYER EXPOSED: Primary | ICD-10-CM

## 2017-11-07 DIAGNOSIS — E11.621 DIABETIC ULCER OF RIGHT MIDFOOT ASSOCIATED WITH TYPE 2 DIABETES MELLITUS, WITH MUSCLE INVOLVEMENT WITHOUT EVIDENCE OF NECROSIS: ICD-10-CM

## 2017-11-07 DIAGNOSIS — E08.621 DIABETIC ULCER OF TOE OF RIGHT FOOT ASSOCIATED WITH DIABETES MELLITUS DUE TO UNDERLYING CONDITION, WITH BONE INVOLVEMENT WITHOUT EVIDENCE OF NECROSIS: ICD-10-CM

## 2017-11-07 PROCEDURE — 27201912 HC WOUND CARE DEBRIDEMENT SUPPLIES

## 2017-11-07 PROCEDURE — 15275 SKIN SUB GRAFT FACE/NK/HF/G: CPT

## 2017-11-07 PROCEDURE — 15275 SKIN SUB GRAFT FACE/NK/HF/G: CPT | Mod: ,,, | Performed by: PODIATRIST

## 2017-11-07 PROCEDURE — 11043 DBRDMT MUSC&/FSCA 1ST 20/<: CPT | Mod: 59,,, | Performed by: PODIATRIST

## 2017-11-07 PROCEDURE — 11043 DBRDMT MUSC&/FSCA 1ST 20/<: CPT

## 2017-11-07 PROCEDURE — 11043 PR DEBRIDEMENT, SKIN, SUB-Q TISSUE,MUSCLE,=<20 SQ CM: ICD-10-PCS | Mod: 59,,, | Performed by: PODIATRIST

## 2017-11-07 PROCEDURE — 15275 PR SKIN SUB GRAFT FACE/NK/HF/G UP TO 100 SQCM: ICD-10-PCS | Mod: ,,, | Performed by: PODIATRIST

## 2017-11-07 RX ORDER — LIDOCAINE HYDROCHLORIDE 10 MG/ML
20 INJECTION INFILTRATION; PERINEURAL
Status: DISCONTINUED | OUTPATIENT
Start: 2017-11-07 | End: 2019-01-01 | Stop reason: HOSPADM

## 2017-11-07 NOTE — PROGRESS NOTES
Much of the documentation for this visit was completed in the [facility] system. Please see the attached documentation for further details about this patient's care.     Katy Soliman

## 2017-11-13 ENCOUNTER — OFFICE VISIT (OUTPATIENT)
Dept: CARDIOLOGY | Facility: CLINIC | Age: 67
End: 2017-11-13
Payer: MEDICARE

## 2017-11-13 VITALS
WEIGHT: 227 LBS | DIASTOLIC BLOOD PRESSURE: 70 MMHG | HEIGHT: 69 IN | HEART RATE: 65 BPM | BODY MASS INDEX: 33.62 KG/M2 | SYSTOLIC BLOOD PRESSURE: 118 MMHG

## 2017-11-13 DIAGNOSIS — I70.229 CRITICAL LOWER LIMB ISCHEMIA: Primary | ICD-10-CM

## 2017-11-13 DIAGNOSIS — N18.30 BENIGN HYPERTENSIVE HEART AND KIDNEY DISEASE WITH SYSTOLIC CHF, NYHA CLASS 2 AND CKD STAGE 3: ICD-10-CM

## 2017-11-13 DIAGNOSIS — E78.2 MIXED HYPERLIPIDEMIA: ICD-10-CM

## 2017-11-13 DIAGNOSIS — E08.621 DIABETIC ULCER OF TOE OF RIGHT FOOT ASSOCIATED WITH DIABETES MELLITUS DUE TO UNDERLYING CONDITION, WITH BONE INVOLVEMENT WITHOUT EVIDENCE OF NECROSIS: ICD-10-CM

## 2017-11-13 DIAGNOSIS — L97.516 DIABETIC ULCER OF TOE OF RIGHT FOOT ASSOCIATED WITH DIABETES MELLITUS DUE TO UNDERLYING CONDITION, WITH BONE INVOLVEMENT WITHOUT EVIDENCE OF NECROSIS: ICD-10-CM

## 2017-11-13 DIAGNOSIS — I70.25 ATHEROSCLEROSIS OF NATIVE ARTERIES OF THE EXTREMITIES WITH ULCERATION: ICD-10-CM

## 2017-11-13 DIAGNOSIS — I13.0 BENIGN HYPERTENSIVE HEART AND KIDNEY DISEASE WITH SYSTOLIC CHF, NYHA CLASS 2 AND CKD STAGE 3: ICD-10-CM

## 2017-11-13 DIAGNOSIS — Z95.828 S/P FEMORAL-POPLITEAL BYPASS SURGERY: ICD-10-CM

## 2017-11-13 DIAGNOSIS — I10 ESSENTIAL HYPERTENSION: ICD-10-CM

## 2017-11-13 DIAGNOSIS — I73.9 PAD (PERIPHERAL ARTERY DISEASE): ICD-10-CM

## 2017-11-13 DIAGNOSIS — I50.20 BENIGN HYPERTENSIVE HEART AND KIDNEY DISEASE WITH SYSTOLIC CHF, NYHA CLASS 2 AND CKD STAGE 3: ICD-10-CM

## 2017-11-13 PROCEDURE — 99215 OFFICE O/P EST HI 40 MIN: CPT | Mod: S$GLB,,, | Performed by: INTERNAL MEDICINE

## 2017-11-13 PROCEDURE — 99999 PR PBB SHADOW E&M-EST. PATIENT-LVL III: CPT | Mod: PBBFAC,,, | Performed by: INTERNAL MEDICINE

## 2017-11-13 NOTE — PROGRESS NOTES
Subjective:   Patient ID:  Chau Rodarte is a 67 y.o. male who presents for evaluation and treatment of Peripheral Arterial Disease; Hyperlipidemia; Hypertension; and righ foot wound      HPI:       Chau Rodarte 67 y.o. male is here follow up and feeling well without any new complaints. He is here with his wife by recommendation of Dr. Bland (podiatry) for follow up after revascularization for R foot CLI. His R foot is improving but very slowly. He is taking antibiotics. He is on aspirin and plavix for DAPT. Dr. Bland grafted the wound on the R great toe and the heel.     He has an extensive history of PAD. S/p L pSFA PTAS with 8.0 x 40 mm Lifestent post dilated with 7 mm balloon (5/2012), S/p R fem-pop 2012 at [facility] with 6 mm PTFE graft (Occluded on US and Angio in 2017), and S/p L fem-bk POP bypass-GSV 6/2017. His L heel wound healed. He was not an ideal surgical candidate for another bypass on the R leg. He had revascularization of R SFA and POP  with re-establishment of flow to his foot. This was a complex IVUS guided intervention that required dual L retrograde cross over CFA and R AT retrograde accesses. Reverse CART was performed in the popliteal artery with a 5.0 x 100 mm balloon and retrograde Astato 30 wire. Thereafter he had multiple stents. Surveillance ultrasound on 10/8/2017 and 10/30/2017 revealed patent SFA/POP/AT and L fem-pop bypass.          PTAS prox & ostial AT with 3.0 x 38 and 4.0 x 38 mm Resolute SOULEYMANE.    PTAS of popliteal with 5.5 x 100 mm Supera stent.     PTAS of dSFA with 6.0 x 150 Supera     PTAS of mSFA with 7 x 100 Zilver,    PTAS prox SFA with 80 x 100 mm Zilver    PTAS ostial SFA with 8 x 40 mm Zilver.     2 vessel run off via AT and PER  with filling of plantar arch.          He also has CAD s/p multivessel PCI, HTN, HLP, DM, ICM with EF 45%, PAF with embolic CVA 2006, s/p CTI RFA 2/2017, and ILR for afib surveillance. He is taking clindamycin and cipro for abx           Care  team:    Baldo-pop  Smith-vas sx  Dvorin-pcp  Josh/Rich-card        Patient Active Problem List    Diagnosis Date Noted    Critical lower limb ischemia 09/15/2017     Priority: High    Diabetic ulcer of right midfoot associated with type 2 diabetes mellitus, with muscle involvement without evidence of necrosis 09/07/2017     Priority: High     Added automatically from request for surgery [id number]      Decubitus ulcer, heel, right, unstageable     Acute osteomyelitis of toe, right     Diabetic ulcer of toe of right foot associated with type 2 diabetes mellitus, with necrosis of bone     Nonhealing surgical wound     Diabetic ulcer of right heel associated with type 1 diabetes mellitus 09/07/2017    Diabetic ulcer of heel associated with type 2 diabetes mellitus     PAD (peripheral artery disease)          S/p L pSFA PTAS with 8.0 x 40 mm Lifestent    Post dilated with 7 mm balloon    S/p R fem-pop 2012 at [facility]   6 mm PTFE graft   Occluded on US and Angio in 2017       S/p L fem-bk POP bypass-GSV 6/2017      Selective R leg angiogram 9/7/2017     R CFA patent   R SFA and POP    AT and PER reconstitution proximally   DP is the only vessel providing flow to plantar arch      S/p peripheral intervention 9/15/2017       RCART with 5.0 x 100 mm and retrograde Astato 30 wire.     PTAS of AT with 3.0 x 38 and 4.0 x 38 mm Resolute SOULEYMANE.    PTAS of popliteal with 5.5 x 100 mm Supera stent.     PTAS of dSFA with 6.0 x 150 Supera     PTAS of mSFA with 7 x 100 Zilver,    PTAS prox SFA with 80 x 100 mm Zilver    PTAS ostial SFA with 8 x 40 mm Zilver.     2 vessel run off via AT and PER  with filling of plantar arch.                   Ulcer of heel due to diabetes mellitus 06/23/2017    BPH (benign prostatic hypertrophy) 06/23/2017    Debility 06/19/2017    Preop examination     Atherosclerosis of native arteries of the extremities with ulceration 04/26/2017    Paroxysmal atrial fibrillation 03/24/2017     Lower GI bleed 2017    Type 2 diabetes mellitus with peripheral circulatory disorder 2017    Acute blood loss anemia 2017    Long term (current) use of anticoagulants [Z79.01] 2017    Typical atrial flutter, s/p ablation 2017    Cryptogenic stroke, remote history () 2017    Coronary artery disease involving native coronary artery of native heart without angina pectoris 2016    Ischemic cardiomyopathy 2016    CORRY (acute kidney injury) 2016    Cardiomegaly 2016    Type 2 diabetes mellitus with diabetic polyneuropathy, with long-term current use of insulin 2016    Chronic systolic heart failure 2016    Severe obesity (BMI 35.0-35.9 with comorbidity) 2016    NSTEMI (non-ST elevated myocardial infarction) 2016    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, NYHA class 2 and CKD stage 3 2016    Essential hypertension 2014    Diabetic ulcer of toe of right foot associated with diabetes mellitus due to underlying condition, with bone involvement without evidence of necrosis 2013    HLD (hyperlipidemia) 10/24/2013    S/P femoral-popliteal bypass surgery 10/24/2013         R fem-pop in       L fem-pop in 2017                   Right Arm BP - Sittin/70  Left Arm BP - Sittin/68        LABS    LAST HbA1c  Lab Results   Component Value Date    HGBA1C 6.1 (H) 2017       Lipid panel  Lab Results   Component Value Date    CHOL 110 (L) 2017    CHOL 113 (L) 2016    CHOL 122 (L) 10/21/2016     Lab Results   Component Value Date    HDL 25 (L) 2017    HDL 31 (L) 2016    HDL 32 (L) 10/21/2016     Lab Results   Component Value Date    LDLCALC 72.2 2017    LDLCALC 72.0 2016    LDLCALC 76 10/21/2016     Lab Results   Component Value Date    TRIG 64 2017    TRIG 50 2016    TRIG 71 10/21/2016     Lab Results   Component Value Date     CHOLHDL 22.7 01/12/2017    CHOLHDL 27.4 11/29/2016    CHOLHDL 3.8 10/21/2016            Review of Systems   Constitution: Negative for diaphoresis, weakness, night sweats, weight gain and weight loss.   HENT: Negative for congestion.    Eyes: Negative for blurred vision, discharge and double vision.   Cardiovascular: Negative for chest pain, claudication, cyanosis, dyspnea on exertion, irregular heartbeat, leg swelling, near-syncope, orthopnea, palpitations, paroxysmal nocturnal dyspnea and syncope.   Respiratory: Negative for cough, shortness of breath and wheezing.    Endocrine: Negative for cold intolerance, heat intolerance and polyphagia.   Hematologic/Lymphatic: Negative for adenopathy and bleeding problem. Does not bruise/bleed easily.   Skin: Positive for poor wound healing. Negative for dry skin and nail changes.   Musculoskeletal: Negative for arthritis, back pain, falls, joint pain, myalgias and neck pain.   Gastrointestinal: Negative for bloating, abdominal pain, change in bowel habit and constipation.   Genitourinary: Negative for bladder incontinence, dysuria, flank pain, genital sores and missed menses.   Neurological: Negative for aphonia, brief paralysis, difficulty with concentration and dizziness.   Psychiatric/Behavioral: Negative for altered mental status and memory loss. The patient does not have insomnia.    Allergic/Immunologic: Negative for environmental allergies.       Objective:   Physical Exam   Constitutional: He is oriented to person, place, and time. He appears well-developed and well-nourished. He is not intubated.   HENT:   Head: Normocephalic and atraumatic.   Right Ear: External ear normal.   Left Ear: External ear normal.   Mouth/Throat: Oropharynx is clear and moist.   Eyes: Conjunctivae and EOM are normal. Pupils are equal, round, and reactive to light. Right eye exhibits no discharge. Left eye exhibits no discharge. No scleral icterus.   Neck: Normal range of motion. Neck  supple. Normal carotid pulses, no hepatojugular reflux and no JVD present. Carotid bruit is not present. No tracheal deviation present. No thyromegaly present.   Cardiovascular: Normal rate, regular rhythm, S1 normal and S2 normal.   No extrasystoles are present. PMI is not displaced.  Exam reveals no gallop, no S3, no distant heart sounds, no friction rub and no midsystolic click.    Murmur heard.   Systolic murmur is present with a grade of 2/6  at the upper right sternal border  Pulses:       Carotid pulses are 2+ on the right side, and 2+ on the left side.       Radial pulses are 2+ on the right side, and 2+ on the left side.        Femoral pulses are 2+ on the right side, and 2+ on the left side.       Popliteal pulses are 2+ on the right side, and 2+ on the left side.        Dorsalis pedis pulses are 1+ on the right side. Left dorsalis pedis pulse not accessible.        Posterior tibial pulses are 0 on the right side. Left posterior tibial pulse not accessible.       Triphasic R POP with triphasic R DP and monophasic R PT doppler signals      Biphasic L POP with monophasic L DP and faint L PT doppler signals        Pulmonary/Chest: Effort normal and breath sounds normal. No accessory muscle usage or stridor. No apnea, no tachypnea and no bradypnea. He is not intubated. No respiratory distress. He has no decreased breath sounds. He has no wheezes. He has no rales. He exhibits no tenderness and no bony tenderness.   Abdominal: He exhibits no distension, no pulsatile liver, no abdominal bruit, no ascites, no pulsatile midline mass and no mass. There is no tenderness. There is no rebound and no guarding.   Musculoskeletal: Normal range of motion. He exhibits no edema or tenderness.   Lymphadenopathy:     He has no cervical adenopathy.   Neurological: He is alert and oriented to person, place, and time. He has normal reflexes. No cranial nerve deficit. Coordination normal.   Skin: Skin is warm. No rash noted. No  erythema. No pallor.       L heel wound-healed         R great wound healing with granulating tissue      R first metatarsal plantar surface wound with fat exposure with surrounding granulating tissue      R heel wound with granulating tissue       See images         Psychiatric: He has a normal mood and affect. His behavior is normal. Judgment and thought content normal.     8/28/2017              10/15/2017    Left heel       Right heel      R lateral great toe     right great toe      11/13/2017                      Assessment:     1. Critical lower limb ischemia    2. Mixed hyperlipidemia    3. S/P femoral-popliteal bypass surgery    4. Essential hypertension    5. 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, NYHA class 2 and CKD stage 3    6. Atherosclerosis of native arteries of the extremities with ulceration    7. PAD (peripheral artery disease)    8. Diabetic ulcer of toe of right foot associated with diabetes mellitus due to underlying condition, with bone involvement without evidence of necrosis        Plan:         He is doing well regarding his wounds        Left heel wound healed after skin graft and L fem-pop      R foot wounds (heel, great, and lateral first MT) healing but slowly         He will benefit from hyperbaric treatment in addition to abx, revascularization, and wound care        Surveillance ultrasound in 4 weeks  Follow up in 4 weeks      Continue with wound care  Continue with antibiotics      Risk factors modification          Continue with current medical plan and lifestyle changes.  Return sooner for concerns or questions. If symptoms persist go to the ED  I have reviewed all pertinent data on this patient       I have reviewed the patient's medical history in detail and updated the computerized patient record.    Orders Placed This Encounter   Procedures    US Lower Extremity Arteries Bilateral     Standing Status:   Future     Standing Expiration Date:    11/13/2018       Follow up as scheduled. Return sooner for concerns or questions            He expressed verbal understanding and agreed with the plan        Greater than 50% of the visit of 45 minutes was spent counseling, educating, and coordinating the care of the patient.  -In today's visit, at least 4 established conditions that pose a risk to life or bodily function have been addressed and the conditions are severe.    -In today's visit, monitoring for drug toxicity was accomplished.            Patient's Medications   New Prescriptions    No medications on file   Previous Medications    ASPIRIN 81 MG CHEW    Take 1 tablet (81 mg total) by mouth once daily.    ATORVASTATIN (LIPITOR) 80 MG TABLET    Take 1 tablet (80 mg total) by mouth once daily.    CHOLECALCIFEROL, VITAMIN D3, (VITAMIN D3) 5,000 UNIT TAB    Take 5,000 Units by mouth once daily.    CIPROFLOXACIN HCL (CIPRO) 750 MG TABLET    Take 1 tablet (750 mg total) by mouth every 12 (twelve) hours.    CLINDAMYCIN (CLEOCIN) 300 MG CAPSULE    Take 1 capsule (300 mg total) by mouth 3 (three) times daily.    CLOPIDOGREL (PLAVIX) 75 MG TABLET    Take 1 tablet (75 mg total) by mouth once daily.    COLLAGENASE (SANTYL) OINTMENT    Apply topically once daily.    DICLOFENAC SODIUM 1 % GEL    Apply 2 g topically 3 (three) times daily.    FERROUS SULFATE 325 (65 FE) MG EC TABLET    Take 1 tablet (325 mg total) by mouth once daily.    FUROSEMIDE (LASIX) 40 MG TABLET    Take 1 tablet (40 mg total) by mouth once daily.    GABAPENTIN (NEURONTIN) 300 MG CAPSULE    Take 300 mg by mouth 2 (two) times daily.    INSULIN DETEMIR (LEVEMIR FLEXTOUCH) 100 UNIT/ML (3 ML) SUBQ INPN PEN    Inject 16 Units into the skin every evening.    ISOSORBIDE DINITRATE (ISORDIL) 20 MG TABLET    Take 1 tablet (20 mg total) by mouth 3 (three) times daily.    LIRAGLUTIDE 0.6 MG/0.1 ML, 18 MG/3 ML, SUBQ PNIJ (VICTOZA 3-SAGAR) 0.6 MG/0.1 ML (18 MG/3 ML) PNIJ    Inject 1.2 mg into the skin once daily.  "   METOPROLOL SUCCINATE (TOPROL-XL) 25 MG 24 HR TABLET    Take 1 tablet (25 mg total) by mouth once daily.    OXYCODONE-ACETAMINOPHEN (PERCOCET)  MG PER TABLET    Take 1 tablet by mouth every 6 (six) hours as needed for Pain.    PEN NEEDLE, DIABETIC (BD ULTRA-FINE HANG PEN NEEDLES) 32 GAUGE X 5/32" NDLE    Uses 2 times daily, on  insulin injections    SENNA-DOCUSATE 8.6-50 MG (PERICOLACE) 8.6-50 MG PER TABLET    Take 1 tablet by mouth once daily.    TAMSULOSIN (FLOMAX) 0.4 MG CP24    Take 1 capsule (0.4 mg total) by mouth once daily.   Modified Medications    No medications on file   Discontinued Medications    No medications on file      "

## 2017-11-16 ENCOUNTER — HOSPITAL ENCOUNTER (OUTPATIENT)
Dept: WOUND CARE | Facility: HOSPITAL | Age: 67
Discharge: HOME | End: 2017-11-16
Attending: PODIATRIST
Payer: MEDICARE

## 2017-11-16 VITALS
WEIGHT: 227 LBS | HEIGHT: 69 IN | BODY MASS INDEX: 33.62 KG/M2 | HEART RATE: 80 BPM | SYSTOLIC BLOOD PRESSURE: 144 MMHG | DIASTOLIC BLOOD PRESSURE: 68 MMHG | TEMPERATURE: 98 F

## 2017-11-16 DIAGNOSIS — I73.9 PAD (PERIPHERAL ARTERY DISEASE): ICD-10-CM

## 2017-11-16 DIAGNOSIS — L97.516 DIABETIC ULCER OF TOE OF RIGHT FOOT ASSOCIATED WITH DIABETES MELLITUS DUE TO UNDERLYING CONDITION, WITH BONE INVOLVEMENT WITHOUT EVIDENCE OF NECROSIS: ICD-10-CM

## 2017-11-16 DIAGNOSIS — E11.621 DIABETIC ULCER OF RIGHT MIDFOOT ASSOCIATED WITH TYPE 2 DIABETES MELLITUS, WITH MUSCLE INVOLVEMENT WITHOUT EVIDENCE OF NECROSIS: ICD-10-CM

## 2017-11-16 DIAGNOSIS — L97.412 DIABETIC ULCER OF RIGHT HEEL ASSOCIATED WITH TYPE 2 DIABETES MELLITUS, WITH FAT LAYER EXPOSED: Primary | ICD-10-CM

## 2017-11-16 DIAGNOSIS — L97.415 DIABETIC ULCER OF RIGHT MIDFOOT ASSOCIATED WITH TYPE 2 DIABETES MELLITUS, WITH MUSCLE INVOLVEMENT WITHOUT EVIDENCE OF NECROSIS: ICD-10-CM

## 2017-11-16 DIAGNOSIS — M86.171 ACUTE OSTEOMYELITIS OF TOE, RIGHT: ICD-10-CM

## 2017-11-16 DIAGNOSIS — E11.621 DIABETIC ULCER OF RIGHT HEEL ASSOCIATED WITH TYPE 2 DIABETES MELLITUS, WITH FAT LAYER EXPOSED: Primary | ICD-10-CM

## 2017-11-16 DIAGNOSIS — E08.621 DIABETIC ULCER OF TOE OF RIGHT FOOT ASSOCIATED WITH DIABETES MELLITUS DUE TO UNDERLYING CONDITION, WITH BONE INVOLVEMENT WITHOUT EVIDENCE OF NECROSIS: ICD-10-CM

## 2017-11-16 PROCEDURE — 15275 PR SKIN SUB GRAFT FACE/NK/HF/G UP TO 100 SQCM: ICD-10-PCS | Mod: ,,, | Performed by: PODIATRIST

## 2017-11-16 PROCEDURE — 15004 WOUND PREP F/N/HF/G: CPT | Mod: ,,, | Performed by: PODIATRIST

## 2017-11-16 PROCEDURE — 15275 SKIN SUB GRAFT FACE/NK/HF/G: CPT

## 2017-11-16 PROCEDURE — 15275 SKIN SUB GRAFT FACE/NK/HF/G: CPT | Mod: ,,, | Performed by: PODIATRIST

## 2017-11-16 PROCEDURE — 15004 PR WND PREP PED, FACE/NCK/HND/FT/GEN 1ST 100 CM: ICD-10-PCS | Mod: ,,, | Performed by: PODIATRIST

## 2017-11-27 ENCOUNTER — CLINICAL SUPPORT (OUTPATIENT)
Dept: LAB | Facility: HOSPITAL | Age: 67
End: 2017-11-27
Attending: EMERGENCY MEDICINE
Payer: MEDICARE

## 2017-11-27 ENCOUNTER — HOSPITAL ENCOUNTER (OUTPATIENT)
Dept: RADIOLOGY | Facility: HOSPITAL | Age: 67
Discharge: HOME | End: 2017-11-27
Attending: EMERGENCY MEDICINE
Payer: MEDICARE

## 2017-11-27 DIAGNOSIS — N18.30 BENIGN HYPERTENSIVE HEART AND KIDNEY DISEASE WITH SYSTOLIC CHF, NYHA CLASS 2 AND CKD STAGE 3: ICD-10-CM

## 2017-11-27 DIAGNOSIS — I13.0 BENIGN HYPERTENSIVE HEART AND KIDNEY DISEASE WITH SYSTOLIC CHF, NYHA CLASS 2 AND CKD STAGE 3: ICD-10-CM

## 2017-11-27 DIAGNOSIS — I50.20 BENIGN HYPERTENSIVE HEART AND KIDNEY DISEASE WITH SYSTOLIC CHF, NYHA CLASS 2 AND CKD STAGE 3: ICD-10-CM

## 2017-11-27 PROCEDURE — 71020 XR CHEST PA AND LATERAL: CPT | Mod: 26,,, | Performed by: RADIOLOGY

## 2017-11-27 PROCEDURE — 93010 EKG 12-LEAD: ICD-10-PCS | Mod: ,,, | Performed by: INTERNAL MEDICINE

## 2017-11-27 PROCEDURE — 93010 ELECTROCARDIOGRAM REPORT: CPT | Mod: ,,, | Performed by: INTERNAL MEDICINE

## 2017-11-27 PROCEDURE — 71020 XR CHEST PA AND LATERAL: CPT | Mod: TC

## 2017-11-27 PROCEDURE — 93005 ELECTROCARDIOGRAM TRACING: CPT

## 2017-11-28 ENCOUNTER — HOSPITAL ENCOUNTER (OUTPATIENT)
Dept: WOUND CARE | Facility: HOSPITAL | Age: 67
Discharge: HOME | End: 2017-11-28
Attending: PODIATRIST
Payer: MEDICARE

## 2017-11-28 VITALS — DIASTOLIC BLOOD PRESSURE: 89 MMHG | TEMPERATURE: 97 F | SYSTOLIC BLOOD PRESSURE: 154 MMHG | HEART RATE: 73 BPM

## 2017-11-28 DIAGNOSIS — E11.621 TYPE 2 DIABETES WITH SKIN ULCER OF FOOT: ICD-10-CM

## 2017-11-28 DIAGNOSIS — L97.509 TYPE 2 DIABETES WITH SKIN ULCER OF FOOT: ICD-10-CM

## 2017-11-28 PROCEDURE — 15275 SKIN SUB GRAFT FACE/NK/HF/G: CPT

## 2017-11-29 ENCOUNTER — HOSPITAL ENCOUNTER (OUTPATIENT)
Dept: WOUND CARE | Facility: HOSPITAL | Age: 67
Discharge: HOME | End: 2017-11-29
Attending: SURGERY
Payer: MEDICARE

## 2017-11-29 VITALS
WEIGHT: 227 LBS | SYSTOLIC BLOOD PRESSURE: 139 MMHG | HEIGHT: 69 IN | TEMPERATURE: 97 F | RESPIRATION RATE: 16 BRPM | DIASTOLIC BLOOD PRESSURE: 71 MMHG | BODY MASS INDEX: 33.62 KG/M2 | HEART RATE: 72 BPM

## 2017-11-29 DIAGNOSIS — M86.60 CHRONIC OSTEOMYELITIS: ICD-10-CM

## 2017-11-29 PROCEDURE — G0277 HBOT, FULL BODY CHAMBER, 30M: HCPCS | Mod: CCAT

## 2017-11-30 ENCOUNTER — HOSPITAL ENCOUNTER (OUTPATIENT)
Dept: WOUND CARE | Facility: HOSPITAL | Age: 67
Discharge: HOME | End: 2017-11-30
Attending: PODIATRIST
Payer: MEDICARE

## 2017-11-30 VITALS
BODY MASS INDEX: 34.4 KG/M2 | HEART RATE: 71 BPM | HEIGHT: 68 IN | SYSTOLIC BLOOD PRESSURE: 157 MMHG | TEMPERATURE: 97 F | WEIGHT: 227 LBS | RESPIRATION RATE: 16 BRPM | DIASTOLIC BLOOD PRESSURE: 78 MMHG

## 2017-11-30 DIAGNOSIS — E11.621 DIABETIC FOOT ULCER WITH OSTEOMYELITIS: ICD-10-CM

## 2017-11-30 DIAGNOSIS — L97.509 DIABETIC FOOT ULCER WITH OSTEOMYELITIS: ICD-10-CM

## 2017-11-30 DIAGNOSIS — E11.69 DIABETIC FOOT ULCER WITH OSTEOMYELITIS: ICD-10-CM

## 2017-11-30 DIAGNOSIS — M86.9 DIABETIC FOOT ULCER WITH OSTEOMYELITIS: ICD-10-CM

## 2017-11-30 DIAGNOSIS — M86.60 OSTEOMYELITIS, CHRONIC: ICD-10-CM

## 2017-11-30 PROCEDURE — G0277 HBOT, FULL BODY CHAMBER, 30M: HCPCS | Mod: CCAT

## 2017-12-01 ENCOUNTER — HOSPITAL ENCOUNTER (OUTPATIENT)
Dept: WOUND CARE | Facility: HOSPITAL | Age: 67
Discharge: HOME | End: 2017-12-01
Attending: PODIATRIST
Payer: MEDICARE

## 2017-12-01 VITALS
HEART RATE: 74 BPM | HEIGHT: 68 IN | WEIGHT: 227 LBS | SYSTOLIC BLOOD PRESSURE: 133 MMHG | DIASTOLIC BLOOD PRESSURE: 74 MMHG | RESPIRATION RATE: 18 BRPM | TEMPERATURE: 97 F | BODY MASS INDEX: 34.4 KG/M2

## 2017-12-01 DIAGNOSIS — E11.621 DIABETIC FOOT ULCER WITH OSTEOMYELITIS: ICD-10-CM

## 2017-12-01 DIAGNOSIS — E11.69 DIABETIC FOOT ULCER WITH OSTEOMYELITIS: ICD-10-CM

## 2017-12-01 DIAGNOSIS — L97.509 DIABETIC FOOT ULCER WITH OSTEOMYELITIS: ICD-10-CM

## 2017-12-01 DIAGNOSIS — M86.9 DIABETIC FOOT ULCER WITH OSTEOMYELITIS: ICD-10-CM

## 2017-12-01 PROCEDURE — G0277 HBOT, FULL BODY CHAMBER, 30M: HCPCS | Mod: CCAT

## 2017-12-04 ENCOUNTER — HOSPITAL ENCOUNTER (OUTPATIENT)
Dept: WOUND CARE | Facility: HOSPITAL | Age: 67
Discharge: HOME | End: 2017-12-04
Attending: PODIATRIST
Payer: MEDICARE

## 2017-12-04 VITALS
WEIGHT: 227 LBS | HEART RATE: 68 BPM | RESPIRATION RATE: 16 BRPM | BODY MASS INDEX: 33.62 KG/M2 | TEMPERATURE: 97 F | SYSTOLIC BLOOD PRESSURE: 142 MMHG | HEIGHT: 69 IN | DIASTOLIC BLOOD PRESSURE: 67 MMHG

## 2017-12-04 DIAGNOSIS — L97.509 DIABETIC FOOT ULCER WITH OSTEOMYELITIS: ICD-10-CM

## 2017-12-04 DIAGNOSIS — M86.9 DIABETIC FOOT ULCER WITH OSTEOMYELITIS: ICD-10-CM

## 2017-12-04 DIAGNOSIS — E11.621 DIABETIC FOOT ULCER WITH OSTEOMYELITIS: ICD-10-CM

## 2017-12-04 DIAGNOSIS — E11.69 DIABETIC FOOT ULCER WITH OSTEOMYELITIS: ICD-10-CM

## 2017-12-04 PROCEDURE — G0277 HBOT, FULL BODY CHAMBER, 30M: HCPCS | Mod: CCAT

## 2017-12-05 ENCOUNTER — HOSPITAL ENCOUNTER (OUTPATIENT)
Dept: WOUND CARE | Facility: HOSPITAL | Age: 67
Discharge: HOME | End: 2017-12-05
Attending: PODIATRIST
Payer: MEDICARE

## 2017-12-05 ENCOUNTER — TELEPHONE (OUTPATIENT)
Dept: ELECTROPHYSIOLOGY | Facility: CLINIC | Age: 67
End: 2017-12-05

## 2017-12-05 ENCOUNTER — HOSPITAL ENCOUNTER (OUTPATIENT)
Dept: RADIOLOGY | Facility: HOSPITAL | Age: 67
Discharge: HOME | End: 2017-12-05
Attending: INTERNAL MEDICINE
Payer: MEDICARE

## 2017-12-05 VITALS
DIASTOLIC BLOOD PRESSURE: 78 MMHG | HEIGHT: 69 IN | WEIGHT: 227 LBS | BODY MASS INDEX: 33.62 KG/M2 | SYSTOLIC BLOOD PRESSURE: 160 MMHG | TEMPERATURE: 97 F | HEART RATE: 70 BPM

## 2017-12-05 VITALS
HEART RATE: 70 BPM | SYSTOLIC BLOOD PRESSURE: 160 MMHG | WEIGHT: 227 LBS | HEIGHT: 69 IN | BODY MASS INDEX: 33.62 KG/M2 | RESPIRATION RATE: 16 BRPM | DIASTOLIC BLOOD PRESSURE: 78 MMHG | TEMPERATURE: 97 F

## 2017-12-05 DIAGNOSIS — I70.229 CRITICAL LOWER LIMB ISCHEMIA: ICD-10-CM

## 2017-12-05 DIAGNOSIS — L98.499 DIABETES MELLITUS WITH SKIN ULCER: ICD-10-CM

## 2017-12-05 DIAGNOSIS — L97.509 DIABETIC FOOT ULCER WITH OSTEOMYELITIS: ICD-10-CM

## 2017-12-05 DIAGNOSIS — L97.509 DIABETIC FOOT ULCER WITH OSTEOMYELITIS: Primary | ICD-10-CM

## 2017-12-05 DIAGNOSIS — I73.9 PAD (PERIPHERAL ARTERY DISEASE): ICD-10-CM

## 2017-12-05 DIAGNOSIS — E11.69 DIABETIC FOOT ULCER WITH OSTEOMYELITIS: ICD-10-CM

## 2017-12-05 DIAGNOSIS — M86.9 DIABETIC FOOT ULCER WITH OSTEOMYELITIS: Primary | ICD-10-CM

## 2017-12-05 DIAGNOSIS — M86.9 DIABETIC FOOT ULCER WITH OSTEOMYELITIS: ICD-10-CM

## 2017-12-05 DIAGNOSIS — E11.621 DIABETIC FOOT ULCER WITH OSTEOMYELITIS: ICD-10-CM

## 2017-12-05 DIAGNOSIS — M86.60 CHRONIC OSTEOMYELITIS WITH OR WITHOUT PERIOSTITIS: ICD-10-CM

## 2017-12-05 DIAGNOSIS — E11.69 DIABETIC FOOT ULCER WITH OSTEOMYELITIS: Primary | ICD-10-CM

## 2017-12-05 DIAGNOSIS — E11.621 DIABETIC FOOT ULCER WITH OSTEOMYELITIS: Primary | ICD-10-CM

## 2017-12-05 DIAGNOSIS — E11.622 DIABETES MELLITUS WITH SKIN ULCER: ICD-10-CM

## 2017-12-05 PROCEDURE — 93925 LOWER EXTREMITY STUDY: CPT | Mod: TC

## 2017-12-05 PROCEDURE — 11042 DBRDMT SUBQ TIS 1ST 20SQCM/<: CPT

## 2017-12-05 PROCEDURE — 15275 SKIN SUB GRAFT FACE/NK/HF/G: CPT | Mod: ,,, | Performed by: PODIATRIST

## 2017-12-05 PROCEDURE — 15275 SKIN SUB GRAFT FACE/NK/HF/G: CPT

## 2017-12-05 PROCEDURE — 15275 PR SKIN SUB GRAFT FACE/NK/HF/G UP TO 100 SQCM: ICD-10-PCS | Mod: ,,, | Performed by: PODIATRIST

## 2017-12-05 PROCEDURE — 93925 LOWER EXTREMITY STUDY: CPT | Mod: 26,,, | Performed by: RADIOLOGY

## 2017-12-05 PROCEDURE — G0277 HBOT, FULL BODY CHAMBER, 30M: HCPCS | Mod: CCAT

## 2017-12-05 PROCEDURE — 27201912 HC WOUND CARE DEBRIDEMENT SUPPLIES

## 2017-12-05 PROCEDURE — 99183 PR HYPERBARIC OXYGEN THERAPY ATTENDANCE/SUPERVISION, PER SESSION: ICD-10-PCS | Mod: ,,, | Performed by: SURGERY

## 2017-12-05 PROCEDURE — 99183 HYPERBARIC OXYGEN THERAPY: CPT | Mod: ,,, | Performed by: SURGERY

## 2017-12-05 NOTE — PROGRESS NOTES
Much of the documentation for this visit was completed in the [facility] system. Please see the attached documentation for further details about this patient's care.     Franklyn Jones Jr, RRT

## 2017-12-05 NOTE — TELEPHONE ENCOUNTER
----- Message from Cayden Moreno MD sent at 12/5/2017  3:35 PM CST -----  Thanks, he unfortunately refuses anticoagulation.     ----- Message -----  From: Anay Ware RN  Sent: 12/5/2017   1:44 PM  To: Cayden Moreno MD    Hi Dr. Moreno,     ILR alert received today for AF event on 12/3/17 at 08:11 AM. Episode lasted 1 hr 20 mins, and max v rate 120. Median v rate 77. Pt currently off blood thinners per your last clinic note and waiting for NICKO ligation procedure by Dr Bobby once foot ulcers heal.     I placed the transmission on your desk for review. Please let me know if any additional info needed.     Anay

## 2017-12-06 ENCOUNTER — HOSPITAL ENCOUNTER (OUTPATIENT)
Dept: WOUND CARE | Facility: HOSPITAL | Age: 67
Discharge: HOME | End: 2017-12-06
Attending: PODIATRIST
Payer: MEDICARE

## 2017-12-06 VITALS
WEIGHT: 227 LBS | HEART RATE: 72 BPM | DIASTOLIC BLOOD PRESSURE: 75 MMHG | TEMPERATURE: 97 F | BODY MASS INDEX: 33.62 KG/M2 | SYSTOLIC BLOOD PRESSURE: 144 MMHG | RESPIRATION RATE: 16 BRPM | HEIGHT: 69 IN

## 2017-12-06 DIAGNOSIS — E11.621 DIABETIC FOOT ULCER WITH OSTEOMYELITIS: ICD-10-CM

## 2017-12-06 DIAGNOSIS — E11.69 DIABETIC FOOT ULCER WITH OSTEOMYELITIS: ICD-10-CM

## 2017-12-06 DIAGNOSIS — M86.9 DIABETIC FOOT ULCER WITH OSTEOMYELITIS: ICD-10-CM

## 2017-12-06 DIAGNOSIS — L97.509 DIABETIC FOOT ULCER WITH OSTEOMYELITIS: ICD-10-CM

## 2017-12-06 PROCEDURE — G0277 HBOT, FULL BODY CHAMBER, 30M: HCPCS | Mod: CCAT

## 2017-12-06 NOTE — PROGRESS NOTES
The right leg stents are open as well the bypass on the left leg      Good news      Continue with wound care      ZN

## 2017-12-07 ENCOUNTER — HOSPITAL ENCOUNTER (OUTPATIENT)
Dept: WOUND CARE | Facility: HOSPITAL | Age: 67
Discharge: HOME | End: 2017-12-07
Attending: PODIATRIST
Payer: MEDICARE

## 2017-12-07 VITALS
HEART RATE: 98 BPM | DIASTOLIC BLOOD PRESSURE: 72 MMHG | WEIGHT: 227 LBS | HEIGHT: 69 IN | TEMPERATURE: 98 F | RESPIRATION RATE: 16 BRPM | BODY MASS INDEX: 33.62 KG/M2 | SYSTOLIC BLOOD PRESSURE: 121 MMHG

## 2017-12-07 DIAGNOSIS — L97.509 DIABETIC FOOT ULCER WITH OSTEOMYELITIS: ICD-10-CM

## 2017-12-07 DIAGNOSIS — M86.9 DIABETIC FOOT ULCER WITH OSTEOMYELITIS: ICD-10-CM

## 2017-12-07 DIAGNOSIS — E11.69 DIABETIC FOOT ULCER WITH OSTEOMYELITIS: ICD-10-CM

## 2017-12-07 DIAGNOSIS — E11.621 DIABETIC FOOT ULCER WITH OSTEOMYELITIS: ICD-10-CM

## 2017-12-07 PROCEDURE — G0277 HBOT, FULL BODY CHAMBER, 30M: HCPCS | Mod: CCAT

## 2017-12-08 ENCOUNTER — HOSPITAL ENCOUNTER (OUTPATIENT)
Dept: WOUND CARE | Facility: HOSPITAL | Age: 67
Discharge: HOME | End: 2017-12-08
Attending: PODIATRIST
Payer: MEDICARE

## 2017-12-08 VITALS
HEIGHT: 69 IN | SYSTOLIC BLOOD PRESSURE: 112 MMHG | TEMPERATURE: 98 F | RESPIRATION RATE: 16 BRPM | BODY MASS INDEX: 33.62 KG/M2 | WEIGHT: 227 LBS | DIASTOLIC BLOOD PRESSURE: 60 MMHG | HEART RATE: 67 BPM

## 2017-12-08 DIAGNOSIS — E11.69 DIABETIC FOOT ULCER WITH OSTEOMYELITIS: ICD-10-CM

## 2017-12-08 DIAGNOSIS — E11.622 DIABETES MELLITUS WITH SKIN ULCER: ICD-10-CM

## 2017-12-08 DIAGNOSIS — L98.499 DIABETES MELLITUS WITH SKIN ULCER: ICD-10-CM

## 2017-12-08 DIAGNOSIS — E11.621 DIABETIC FOOT ULCER WITH OSTEOMYELITIS: ICD-10-CM

## 2017-12-08 DIAGNOSIS — L97.509 DIABETIC FOOT ULCER WITH OSTEOMYELITIS: ICD-10-CM

## 2017-12-08 DIAGNOSIS — M86.9 DIABETIC FOOT ULCER WITH OSTEOMYELITIS: ICD-10-CM

## 2017-12-08 PROCEDURE — 99213 OFFICE O/P EST LOW 20 MIN: CPT | Mod: 25

## 2017-12-08 PROCEDURE — G0277 HBOT, FULL BODY CHAMBER, 30M: HCPCS | Mod: CCAT

## 2017-12-11 ENCOUNTER — HOSPITAL ENCOUNTER (OUTPATIENT)
Dept: WOUND CARE | Facility: HOSPITAL | Age: 67
Discharge: HOME | End: 2017-12-11
Attending: PODIATRIST
Payer: MEDICARE

## 2017-12-11 ENCOUNTER — OFFICE VISIT (OUTPATIENT)
Dept: CARDIOLOGY | Facility: CLINIC | Age: 67
End: 2017-12-11
Payer: MEDICARE

## 2017-12-11 VITALS
RESPIRATION RATE: 16 BRPM | TEMPERATURE: 97 F | WEIGHT: 237 LBS | DIASTOLIC BLOOD PRESSURE: 86 MMHG | HEIGHT: 68 IN | SYSTOLIC BLOOD PRESSURE: 148 MMHG | HEART RATE: 70 BPM | BODY MASS INDEX: 35.92 KG/M2

## 2017-12-11 VITALS
HEART RATE: 80 BPM | DIASTOLIC BLOOD PRESSURE: 75 MMHG | BODY MASS INDEX: 35.92 KG/M2 | WEIGHT: 237 LBS | SYSTOLIC BLOOD PRESSURE: 130 MMHG | HEIGHT: 68 IN

## 2017-12-11 DIAGNOSIS — I70.229 CRITICAL LOWER LIMB ISCHEMIA: Primary | ICD-10-CM

## 2017-12-11 DIAGNOSIS — I25.10 CORONARY ARTERY DISEASE INVOLVING NATIVE CORONARY ARTERY OF NATIVE HEART WITHOUT ANGINA PECTORIS: ICD-10-CM

## 2017-12-11 DIAGNOSIS — I73.9 PAD (PERIPHERAL ARTERY DISEASE): ICD-10-CM

## 2017-12-11 DIAGNOSIS — E78.2 MIXED HYPERLIPIDEMIA: ICD-10-CM

## 2017-12-11 DIAGNOSIS — I10 ESSENTIAL HYPERTENSION: ICD-10-CM

## 2017-12-11 DIAGNOSIS — E11.621 DIABETIC FOOT ULCER WITH OSTEOMYELITIS: ICD-10-CM

## 2017-12-11 DIAGNOSIS — L97.509 DIABETIC FOOT ULCER WITH OSTEOMYELITIS: ICD-10-CM

## 2017-12-11 DIAGNOSIS — L97.514 DIABETIC ULCER OF TOE OF RIGHT FOOT ASSOCIATED WITH TYPE 2 DIABETES MELLITUS, WITH NECROSIS OF BONE: ICD-10-CM

## 2017-12-11 DIAGNOSIS — M86.9 DIABETIC FOOT ULCER WITH OSTEOMYELITIS: ICD-10-CM

## 2017-12-11 DIAGNOSIS — E11.69 DIABETIC FOOT ULCER WITH OSTEOMYELITIS: ICD-10-CM

## 2017-12-11 DIAGNOSIS — E11.621 DIABETIC ULCER OF TOE OF RIGHT FOOT ASSOCIATED WITH TYPE 2 DIABETES MELLITUS, WITH NECROSIS OF BONE: ICD-10-CM

## 2017-12-11 DIAGNOSIS — Z95.828 S/P FEMORAL-POPLITEAL BYPASS SURGERY: ICD-10-CM

## 2017-12-11 PROCEDURE — 99999 PR PBB SHADOW E&M-EST. PATIENT-LVL III: CPT | Mod: PBBFAC,,, | Performed by: INTERNAL MEDICINE

## 2017-12-11 PROCEDURE — 99499 UNLISTED E&M SERVICE: CPT | Mod: S$GLB,,, | Performed by: INTERNAL MEDICINE

## 2017-12-11 PROCEDURE — 99214 OFFICE O/P EST MOD 30 MIN: CPT | Mod: S$GLB,,, | Performed by: INTERNAL MEDICINE

## 2017-12-11 PROCEDURE — G0277 HBOT, FULL BODY CHAMBER, 30M: HCPCS | Mod: CCAT

## 2017-12-11 RX ORDER — PANTOPRAZOLE SODIUM 20 MG/1
TABLET, DELAYED RELEASE ORAL
COMMUNITY
Start: 2017-11-24 | End: 2019-01-14

## 2017-12-12 ENCOUNTER — HOSPITAL ENCOUNTER (OUTPATIENT)
Dept: WOUND CARE | Facility: HOSPITAL | Age: 67
Discharge: HOME | End: 2017-12-12
Attending: PODIATRIST
Payer: MEDICARE

## 2017-12-12 VITALS — DIASTOLIC BLOOD PRESSURE: 77 MMHG | HEART RATE: 62 BPM | SYSTOLIC BLOOD PRESSURE: 137 MMHG | TEMPERATURE: 98 F

## 2017-12-12 VITALS
HEART RATE: 62 BPM | WEIGHT: 237 LBS | HEIGHT: 69 IN | BODY MASS INDEX: 35.1 KG/M2 | DIASTOLIC BLOOD PRESSURE: 77 MMHG | TEMPERATURE: 98 F | SYSTOLIC BLOOD PRESSURE: 137 MMHG

## 2017-12-12 DIAGNOSIS — M86.9 DIABETIC FOOT ULCER WITH OSTEOMYELITIS: ICD-10-CM

## 2017-12-12 DIAGNOSIS — L97.509 DIABETIC FOOT ULCER WITH OSTEOMYELITIS: Primary | ICD-10-CM

## 2017-12-12 DIAGNOSIS — M86.9 DIABETIC FOOT ULCER WITH OSTEOMYELITIS: Primary | ICD-10-CM

## 2017-12-12 DIAGNOSIS — N18.30 BENIGN HYPERTENSIVE HEART AND KIDNEY DISEASE WITH SYSTOLIC CHF, NYHA CLASS 2 AND CKD STAGE 3: ICD-10-CM

## 2017-12-12 DIAGNOSIS — E11.69 DIABETIC FOOT ULCER WITH OSTEOMYELITIS: ICD-10-CM

## 2017-12-12 DIAGNOSIS — I50.20 BENIGN HYPERTENSIVE HEART AND KIDNEY DISEASE WITH SYSTOLIC CHF, NYHA CLASS 2 AND CKD STAGE 3: ICD-10-CM

## 2017-12-12 DIAGNOSIS — I25.10 CORONARY ARTERY DISEASE INVOLVING NATIVE CORONARY ARTERY OF NATIVE HEART WITHOUT ANGINA PECTORIS: ICD-10-CM

## 2017-12-12 DIAGNOSIS — I25.5 ISCHEMIC CARDIOMYOPATHY: ICD-10-CM

## 2017-12-12 DIAGNOSIS — I73.9 PAD (PERIPHERAL ARTERY DISEASE): ICD-10-CM

## 2017-12-12 DIAGNOSIS — E11.621 DIABETIC FOOT ULCER WITH OSTEOMYELITIS: Primary | ICD-10-CM

## 2017-12-12 DIAGNOSIS — E11.621 DIABETIC FOOT ULCER WITH OSTEOMYELITIS: ICD-10-CM

## 2017-12-12 DIAGNOSIS — L97.509 DIABETIC FOOT ULCER WITH OSTEOMYELITIS: ICD-10-CM

## 2017-12-12 DIAGNOSIS — I13.0 BENIGN HYPERTENSIVE HEART AND KIDNEY DISEASE WITH SYSTOLIC CHF, NYHA CLASS 2 AND CKD STAGE 3: ICD-10-CM

## 2017-12-12 DIAGNOSIS — E11.69 DIABETIC FOOT ULCER WITH OSTEOMYELITIS: Primary | ICD-10-CM

## 2017-12-12 PROCEDURE — 15275 SKIN SUB GRAFT FACE/NK/HF/G: CPT | Mod: ,,, | Performed by: PODIATRIST

## 2017-12-12 PROCEDURE — 15275 PR SKIN SUB GRAFT FACE/NK/HF/G UP TO 100 SQCM: ICD-10-PCS | Mod: ,,, | Performed by: PODIATRIST

## 2017-12-12 PROCEDURE — 99183 HYPERBARIC OXYGEN THERAPY: CPT | Mod: ,,, | Performed by: SURGERY

## 2017-12-12 PROCEDURE — G0277 HBOT, FULL BODY CHAMBER, 30M: HCPCS | Mod: CCAT

## 2017-12-12 PROCEDURE — 15275 SKIN SUB GRAFT FACE/NK/HF/G: CPT

## 2017-12-12 PROCEDURE — 99183 PR HYPERBARIC OXYGEN THERAPY ATTENDANCE/SUPERVISION, PER SESSION: ICD-10-PCS | Mod: ,,, | Performed by: SURGERY

## 2017-12-13 ENCOUNTER — HOSPITAL ENCOUNTER (OUTPATIENT)
Dept: WOUND CARE | Facility: HOSPITAL | Age: 67
Discharge: HOME | End: 2017-12-13
Attending: PODIATRIST
Payer: MEDICARE

## 2017-12-13 DIAGNOSIS — E11.621 DIABETIC FOOT ULCER WITH OSTEOMYELITIS: ICD-10-CM

## 2017-12-13 DIAGNOSIS — M86.9 DIABETIC FOOT ULCER WITH OSTEOMYELITIS: ICD-10-CM

## 2017-12-13 DIAGNOSIS — L97.509 DIABETIC FOOT ULCER WITH OSTEOMYELITIS: ICD-10-CM

## 2017-12-13 DIAGNOSIS — E11.69 DIABETIC FOOT ULCER WITH OSTEOMYELITIS: ICD-10-CM

## 2017-12-13 PROCEDURE — G0277 HBOT, FULL BODY CHAMBER, 30M: HCPCS | Mod: CCAT

## 2017-12-14 ENCOUNTER — HOSPITAL ENCOUNTER (OUTPATIENT)
Dept: WOUND CARE | Facility: HOSPITAL | Age: 67
Discharge: HOME | End: 2017-12-14
Attending: PODIATRIST
Payer: MEDICARE

## 2017-12-14 VITALS
RESPIRATION RATE: 16 BRPM | BODY MASS INDEX: 35.1 KG/M2 | WEIGHT: 237 LBS | DIASTOLIC BLOOD PRESSURE: 78 MMHG | HEART RATE: 78 BPM | HEIGHT: 69 IN | TEMPERATURE: 97 F | SYSTOLIC BLOOD PRESSURE: 124 MMHG

## 2017-12-14 DIAGNOSIS — M86.9 DIABETIC FOOT ULCER WITH OSTEOMYELITIS: ICD-10-CM

## 2017-12-14 DIAGNOSIS — M86.60 CHRONIC OSTEOMYELITIS WITH OR WITHOUT PERIOSTITIS: ICD-10-CM

## 2017-12-14 DIAGNOSIS — L98.499 DIABETES MELLITUS WITH SKIN ULCER: ICD-10-CM

## 2017-12-14 DIAGNOSIS — E11.622 DIABETES MELLITUS WITH SKIN ULCER: ICD-10-CM

## 2017-12-14 DIAGNOSIS — E11.69 DIABETIC FOOT ULCER WITH OSTEOMYELITIS: ICD-10-CM

## 2017-12-14 DIAGNOSIS — L97.509 DIABETIC FOOT ULCER WITH OSTEOMYELITIS: ICD-10-CM

## 2017-12-14 DIAGNOSIS — E11.621 DIABETIC FOOT ULCER WITH OSTEOMYELITIS: ICD-10-CM

## 2017-12-14 PROCEDURE — G0277 HBOT, FULL BODY CHAMBER, 30M: HCPCS | Mod: CCAT

## 2017-12-14 RX ORDER — CIPROFLOXACIN 750 MG/1
750 TABLET, FILM COATED ORAL EVERY 12 HOURS
Qty: 60 TABLET | Refills: 1 | OUTPATIENT
Start: 2017-12-14

## 2017-12-14 NOTE — PATIENT INSTRUCTIONS
Leg Artery Emergencies: Critical Limb Ischemia (CLI)  Critical limb ischemia (CLI) is a condition that can occur over time when your leg arteries are damaged. It is a severe form of peripheral arterial disease (PAD). PAD is caused when leg arteries are narrowed, reducing blood flow. If blood flow to the toe, foot, or leg is completely blocked, the tissue begins to die (gangrene). If this happens, you need medical care right away to restore blood flow and possibly save the leg. But even with the best medical care, it might not be possible to save a severely affected limb.  When do you need emergency care?    CLI can get worse and cause an urgent problem. For example, if you have a wound, it may not heal. This can lead to gangrene. Go to the emergency room right away if you have any of these symptoms:  · A wound that is foul smelling, draining pus, or discolored  · Severe foot or leg pain that occurs suddenly without injury, especially if the foot or leg is cold or numb  Call your healthcare provider if:  · You have a cut or ulcer that does not heal  · You have foot or leg pain that happens when walking but goes away with rest. This may be a sign of blocked arteries.  How is critical limb ischemia diagnosed?  Certain tests may be done to find out if you have CLI. First your provider will carefully examine you, checking for pulses at several places. Other common tests include:  · Ankle-brachial index (MARIO). The blood pressure in your ankle is compared with the blood pressure in your arm.  · Duplex ultrasound. Harmless sound waves are used to create images of blood flow in your legs.  · Arteriography. X-ray dye (contrast medium) is injected into the artery using a thin, flexible tube (catheter). This allows blood vessels to be seen easily on X-rays.  How is critical limb ischemia treated?  Possible treatments for CLI include:  · Dissolving or removing a blood clot. To dissolve a clot, a tube (catheter) is put into an  artery in your groin. Clot-busting medicine is put into the tube to dissolve the clot. Or surgery may be done to remove the clot. A cut (incision) is made in the artery at the blocked area. The clot is then removed.  · Angioplasty. A tiny, uninflated balloon is sent to the narrowed area by a catheter. It is then inflated to widen the artery. The balloon is deflated and removed.  · Stenting. After angioplasty, a tiny wire mesh tube (stent) may be put in the artery to help hold it open. The stent is also put in using a catheter.  · Endarterectomy. An incision is made in the artery at the blocked area. The material that blocks the artery is then removed from artery walls.  · Peripheral bypass surgery. A natural or artificial graft is used to bypass the blocked area.  · Amputation. If left untreated, the affected area may have to be removed (amputated).  How can critical limb ischemia emergencies be prevented?  Know the signs and symptoms of a leg artery emergency. If you have diabetes or poor blood circulation, check your feet daily for wounds, sores, blisters, and color changes. If you smoke, stop smoking.  Date Last Reviewed: 6/1/2016  © 9730-1026 [employer]. [address] [place] [address], [place] [address]. All rights reserved. This information is not intended as a substitute for professional medical care. Always follow your healthcare professional's instructions.

## 2017-12-14 NOTE — PROGRESS NOTES
Subjective:   Patient ID:  Chau Rodarte is a 67 y.o. male who presents for evaluation and treatment of Peripheral Arterial Disease; non healing wound; and Results      HPI:       Chau Rodarte 67 y.o. male is here follow up and feeling well without any new complaints. He is here by recommendation of Dr. Bland (podiatry) for follow up after revascularization for R foot CLI. His R foot is improving. The wounds on the R heel and great toe have healed. The wound on the R first MT joint is healing.  He is taking antibiotics. He is on aspirin and plavix for DAPT.         He has an extensive history of PAD. S/p L pSFA PTAS with 8.0 x 40 mm Lifestent post dilated with 7 mm balloon (5/2012), S/p R fem-pop 2012 at [facility] with 6 mm PTFE graft (Occluded on US and Angio in 2017), and S/p L fem-bk POP bypass-V 6/2017. His L heel wound healed. He was not an ideal surgical candidate for another bypass on the R leg. He had revascularization of R SFA and POP  with re-establishment of flow to his foot. This was a complex IVUS guided intervention that required dual L retrograde cross over CFA and R AT retrograde accesses. Reverse CART was performed in the popliteal artery with a 5.0 x 100 mm balloon and retrograde Astato 30 wire. Thereafter he had multiple stents. Surveillance ultrasounds on 10/8/2017, 10/30/2017, and 12/5/2017 revealed patent SFA/POP/AT and L fem-pop bypass.          PTAS prox & ostial AT with 3.0 x 38 and 4.0 x 38 mm Resolute SOULEYMANE.    PTAS of popliteal with 5.5 x 100 mm Supera stent.     PTAS of dSFA with 6.0 x 150 Supera     PTAS of mSFA with 7 x 100 Zilver,    PTAS prox SFA with 80 x 100 mm Zilver    PTAS ostial SFA with 8 x 40 mm Zilver.     2 vessel run off via AT and PER  with filling of plantar arch.          He also has CAD s/p multivessel PCI, HTN, HLP, DM, ICM with EF 45%, PAF with embolic CVA 2006, s/p CTI RFA 2/2017, and ILR for afib surveillance. He is taking clindamycin and cipro for  abx           Care team:    Erickson  Smith-vas sx  Dvorin-pcp  Josh/Rich-card        Patient Active Problem List    Diagnosis Date Noted    Critical lower limb ischemia 09/15/2017     Priority: High    Diabetic ulcer of right midfoot associated with type 2 diabetes mellitus, with muscle involvement without evidence of necrosis 09/07/2017     Priority: High     Added automatically from request for surgery [id number]      Decubitus ulcer, heel, right, unstageable     Acute osteomyelitis of toe, right     Diabetic ulcer of toe of right foot associated with type 2 diabetes mellitus, with necrosis of bone     Nonhealing surgical wound     Diabetic ulcer of right heel associated with type 1 diabetes mellitus 09/07/2017    Diabetic ulcer of heel associated with type 2 diabetes mellitus     PAD (peripheral artery disease)          S/p L pSFA PTAS with 8.0 x 40 mm Lifestent    Post dilated with 7 mm balloon    S/p R fem-pop 2012 at [facility]   6 mm PTFE graft   Occluded on US and Angio in 2017       S/p L fem-bk POP bypass-GSV 6/2017      Selective R leg angiogram 9/7/2017     R CFA patent   R SFA and POP    AT and PER reconstitution proximally   DP is the only vessel providing flow to plantar arch      S/p peripheral intervention 9/15/2017       RCART with 5.0 x 100 mm and retrograde Astato 30 wire.     PTAS of AT with 3.0 x 38 and 4.0 x 38 mm Resolute SOULEYMANE.    PTAS of popliteal with 5.5 x 100 mm Supera stent.     PTAS of dSFA with 6.0 x 150 Supera     PTAS of mSFA with 7 x 100 Zilver,    PTAS prox SFA with 80 x 100 mm Zilver    PTAS ostial SFA with 8 x 40 mm Zilver.     2 vessel run off via AT and PER  with filling of plantar arch.                   Ulcer of heel due to diabetes mellitus 06/23/2017    BPH (benign prostatic hypertrophy) 06/23/2017    Debility 06/19/2017    Preop examination     Atherosclerosis of native arteries of the extremities with ulceration 04/26/2017    Paroxysmal atrial fibrillation  2017    Lower GI bleed 2017    Type 2 diabetes mellitus with peripheral circulatory disorder 2017    Acute blood loss anemia 2017    Long term (current) use of anticoagulants [Z79.01] 2017    Typical atrial flutter, s/p ablation 2017    Cryptogenic stroke, remote history () 2017    Coronary artery disease involving native coronary artery of native heart without angina pectoris 2016    Ischemic cardiomyopathy 2016    CORRY (acute kidney injury) 2016    Cardiomegaly 2016    Type 2 diabetes mellitus with diabetic polyneuropathy, with long-term current use of insulin 2016    Chronic systolic heart failure 2016    Severe obesity (BMI 35.0-35.9 with comorbidity) 2016    NSTEMI (non-ST elevated myocardial infarction) 2016    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, NYHA class 2 and CKD stage 3 2016    Essential hypertension 2014    Diabetic ulcer of toe of right foot associated with diabetes mellitus due to underlying condition, with bone involvement without evidence of necrosis 2013    HLD (hyperlipidemia) 10/24/2013    S/P femoral-popliteal bypass surgery 10/24/2013         R fem-pop in       L fem-pop in 2017                   Right Arm BP - Sittin/73  Left Arm BP - Sittin/75        LABS    LAST HbA1c  Lab Results   Component Value Date    HGBA1C 6.1 (H) 2017       Lipid panel  Lab Results   Component Value Date    CHOL 110 (L) 2017    CHOL 113 (L) 2016    CHOL 122 (L) 10/21/2016     Lab Results   Component Value Date    HDL 25 (L) 2017    HDL 31 (L) 2016    HDL 32 (L) 10/21/2016     Lab Results   Component Value Date    LDLCALC 72.2 2017    LDLCALC 72.0 2016    LDLCALC 76 10/21/2016     Lab Results   Component Value Date    TRIG 64 2017    TRIG 50 2016    TRIG 71 10/21/2016     Lab Results   Component  Value Date    CHOLHDL 22.7 01/12/2017    CHOLHDL 27.4 11/29/2016    CHOLHDL 3.8 10/21/2016            Review of Systems   Constitution: Negative for diaphoresis, weakness, night sweats, weight gain and weight loss.   HENT: Negative for congestion.    Eyes: Negative for blurred vision, discharge and double vision.   Cardiovascular: Negative for chest pain, claudication, cyanosis, dyspnea on exertion, irregular heartbeat, leg swelling, near-syncope, orthopnea, palpitations, paroxysmal nocturnal dyspnea and syncope.   Respiratory: Negative for cough, shortness of breath and wheezing.    Endocrine: Negative for cold intolerance, heat intolerance and polyphagia.   Hematologic/Lymphatic: Negative for adenopathy and bleeding problem. Does not bruise/bleed easily.   Skin: Positive for poor wound healing. Negative for dry skin and nail changes.   Musculoskeletal: Negative for arthritis, back pain, falls, joint pain, myalgias and neck pain.   Gastrointestinal: Negative for bloating, abdominal pain, change in bowel habit and constipation.   Genitourinary: Negative for bladder incontinence, dysuria, flank pain, genital sores and missed menses.   Neurological: Negative for aphonia, brief paralysis, difficulty with concentration and dizziness.   Psychiatric/Behavioral: Negative for altered mental status and memory loss. The patient does not have insomnia.    Allergic/Immunologic: Negative for environmental allergies.       Objective:   Physical Exam   Constitutional: He is oriented to person, place, and time. He appears well-developed and well-nourished. He is not intubated.   HENT:   Head: Normocephalic and atraumatic.   Right Ear: External ear normal.   Left Ear: External ear normal.   Mouth/Throat: Oropharynx is clear and moist.   Eyes: Conjunctivae and EOM are normal. Pupils are equal, round, and reactive to light. Right eye exhibits no discharge. Left eye exhibits no discharge. No scleral icterus.   Neck: Normal range of  motion. Neck supple. Normal carotid pulses, no hepatojugular reflux and no JVD present. Carotid bruit is not present. No tracheal deviation present. No thyromegaly present.   Cardiovascular: Normal rate, regular rhythm, S1 normal and S2 normal.   No extrasystoles are present. PMI is not displaced.  Exam reveals no gallop, no S3, no distant heart sounds, no friction rub and no midsystolic click.    Murmur heard.   Systolic murmur is present with a grade of 2/6  at the upper right sternal border  Pulses:       Carotid pulses are 2+ on the right side, and 2+ on the left side.       Radial pulses are 2+ on the right side, and 2+ on the left side.        Femoral pulses are 2+ on the right side, and 2+ on the left side.       Popliteal pulses are 2+ on the right side, and 2+ on the left side.        Dorsalis pedis pulses are 1+ on the right side. Left dorsalis pedis pulse not accessible.        Posterior tibial pulses are 0 on the right side. Left posterior tibial pulse not accessible.       Triphasic R POP with triphasic R DP and monophasic R PT doppler signals      Biphasic L POP with monophasic L DP and faint L PT doppler signals        Pulmonary/Chest: Effort normal and breath sounds normal. No accessory muscle usage or stridor. No apnea, no tachypnea and no bradypnea. He is not intubated. No respiratory distress. He has no decreased breath sounds. He has no wheezes. He has no rales. He exhibits no tenderness and no bony tenderness.   Abdominal: He exhibits no distension, no pulsatile liver, no abdominal bruit, no ascites, no pulsatile midline mass and no mass. There is no tenderness. There is no rebound and no guarding.   Musculoskeletal: Normal range of motion. He exhibits no edema or tenderness.   Lymphadenopathy:     He has no cervical adenopathy.   Neurological: He is alert and oriented to person, place, and time. He has normal reflexes. No cranial nerve deficit. Coordination normal.   Skin: Skin is warm. No rash  noted. No erythema. No pallor.       L heel wound-healed         R great wound healing with granulating tissue      R first metatarsal plantar surface wound with fat exposure with surrounding granulating tissue      R heel wound with granulating tissue       See images         Psychiatric: He has a normal mood and affect. His behavior is normal. Judgment and thought content normal.     8/28/2017              10/15/2017    Left heel       Right heel      R lateral great toe     right great toe      11/13/2017 12/12/2017            Assessment:     1. Critical lower limb ischemia    2. S/P femoral-popliteal bypass surgery    3. Mixed hyperlipidemia    4. Essential hypertension    5. Coronary artery disease involving native coronary artery of native heart without angina pectoris    6. PAD (peripheral artery disease)    7. Diabetic ulcer of toe of right foot associated with type 2 diabetes mellitus, with necrosis of bone        Plan:         He is doing well regarding his wounds        Left heel wound healed after skin graft and L fem-pop      R foot wounds (heel and great toe healed; first MT healing)        Continue with HBO   Surveillance ultrasound in 4 weeks  Follow up in 4 weeks      Continue with wound care  Continue with antibiotics      Risk factors modification          Continue with current medical plan and lifestyle changes.  Return sooner for concerns or questions. If symptoms persist go to the ED  I have reviewed all pertinent data on this patient       I have reviewed the patient's medical history in detail and updated the computerized patient record.    Orders Placed This Encounter   Procedures    US Lower Extremity Arteries Bilateral     Standing Status:   Future     Standing Expiration Date:   12/14/2018       Follow up as scheduled. Return sooner for concerns or questions            He expressed verbal understanding and agreed with the plan        Greater than 50% of the visit of 45  minutes was spent counseling, educating, and coordinating the care of the patient.  -In today's visit, at least 4 established conditions that pose a risk to life or bodily function have been addressed and the conditions are severe.    -In today's visit, monitoring for drug toxicity was accomplished.            Patient's Medications   New Prescriptions    No medications on file   Previous Medications    ASPIRIN 81 MG CHEW    Take 1 tablet (81 mg total) by mouth once daily.    ATORVASTATIN (LIPITOR) 80 MG TABLET    Take 1 tablet (80 mg total) by mouth once daily.    CHOLECALCIFEROL, VITAMIN D3, (VITAMIN D3) 5,000 UNIT TAB    Take 5,000 Units by mouth once daily.    CIPROFLOXACIN HCL (CIPRO) 750 MG TABLET    Take 1 tablet (750 mg total) by mouth every 12 (twelve) hours.    CLINDAMYCIN (CLEOCIN) 300 MG CAPSULE    Take 1 capsule (300 mg total) by mouth 3 (three) times daily.    CLOPIDOGREL (PLAVIX) 75 MG TABLET    Take 1 tablet (75 mg total) by mouth once daily.    COLLAGENASE (SANTYL) OINTMENT    Apply topically once daily.    DICLOFENAC SODIUM 1 % GEL    Apply 2 g topically 3 (three) times daily.    FERROUS SULFATE 325 (65 FE) MG EC TABLET    Take 1 tablet (325 mg total) by mouth once daily.    FUROSEMIDE (LASIX) 40 MG TABLET    Take 1 tablet (40 mg total) by mouth once daily.    GABAPENTIN (NEURONTIN) 300 MG CAPSULE    Take 300 mg by mouth 2 (two) times daily.    INSULIN DETEMIR (LEVEMIR FLEXTOUCH) 100 UNIT/ML (3 ML) SUBQ INPN PEN    Inject 16 Units into the skin every evening.    ISOSORBIDE DINITRATE (ISORDIL) 20 MG TABLET    Take 1 tablet (20 mg total) by mouth 3 (three) times daily.    LIRAGLUTIDE 0.6 MG/0.1 ML, 18 MG/3 ML, SUBQ PNIJ (VICTOZA 3-SAGAR) 0.6 MG/0.1 ML (18 MG/3 ML) PNIJ    Inject 1.2 mg into the skin once daily.    METOPROLOL SUCCINATE (TOPROL-XL) 25 MG 24 HR TABLET    Take 1 tablet (25 mg total) by mouth once daily.    OXYCODONE-ACETAMINOPHEN (PERCOCET)  MG PER TABLET    Take 1 tablet by mouth  "every 6 (six) hours as needed for Pain.    PANTOPRAZOLE (PROTONIX) 20 MG TABLET        PEN NEEDLE, DIABETIC (BD ULTRA-FINE HANG PEN NEEDLES) 32 GAUGE X 5/32" NDLE    Uses 2 times daily, on  insulin injections    SENNA-DOCUSATE 8.6-50 MG (PERICOLACE) 8.6-50 MG PER TABLET    Take 1 tablet by mouth once daily.    TAMSULOSIN (FLOMAX) 0.4 MG CP24    Take 1 capsule (0.4 mg total) by mouth once daily.   Modified Medications    No medications on file   Discontinued Medications    No medications on file      "

## 2017-12-15 ENCOUNTER — HOSPITAL ENCOUNTER (OUTPATIENT)
Dept: WOUND CARE | Facility: HOSPITAL | Age: 67
Discharge: HOME | End: 2017-12-15
Attending: PODIATRIST
Payer: MEDICARE

## 2017-12-15 VITALS — TEMPERATURE: 97 F | DIASTOLIC BLOOD PRESSURE: 58 MMHG | HEART RATE: 77 BPM | SYSTOLIC BLOOD PRESSURE: 103 MMHG

## 2017-12-15 DIAGNOSIS — L97.509 DIABETIC FOOT ULCER WITH OSTEOMYELITIS: ICD-10-CM

## 2017-12-15 DIAGNOSIS — M86.9 DIABETIC FOOT ULCER WITH OSTEOMYELITIS: ICD-10-CM

## 2017-12-15 DIAGNOSIS — M86.60 CHRONIC OSTEOMYELITIS WITH OR WITHOUT PERIOSTITIS: ICD-10-CM

## 2017-12-15 DIAGNOSIS — E11.69 DIABETIC FOOT ULCER WITH OSTEOMYELITIS: ICD-10-CM

## 2017-12-15 DIAGNOSIS — E11.621 DIABETIC FOOT ULCER WITH OSTEOMYELITIS: ICD-10-CM

## 2017-12-15 PROCEDURE — G0277 HBOT, FULL BODY CHAMBER, 30M: HCPCS | Mod: CCAT

## 2017-12-18 ENCOUNTER — HOSPITAL ENCOUNTER (OUTPATIENT)
Dept: WOUND CARE | Facility: HOSPITAL | Age: 67
Discharge: HOME | End: 2017-12-18
Attending: PODIATRIST
Payer: MEDICARE

## 2017-12-18 VITALS
SYSTOLIC BLOOD PRESSURE: 139 MMHG | HEIGHT: 69 IN | BODY MASS INDEX: 35.1 KG/M2 | RESPIRATION RATE: 16 BRPM | HEART RATE: 73 BPM | DIASTOLIC BLOOD PRESSURE: 79 MMHG | WEIGHT: 237 LBS | TEMPERATURE: 98 F

## 2017-12-18 DIAGNOSIS — M86.9 DIABETIC FOOT ULCER WITH OSTEOMYELITIS: ICD-10-CM

## 2017-12-18 DIAGNOSIS — E11.622 DIABETES MELLITUS WITH SKIN ULCER: ICD-10-CM

## 2017-12-18 DIAGNOSIS — L97.509 DIABETIC FOOT ULCER WITH OSTEOMYELITIS: ICD-10-CM

## 2017-12-18 DIAGNOSIS — L98.499 DIABETES MELLITUS WITH SKIN ULCER: ICD-10-CM

## 2017-12-18 DIAGNOSIS — E11.621 DIABETIC FOOT ULCER WITH OSTEOMYELITIS: ICD-10-CM

## 2017-12-18 DIAGNOSIS — E11.69 DIABETIC FOOT ULCER WITH OSTEOMYELITIS: ICD-10-CM

## 2017-12-18 DIAGNOSIS — M86.60 CHRONIC OSTEOMYELITIS WITH OR WITHOUT PERIOSTITIS: ICD-10-CM

## 2017-12-18 PROCEDURE — G0277 HBOT, FULL BODY CHAMBER, 30M: HCPCS | Mod: CCAT

## 2017-12-19 ENCOUNTER — HOSPITAL ENCOUNTER (OUTPATIENT)
Dept: WOUND CARE | Facility: HOSPITAL | Age: 67
Discharge: HOME | End: 2017-12-19
Attending: PODIATRIST
Payer: MEDICARE

## 2017-12-19 VITALS
HEIGHT: 69 IN | DIASTOLIC BLOOD PRESSURE: 67 MMHG | HEART RATE: 67 BPM | SYSTOLIC BLOOD PRESSURE: 121 MMHG | TEMPERATURE: 97 F | RESPIRATION RATE: 16 BRPM | WEIGHT: 237 LBS | BODY MASS INDEX: 35.1 KG/M2

## 2017-12-19 VITALS
BODY MASS INDEX: 35.1 KG/M2 | WEIGHT: 237 LBS | HEIGHT: 69 IN | TEMPERATURE: 97 F | HEART RATE: 67 BPM | SYSTOLIC BLOOD PRESSURE: 121 MMHG | DIASTOLIC BLOOD PRESSURE: 67 MMHG

## 2017-12-19 DIAGNOSIS — M86.60 CHRONIC OSTEOMYELITIS WITH OR WITHOUT PERIOSTITIS: ICD-10-CM

## 2017-12-19 DIAGNOSIS — E11.622 DIABETES MELLITUS WITH SKIN ULCER: ICD-10-CM

## 2017-12-19 DIAGNOSIS — E11.621 DIABETIC ULCER OF OTHER PART OF RIGHT FOOT ASSOCIATED WITH TYPE 2 DIABETES MELLITUS, WITH BONE INVOLVEMENT WITHOUT EVIDENCE OF NECROSIS: Primary | ICD-10-CM

## 2017-12-19 DIAGNOSIS — I73.9 PAD (PERIPHERAL ARTERY DISEASE): ICD-10-CM

## 2017-12-19 DIAGNOSIS — E11.621 DIABETIC FOOT ULCER WITH OSTEOMYELITIS: ICD-10-CM

## 2017-12-19 DIAGNOSIS — L97.509 DIABETIC FOOT ULCER WITH OSTEOMYELITIS: ICD-10-CM

## 2017-12-19 DIAGNOSIS — M86.9 DIABETIC FOOT ULCER WITH OSTEOMYELITIS: ICD-10-CM

## 2017-12-19 DIAGNOSIS — E11.69 DIABETIC FOOT ULCER WITH OSTEOMYELITIS: ICD-10-CM

## 2017-12-19 DIAGNOSIS — L97.516 DIABETIC ULCER OF OTHER PART OF RIGHT FOOT ASSOCIATED WITH TYPE 2 DIABETES MELLITUS, WITH BONE INVOLVEMENT WITHOUT EVIDENCE OF NECROSIS: Primary | ICD-10-CM

## 2017-12-19 DIAGNOSIS — E11.22 TYPE 2 DIABETES MELLITUS WITH STAGE 4 CHRONIC KIDNEY DISEASE, WITH LONG-TERM CURRENT USE OF INSULIN: ICD-10-CM

## 2017-12-19 DIAGNOSIS — N18.4 TYPE 2 DIABETES MELLITUS WITH STAGE 4 CHRONIC KIDNEY DISEASE, WITH LONG-TERM CURRENT USE OF INSULIN: ICD-10-CM

## 2017-12-19 DIAGNOSIS — Z79.4 TYPE 2 DIABETES MELLITUS WITH STAGE 4 CHRONIC KIDNEY DISEASE, WITH LONG-TERM CURRENT USE OF INSULIN: ICD-10-CM

## 2017-12-19 DIAGNOSIS — L98.499 DIABETES MELLITUS WITH SKIN ULCER: ICD-10-CM

## 2017-12-19 PROCEDURE — 99183 PR HYPERBARIC OXYGEN THERAPY ATTENDANCE/SUPERVISION, PER SESSION: ICD-10-PCS | Mod: ,,, | Performed by: SURGERY

## 2017-12-19 PROCEDURE — 11042 DBRDMT SUBQ TIS 1ST 20SQCM/<: CPT

## 2017-12-19 PROCEDURE — 11042 PR DEBRIDEMENT, SKIN, SUB-Q TISSUE,=<20 SQ CM: ICD-10-PCS | Mod: ,,, | Performed by: PODIATRIST

## 2017-12-19 PROCEDURE — 27201912 HC WOUND CARE DEBRIDEMENT SUPPLIES

## 2017-12-19 PROCEDURE — 11042 DBRDMT SUBQ TIS 1ST 20SQCM/<: CPT | Mod: ,,, | Performed by: PODIATRIST

## 2017-12-19 PROCEDURE — G0277 HBOT, FULL BODY CHAMBER, 30M: HCPCS | Mod: CCAT

## 2017-12-19 PROCEDURE — 99183 HYPERBARIC OXYGEN THERAPY: CPT | Mod: ,,, | Performed by: SURGERY

## 2017-12-20 ENCOUNTER — HOSPITAL ENCOUNTER (OUTPATIENT)
Dept: WOUND CARE | Facility: HOSPITAL | Age: 67
Discharge: HOME | End: 2017-12-20
Attending: PODIATRIST
Payer: MEDICARE

## 2017-12-20 VITALS
RESPIRATION RATE: 16 BRPM | TEMPERATURE: 98 F | DIASTOLIC BLOOD PRESSURE: 74 MMHG | SYSTOLIC BLOOD PRESSURE: 133 MMHG | HEART RATE: 75 BPM | BODY MASS INDEX: 35.1 KG/M2 | HEIGHT: 69 IN | WEIGHT: 237 LBS

## 2017-12-20 DIAGNOSIS — M86.60 OSTEOMYELITIS, CHRONIC: ICD-10-CM

## 2017-12-20 DIAGNOSIS — E11.621 DIABETIC FOOT ULCER WITH OSTEOMYELITIS: ICD-10-CM

## 2017-12-20 DIAGNOSIS — E11.69 DIABETIC FOOT ULCER WITH OSTEOMYELITIS: ICD-10-CM

## 2017-12-20 DIAGNOSIS — M86.9 DIABETIC FOOT ULCER WITH OSTEOMYELITIS: ICD-10-CM

## 2017-12-20 DIAGNOSIS — L97.509 DIABETIC FOOT ULCER WITH OSTEOMYELITIS: ICD-10-CM

## 2017-12-20 PROCEDURE — G0277 HBOT, FULL BODY CHAMBER, 30M: HCPCS | Mod: CCAT

## 2017-12-21 ENCOUNTER — HOSPITAL ENCOUNTER (OUTPATIENT)
Dept: WOUND CARE | Facility: HOSPITAL | Age: 67
Discharge: HOME | End: 2017-12-21
Attending: PODIATRIST
Payer: MEDICARE

## 2017-12-21 VITALS
HEIGHT: 69 IN | HEART RATE: 68 BPM | TEMPERATURE: 98 F | BODY MASS INDEX: 35.1 KG/M2 | SYSTOLIC BLOOD PRESSURE: 122 MMHG | WEIGHT: 237 LBS | RESPIRATION RATE: 16 BRPM | DIASTOLIC BLOOD PRESSURE: 65 MMHG

## 2017-12-21 DIAGNOSIS — L97.509 DIABETIC FOOT ULCER WITH OSTEOMYELITIS: ICD-10-CM

## 2017-12-21 DIAGNOSIS — E11.621 DIABETIC FOOT ULCER WITH OSTEOMYELITIS: ICD-10-CM

## 2017-12-21 DIAGNOSIS — M86.9 DIABETIC FOOT ULCER WITH OSTEOMYELITIS: ICD-10-CM

## 2017-12-21 DIAGNOSIS — E11.69 DIABETIC FOOT ULCER WITH OSTEOMYELITIS: ICD-10-CM

## 2017-12-21 PROCEDURE — G0277 HBOT, FULL BODY CHAMBER, 30M: HCPCS | Mod: CCAT

## 2017-12-22 ENCOUNTER — HOSPITAL ENCOUNTER (OUTPATIENT)
Dept: WOUND CARE | Facility: HOSPITAL | Age: 67
Discharge: HOME | End: 2017-12-22
Attending: PODIATRIST
Payer: MEDICARE

## 2017-12-22 ENCOUNTER — CLINICAL SUPPORT (OUTPATIENT)
Dept: ELECTROPHYSIOLOGY | Facility: CLINIC | Age: 67
End: 2017-12-22
Attending: INTERNAL MEDICINE
Payer: MEDICARE

## 2017-12-22 VITALS
HEART RATE: 67 BPM | DIASTOLIC BLOOD PRESSURE: 81 MMHG | RESPIRATION RATE: 15 BRPM | TEMPERATURE: 98 F | HEIGHT: 69 IN | WEIGHT: 237 LBS | BODY MASS INDEX: 35.1 KG/M2 | SYSTOLIC BLOOD PRESSURE: 156 MMHG

## 2017-12-22 DIAGNOSIS — L97.509 DIABETIC FOOT ULCER WITH OSTEOMYELITIS: ICD-10-CM

## 2017-12-22 DIAGNOSIS — Z95.818 STATUS POST PLACEMENT OF IMPLANTABLE LOOP RECORDER: ICD-10-CM

## 2017-12-22 DIAGNOSIS — E11.69 DIABETIC FOOT ULCER WITH OSTEOMYELITIS: ICD-10-CM

## 2017-12-22 DIAGNOSIS — E11.621 DIABETIC FOOT ULCER WITH OSTEOMYELITIS: ICD-10-CM

## 2017-12-22 DIAGNOSIS — I63.9 CRYPTOGENIC STROKE: ICD-10-CM

## 2017-12-22 DIAGNOSIS — M86.9 DIABETIC FOOT ULCER WITH OSTEOMYELITIS: ICD-10-CM

## 2017-12-22 PROCEDURE — 93299 LOOP RECORDER REMOTE: CPT | Mod: S$GLB,,, | Performed by: INTERNAL MEDICINE

## 2017-12-22 PROCEDURE — 99213 OFFICE O/P EST LOW 20 MIN: CPT | Mod: 25

## 2017-12-22 PROCEDURE — G0277 HBOT, FULL BODY CHAMBER, 30M: HCPCS | Mod: CCAT

## 2017-12-22 PROCEDURE — 93298 REM INTERROG DEV EVAL SCRMS: CPT | Mod: S$GLB,,, | Performed by: INTERNAL MEDICINE

## 2017-12-22 RX ORDER — FUROSEMIDE 40 MG/1
40 TABLET ORAL DAILY
Qty: 30 TABLET | Refills: 11 | Status: SHIPPED | OUTPATIENT
Start: 2017-12-22 | End: 2018-03-14 | Stop reason: SDUPTHER

## 2017-12-26 ENCOUNTER — HOSPITAL ENCOUNTER (OUTPATIENT)
Dept: WOUND CARE | Facility: HOSPITAL | Age: 67
Discharge: HOME | End: 2017-12-26
Attending: PODIATRIST
Payer: MEDICARE

## 2017-12-26 DIAGNOSIS — E08.621 DIABETIC ULCER OF OTHER PART OF RIGHT FOOT ASSOCIATED WITH DIABETES MELLITUS DUE TO UNDERLYING CONDITION, WITH NECROSIS OF MUSCLE: ICD-10-CM

## 2017-12-26 DIAGNOSIS — E11.69 DIABETIC FOOT ULCER WITH OSTEOMYELITIS: ICD-10-CM

## 2017-12-26 DIAGNOSIS — E11.621 DIABETIC FOOT ULCER WITH OSTEOMYELITIS: ICD-10-CM

## 2017-12-26 DIAGNOSIS — M86.9 DIABETIC FOOT ULCER WITH OSTEOMYELITIS: ICD-10-CM

## 2017-12-26 DIAGNOSIS — L97.513 DIABETIC ULCER OF OTHER PART OF RIGHT FOOT ASSOCIATED WITH DIABETES MELLITUS DUE TO UNDERLYING CONDITION, WITH NECROSIS OF MUSCLE: ICD-10-CM

## 2017-12-26 DIAGNOSIS — E11.51 TYPE 2 DIABETES MELLITUS WITH PERIPHERAL CIRCULATORY DISORDER: Primary | ICD-10-CM

## 2017-12-26 DIAGNOSIS — L97.509 DIABETIC FOOT ULCER WITH OSTEOMYELITIS: ICD-10-CM

## 2017-12-26 PROCEDURE — 99183 HYPERBARIC OXYGEN THERAPY: CPT | Mod: ,,, | Performed by: SURGERY

## 2017-12-26 PROCEDURE — G0277 HBOT, FULL BODY CHAMBER, 30M: HCPCS | Mod: CCAT

## 2017-12-26 PROCEDURE — 99183 PR HYPERBARIC OXYGEN THERAPY ATTENDANCE/SUPERVISION, PER SESSION: ICD-10-PCS | Mod: ,,, | Performed by: SURGERY

## 2017-12-26 PROCEDURE — 15275 SKIN SUB GRAFT FACE/NK/HF/G: CPT

## 2017-12-26 PROCEDURE — 15275 PR SKIN SUB GRAFT FACE/NK/HF/G UP TO 100 SQCM: ICD-10-PCS | Mod: ,,, | Performed by: PODIATRIST

## 2017-12-26 PROCEDURE — 15275 SKIN SUB GRAFT FACE/NK/HF/G: CPT | Mod: ,,, | Performed by: PODIATRIST

## 2017-12-27 ENCOUNTER — HOSPITAL ENCOUNTER (OUTPATIENT)
Dept: WOUND CARE | Facility: HOSPITAL | Age: 67
Discharge: HOME | End: 2017-12-27
Attending: PODIATRIST
Payer: MEDICARE

## 2017-12-27 VITALS
TEMPERATURE: 97 F | RESPIRATION RATE: 16 BRPM | HEART RATE: 53 BPM | WEIGHT: 237 LBS | BODY MASS INDEX: 35.1 KG/M2 | DIASTOLIC BLOOD PRESSURE: 81 MMHG | SYSTOLIC BLOOD PRESSURE: 139 MMHG | HEIGHT: 69 IN

## 2017-12-27 VITALS
HEART RATE: 81 BPM | SYSTOLIC BLOOD PRESSURE: 147 MMHG | RESPIRATION RATE: 16 BRPM | DIASTOLIC BLOOD PRESSURE: 78 MMHG | HEIGHT: 69 IN | BODY MASS INDEX: 35.1 KG/M2 | WEIGHT: 237 LBS | TEMPERATURE: 99 F

## 2017-12-27 DIAGNOSIS — M86.60 CHRONIC OSTEOMYELITIS WITH OR WITHOUT PERIOSTITIS: ICD-10-CM

## 2017-12-27 DIAGNOSIS — L97.509 DIABETIC FOOT ULCER WITH OSTEOMYELITIS: ICD-10-CM

## 2017-12-27 DIAGNOSIS — I70.25: ICD-10-CM

## 2017-12-27 DIAGNOSIS — M86.9 DIABETIC FOOT ULCER WITH OSTEOMYELITIS: ICD-10-CM

## 2017-12-27 DIAGNOSIS — E11.622 DIABETES MELLITUS WITH SKIN ULCER: ICD-10-CM

## 2017-12-27 DIAGNOSIS — L98.499 DIABETES MELLITUS WITH SKIN ULCER: ICD-10-CM

## 2017-12-27 DIAGNOSIS — E11.621 DIABETIC FOOT ULCER WITH OSTEOMYELITIS: ICD-10-CM

## 2017-12-27 DIAGNOSIS — E11.69 DIABETIC FOOT ULCER WITH OSTEOMYELITIS: ICD-10-CM

## 2017-12-27 PROCEDURE — G0277 HBOT, FULL BODY CHAMBER, 30M: HCPCS | Mod: CCAT

## 2017-12-28 ENCOUNTER — HOSPITAL ENCOUNTER (OUTPATIENT)
Dept: WOUND CARE | Facility: HOSPITAL | Age: 67
Discharge: HOME | End: 2017-12-28
Attending: PODIATRIST
Payer: MEDICARE

## 2017-12-28 VITALS
HEIGHT: 69 IN | TEMPERATURE: 97 F | SYSTOLIC BLOOD PRESSURE: 158 MMHG | HEART RATE: 79 BPM | WEIGHT: 237 LBS | BODY MASS INDEX: 35.1 KG/M2 | RESPIRATION RATE: 16 BRPM | DIASTOLIC BLOOD PRESSURE: 83 MMHG

## 2017-12-28 DIAGNOSIS — L97.509 DIABETIC FOOT ULCER WITH OSTEOMYELITIS: ICD-10-CM

## 2017-12-28 DIAGNOSIS — E11.621 DIABETIC FOOT ULCER WITH OSTEOMYELITIS: ICD-10-CM

## 2017-12-28 DIAGNOSIS — M86.9 DIABETIC FOOT ULCER WITH OSTEOMYELITIS: ICD-10-CM

## 2017-12-28 DIAGNOSIS — E11.69 DIABETIC FOOT ULCER WITH OSTEOMYELITIS: ICD-10-CM

## 2017-12-28 PROCEDURE — G0277 HBOT, FULL BODY CHAMBER, 30M: HCPCS | Mod: CCAT

## 2017-12-29 ENCOUNTER — HOSPITAL ENCOUNTER (OUTPATIENT)
Dept: WOUND CARE | Facility: HOSPITAL | Age: 67
Discharge: HOME | End: 2017-12-29
Attending: PODIATRIST
Payer: MEDICARE

## 2017-12-29 VITALS
RESPIRATION RATE: 18 BRPM | SYSTOLIC BLOOD PRESSURE: 157 MMHG | DIASTOLIC BLOOD PRESSURE: 81 MMHG | WEIGHT: 237 LBS | HEART RATE: 86 BPM | TEMPERATURE: 97 F | HEIGHT: 69 IN | BODY MASS INDEX: 35.1 KG/M2

## 2017-12-29 DIAGNOSIS — E11.622 DIABETES MELLITUS WITH SKIN ULCER: ICD-10-CM

## 2017-12-29 DIAGNOSIS — L98.499 DIABETES MELLITUS WITH SKIN ULCER: ICD-10-CM

## 2017-12-29 PROCEDURE — 99213 OFFICE O/P EST LOW 20 MIN: CPT | Mod: 25

## 2017-12-29 PROCEDURE — G0277 HBOT, FULL BODY CHAMBER, 30M: HCPCS | Mod: CCAT

## 2018-01-02 ENCOUNTER — HOSPITAL ENCOUNTER (OUTPATIENT)
Dept: WOUND CARE | Facility: HOSPITAL | Age: 68
Discharge: HOME | End: 2018-01-02
Attending: PODIATRIST
Payer: MEDICARE

## 2018-01-02 DIAGNOSIS — E11.69 DIABETIC FOOT ULCER WITH OSTEOMYELITIS: ICD-10-CM

## 2018-01-02 DIAGNOSIS — M86.9 DIABETIC FOOT ULCER WITH OSTEOMYELITIS: ICD-10-CM

## 2018-01-02 DIAGNOSIS — L97.509 DIABETIC FOOT ULCER WITH OSTEOMYELITIS: ICD-10-CM

## 2018-01-02 DIAGNOSIS — E11.51 TYPE 2 DIABETES MELLITUS WITH PERIPHERAL CIRCULATORY DISORDER: Primary | ICD-10-CM

## 2018-01-02 DIAGNOSIS — L97.513 DIABETIC ULCER OF OTHER PART OF RIGHT FOOT ASSOCIATED WITH DIABETES MELLITUS DUE TO UNDERLYING CONDITION, WITH NECROSIS OF MUSCLE: ICD-10-CM

## 2018-01-02 DIAGNOSIS — E11.621 DIABETIC FOOT ULCER WITH OSTEOMYELITIS: ICD-10-CM

## 2018-01-02 DIAGNOSIS — E08.621 DIABETIC ULCER OF OTHER PART OF RIGHT FOOT ASSOCIATED WITH DIABETES MELLITUS DUE TO UNDERLYING CONDITION, WITH NECROSIS OF MUSCLE: ICD-10-CM

## 2018-01-02 PROCEDURE — 15275 PR SKIN SUB GRAFT FACE/NK/HF/G UP TO 100 SQCM: ICD-10-PCS | Mod: ,,, | Performed by: PODIATRIST

## 2018-01-02 PROCEDURE — G0277 HBOT, FULL BODY CHAMBER, 30M: HCPCS | Mod: CCAT

## 2018-01-02 PROCEDURE — 15275 SKIN SUB GRAFT FACE/NK/HF/G: CPT

## 2018-01-02 PROCEDURE — 15275 SKIN SUB GRAFT FACE/NK/HF/G: CPT | Mod: ,,, | Performed by: PODIATRIST

## 2018-01-03 ENCOUNTER — HOSPITAL ENCOUNTER (OUTPATIENT)
Dept: WOUND CARE | Facility: HOSPITAL | Age: 68
Discharge: HOME | End: 2018-01-03
Attending: PODIATRIST
Payer: MEDICARE

## 2018-01-03 VITALS
DIASTOLIC BLOOD PRESSURE: 74 MMHG | WEIGHT: 237 LBS | RESPIRATION RATE: 16 BRPM | SYSTOLIC BLOOD PRESSURE: 128 MMHG | TEMPERATURE: 98 F | HEIGHT: 69 IN | HEART RATE: 75 BPM | BODY MASS INDEX: 35.1 KG/M2

## 2018-01-03 DIAGNOSIS — L97.509 DIABETIC FOOT ULCER WITH OSTEOMYELITIS: ICD-10-CM

## 2018-01-03 DIAGNOSIS — E11.69 DIABETIC FOOT ULCER WITH OSTEOMYELITIS: ICD-10-CM

## 2018-01-03 DIAGNOSIS — E11.621 DIABETIC FOOT ULCER WITH OSTEOMYELITIS: ICD-10-CM

## 2018-01-03 DIAGNOSIS — M86.9 DIABETIC FOOT ULCER WITH OSTEOMYELITIS: ICD-10-CM

## 2018-01-03 PROCEDURE — G0277 HBOT, FULL BODY CHAMBER, 30M: HCPCS | Mod: CCAT

## 2018-01-04 ENCOUNTER — HOSPITAL ENCOUNTER (OUTPATIENT)
Dept: WOUND CARE | Facility: HOSPITAL | Age: 68
Discharge: HOME | End: 2018-01-04
Attending: PODIATRIST
Payer: MEDICARE

## 2018-01-04 VITALS
RESPIRATION RATE: 16 BRPM | WEIGHT: 237 LBS | HEART RATE: 78 BPM | BODY MASS INDEX: 35.1 KG/M2 | HEIGHT: 69 IN | DIASTOLIC BLOOD PRESSURE: 82 MMHG | SYSTOLIC BLOOD PRESSURE: 156 MMHG | TEMPERATURE: 97 F

## 2018-01-04 DIAGNOSIS — L97.509 DIABETIC FOOT ULCER WITH OSTEOMYELITIS: ICD-10-CM

## 2018-01-04 DIAGNOSIS — E11.621 DIABETIC FOOT ULCER WITH OSTEOMYELITIS: ICD-10-CM

## 2018-01-04 DIAGNOSIS — M86.9 DIABETIC FOOT ULCER WITH OSTEOMYELITIS: ICD-10-CM

## 2018-01-04 DIAGNOSIS — E11.69 DIABETIC FOOT ULCER WITH OSTEOMYELITIS: ICD-10-CM

## 2018-01-04 PROCEDURE — G0277 HBOT, FULL BODY CHAMBER, 30M: HCPCS | Mod: CCAT

## 2018-01-05 ENCOUNTER — HOSPITAL ENCOUNTER (OUTPATIENT)
Dept: WOUND CARE | Facility: HOSPITAL | Age: 68
Discharge: HOME | End: 2018-01-05
Attending: PODIATRIST
Payer: MEDICARE

## 2018-01-05 VITALS
WEIGHT: 237 LBS | TEMPERATURE: 98 F | RESPIRATION RATE: 16 BRPM | SYSTOLIC BLOOD PRESSURE: 146 MMHG | BODY MASS INDEX: 35.1 KG/M2 | DIASTOLIC BLOOD PRESSURE: 80 MMHG | HEIGHT: 69 IN | HEART RATE: 72 BPM

## 2018-01-05 DIAGNOSIS — E11.621 DIABETIC FOOT ULCER WITH OSTEOMYELITIS: ICD-10-CM

## 2018-01-05 DIAGNOSIS — M86.60 OSTEOMYELITIS, CHRONIC: ICD-10-CM

## 2018-01-05 DIAGNOSIS — L97.509 DIABETIC FOOT ULCER WITH OSTEOMYELITIS: ICD-10-CM

## 2018-01-05 DIAGNOSIS — M86.9 DIABETIC FOOT ULCER WITH OSTEOMYELITIS: ICD-10-CM

## 2018-01-05 DIAGNOSIS — E11.69 DIABETIC FOOT ULCER WITH OSTEOMYELITIS: ICD-10-CM

## 2018-01-05 PROCEDURE — G0277 HBOT, FULL BODY CHAMBER, 30M: HCPCS | Mod: CCAT

## 2018-01-05 PROCEDURE — 99213 OFFICE O/P EST LOW 20 MIN: CPT | Mod: 25

## 2018-01-08 ENCOUNTER — HOSPITAL ENCOUNTER (OUTPATIENT)
Dept: WOUND CARE | Facility: HOSPITAL | Age: 68
Discharge: HOME | End: 2018-01-08
Attending: PODIATRIST
Payer: MEDICARE

## 2018-01-08 VITALS
BODY MASS INDEX: 35.1 KG/M2 | WEIGHT: 237 LBS | HEIGHT: 69 IN | DIASTOLIC BLOOD PRESSURE: 85 MMHG | RESPIRATION RATE: 16 BRPM | HEART RATE: 81 BPM | TEMPERATURE: 98 F | SYSTOLIC BLOOD PRESSURE: 150 MMHG

## 2018-01-08 DIAGNOSIS — L97.509 DIABETIC FOOT ULCER WITH OSTEOMYELITIS: ICD-10-CM

## 2018-01-08 DIAGNOSIS — E11.69 DIABETIC FOOT ULCER WITH OSTEOMYELITIS: ICD-10-CM

## 2018-01-08 DIAGNOSIS — M86.9 DIABETIC FOOT ULCER WITH OSTEOMYELITIS: ICD-10-CM

## 2018-01-08 DIAGNOSIS — E11.621 DIABETIC FOOT ULCER WITH OSTEOMYELITIS: ICD-10-CM

## 2018-01-08 PROCEDURE — G0277 HBOT, FULL BODY CHAMBER, 30M: HCPCS | Mod: CCAT

## 2018-01-08 RX ORDER — ISOSORBIDE DINITRATE 20 MG/1
20 TABLET ORAL 3 TIMES DAILY
Qty: 90 TABLET | Refills: 3 | Status: SHIPPED | OUTPATIENT
Start: 2018-01-08 | End: 2018-07-30 | Stop reason: SDUPTHER

## 2018-01-09 ENCOUNTER — HOSPITAL ENCOUNTER (OUTPATIENT)
Dept: WOUND CARE | Facility: HOSPITAL | Age: 68
Discharge: HOME | End: 2018-01-09
Attending: PODIATRIST
Payer: MEDICARE

## 2018-01-09 VITALS
TEMPERATURE: 97 F | HEART RATE: 72 BPM | SYSTOLIC BLOOD PRESSURE: 130 MMHG | DIASTOLIC BLOOD PRESSURE: 74 MMHG | HEIGHT: 69 IN | WEIGHT: 237 LBS | RESPIRATION RATE: 16 BRPM | BODY MASS INDEX: 35.1 KG/M2

## 2018-01-09 VITALS
HEART RATE: 72 BPM | TEMPERATURE: 97 F | HEIGHT: 69 IN | SYSTOLIC BLOOD PRESSURE: 130 MMHG | BODY MASS INDEX: 35.1 KG/M2 | DIASTOLIC BLOOD PRESSURE: 74 MMHG | WEIGHT: 237 LBS

## 2018-01-09 DIAGNOSIS — M86.9 DIABETIC FOOT ULCER WITH OSTEOMYELITIS: ICD-10-CM

## 2018-01-09 DIAGNOSIS — L97.509 DIABETIC FOOT ULCER WITH OSTEOMYELITIS: ICD-10-CM

## 2018-01-09 DIAGNOSIS — E11.621 DIABETIC FOOT ULCER WITH OSTEOMYELITIS: ICD-10-CM

## 2018-01-09 DIAGNOSIS — L97.513 DIABETIC ULCER OF OTHER PART OF RIGHT FOOT ASSOCIATED WITH DIABETES MELLITUS DUE TO UNDERLYING CONDITION, WITH NECROSIS OF MUSCLE: ICD-10-CM

## 2018-01-09 DIAGNOSIS — E08.621 DIABETIC ULCER OF OTHER PART OF RIGHT FOOT ASSOCIATED WITH DIABETES MELLITUS DUE TO UNDERLYING CONDITION, WITH NECROSIS OF MUSCLE: ICD-10-CM

## 2018-01-09 DIAGNOSIS — E11.51 TYPE 2 DIABETES MELLITUS WITH PERIPHERAL CIRCULATORY DISORDER: Primary | ICD-10-CM

## 2018-01-09 DIAGNOSIS — E11.69 DIABETIC FOOT ULCER WITH OSTEOMYELITIS: ICD-10-CM

## 2018-01-09 DIAGNOSIS — M86.60 OSTEOMYELITIS, CHRONIC: ICD-10-CM

## 2018-01-09 PROCEDURE — 99183 HYPERBARIC OXYGEN THERAPY: CPT | Mod: ,,, | Performed by: SURGERY

## 2018-01-09 PROCEDURE — G0277 HBOT, FULL BODY CHAMBER, 30M: HCPCS | Mod: CCAT

## 2018-01-09 PROCEDURE — 15275 SKIN SUB GRAFT FACE/NK/HF/G: CPT

## 2018-01-09 PROCEDURE — 15275 PR SKIN SUB GRAFT FACE/NK/HF/G UP TO 100 SQCM: ICD-10-PCS | Mod: ,,, | Performed by: PODIATRIST

## 2018-01-09 PROCEDURE — 99183 PR HYPERBARIC OXYGEN THERAPY ATTENDANCE/SUPERVISION, PER SESSION: ICD-10-PCS | Mod: ,,, | Performed by: SURGERY

## 2018-01-09 PROCEDURE — 15275 SKIN SUB GRAFT FACE/NK/HF/G: CPT | Mod: ,,, | Performed by: PODIATRIST

## 2018-01-09 NOTE — PROGRESS NOTES
Much of the documentation for this visit was completed in the [facility] system. Please see the attached documentation for further details about this patient's care.     Debi Batista RN

## 2018-01-10 ENCOUNTER — HOSPITAL ENCOUNTER (OUTPATIENT)
Dept: WOUND CARE | Facility: HOSPITAL | Age: 68
Discharge: HOME | End: 2018-01-10
Attending: PODIATRIST
Payer: MEDICARE

## 2018-01-10 VITALS
SYSTOLIC BLOOD PRESSURE: 157 MMHG | HEART RATE: 82 BPM | DIASTOLIC BLOOD PRESSURE: 75 MMHG | WEIGHT: 237 LBS | RESPIRATION RATE: 16 BRPM | TEMPERATURE: 98 F | BODY MASS INDEX: 35.1 KG/M2 | HEIGHT: 69 IN

## 2018-01-10 DIAGNOSIS — L97.509 DIABETIC FOOT ULCER WITH OSTEOMYELITIS: ICD-10-CM

## 2018-01-10 DIAGNOSIS — M86.9 DIABETIC FOOT ULCER WITH OSTEOMYELITIS: ICD-10-CM

## 2018-01-10 DIAGNOSIS — E11.621 DIABETIC FOOT ULCER WITH OSTEOMYELITIS: ICD-10-CM

## 2018-01-10 DIAGNOSIS — E11.69 DIABETIC FOOT ULCER WITH OSTEOMYELITIS: ICD-10-CM

## 2018-01-10 DIAGNOSIS — M86.60 CHRONIC OSTEOMYELITIS: ICD-10-CM

## 2018-01-10 PROCEDURE — G0277 HBOT, FULL BODY CHAMBER, 30M: HCPCS | Mod: CCAT

## 2018-01-11 ENCOUNTER — HOSPITAL ENCOUNTER (OUTPATIENT)
Dept: WOUND CARE | Facility: HOSPITAL | Age: 68
Discharge: HOME | End: 2018-01-11
Attending: PODIATRIST
Payer: MEDICARE

## 2018-01-11 VITALS
HEIGHT: 69 IN | WEIGHT: 237 LBS | TEMPERATURE: 97 F | RESPIRATION RATE: 16 BRPM | BODY MASS INDEX: 35.1 KG/M2 | DIASTOLIC BLOOD PRESSURE: 71 MMHG | HEART RATE: 70 BPM | SYSTOLIC BLOOD PRESSURE: 120 MMHG

## 2018-01-11 DIAGNOSIS — E11.621 DIABETIC FOOT ULCER WITH OSTEOMYELITIS: ICD-10-CM

## 2018-01-11 DIAGNOSIS — M86.60 CHRONIC OSTEOMYELITIS: ICD-10-CM

## 2018-01-11 DIAGNOSIS — M86.9 DIABETIC FOOT ULCER WITH OSTEOMYELITIS: ICD-10-CM

## 2018-01-11 DIAGNOSIS — E11.69 DIABETIC FOOT ULCER WITH OSTEOMYELITIS: ICD-10-CM

## 2018-01-11 DIAGNOSIS — L97.509 DIABETIC FOOT ULCER WITH OSTEOMYELITIS: ICD-10-CM

## 2018-01-11 PROCEDURE — G0277 HBOT, FULL BODY CHAMBER, 30M: HCPCS | Mod: CCAT

## 2018-01-12 ENCOUNTER — HOSPITAL ENCOUNTER (OUTPATIENT)
Dept: WOUND CARE | Facility: HOSPITAL | Age: 68
Discharge: HOME | End: 2018-01-12
Attending: PODIATRIST
Payer: MEDICARE

## 2018-01-12 PROCEDURE — 99213 OFFICE O/P EST LOW 20 MIN: CPT

## 2018-01-12 RX ORDER — CLOPIDOGREL BISULFATE 75 MG/1
TABLET ORAL
Qty: 90 TABLET | Refills: 1 | Status: SHIPPED | OUTPATIENT
Start: 2018-01-12 | End: 2018-07-21 | Stop reason: SDUPTHER

## 2018-01-19 ENCOUNTER — HOSPITAL ENCOUNTER (OUTPATIENT)
Dept: WOUND CARE | Facility: HOSPITAL | Age: 68
Discharge: HOME | End: 2018-01-19
Attending: PODIATRIST
Payer: MEDICARE

## 2018-01-19 DIAGNOSIS — E08.621 DIABETIC ULCER OF OTHER PART OF RIGHT FOOT ASSOCIATED WITH DIABETES MELLITUS DUE TO UNDERLYING CONDITION, WITH NECROSIS OF MUSCLE: ICD-10-CM

## 2018-01-19 DIAGNOSIS — E11.69 DIABETIC FOOT ULCER WITH OSTEOMYELITIS: Primary | ICD-10-CM

## 2018-01-19 DIAGNOSIS — L97.509 DIABETIC FOOT ULCER WITH OSTEOMYELITIS: Primary | ICD-10-CM

## 2018-01-19 DIAGNOSIS — M86.60 OSTEOMYELITIS, CHRONIC: ICD-10-CM

## 2018-01-19 DIAGNOSIS — M86.9 DIABETIC FOOT ULCER WITH OSTEOMYELITIS: Primary | ICD-10-CM

## 2018-01-19 DIAGNOSIS — L97.513 DIABETIC ULCER OF OTHER PART OF RIGHT FOOT ASSOCIATED WITH DIABETES MELLITUS DUE TO UNDERLYING CONDITION, WITH NECROSIS OF MUSCLE: ICD-10-CM

## 2018-01-19 DIAGNOSIS — E11.51 TYPE 2 DIABETES MELLITUS WITH PERIPHERAL CIRCULATORY DISORDER: ICD-10-CM

## 2018-01-19 DIAGNOSIS — E11.621 DIABETIC FOOT ULCER WITH OSTEOMYELITIS: Primary | ICD-10-CM

## 2018-01-19 PROCEDURE — 99213 OFFICE O/P EST LOW 20 MIN: CPT

## 2018-01-23 ENCOUNTER — HOSPITAL ENCOUNTER (OUTPATIENT)
Dept: WOUND CARE | Facility: HOSPITAL | Age: 68
Discharge: HOME | End: 2018-01-23
Attending: PODIATRIST
Payer: MEDICARE

## 2018-01-23 VITALS — DIASTOLIC BLOOD PRESSURE: 78 MMHG | HEART RATE: 80 BPM | SYSTOLIC BLOOD PRESSURE: 129 MMHG | TEMPERATURE: 98 F

## 2018-01-23 DIAGNOSIS — E11.69 DIABETIC FOOT ULCER WITH OSTEOMYELITIS: Primary | ICD-10-CM

## 2018-01-23 DIAGNOSIS — L97.509 DIABETIC FOOT ULCER WITH OSTEOMYELITIS: Primary | ICD-10-CM

## 2018-01-23 DIAGNOSIS — M86.9 DIABETIC FOOT ULCER WITH OSTEOMYELITIS: Primary | ICD-10-CM

## 2018-01-23 DIAGNOSIS — M86.60 OSTEOMYELITIS, CHRONIC: ICD-10-CM

## 2018-01-23 DIAGNOSIS — E11.621 DIABETIC FOOT ULCER WITH OSTEOMYELITIS: Primary | ICD-10-CM

## 2018-01-23 PROCEDURE — 15275 SKIN SUB GRAFT FACE/NK/HF/G: CPT | Mod: ,,, | Performed by: PODIATRIST

## 2018-01-23 PROCEDURE — 15276 SKIN SUB GRAFT F/N/HF/G ADDL: CPT

## 2018-01-23 PROCEDURE — 15275 SKIN SUB GRAFT FACE/NK/HF/G: CPT

## 2018-01-23 PROCEDURE — 15275 PR SKIN SUB GRAFT FACE/NK/HF/G UP TO 100 SQCM: ICD-10-PCS | Mod: ,,, | Performed by: PODIATRIST

## 2018-01-26 ENCOUNTER — HOSPITAL ENCOUNTER (OUTPATIENT)
Dept: WOUND CARE | Facility: HOSPITAL | Age: 68
Discharge: HOME | End: 2018-01-26
Attending: PODIATRIST
Payer: MEDICARE

## 2018-01-26 DIAGNOSIS — E11.42 DIABETIC POLYNEUROPATHY ASSOCIATED WITH TYPE 2 DIABETES MELLITUS: ICD-10-CM

## 2018-01-26 DIAGNOSIS — L97.509 DIABETIC FOOT ULCER WITH OSTEOMYELITIS: ICD-10-CM

## 2018-01-26 DIAGNOSIS — M86.9 DIABETIC FOOT ULCER WITH OSTEOMYELITIS: ICD-10-CM

## 2018-01-26 DIAGNOSIS — E11.42 TYPE 2 DIABETES MELLITUS WITH DIABETIC POLYNEUROPATHY, WITH LONG-TERM CURRENT USE OF INSULIN: Primary | ICD-10-CM

## 2018-01-26 DIAGNOSIS — Z79.4 TYPE 2 DIABETES MELLITUS WITH DIABETIC POLYNEUROPATHY, WITH LONG-TERM CURRENT USE OF INSULIN: Primary | ICD-10-CM

## 2018-01-26 DIAGNOSIS — E11.621 DIABETIC FOOT ULCER WITH OSTEOMYELITIS: ICD-10-CM

## 2018-01-26 DIAGNOSIS — E11.69 DIABETIC FOOT ULCER WITH OSTEOMYELITIS: ICD-10-CM

## 2018-01-26 PROCEDURE — 99213 OFFICE O/P EST LOW 20 MIN: CPT

## 2018-01-30 ENCOUNTER — HOSPITAL ENCOUNTER (OUTPATIENT)
Dept: WOUND CARE | Facility: HOSPITAL | Age: 68
Discharge: HOME | End: 2018-01-30
Attending: PODIATRIST
Payer: MEDICARE

## 2018-01-30 VITALS — SYSTOLIC BLOOD PRESSURE: 112 MMHG | DIASTOLIC BLOOD PRESSURE: 55 MMHG | HEART RATE: 69 BPM | TEMPERATURE: 98 F

## 2018-01-30 DIAGNOSIS — L97.516 DIABETIC ULCER OF TOE OF RIGHT FOOT ASSOCIATED WITH DIABETES MELLITUS DUE TO UNDERLYING CONDITION, WITH BONE INVOLVEMENT WITHOUT EVIDENCE OF NECROSIS: ICD-10-CM

## 2018-01-30 DIAGNOSIS — Z79.4 TYPE 2 DIABETES MELLITUS WITH DIABETIC POLYNEUROPATHY, WITH LONG-TERM CURRENT USE OF INSULIN: Primary | ICD-10-CM

## 2018-01-30 DIAGNOSIS — M86.60 OSTEOMYELITIS, CHRONIC: ICD-10-CM

## 2018-01-30 DIAGNOSIS — E11.42 TYPE 2 DIABETES MELLITUS WITH DIABETIC POLYNEUROPATHY, WITH LONG-TERM CURRENT USE OF INSULIN: Primary | ICD-10-CM

## 2018-01-30 DIAGNOSIS — E08.621 DIABETIC ULCER OF TOE OF RIGHT FOOT ASSOCIATED WITH DIABETES MELLITUS DUE TO UNDERLYING CONDITION, WITH BONE INVOLVEMENT WITHOUT EVIDENCE OF NECROSIS: ICD-10-CM

## 2018-01-30 DIAGNOSIS — I73.9 PAD (PERIPHERAL ARTERY DISEASE): ICD-10-CM

## 2018-01-30 PROCEDURE — 15275 PR SKIN SUB GRAFT FACE/NK/HF/G UP TO 100 SQCM: ICD-10-PCS | Mod: ,,, | Performed by: PODIATRIST

## 2018-01-30 PROCEDURE — 15275 SKIN SUB GRAFT FACE/NK/HF/G: CPT | Mod: ,,, | Performed by: PODIATRIST

## 2018-01-30 PROCEDURE — 29445 APPL RIGID TOT CNTC LEG CAST: CPT | Mod: 59,RT,, | Performed by: PODIATRIST

## 2018-01-30 PROCEDURE — 29445 APPL RIGID TOT CNTC LEG CAST: CPT | Mod: RT,59

## 2018-01-30 PROCEDURE — 29445 PR APPLY RIGID LEG CAST: ICD-10-PCS | Mod: 59,RT,, | Performed by: PODIATRIST

## 2018-01-30 PROCEDURE — 15275 SKIN SUB GRAFT FACE/NK/HF/G: CPT

## 2018-02-06 ENCOUNTER — TELEPHONE (OUTPATIENT)
Dept: INTERNAL MEDICINE | Facility: CLINIC | Age: 68
End: 2018-02-06

## 2018-02-06 ENCOUNTER — HOSPITAL ENCOUNTER (OUTPATIENT)
Dept: WOUND CARE | Facility: HOSPITAL | Age: 68
Discharge: HOME | End: 2018-02-06
Attending: PODIATRIST
Payer: MEDICARE

## 2018-02-06 VITALS — TEMPERATURE: 97 F | DIASTOLIC BLOOD PRESSURE: 42 MMHG | HEART RATE: 71 BPM | SYSTOLIC BLOOD PRESSURE: 103 MMHG

## 2018-02-06 DIAGNOSIS — E11.42 TYPE 2 DIABETES MELLITUS WITH DIABETIC POLYNEUROPATHY, WITH LONG-TERM CURRENT USE OF INSULIN: Primary | ICD-10-CM

## 2018-02-06 DIAGNOSIS — E11.42 TYPE 2 DIABETES MELLITUS WITH DIABETIC POLYNEUROPATHY, WITH LONG-TERM CURRENT USE OF INSULIN: ICD-10-CM

## 2018-02-06 DIAGNOSIS — I73.9 PAD (PERIPHERAL ARTERY DISEASE): ICD-10-CM

## 2018-02-06 DIAGNOSIS — E08.621 DIABETIC ULCER OF OTHER PART OF RIGHT FOOT ASSOCIATED WITH DIABETES MELLITUS DUE TO UNDERLYING CONDITION, WITH BONE INVOLVEMENT WITHOUT EVIDENCE OF NECROSIS: Primary | ICD-10-CM

## 2018-02-06 DIAGNOSIS — Z79.4 TYPE 2 DIABETES MELLITUS WITH DIABETIC POLYNEUROPATHY, WITH LONG-TERM CURRENT USE OF INSULIN: ICD-10-CM

## 2018-02-06 DIAGNOSIS — Z79.4 TYPE 2 DIABETES MELLITUS WITH DIABETIC POLYNEUROPATHY, WITH LONG-TERM CURRENT USE OF INSULIN: Primary | ICD-10-CM

## 2018-02-06 DIAGNOSIS — N18.30 CHRONIC KIDNEY DISEASE, STAGE III (MODERATE): Primary | ICD-10-CM

## 2018-02-06 DIAGNOSIS — L97.516 DIABETIC ULCER OF OTHER PART OF RIGHT FOOT ASSOCIATED WITH DIABETES MELLITUS DUE TO UNDERLYING CONDITION, WITH BONE INVOLVEMENT WITHOUT EVIDENCE OF NECROSIS: Primary | ICD-10-CM

## 2018-02-06 DIAGNOSIS — Z11.59 NEED FOR HEPATITIS C SCREENING TEST: ICD-10-CM

## 2018-02-06 PROCEDURE — 15275 SKIN SUB GRAFT FACE/NK/HF/G: CPT

## 2018-02-06 PROCEDURE — 15275 PR SKIN SUB GRAFT FACE/NK/HF/G UP TO 100 SQCM: ICD-10-PCS | Mod: ,,, | Performed by: PODIATRIST

## 2018-02-06 PROCEDURE — 15275 SKIN SUB GRAFT FACE/NK/HF/G: CPT | Mod: ,,, | Performed by: PODIATRIST

## 2018-02-06 NOTE — TELEPHONE ENCOUNTER
----- Message from Katy Cortez sent at 2/6/2018  9:24 AM CST -----  Patient called in regards needing to place order for annual labs. Patient is scheduled for annual on 04/03/18. Please call and advise.       Thank you!!!

## 2018-02-09 ENCOUNTER — HOSPITAL ENCOUNTER (OUTPATIENT)
Dept: WOUND CARE | Facility: HOSPITAL | Age: 68
Discharge: HOME | End: 2018-02-09
Attending: PODIATRIST
Payer: MEDICARE

## 2018-02-09 DIAGNOSIS — L97.516 DIABETIC ULCER OF OTHER PART OF RIGHT FOOT ASSOCIATED WITH DIABETES MELLITUS DUE TO UNDERLYING CONDITION, WITH BONE INVOLVEMENT WITHOUT EVIDENCE OF NECROSIS: Primary | ICD-10-CM

## 2018-02-09 DIAGNOSIS — I73.9 PAD (PERIPHERAL ARTERY DISEASE): ICD-10-CM

## 2018-02-09 DIAGNOSIS — E08.621 DIABETIC ULCER OF OTHER PART OF RIGHT FOOT ASSOCIATED WITH DIABETES MELLITUS DUE TO UNDERLYING CONDITION, WITH BONE INVOLVEMENT WITHOUT EVIDENCE OF NECROSIS: Primary | ICD-10-CM

## 2018-02-09 PROCEDURE — 99214 OFFICE O/P EST MOD 30 MIN: CPT

## 2018-02-14 RX ORDER — PEN NEEDLE, DIABETIC 31 GX5/16"
NEEDLE, DISPOSABLE MISCELLANEOUS
Qty: 100 EACH | Refills: 11 | Status: SHIPPED | OUTPATIENT
Start: 2018-02-14

## 2018-02-15 ENCOUNTER — TELEPHONE (OUTPATIENT)
Dept: ELECTROPHYSIOLOGY | Facility: CLINIC | Age: 68
End: 2018-02-15

## 2018-02-15 NOTE — TELEPHONE ENCOUNTER
Spoke to Mr Rodarte fatuma: some AFL caught on the loop recorder. He felt nothing. I asked him to send a manual transmission and walked him through it. Adv I would give this to the doctor or the device RN .

## 2018-02-16 ENCOUNTER — HOSPITAL ENCOUNTER (OUTPATIENT)
Dept: WOUND CARE | Facility: HOSPITAL | Age: 68
Discharge: HOME | End: 2018-02-16
Attending: PODIATRIST
Payer: MEDICARE

## 2018-02-16 DIAGNOSIS — E08.621 DIABETIC ULCER OF OTHER PART OF RIGHT FOOT ASSOCIATED WITH DIABETES MELLITUS DUE TO UNDERLYING CONDITION, WITH BONE INVOLVEMENT WITHOUT EVIDENCE OF NECROSIS: Primary | ICD-10-CM

## 2018-02-16 DIAGNOSIS — L97.516 DIABETIC ULCER OF OTHER PART OF RIGHT FOOT ASSOCIATED WITH DIABETES MELLITUS DUE TO UNDERLYING CONDITION, WITH BONE INVOLVEMENT WITHOUT EVIDENCE OF NECROSIS: Primary | ICD-10-CM

## 2018-02-16 DIAGNOSIS — I73.9 PAD (PERIPHERAL ARTERY DISEASE): ICD-10-CM

## 2018-02-16 PROCEDURE — 99213 OFFICE O/P EST LOW 20 MIN: CPT

## 2018-02-19 ENCOUNTER — CLINICAL SUPPORT (OUTPATIENT)
Dept: ELECTROPHYSIOLOGY | Facility: CLINIC | Age: 68
End: 2018-02-19
Attending: INTERNAL MEDICINE
Payer: MEDICARE

## 2018-02-19 ENCOUNTER — OFFICE VISIT (OUTPATIENT)
Dept: NEPHROLOGY | Facility: CLINIC | Age: 68
End: 2018-02-19
Payer: MEDICARE

## 2018-02-19 ENCOUNTER — TELEPHONE (OUTPATIENT)
Dept: ELECTROPHYSIOLOGY | Facility: CLINIC | Age: 68
End: 2018-02-19

## 2018-02-19 VITALS
OXYGEN SATURATION: 98 % | HEART RATE: 70 BPM | WEIGHT: 239.19 LBS | BODY MASS INDEX: 35.43 KG/M2 | HEIGHT: 69 IN | DIASTOLIC BLOOD PRESSURE: 80 MMHG | SYSTOLIC BLOOD PRESSURE: 150 MMHG

## 2018-02-19 DIAGNOSIS — I10 ESSENTIAL HYPERTENSION: ICD-10-CM

## 2018-02-19 DIAGNOSIS — N40.0 BENIGN PROSTATIC HYPERPLASIA, UNSPECIFIED WHETHER LOWER URINARY TRACT SYMPTOMS PRESENT: ICD-10-CM

## 2018-02-19 DIAGNOSIS — N18.30 CHRONIC KIDNEY DISEASE (CKD), STAGE III (MODERATE): Primary | ICD-10-CM

## 2018-02-19 DIAGNOSIS — E11.21 DIABETIC NEPHROPATHY ASSOCIATED WITH TYPE 2 DIABETES MELLITUS: ICD-10-CM

## 2018-02-19 DIAGNOSIS — Z95.818 STATUS POST PLACEMENT OF IMPLANTABLE LOOP RECORDER: ICD-10-CM

## 2018-02-19 DIAGNOSIS — I50.42 CHRONIC COMBINED SYSTOLIC AND DIASTOLIC HEART FAILURE: ICD-10-CM

## 2018-02-19 DIAGNOSIS — I63.9 CRYPTOGENIC STROKE: ICD-10-CM

## 2018-02-19 DIAGNOSIS — I73.9 PAD (PERIPHERAL ARTERY DISEASE): ICD-10-CM

## 2018-02-19 PROCEDURE — 99999 PR PBB SHADOW E&M-EST. PATIENT-LVL III: CPT | Mod: PBBFAC,,, | Performed by: INTERNAL MEDICINE

## 2018-02-19 PROCEDURE — 93298 REM INTERROG DEV EVAL SCRMS: CPT | Mod: S$GLB,,, | Performed by: INTERNAL MEDICINE

## 2018-02-19 PROCEDURE — 3008F BODY MASS INDEX DOCD: CPT | Mod: S$GLB,,, | Performed by: INTERNAL MEDICINE

## 2018-02-19 PROCEDURE — 93299 LOOP RECORDER REMOTE: CPT | Mod: S$GLB,,, | Performed by: INTERNAL MEDICINE

## 2018-02-19 PROCEDURE — 1159F MED LIST DOCD IN RCRD: CPT | Mod: S$GLB,,, | Performed by: INTERNAL MEDICINE

## 2018-02-19 PROCEDURE — 1126F AMNT PAIN NOTED NONE PRSNT: CPT | Mod: S$GLB,,, | Performed by: INTERNAL MEDICINE

## 2018-02-19 PROCEDURE — 99213 OFFICE O/P EST LOW 20 MIN: CPT | Mod: S$GLB,,, | Performed by: INTERNAL MEDICINE

## 2018-02-19 NOTE — TELEPHONE ENCOUNTER
Patient had a 3 hour episode of asymptomatic atrial fibrillation (not flutter). I called to see his status on his legs healing. He stated 1 was healed and the other is improving but not fully healed yet. He has a history of massive GI bleed and does not desire any further anticoagulation therapy. Plan is to eventually get surgical NICKO excision by Dr. Bobby.

## 2018-02-19 NOTE — PROGRESS NOTES
Patient is here today for follow up evaluation of CKIII. Last seen in renal office 9/26/17, Baseline Cr 1.7-2.0 mg/dl. His most recent lab (11/27/17) Cr 1.9 mg/dl; eGFR; 41 ml/min; potassium 3.9 mmol/L He is hypertensive and has diabetes complicated by neuropathy; nephropathy. Today he has no new complaints    Lab today; Cr 2.0 mg/dl; eGFR; 39 ml/min; potassium; 4.7 mmol/L    Impression  CKD III Stable    Plan  Return Visit; 4 mo

## 2018-02-20 ENCOUNTER — HOSPITAL ENCOUNTER (OUTPATIENT)
Dept: WOUND CARE | Facility: HOSPITAL | Age: 68
Discharge: HOME | End: 2018-02-20
Attending: PODIATRIST
Payer: MEDICARE

## 2018-02-20 VITALS — TEMPERATURE: 98 F | HEART RATE: 86 BPM | SYSTOLIC BLOOD PRESSURE: 117 MMHG | DIASTOLIC BLOOD PRESSURE: 67 MMHG

## 2018-02-20 DIAGNOSIS — L97.516 DIABETIC ULCER OF RIGHT FOOT ASSOCIATED WITH TYPE 2 DIABETES MELLITUS, WITH BONE INVOLVEMENT WITHOUT EVIDENCE OF NECROSIS, UNSPECIFIED PART OF FOOT: Primary | ICD-10-CM

## 2018-02-20 DIAGNOSIS — I50.22 CHRONIC SYSTOLIC HEART FAILURE: ICD-10-CM

## 2018-02-20 DIAGNOSIS — E11.51 TYPE 2 DIABETES MELLITUS WITH PERIPHERAL CIRCULATORY DISORDER: ICD-10-CM

## 2018-02-20 DIAGNOSIS — E11.42 DIABETIC POLYNEUROPATHY ASSOCIATED WITH TYPE 2 DIABETES MELLITUS: ICD-10-CM

## 2018-02-20 DIAGNOSIS — E11.621 DIABETIC ULCER OF RIGHT FOOT ASSOCIATED WITH TYPE 2 DIABETES MELLITUS, WITH BONE INVOLVEMENT WITHOUT EVIDENCE OF NECROSIS, UNSPECIFIED PART OF FOOT: Primary | ICD-10-CM

## 2018-02-20 DIAGNOSIS — I73.9 PAD (PERIPHERAL ARTERY DISEASE): ICD-10-CM

## 2018-02-20 PROCEDURE — 11042 DBRDMT SUBQ TIS 1ST 20SQCM/<: CPT | Mod: ,,, | Performed by: PODIATRIST

## 2018-02-20 PROCEDURE — 11042 PR DEBRIDEMENT, SKIN, SUB-Q TISSUE,=<20 SQ CM: ICD-10-PCS | Mod: ,,, | Performed by: PODIATRIST

## 2018-02-20 PROCEDURE — 27201912 HC WOUND CARE DEBRIDEMENT SUPPLIES

## 2018-02-20 PROCEDURE — 11042 DBRDMT SUBQ TIS 1ST 20SQCM/<: CPT

## 2018-02-23 ENCOUNTER — HOSPITAL ENCOUNTER (OUTPATIENT)
Dept: WOUND CARE | Facility: HOSPITAL | Age: 68
Discharge: HOME | End: 2018-02-23
Attending: PODIATRIST
Payer: MEDICARE

## 2018-02-23 DIAGNOSIS — L97.509 DIABETIC FOOT ULCER WITH OSTEOMYELITIS: ICD-10-CM

## 2018-02-23 DIAGNOSIS — E11.621 DIABETIC FOOT ULCER WITH OSTEOMYELITIS: ICD-10-CM

## 2018-02-23 DIAGNOSIS — M86.9 DIABETIC FOOT ULCER WITH OSTEOMYELITIS: ICD-10-CM

## 2018-02-23 DIAGNOSIS — E11.69 DIABETIC FOOT ULCER WITH OSTEOMYELITIS: ICD-10-CM

## 2018-02-23 PROCEDURE — 99213 OFFICE O/P EST LOW 20 MIN: CPT

## 2018-02-27 ENCOUNTER — HOSPITAL ENCOUNTER (OUTPATIENT)
Dept: WOUND CARE | Facility: HOSPITAL | Age: 68
Discharge: HOME | End: 2018-02-27
Attending: PODIATRIST
Payer: MEDICARE

## 2018-02-27 VITALS
DIASTOLIC BLOOD PRESSURE: 60 MMHG | HEIGHT: 69 IN | HEART RATE: 70 BPM | TEMPERATURE: 98 F | BODY MASS INDEX: 35.4 KG/M2 | SYSTOLIC BLOOD PRESSURE: 117 MMHG | WEIGHT: 239 LBS

## 2018-02-27 DIAGNOSIS — I73.9 PAD (PERIPHERAL ARTERY DISEASE): ICD-10-CM

## 2018-02-27 DIAGNOSIS — L97.509 DIABETIC FOOT ULCER WITH OSTEOMYELITIS: Primary | ICD-10-CM

## 2018-02-27 DIAGNOSIS — I25.5 ISCHEMIC CARDIOMYOPATHY: ICD-10-CM

## 2018-02-27 DIAGNOSIS — E11.621 DIABETIC FOOT ULCER WITH OSTEOMYELITIS: Primary | ICD-10-CM

## 2018-02-27 DIAGNOSIS — I48.0 PAROXYSMAL ATRIAL FIBRILLATION: ICD-10-CM

## 2018-02-27 DIAGNOSIS — M86.9 DIABETIC FOOT ULCER WITH OSTEOMYELITIS: Primary | ICD-10-CM

## 2018-02-27 DIAGNOSIS — E11.69 DIABETIC FOOT ULCER WITH OSTEOMYELITIS: Primary | ICD-10-CM

## 2018-02-27 DIAGNOSIS — E11.42 DIABETIC POLYNEUROPATHY ASSOCIATED WITH TYPE 2 DIABETES MELLITUS: ICD-10-CM

## 2018-02-27 DIAGNOSIS — I63.9 CRYPTOGENIC STROKE: Primary | ICD-10-CM

## 2018-02-27 PROCEDURE — 11042 PR DEBRIDEMENT, SKIN, SUB-Q TISSUE,=<20 SQ CM: ICD-10-PCS | Mod: ,,, | Performed by: PODIATRIST

## 2018-02-27 PROCEDURE — 11042 DBRDMT SUBQ TIS 1ST 20SQCM/<: CPT

## 2018-02-27 PROCEDURE — 11042 DBRDMT SUBQ TIS 1ST 20SQCM/<: CPT | Mod: ,,, | Performed by: PODIATRIST

## 2018-02-27 PROCEDURE — 27201912 HC WOUND CARE DEBRIDEMENT SUPPLIES

## 2018-03-04 DIAGNOSIS — Z95.818 STATUS POST PLACEMENT OF IMPLANTABLE LOOP RECORDER: Primary | ICD-10-CM

## 2018-03-04 DIAGNOSIS — I63.9 CRYPTOGENIC STROKE: ICD-10-CM

## 2018-03-05 ENCOUNTER — HOSPITAL ENCOUNTER (OUTPATIENT)
Dept: CARDIOLOGY | Facility: CLINIC | Age: 68
Discharge: HOME | End: 2018-03-05
Payer: MEDICARE

## 2018-03-05 ENCOUNTER — OFFICE VISIT (OUTPATIENT)
Dept: ELECTROPHYSIOLOGY | Facility: CLINIC | Age: 68
End: 2018-03-05
Payer: MEDICARE

## 2018-03-05 VITALS
DIASTOLIC BLOOD PRESSURE: 68 MMHG | HEIGHT: 69 IN | SYSTOLIC BLOOD PRESSURE: 116 MMHG | HEART RATE: 67 BPM | BODY MASS INDEX: 35.25 KG/M2 | WEIGHT: 238 LBS

## 2018-03-05 DIAGNOSIS — I25.10 CORONARY ARTERY DISEASE INVOLVING NATIVE CORONARY ARTERY OF NATIVE HEART WITHOUT ANGINA PECTORIS: ICD-10-CM

## 2018-03-05 DIAGNOSIS — I25.5 ISCHEMIC CARDIOMYOPATHY: ICD-10-CM

## 2018-03-05 DIAGNOSIS — I48.3 TYPICAL ATRIAL FLUTTER: Primary | Chronic | ICD-10-CM

## 2018-03-05 DIAGNOSIS — E78.2 MIXED HYPERLIPIDEMIA: ICD-10-CM

## 2018-03-05 DIAGNOSIS — M86.60 OSTEOMYELITIS, CHRONIC: ICD-10-CM

## 2018-03-05 DIAGNOSIS — K92.2 LOWER GI BLEED: ICD-10-CM

## 2018-03-05 DIAGNOSIS — I48.0 PAROXYSMAL ATRIAL FIBRILLATION: ICD-10-CM

## 2018-03-05 DIAGNOSIS — I73.9 PAD (PERIPHERAL ARTERY DISEASE): ICD-10-CM

## 2018-03-05 DIAGNOSIS — I10 ESSENTIAL HYPERTENSION: ICD-10-CM

## 2018-03-05 DIAGNOSIS — I50.22 CHRONIC SYSTOLIC HEART FAILURE: ICD-10-CM

## 2018-03-05 DIAGNOSIS — I63.9 CRYPTOGENIC STROKE: Chronic | ICD-10-CM

## 2018-03-05 DIAGNOSIS — E11.51 TYPE 2 DIABETES MELLITUS WITH PERIPHERAL CIRCULATORY DISORDER: ICD-10-CM

## 2018-03-05 DIAGNOSIS — I63.9 CRYPTOGENIC STROKE: ICD-10-CM

## 2018-03-05 PROBLEM — Z79.01 LONG TERM (CURRENT) USE OF ANTICOAGULANTS: Status: RESOLVED | Noted: 2017-01-27 | Resolved: 2018-03-05

## 2018-03-05 PROCEDURE — 99215 OFFICE O/P EST HI 40 MIN: CPT | Mod: S$GLB,,, | Performed by: INTERNAL MEDICINE

## 2018-03-05 PROCEDURE — 3074F SYST BP LT 130 MM HG: CPT | Mod: S$GLB,,, | Performed by: INTERNAL MEDICINE

## 2018-03-05 PROCEDURE — 93000 ELECTROCARDIOGRAM COMPLETE: CPT | Mod: S$GLB,,, | Performed by: INTERNAL MEDICINE

## 2018-03-05 PROCEDURE — 99999 PR PBB SHADOW E&M-EST. PATIENT-LVL IV: CPT | Mod: PBBFAC,,, | Performed by: INTERNAL MEDICINE

## 2018-03-05 PROCEDURE — 99499 UNLISTED E&M SERVICE: CPT | Mod: S$GLB,,, | Performed by: INTERNAL MEDICINE

## 2018-03-05 PROCEDURE — 3078F DIAST BP <80 MM HG: CPT | Mod: S$GLB,,, | Performed by: INTERNAL MEDICINE

## 2018-03-05 RX ORDER — AMIODARONE HYDROCHLORIDE 200 MG/1
200 TABLET ORAL DAILY
Qty: 90 TABLET | Refills: 3 | Status: SHIPPED | OUTPATIENT
Start: 2018-03-05 | End: 2018-08-01 | Stop reason: SDUPTHER

## 2018-03-05 NOTE — PROGRESS NOTES
Subjective:     HPI    I had the pleasure of seeing Chau Rodarte in the office today for a second opinion regarding his arrhythmias (he is a patient of Dr. Moreno). He is a 67 year old male with a history of vascular disease for which he has undergone right fem-pop and left SFA stent, ischemic cardiomyopathy (EF as low as 20-25% now 45-50% based on 3/2017 echo), CAD status-post multivessel PCI in 12/2016 (PCI to prox-LAD/LCX, distal LMCA and mid LAD), chronic kidney disease stage 3 (Cr 2.0 in 2/2018), cryptogenic stroke (2006, manifesting as facial droop, dysarthria, and right sided weakness), HTN, and poorly controlled diabetes mellitus. His arrhythmia history dates back to late 2016 when he was incidentally noted to be in atrial while at cardiac rehab following PCI. He ultimately underwent CTI-line in 2/2017. In 3/2017 he underwent ILR implantation to assess for occult AF. PAF was diagnosed (overall AF burden <1%). In the interim he had a significant GI bleed requiring transfusions (INR 2.9 on coumadin on admission), and anticoagulation was stopped. He was evaluated by CT surgery and was deemed a suitable candidate for left atrial appendage ligation. However, in the interim he has continued to have LLE ulcers due to his PAD, preventing him from undergoing surgery.    I reviewed a recent ILR interrogation, which shows a 0.6% AF burden (longest episode 3 hrs 14 minutes).    I reviewed today's ECG tracing, which shows sinus rhythm at 67 bpm.    Review of Systems   Constitution: Negative for decreased appetite, malaise/fatigue, weight gain and weight loss.   HENT: Negative for sore throat.    Eyes: Negative for blurred vision.   Cardiovascular: Negative for chest pain, dyspnea on exertion, irregular heartbeat, leg swelling, near-syncope, orthopnea, palpitations, paroxysmal nocturnal dyspnea and syncope.   Respiratory: Negative for shortness of breath.    Skin: Negative for rash.   Musculoskeletal: Negative for  arthritis.   Gastrointestinal: Negative for abdominal pain.   Neurological: Negative for focal weakness.   Psychiatric/Behavioral: Negative for altered mental status.        Objective:    Physical Exam   Constitutional: He is oriented to person, place, and time. He appears well-developed and well-nourished. No distress.   HENT:   Head: Normocephalic and atraumatic.   Mouth/Throat: Oropharynx is clear and moist.   Eyes: Pupils are equal, round, and reactive to light. No scleral icterus.   Neck: Neck supple. No thyromegaly present.   Cardiovascular: Regular rhythm, normal heart sounds and normal pulses.  Exam reveals no gallop and no friction rub.    No murmur heard.  Pulmonary/Chest: Effort normal and breath sounds normal. He has no rales.   Abdominal: Soft. Bowel sounds are normal. He exhibits no distension. There is no tenderness.   Musculoskeletal: He exhibits no edema.   Neurological: He is alert and oriented to person, place, and time.   Skin: Skin is warm and dry. No rash noted.   Psychiatric: He has a normal mood and affect. His behavior is normal.         Assessment:       1. Typical atrial flutter, s/p ablation    2. Paroxysmal atrial fibrillation    3. PAD (peripheral artery disease)    4. Ischemic cardiomyopathy    5. Mixed hyperlipidemia    6. Essential hypertension    7. Coronary artery disease involving native coronary artery of native heart without angina pectoris    8. Chronic systolic heart failure    9. Lower GI bleed    10. Type 2 diabetes mellitus with peripheral circulatory disorder    11. Osteomyelitis, chronic    12. Cryptogenic stroke, remote history (2006)         Plan:       In summary, Chau Rodarte is a 67 year old male with a complex cardiac history as described above, who is having episodes of occult AF and is not an anticoagulation candidate. His [id number] Score is 7 (age, CVA, DM, HTN, CAD, cardiomyopathy) which obviously corresponds to a high yearly risk of stroke without  anticoagulation. He has discussed surgical ligation of the NICKO with Dr. Bobby, but unfortunately he still his RLE ulcerations that are still in the process of healing. The plan at this time is to start Amiodarone, in hopes that this will quiesce his arrhythmias and therefore eliminate his AF stroke risk. He will follow-up with me in 3 months.    Thank you for allowing me to participate in the care of this patient. Please do not hesitate to call me with any questions or concerns.

## 2018-03-06 ENCOUNTER — HOSPITAL ENCOUNTER (OUTPATIENT)
Dept: WOUND CARE | Facility: HOSPITAL | Age: 68
Discharge: HOME | End: 2018-03-06
Attending: PODIATRIST
Payer: MEDICARE

## 2018-03-06 VITALS — TEMPERATURE: 98 F | DIASTOLIC BLOOD PRESSURE: 58 MMHG | SYSTOLIC BLOOD PRESSURE: 113 MMHG | HEART RATE: 77 BPM

## 2018-03-06 DIAGNOSIS — E11.621 DIABETIC ULCER OF RIGHT FOOT ASSOCIATED WITH TYPE 2 DIABETES MELLITUS, WITH BONE INVOLVEMENT WITHOUT EVIDENCE OF NECROSIS, UNSPECIFIED PART OF FOOT: Primary | ICD-10-CM

## 2018-03-06 DIAGNOSIS — E11.42 DIABETIC POLYNEUROPATHY ASSOCIATED WITH TYPE 2 DIABETES MELLITUS: ICD-10-CM

## 2018-03-06 DIAGNOSIS — L97.516 DIABETIC ULCER OF RIGHT FOOT ASSOCIATED WITH TYPE 2 DIABETES MELLITUS, WITH BONE INVOLVEMENT WITHOUT EVIDENCE OF NECROSIS, UNSPECIFIED PART OF FOOT: Primary | ICD-10-CM

## 2018-03-06 DIAGNOSIS — I73.9 PAD (PERIPHERAL ARTERY DISEASE): ICD-10-CM

## 2018-03-06 PROCEDURE — 11042 DBRDMT SUBQ TIS 1ST 20SQCM/<: CPT

## 2018-03-06 PROCEDURE — 11042 PR DEBRIDEMENT, SKIN, SUB-Q TISSUE,=<20 SQ CM: ICD-10-PCS | Mod: ,,, | Performed by: PODIATRIST

## 2018-03-06 PROCEDURE — 11042 DBRDMT SUBQ TIS 1ST 20SQCM/<: CPT | Mod: ,,, | Performed by: PODIATRIST

## 2018-03-06 PROCEDURE — 27201912 HC WOUND CARE DEBRIDEMENT SUPPLIES

## 2018-03-06 NOTE — PROGRESS NOTES
Patient, Chau Rodarte (MRN #[id number]), presented with a recorded BMI of 35.15 kg/m^2 and a documented comorbidity(s):  - Diabetes Mellitus Type 2  - Hypertension  - Hyperlipidemia  - Atrial Fibrillation  - Atrial Fibrillation  to which the severe obesity is a contributing factor. This is consistent with the definition of severe obesity (BMI 35.0-35.9) with comorbidity (ICD-10 E66.01, Z68.35). The patient's severe obesity was monitored, evaluated, addressed and/or treated. This addendum to the medical record is made on 03/06/2018.

## 2018-03-13 ENCOUNTER — HOSPITAL ENCOUNTER (OUTPATIENT)
Dept: WOUND CARE | Facility: HOSPITAL | Age: 68
Discharge: HOME | End: 2018-03-13
Attending: PODIATRIST
Payer: MEDICARE

## 2018-03-13 VITALS
BODY MASS INDEX: 35.25 KG/M2 | TEMPERATURE: 98 F | HEART RATE: 72 BPM | DIASTOLIC BLOOD PRESSURE: 61 MMHG | HEIGHT: 69 IN | WEIGHT: 238 LBS | SYSTOLIC BLOOD PRESSURE: 101 MMHG

## 2018-03-13 DIAGNOSIS — L97.516 DIABETIC ULCER OF RIGHT FOOT ASSOCIATED WITH TYPE 2 DIABETES MELLITUS, WITH BONE INVOLVEMENT WITHOUT EVIDENCE OF NECROSIS, UNSPECIFIED PART OF FOOT: Primary | ICD-10-CM

## 2018-03-13 DIAGNOSIS — E11.621 DIABETIC ULCER OF RIGHT FOOT ASSOCIATED WITH TYPE 2 DIABETES MELLITUS, WITH BONE INVOLVEMENT WITHOUT EVIDENCE OF NECROSIS, UNSPECIFIED PART OF FOOT: Primary | ICD-10-CM

## 2018-03-13 DIAGNOSIS — I73.9 PAD (PERIPHERAL ARTERY DISEASE): ICD-10-CM

## 2018-03-13 DIAGNOSIS — E11.42 DIABETIC POLYNEUROPATHY ASSOCIATED WITH TYPE 2 DIABETES MELLITUS: ICD-10-CM

## 2018-03-13 PROCEDURE — 11042 PR DEBRIDEMENT, SKIN, SUB-Q TISSUE,=<20 SQ CM: ICD-10-PCS | Mod: ,,, | Performed by: PODIATRIST

## 2018-03-13 PROCEDURE — 11042 DBRDMT SUBQ TIS 1ST 20SQCM/<: CPT

## 2018-03-13 PROCEDURE — 27201912 HC WOUND CARE DEBRIDEMENT SUPPLIES

## 2018-03-13 PROCEDURE — 11042 DBRDMT SUBQ TIS 1ST 20SQCM/<: CPT | Mod: ,,, | Performed by: PODIATRIST

## 2018-03-15 RX ORDER — FUROSEMIDE 40 MG/1
40 TABLET ORAL DAILY
Qty: 90 TABLET | Refills: 3 | Status: SHIPPED | OUTPATIENT
Start: 2018-03-15 | End: 2019-01-14

## 2018-03-20 ENCOUNTER — HOSPITAL ENCOUNTER (OUTPATIENT)
Dept: WOUND CARE | Facility: HOSPITAL | Age: 68
Discharge: HOME | End: 2018-03-20
Attending: PODIATRIST
Payer: MEDICARE

## 2018-03-20 VITALS — DIASTOLIC BLOOD PRESSURE: 62 MMHG | HEART RATE: 71 BPM | TEMPERATURE: 98 F | SYSTOLIC BLOOD PRESSURE: 119 MMHG

## 2018-03-20 DIAGNOSIS — E11.621 DIABETIC ULCER OF RIGHT FOOT ASSOCIATED WITH TYPE 2 DIABETES MELLITUS, WITH BONE INVOLVEMENT WITHOUT EVIDENCE OF NECROSIS, UNSPECIFIED PART OF FOOT: Primary | ICD-10-CM

## 2018-03-20 DIAGNOSIS — L97.516 DIABETIC ULCER OF RIGHT FOOT ASSOCIATED WITH TYPE 2 DIABETES MELLITUS, WITH BONE INVOLVEMENT WITHOUT EVIDENCE OF NECROSIS, UNSPECIFIED PART OF FOOT: Primary | ICD-10-CM

## 2018-03-20 DIAGNOSIS — E11.42 DIABETIC POLYNEUROPATHY ASSOCIATED WITH TYPE 2 DIABETES MELLITUS: ICD-10-CM

## 2018-03-20 DIAGNOSIS — I73.9 PAD (PERIPHERAL ARTERY DISEASE): ICD-10-CM

## 2018-03-20 PROCEDURE — 11042 DBRDMT SUBQ TIS 1ST 20SQCM/<: CPT

## 2018-03-20 PROCEDURE — 27201912 HC WOUND CARE DEBRIDEMENT SUPPLIES

## 2018-03-27 ENCOUNTER — CLINICAL SUPPORT (OUTPATIENT)
Dept: ELECTROPHYSIOLOGY | Facility: CLINIC | Age: 68
End: 2018-03-27
Attending: INTERNAL MEDICINE
Payer: MEDICARE

## 2018-03-27 ENCOUNTER — HOSPITAL ENCOUNTER (OUTPATIENT)
Dept: WOUND CARE | Facility: HOSPITAL | Age: 68
Discharge: HOME | End: 2018-03-27
Attending: PODIATRIST
Payer: MEDICARE

## 2018-03-27 VITALS — TEMPERATURE: 98 F | SYSTOLIC BLOOD PRESSURE: 106 MMHG | DIASTOLIC BLOOD PRESSURE: 60 MMHG | HEART RATE: 60 BPM

## 2018-03-27 DIAGNOSIS — L97.516 DIABETIC ULCER OF RIGHT FOOT ASSOCIATED WITH TYPE 2 DIABETES MELLITUS, WITH BONE INVOLVEMENT WITHOUT EVIDENCE OF NECROSIS, UNSPECIFIED PART OF FOOT: Primary | ICD-10-CM

## 2018-03-27 DIAGNOSIS — I73.9 PAD (PERIPHERAL ARTERY DISEASE): ICD-10-CM

## 2018-03-27 DIAGNOSIS — E11.621 DIABETIC ULCER OF RIGHT FOOT ASSOCIATED WITH TYPE 2 DIABETES MELLITUS, WITH BONE INVOLVEMENT WITHOUT EVIDENCE OF NECROSIS, UNSPECIFIED PART OF FOOT: Primary | ICD-10-CM

## 2018-03-27 DIAGNOSIS — E11.42 DIABETIC POLYNEUROPATHY ASSOCIATED WITH TYPE 2 DIABETES MELLITUS: ICD-10-CM

## 2018-03-27 DIAGNOSIS — I63.9 CRYPTOGENIC STROKE: ICD-10-CM

## 2018-03-27 DIAGNOSIS — Z95.818 STATUS POST PLACEMENT OF IMPLANTABLE LOOP RECORDER: ICD-10-CM

## 2018-03-27 PROCEDURE — 11042 DBRDMT SUBQ TIS 1ST 20SQCM/<: CPT

## 2018-03-27 PROCEDURE — 93299 LOOP RECORDER REMOTE: CPT | Mod: S$GLB,,, | Performed by: INTERNAL MEDICINE

## 2018-03-27 PROCEDURE — 11042 DBRDMT SUBQ TIS 1ST 20SQCM/<: CPT | Mod: ,,, | Performed by: PODIATRIST

## 2018-03-27 PROCEDURE — 93298 REM INTERROG DEV EVAL SCRMS: CPT | Mod: S$GLB,,, | Performed by: INTERNAL MEDICINE

## 2018-03-27 PROCEDURE — 27201912 HC WOUND CARE DEBRIDEMENT SUPPLIES

## 2018-03-27 PROCEDURE — 11042 PR DEBRIDEMENT, SKIN, SUB-Q TISSUE,=<20 SQ CM: ICD-10-PCS | Mod: ,,, | Performed by: PODIATRIST

## 2018-03-29 ENCOUNTER — LAB VISIT (OUTPATIENT)
Dept: LAB | Facility: HOSPITAL | Age: 68
End: 2018-03-29
Attending: INTERNAL MEDICINE
Payer: MEDICARE

## 2018-03-29 DIAGNOSIS — E11.42 TYPE 2 DIABETES MELLITUS WITH DIABETIC POLYNEUROPATHY, WITH LONG-TERM CURRENT USE OF INSULIN: ICD-10-CM

## 2018-03-29 DIAGNOSIS — Z11.59 NEED FOR HEPATITIS C SCREENING TEST: ICD-10-CM

## 2018-03-29 DIAGNOSIS — Z79.4 TYPE 2 DIABETES MELLITUS WITH DIABETIC POLYNEUROPATHY, WITH LONG-TERM CURRENT USE OF INSULIN: ICD-10-CM

## 2018-03-29 LAB
CHOLEST SERPL-MCNC: 115 MG/DL
CHOLEST/HDLC SERPL: 4.1 {RATIO}
ESTIMATED AVG GLUCOSE: 128 MG/DL
HBA1C MFR BLD HPLC: 6.1 %
HCV AB SERPL QL IA: NEGATIVE
HDLC SERPL-MCNC: 28 MG/DL
HDLC SERPL: 24.3 %
LDLC SERPL CALC-MCNC: 75.2 MG/DL
NONHDLC SERPL-MCNC: 87 MG/DL
TRIGL SERPL-MCNC: 59 MG/DL

## 2018-03-29 PROCEDURE — 36415 COLL VENOUS BLD VENIPUNCTURE: CPT

## 2018-03-29 PROCEDURE — 86803 HEPATITIS C AB TEST: CPT

## 2018-03-29 PROCEDURE — 80061 LIPID PANEL: CPT

## 2018-03-29 PROCEDURE — 83036 HEMOGLOBIN GLYCOSYLATED A1C: CPT

## 2018-04-03 ENCOUNTER — OFFICE VISIT (OUTPATIENT)
Dept: INTERNAL MEDICINE | Facility: CLINIC | Age: 68
End: 2018-04-03
Payer: MEDICARE

## 2018-04-03 ENCOUNTER — HOSPITAL ENCOUNTER (OUTPATIENT)
Dept: WOUND CARE | Facility: HOSPITAL | Age: 68
Discharge: HOME | End: 2018-04-03
Attending: PODIATRIST
Payer: MEDICARE

## 2018-04-03 VITALS — SYSTOLIC BLOOD PRESSURE: 120 MMHG | DIASTOLIC BLOOD PRESSURE: 61 MMHG | HEART RATE: 56 BPM | TEMPERATURE: 99 F

## 2018-04-03 VITALS
DIASTOLIC BLOOD PRESSURE: 58 MMHG | WEIGHT: 197.31 LBS | HEART RATE: 62 BPM | BODY MASS INDEX: 29.22 KG/M2 | HEIGHT: 69 IN | SYSTOLIC BLOOD PRESSURE: 118 MMHG

## 2018-04-03 DIAGNOSIS — L97.514 DIABETIC ULCER OF TOE OF RIGHT FOOT ASSOCIATED WITH TYPE 2 DIABETES MELLITUS, WITH NECROSIS OF BONE: ICD-10-CM

## 2018-04-03 DIAGNOSIS — E11.621 DIABETIC ULCER OF RIGHT FOOT ASSOCIATED WITH TYPE 2 DIABETES MELLITUS, WITH BONE INVOLVEMENT WITHOUT EVIDENCE OF NECROSIS, UNSPECIFIED PART OF FOOT: Primary | ICD-10-CM

## 2018-04-03 DIAGNOSIS — Z13.6 SCREENING FOR AAA (ABDOMINAL AORTIC ANEURYSM): ICD-10-CM

## 2018-04-03 DIAGNOSIS — I70.25 ATHEROSCLEROSIS OF NATIVE ARTERIES OF THE EXTREMITIES WITH ULCERATION: Primary | ICD-10-CM

## 2018-04-03 DIAGNOSIS — E11.42 DIABETIC POLYNEUROPATHY ASSOCIATED WITH TYPE 2 DIABETES MELLITUS: ICD-10-CM

## 2018-04-03 DIAGNOSIS — L89.610 DECUBITUS ULCER, HEEL, RIGHT, UNSTAGEABLE: ICD-10-CM

## 2018-04-03 DIAGNOSIS — I73.9 PAD (PERIPHERAL ARTERY DISEASE): ICD-10-CM

## 2018-04-03 DIAGNOSIS — Z87.891 EX-SMOKER: ICD-10-CM

## 2018-04-03 DIAGNOSIS — L97.516 DIABETIC ULCER OF RIGHT FOOT ASSOCIATED WITH TYPE 2 DIABETES MELLITUS, WITH BONE INVOLVEMENT WITHOUT EVIDENCE OF NECROSIS, UNSPECIFIED PART OF FOOT: Primary | ICD-10-CM

## 2018-04-03 DIAGNOSIS — E11.621 DIABETIC ULCER OF TOE OF RIGHT FOOT ASSOCIATED WITH TYPE 2 DIABETES MELLITUS, WITH NECROSIS OF BONE: ICD-10-CM

## 2018-04-03 PROCEDURE — 99999 PR PBB SHADOW E&M-EST. PATIENT-LVL III: CPT | Mod: PBBFAC,,, | Performed by: INTERNAL MEDICINE

## 2018-04-03 PROCEDURE — 11042 PR DEBRIDEMENT, SKIN, SUB-Q TISSUE,=<20 SQ CM: ICD-10-PCS | Mod: ,,, | Performed by: PODIATRIST

## 2018-04-03 PROCEDURE — 11042 DBRDMT SUBQ TIS 1ST 20SQCM/<: CPT

## 2018-04-03 PROCEDURE — 3078F DIAST BP <80 MM HG: CPT | Mod: CPTII,S$GLB,, | Performed by: INTERNAL MEDICINE

## 2018-04-03 PROCEDURE — 27201912 HC WOUND CARE DEBRIDEMENT SUPPLIES

## 2018-04-03 PROCEDURE — 3044F HG A1C LEVEL LT 7.0%: CPT | Mod: CPTII,S$GLB,, | Performed by: INTERNAL MEDICINE

## 2018-04-03 PROCEDURE — 3074F SYST BP LT 130 MM HG: CPT | Mod: CPTII,S$GLB,, | Performed by: INTERNAL MEDICINE

## 2018-04-03 PROCEDURE — 11042 DBRDMT SUBQ TIS 1ST 20SQCM/<: CPT | Mod: ,,, | Performed by: PODIATRIST

## 2018-04-03 PROCEDURE — 99499 UNLISTED E&M SERVICE: CPT | Mod: S$GLB,,, | Performed by: INTERNAL MEDICINE

## 2018-04-03 PROCEDURE — 99214 OFFICE O/P EST MOD 30 MIN: CPT | Mod: S$GLB,,, | Performed by: INTERNAL MEDICINE

## 2018-04-03 NOTE — PROGRESS NOTES
Subjective:       Patient ID: Chau Rodarte is a 67 y.o. male.    Chief Complaint: Annual Exam    Patient is here for followup for chronic conditions.    Since last visit issues with circulation in the legs. Current still has wound care for R foot. No f/c/nbs, no active SOI as per wound care notes.    DM2:  good med adherence  no changed Diet  < 140 AM sugars  Controlled, Post-prandial sugars  Not discussed, Numbness in feet  No new sores in feet  No new Visual changes  no Polyuria/polydipsia.          Review of Systems   Constitutional: Negative for activity change, fatigue, fever and unexpected weight change.   HENT: Negative for congestion.    Respiratory: Negative for cough, chest tightness, shortness of breath and wheezing.    Cardiovascular: Negative for chest pain, palpitations and leg swelling.   Gastrointestinal: Negative for abdominal distention, abdominal pain, anal bleeding, blood in stool, nausea and vomiting.   Genitourinary: Negative for decreased urine volume and difficulty urinating.   Musculoskeletal: Negative for arthralgias and neck pain.   Skin: Positive for wound (R foot). Negative for rash.   Neurological: Negative for tremors, syncope, weakness and numbness.   Hematological: Negative for adenopathy. Does not bruise/bleed easily.   Psychiatric/Behavioral: Negative for confusion.       Objective:      Physical Exam   Constitutional: He appears well-developed and well-nourished. No distress.   Appears well   HENT:   Head: Normocephalic and atraumatic.   Mouth/Throat: No oropharyngeal exudate.   Eyes: EOM are normal. Pupils are equal, round, and reactive to light. No scleral icterus.   Cardiovascular: Normal rate, regular rhythm and normal heart sounds.    Pulmonary/Chest: Effort normal. He has wheezes (mild).   Abdominal: Soft. Bowel sounds are normal. He exhibits no distension and no mass. There is no tenderness. There is no rebound and no guarding. No hernia.   Musculoskeletal:   R foot in surg  JESSICA west in walking shoe   Skin: He is not diaphoretic.   Psychiatric: He has a normal mood and affect. His behavior is normal.       Assessment:       1. Screening for AAA (abdominal aortic aneurysm)    2. Ex-smoker    3. Atherosclerosis of native arteries of the extremities with ulceration    4. PAD (peripheral artery disease)    5. Decubitus ulcer, heel, right, unstageable    6. Diabetic ulcer of toe of right foot associated with type 2 diabetes mellitus, with necrosis of bone        Plan:       Chau DAMIAN was seen today for annual exam.    Diagnoses and all orders for this visit:    Screening for AAA (abdominal aortic aneurysm)  -     US Abdominal Aorta; Future  Ex-smoker  -     US Abdominal Aorta; Future    Atherosclerosis of native arteries of the extremities with ulceration  -     Ambulatory Referral to Cardiology    PAD (peripheral artery disease)  Stable, no new symptoms    Decubitus ulcer, heel, right, unstageable  Diabetic ulcer of toe of right foot associated with type 2 diabetes mellitus, with necrosis of bone  Still under wound care      Health Maintenance       Date Due Completion Date    TETANUS VACCINE 04/20/1967 ---    Zoster Vaccine 04/20/2009 ---    Abdominal Aortic Aneurysm Screening 04/20/2014 ---    PROSTATE-SPECIFIC ANTIGEN 06/22/2017 6/22/2016    Influenza Vaccine 08/01/2017 12/7/2016    Pneumococcal (65+) (2 of 2 - PPSV23) 05/10/2018 5/10/2017    Foot Exam 06/15/2018 6/15/2017    Override on 10/23/2015: Done (Tyra Hoover)    Hemoglobin A1c 06/29/2018 3/29/2018    Eye Exam 07/25/2018 7/25/2017    Override on 7/18/2015: Done    Override on 3/29/2014: Done    Lipid Panel 03/29/2019 3/29/2018    High Dose Statin 04/03/2019 4/3/2018    Colonoscopy 03/21/2022 3/21/2017      Shingles vaccine please  Discussed timing of flu vax for next yr, too late now    Follow-up in about 6 months (around 10/3/2018).

## 2018-04-10 ENCOUNTER — HOSPITAL ENCOUNTER (OUTPATIENT)
Dept: WOUND CARE | Facility: HOSPITAL | Age: 68
Discharge: HOME | End: 2018-04-10
Attending: PODIATRIST
Payer: MEDICARE

## 2018-04-10 VITALS — TEMPERATURE: 98 F | SYSTOLIC BLOOD PRESSURE: 117 MMHG | DIASTOLIC BLOOD PRESSURE: 76 MMHG | HEART RATE: 59 BPM

## 2018-04-10 DIAGNOSIS — E11.42 DIABETIC POLYNEUROPATHY ASSOCIATED WITH TYPE 2 DIABETES MELLITUS: ICD-10-CM

## 2018-04-10 DIAGNOSIS — E11.621 DIABETIC ULCER OF RIGHT FOOT ASSOCIATED WITH TYPE 2 DIABETES MELLITUS, WITH BONE INVOLVEMENT WITHOUT EVIDENCE OF NECROSIS, UNSPECIFIED PART OF FOOT: Primary | ICD-10-CM

## 2018-04-10 DIAGNOSIS — L97.516 DIABETIC ULCER OF RIGHT FOOT ASSOCIATED WITH TYPE 2 DIABETES MELLITUS, WITH BONE INVOLVEMENT WITHOUT EVIDENCE OF NECROSIS, UNSPECIFIED PART OF FOOT: Primary | ICD-10-CM

## 2018-04-10 DIAGNOSIS — I73.9 PAD (PERIPHERAL ARTERY DISEASE): ICD-10-CM

## 2018-04-10 PROCEDURE — 27201912 HC WOUND CARE DEBRIDEMENT SUPPLIES

## 2018-04-10 PROCEDURE — 97597 DBRDMT OPN WND 1ST 20 CM/<: CPT | Mod: ,,, | Performed by: PODIATRIST

## 2018-04-10 PROCEDURE — 97597 PR DEBRIDEMENT OPEN WOUND 20 SQ CM<: ICD-10-PCS | Mod: ,,, | Performed by: PODIATRIST

## 2018-04-10 PROCEDURE — 11042 DBRDMT SUBQ TIS 1ST 20SQCM/<: CPT

## 2018-04-17 ENCOUNTER — HOSPITAL ENCOUNTER (OUTPATIENT)
Dept: WOUND CARE | Facility: HOSPITAL | Age: 68
Discharge: HOME | End: 2018-04-17
Attending: PODIATRIST
Payer: MEDICARE

## 2018-04-17 ENCOUNTER — HOSPITAL ENCOUNTER (OUTPATIENT)
Dept: RADIOLOGY | Facility: HOSPITAL | Age: 68
Discharge: HOME | End: 2018-04-17
Attending: INTERNAL MEDICINE
Payer: MEDICARE

## 2018-04-17 VITALS
DIASTOLIC BLOOD PRESSURE: 65 MMHG | SYSTOLIC BLOOD PRESSURE: 131 MMHG | HEIGHT: 69 IN | WEIGHT: 197 LBS | HEART RATE: 68 BPM | TEMPERATURE: 98 F | BODY MASS INDEX: 29.18 KG/M2

## 2018-04-17 DIAGNOSIS — E11.621 DIABETIC ULCER OF RIGHT FOOT ASSOCIATED WITH TYPE 2 DIABETES MELLITUS, WITH BONE INVOLVEMENT WITHOUT EVIDENCE OF NECROSIS, UNSPECIFIED PART OF FOOT: Primary | ICD-10-CM

## 2018-04-17 DIAGNOSIS — I73.9 PAD (PERIPHERAL ARTERY DISEASE): ICD-10-CM

## 2018-04-17 DIAGNOSIS — L97.516 DIABETIC ULCER OF RIGHT FOOT ASSOCIATED WITH TYPE 2 DIABETES MELLITUS, WITH BONE INVOLVEMENT WITHOUT EVIDENCE OF NECROSIS, UNSPECIFIED PART OF FOOT: Primary | ICD-10-CM

## 2018-04-17 DIAGNOSIS — Z87.891 EX-SMOKER: ICD-10-CM

## 2018-04-17 DIAGNOSIS — E11.42 DIABETIC POLYNEUROPATHY ASSOCIATED WITH TYPE 2 DIABETES MELLITUS: ICD-10-CM

## 2018-04-17 DIAGNOSIS — Z13.6 SCREENING FOR AAA (ABDOMINAL AORTIC ANEURYSM): ICD-10-CM

## 2018-04-17 PROCEDURE — 11042 DBRDMT SUBQ TIS 1ST 20SQCM/<: CPT

## 2018-04-17 PROCEDURE — 11042 PR DEBRIDEMENT, SKIN, SUB-Q TISSUE,=<20 SQ CM: ICD-10-PCS | Mod: ,,, | Performed by: PODIATRIST

## 2018-04-17 PROCEDURE — 76775 US EXAM ABDO BACK WALL LIM: CPT | Mod: 26,,, | Performed by: RADIOLOGY

## 2018-04-17 PROCEDURE — 76775 US EXAM ABDO BACK WALL LIM: CPT | Mod: TC

## 2018-04-17 PROCEDURE — 27201912 HC WOUND CARE DEBRIDEMENT SUPPLIES

## 2018-04-17 PROCEDURE — 11042 DBRDMT SUBQ TIS 1ST 20SQCM/<: CPT | Mod: ,,, | Performed by: PODIATRIST

## 2018-04-18 RX ORDER — ATORVASTATIN CALCIUM 80 MG/1
80 TABLET, FILM COATED ORAL DAILY
Qty: 90 TABLET | Refills: 0 | Status: SHIPPED | OUTPATIENT
Start: 2018-04-18 | End: 2018-07-30 | Stop reason: SDUPTHER

## 2018-04-24 ENCOUNTER — HOSPITAL ENCOUNTER (OUTPATIENT)
Dept: WOUND CARE | Facility: HOSPITAL | Age: 68
Discharge: HOME | End: 2018-04-24
Attending: PODIATRIST
Payer: MEDICARE

## 2018-04-24 VITALS — DIASTOLIC BLOOD PRESSURE: 56 MMHG | TEMPERATURE: 98 F | SYSTOLIC BLOOD PRESSURE: 112 MMHG | HEART RATE: 60 BPM

## 2018-04-24 DIAGNOSIS — I73.9 PAD (PERIPHERAL ARTERY DISEASE): ICD-10-CM

## 2018-04-24 DIAGNOSIS — E11.42 DIABETIC POLYNEUROPATHY ASSOCIATED WITH TYPE 2 DIABETES MELLITUS: ICD-10-CM

## 2018-04-24 DIAGNOSIS — L97.516 DIABETIC ULCER OF RIGHT FOOT ASSOCIATED WITH TYPE 2 DIABETES MELLITUS, WITH BONE INVOLVEMENT WITHOUT EVIDENCE OF NECROSIS, UNSPECIFIED PART OF FOOT: Primary | ICD-10-CM

## 2018-04-24 DIAGNOSIS — E11.621 DIABETIC ULCER OF RIGHT FOOT ASSOCIATED WITH TYPE 2 DIABETES MELLITUS, WITH BONE INVOLVEMENT WITHOUT EVIDENCE OF NECROSIS, UNSPECIFIED PART OF FOOT: Primary | ICD-10-CM

## 2018-04-24 PROCEDURE — 27201912 HC WOUND CARE DEBRIDEMENT SUPPLIES

## 2018-04-24 PROCEDURE — 11042 DBRDMT SUBQ TIS 1ST 20SQCM/<: CPT

## 2018-04-24 PROCEDURE — 11042 DBRDMT SUBQ TIS 1ST 20SQCM/<: CPT | Mod: ,,, | Performed by: PODIATRIST

## 2018-04-24 PROCEDURE — 11042 PR DEBRIDEMENT, SKIN, SUB-Q TISSUE,=<20 SQ CM: ICD-10-PCS | Mod: ,,, | Performed by: PODIATRIST

## 2018-04-27 ENCOUNTER — CLINICAL SUPPORT (OUTPATIENT)
Dept: ELECTROPHYSIOLOGY | Facility: CLINIC | Age: 68
End: 2018-04-27
Attending: INTERNAL MEDICINE
Payer: MEDICARE

## 2018-04-27 DIAGNOSIS — I63.9 CRYPTOGENIC STROKE: ICD-10-CM

## 2018-04-27 DIAGNOSIS — Z95.818 STATUS POST PLACEMENT OF IMPLANTABLE LOOP RECORDER: ICD-10-CM

## 2018-04-27 PROCEDURE — 93298 REM INTERROG DEV EVAL SCRMS: CPT | Mod: S$GLB,,, | Performed by: INTERNAL MEDICINE

## 2018-04-27 PROCEDURE — 93299 LOOP RECORDER REMOTE: CPT | Mod: S$GLB,,, | Performed by: INTERNAL MEDICINE

## 2018-05-01 ENCOUNTER — HOSPITAL ENCOUNTER (OUTPATIENT)
Dept: WOUND CARE | Facility: HOSPITAL | Age: 68
Discharge: HOME | End: 2018-05-01
Attending: PODIATRIST
Payer: MEDICARE

## 2018-05-01 VITALS
SYSTOLIC BLOOD PRESSURE: 133 MMHG | DIASTOLIC BLOOD PRESSURE: 61 MMHG | TEMPERATURE: 98 F | HEART RATE: 53 BPM | BODY MASS INDEX: 29.18 KG/M2 | HEIGHT: 69 IN | WEIGHT: 197 LBS

## 2018-05-01 DIAGNOSIS — L97.516 DIABETIC ULCER OF RIGHT FOOT ASSOCIATED WITH TYPE 2 DIABETES MELLITUS, WITH BONE INVOLVEMENT WITHOUT EVIDENCE OF NECROSIS, UNSPECIFIED PART OF FOOT: Primary | ICD-10-CM

## 2018-05-01 DIAGNOSIS — I73.9 PAD (PERIPHERAL ARTERY DISEASE): ICD-10-CM

## 2018-05-01 DIAGNOSIS — E11.42 DIABETIC POLYNEUROPATHY ASSOCIATED WITH TYPE 2 DIABETES MELLITUS: ICD-10-CM

## 2018-05-01 DIAGNOSIS — E11.621 DIABETIC ULCER OF RIGHT FOOT ASSOCIATED WITH TYPE 2 DIABETES MELLITUS, WITH BONE INVOLVEMENT WITHOUT EVIDENCE OF NECROSIS, UNSPECIFIED PART OF FOOT: Primary | ICD-10-CM

## 2018-05-01 PROCEDURE — 97597 DBRDMT OPN WND 1ST 20 CM/<: CPT

## 2018-05-01 PROCEDURE — 97597 DBRDMT OPN WND 1ST 20 CM/<: CPT | Mod: ,,, | Performed by: PODIATRIST

## 2018-05-01 PROCEDURE — 97597 PR DEBRIDEMENT OPEN WOUND 20 SQ CM<: ICD-10-PCS | Mod: ,,, | Performed by: PODIATRIST

## 2018-05-04 DIAGNOSIS — Z79.4 TYPE 2 DIABETES MELLITUS WITH DIABETIC POLYNEUROPATHY, WITH LONG-TERM CURRENT USE OF INSULIN: ICD-10-CM

## 2018-05-04 DIAGNOSIS — E11.42 TYPE 2 DIABETES MELLITUS WITH DIABETIC POLYNEUROPATHY, WITH LONG-TERM CURRENT USE OF INSULIN: ICD-10-CM

## 2018-05-07 RX ORDER — INSULIN DETEMIR 100 [IU]/ML
INJECTION, SOLUTION SUBCUTANEOUS
Refills: 3 | OUTPATIENT
Start: 2018-05-07

## 2018-05-08 ENCOUNTER — HOSPITAL ENCOUNTER (OUTPATIENT)
Dept: WOUND CARE | Facility: HOSPITAL | Age: 68
Discharge: HOME | End: 2018-05-08
Attending: PODIATRIST
Payer: MEDICARE

## 2018-05-08 VITALS — TEMPERATURE: 98 F | HEART RATE: 59 BPM | SYSTOLIC BLOOD PRESSURE: 110 MMHG | DIASTOLIC BLOOD PRESSURE: 64 MMHG

## 2018-05-08 DIAGNOSIS — Z87.2 HEALED ULCER OF RIGHT FOOT ON EXAMINATION: Primary | ICD-10-CM

## 2018-05-08 DIAGNOSIS — I73.9 PAD (PERIPHERAL ARTERY DISEASE): ICD-10-CM

## 2018-05-08 DIAGNOSIS — Z79.4 TYPE 2 DIABETES MELLITUS WITH DIABETIC POLYNEUROPATHY, WITH LONG-TERM CURRENT USE OF INSULIN: ICD-10-CM

## 2018-05-08 DIAGNOSIS — E11.42 DIABETIC POLYNEUROPATHY ASSOCIATED WITH TYPE 2 DIABETES MELLITUS: ICD-10-CM

## 2018-05-08 DIAGNOSIS — E11.42 TYPE 2 DIABETES MELLITUS WITH DIABETIC POLYNEUROPATHY, WITH LONG-TERM CURRENT USE OF INSULIN: ICD-10-CM

## 2018-05-08 PROCEDURE — 99213 PR OFFICE/OUTPT VISIT, EST, LEVL III, 20-29 MIN: ICD-10-PCS | Mod: ,,, | Performed by: PODIATRIST

## 2018-05-08 PROCEDURE — 99213 OFFICE O/P EST LOW 20 MIN: CPT | Mod: ,,, | Performed by: PODIATRIST

## 2018-05-08 PROCEDURE — 99211 OFF/OP EST MAY X REQ PHY/QHP: CPT

## 2018-05-10 ENCOUNTER — TELEPHONE (OUTPATIENT)
Dept: PODIATRY | Facility: CLINIC | Age: 68
End: 2018-05-10

## 2018-05-10 NOTE — TELEPHONE ENCOUNTER
----- Message from Fay Lawson sent at 5/10/2018 10:11 AM CDT -----  Contact: [phone]/ Janet with [facility]   Mrs Gonzales its requesting pt's medical necessity form and latest office notes and clinical notes fax to [phone] for pt's diabetic shoes . Please advise

## 2018-05-28 ENCOUNTER — CLINICAL SUPPORT (OUTPATIENT)
Dept: ELECTROPHYSIOLOGY | Facility: CLINIC | Age: 68
End: 2018-05-28
Attending: INTERNAL MEDICINE
Payer: MEDICARE

## 2018-05-28 DIAGNOSIS — I63.9 CRYPTOGENIC STROKE: ICD-10-CM

## 2018-05-28 DIAGNOSIS — Z95.818 STATUS POST PLACEMENT OF IMPLANTABLE LOOP RECORDER: ICD-10-CM

## 2018-05-28 PROCEDURE — 93299 LOOP RECORDER REMOTE: CPT | Mod: S$GLB,,, | Performed by: INTERNAL MEDICINE

## 2018-05-28 PROCEDURE — 93298 REM INTERROG DEV EVAL SCRMS: CPT | Mod: S$GLB,,, | Performed by: INTERNAL MEDICINE

## 2018-05-30 ENCOUNTER — TELEPHONE (OUTPATIENT)
Dept: INTERNAL MEDICINE | Facility: CLINIC | Age: 68
End: 2018-05-30

## 2018-05-30 NOTE — TELEPHONE ENCOUNTER
----- Message from Leif Lawrence sent at 5/30/2018  3:54 PM CDT -----  Contact: Leatha [phone]  RX request - refill or new RX.  Is this a refill or new RX:  Refill  RX name and strength: Diarrhea Rx      Pharmacy name and phone #[id number] - [place] - [id number] [phone] (Phone) [phone] (Fax)    Please call and advise  Thank you

## 2018-05-31 RX ORDER — DIPHENOXYLATE HYDROCHLORIDE AND ATROPINE SULFATE 2.5; .025 MG/1; MG/1
1 TABLET ORAL 3 TIMES DAILY PRN
Qty: 20 TABLET | Refills: 0 | Status: SHIPPED | OUTPATIENT
Start: 2018-05-31 | End: 2018-10-04 | Stop reason: SDUPTHER

## 2018-06-06 ENCOUNTER — OFFICE VISIT (OUTPATIENT)
Dept: NEPHROLOGY | Facility: CLINIC | Age: 68
End: 2018-06-06
Payer: MEDICARE

## 2018-06-06 VITALS
DIASTOLIC BLOOD PRESSURE: 70 MMHG | OXYGEN SATURATION: 97 % | HEART RATE: 60 BPM | WEIGHT: 244.69 LBS | SYSTOLIC BLOOD PRESSURE: 120 MMHG | HEIGHT: 69 IN | BODY MASS INDEX: 36.24 KG/M2

## 2018-06-06 DIAGNOSIS — I73.9 PAD (PERIPHERAL ARTERY DISEASE): ICD-10-CM

## 2018-06-06 DIAGNOSIS — I10 ESSENTIAL HYPERTENSION: ICD-10-CM

## 2018-06-06 DIAGNOSIS — N18.30 CHRONIC KIDNEY DISEASE (CKD), STAGE III (MODERATE): Primary | ICD-10-CM

## 2018-06-06 DIAGNOSIS — I50.40 COMBINED SYSTOLIC AND DIASTOLIC HEART FAILURE, UNSPECIFIED HF CHRONICITY: ICD-10-CM

## 2018-06-06 DIAGNOSIS — E11.21 DIABETIC NEPHROPATHY ASSOCIATED WITH TYPE 2 DIABETES MELLITUS: ICD-10-CM

## 2018-06-06 PROCEDURE — 3044F HG A1C LEVEL LT 7.0%: CPT | Mod: CPTII,S$GLB,, | Performed by: INTERNAL MEDICINE

## 2018-06-06 PROCEDURE — 99999 PR PBB SHADOW E&M-EST. PATIENT-LVL IV: CPT | Mod: PBBFAC,,, | Performed by: INTERNAL MEDICINE

## 2018-06-06 PROCEDURE — 3078F DIAST BP <80 MM HG: CPT | Mod: CPTII,S$GLB,, | Performed by: INTERNAL MEDICINE

## 2018-06-06 PROCEDURE — 99213 OFFICE O/P EST LOW 20 MIN: CPT | Mod: S$GLB,,, | Performed by: INTERNAL MEDICINE

## 2018-06-06 PROCEDURE — 3074F SYST BP LT 130 MM HG: CPT | Mod: CPTII,S$GLB,, | Performed by: INTERNAL MEDICINE

## 2018-06-06 PROCEDURE — 99499 UNLISTED E&M SERVICE: CPT | Mod: S$GLB,,, | Performed by: INTERNAL MEDICINE

## 2018-06-06 NOTE — PROGRESS NOTES
Priscilla is here today for folllow up CKD IV Last seen in renal office 2/19/18 Baseline Cr; 1.8-2.0 mg/dl. His most recent lab ( 2/19/118) Cr; 2.6  mg/dl; eGFR; 28  ml/min; potassium  4.7 mmol/L Lab todayl Cr; 2.6 mg/dl; eGFR 28 ml/min; potassium 4.7 mmol/L Today he has no new complaints.    Exam  Pleasant genttleman no acure distress; oriented x 3  Physical Exam   Constitutional: He appears well-developed. No distress.   Eyes:   Grossly intact   Cardiovascular: Normal rate, regular rhythm, normal heart sounds and intact distal pulses.  Exam reveals no gallop and no friction rub.    No murmur heard.  Pulmonary/Chest: Breath sounds normal. He has no wheezes. He has no rales.   No rhonchi.   Abdominal: Bowel sounds are normal. There is no tenderness (  negative for CVAT).   Musculoskeletal: He exhibits no edema.   Extremities - no edema.   Skin: Skin is warm and dry. No rash noted.   Psychiatric: He has a normal mood and affect. His behavior is normal. Thought content normal.     1. Chronic kidney disease (CKD), stage III (moderate)  At baseline level of function  - Renal function panel; Future  - Renal function panel; Future    2. Diabetic nephropathy associated with type 2 diabetes mellitus  stable    3. Essential hypertension  Satisfactory controlled BP    4. Combined systolic and diastolic heart failure, unspecified HF chronicity  stable    5. PAD (peripheral artery disease)  No new lesions    Plan - repeat labs in 3 months, return visit 4 months pending above.

## 2018-06-28 ENCOUNTER — CLINICAL SUPPORT (OUTPATIENT)
Dept: ELECTROPHYSIOLOGY | Facility: CLINIC | Age: 68
End: 2018-06-28
Attending: INTERNAL MEDICINE
Payer: MEDICARE

## 2018-06-28 DIAGNOSIS — Z95.818 STATUS POST PLACEMENT OF IMPLANTABLE LOOP RECORDER: ICD-10-CM

## 2018-06-28 DIAGNOSIS — I63.9 CRYPTOGENIC STROKE: ICD-10-CM

## 2018-06-28 PROCEDURE — 93298 REM INTERROG DEV EVAL SCRMS: CPT | Mod: S$GLB,,, | Performed by: INTERNAL MEDICINE

## 2018-06-28 PROCEDURE — 93299 LOOP RECORDER REMOTE: CPT | Mod: S$GLB,,, | Performed by: INTERNAL MEDICINE

## 2018-07-02 ENCOUNTER — LAB VISIT (OUTPATIENT)
Dept: LAB | Facility: HOSPITAL | Age: 68
End: 2018-07-02
Payer: MEDICARE

## 2018-07-02 ENCOUNTER — OFFICE VISIT (OUTPATIENT)
Dept: ELECTROPHYSIOLOGY | Facility: CLINIC | Age: 68
End: 2018-07-02
Payer: MEDICARE

## 2018-07-02 VITALS
WEIGHT: 253 LBS | HEIGHT: 69 IN | DIASTOLIC BLOOD PRESSURE: 84 MMHG | SYSTOLIC BLOOD PRESSURE: 176 MMHG | HEART RATE: 59 BPM | BODY MASS INDEX: 37.47 KG/M2

## 2018-07-02 DIAGNOSIS — I10 ESSENTIAL HYPERTENSION: ICD-10-CM

## 2018-07-02 DIAGNOSIS — I63.9 CRYPTOGENIC STROKE: Chronic | ICD-10-CM

## 2018-07-02 DIAGNOSIS — I48.3 TYPICAL ATRIAL FLUTTER: Primary | Chronic | ICD-10-CM

## 2018-07-02 DIAGNOSIS — Z79.899 LONG TERM CURRENT USE OF AMIODARONE: ICD-10-CM

## 2018-07-02 DIAGNOSIS — I48.0 PAROXYSMAL ATRIAL FIBRILLATION: ICD-10-CM

## 2018-07-02 DIAGNOSIS — I25.5 ISCHEMIC CARDIOMYOPATHY: ICD-10-CM

## 2018-07-02 LAB
ALBUMIN SERPL BCP-MCNC: 3.6 G/DL
ALP SERPL-CCNC: 113 U/L
ALT SERPL W/O P-5'-P-CCNC: 15 U/L
AST SERPL-CCNC: 15 U/L
BILIRUB DIRECT SERPL-MCNC: 0.3 MG/DL
BILIRUB SERPL-MCNC: 0.6 MG/DL
PROT SERPL-MCNC: 7.2 G/DL
T4 FREE SERPL-MCNC: 1.16 NG/DL
TSH SERPL DL<=0.005 MIU/L-ACNC: 0.4 UIU/ML

## 2018-07-02 PROCEDURE — 99999 PR PBB SHADOW E&M-EST. PATIENT-LVL III: CPT | Mod: PBBFAC,,, | Performed by: NURSE PRACTITIONER

## 2018-07-02 PROCEDURE — 84439 ASSAY OF FREE THYROXINE: CPT

## 2018-07-02 PROCEDURE — 36415 COLL VENOUS BLD VENIPUNCTURE: CPT

## 2018-07-02 PROCEDURE — 3077F SYST BP >= 140 MM HG: CPT | Mod: CPTII,S$GLB,, | Performed by: NURSE PRACTITIONER

## 2018-07-02 PROCEDURE — 99214 OFFICE O/P EST MOD 30 MIN: CPT | Mod: S$GLB,,, | Performed by: NURSE PRACTITIONER

## 2018-07-02 PROCEDURE — 99499 UNLISTED E&M SERVICE: CPT | Mod: S$GLB,,, | Performed by: NURSE PRACTITIONER

## 2018-07-02 PROCEDURE — 84443 ASSAY THYROID STIM HORMONE: CPT

## 2018-07-02 PROCEDURE — 80076 HEPATIC FUNCTION PANEL: CPT

## 2018-07-02 PROCEDURE — 3079F DIAST BP 80-89 MM HG: CPT | Mod: CPTII,S$GLB,, | Performed by: NURSE PRACTITIONER

## 2018-07-02 NOTE — PROGRESS NOTES
Mr. Rodarte is a patient of Dr. Rodriguez and was last seen in clinic 3/5/2018.      Subjective:   Patient ID:  Chau Rodarte is a 68 y.o. male who presents for follow-up of Atrial Flutter  .     HPI:    Mr. Rodarte is a 68 y.o. male with a history of pAF, AFL (s/p RFA of CTI 2/3/2017), vascular disease (s/p right fem-pop and left SFA stent), ischemic cardiomyopathy (EF as low as 20-25% now 45-50% based on 3/2017 echo), CAD status-post multivessel PCI in 12/2016 (PCI to prox-LAD/LCX, distal LMCA and mid LAD), chronic kidney disease stage 3 (baseline Cr 1.9-2.7), cryptogenic stroke (2006, manifesting as facial droop, dysarthria, and right sided weakness), HTN, and poorly controlled diabetes mellitus here for follow up.     Background:    Mr. Rodarte presented to Dr. Rodriguez for a second opinion regarding his arrhythmias (he is a patient of Dr. Moreno).  His arrhythmia history dates back to late 2016 when he was incidentally noted to be in atrial while at cardiac rehab following PCI.   He ultimately underwent CTI-line in 2/2017.   In 3/2017 he underwent ILR implantation to assess for occult AF. PAF was diagnosed (overall AF burden <1%).   In the interim he had a significant GI bleed requiring transfusions (INR 2.9 on coumadin on admission), and anticoagulation was stopped.   He was evaluated by CT surgery and was deemed a suitable candidate for left atrial appendage ligation. However, in the interim he has continued to have LLE ulcers due to his PAD, preventing him from undergoing surgery.    His [id number] Score is 7 (age, CVA, DM, HTN, CAD, cardiomyopathy) which obviously corresponds to a high yearly risk of stroke without anticoagulation, but he is not an anticoagulation candidate. He has discussed surgical ligation of the NICKO with Dr. Bobby, but unfortunately he still his RLE ulcerations that are still in the process of healing.     He started amiodarone 3/5/2018, in hopes that this will quiesce his arrhythmias and therefore  "eliminate his AF stroke risk.     Update (07/02/2018):    Today he says he feels well and has no cardiac complaints. Mr. Rodarte denies chest pain with exertion or at rest, palpitations, SOB, VELÁSQUEZ, dizziness, or syncope. He says he has gained weight recently but his activity has been significantly limited by foot ulcers which are now healing. He plans to start a regular exercise regimen soon.    He is taking ASA 81mg and plavix 75mg daily. He is currently on amiodarone 200mg daily and metoprolol succinate 25mg daily. He needs baseline PFTs, LFTs, and TSH. He has regular annual eye examinations. His BPs are elevated in clinic today and he says he did not take his antihypertensive medications yet today. He is followed by nephrology and has a creatinine of 2.6 (baseline 1.9-2.7) on 6/6/2018.    Review of ILR reports show no AF since initiation of amiodarone therapy in March 2018. SR with HRs in the 60s.      I have personally reviewed the patient's EKG today, which shows sinus rhythm with 1st degree block at 59bpm. W[place] interval is 320ms. . QT is 418.    Recent Cardiac Tests:    2D Echo (3/7/2017):  CONCLUSIONS     1 - Eccentric hypertrophy.     2 - Mildly depressed left ventricular systolic function (EF 45-50%).     3 - Normal right ventricular systolic function .     4 - Grade I left ventricular diastolic dysfunction.     5 - Mildly enlarged ascending aorta.     Current Outpatient Prescriptions   Medication Sig    amiodarone (PACERONE) 200 MG Tab Take 1 tablet (200 mg total) by mouth once daily. Take 2 tablets (400 mg) twice daily for 14 days, then 1 tablet (200 mg) daily.    aspirin 81 MG Chew Take 1 tablet (81 mg total) by mouth once daily.    atorvastatin (LIPITOR) 80 MG tablet Take 1 tablet (80 mg total) by mouth once daily.    BD ULTRA-FINE HANG PEN NEEDLES 32 gauge x 5/32" Ndle USE TWICE DAILY FOR INSULIN INJECTION    cholecalciferol, vitamin D3, (VITAMIN D3) 5,000 unit Tab Take 5,000 Units by mouth once " daily.    clindamycin (CLEOCIN) 300 MG capsule Take 1 capsule (300 mg total) by mouth 3 (three) times daily.    clopidogrel (PLAVIX) 75 mg tablet TAKE 1 TABLET BY MOUTH EVERY DAY    collagenase (SANTYL) ointment Apply topically once daily.    diphenoxylate-atropine 2.5-0.025 mg (LOMOTIL) 2.5-0.025 mg per tablet Take 1 tablet by mouth 3 (three) times daily as needed for Diarrhea.    ferrous sulfate 325 (65 FE) MG EC tablet Take 1 tablet (325 mg total) by mouth once daily.    furosemide (LASIX) 40 MG tablet Take 1 tablet (40 mg total) by mouth once daily.    gabapentin (NEURONTIN) 300 MG capsule Take 300 mg by mouth 2 (two) times daily.    insulin detemir (LEVEMIR FLEXTOUCH) 100 unit/mL (3 mL) SubQ InPn pen Inject 16 Units into the skin every evening.    isosorbide dinitrate (ISORDIL) 20 MG tablet Take 1 tablet (20 mg total) by mouth 3 (three) times daily.    liraglutide 0.6 mg/0.1 mL, 18 mg/3 mL, subq PNIJ (VICTOZA 3-SAGAR) 0.6 mg/0.1 mL (18 mg/3 mL) PnIj Inject 1.2 mg into the skin once daily.    metoprolol succinate (TOPROL-XL) 25 MG 24 hr tablet Take 1 tablet (25 mg total) by mouth once daily.    pantoprazole (PROTONIX) 20 MG tablet     senna-docusate 8.6-50 mg (PERICOLACE) 8.6-50 mg per tablet Take 1 tablet by mouth once daily.    tamsulosin (FLOMAX) 0.4 mg Cp24 Take 1 capsule (0.4 mg total) by mouth once daily.     Current Facility-Administered Medications   Medication    lidocaine HCL 10 mg/ml (1%) injection 20 mL    lidocaine HCL 10 mg/ml (1%) injection 20 mL     Review of Systems   Constitution: Negative for malaise/fatigue.   Cardiovascular: Negative for chest pain, dyspnea on exertion, irregular heartbeat, leg swelling and palpitations.   Respiratory: Negative for shortness of breath.    Hematologic/Lymphatic: Negative for bleeding problem.   Skin: Negative for rash.   Musculoskeletal: Negative for myalgias.   Gastrointestinal: Negative for hematemesis, hematochezia and nausea.  "  Genitourinary: Negative for hematuria.   Neurological: Negative for light-headedness.   Psychiatric/Behavioral: Negative for altered mental status.   Allergic/Immunologic: Negative for persistent infections.     Objective:          BP (!) 176/84   Pulse (!) 59   Ht 5' 9" (1.753 m)   Wt 114.8 kg (253 lb)   BMI 37.36 kg/m²     Physical Exam   Constitutional: He is oriented to person, place, and time. He appears well-developed and well-nourished.   HENT:   Head: Normocephalic.   Nose: Nose normal.   Eyes: Pupils are equal, round, and reactive to light.   Cardiovascular: Normal rate, regular rhythm, S1 normal and S2 normal.    No murmur heard.  Pulses:       Radial pulses are 2+ on the right side, and 2+ on the left side.   Pulmonary/Chest: Breath sounds normal. No respiratory distress.   Abdominal: Normal appearance.   Musculoskeletal: Normal range of motion. He exhibits no edema.   Neurological: He is alert and oriented to person, place, and time.   Skin: Skin is warm and dry. No erythema.   Psychiatric: He has a normal mood and affect. His speech is normal and behavior is normal.   Nursing note and vitals reviewed.    Lab Results   Component Value Date     06/06/2018     06/06/2018    K 4.2 06/06/2018    K 4.2 06/06/2018    MG 1.8 06/29/2017    BUN 35 (H) 06/06/2018    BUN 35 (H) 06/06/2018    CREATININE 2.6 (H) 06/06/2018    CREATININE 2.6 (H) 06/06/2018    ALT 26 11/27/2017    AST 20 11/27/2017    HGB 10.1 (L) 09/15/2017    HCT 32.3 (L) 09/15/2017    HCT 27 (L) 06/14/2017    TSH 0.666 05/09/2017    LDLCALC 75.2 03/29/2018         Recent Labs  Lab 03/20/17  0224 03/20/17  1325 03/21/17  0801 06/19/17  1312   INR 2.8 H 1.9 H 1.9 H 1.1           Assessment:       1. Typical atrial flutter, s/p ablation    2. Cryptogenic stroke, remote history (2006)    3. Paroxysmal atrial fibrillation    4. Ischemic cardiomyopathy    5. Essential hypertension    6. Long term current use of amiodarone      Plan: "     Mr. Rodarte is a 68 y.o. male with a history of pAF, AFL (s/p RFA of CTI 3/24/2017), vascular disease (s/p right fem-pop and left SFA stent), ischemic cardiomyopathy (EF as low as 20-25% now 45-50% based on 3/2017 echo), CAD status-post multivessel PCI in 12/2016 (PCI to prox-LAD/LCX, distal LMCA and mid LAD), chronic kidney disease stage 3, cryptogenic stroke (2006, manifesting as facial droop, dysarthria, and right sided weakness), HTN, and poorly controlled diabetes mellitus here for follow up.  He is doing well after starting amiodarone for rhythm control, and his ILR reports show that he has being maintaining SR on this regimen. He is on ASA and plavix, but no OAC due to severe GIB. He will remain on amiodarone therapy with routine ILR monitoring. Baseline PFTs, LFTs, TSH will be obtained.     Schedule PFTs, LFTs, TSH.  Continue current medication regimen.  Continue loop recorder monitoring as scheduled.  RTC in 6 months, sooner as needed.    Follow-up in about 6 months (around 1/2/2019).    *A copy of this note has been sent to Dr. Rodriguez*  ------------------------------------------------------------------    SYDNEE Junior, NP-C  [facility]

## 2018-07-02 NOTE — Clinical Note
He already had that 1st deg AVB but it has increased - let me know if you want more frequent EKG monitoring. Thanks, Lynette

## 2018-07-02 NOTE — LETTER
July 2, 2018      Riki Huynh MD  [address]  [facility] [address]           [facility] - Arrhythmia  [address]  [facility] [address]  Phone: [phone]  Fax: [phone]          Patient: Chau Rodarte   MR Number: [id number]   YOB: 1950   Date of Visit: 7/2/2018       Dear Dr. Riki Huynh:    Thank you for referring Chau Rodarte to me for evaluation. Attached you will find relevant portions of my assessment and plan of care.    If you have questions, please do not hesitate to call me. I look forward to following Chau Rodarte along with you.    Sincerely,    Lynette Shaw NP    Enclosure  CC:  No Recipients    If you would like to receive this communication electronically, please contact [email or web address] or [phone] to request more information on [facility] Link access.    For providers and/or their staff who would like to refer a patient to [facility], please contact us through our one-stop-shop provider referral line, [facility], at [phone].    If you feel you have received this communication in error or would no longer like to receive these types of communications, please e-mail [email or web address]

## 2018-07-23 ENCOUNTER — HOSPITAL ENCOUNTER (OUTPATIENT)
Dept: PULMONOLOGY | Facility: CLINIC | Age: 68
Discharge: HOME | End: 2018-07-23
Payer: MEDICARE

## 2018-07-23 DIAGNOSIS — Z79.899 LONG TERM CURRENT USE OF AMIODARONE: ICD-10-CM

## 2018-07-23 LAB
PRE FEV1 FVC: 75
PRE FEV1: 1.62
PRE FVC: 2.15
PREDICTED FEV1 FVC: 79
PREDICTED FEV1: 2.97
PREDICTED FVC: 3.73

## 2018-07-23 PROCEDURE — 94729 DIFFUSING CAPACITY: CPT | Mod: S$GLB,,, | Performed by: INTERNAL MEDICINE

## 2018-07-23 PROCEDURE — 94010 BREATHING CAPACITY TEST: CPT | Mod: S$GLB,,, | Performed by: INTERNAL MEDICINE

## 2018-07-23 RX ORDER — CLOPIDOGREL BISULFATE 75 MG/1
TABLET ORAL
Qty: 90 TABLET | Refills: 1 | Status: SHIPPED | OUTPATIENT
Start: 2018-07-23 | End: 2018-07-31

## 2018-07-24 ENCOUNTER — OFFICE VISIT (OUTPATIENT)
Dept: PODIATRY | Facility: CLINIC | Age: 68
End: 2018-07-24
Payer: MEDICARE

## 2018-07-24 ENCOUNTER — CLINICAL SUPPORT (OUTPATIENT)
Dept: ELECTROPHYSIOLOGY | Facility: CLINIC | Age: 68
End: 2018-07-24
Attending: INTERNAL MEDICINE
Payer: MEDICARE

## 2018-07-24 ENCOUNTER — DOCUMENTATION ONLY (OUTPATIENT)
Dept: ELECTROPHYSIOLOGY | Facility: CLINIC | Age: 68
End: 2018-07-24

## 2018-07-24 ENCOUNTER — TELEPHONE (OUTPATIENT)
Dept: ELECTROPHYSIOLOGY | Facility: CLINIC | Age: 68
End: 2018-07-24

## 2018-07-24 VITALS
WEIGHT: 253 LBS | HEIGHT: 69 IN | SYSTOLIC BLOOD PRESSURE: 90 MMHG | HEART RATE: 64 BPM | BODY MASS INDEX: 37.47 KG/M2 | DIASTOLIC BLOOD PRESSURE: 60 MMHG

## 2018-07-24 DIAGNOSIS — E11.51 TYPE 2 DIABETES MELLITUS WITH DIABETIC PERIPHERAL ANGIOPATHY WITHOUT GANGRENE, WITH LONG-TERM CURRENT USE OF INSULIN: Primary | ICD-10-CM

## 2018-07-24 DIAGNOSIS — I63.9 CRYPTOGENIC STROKE: ICD-10-CM

## 2018-07-24 DIAGNOSIS — Z95.818 STATUS POST PLACEMENT OF IMPLANTABLE LOOP RECORDER: ICD-10-CM

## 2018-07-24 DIAGNOSIS — E11.42 DIABETIC POLYNEUROPATHY ASSOCIATED WITH TYPE 2 DIABETES MELLITUS: ICD-10-CM

## 2018-07-24 DIAGNOSIS — Z79.4 TYPE 2 DIABETES MELLITUS WITH DIABETIC PERIPHERAL ANGIOPATHY WITHOUT GANGRENE, WITH LONG-TERM CURRENT USE OF INSULIN: Primary | ICD-10-CM

## 2018-07-24 DIAGNOSIS — L84 PRE-ULCERATIVE CALLUSES: ICD-10-CM

## 2018-07-24 PROCEDURE — 3074F SYST BP LT 130 MM HG: CPT | Mod: CPTII,S$GLB,, | Performed by: PODIATRIST

## 2018-07-24 PROCEDURE — 93298 REM INTERROG DEV EVAL SCRMS: CPT | Mod: S$GLB,,, | Performed by: INTERNAL MEDICINE

## 2018-07-24 PROCEDURE — 3078F DIAST BP <80 MM HG: CPT | Mod: CPTII,S$GLB,, | Performed by: PODIATRIST

## 2018-07-24 PROCEDURE — 11055 PARING/CUTG B9 HYPRKER LES 1: CPT | Mod: Q9,S$GLB,, | Performed by: PODIATRIST

## 2018-07-24 PROCEDURE — 3044F HG A1C LEVEL LT 7.0%: CPT | Mod: CPTII,S$GLB,, | Performed by: PODIATRIST

## 2018-07-24 PROCEDURE — 93299 LOOP RECORDER REMOTE: CPT | Mod: S$GLB,,, | Performed by: INTERNAL MEDICINE

## 2018-07-24 PROCEDURE — 99999 PR PBB SHADOW E&M-EST. PATIENT-LVL III: CPT | Mod: PBBFAC,,, | Performed by: PODIATRIST

## 2018-07-24 PROCEDURE — 99213 OFFICE O/P EST LOW 20 MIN: CPT | Mod: 25,S$GLB,, | Performed by: PODIATRIST

## 2018-07-24 NOTE — PROGRESS NOTES
Subjective:      Patient ID: Chau Rodarte is a 68 y.o. male.    Chief Complaint: Follow-up (Dr. Huynh 4/3/18); Diabetes Mellitus; and Diabetic Foot Exam    Chau DAMIAN is a 68 y.o. male who presents to the clinic for evaluation and treatment of high risk feet. Chau DAMIAN has a past medical history of Acute on chronic systolic CHF (congestive heart failure) (11/29/2016); Anticoagulant long-term use; CKD (chronic kidney disease) stage 2, GFR 60-89 ml/min (2/21/2016); Coronary artery disease; Encounter for blood transfusion; HTN (hypertension) (3/3/2014); Hyperlipidemia (3/3/2014); NSTEMI (non-ST elevated myocardial infarction) (11/28/2016); Obesity (BMI 30-39.9) (11/29/2016); PVD (peripheral vascular disease); Stroke; Type 2 diabetes mellitus with diabetic peripheral angiopathy without gangrene, without long-term current use of insulin (10/24/2013); and Type 2 diabetes mellitus with diabetic polyneuropathy, without long-term current use of insulin (11/29/2016). The patient's chief complaint is long, thick toenails. This patient has documented high risk feet requiring routine maintenance secondary to peripheral vascular disease and peripheral neuropathy. Discharged from [facility] on 5/8/18.    PCP: Riki Huynh MD    Date Last Seen by PCP: 4/3/18    Current shoe gear:  Affected Foot: Rx diabetic extra depth shoes and custom accommodative insoles     Unaffected Foot: Rx diabetic extra depth shoes and custom accommodative insoles    Hemoglobin A1C   Date Value Ref Range Status   03/29/2018 6.1 (H) 4.0 - 5.6 % Final     Comment:     According to ADA guidelines, hemoglobin A1c <7.0% represents  optimal control in non-pregnant diabetic patients. Different  metrics may apply to specific patient populations.   Standards of Medical Care in Diabetes-2016.  For the purpose of screening for the presence of diabetes:  <5.7%     Consistent with the absence of diabetes  5.7-6.4%  Consistent with increasing risk for diabetes    (prediabetes)  >or=6.5%  Consistent with diabetes  Currently, no consensus exists for use of hemoglobin A1c  for diagnosis of diabetes for children.  This Hemoglobin A1c assay has significant interference with fetal   hemoglobin   (HbF). The results are invalid for patients with abnormal amounts of   HbF,   including those with known Hereditary Persistence   of Fetal Hemoglobin. Heterozygous hemoglobin variants (HbAS, HbAC,   HbAD, HbAE, HbA2) do not significantly interfere with this assay;   however, presence of multiple variants in a sample may impact the %   interference.     06/26/2017 6.1 (H) 4.0 - 5.6 % Final     Comment:     According to ADA guidelines, hemoglobin A1c <7.0% represents  optimal control in non-pregnant diabetic patients. Different  metrics may apply to specific patient populations.   Standards of Medical Care in Diabetes-2016.  For the purpose of screening for the presence of diabetes:  <5.7%     Consistent with the absence of diabetes  5.7-6.4%  Consistent with increasing risk for diabetes   (prediabetes)  >or=6.5%  Consistent with diabetes  Currently, no consensus exists for use of hemoglobin A1c  for diagnosis of diabetes for children.  This Hemoglobin A1c assay has significant interference with fetal   hemoglobin   (HbF). The results are invalid for patients with abnormal amounts of   HbF,   including those with known Hereditary Persistence   of Fetal Hemoglobin. Heterozygous hemoglobin variants (HbAS, HbAC,   HbAD, HbAE, HbA2) do not significantly interfere with this assay;   however, presence of multiple variants in a sample may impact the %   interference.     05/09/2017 6.4 (H) 4.5 - 6.2 % Final     Comment:     According to ADA guidelines, hemoglobin A1C <7.0% represents  optimal control in non-pregnant diabetic patients.  Different  metrics may apply to specific populations.   Standards of Medical Care in Diabetes - 2016.  For the purpose of screening for the presence of  diabetes:  <5.7%     Consistent with the absence of diabetes  5.7-6.4%  Consistent with increasing risk for diabetes   (prediabetes)  >or=6.5%  Consistent with diabetes  Currently no consensus exists for use of hemoglobin A1C  for diagnosis of diabetes for children.         Review of Systems   Constitution: Negative for chills, fever, weakness and malaise/fatigue.   Cardiovascular: Negative for chest pain, claudication and leg swelling.   Respiratory: Negative for cough and shortness of breath.    Skin: Positive for dry skin, nail changes and poor wound healing.   Musculoskeletal: Negative for back pain, joint pain, muscle cramps and muscle weakness.   Gastrointestinal: Negative for nausea and vomiting.   Neurological: Positive for numbness and paresthesias.   Psychiatric/Behavioral: Negative for altered mental status.           Objective:      Physical Exam   Constitutional: He is oriented to person, place, and time. No distress.   Cardiovascular:   Pulses:       Dorsalis pedis pulses are 1+ on the right side, and 1+ on the left side.        Posterior tibial pulses are 1+ on the right side, and 1+ on the left side.   Skin temp warm bilateral foot. No hair growth bilateral lower extremity. Mild edema bilateral lower extremity.   Musculoskeletal:        Right foot: There is deformity.   Semi-rigid cocked up right hallux.    Cavus foot structure bilateral.    Uriah pain with ROM or MMT bilateral lower extremity.   Feet:   Right Foot:   Protective Sensation: 10 sites tested. 4 sites sensed.   Skin Integrity: Positive for callus and dry skin. Negative for ulcer, blister, skin breakdown, erythema or warmth.   Left Foot:   Protective Sensation: 10 sites tested. 8 sites sensed.   Skin Integrity: Positive for dry skin. Negative for ulcer, blister, skin breakdown, erythema, warmth or callus.   Neurological: He is alert and oriented to person, place, and time. A sensory deficit is present.   Vibratory sensation absent bilateral  foot.   Skin: Skin is warm, dry and intact. Capillary refill takes 2 to 3 seconds. Lesion noted. No ecchymosis and no rash noted. He is not diaphoretic. No cyanosis or erythema. Nails show no clubbing.   Hyperkeratotic lesion plantar first met head right foot.    Nails 1-5 left and 2-5 right are well trimmed and mildly thickened.    No open lesions or macerations bilateral lower extremity.               Assessment:       Encounter Diagnoses   Name Primary?    Type 2 diabetes mellitus with diabetic peripheral angiopathy without gangrene, with long-term current use of insulin Yes    Diabetic polyneuropathy associated with type 2 diabetes mellitus     Pre-ulcerative calluses          Plan:       Chau DAMIAN was seen today for follow-up, diabetes mellitus and diabetic foot exam.    Diagnoses and all orders for this visit:    Type 2 diabetes mellitus with diabetic peripheral angiopathy without gangrene, with long-term current use of insulin  -     Foot Care    Diabetic polyneuropathy associated with type 2 diabetes mellitus  -     Foot Care    Pre-ulcerative calluses  -     Foot Care      I counseled the patient on his conditions, their implications and medical management.    Shoe inspection. Diabetic Foot Education. Patient reminded of the importance of good nutrition and blood sugar control to help prevent podiatric complications of diabetes. Patient instructed on proper foot hygeine. We discussed wearing proper shoe gear, daily foot inspections, never walking without protective shoe gear, never putting sharp instruments to feet, routine podiatric nail visits every 2-3 months.      Routine foot care per attached note. Patient relates relief following the procedure. He will continue to monitor the areas daily, inspect his feet, wear protective shoe gear when ambulatory, moisturizer to maintain skin integrity and follow in this office in approximately 2-3 months, sooner p.r.n.

## 2018-07-24 NOTE — PROCEDURES
"Routine Foot Care  Date/Time: 7/24/2018 8:11 AM  Performed by: EVELIN LOMELI  Authorized by: EVELIN LOMELI     Time out: Immediately prior to procedure a "time out" was called to verify the correct patient, procedure, equipment, support staff and site/side marked as required.    Consent Done?:  Yes (Verbal)  Hyperkeratotic Skin Lesions?: Yes    Number of trimmed lesions:  1  Location(s):  Right 1st Metatarsal Head    Patient tolerance:  Patient tolerated the procedure well with no immediate complications      "

## 2018-07-24 NOTE — TELEPHONE ENCOUNTER
ILR implanted for cryptogenic stroke.  Event report from today shows pause episode x4 from 7/23/18 (starting at 6:29PM), longest 6.6 seconds.      Spoke with Mr. Rodarte and he reports having an upset stomach last night with nausea and vomiting.  He started feeling bad around 6:30PM and was better by 9PM.    Pause episodes reviewed with Dr. Rodriguez and possible vagal response (pt reported vomiting).  Mr. Rodarte denied any near-syncope/syncope.  Will continue to monitor.

## 2018-07-24 NOTE — TELEPHONE ENCOUNTER
Patient had loop event come today from last night 7.23.18 1829. Patient had bradycardic episode w/ long pauses and escape rhythm. Asked for a manual transmission and advised would give this to the device RN. Patient verbalized understanding.

## 2018-07-25 ENCOUNTER — TELEPHONE (OUTPATIENT)
Dept: ELECTROPHYSIOLOGY | Facility: CLINIC | Age: 68
End: 2018-07-25

## 2018-07-25 NOTE — TELEPHONE ENCOUNTER
Called patient and gave him results of PFTs - no current change to medication regimen - will repeat in one year. Patient verbalized understanding.

## 2018-07-26 DIAGNOSIS — Z79.4 TYPE 2 DIABETES MELLITUS WITHOUT COMPLICATION, WITH LONG-TERM CURRENT USE OF INSULIN: ICD-10-CM

## 2018-07-26 DIAGNOSIS — E11.9 TYPE 2 DIABETES MELLITUS WITHOUT COMPLICATION, WITH LONG-TERM CURRENT USE OF INSULIN: ICD-10-CM

## 2018-07-26 RX ORDER — LIRAGLUTIDE 6 MG/ML
1.2 INJECTION SUBCUTANEOUS DAILY
Qty: 18 SYRINGE | Refills: 2 | Status: SHIPPED | OUTPATIENT
Start: 2018-07-26 | End: 2020-01-01

## 2018-07-26 NOTE — PROGRESS NOTES
ILR - pause episodes WITHOUT near syncope/syncope     Message Contents   Anna Rodriguez MD   Cc: Demetra Rodriguez,     As previously discussed, ILR implanted for cryptogenic stroke.  Event report from today shows pause episode x4 from 7/23/18 (starting at 6:29PM), longest 6.6 seconds.       Spoke with Mr. Rodarte and he reports having an upset stomach last night with nausea and vomiting.  He started feeling bad around 6:30PM and was better by 9PM.     Pause episodes reviewed and possible vagal response (pt reported vomiting).  Mr. Rodarte did denied any near-syncope/syncope.  Will continue to monitor.     Thanks,   Anna

## 2018-07-30 ENCOUNTER — TELEPHONE (OUTPATIENT)
Dept: CARDIOLOGY | Facility: HOSPITAL | Age: 68
End: 2018-07-30

## 2018-07-30 RX ORDER — ISOSORBIDE DINITRATE 20 MG/1
20 TABLET ORAL 3 TIMES DAILY
Qty: 90 TABLET | Refills: 3 | Status: SHIPPED | OUTPATIENT
Start: 2018-07-30 | End: 2019-01-22

## 2018-07-30 RX ORDER — ISOSORBIDE DINITRATE 20 MG/1
20 TABLET ORAL 3 TIMES DAILY
Qty: 90 TABLET | Refills: 3 | OUTPATIENT
Start: 2018-07-30 | End: 2019-01-01

## 2018-07-30 NOTE — TELEPHONE ENCOUNTER
----- Message from Tal Corbett MA sent at 7/30/2018 11:12 AM CDT -----  Regarding: FW: medication refill  Please see below.  Thank you  ----- Message -----  From: Cayden Moreno MD  Sent: 7/29/2018  12:22 PM  To: Josh TOBIAS Staff  Subject: medication refill                                Hello    I received a refill request for Mr Vanessa pettysoscott. He no longer follows with me.  Can you send a refill request to his primary care physician or his general cardiologist?

## 2018-07-31 RX ORDER — CLOPIDOGREL BISULFATE 75 MG/1
TABLET ORAL
Qty: 90 TABLET | Refills: 1 | Status: SHIPPED | OUTPATIENT
Start: 2018-07-31 | End: 2019-01-01 | Stop reason: SDUPTHER

## 2018-07-31 RX ORDER — ATORVASTATIN CALCIUM 80 MG/1
TABLET, FILM COATED ORAL
Qty: 90 TABLET | Refills: 0 | Status: SHIPPED | OUTPATIENT
Start: 2018-07-31 | End: 2018-10-27 | Stop reason: SDUPTHER

## 2018-08-02 RX ORDER — AMIODARONE HYDROCHLORIDE 200 MG/1
200 TABLET ORAL DAILY
Qty: 90 TABLET | Refills: 3 | Status: SHIPPED | OUTPATIENT
Start: 2018-08-02 | End: 2020-01-01 | Stop reason: SDUPTHER

## 2018-08-22 ENCOUNTER — HOME CARE VISIT (OUTPATIENT)
Dept: INTERNAL MEDICINE | Facility: CLINIC | Age: 68
End: 2018-08-22

## 2018-08-22 VITALS — SYSTOLIC BLOOD PRESSURE: 126 MMHG | DIASTOLIC BLOOD PRESSURE: 78 MMHG

## 2018-08-27 ENCOUNTER — CLINICAL SUPPORT (OUTPATIENT)
Dept: ELECTROPHYSIOLOGY | Facility: CLINIC | Age: 68
End: 2018-08-27
Attending: INTERNAL MEDICINE
Payer: MEDICARE

## 2018-08-27 DIAGNOSIS — Z95.818 STATUS POST PLACEMENT OF IMPLANTABLE LOOP RECORDER: ICD-10-CM

## 2018-08-27 DIAGNOSIS — I63.9 CRYPTOGENIC STROKE: ICD-10-CM

## 2018-08-27 PROCEDURE — 93298 REM INTERROG DEV EVAL SCRMS: CPT | Mod: ,,, | Performed by: INTERNAL MEDICINE

## 2018-08-27 PROCEDURE — 93299 LOOP RECORDER REMOTE: CPT | Mod: PBBFAC | Performed by: INTERNAL MEDICINE

## 2018-10-01 ENCOUNTER — CLINICAL SUPPORT (OUTPATIENT)
Dept: ELECTROPHYSIOLOGY | Facility: CLINIC | Age: 68
End: 2018-10-01
Attending: INTERNAL MEDICINE
Payer: MEDICARE

## 2018-10-01 DIAGNOSIS — I63.9 CRYPTOGENIC STROKE: ICD-10-CM

## 2018-10-01 DIAGNOSIS — Z95.818 STATUS POST PLACEMENT OF IMPLANTABLE LOOP RECORDER: ICD-10-CM

## 2018-10-01 PROCEDURE — 93298 REM INTERROG DEV EVAL SCRMS: CPT | Mod: ,,, | Performed by: INTERNAL MEDICINE

## 2018-10-01 PROCEDURE — 93299 LOOP RECORDER REMOTE: CPT | Mod: PBBFAC | Performed by: INTERNAL MEDICINE

## 2018-10-02 ENCOUNTER — TELEPHONE (OUTPATIENT)
Dept: CARDIOLOGY | Facility: CLINIC | Age: 68
End: 2018-10-02

## 2018-10-02 DIAGNOSIS — I73.9 PAD (PERIPHERAL ARTERY DISEASE): Primary | ICD-10-CM

## 2018-10-02 NOTE — TELEPHONE ENCOUNTER
----- Message from Nichol Dunlap sent at 10/2/2018 10:05 AM CDT -----  Contact: Wife [phone]  Patient would like to speak with you about scheduling a check up appointment. Please advise

## 2018-10-03 NOTE — TELEPHONE ENCOUNTER
----- Message from Yris Lawrence sent at 10/3/2018 12:50 PM CDT -----  Contact: Wife [phone]  Patient is returning your call to schedule an appointment. Please call and advise.

## 2018-10-04 ENCOUNTER — OFFICE VISIT (OUTPATIENT)
Dept: INTERNAL MEDICINE | Facility: CLINIC | Age: 68
End: 2018-10-04
Payer: MEDICARE

## 2018-10-04 VITALS
OXYGEN SATURATION: 99 % | DIASTOLIC BLOOD PRESSURE: 80 MMHG | WEIGHT: 256.81 LBS | HEIGHT: 69 IN | HEART RATE: 54 BPM | BODY MASS INDEX: 38.04 KG/M2 | SYSTOLIC BLOOD PRESSURE: 130 MMHG

## 2018-10-04 DIAGNOSIS — I10 ESSENTIAL HYPERTENSION: Primary | ICD-10-CM

## 2018-10-04 DIAGNOSIS — E11.42 TYPE 2 DIABETES MELLITUS WITH DIABETIC POLYNEUROPATHY, WITH LONG-TERM CURRENT USE OF INSULIN: ICD-10-CM

## 2018-10-04 DIAGNOSIS — N18.30 BENIGN HYPERTENSIVE HEART AND KIDNEY DISEASE WITH SYSTOLIC CHF, NYHA CLASS 2 AND CKD STAGE 3: ICD-10-CM

## 2018-10-04 DIAGNOSIS — I50.20 BENIGN HYPERTENSIVE HEART AND KIDNEY DISEASE WITH SYSTOLIC CHF, NYHA CLASS 2 AND CKD STAGE 3: ICD-10-CM

## 2018-10-04 DIAGNOSIS — R19.7 DIARRHEA, UNSPECIFIED TYPE: ICD-10-CM

## 2018-10-04 DIAGNOSIS — I13.0 BENIGN HYPERTENSIVE HEART AND KIDNEY DISEASE WITH SYSTOLIC CHF, NYHA CLASS 2 AND CKD STAGE 3: ICD-10-CM

## 2018-10-04 DIAGNOSIS — I50.22 CHRONIC SYSTOLIC HEART FAILURE: ICD-10-CM

## 2018-10-04 DIAGNOSIS — Z79.4 TYPE 2 DIABETES MELLITUS WITH DIABETIC POLYNEUROPATHY, WITH LONG-TERM CURRENT USE OF INSULIN: ICD-10-CM

## 2018-10-04 PROCEDURE — 99999 PR PBB SHADOW E&M-EST. PATIENT-LVL III: CPT | Mod: PBBFAC,,, | Performed by: INTERNAL MEDICINE

## 2018-10-04 PROCEDURE — 99214 OFFICE O/P EST MOD 30 MIN: CPT | Mod: S$PBB,,, | Performed by: INTERNAL MEDICINE

## 2018-10-04 PROCEDURE — 99213 OFFICE O/P EST LOW 20 MIN: CPT | Mod: PBBFAC | Performed by: INTERNAL MEDICINE

## 2018-10-04 PROCEDURE — 3044F HG A1C LEVEL LT 7.0%: CPT | Mod: CPTII,,, | Performed by: INTERNAL MEDICINE

## 2018-10-04 PROCEDURE — 1101F PT FALLS ASSESS-DOCD LE1/YR: CPT | Mod: CPTII,,, | Performed by: INTERNAL MEDICINE

## 2018-10-04 PROCEDURE — 3075F SYST BP GE 130 - 139MM HG: CPT | Mod: CPTII,,, | Performed by: INTERNAL MEDICINE

## 2018-10-04 PROCEDURE — 3079F DIAST BP 80-89 MM HG: CPT | Mod: CPTII,,, | Performed by: INTERNAL MEDICINE

## 2018-10-04 RX ORDER — DIPHENOXYLATE HYDROCHLORIDE AND ATROPINE SULFATE 2.5; .025 MG/1; MG/1
1 TABLET ORAL 3 TIMES DAILY PRN
Qty: 20 TABLET | Refills: 0 | Status: SHIPPED | OUTPATIENT
Start: 2018-10-04 | End: 2019-01-14

## 2018-10-04 NOTE — PROGRESS NOTES
Subjective:       Patient ID: Chau Rodarte is a 68 y.o. male.    Chief Complaint: Follow-up (needs refill for congestion and diarrhea medication)    Patient is here for followup for chronic conditions.    DM2:  good med adherence  no changed Diet  < 140 AM sugars  Not discussed Post-prandial sugars  stable Numbness in feet  No new sores in feet  no Visual changes  no Polyuria/polydipsia.          Review of Systems   Constitutional: Negative for activity change, fatigue, fever and unexpected weight change.   HENT: Negative for congestion.    Respiratory: Negative for cough, chest tightness, shortness of breath and wheezing.    Cardiovascular: Negative for chest pain, palpitations and leg swelling.   Gastrointestinal: Negative for abdominal distention, abdominal pain, anal bleeding, blood in stool, nausea and vomiting.   Genitourinary: Negative for decreased urine volume and difficulty urinating.   Musculoskeletal: Negative for arthralgias and neck pain.   Skin: Negative for rash and wound.   Neurological: Negative for tremors, syncope, weakness and numbness.   Hematological: Negative for adenopathy. Does not bruise/bleed easily.   Psychiatric/Behavioral: Negative for confusion.       Objective:      Physical Exam   Constitutional: He appears well-developed and well-nourished. No distress.   Appears well   HENT:   Head: Normocephalic and atraumatic.   Mouth/Throat: No oropharyngeal exudate.   Eyes: EOM are normal. Pupils are equal, round, and reactive to light. No scleral icterus.   Cardiovascular: Normal rate, regular rhythm and normal heart sounds.   Pulmonary/Chest: Effort normal. He has wheezes (mild).   Abdominal: Soft. Bowel sounds are normal. He exhibits no distension and no mass. There is no tenderness. There is no rebound and no guarding. No hernia.   Musculoskeletal:   bilat feet in walking shoes!   Skin: He is not diaphoretic.   Psychiatric: He has a normal mood and affect. His behavior is normal.        Assessment:       1. Essential hypertension    2. Type 2 diabetes mellitus with diabetic polyneuropathy, with long-term current use of insulin    3. Chronic systolic heart failure    4. 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 Diarrhea, unspecified type        Plan:         Chau DAMIAN was seen today for follow-up.    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  Uses occ    Lengthy discussion re importance of more phys activity and eating btr    Health Maintenance       Date Due Completion Date    TETANUS VACCINE 04/20/1967 ---    Pneumococcal (65+) (2 of 2 - PPSV23) 05/10/2018 5/10/2017    Hemoglobin A1c 06/29/2018 3/29/2018    Influenza Vaccine 08/01/2018 12/7/2016    Lipid Panel 03/29/2019 3/29/2018    Eye Exam 04/11/2019 4/11/2018 (Done)    Override on 4/11/2018: Done (dr. jones, [facility], [phone])    Override on 7/18/2015: Done    Override on 3/29/2014: Done    Foot Exam 07/24/2019 7/24/2018    Override on 10/23/2015: Done (Tyra Hoover)    High Dose Statin 10/04/2019 10/4/2018    Colonoscopy 03/21/2022 3/21/2017          Follow-up in about 6 months (around 4/4/2019).    Future Appointments   Date Time Provider Department Center   10/23/2018  8:00 AM Quirino Bland DPM [facility]   12/3/2018  9:30 AM [facility] US2 [facility]   12/5/2018  9:20 AM Bonifacio Epps MD [facility] CARDIO [facility]          Flu vax please, [email or web address] as well

## 2018-10-16 NOTE — ASSESSMENT & PLAN NOTE
HTN controlled and CHF compensatedwith current medical regimen of metoprolol and bidil which will be continued.  Monitor with daily weights and continue lasix to maintain euvolemia  Continue cardiac diet   Cr is at baseline of 2.1; will continue to monitor with BIW labs; renally adjust meds and avoid nephrotoxins  
-Chronic   -Continue tamsulosin 0.4mg nightly  
-Chronic   -Continue tamsulosin 0.4mg nightly at home  
-Chronic  -Continue atorvastatin 80mg daily  
-Chronic  -Continue atorvastatin 80mg daily at home  
-Chronic and controlled  -Continue therapy with levemir 16units nightly  -Monitor with accuchecks   -continue SSNI prn hyperglycemia  -hypo/hyperglycemic protocol   -Continue diabetic diet  -will continue to monitor and adjust regimen as necessary  -Continue gabapentin  300mg bid to treat polyneuropathy  
-Chronic and controlled  -Continue therapy with levemir 16units nightly at home  -Monitor with accuchecks   -continue SSNI prn hyperglycemia  -hypo/hyperglycemic protocol   -Continue diabetic diet  -will continue to monitor and adjust regimen as necessary  -Continue gabapentin  300mg bid to treat polyneuropathy at home  
-HTN controlled and CHF compensated   -continue metoprolol 24hr  25mg daily  -continue bidil 20-37.5mg tid  -added holding parameters  -Monitor with daily weights and continue lasix 40mg daily to maintain euvolemia  -Continue cardiac diet   -Cr is at baseline; will continue to monitor with BIW labs; renally adjust meds and avoid nephrotoxins  
-Keep incisions dry and monitor for erythema or drainage  -No tape to skin due to dermatitis.   -Continue ASA 81mg daily  -continue clopidogrel 75mg daily  -continue hydrocodone/APAP to treat pain  -continue senokot-s to prevent constipation  
-Keep incisions dry and monitor for erythema or drainage  -No tape to skin due to dermatitis.   -Continue ASA 81mg daily at home  -continue clopidogrel 75mg daily at home  -continue hydrocodone/APAP to treat pain at home  -continue senokot-s to prevent constipation  
-Rate controlled   -continue metoprolol   -will continue to monitor and adjust regimen as necessary  -patient not on chronic anticoagulation due to previous GI bleeding  
-S/p 2 units PRBC after surgery  -Continue empiric iron therapy, iron studies with iron deficiency from blood loss  
-S/p 2 units PRBC after surgery  -Continue empiric iron therapy, iron studies with iron deficiency from blood loss  
-continue PT/OT to increase ambulation, ADL performance and endurance  -continue early mobility for DVT prophylaxis; patient refuses heparin products due to history of bleeding; SCD's as tolerated to right leg  -continue fall precautions  -continue senokot-s to prevent constipation; hold for frequent or loose stooling  
-continue PT/OT to increase ambulation, ADL performance and endurance  -continue early mobility for DVT prophylaxis; patient refuses heparin products due to history of bleeding; SCD's as tolerated to right leg  -continue fall precautions  -continue senokot-s to prevent constipation; hold for frequent or loose stooling  -pain regimen: hydrocodone/ APAP 5-325mg q4hrs prn pain  
-continue recommendations per podiatry next f/u on 7/11  
-contiue santyl to bilateral heels and 1st right MTP to treat as per podiatry:  1.Cleanse wounds B/L feet with saline  2. Apply santyl to all wounds B/L LE  3. Apply krysten foam wrap with kerlix.   -Keep f/u with podiatry as scheduled on 6/27  
-contiue santyl to bilateral heels and 1st right MTP to treat as per podiatry:  1.Cleanse wounds B/L feet with saline  2. Apply santyl to all wounds B/L LE  3. Apply krysten foam wrap with kerlix.   -Keep f/u with podiatry as scheduled on 6/27---waiting for clarification on recommendations from clinic visit on 6/27  
-hypotensive  -CHF compensated   -continue metoprolol 24hr  25mg daily  -was on bidil 20-37.5mg tid, discontinue hydralazine portion, continue with isosorbide dinitrate 20mg tid  -continue holding parameters  -PRN hydralazine added  -Monitor with daily weights and reduced lasix 40mg daily to 20mg daily maintain euvolemia--weight stable  -Continue cardiac diet   -Cr is at baseline; will continue to monitor with BIW labs; renally adjust meds and avoid nephrotoxins  
-hypotensive  -CHF compensated   -continue metoprolol 24hr  25mg daily at home  -was on bidil 20-37.5mg tid, discontinue hydralazine portion, continue with isosorbide dinitrate 20mg tid at home  -continue holding parameters  -PRN hydralazine added  -continue lasix 40mg daily at home  -Continue cardiac diet   -Cr is at baseline; will continue to monitor with BIW labs; renally adjust meds and avoid nephrotoxins  
Chronic   Continue therapy with tamsulosin to treat  
Chronic  Continue atorvastatin to treat  
Hemoglobin A1C   Date Value Ref Range Status   05/09/2017 6.4 (H) 4.5 - 6.2 % Final   02/27/2017 7.1 (H) 4.5 - 6.2 % Final   01/12/2017 8.2 (H) 4.5 - 6.2 % Final   Chronic and controlled  Continue therapy with levemir insulin  Monitor with accuchecks and continue SSNI prn hyperglycemia  Continue diabetic diet  -will continue to monitor and adjust regimen as necessary  Continue gabapentin to treat polyneuropathy  
Keep incisions dry and monitor for erythema or drainage  No tape to skin due to dermatitis.   Continue medical therapy with ASA, clopidogrel  -continue hydrocodone/APAP to treat pain  -continue senokot-s to prevent constipation  
Rate controlled with metoprolol to treat which will be continued  -will continue to monitor and adjust regimen as necessary  -patient not on chronic anticoagulation due to previous GI bleeding  
S/p 2 units PRBC after surgery  Continue empiric iron therapy   Evaluate with iron studies with next labs  
contiue santyl to bilateral heels and 1st right MTP to treat as per podiatry:  1.Cleanse wounds B/L feet with saline  2. Apply santyl to all wounds B/L LE  3. Apply krysten foam wrap with kerlix.   Keep f/u with podiatry as scheduled  
Regular rate and rhythm, Heart sounds S1 S2 present, no murmurs, rubs or gallops

## 2018-10-23 ENCOUNTER — OFFICE VISIT (OUTPATIENT)
Dept: PODIATRY | Facility: CLINIC | Age: 68
End: 2018-10-23
Payer: MEDICARE

## 2018-10-23 VITALS
SYSTOLIC BLOOD PRESSURE: 159 MMHG | DIASTOLIC BLOOD PRESSURE: 78 MMHG | BODY MASS INDEX: 37.92 KG/M2 | WEIGHT: 256 LBS | HEIGHT: 69 IN | HEART RATE: 57 BPM

## 2018-10-23 DIAGNOSIS — Z79.4 TYPE 2 DIABETES MELLITUS WITH DIABETIC PERIPHERAL ANGIOPATHY WITHOUT GANGRENE, WITH LONG-TERM CURRENT USE OF INSULIN: Primary | ICD-10-CM

## 2018-10-23 DIAGNOSIS — L84 PRE-ULCERATIVE CALLUSES: ICD-10-CM

## 2018-10-23 DIAGNOSIS — E11.42 DIABETIC POLYNEUROPATHY ASSOCIATED WITH TYPE 2 DIABETES MELLITUS: ICD-10-CM

## 2018-10-23 DIAGNOSIS — L60.9 DISEASE OF NAIL: ICD-10-CM

## 2018-10-23 DIAGNOSIS — E11.51 TYPE 2 DIABETES MELLITUS WITH DIABETIC PERIPHERAL ANGIOPATHY WITHOUT GANGRENE, WITH LONG-TERM CURRENT USE OF INSULIN: Primary | ICD-10-CM

## 2018-10-23 PROCEDURE — 11055 PARING/CUTG B9 HYPRKER LES 1: CPT | Mod: PBBFAC,PN | Performed by: PODIATRIST

## 2018-10-23 PROCEDURE — 99499 UNLISTED E&M SERVICE: CPT | Mod: S$PBB,,, | Performed by: PODIATRIST

## 2018-10-23 PROCEDURE — 99999 PR PBB SHADOW E&M-EST. PATIENT-LVL III: CPT | Mod: PBBFAC,,, | Performed by: PODIATRIST

## 2018-10-23 PROCEDURE — 11721 DEBRIDE NAIL 6 OR MORE: CPT | Mod: 59,Q9,S$PBB, | Performed by: PODIATRIST

## 2018-10-23 PROCEDURE — 11721 DEBRIDE NAIL 6 OR MORE: CPT | Mod: PBBFAC,PN,Q9,59

## 2018-10-23 PROCEDURE — 99213 OFFICE O/P EST LOW 20 MIN: CPT | Mod: PBBFAC,PN,25 | Performed by: PODIATRIST

## 2018-10-23 NOTE — PROCEDURES
"Routine Foot Care  Date/Time: 10/23/2018 8:14 AM  Performed by: Quirino Bland DPM  Authorized by: Quirino Bland DPM     Time out: Immediately prior to procedure a "time out" was called to verify the correct patient, procedure, equipment, support staff and site/side marked as required.    Consent Done?:  Yes (Verbal)  Hyperkeratotic Skin Lesions?: Yes    Number of trimmed lesions:  1  Location(s):  Right 1st Metatarsal Head    Nail Care Type:  Debride(Left 1st Toe, Left 2nd Toe, Left 3rd Toe, Right 2nd Toe, Right 3rd Toe, Right 4th Toe and Right 5th Toe)  Patient tolerance:  Patient tolerated the procedure well with no immediate complications        "

## 2018-10-24 NOTE — PROGRESS NOTES
Subjective:      Patient ID: Chau Rodarte is a 68 y.o. male.    Chief Complaint: Diabetes Mellitus (Dr. Riki Huynh 10/4/18); Diabetic Foot Exam; and Routine Foot Care    Chau DAMIAN is a 68 y.o. male who presents to the clinic for evaluation and treatment of high risk feet. Chau DAMIAN has a past medical history of Acute on chronic systolic CHF (congestive heart failure) (11/29/2016), Anticoagulant long-term use, CKD (chronic kidney disease) stage 2, GFR 60-89 ml/min (2/21/2016), Coronary artery disease, Encounter for blood transfusion, HTN (hypertension) (3/3/2014), Hyperlipidemia (3/3/2014), NSTEMI (non-ST elevated myocardial infarction) (11/28/2016), Obesity (BMI 30-39.9) (11/29/2016), PVD (peripheral vascular disease), Stroke, Type 2 diabetes mellitus with diabetic peripheral angiopathy without gangrene, without long-term current use of insulin (10/24/2013), and Type 2 diabetes mellitus with diabetic polyneuropathy, without long-term current use of insulin (11/29/2016). The patient's chief complaint is long, thick toenails. This patient has documented high risk feet requiring routine maintenance secondary to peripheral vascular disease and peripheral neuropathy. Discharged from [facility] on 5/8/18. No new complaints.    PCP: Riki Huynh MD    Date Last Seen by PCP: 10/4/18    Current shoe gear:  Affected Foot: Rx diabetic extra depth shoes and custom accommodative insoles     Unaffected Foot: Rx diabetic extra depth shoes and custom accommodative insoles    Hemoglobin A1C   Date Value Ref Range Status   10/04/2018 7.1 (H) 4.0 - 5.6 % Final     Comment:     ADA Screening Guidelines:  5.7-6.4%  Consistent with prediabetes  >or=6.5%  Consistent with diabetes  High levels of fetal hemoglobin interfere with the HbA1C  assay. Heterozygous hemoglobin variants (HbS, HgC, etc)do  not significantly interfere with this assay.   However, presence of multiple variants may affect accuracy.     03/29/2018 6.1 (H) 4.0 - 5.6  % Final     Comment:     According to ADA guidelines, hemoglobin A1c <7.0% represents  optimal control in non-pregnant diabetic patients. Different  metrics may apply to specific patient populations.   Standards of Medical Care in Diabetes-2016.  For the purpose of screening for the presence of diabetes:  <5.7%     Consistent with the absence of diabetes  5.7-6.4%  Consistent with increasing risk for diabetes   (prediabetes)  >or=6.5%  Consistent with diabetes  Currently, no consensus exists for use of hemoglobin A1c  for diagnosis of diabetes for children.  This Hemoglobin A1c assay has significant interference with fetal   hemoglobin   (HbF). The results are invalid for patients with abnormal amounts of   HbF,   including those with known Hereditary Persistence   of Fetal Hemoglobin. Heterozygous hemoglobin variants (HbAS, HbAC,   HbAD, HbAE, HbA2) do not significantly interfere with this assay;   however, presence of multiple variants in a sample may impact the %   interference.     06/26/2017 6.1 (H) 4.0 - 5.6 % Final     Comment:     According to ADA guidelines, hemoglobin A1c <7.0% represents  optimal control in non-pregnant diabetic patients. Different  metrics may apply to specific patient populations.   Standards of Medical Care in Diabetes-2016.  For the purpose of screening for the presence of diabetes:  <5.7%     Consistent with the absence of diabetes  5.7-6.4%  Consistent with increasing risk for diabetes   (prediabetes)  >or=6.5%  Consistent with diabetes  Currently, no consensus exists for use of hemoglobin A1c  for diagnosis of diabetes for children.  This Hemoglobin A1c assay has significant interference with fetal   hemoglobin   (HbF). The results are invalid for patients with abnormal amounts of   HbF,   including those with known Hereditary Persistence   of Fetal Hemoglobin. Heterozygous hemoglobin variants (HbAS, HbAC,   HbAD, HbAE, HbA2) do not significantly interfere with this assay;    however, presence of multiple variants in a sample may impact the %   interference.         Review of Systems   Constitution: Negative for chills, fever, weakness and malaise/fatigue.   Cardiovascular: Negative for chest pain, claudication and leg swelling.   Respiratory: Negative for cough and shortness of breath.    Skin: Positive for dry skin, nail changes and poor wound healing.   Musculoskeletal: Negative for back pain, joint pain, muscle cramps and muscle weakness.   Gastrointestinal: Negative for nausea and vomiting.   Neurological: Positive for numbness and paresthesias.   Psychiatric/Behavioral: Negative for altered mental status.           Objective:      Physical Exam   Constitutional: He is oriented to person, place, and time. No distress.   Cardiovascular:   Pulses:       Dorsalis pedis pulses are 1+ on the right side, and 1+ on the left side.        Posterior tibial pulses are 1+ on the right side, and 1+ on the left side.   Skin temp warm bilateral foot. No hair growth bilateral lower extremity. Mild edema bilateral lower extremity.   Musculoskeletal:        Right foot: There is deformity.   Semi-rigid cocked up right hallux.    Cavus foot structure bilateral.    Uriah pain with ROM or MMT bilateral lower extremity.   Feet:   Right Foot:   Protective Sensation: 10 sites tested. 4 sites sensed.   Skin Integrity: Positive for callus and dry skin. Negative for ulcer, blister, skin breakdown, erythema or warmth.   Left Foot:   Protective Sensation: 10 sites tested. 8 sites sensed.   Skin Integrity: Positive for dry skin. Negative for ulcer, blister, skin breakdown, erythema, warmth or callus.   Neurological: He is alert and oriented to person, place, and time. A sensory deficit is present.   Vibratory sensation absent bilateral foot.   Skin: Skin is warm, dry and intact. Capillary refill takes 2 to 3 seconds. Lesion noted. No ecchymosis and no rash noted. He is not diaphoretic. No cyanosis or erythema.  Nails show no clubbing.   Hyperkeratotic lesion plantar first met head right foot.    Nails 1-3 left and 2-5 right are elongated 2-3 mm and mildly thickened. Nails 4,5 left are trimmed.    No open lesions or macerations bilateral lower extremity.               Assessment:       Encounter Diagnoses   Name Primary?    Type 2 diabetes mellitus with diabetic peripheral angiopathy without gangrene, with long-term current use of insulin Yes    Diabetic polyneuropathy associated with type 2 diabetes mellitus     Pre-ulcerative calluses     Disease of nail          Plan:       Chau DAMIAN was seen today for diabetes mellitus, diabetic foot exam and routine foot care.    Diagnoses and all orders for this visit:    Type 2 diabetes mellitus with diabetic peripheral angiopathy without gangrene, with long-term current use of insulin  -     Routine Foot Care    Diabetic polyneuropathy associated with type 2 diabetes mellitus  -     Routine Foot Care    Pre-ulcerative calluses  -     Routine Foot Care    Disease of nail  -     Routine Foot Care      I counseled the patient on his conditions, their implications and medical management.    Shoe inspection. Diabetic Foot Education. Patient reminded of the importance of good nutrition and blood sugar control to help prevent podiatric complications of diabetes. Patient instructed on proper foot hygeine. We discussed wearing proper shoe gear, daily foot inspections, never walking without protective shoe gear, never putting sharp instruments to feet, routine podiatric nail visits every 2-3 months.      Routine foot care per attached note. Patient relates relief following the procedure. He will continue to monitor the areas daily, inspect his feet, wear protective shoe gear when ambulatory, moisturizer to maintain skin integrity and follow in this office in approximately 2-3 months, sooner p.r.n.

## 2018-10-31 RX ORDER — ATORVASTATIN CALCIUM 80 MG/1
TABLET, FILM COATED ORAL
Qty: 90 TABLET | Refills: 11 | Status: SHIPPED | OUTPATIENT
Start: 2018-10-31 | End: 2019-01-01 | Stop reason: SDUPTHER

## 2018-11-06 ENCOUNTER — TELEPHONE (OUTPATIENT)
Dept: INTERNAL MEDICINE | Facility: CLINIC | Age: 68
End: 2018-11-06

## 2018-11-06 NOTE — TELEPHONE ENCOUNTER
Hi, please call him or his wife in regards to his congestion medicine -- is it called singulair/montelukast, a 10mg tablet? Or is it a nose spray or other tablet.  Please let me know.  Thank you, Riki Huynh

## 2018-11-08 NOTE — TELEPHONE ENCOUNTER
Hi, please call -- I recommend that he try over the counter loratadine or claritin 10mg per day as needed for congestion.  His wife called regarding his congestion.  Let me know if patient has any questions.  Thank you, Riki Huynh

## 2018-11-29 ENCOUNTER — TELEPHONE (OUTPATIENT)
Dept: CARDIOLOGY | Facility: HOSPITAL | Age: 68
End: 2018-11-29

## 2018-11-29 NOTE — TELEPHONE ENCOUNTER
----- Message from Karsten Rodriguez MD sent at 11/27/2018  3:31 PM CST -----  Yes we know about his AF history. He has a history of significant GI bleeding.     He sees me again in 1/2018. Thanks    GP    ----- Message -----  From: Katy Beauchamp RN  Sent: 11/27/2018   2:55 PM  To: Karsten Rodriguez MD    Pause alert 11/23/18, EGM demonstrates likely junctional bradycardia with PVC and 3 second pause.    AF alert, 2 events since 7/24/18, 0.2%, EGM from 11/23/18 demonstrates AF event, duration 4 hours , median HR 63 bpm.     ILR implanted for cryptogenic stroke. Not on OAC. I will leave a copy of the strips in your door. Please advise.

## 2018-12-05 ENCOUNTER — CLINICAL SUPPORT (OUTPATIENT)
Dept: CARDIOLOGY | Facility: HOSPITAL | Age: 68
End: 2018-12-05
Attending: INTERNAL MEDICINE
Payer: MEDICARE

## 2018-12-05 DIAGNOSIS — Z95.818 STATUS POST PLACEMENT OF IMPLANTABLE LOOP RECORDER: ICD-10-CM

## 2018-12-05 PROCEDURE — 93298 REM INTERROG DEV EVAL SCRMS: CPT | Mod: ,,, | Performed by: INTERNAL MEDICINE

## 2018-12-05 PROCEDURE — 93298 CARDIAC DEVICE CHECK - REMOTE: ICD-10-PCS | Mod: ,,, | Performed by: INTERNAL MEDICINE

## 2018-12-05 PROCEDURE — 93299 CARDIAC DEVICE CHECK - REMOTE: CPT

## 2018-12-21 ENCOUNTER — CLINICAL SUPPORT (OUTPATIENT)
Dept: CARDIOLOGY | Facility: HOSPITAL | Age: 68
End: 2018-12-21
Attending: INTERNAL MEDICINE
Payer: MEDICARE

## 2018-12-21 ENCOUNTER — TELEPHONE (OUTPATIENT)
Dept: ELECTROPHYSIOLOGY | Facility: CLINIC | Age: 68
End: 2018-12-21

## 2018-12-21 DIAGNOSIS — Z95.818 STATUS POST PLACEMENT OF IMPLANTABLE LOOP RECORDER: ICD-10-CM

## 2018-12-21 DIAGNOSIS — Z95.818 STATUS POST PLACEMENT OF IMPLANTABLE LOOP RECORDER: Primary | ICD-10-CM

## 2018-12-21 PROCEDURE — 93299 CARDIAC DEVICE CHECK - REMOTE: CPT

## 2018-12-21 PROCEDURE — 93298 REM INTERROG DEV EVAL SCRMS: CPT | Mod: ,,, | Performed by: INTERNAL MEDICINE

## 2018-12-26 ENCOUNTER — TELEPHONE (OUTPATIENT)
Dept: ELECTROPHYSIOLOGY | Facility: CLINIC | Age: 68
End: 2018-12-26

## 2018-12-26 DIAGNOSIS — Z95.818 STATUS POST PLACEMENT OF IMPLANTABLE LOOP RECORDER: Primary | ICD-10-CM

## 2019-01-01 ENCOUNTER — IMMUNIZATION (OUTPATIENT)
Dept: PHARMACY | Facility: CLINIC | Age: 69
End: 2019-01-01
Payer: MEDICARE

## 2019-01-01 ENCOUNTER — TELEPHONE (OUTPATIENT)
Dept: CARDIOLOGY | Facility: HOSPITAL | Age: 69
End: 2019-01-01

## 2019-01-01 ENCOUNTER — CLINICAL SUPPORT (OUTPATIENT)
Dept: CARDIOLOGY | Facility: HOSPITAL | Age: 69
End: 2019-01-01
Attending: INTERNAL MEDICINE
Payer: MEDICARE

## 2019-01-01 ENCOUNTER — OFFICE VISIT (OUTPATIENT)
Dept: INTERNAL MEDICINE | Facility: CLINIC | Age: 69
End: 2019-01-01
Payer: MEDICARE

## 2019-01-01 ENCOUNTER — TELEPHONE (OUTPATIENT)
Dept: ELECTROPHYSIOLOGY | Facility: CLINIC | Age: 69
End: 2019-01-01

## 2019-01-01 ENCOUNTER — TELEPHONE (OUTPATIENT)
Dept: INTERNAL MEDICINE | Facility: CLINIC | Age: 69
End: 2019-01-01

## 2019-01-01 ENCOUNTER — LAB VISIT (OUTPATIENT)
Dept: LAB | Facility: HOSPITAL | Age: 69
End: 2019-01-01
Payer: MEDICARE

## 2019-01-01 ENCOUNTER — CLINICAL SUPPORT (OUTPATIENT)
Dept: CARDIOLOGY | Facility: HOSPITAL | Age: 69
End: 2019-01-01
Payer: MEDICARE

## 2019-01-01 ENCOUNTER — LAB VISIT (OUTPATIENT)
Dept: LAB | Facility: HOSPITAL | Age: 69
End: 2019-01-01
Attending: INTERNAL MEDICINE
Payer: MEDICARE

## 2019-01-01 ENCOUNTER — OFFICE VISIT (OUTPATIENT)
Dept: CARDIOLOGY | Facility: CLINIC | Age: 69
End: 2019-01-01
Payer: MEDICARE

## 2019-01-01 ENCOUNTER — PATIENT OUTREACH (OUTPATIENT)
Dept: ADMINISTRATIVE | Facility: OTHER | Age: 69
End: 2019-01-01

## 2019-01-01 ENCOUNTER — HOSPITAL ENCOUNTER (OUTPATIENT)
Dept: RADIOLOGY | Facility: HOSPITAL | Age: 69
Discharge: HOME | End: 2019-10-03
Attending: INTERNAL MEDICINE
Payer: MEDICARE

## 2019-01-01 ENCOUNTER — HOSPITAL ENCOUNTER (OUTPATIENT)
Facility: HOSPITAL | Age: 69
Discharge: HOME | End: 2019-08-23
Attending: EMERGENCY MEDICINE | Admitting: INTERNAL MEDICINE
Payer: MEDICARE

## 2019-01-01 ENCOUNTER — TELEPHONE (OUTPATIENT)
Dept: CARDIOLOGY | Facility: CLINIC | Age: 69
End: 2019-01-01

## 2019-01-01 ENCOUNTER — TELEPHONE (OUTPATIENT)
Dept: NEPHROLOGY | Facility: CLINIC | Age: 69
End: 2019-01-01

## 2019-01-01 ENCOUNTER — PATIENT MESSAGE (OUTPATIENT)
Dept: ELECTROPHYSIOLOGY | Facility: CLINIC | Age: 69
End: 2019-01-01

## 2019-01-01 VITALS
HEIGHT: 69 IN | TEMPERATURE: 98 F | RESPIRATION RATE: 19 BRPM | HEART RATE: 57 BPM | DIASTOLIC BLOOD PRESSURE: 56 MMHG | SYSTOLIC BLOOD PRESSURE: 113 MMHG | OXYGEN SATURATION: 96 % | BODY MASS INDEX: 36.9 KG/M2 | WEIGHT: 249.13 LBS

## 2019-01-01 VITALS
SYSTOLIC BLOOD PRESSURE: 126 MMHG | HEIGHT: 69 IN | DIASTOLIC BLOOD PRESSURE: 70 MMHG | BODY MASS INDEX: 37.41 KG/M2 | HEART RATE: 52 BPM | WEIGHT: 252.56 LBS

## 2019-01-01 VITALS — SYSTOLIC BLOOD PRESSURE: 98 MMHG | DIASTOLIC BLOOD PRESSURE: 60 MMHG | BODY MASS INDEX: 36.85 KG/M2 | WEIGHT: 249.56 LBS

## 2019-01-01 DIAGNOSIS — I50.22 CHRONIC SYSTOLIC HEART FAILURE: ICD-10-CM

## 2019-01-01 DIAGNOSIS — Z87.891 HISTORY OF TOBACCO ABUSE: Primary | ICD-10-CM

## 2019-01-01 DIAGNOSIS — E11.22 TYPE 2 DIABETES MELLITUS WITH STAGE 3 CHRONIC KIDNEY DISEASE, WITH LONG-TERM CURRENT USE OF INSULIN: ICD-10-CM

## 2019-01-01 DIAGNOSIS — R19.7 NAUSEA, VOMITING AND DIARRHEA: ICD-10-CM

## 2019-01-01 DIAGNOSIS — Z87.2 HEALED ULCER OF RIGHT FOOT ON EXAMINATION: ICD-10-CM

## 2019-01-01 DIAGNOSIS — N18.30 TYPE 2 DIABETES MELLITUS WITH STAGE 3 CHRONIC KIDNEY DISEASE, WITH LONG-TERM CURRENT USE OF INSULIN: ICD-10-CM

## 2019-01-01 DIAGNOSIS — K52.9 ACUTE GASTROENTERITIS: ICD-10-CM

## 2019-01-01 DIAGNOSIS — Z46.9 FITTING AND ADJUSTMENT OF DEVICE: ICD-10-CM

## 2019-01-01 DIAGNOSIS — Z79.4 TYPE 2 DIABETES MELLITUS WITH DIABETIC POLYNEUROPATHY, WITH LONG-TERM CURRENT USE OF INSULIN: ICD-10-CM

## 2019-01-01 DIAGNOSIS — I50.20 BENIGN HYPERTENSIVE HEART AND KIDNEY DISEASE WITH SYSTOLIC CHF, NYHA CLASS 2 AND CKD STAGE 3: ICD-10-CM

## 2019-01-01 DIAGNOSIS — N18.4 CKD (CHRONIC KIDNEY DISEASE) STAGE 4, GFR 15-29 ML/MIN: Primary | ICD-10-CM

## 2019-01-01 DIAGNOSIS — R00.1 BRADYCARDIA: ICD-10-CM

## 2019-01-01 DIAGNOSIS — I48.91 ATRIAL FIBRILLATION WITH SLOW VENTRICULAR RESPONSE: ICD-10-CM

## 2019-01-01 DIAGNOSIS — E87.20 ACIDOSIS, METABOLIC: ICD-10-CM

## 2019-01-01 DIAGNOSIS — N25.81 SECONDARY HYPERPARATHYROIDISM OF RENAL ORIGIN: ICD-10-CM

## 2019-01-01 DIAGNOSIS — N18.30 STAGE 3 CHRONIC KIDNEY DISEASE: ICD-10-CM

## 2019-01-01 DIAGNOSIS — I73.9 PAD (PERIPHERAL ARTERY DISEASE): ICD-10-CM

## 2019-01-01 DIAGNOSIS — E11.51 TYPE 2 DIABETES MELLITUS WITH PERIPHERAL CIRCULATORY DISORDER: Primary | ICD-10-CM

## 2019-01-01 DIAGNOSIS — Z12.9 SCREENING FOR CANCER: ICD-10-CM

## 2019-01-01 DIAGNOSIS — I10 ESSENTIAL HYPERTENSION: ICD-10-CM

## 2019-01-01 DIAGNOSIS — I25.10 CORONARY ARTERY DISEASE INVOLVING NATIVE CORONARY ARTERY OF NATIVE HEART WITHOUT ANGINA PECTORIS: ICD-10-CM

## 2019-01-01 DIAGNOSIS — N18.9 CHRONIC KIDNEY DISEASE, UNSPECIFIED CKD STAGE: ICD-10-CM

## 2019-01-01 DIAGNOSIS — E11.42 TYPE 2 DIABETES MELLITUS WITH DIABETIC POLYNEUROPATHY, WITH LONG-TERM CURRENT USE OF INSULIN: ICD-10-CM

## 2019-01-01 DIAGNOSIS — D63.1 ANEMIA DUE TO STAGE 3 CHRONIC KIDNEY DISEASE: ICD-10-CM

## 2019-01-01 DIAGNOSIS — Z95.828 S/P FEMORAL-POPLITEAL BYPASS SURGERY: ICD-10-CM

## 2019-01-01 DIAGNOSIS — N28.1 RENAL CYST: ICD-10-CM

## 2019-01-01 DIAGNOSIS — N18.30 CHRONIC KIDNEY DISEASE, STAGE III (MODERATE): ICD-10-CM

## 2019-01-01 DIAGNOSIS — N18.30 CKD (CHRONIC KIDNEY DISEASE), STAGE III: ICD-10-CM

## 2019-01-01 DIAGNOSIS — E86.9 VOLUME DEPLETION: ICD-10-CM

## 2019-01-01 DIAGNOSIS — N18.30 ANEMIA DUE TO STAGE 3 CHRONIC KIDNEY DISEASE: ICD-10-CM

## 2019-01-01 DIAGNOSIS — I73.9 PVD (PERIPHERAL VASCULAR DISEASE): ICD-10-CM

## 2019-01-01 DIAGNOSIS — N18.30 BENIGN HYPERTENSIVE HEART AND KIDNEY DISEASE WITH SYSTOLIC CHF, NYHA CLASS 2 AND CKD STAGE 3: ICD-10-CM

## 2019-01-01 DIAGNOSIS — E78.2 MIXED HYPERLIPIDEMIA: ICD-10-CM

## 2019-01-01 DIAGNOSIS — Z79.4 TYPE 2 DIABETES MELLITUS WITH STAGE 3 CHRONIC KIDNEY DISEASE, WITH LONG-TERM CURRENT USE OF INSULIN: ICD-10-CM

## 2019-01-01 DIAGNOSIS — R11.2 NAUSEA, VOMITING AND DIARRHEA: ICD-10-CM

## 2019-01-01 DIAGNOSIS — I13.0 BENIGN HYPERTENSIVE HEART AND KIDNEY DISEASE WITH SYSTOLIC CHF, NYHA CLASS 2 AND CKD STAGE 3: ICD-10-CM

## 2019-01-01 DIAGNOSIS — I73.9 PAD (PERIPHERAL ARTERY DISEASE): Primary | ICD-10-CM

## 2019-01-01 DIAGNOSIS — Z87.891 HISTORY OF TOBACCO ABUSE: ICD-10-CM

## 2019-01-01 DIAGNOSIS — N17.9 AKI (ACUTE KIDNEY INJURY): Primary | ICD-10-CM

## 2019-01-01 DIAGNOSIS — I95.1 ORTHOSTATIC HYPOTENSION: ICD-10-CM

## 2019-01-01 LAB
ALBUMIN SERPL BCP-MCNC: 3.4 G/DL (ref 3.5–5.2)
ALBUMIN SERPL BCP-MCNC: 3.5 G/DL (ref 3.5–5.2)
ALBUMIN SERPL BCP-MCNC: 3.5 G/DL (ref 3.5–5.2)
ALBUMIN/CREAT UR: 139.5 UG/MG (ref 0–30)
ALP SERPL-CCNC: 120 U/L (ref 55–135)
ALT SERPL W/O P-5'-P-CCNC: 17 U/L (ref 10–44)
ANION GAP SERPL CALC-SCNC: 10 MMOL/L (ref 8–16)
ANION GAP SERPL CALC-SCNC: 11 MMOL/L (ref 8–16)
ANION GAP SERPL CALC-SCNC: 12 MMOL/L (ref 8–16)
ANION GAP SERPL CALC-SCNC: 8 MMOL/L (ref 8–16)
AST SERPL-CCNC: 18 U/L (ref 10–40)
BASOPHILS # BLD AUTO: 0.01 K/UL (ref 0–0.2)
BASOPHILS # BLD AUTO: 0.02 K/UL (ref 0–0.2)
BASOPHILS # BLD AUTO: 0.03 K/UL (ref 0–0.2)
BASOPHILS # BLD AUTO: 0.05 K/UL (ref 0–0.2)
BASOPHILS NFR BLD: 0.1 % (ref 0–1.9)
BASOPHILS NFR BLD: 0.2 % (ref 0–1.9)
BASOPHILS NFR BLD: 0.4 % (ref 0–1.9)
BASOPHILS NFR BLD: 0.6 % (ref 0–1.9)
BILIRUB SERPL-MCNC: 0.8 MG/DL (ref 0.1–1)
BILIRUB UR QL STRIP: NEGATIVE
BUN SERPL-MCNC: 24 MG/DL (ref 8–23)
BUN SERPL-MCNC: 28 MG/DL (ref 8–23)
BUN SERPL-MCNC: 34 MG/DL (ref 8–23)
BUN SERPL-MCNC: 39 MG/DL (ref 8–23)
CALCIUM SERPL-MCNC: 8.5 MG/DL (ref 8.7–10.5)
CALCIUM SERPL-MCNC: 9.1 MG/DL (ref 8.7–10.5)
CALCIUM SERPL-MCNC: 9.2 MG/DL (ref 8.7–10.5)
CALCIUM SERPL-MCNC: 9.2 MG/DL (ref 8.7–10.5)
CHLORIDE SERPL-SCNC: 103 MMOL/L (ref 95–110)
CHLORIDE SERPL-SCNC: 105 MMOL/L (ref 95–110)
CHLORIDE SERPL-SCNC: 106 MMOL/L (ref 95–110)
CHLORIDE SERPL-SCNC: 108 MMOL/L (ref 95–110)
CK SERPL-CCNC: 88 U/L (ref 20–200)
CLARITY UR: CLEAR
CO2 SERPL-SCNC: 22 MMOL/L (ref 23–29)
CO2 SERPL-SCNC: 24 MMOL/L (ref 23–29)
CO2 SERPL-SCNC: 27 MMOL/L (ref 23–29)
CO2 SERPL-SCNC: 30 MMOL/L (ref 23–29)
COLOR UR: YELLOW
CREAT SERPL-MCNC: 2.3 MG/DL (ref 0.5–1.4)
CREAT SERPL-MCNC: 2.4 MG/DL (ref 0.5–1.4)
CREAT SERPL-MCNC: 2.6 MG/DL (ref 0.5–1.4)
CREAT SERPL-MCNC: 3.1 MG/DL (ref 0.5–1.4)
CREAT UR-MCNC: 119 MG/DL (ref 23–375)
DIFFERENTIAL METHOD: ABNORMAL
EOSINOPHIL # BLD AUTO: 0 K/UL (ref 0–0.5)
EOSINOPHIL # BLD AUTO: 0.1 K/UL (ref 0–0.5)
EOSINOPHIL # BLD AUTO: 0.1 K/UL (ref 0–0.5)
EOSINOPHIL # BLD AUTO: 0.2 K/UL (ref 0–0.5)
EOSINOPHIL NFR BLD: 0.4 % (ref 0–8)
EOSINOPHIL NFR BLD: 1.1 % (ref 0–8)
EOSINOPHIL NFR BLD: 1.3 % (ref 0–8)
EOSINOPHIL NFR BLD: 2 % (ref 0–8)
ERYTHROCYTE [DISTWIDTH] IN BLOOD BY AUTOMATED COUNT: 14.6 % (ref 11.5–14.5)
ERYTHROCYTE [DISTWIDTH] IN BLOOD BY AUTOMATED COUNT: 14.6 % (ref 11.5–14.5)
ERYTHROCYTE [DISTWIDTH] IN BLOOD BY AUTOMATED COUNT: 14.8 % (ref 11.5–14.5)
ERYTHROCYTE [DISTWIDTH] IN BLOOD BY AUTOMATED COUNT: 14.8 % (ref 11.5–14.5)
EST. GFR  (AFRICAN AMERICAN): 22 ML/MIN/1.73 M^2
EST. GFR  (AFRICAN AMERICAN): 28 ML/MIN/1.73 M^2
EST. GFR  (AFRICAN AMERICAN): 30 ML/MIN/1.73 M^2
EST. GFR  (AFRICAN AMERICAN): 32 ML/MIN/1.73 M^2
EST. GFR  (NON AFRICAN AMERICAN): 19 ML/MIN/1.73 M^2
EST. GFR  (NON AFRICAN AMERICAN): 24 ML/MIN/1.73 M^2
EST. GFR  (NON AFRICAN AMERICAN): 26 ML/MIN/1.73 M^2
EST. GFR  (NON AFRICAN AMERICAN): 28 ML/MIN/1.73 M^2
GLUCOSE SERPL-MCNC: 118 MG/DL (ref 70–110)
GLUCOSE SERPL-MCNC: 120 MG/DL (ref 70–110)
GLUCOSE SERPL-MCNC: 142 MG/DL (ref 70–110)
GLUCOSE SERPL-MCNC: 222 MG/DL (ref 70–110)
GLUCOSE UR QL STRIP: ABNORMAL
HCT VFR BLD AUTO: 39.1 % (ref 40–54)
HCT VFR BLD AUTO: 39.7 % (ref 40–54)
HCT VFR BLD AUTO: 40.3 % (ref 40–54)
HCT VFR BLD AUTO: 41.5 % (ref 40–54)
HGB BLD-MCNC: 12.4 G/DL (ref 14–18)
HGB BLD-MCNC: 12.8 G/DL (ref 14–18)
HGB BLD-MCNC: 13 G/DL (ref 14–18)
HGB BLD-MCNC: 13.5 G/DL (ref 14–18)
HGB UR QL STRIP: NEGATIVE
INFLUENZA A, MOLECULAR: NEGATIVE
INFLUENZA B, MOLECULAR: NEGATIVE
KETONES UR QL STRIP: NEGATIVE
LEUKOCYTE ESTERASE UR QL STRIP: NEGATIVE
LIPASE SERPL-CCNC: 44 U/L (ref 4–60)
LYMPHOCYTES # BLD AUTO: 1.2 K/UL (ref 1–4.8)
LYMPHOCYTES # BLD AUTO: 1.7 K/UL (ref 1–4.8)
LYMPHOCYTES # BLD AUTO: 1.9 K/UL (ref 1–4.8)
LYMPHOCYTES # BLD AUTO: 2.6 K/UL (ref 1–4.8)
LYMPHOCYTES NFR BLD: 11.3 % (ref 18–48)
LYMPHOCYTES NFR BLD: 19.3 % (ref 18–48)
LYMPHOCYTES NFR BLD: 21.9 % (ref 18–48)
LYMPHOCYTES NFR BLD: 32.3 % (ref 18–48)
MAGNESIUM SERPL-MCNC: 1.9 MG/DL (ref 1.6–2.6)
MAGNESIUM SERPL-MCNC: 2 MG/DL (ref 1.6–2.6)
MCH RBC QN AUTO: 29.8 PG (ref 27–31)
MCH RBC QN AUTO: 30 PG (ref 27–31)
MCH RBC QN AUTO: 30.2 PG (ref 27–31)
MCH RBC QN AUTO: 30.4 PG (ref 27–31)
MCHC RBC AUTO-ENTMCNC: 31.7 G/DL (ref 32–36)
MCHC RBC AUTO-ENTMCNC: 31.8 G/DL (ref 32–36)
MCHC RBC AUTO-ENTMCNC: 32.5 G/DL (ref 32–36)
MCHC RBC AUTO-ENTMCNC: 32.7 G/DL (ref 32–36)
MCV RBC AUTO: 93 FL (ref 82–98)
MCV RBC AUTO: 93 FL (ref 82–98)
MCV RBC AUTO: 94 FL (ref 82–98)
MCV RBC AUTO: 94 FL (ref 82–98)
MICROALBUMIN UR DL<=1MG/L-MCNC: 166 UG/ML
MONOCYTES # BLD AUTO: 0.7 K/UL (ref 0.3–1)
MONOCYTES # BLD AUTO: 0.8 K/UL (ref 0.3–1)
MONOCYTES # BLD AUTO: 0.8 K/UL (ref 0.3–1)
MONOCYTES # BLD AUTO: 0.9 K/UL (ref 0.3–1)
MONOCYTES NFR BLD: 10.3 % (ref 4–15)
MONOCYTES NFR BLD: 8.3 % (ref 4–15)
MONOCYTES NFR BLD: 8.3 % (ref 4–15)
MONOCYTES NFR BLD: 9.7 % (ref 4–15)
NEUTROPHILS # BLD AUTO: 4.3 K/UL (ref 1.8–7.7)
NEUTROPHILS # BLD AUTO: 6 K/UL (ref 1.8–7.7)
NEUTROPHILS # BLD AUTO: 6 K/UL (ref 1.8–7.7)
NEUTROPHILS # BLD AUTO: 8.4 K/UL (ref 1.8–7.7)
NEUTROPHILS NFR BLD: 55 % (ref 38–73)
NEUTROPHILS NFR BLD: 68.5 % (ref 38–73)
NEUTROPHILS NFR BLD: 69.1 % (ref 38–73)
NEUTROPHILS NFR BLD: 79.9 % (ref 38–73)
NITRITE UR QL STRIP: NEGATIVE
PH UR STRIP: 6 [PH] (ref 5–8)
PHOSPHATE SERPL-MCNC: 3.2 MG/DL (ref 2.7–4.5)
PHOSPHATE SERPL-MCNC: 3.9 MG/DL (ref 2.7–4.5)
PLATELET # BLD AUTO: 174 K/UL (ref 150–350)
PLATELET # BLD AUTO: 215 K/UL (ref 150–350)
PLATELET # BLD AUTO: 220 K/UL (ref 150–350)
PLATELET # BLD AUTO: 248 K/UL (ref 150–350)
PMV BLD AUTO: 10.4 FL (ref 9.2–12.9)
PMV BLD AUTO: 10.4 FL (ref 9.2–12.9)
PMV BLD AUTO: 10.5 FL (ref 9.2–12.9)
PMV BLD AUTO: 10.7 FL (ref 9.2–12.9)
POCT GLUCOSE: 118 MG/DL (ref 70–110)
POCT GLUCOSE: 173 MG/DL (ref 70–110)
POTASSIUM SERPL-SCNC: 4 MMOL/L (ref 3.5–5.1)
POTASSIUM SERPL-SCNC: 4 MMOL/L (ref 3.5–5.1)
POTASSIUM SERPL-SCNC: 4.2 MMOL/L (ref 3.5–5.1)
POTASSIUM SERPL-SCNC: 4.3 MMOL/L (ref 3.5–5.1)
PROT SERPL-MCNC: 7.2 G/DL (ref 6–8.4)
PROT UR QL STRIP: NEGATIVE
PTH-INTACT SERPL-MCNC: 152 PG/ML (ref 9–77)
PTH-INTACT SERPL-MCNC: 242.3 PG/ML (ref 9–77)
RBC # BLD AUTO: 4.14 M/UL (ref 4.6–6.2)
RBC # BLD AUTO: 4.28 M/UL (ref 4.6–6.2)
RBC # BLD AUTO: 4.29 M/UL (ref 4.6–6.2)
RBC # BLD AUTO: 4.47 M/UL (ref 4.6–6.2)
SODIUM SERPL-SCNC: 139 MMOL/L (ref 136–145)
SODIUM SERPL-SCNC: 141 MMOL/L (ref 136–145)
SODIUM SERPL-SCNC: 141 MMOL/L (ref 136–145)
SODIUM SERPL-SCNC: 145 MMOL/L (ref 136–145)
SP GR UR STRIP: 1.02 (ref 1–1.03)
SPECIMEN SOURCE: NORMAL
URN SPEC COLLECT METH UR: ABNORMAL
UROBILINOGEN UR STRIP-ACNC: NEGATIVE EU/DL
WBC # BLD AUTO: 10.54 K/UL (ref 3.9–12.7)
WBC # BLD AUTO: 7.93 K/UL (ref 3.9–12.7)
WBC # BLD AUTO: 8.69 K/UL (ref 3.9–12.7)
WBC # BLD AUTO: 8.77 K/UL (ref 3.9–12.7)

## 2019-01-01 PROCEDURE — 81003 URINALYSIS AUTO W/O SCOPE: CPT

## 2019-01-01 PROCEDURE — 85025 COMPLETE CBC W/AUTO DIFF WBC: CPT

## 2019-01-01 PROCEDURE — 80048 BASIC METABOLIC PNL TOTAL CA: CPT

## 2019-01-01 PROCEDURE — 1101F PR PT FALLS ASSESS DOC 0-1 FALLS W/OUT INJ PAST YR: ICD-10-PCS | Mod: CPTII,S$GLB,, | Performed by: INTERNAL MEDICINE

## 2019-01-01 PROCEDURE — 93298 CARDIAC DEVICE CHECK - REMOTE: ICD-10-PCS | Mod: ,,, | Performed by: INTERNAL MEDICINE

## 2019-01-01 PROCEDURE — G0378 HOSPITAL OBSERVATION PER HR: HCPCS

## 2019-01-01 PROCEDURE — 93925 LOWER EXTREMITY STUDY: CPT | Mod: TC

## 2019-01-01 PROCEDURE — 93298 REM INTERROG DEV EVAL SCRMS: CPT | Mod: ,,, | Performed by: INTERNAL MEDICINE

## 2019-01-01 PROCEDURE — 93299 CARDIAC DEVICE CHECK - REMOTE: CPT | Performed by: INTERNAL MEDICINE

## 2019-01-01 PROCEDURE — 82043 UR ALBUMIN QUANTITATIVE: CPT

## 2019-01-01 PROCEDURE — 93299 CARDIAC DEVICE CHECK - REMOTE: CPT

## 2019-01-01 PROCEDURE — 25000003 PHARM REV CODE 250: Performed by: STUDENT IN AN ORGANIZED HEALTH CARE EDUCATION/TRAINING PROGRAM

## 2019-01-01 PROCEDURE — 96361 HYDRATE IV INFUSION ADD-ON: CPT

## 2019-01-01 PROCEDURE — 3078F DIAST BP <80 MM HG: CPT | Mod: CPTII,S$GLB,, | Performed by: INTERNAL MEDICINE

## 2019-01-01 PROCEDURE — 99499 RISK ADDL DX/OHS AUDIT: ICD-10-PCS | Mod: S$GLB,,, | Performed by: INTERNAL MEDICINE

## 2019-01-01 PROCEDURE — 96372 THER/PROPH/DIAG INJ SC/IM: CPT

## 2019-01-01 PROCEDURE — 96374 THER/PROPH/DIAG INJ IV PUSH: CPT

## 2019-01-01 PROCEDURE — 82040 ASSAY OF SERUM ALBUMIN: CPT

## 2019-01-01 PROCEDURE — 93925 LOWER EXTREMITY STUDY: CPT | Mod: 26,,, | Performed by: RADIOLOGY

## 2019-01-01 PROCEDURE — 36415 COLL VENOUS BLD VENIPUNCTURE: CPT

## 2019-01-01 PROCEDURE — 3074F PR MOST RECENT SYSTOLIC BLOOD PRESSURE < 130 MM HG: ICD-10-PCS | Mod: CPTII,S$GLB,, | Performed by: INTERNAL MEDICINE

## 2019-01-01 PROCEDURE — 80053 COMPREHEN METABOLIC PANEL: CPT

## 2019-01-01 PROCEDURE — 99215 PR OFFICE/OUTPT VISIT, EST, LEVL V, 40-54 MIN: ICD-10-PCS | Mod: S$GLB,,, | Performed by: INTERNAL MEDICINE

## 2019-01-01 PROCEDURE — S5571 INSULIN DISPOS PEN 3 ML: HCPCS | Performed by: STUDENT IN AN ORGANIZED HEALTH CARE EDUCATION/TRAINING PROGRAM

## 2019-01-01 PROCEDURE — 3074F SYST BP LT 130 MM HG: CPT | Mod: CPTII,S$GLB,, | Performed by: INTERNAL MEDICINE

## 2019-01-01 PROCEDURE — 87502 INFLUENZA DNA AMP PROBE: CPT

## 2019-01-01 PROCEDURE — 63600175 PHARM REV CODE 636 W HCPCS: Performed by: STUDENT IN AN ORGANIZED HEALTH CARE EDUCATION/TRAINING PROGRAM

## 2019-01-01 PROCEDURE — 1101F PT FALLS ASSESS-DOCD LE1/YR: CPT | Mod: CPTII,S$GLB,, | Performed by: INTERNAL MEDICINE

## 2019-01-01 PROCEDURE — 83690 ASSAY OF LIPASE: CPT

## 2019-01-01 PROCEDURE — 3045F PR MOST RECENT HEMOGLOBIN A1C LEVEL 7.0-9.0%: CPT | Mod: CPTII,S$GLB,, | Performed by: INTERNAL MEDICINE

## 2019-01-01 PROCEDURE — 3045F PR MOST RECENT HEMOGLOBIN A1C LEVEL 7.0-9.0%: ICD-10-PCS | Mod: CPTII,S$GLB,, | Performed by: INTERNAL MEDICINE

## 2019-01-01 PROCEDURE — 83735 ASSAY OF MAGNESIUM: CPT

## 2019-01-01 PROCEDURE — 99999 PR PBB SHADOW E&M-EST. PATIENT-LVL III: ICD-10-PCS | Mod: PBBFAC,,, | Performed by: INTERNAL MEDICINE

## 2019-01-01 PROCEDURE — 63600175 PHARM REV CODE 636 W HCPCS: Performed by: EMERGENCY MEDICINE

## 2019-01-01 PROCEDURE — 99999 PR PBB SHADOW E&M-EST. PATIENT-LVL III: CPT | Mod: PBBFAC,,, | Performed by: INTERNAL MEDICINE

## 2019-01-01 PROCEDURE — 99285 EMERGENCY DEPT VISIT HI MDM: CPT | Mod: 25

## 2019-01-01 PROCEDURE — 99214 OFFICE O/P EST MOD 30 MIN: CPT | Mod: S$GLB,,, | Performed by: INTERNAL MEDICINE

## 2019-01-01 PROCEDURE — G0297 LDCT FOR LUNG CA SCREEN: HCPCS | Mod: TC

## 2019-01-01 PROCEDURE — 82550 ASSAY OF CK (CPK): CPT

## 2019-01-01 PROCEDURE — 83970 ASSAY OF PARATHORMONE: CPT

## 2019-01-01 PROCEDURE — 3078F PR MOST RECENT DIASTOLIC BLOOD PRESSURE < 80 MM HG: ICD-10-PCS | Mod: CPTII,S$GLB,, | Performed by: INTERNAL MEDICINE

## 2019-01-01 PROCEDURE — 99499 UNLISTED E&M SERVICE: CPT | Mod: S$GLB,,, | Performed by: INTERNAL MEDICINE

## 2019-01-01 PROCEDURE — 84100 ASSAY OF PHOSPHORUS: CPT

## 2019-01-01 PROCEDURE — 96372 THER/PROPH/DIAG INJ SC/IM: CPT | Mod: 59

## 2019-01-01 PROCEDURE — G0297 CT CHEST LUNG SCREENING LOW DOSE: ICD-10-PCS | Mod: 26,,, | Performed by: RADIOLOGY

## 2019-01-01 PROCEDURE — 94761 N-INVAS EAR/PLS OXIMETRY MLT: CPT

## 2019-01-01 PROCEDURE — 99215 OFFICE O/P EST HI 40 MIN: CPT | Mod: S$GLB,,, | Performed by: INTERNAL MEDICINE

## 2019-01-01 PROCEDURE — 99214 PR OFFICE/OUTPT VISIT, EST, LEVL IV, 30-39 MIN: ICD-10-PCS | Mod: S$GLB,,, | Performed by: INTERNAL MEDICINE

## 2019-01-01 PROCEDURE — 93925 US LOWER EXTREMITY ARTERIES BILATERAL: ICD-10-PCS | Mod: 26,,, | Performed by: RADIOLOGY

## 2019-01-01 PROCEDURE — 93005 ELECTROCARDIOGRAM TRACING: CPT

## 2019-01-01 PROCEDURE — G0297 LDCT FOR LUNG CA SCREEN: HCPCS | Mod: 26,,, | Performed by: RADIOLOGY

## 2019-01-01 RX ORDER — ONDANSETRON 2 MG/ML
4 INJECTION INTRAMUSCULAR; INTRAVENOUS
Status: COMPLETED | OUTPATIENT
Start: 2019-01-01 | End: 2019-01-01

## 2019-01-01 RX ORDER — DICYCLOMINE HYDROCHLORIDE 10 MG/1
10 CAPSULE ORAL
Qty: 30 CAPSULE | Refills: 0 | Status: SHIPPED | OUTPATIENT
Start: 2019-01-01 | End: 2019-01-01

## 2019-01-01 RX ORDER — DICYCLOMINE HYDROCHLORIDE 20 MG/1
20 TABLET ORAL
Status: DISCONTINUED | OUTPATIENT
Start: 2019-01-01 | End: 2019-01-01 | Stop reason: HOSPADM

## 2019-01-01 RX ORDER — ONDANSETRON 4 MG/1
4 TABLET, ORALLY DISINTEGRATING ORAL
Status: COMPLETED | OUTPATIENT
Start: 2019-01-01 | End: 2019-01-01

## 2019-01-01 RX ORDER — AMIODARONE HYDROCHLORIDE 200 MG/1
200 TABLET ORAL DAILY
Status: DISCONTINUED | OUTPATIENT
Start: 2019-01-01 | End: 2019-01-01 | Stop reason: HOSPADM

## 2019-01-01 RX ORDER — ASPIRIN 81 MG/1
81 TABLET ORAL DAILY
Status: DISCONTINUED | OUTPATIENT
Start: 2019-01-01 | End: 2019-01-01 | Stop reason: HOSPADM

## 2019-01-01 RX ORDER — GLUCAGON 1 MG
1 KIT INJECTION
Status: DISCONTINUED | OUTPATIENT
Start: 2019-01-01 | End: 2019-01-01 | Stop reason: HOSPADM

## 2019-01-01 RX ORDER — IBUPROFEN 200 MG
16 TABLET ORAL
Status: DISCONTINUED | OUTPATIENT
Start: 2019-01-01 | End: 2019-01-01 | Stop reason: HOSPADM

## 2019-01-01 RX ORDER — DEXTROSE 50 % IN WATER (D50W) INTRAVENOUS SYRINGE
12.5
Status: DISCONTINUED | OUTPATIENT
Start: 2019-01-01 | End: 2019-01-01 | Stop reason: HOSPADM

## 2019-01-01 RX ORDER — ATORVASTATIN CALCIUM 20 MG/1
80 TABLET, FILM COATED ORAL DAILY
Status: DISCONTINUED | OUTPATIENT
Start: 2019-01-01 | End: 2019-01-01 | Stop reason: HOSPADM

## 2019-01-01 RX ORDER — CLOPIDOGREL BISULFATE 75 MG/1
TABLET ORAL
Qty: 90 TABLET | Refills: 1 | Status: SHIPPED | OUTPATIENT
Start: 2019-01-01 | End: 2020-01-01

## 2019-01-01 RX ORDER — INSULIN ASPART 100 [IU]/ML
1-10 INJECTION, SOLUTION INTRAVENOUS; SUBCUTANEOUS
Status: DISCONTINUED | OUTPATIENT
Start: 2019-01-01 | End: 2019-01-01 | Stop reason: HOSPADM

## 2019-01-01 RX ORDER — HEPARIN SODIUM 5000 [USP'U]/ML
5000 INJECTION, SOLUTION INTRAVENOUS; SUBCUTANEOUS EVERY 8 HOURS
Status: DISCONTINUED | OUTPATIENT
Start: 2019-01-01 | End: 2019-01-01 | Stop reason: HOSPADM

## 2019-01-01 RX ORDER — ATORVASTATIN CALCIUM 80 MG/1
TABLET, FILM COATED ORAL
Qty: 90 TABLET | Refills: 11 | Status: SHIPPED | OUTPATIENT
Start: 2019-01-01

## 2019-01-01 RX ORDER — CLOPIDOGREL BISULFATE 75 MG/1
75 TABLET ORAL DAILY
Status: DISCONTINUED | OUTPATIENT
Start: 2019-01-01 | End: 2019-01-01 | Stop reason: HOSPADM

## 2019-01-01 RX ORDER — SODIUM CHLORIDE 0.9 % (FLUSH) 0.9 %
10 SYRINGE (ML) INJECTION
Status: DISCONTINUED | OUTPATIENT
Start: 2019-01-01 | End: 2019-01-01 | Stop reason: HOSPADM

## 2019-01-01 RX ORDER — SODIUM CHLORIDE, SODIUM LACTATE, POTASSIUM CHLORIDE, CALCIUM CHLORIDE 600; 310; 30; 20 MG/100ML; MG/100ML; MG/100ML; MG/100ML
INJECTION, SOLUTION INTRAVENOUS ONCE
Status: COMPLETED | OUTPATIENT
Start: 2019-01-01 | End: 2019-01-01

## 2019-01-01 RX ORDER — DEXTROSE 50 % IN WATER (D50W) INTRAVENOUS SYRINGE
25
Status: DISCONTINUED | OUTPATIENT
Start: 2019-01-01 | End: 2019-01-01 | Stop reason: HOSPADM

## 2019-01-01 RX ORDER — ONDANSETRON 4 MG/1
4 TABLET, FILM COATED ORAL EVERY 8 HOURS PRN
Qty: 30 TABLET | Refills: 0 | Status: SHIPPED | OUTPATIENT
Start: 2019-01-01

## 2019-01-01 RX ORDER — METOPROLOL SUCCINATE 25 MG/1
25 TABLET, EXTENDED RELEASE ORAL DAILY
Status: DISCONTINUED | OUTPATIENT
Start: 2019-01-01 | End: 2019-01-01 | Stop reason: HOSPADM

## 2019-01-01 RX ORDER — IBUPROFEN 200 MG
24 TABLET ORAL
Status: DISCONTINUED | OUTPATIENT
Start: 2019-01-01 | End: 2019-01-01 | Stop reason: HOSPADM

## 2019-01-01 RX ADMIN — HEPARIN SODIUM 5000 UNITS: 5000 INJECTION, SOLUTION INTRAVENOUS; SUBCUTANEOUS at 05:08

## 2019-01-01 RX ADMIN — HEPARIN SODIUM 5000 UNITS: 5000 INJECTION, SOLUTION INTRAVENOUS; SUBCUTANEOUS at 09:08

## 2019-01-01 RX ADMIN — ATORVASTATIN CALCIUM 80 MG: 20 TABLET, FILM COATED ORAL at 08:08

## 2019-01-01 RX ADMIN — SODIUM CHLORIDE 1000 ML: 0.9 INJECTION, SOLUTION INTRAVENOUS at 03:08

## 2019-01-01 RX ADMIN — DICYCLOMINE HYDROCHLORIDE 20 MG: 20 TABLET ORAL at 05:08

## 2019-01-01 RX ADMIN — AMIODARONE HYDROCHLORIDE 200 MG: 200 TABLET ORAL at 08:08

## 2019-01-01 RX ADMIN — INSULIN DETEMIR 16 UNITS: 100 INJECTION, SOLUTION SUBCUTANEOUS at 08:08

## 2019-01-01 RX ADMIN — SODIUM CHLORIDE, SODIUM LACTATE, POTASSIUM CHLORIDE, AND CALCIUM CHLORIDE: .6; .31; .03; .02 INJECTION, SOLUTION INTRAVENOUS at 06:08

## 2019-01-01 RX ADMIN — ONDANSETRON 4 MG: 2 INJECTION INTRAMUSCULAR; INTRAVENOUS at 03:08

## 2019-01-01 RX ADMIN — DICYCLOMINE HYDROCHLORIDE 20 MG: 20 TABLET ORAL at 08:08

## 2019-01-01 RX ADMIN — ONDANSETRON 4 MG: 4 TABLET, ORALLY DISINTEGRATING ORAL at 06:08

## 2019-01-01 RX ADMIN — INSULIN ASPART 1 UNITS: 100 INJECTION, SOLUTION INTRAVENOUS; SUBCUTANEOUS at 08:08

## 2019-01-01 RX ADMIN — CLOPIDOGREL BISULFATE 75 MG: 75 TABLET ORAL at 08:08

## 2019-01-01 RX ADMIN — ASPIRIN 81 MG: 81 TABLET, COATED ORAL at 08:08

## 2019-01-04 ENCOUNTER — CLINICAL SUPPORT (OUTPATIENT)
Dept: CARDIOLOGY | Facility: HOSPITAL | Age: 69
End: 2019-01-04
Attending: INTERNAL MEDICINE
Payer: MEDICARE

## 2019-01-04 DIAGNOSIS — Z46.9 FITTING AND ADJUSTMENT OF DEVICE: ICD-10-CM

## 2019-01-04 PROCEDURE — 93299 CARDIAC DEVICE CHECK - REMOTE: CPT

## 2019-01-07 ENCOUNTER — HOSPITAL ENCOUNTER (OUTPATIENT)
Dept: RADIOLOGY | Facility: HOSPITAL | Age: 69
Discharge: HOME | End: 2019-01-07
Attending: INTERNAL MEDICINE
Payer: MEDICARE

## 2019-01-07 ENCOUNTER — HOSPITAL ENCOUNTER (EMERGENCY)
Facility: HOSPITAL | Age: 69
Discharge: HOME | End: 2019-01-07
Attending: EMERGENCY MEDICINE
Payer: MEDICARE

## 2019-01-07 VITALS
BODY MASS INDEX: 37.03 KG/M2 | RESPIRATION RATE: 18 BRPM | TEMPERATURE: 98 F | HEART RATE: 60 BPM | OXYGEN SATURATION: 99 % | SYSTOLIC BLOOD PRESSURE: 166 MMHG | HEIGHT: 69 IN | DIASTOLIC BLOOD PRESSURE: 79 MMHG | WEIGHT: 250 LBS

## 2019-01-07 DIAGNOSIS — H60.502 ACUTE OTITIS EXTERNA OF LEFT EAR, UNSPECIFIED TYPE: Primary | ICD-10-CM

## 2019-01-07 DIAGNOSIS — I73.9 PAD (PERIPHERAL ARTERY DISEASE): ICD-10-CM

## 2019-01-07 LAB — POCT GLUCOSE: 247 MG/DL (ref 70–110)

## 2019-01-07 PROCEDURE — 93925 LOWER EXTREMITY STUDY: CPT | Mod: TC

## 2019-01-07 PROCEDURE — 82962 GLUCOSE BLOOD TEST: CPT

## 2019-01-07 PROCEDURE — 93925 US LOWER EXTREMITY ARTERIES BILATERAL: ICD-10-PCS | Mod: 26,,, | Performed by: RADIOLOGY

## 2019-01-07 PROCEDURE — 93925 LOWER EXTREMITY STUDY: CPT | Mod: 26,,, | Performed by: RADIOLOGY

## 2019-01-07 PROCEDURE — 99283 EMERGENCY DEPT VISIT LOW MDM: CPT

## 2019-01-07 RX ORDER — CIPROFLOXACIN 0.5 MG/.25ML
0.25 SOLUTION/ DROPS AURICULAR (OTIC) 2 TIMES DAILY
Qty: 14 VIAL | Refills: 0 | Status: SHIPPED | OUTPATIENT
Start: 2019-01-07 | End: 2019-01-14

## 2019-01-07 RX ORDER — METOPROLOL SUCCINATE 25 MG/1
25 TABLET, EXTENDED RELEASE ORAL DAILY
Qty: 90 TABLET | Refills: 4 | Status: SHIPPED | OUTPATIENT
Start: 2019-01-07 | End: 2020-01-01

## 2019-01-07 NOTE — ED PROVIDER NOTES
Encounter Date: 1/7/2019       History     Chief Complaint   Patient presents with    Otalgia     L ear pain for the past 3 days, feels drainage     Chau Rodarte 68 y.o. afebrile male with PMH of CVA, HLD, HTN, CKD, DM, CHF, obesity, PVD, CAD on anticoagulant therapy presented to the ED with c/o left ear pain for the past 3 days.  Patient reports that he began with some initial mild left ear discomfort.  He states today the pain has gradually increased and that he does note some yellow drainage from the affected ear.  Patient does report that pain is exacerbated by movement of the ear.  He denies any other symptoms including headache, fever, chills, change in hearing or facial swelling. He has not tried any medications for the symptoms.       The history is provided by the patient.     Review of patient's allergies indicates:   Allergen Reactions    Adhesive tape-silicones Blisters     Past Medical History:   Diagnosis Date    Acute on chronic systolic CHF (congestive heart failure) 11/29/2016    Anticoagulant long-term use     CKD (chronic kidney disease) stage 2, GFR 60-89 ml/min 2/21/2016    Coronary artery disease     Encounter for blood transfusion     HTN (hypertension) 3/3/2014    Hyperlipidemia 3/3/2014    NSTEMI (non-ST elevated myocardial infarction) 11/28/2016    Obesity (BMI 30-39.9) 11/29/2016    PVD (peripheral vascular disease)     Stroke     Type 2 diabetes mellitus with diabetic peripheral angiopathy without gangrene, without long-term current use of insulin 10/24/2013    Type 2 diabetes mellitus with diabetic polyneuropathy, without long-term current use of insulin 11/29/2016     Past Surgical History:   Procedure Laterality Date    ABLATION N/A 2/3/2017    Performed by [facility]den [facility]no [facility] at [facility] CATH LAB    ANGIOGRAM-EXTREMITY- Right LOWER CO2, Diagnostic only, Right CFA access Right 7/14/2017    Performed by Nehal William MD at [facility] CATH LAB    [id number] Left  2017    Performed by [facility]hal [facility] at [facility] OR 2ND FLR    CARDIAC SURGERY      COLONOSCOPY N/A 3/21/2017    Performed by Karissa Peck MD at [facility] ENDO (2ND FLR)    CORONARY ANGIOPLASTY WITH STENT PLACEMENT      DEBRIDEMENT, ULCER, WITH SKIN GRAFT APPLICATION Right 2013    Performed by Aftab [facility]oso D[facility] at [facility] OR    ESOPHAGOGASTRODUODENOSCOPY (EGD) N/A 3/21/2017    Performed by Karissa Peck MD at [facility] ENDO (2ND FLR)    FOOT NERVE GRAFT  2013    HEART CATH-LEFT Left 2016    Performed by Robbie Collazo MD at [facility] CATH LAB    left leg stent      Loop Recorder placement      right leg bypass      TRANSESOPHAGEAL ECHOCARDIOGRAM (LIANE) N/A 2/3/2017    Performed by Cayden Moreno MD at [facility] CATH LAB     Family History   Problem Relation Age of Onset    Diabetes Mother     Glaucoma Mother     Cataracts Mother     Heart disease Father     Blindness Maternal Grandmother     Macular degeneration Neg Hx     Retinal detachment Neg Hx     Strabismus Neg Hx     Amblyopia Neg Hx      Social History     Tobacco Use    Smoking status: Former Smoker     Packs/day: 1.00     Years: 50.00     Pack years: 50.00     Last attempt to quit: 3/1/2011     Years since quittin.8    Smokeless tobacco: Never Used   Substance Use Topics    Alcohol use: No    Drug use: No     Review of Systems   Constitutional: Negative for chills and fever.   HENT: Positive for congestion, ear discharge and ear pain. Negative for postnasal drip, rhinorrhea, sinus pain and sore throat.    Eyes: Negative for visual disturbance.   Respiratory: Negative for cough.    Gastrointestinal: Negative for nausea and vomiting.   Musculoskeletal: Negative for back pain, neck pain and neck stiffness.   Skin: Negative for color change and rash.   Neurological: Negative for weakness and headaches.   Hematological: Does not bruise/bleed easily.       Physical Exam     Initial Vitals [19 1725]   BP Pulse Resp  Temp SpO2   (!) 166/79 60 18 97.7 °F (36.5 °C) 99 %      MAP       --         Physical Exam    Nursing note and vitals reviewed.  Constitutional: Vital signs are normal. He appears well-developed and well-nourished. He is cooperative.  Non-toxic appearance. He does not appear ill. No distress.   HENT:   Head: Normocephalic and atraumatic.   Right Ear: Tympanic membrane normal. No mastoid tenderness. Tympanic membrane is not erythematous.   Left Ear: There is drainage, swelling and tenderness. No foreign bodies. No mastoid tenderness. Tympanic membrane is not perforated and not erythematous.   Nose: Mucosal edema present.   Mouth/Throat: Mucous membranes are not dry. No posterior oropharyngeal edema or posterior oropharyngeal erythema.   Eyes: Conjunctivae and lids are normal.   Neck: Trachea normal and normal range of motion. Neck supple. No stridor present. No tracheal deviation present.   Cardiovascular: Normal rate.   Pulmonary/Chest: No respiratory distress.   Abdominal: Soft. Normal appearance.   Lymphadenopathy:     He has no cervical adenopathy.   Neurological: He is alert and oriented to person, place, and time. GCS eye subscore is 4. GCS verbal subscore is 5. GCS motor subscore is 6.   Skin: Skin is warm, dry and intact. No rash noted.   Psychiatric: He has a normal mood and affect. His speech is normal and behavior is normal. Thought content normal.         ED Course   Procedures  Labs Reviewed   POCT GLUCOSE - Abnormal; Notable for the following components:       Result Value    POCT Glucose 247 (*)     All other components within normal limits          Imaging Results    None     Chau Rodarte 68 y.o. afebrile male with PMH of CVA, HLD, HTN, CKD, DM, CHF, obesity, PVD, CAD on anticoagulant therapy presented to the ED with c/o left ear pain for the past 3 days.  Patient reports that he began with some initial mild left ear discomfort.  He states today the pain has gradually increased and that he does note  some yellow drainage from the affected ear.  Patient does report that pain is exacerbated by movement of the ear.  He denies any other symptoms including headache, fever, chills, change in hearing or facial swelling. He has not tried any medications for the symptoms.  ROS positive for URI symptoms.  Physical exam reveals patient that appears well and nontoxic.  Left ear canal with erythema and exudate; TM is intact. No significant otorrhea, facial swelling, erythema or edema of the mastoid and no trismus.  Oropharynx with mild erythema; no edema or exudate. Lungs clear and free of wheeze. Heart regular rate and rhythm.  FROM of neck, no lymphadenopathy and FROM of all extremities with strength 5/5 bilaterally. Skin free of rash, pallor and diaphoresis.    DDX:  Cerumen impaction, otitis externa, otitis media, mastoiditis, malignant otitis externa    ED management:  Glucose is mildly elevated however patient reports that he just a prior to arrival. No further labs or imaging warranted at this time as patient remains well appearing, afebrile and in no distress at this time.  Symptoms consistent with otitis externa do recognize that patient is diabetic however no significant otorrhea, mastoid tenderness, movement tenderness or signs to suggest malignant otitis externa although cautioned on signs and symptoms to look for and when to return to ED.  We will also place on floor quinolone to cover for any possible Pseudomonas etiology.  Instructed patient on fever and symptom control.      Impression/Plan: The encounter diagnosis was Acute otitis externa of left ear, unspecified type. Discharged with Ciprofloxin. Patient will follow up with Primary.  Patient cautioned on when to return to ED.  Pt. Understands and agrees with current treatment plan                                                 Clinical Impression:   The encounter diagnosis was Acute otitis externa of left ear, unspecified type.                              BARBARA Andrade  01/09/19 1024

## 2019-01-14 ENCOUNTER — OFFICE VISIT (OUTPATIENT)
Dept: CARDIOLOGY | Facility: CLINIC | Age: 69
End: 2019-01-14
Payer: MEDICARE

## 2019-01-14 ENCOUNTER — TELEPHONE (OUTPATIENT)
Dept: INTERNAL MEDICINE | Facility: CLINIC | Age: 69
End: 2019-01-14

## 2019-01-14 VITALS
DIASTOLIC BLOOD PRESSURE: 80 MMHG | SYSTOLIC BLOOD PRESSURE: 140 MMHG | WEIGHT: 265 LBS | BODY MASS INDEX: 39.13 KG/M2 | OXYGEN SATURATION: 100 % | HEART RATE: 57 BPM

## 2019-01-14 DIAGNOSIS — I70.229 CRITICAL LOWER LIMB ISCHEMIA: ICD-10-CM

## 2019-01-14 DIAGNOSIS — I73.9 PAD (PERIPHERAL ARTERY DISEASE): Primary | ICD-10-CM

## 2019-01-14 DIAGNOSIS — I10 ESSENTIAL HYPERTENSION: ICD-10-CM

## 2019-01-14 DIAGNOSIS — Z79.4 TYPE 2 DIABETES MELLITUS WITH DIABETIC PERIPHERAL ANGIOPATHY WITHOUT GANGRENE, WITH LONG-TERM CURRENT USE OF INSULIN: ICD-10-CM

## 2019-01-14 DIAGNOSIS — I48.3 TYPICAL ATRIAL FLUTTER: Chronic | ICD-10-CM

## 2019-01-14 DIAGNOSIS — E78.2 MIXED HYPERLIPIDEMIA: ICD-10-CM

## 2019-01-14 DIAGNOSIS — I70.25 ATHEROSCLEROSIS OF NATIVE ARTERIES OF THE EXTREMITIES WITH ULCERATION: ICD-10-CM

## 2019-01-14 DIAGNOSIS — I25.10 CORONARY ARTERY DISEASE INVOLVING NATIVE CORONARY ARTERY OF NATIVE HEART WITHOUT ANGINA PECTORIS: ICD-10-CM

## 2019-01-14 DIAGNOSIS — I50.42 CHRONIC COMBINED SYSTOLIC AND DIASTOLIC CONGESTIVE HEART FAILURE, NYHA CLASS 1: ICD-10-CM

## 2019-01-14 DIAGNOSIS — N18.30 STAGE 3 CHRONIC KIDNEY DISEASE: ICD-10-CM

## 2019-01-14 DIAGNOSIS — Z95.828 S/P FEMORAL-POPLITEAL BYPASS SURGERY: ICD-10-CM

## 2019-01-14 DIAGNOSIS — E11.51 TYPE 2 DIABETES MELLITUS WITH DIABETIC PERIPHERAL ANGIOPATHY WITHOUT GANGRENE, WITH LONG-TERM CURRENT USE OF INSULIN: ICD-10-CM

## 2019-01-14 DIAGNOSIS — L97.415 DIABETIC ULCER OF RIGHT MIDFOOT ASSOCIATED WITH TYPE 2 DIABETES MELLITUS, WITH MUSCLE INVOLVEMENT WITHOUT EVIDENCE OF NECROSIS: ICD-10-CM

## 2019-01-14 DIAGNOSIS — E11.621 DIABETIC ULCER OF RIGHT MIDFOOT ASSOCIATED WITH TYPE 2 DIABETES MELLITUS, WITH MUSCLE INVOLVEMENT WITHOUT EVIDENCE OF NECROSIS: ICD-10-CM

## 2019-01-14 PROCEDURE — 3045F PR MOST RECENT HEMOGLOBIN A1C LEVEL 7.0-9.0%: CPT | Mod: CPTII,S$GLB,, | Performed by: INTERNAL MEDICINE

## 2019-01-14 PROCEDURE — 99499 UNLISTED E&M SERVICE: CPT | Mod: S$GLB,,, | Performed by: INTERNAL MEDICINE

## 2019-01-14 PROCEDURE — 1101F PR PT FALLS ASSESS DOC 0-1 FALLS W/OUT INJ PAST YR: ICD-10-PCS | Mod: CPTII,S$GLB,, | Performed by: INTERNAL MEDICINE

## 2019-01-14 PROCEDURE — 99999 PR PBB SHADOW E&M-EST. PATIENT-LVL V: ICD-10-PCS | Mod: PBBFAC,,, | Performed by: INTERNAL MEDICINE

## 2019-01-14 PROCEDURE — 3079F DIAST BP 80-89 MM HG: CPT | Mod: CPTII,S$GLB,, | Performed by: INTERNAL MEDICINE

## 2019-01-14 PROCEDURE — 1101F PT FALLS ASSESS-DOCD LE1/YR: CPT | Mod: CPTII,S$GLB,, | Performed by: INTERNAL MEDICINE

## 2019-01-14 PROCEDURE — 3077F SYST BP >= 140 MM HG: CPT | Mod: CPTII,S$GLB,, | Performed by: INTERNAL MEDICINE

## 2019-01-14 PROCEDURE — 99999 PR PBB SHADOW E&M-EST. PATIENT-LVL V: CPT | Mod: PBBFAC,,, | Performed by: INTERNAL MEDICINE

## 2019-01-14 PROCEDURE — 3077F PR MOST RECENT SYSTOLIC BLOOD PRESSURE >= 140 MM HG: ICD-10-PCS | Mod: CPTII,S$GLB,, | Performed by: INTERNAL MEDICINE

## 2019-01-14 PROCEDURE — 99499 RISK ADDL DX/OHS AUDIT: ICD-10-PCS | Mod: S$GLB,,, | Performed by: INTERNAL MEDICINE

## 2019-01-14 PROCEDURE — 3045F PR MOST RECENT HEMOGLOBIN A1C LEVEL 7.0-9.0%: ICD-10-PCS | Mod: CPTII,S$GLB,, | Performed by: INTERNAL MEDICINE

## 2019-01-14 PROCEDURE — 3079F PR MOST RECENT DIASTOLIC BLOOD PRESSURE 80-89 MM HG: ICD-10-PCS | Mod: CPTII,S$GLB,, | Performed by: INTERNAL MEDICINE

## 2019-01-14 PROCEDURE — 99215 OFFICE O/P EST HI 40 MIN: CPT | Mod: S$GLB,,, | Performed by: INTERNAL MEDICINE

## 2019-01-14 PROCEDURE — 99215 PR OFFICE/OUTPT VISIT, EST, LEVL V, 40-54 MIN: ICD-10-PCS | Mod: S$GLB,,, | Performed by: INTERNAL MEDICINE

## 2019-01-14 NOTE — TELEPHONE ENCOUNTER
Had an earache last Saturday, went to ED on 1/7/19 and stated it has not gotten any better. Went to the doctor today and they suggested he talk to you.

## 2019-01-14 NOTE — TELEPHONE ENCOUNTER
----- Message from Enma Bah sent at 1/14/2019  1:02 PM CST -----  Contact: call pt at [phone]  Wanting to let you know that his condition is not any better, he is still having a lot of congestion

## 2019-01-14 NOTE — PATIENT INSTRUCTIONS
Taking Aspirin for Atherosclerosis       Aspirin is a medicine often prescribed to treat atherosclerosis. This condition affects your arteries. These are the blood vessels that carry blood away from your heart. Having atherosclerosis means youre at greater risk of a heart attack or stroke. Aspirin can help prevent these from happening.  How atherosclerosis affects your arteries   A fatty material (plaque) can build up in your arteries. This makes it harder for blood to flow through them. A blood clot can then form on the plaque. This may block the artery, cutting off blood flow. This can cause conditions such as coronary artery disease (CAD) and peripheral arterial disease (PAD):  · CAD occurs when plaque builds up in the coronary artery. This artery supplies the heart with oxygen-rich blood.  · PAD occurs when plaque forms in leg arteries.  The same things that cause CAD and PAD can also cause plaque to form in other arteries in your body, such as those in the brain. When plaque occurs in any of these arteries, it raises your risk of heart attack or stroke.  What aspirin does  Aspirin is a blood-thinner (antiplatelet medicine). It helps keep blood clots from forming. This reduces the risk of blockage. Aspirin can be taken daily if you are at high risk of or have already had a heart attack or stroke. It is also used after a procedure called a stent placement. This is when a tiny wire mesh tube, or stent, is placed in an artery to keep it open. Aspirin helps prevent blood clots from forming on the stent.  Taking aspirin safely  Tell your healthcare provider about any medicines you are taking. This includes:  · All prescription medicines  · Over-the-counter medicines  · Herbs, vitamins, and other supplements  Also mention if you have a history of ulcers or bleeding problems. Ask whether you will need to stop taking aspirin before having surgery or dental work. Always take medicines as directed.  Tips for taking  aspirin  · Have a routine. For example, take aspirin with the same meal each day.  · Dont take more than prescribed. A low dose gives the same benefit as a higher one, with a lower risk of side effects.  · Dont skip doses. Aspirin needs to be taken each day to work well.  · Keep track of what you take. A pillbox with days of the week can help, especially if you take several medicines. Or use a list or chart to keep track.  When to call your healthcare provider  Side effects of aspirin are not usually serious. If you do have problems, changing your dose may help. Call your healthcare provider if you have any of the following:  · Excessive bruising (some bruising is normal)  · Nosebleeds, bleeding gums, or other excessive bleeding  · An upset stomach or stomach pain   Date Last Reviewed: 6/1/2016 © 2000-2017 [employer]. [address] [place] [address]. All rights reserved. This information is not intended as a substitute for professional medical care. Always follow your healthcare professional's instructions.        Heart Disease Education    The heart beats 60 to 100 times per minute, 24 hours a day. This equals almost 1000,000 times a day. It pumps blood with oxygen and nutrients to the tissues and organs of the body. But the heart is a muscle and needs its own supply of blood. Blood flow to the heart is supplied by the coronary arteries. Coronary artery disease (atherosclerosis) is a result of cholesterol, saturated fat, and calcium deposits (plaques) that build up inside the walls. This causes inflammation within the coronary arteries. These plaques narrow the artery and reduce blood flow to the heart muscle. The reduction in blood flow to the heart muscle decreases oxygen supply to the heart. If the narrowing is significant enough, the oxygen supply to one or more regions of the heart can be temporarily or permanently shut down. This can cause chest pain, and possibly death of heart  tissue (heart attack).  Types of chest pain  Angina is the name for pain in the heart muscle. Angina is a warning sign of serious heart disease. When untreated it can lead to a heart attack, also known as acute myocardial infarction, or AMI. Angina occurs when there is not enough blood and oxygen flowing to the heart for the amount of work it is doing. This most often happens during physical exertion, when the heart is working hardest. It is usually relieved by rest or nitroglycerin. Angina may also occur after a large meal when extra blood is sent to the digestive organs and less goes to the heart. In the case of advanced or unstable heart disease, angina can occur at rest or awaken you from sleep. Angina usually lasts from a few minutes up to 20 minutes or more. When treated early, the effects of angina can be reversed without permanent damage to the heart. Angina is a serious condition and needs to be evaluated by a medical professional immediately.  There are two types of angina -- stable and unstable:  · Stable angina usually occurs with a predictable level of activity. Being stable, its character, severity, and occurrence do not change much over time. It usually starts with activity, and resolves with rest or taking your medicine as instructed by your doctor. The symptoms usually do not last long.  · Unstable angina changes or gets worse over time. It is different from whatever you are used to. It may feel different or worse, begin without cause, occur with exercise or exertion, wake you up from sleep, and last longer. It may not respond in the same way as it does when you take your usual medicines for an attack. This type of angina can be a warning sign of an impending heart attack.     A heart attack is usually the result of a blood clot that suddenly forms in a coronary artery that has been narrowed with plaque. When this occurs, blood flow may be cut off to a part of the heart muscle, causing the cells to  "die. This weakens the pumping action of the heart, which affects the delivery of blood to all the other organs in the body including the brain. This damage is not reversible. However, early treatment can limit the amount of damage.  The pain you feel with angina and a heart attack may have a similar quality. However, it is usually different in intensity and duration. Here are some typical descriptions of a heart attack:  · It is most often experienced as a squeezing, crushing, pressure-like sensation in the center of the chest.  · It is sometimes described as something heavy sitting on my chest.  · It may feel more like a bad case of indigestion.  · The pain may spread from the chest to the arm, shoulder, throat or jaw.  · Sometimes the pain is not felt in the chest at all, but only in the arm, shoulder, throat or jaw.  · There may also be nausea, vomiting, dizziness or light-headedness, sweating and trouble breathing.  · Palpitations, or your heart beating rapidly  · A new, irregular heart beat  · Unexplained weakness  You may not be able to tell the difference between "bad" angina and a heart attack at home. Seek help if your symptoms are different than usual. Do not be in denial or just try to "tough it out."  Call 911  This is the fastest and safest way to get to the emergency department. The paramedics can also start treatment on the way to the hospital, saving valuable time for your heart.  · If the angina gets worse, if it continues, or if it stops and returns, call 911 immediately. Do not delay. You may be having a heart attack.  · After you call 911, take a second tablet or spray unless instructed otherwise. When repeating doses, sit down if possible, because it can make you feel lightheaded or dizzy. Wait another 5 minutes. If the angina still does not go away, take a third tablet or spray. Do not take more than 3 tablets or sprays within 15 minutes. Stay on the phone with 911 for further " instruction.  · Your healthcare provider may give you slightly different instructions than those above. If so, follow them carefully.  Do not wait until symptoms become severe to call 911.  Other reasons to call 911 include:  · Trouble breathing  · Feeling lightheaded, faint, or dizzy  · Rapid heart beat  · Slower than usual heart rate compared to your normal  · Angina with weakness, dizziness, fainting, heavy sweating, nausea, or vomiting  · Extreme drowsiness, confusion  · Weakness of an arm or leg or one side of the face  · Difficulty with speech or vision  When to seek medical care  Remember, the signs and symptoms of a heart attack are not always like they are on TV. Sometimes they are not so obvious. You may only feel weak, or just not right. If it is not clear or if you have any doubt, call for advice.  · Seek help if there is a change in the type of pain, if it feels different, or if your symptoms are mild.  · Do not drive yourself. Have someone else drive you. If no one can drive, call 911.  · Do not delay. Fast diagnosis and treatment can prevent or limit the amount of heart damage during a heart attack.  · Do not go to your doctor's office or a clinic as they may not be able to provide all the testing and treatment required for this condition.  · If your doctor has given you medicine to take when symptoms occur, take them but don't delay getting help trying to locate medicines.  What happens in the emergency department  The emergency department is connected to your local emergency medical system (EMS) through 911. That's why during a cardiac emergency, calling 911 is the fastest way to get help. The goal of the emergency department is to rapidly screen, evaluate, and treat people.  Once you are there, an electrocardiogram (ECG or heart tracing) will be done. Blood samples may be taken to look for the presence of heart enzymes that leak from damaged heart cells and show if a heart attack is occurring. You  "will often be evaluated by a heart specialist (cardiologist) who decides the best course of action. In the case of severe angina or early heart attack, and depending on the circumstances, powerful "clot busting" medicines can be used to dissolve blood clots in the coronary artery. In other cases, you may be taken to a cardiac catheterization lab. Here, a tiny balloon-tipped catheter is advanced through blood vessels to the heart. There the balloon is inflated pushing open the blood vessel restoring blood flow.  Risk factors for heart disease  Risk factors for heart disease are a combination of genetic and lifestyle. Many risk factors work by either directly or indirectly damaging the blood vessels of the heart, or by increasing the risk of forming blood or cholesterol clots, which then clog up and block the arteries.     Examples of physical lifestyle risk factors:  · Cigarette smoking  · High blood pressure  · High blood cholesterol  · Use of stimulant drugs such as cocaine, crack, and amphetamines  · Eating a high-fat, high-cholesterol meal  · Diabetes   · Obesity which increases risk for diabetes and high blood pressure  · Lack of regular physical activity     Examples of emotional lifestyle factors:  · Chronic high stress levels release stress hormones. These raise blood pressure and cholesterol level and makes blood clot more easily.  · Held-in anger, hostile or cynical attitude  · Social and emotional isolation, lack of intimacy  · Loss of relationship  · Depression  Other factors that increase the risk of heart attack that you cannot control :  · Age. The older you get beyond 40, the greater is your risk of significant coronary artery disease.  · Gender. More men than women get heart disease; but once past menopause, women who are not taking estrogen replacement have the same risk as men for a heart attack.  · Family history. If your mother, father, brother or sister has coronary artery disease, your risk " of having it is higher than a person your age without this family history.  What can you do to decrease your risk  To reduce your risk of heart disease:  · Get regular checkups with your doctor.  · Take your medicines for blood pressure, cholesterol or diabetes as directed.  · Watch your diet. Eat a heart healthy diet choosing fresh foods, less salt, cholesterol, and fat  · Stop smoking. Get help if needed.  · Get regular exercise.  · Manage stress.  · Carry a list of medicines and doses in your wallet.  Date Last Reviewed: 12/30/2015  © 8696-3514 [employer]. [address] [place] [id number]. All rights reserved. This information is not intended as a substitute for professional medical care. Always follow your healthcare professional's instructions.

## 2019-01-14 NOTE — PROGRESS NOTES
Subjective:   Patient ID:  Chau Rodarte is a 68 y.o. male who presents for evaluation and treatment of Coronary Artery Disease; Hyperlipidemia; Hypertension; Peripheral Arterial Disease; Congestive Heart Failure; atrial flutter s/p RFA; and Obesity      HPI:       Chau Rodarte 68 y.o. male is here follow up and feeling well without any new complaints. He has CAD s/p multivessel PCI, PAD, obesity with BMI 39, HTN, HLP, DM, ICM with EF 45%, PAF with embolic CVA 2006, s/p CTI RFA 2/2017, and ILR for afib surveillance. No claudication. He has R plantar 1st MT wound after podiatry shaved a callus. It has improved per patient and wife.       9/2017: He is here by recommendation of Dr. Bland (podiatry) for follow up after revascularization for R foot CLI. His R foot is improving. The wounds on the R heel and great toe have healed. The wound on the R first MT joint is healing.  He is taking antibiotics. He is on aspirin and plavix for DAPT.         He has an extensive history of PAD. S/p L pSFA PTAS with 8.0 x 40 mm Lifestent post dilated with 7 mm balloon (5/2012), S/p R fem-pop 2012 at [facility] with 6 mm PTFE graft (Occluded on US and Angio in 2017), and S/p L fem-bk POP bypass-V 6/2017. His L heel wound healed. He was not an ideal surgical candidate for another bypass on the R leg. He had revascularization of R SFA and POP  with re-establishment of flow to his foot. This was a complex IVUS guided intervention that required dual L retrograde cross over CFA and R AT retrograde accesses. Reverse CART was performed in the popliteal artery with a 5.0 x 100 mm balloon and retrograde Astato 30 wire. Thereafter he had multiple stents. Surveillance ultrasounds on 10/8/2017, 10/30/2017, and 12/5/2017 revealed patent SFA/POP/AT and L fem-pop bypass.          PTAS prox & ostial AT with 3.0 x 38 and 4.0 x 38 mm Resolute SOULEYMANE.    PTAS of popliteal with 5.5 x 100 mm Supera stent.     PTAS of dSFA with 6.0 x 150 Supera     PTAS of  mSFA with 7 x 100 Zilver,    PTAS prox SFA with 80 x 100 mm Zilver    PTAS ostial SFA with 8 x 40 mm Zilver.     2 vessel run off via AT and PER  with filling of plantar arch.          US 1/2019     Diameters in mm     CFA 10    SFA 7.0   POP 6.0   BTK 3.0   FEM-POP 6           R CFA 53, pSFA 77, mSFA 0, dSFA 10, pPOP 16, dPOP 47, PT 0, AT 34/33/14, DP 34  L CFA 72, entire SFA 0, POP 28/246/97/134, PT 10/13/0, AT 35/33/27, DP 26   Findings consistent with bilateral SFA  with 1 vessel r/o via AT   Patent L fem-pop      10/2018: cr 2.3 with gfr 38.          Care team:    Baldo-pop  Smith-vas sx  Dvorin-pcp  Josh/Rich-card        Patient Active Problem List    Diagnosis Date Noted    Critical lower limb ischemia 09/15/2017     Priority: High    Diabetic ulcer of right midfoot associated with type 2 diabetes mellitus, with muscle involvement without evidence of necrosis 09/07/2017     Priority: High     Added automatically from request for surgery [id number]      Long term current use of amiodarone     Healed ulcer of right foot on examination     Diabetic polyneuropathy associated with type 2 diabetes mellitus     Osteomyelitis, chronic     Decubitus ulcer, heel, right, unstageable     Acute osteomyelitis of toe, right     Diabetic ulcer of toe of right foot associated with type 2 diabetes mellitus, with necrosis of bone     Nonhealing surgical wound     Diabetic ulcer of right heel associated with type 1 diabetes mellitus 09/07/2017    Diabetic ulcer of heel associated with type 2 diabetes mellitus     PAD (peripheral artery disease)          S/p L pSFA PTAS with 8.0 x 40 mm Lifestent    Post dilated with 7 mm balloon    S/p R fem-pop 2012 at [facility]   6 mm PTFE graft   Occluded on US and Angio in 2017       S/p L fem-bk POP bypass-GSV 6/2017      Selective R leg angiogram 9/7/2017     R CFA patent   R SFA and POP    AT and PER reconstitution proximally   DP is the only vessel providing flow to  plantar arch      S/p peripheral intervention 9/15/2017       RCART with 5.0 x 100 mm and retrograde Astato 30 wire.     PTAS of AT with 3.0 x 38 and 4.0 x 38 mm Resolute SOULEYMANE.    PTAS of popliteal with 5.5 x 100 mm Supera stent.     PTAS of dSFA with 6.0 x 150 Supera     PTAS of mSFA with 7 x 100 Zilver,    PTAS prox SFA with 80 x 100 mm Zilver    PTAS ostial SFA with 8 x 40 mm Zilver.     2 vessel run off via AT and PER  with filling of plantar arch.      US 1/2019     Diameters in mm     CFA 10    SFA 7.0   POP 6.0   BTK 3.0   FEM-POP 6           R CFA 53, pSFA 77, mSFA 0, dSFA 10, pPOP 16, dPOP 47, PT 0, AT 34/33/14, DP 34  L CFA 72, entire SFA 0, POP 28/246/97/134, PT 10/13/0, AT 35/33/27, DP 26  Findings consistent with bilateral SFA  with 1 vessel r/o via AT  Patent L fem-pop          Diabetic foot ulcer with osteomyelitis 06/23/2017    BPH (benign prostatic hypertrophy) 06/23/2017    Debility 06/19/2017    Preop examination     Atherosclerosis of native arteries of the extremities with ulceration 04/26/2017    Paroxysmal atrial fibrillation 03/24/2017    Lower GI bleed 03/20/2017    Type 2 diabetes mellitus with peripheral circulatory disorder 03/20/2017    Acute blood loss anemia 03/20/2017    Typical atrial flutter, s/p ablation 01/26/2017    Cryptogenic stroke, remote history (2006) 01/26/2017    Coronary artery disease involving native coronary artery of native heart without angina pectoris 12/20/2016         S/p PCI     Distal LM/LAD/LCX with DK crush   4.5 X 18 BMS from LM-LAD and 4 x 15 SOULEYMANE LM-LCX   LAD 4 x 18 SOULEYMANE    RCA        Multi-vessel PCI    Turned down by CTS        EF 40% post PCI      Ischemic cardiomyopathy 12/20/2016    Cardiomegaly 12/05/2016    Type 2 diabetes mellitus with diabetic polyneuropathy, with long-term current use of insulin 11/29/2016    Chronic systolic heart failure 11/29/2016    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, NYHA class  2 and CKD stage 3 2016    Essential hypertension 2014    Diabetic foot ulcer 2013    HLD (hyperlipidemia) 10/24/2013    S/P femoral-popliteal bypass surgery 10/24/2013         R fem-pop in       L fem-pop in                    Right Arm BP - Sittin/80  Left Arm BP - Sittin/80        LABS    LAST HbA1c  Lab Results   Component Value Date    HGBA1C 7.1 (H) 10/04/2018       Lipid panel  Lab Results   Component Value Date    CHOL 115 (L) 2018    CHOL 110 (L) 2017    CHOL 113 (L) 2016     Lab Results   Component Value Date    HDL 28 (L) 2018    HDL 25 (L) 2017    HDL 31 (L) 2016     Lab Results   Component Value Date    LDLCALC 75.2 2018    LDLCALC 72.2 2017    LDLCALC 72.0 2016     Lab Results   Component Value Date    TRIG 59 2018    TRIG 64 2017    TRIG 50 2016     Lab Results   Component Value Date    CHOLHDL 24.3 2018    CHOLHDL 22.7 2017    CHOLHDL 27.4 2016            Review of Systems   Constitution: Negative for diaphoresis, weakness, night sweats, weight gain and weight loss.   HENT: Negative for congestion.    Eyes: Negative for blurred vision, discharge and double vision.   Cardiovascular: Negative for chest pain, claudication, cyanosis, dyspnea on exertion, irregular heartbeat, leg swelling, near-syncope, orthopnea, palpitations, paroxysmal nocturnal dyspnea and syncope.   Respiratory: Negative for cough, shortness of breath and wheezing.    Endocrine: Negative for cold intolerance, heat intolerance and polyphagia.   Hematologic/Lymphatic: Negative for adenopathy and bleeding problem. Does not bruise/bleed easily.   Skin: Positive for poor wound healing. Negative for dry skin and nail changes.   Musculoskeletal: Negative for arthritis, back pain, falls, joint pain, myalgias and neck pain.   Gastrointestinal: Negative for bloating, abdominal pain, change in bowel habit and  constipation.   Genitourinary: Negative for bladder incontinence, dysuria, flank pain, genital sores and missed menses.   Neurological: Negative for aphonia, brief paralysis, difficulty with concentration and dizziness.   Psychiatric/Behavioral: Negative for altered mental status and memory loss. The patient does not have insomnia.    Allergic/Immunologic: Negative for environmental allergies.       Objective:   Physical Exam   Constitutional: He is oriented to person, place, and time. He appears well-developed and well-nourished. He is not intubated.   HENT:   Head: Normocephalic and atraumatic.   Right Ear: External ear normal.   Left Ear: External ear normal.   Mouth/Throat: Oropharynx is clear and moist.   Eyes: Conjunctivae and EOM are normal. Pupils are equal, round, and reactive to light. Right eye exhibits no discharge. Left eye exhibits no discharge. No scleral icterus.   Neck: Normal range of motion. Neck supple. Normal carotid pulses, no hepatojugular reflux and no JVD present. Carotid bruit is not present. No tracheal deviation present. No thyromegaly present.   Cardiovascular: Normal rate, regular rhythm, S1 normal and S2 normal.  No extrasystoles are present. PMI is not displaced. Exam reveals no gallop, no S3, no distant heart sounds, no friction rub and no midsystolic click.   Murmur heard.   Systolic murmur is present with a grade of 2/6 at the upper right sternal border.  Pulses:       Carotid pulses are 2+ on the right side, and 2+ on the left side.       Radial pulses are 2+ on the right side, and 2+ on the left side.        Femoral pulses are 2+ on the right side, and 2+ on the left side.       Popliteal pulses are 2+ on the right side, and 2+ on the left side.        Dorsalis pedis pulses are 1+ on the right side, and 0 on the left side.        Posterior tibial pulses are 0 on the right side, and 0 on the left side.       Biphasic R POP with biphasic R DP and faint R PT doppler  signals      Biphasic L POP with biphasic L DP and monophasic L PT doppler signals        Pulmonary/Chest: Effort normal and breath sounds normal. No accessory muscle usage or stridor. No apnea, no tachypnea and no bradypnea. He is not intubated. No respiratory distress. He has no decreased breath sounds. He has no wheezes. He has no rales. He exhibits no tenderness and no bony tenderness.   Abdominal: He exhibits no distension, no pulsatile liver, no abdominal bruit, no ascites, no pulsatile midline mass and no mass. There is no tenderness. There is no rebound and no guarding.   Musculoskeletal: Normal range of motion. He exhibits no edema or tenderness.   Lymphadenopathy:     He has no cervical adenopathy.   Neurological: He is alert and oriented to person, place, and time. He has normal reflexes. No cranial nerve deficit. Coordination normal.   Skin: Skin is warm. No rash noted. No erythema. No pallor.     Small ulcer plantar surface R 1st MT joint-s/p callus shaving       No foul smell      No drainage              Psychiatric: He has a normal mood and affect. His behavior is normal. Judgment and thought content normal.     8/28/2017              10/15/2017    Left heel       Right heel      R lateral great toe     right great toe      11/13/2017 12/12/2017 1/14/2019                    Assessment:     1. PAD (peripheral artery disease)    2. Typical atrial flutter, s/p ablation    3. Critical lower limb ischemia    4. Mixed hyperlipidemia    5. S/P femoral-popliteal bypass surgery    6. Diabetic ulcer of right midfoot associated with type 2 diabetes mellitus, with muscle involvement without evidence of necrosis    7. Coronary artery disease involving native coronary artery of native heart without angina pectoris    8. Chronic combined systolic and diastolic congestive heart failure, NYHA class 1    9. Essential hypertension    10. Atherosclerosis of native arteries of the extremities with  ulceration    11. Stage 3 chronic kidney disease    12. Type 2 diabetes mellitus with diabetic peripheral angiopathy without gangrene, with long-term current use of insulin        Plan:           Reviewed his arterial ultrasound  He has R SFA ISR  with 1 vessel run off via AT      Will obtain TcPO2  If it is < 30 mmHg and wound not healing he will have R SFA revascularization  Revascularization will be done with  CO2 in view of CKD       We will have a multispecialty discussion with podiatry  Continue with wound care        Risk factors modification   Weight loss   HTN, HLP, DM II control       Enroll in HTN clinic+ weight loss + low salt diet   Resume care with nephrology + endocrinology  Echo to assess EF          Continue with current medical plan and lifestyle changes.  Return sooner for concerns or questions. If symptoms persist go to the ED  I have reviewed all pertinent data on this patient       I have reviewed the patient's medical history in detail and updated the computerized patient record.    Orders Placed This Encounter   Procedures    Ambulatory consult to Wound Clinic     Referral Priority:   Routine     Referral Type:   Consultation     Referral Reason:   Specialty Services Required     Requested Specialty:   Wound Care     Number of Visits Requested:   1    Ambulatory consult to Nephrology     Referral Priority:   Routine     Referral Type:   Consultation     Referral Reason:   Specialty Services Required     Referred to Provider:   Katya Ramirez MD     Requested Specialty:   Nephrology     Number of Visits Requested:   1    Ambulatory consult to Endocrinology     Referral Priority:   Routine     Referral Type:   Consultation     Requested Specialty:   Endocrinology     Number of Visits Requested:   1    Hypertension Digital Medicine (HDMP) Enrollment Order     I. PURPOSE  To provide [facility] patients with innovative, specialized blood pressure monitoring and optimal dosing  of antihypertensive therapy to improve health outcomes and decrease microvascular and macrovascular complications    II. GOALS  To maintain a systematic, coordinated and cost-effective process to monitor blood pressure in patients with hypertension  To attain and maintain optimal antihypertensive therapy while ensuring patient safety using the most recent evidence-based guidelines for the management of hypertension as reported by AHA/ACC in 2017.   Provide consultative services to providers, patients and caregivers regarding optimal antihypertensive therapy  To improve patient/caregiver understanding and compliance related to antihypertensive therapies by providing continuous patient and caregiver education about their prescribed medications and associated disease state  To provide education, guidance and reinforcement regarding lifestyle modifications including weight loss, adopting and maintaining the Dietary Approaches to Stop Hypertension or DASH diet, sodium restriction, physical activity, and moderation of alcohol consumption to patients and caregivers  Allow providers increased availability of clinic time for direct patient care   To provide patient care and education within an interdisciplinary framework and enhance partnering with other members of health care team  Improve continuity of care for high blood pressure patients and improve patient engagement  Collect and utilize pharmacy related outcomes to improve quality of patient care    III. COLLABORATIVE PRACTICE AGREEMENT    A.  Under this collaborative practice agreement, an [facility] pharmacist according to and in compliance with [place] Board of Pharmacy Title 46, Part LIII, section 523 and [place] Board of Medical Examiners definition of the Collaborative Drug Therapy Management (CDTM) may initiate, implement, alter and monitor a therapeutic drug plan intended to manage antihypertensive therapy.  Services offered by the hypertension pharmacy specialist  may include education on disease state and lifestyle modification, in addition to the drug therapy services listed above. Written and audio/visual educational materials and patient specific information may be provided to improve quality of care.    B. Primary Collaborating Physician:    Primary Physician: Kashif Toure M.D., [facility], SARAH  , Cardiology  License number: [id number]  CDTM number:  [id number]  Telephone number: [phone]   E-mail address: [email or web address]  Emergency Contact Information: [phone]    Back-Up Physician:  Jesus Adames M.D.  Cardiology  License number:  [id number]  CDTM number:  [id number]  Telephone number:  [phone]  E-mail address: [email or web address]  Emergency Contact Information: [phone]    C.  Pharmacists:   Each of the following pharmacists will serve as the primary pharmacist on CDTM and any pharmacist may serve as the secondary pharmacist for another pharmacists agreement.  Each of the pharmacists listed below has a Pharm.D degree, with completion of an accredited pharmacy practice residency and as well as experience with managing patients along with a physician.  The outpatient monitoring service will be provided under the Cardiology Department at [facility] location in [place] at [address] [place] [address]. The pharmacist will contact patients via telephone from a remote location.    Pharmacist: Stephany Aguirre, GarlandD  This pharmacist has completed a pharmacy practice residency and has practiced clinical pharmacy for 7 years. She has experience in the areas of cardiology, acute care, and managed care. She started a pharmacist run hypertension clinic at [facility] during her residency and was published in The American Journal of Health-System Pharmacists.     [place] Pharmacist License Number: [id number]  CDTM number:  [id number]  Contact Number:  [phone]  Contact E-mail:  [email or web address]  Emergency Contact Number:  [phone]    Pharmacist:  Brooklyn Jones, PharmD  This pharmacist has completed a pharmacy practice residency and has practiced clinical pharmacy for 17 years in the areas of cardiology, geriatric medicine, anticoagulation management, retail and ambulatory care.  She served as a pharmacy practice clinical preceptor and lead pharmacist in anticoagulation clinic for 10 years.  [place] Pharmacist License # [id number]  CDTM number:  [id number]  Contact Number:  [phone]  Contact E-mail:  [email or web address]   Emergency Contact Number:  [phone]    Pharmacist: Ariana Garcia PharmD, BCACP, CDE  This pharmacist has completed a pharmacy practice residency with emphasis in ambulatory care and has practiced clinical pharmacy for 8 years in the areas of dyslipidemia, hypertension, diabetes, and anticoagulation. She is also a Certified Diabetes Educator.      [place] Pharmacist License # [id number]  CDTM number: [id number]  Contact number: [phone]  Contact E-mail:  [email or web address]  Emergency Contact Number: [phone]    Pharmacist: Maricel Villaseñor PharmD  This pharmacist started practicing at [facility] in 2011 following her one year residency at [facility]. She serves as an Adjunct Clinical  in the College of Pharmacy at [employer]. She served as the lead pharmacist for the Coumadin Clinic team for 4 years.   [place] Pharmacist License: [id number]  CDTM number: [id number]  Contact number: [phone]  Contact E-mail: [email or web address]   Emergency Contact Number: [phone]    Pharmacist:  Kasia Llamas PharmD  This pharmacist has completed a pharmacy practice residency (PGY-1) and has practiced clinical pharmacy for 16  years in the areas of heart and abdominal transplant, retail and ambulatory care.  She was directly involved in the care of congestive heart failure, left  ventricular assist device and heart transplant patients from [id number].  She is currently practicing as a digital medicine clinical specialist in heart failure.    [place] Pharmacist License # [id number]  CDTM number:  [id number]  Contact Number:  [phone]  Contact E-mail:  [email or web address]   Emergency Contact Number:  [phone]    Pharmacist: Maricel Navas PharmD  This pharmacist obtained her Pharm.D. from [employer], and has completed an Ambulatory Care Pharmacy Practice residency at [facility]. She has practiced clinical pharmacy for 9  years and has experience in the areas of Hypertension and Diabetes.      [place] Pharmacist License: [id number]  CDTM Number: [id number]  Telephone number: [phone]  E-mail: [email or web address]  Emergency Contact Number: [phone]      Pharmacist: Anay Nichols PharmD  This pharmacist has completed a pharmacy practice residency with an emphasis in ambulatory care and has practiced clinical pharmacy for one year. She has experience in the areas of diabetes, hypertension and dyslipidemia.   [place] Pharmacist License: [id number]  CDTM Number: [id number]  Contact Number: [phone]  Contact Email: [email or web address]   Emergency Contact: [phone]    Pharmacist: Garland GayD, BCPS  This pharmacist has completed a pharmacy practice residency with an emphasis in ambulatory care and is a Board Certified Pharmacotherapy Specialist (BCPS). She has practiced clinical pharmacy for  years in areas of ambulatory care, internal medicine, cardiology and specialty pharmacy.    [place] Pharmacist License Number: [id number]  CDTM number:  [id number]  Contact Number:  [phone]  Contact E-mail:  [email or web address]   Emergency Contact Number:  [phone]    D.  Eligible Patients  Patients whose antihypertensive therapy is managed under this agreement must have a diagnosis of  hypertension as documented in the patient record, and have established care with a provider within [facility]. All aspects of the patients hypertension medication management will be followed in collaboration with the physician treating the patients hypertension.  The patient will be seen by his or her physician per their discretion or by the recommendation by the hypertension pharmacist.  The patients case may be reviewed with clinic medical personnel as needed, but will be reported at least every 30 days to the collaborating physician regarding the patients drug therapy management. All decisions made by the pharmacist will be recorded in [facility] and are readily available for physician review. All issues outside of the scope of antihypertensive therapy shall be reported to the collaborating physician.     E.  Medications in CDTM  This collaborative practice proposal involves the management of patients who are receiving antihypertensive therapy, specifically, thiazide-type diuretics, angiotensin-converting enzyme inhibtors (ACE-I), angiotensin receptor blockers (ARB), calcium channel blockers (CCB), beta-blockers, loop diuretics, potassium-sparing diuretics, potassium supplementation, aldosterone antagonists, direct renin inhibitors, alpha-1 receptor blockers, central alpha-2 agonists, direct arterial vasodilators and peripheral adrenergic antoagonists, or those patients in which hypertension is controlled by lifestyle modifcation alone.      F.  Clinical Procedure  All patients will be notified that a hypertension CDTM exists.  A signed physician order will be required for each patient to be enrolled into the hypertension CDTM.  Informed consent and an electronic signature will be obtained from each patient and documented in the patients record.  This patient signature will be valid for 1 year, requiring a new physician order and patient consent yearly.  The patient must also be enrolled in the  electornic communication program ([facility]) to be elegible for the monitoring program.  The following management plan will be utilized for CDTM:    Patients must submit blood pressures at a minimum of once weekly from the patient- purchased wireless blood pressure cuff. Patients who fail to comply with this requirement are subject to removal from [facility].   Evaluate the change in blood pressure, if any, from previous measurements performed on the same cuff and arm.  Determine and document contributing factors for blood pressure change.    Review initial drug therapy made by clinic physician upon program enrollment with patient to ensure compliance.    Identify target blood pressure based on age and comorbidities. Therapeutic changes will be made in collaboration with PharmD and collaborating physician based on the AHA/ACC 2017 guidelines for the treatment of hypertension.  Guide drug optimization based on patient response at 2-4 week intervals until control is achieved.  The PharmD will continue to monitor blood pressure and make adjustments to hypertension medications in order to achieve optimal blood pressure.   Order follow up labs when changing or adding ACE inhibitors and diuretics.    Refer to hypertension specialist if  blood pressure goals cannot be achieved using the noted algorithm.  Notify physician with specific problems or request clinic visit if deemed necessary.  Document antihypertensive therapy changes, interventions, and outcomes in EMR.   Provide patient/caregiver continuous education or reinforcement regarding hypertension management,  medications and lifestyle modifications.    Laboratory Tests  Pharmacists will be authorized to order and evaluate laboratory tests directly related to the disease specific drug therapy being managed. Pharmacists will also be authorized to order additional specific labs based on patient clinical presentation or description. Pharmacists may also review other lab data  available in the patient record which may be necessary for the evaluation and assessment of the impact on antihypertensive therapy (i.e. drug interaction, disease interaction, etc.).     b.  Documentation   Documentation of patient encounters and lab results will be permanently placed in the [facility] EMR.  Laboratory results will automatically populate prompting review and assessment of each patient.  Laboratory results obtained from outside laboratories will be entered into EMR manually.  This documentation will include lab values, medication changes, identification and assessment of adverse events related to therapy, antihypertensive medication dose adjustments, therapeutic management plan and follow up as well as any other information given to the patient during the telemedicine visit.    c.     1.   will be carried out through quarterly reports tracking following statistics:   a. Percent of patients requiring a change to antihypertensive medications   b. Number of emergency visits due to hypertensive urgency or emergency, hypotension or medication side effects for participating patients  Percent of patients who are controlled (BP <130/80 mmHg)  and uncontrolled (BP>130 mmHg)     2.  Patient specific quality indicators will be identified through quarterly review of the following data:   a.  Average change in blood pressure after a dose adjument by the hypertension pharmacist    3.  A random sample of patient records shall be reviewed by the primary physician quarterly to ensure adherence by the hypertension clinical specialists to the collaborative practice agreement.     4.   measures will be reported to Pharmacy & Therapeutics Committee yearly.     References:    MAYELA Jacobson, et al. 2017. 2017. Guidelines for the Prevention, Detection, Evaluation and Management of High Blood Pressure in Adults.      Order Specific Question:   BP Control Goal     Answer:    "Current (2017) AHA Guidelines    Transthoracic echo (TTE) complete (Cupid Only)     Standing Status:   Future     Standing Expiration Date:   1/14/2020       Follow up as scheduled. Return sooner for concerns or questions            He expressed verbal understanding and agreed with the plan        Greater than 50% of the visit of 45 minutes was spent counseling, educating, and coordinating the care of the patient.  -In today's visit, at least 4 established conditions that pose a risk to life or bodily function have been addressed and the conditions are severe.    -In today's visit, monitoring for drug toxicity was accomplished.               Medication List           Accurate as of 1/14/19  5:13 PM. If you have any questions, ask your nurse or doctor.               CONTINUE taking these medications    amiodarone 200 MG Tab  Commonly known as:  PACERONE  Take 1 tablet (200 mg total) by mouth once daily. Take 2 tablets (400 mg) twice daily for 14 days, then 1 tablet (200 mg) daily.     aspirin 81 MG Chew  Take 1 tablet (81 mg total) by mouth once daily.     atorvastatin 80 MG tablet  Commonly known as:  LIPITOR  TAKE 1 TABLET BY MOUTH EVERY DAY     BD ULTRA-FINE HANG PEN NEEDLE 32 gauge x 5/32" Ndle  Generic drug:  pen needle, diabetic  USE TWICE DAILY FOR INSULIN INJECTION     ciprofloxacin HCl 0.2 % otic solution  Place 1 vial (0.25 mLs total) into the left ear 2 (two) times daily. for 7 days     clopidogrel 75 mg tablet  Commonly known as:  PLAVIX  TAKE 1 TABLET BY MOUTH EVERY DAY     FLUZONE HIGH-DOSE 2018-19 (PF) 180 mcg/0.5 mL vaccine  Generic drug:  influenza  Inject into the muscle.     gabapentin 300 MG capsule  Commonly known as:  NEURONTIN     insulin detemir U-100 100 unit/mL (3 mL) Inpn pen  Commonly known as:  LEVEMIR FLEXTOUCH  Inject 16 Units into the skin every evening.     isosorbide dinitrate 20 MG tablet  Commonly known as:  ISORDIL  Take 1 tablet (20 mg total) by mouth 3 (three) times daily.   "   metoprolol succinate 25 MG 24 hr tablet  Commonly known as:  TOPROL-XL  TAKE 1 TABLET (25 MG TOTAL) BY MOUTH ONCE DAILY.     PNEUMOVAX 23 25 mcg/0.5 mL  Generic drug:  pneumococcal 23-homer ps vaccine  Inject into the muscle.     VICTOZA 2-SAGAR 0.6 mg/0.1 mL (18 mg/3 mL) Pnij  Generic drug:  liraglutide 0.6 mg/0.1 mL (18 mg/3 mL) subq PNIJ  INJECT 1.2 MG INTO THE SKIN ONCE DAILY.        STOP taking these medications    clindamycin 300 MG capsule  Commonly known as:  CLEOCIN  Stopped by:  Bonifacio Epps MD     collagenase ointment  Commonly known as:  SANTYL  Stopped by:  Boinfacio Epps MD     diphenoxylate-atropine 2.5-0.025 mg 2.5-0.025 mg per tablet  Commonly known as:  LOMOTIL  Stopped by:  Bonifacio Epps MD     ferrous sulfate 325 (65 FE) MG EC tablet  Stopped by:  Bonifacio Epps MD     furosemide 40 MG tablet  Commonly known as:  LASIX  Stopped by:  Bonifacio Epps MD     pantoprazole 20 MG tablet  Commonly known as:  PROTONIX  Stopped by:  Bonifacio Epps MD     senna-docusate 8.6-50 mg 8.6-50 mg per tablet  Commonly known as:  PERICOLACE  Stopped by:  Bonifacio Epps MD     tamsulosin 0.4 mg Cap  Commonly known as:  FLOMAX  Stopped by:  Bonifacio Epps MD     VITAMIN D3 5,000 unit Tab  Generic drug:  cholecalciferol (vitamin D3)  Stopped by:  Bonifacio Epps MD

## 2019-01-15 ENCOUNTER — TELEPHONE (OUTPATIENT)
Dept: INTERNAL MEDICINE | Facility: CLINIC | Age: 69
End: 2019-01-15

## 2019-01-15 NOTE — TELEPHONE ENCOUNTER
Patient has an appointment on 1/17 for 3:30 was wondering if he could see you on the same day as well. He was not available to come on Wednesday.

## 2019-01-15 NOTE — PROGRESS NOTES
Chief Complaint: Diabetes      HPI:  Chau Rodarte is 68 y.o. male here today for T2DM follow up. Previous patient of JOSE LUIS Kenney NP. Last seen in 5/2017. This is his first visit with me.     He initially presented to the ER in 12/2016 with c/o progressively increasing shortness of breath, +NSTEMI with stent placement. Endocrinology was consulted on admission for a Hb A1c of 9.2%     The patient states diabetes was diagnosed at age 46     Currently, he denies nocturia, polyuria, unexplained weight loss or blurred vision.     Current DM regimen:   Victoza 1.2 mg daily   Levemir 16 units at bedtime     Other medications tried: metformin, farxiga and glucotrol     BG oral recall: 114 this AM    Hypoglycemia: denies recent episodes or symptoms   Knows how to correct with 15 grams of carbs- juice, coke, or a peppermint.     Denies hospital admission related to diabetes.    Diabetes complications include: neuropathy, CKD     Patient has right foot ulcer, followed by Dr. Bland  Last appointment 10/2018. Also has home health for dressing changes as ordered by podiatry     He endorses numbness, tingling sensations to bilateral lower extremities      Reports symptomatic CAD or CVD     24 hour dietary recall:   Breakfast: fried egg, turkey sausage, 4 ounces apple juice   Lunch: chicken salad sandwich on wheat, water   Dinner: 1/2 pork chop , 1 ear of boil corn, unsweet tea   Snacks: jello     Diabetes education: Yes  Exercise: started cardio rehab on Monday 01/21/2017    Diabetes Management Status  Statin: Taking  ACE/ARB: Not taking  Screening or Prevention Patient's value Goal Complete/Controlled?   HgA1C Testing and Control   Lab Results   Component Value Date    HGBA1C 7.1 (H) 10/04/2018      Annually/Less than 8% Yes   Lipid profile : 03/29/2018 Annually Yes   LDL control Lab Results   Component Value Date    LDLCALC 75.2 03/29/2018    Annually/Less than 100 mg/dl  Yes   Nephropathy screening Lab Results   Component  Value Date    LABMICR 66.0 05/10/2017     Lab Results   Component Value Date    PROTEINUA Negative 09/07/2017    Annually No   Blood pressure BP Readings from Last 1 Encounters:   01/17/19 128/80    Less than 140/90 Yes   Dilated retinal exam : 04/11/2018 Annually Yes   Foot exam   : 10/23/2018 Annually Yes     Review of Systems   Constitutional: Negative for unexpected weight change.   Eyes: Negative for visual disturbance.   Respiratory: Negative for shortness of breath.    Cardiovascular: Negative for chest pain.   Gastrointestinal: Negative for abdominal pain.   Endocrine: Negative for cold intolerance, heat intolerance, polydipsia, polyphagia and polyuria.   Musculoskeletal: Negative for arthralgias.   Skin: Negative for wound.   Neurological: Negative for headaches.   Hematological: Does not bruise/bleed easily.   Psychiatric/Behavioral: Negative for sleep disturbance.     Physical Exam   Neck: No thyromegaly present.   Cardiovascular: Normal rate.   No edema present   Pulmonary/Chest: Effort normal.   Abdominal: Soft.   Vitals reviewed.  Appropriate footwear, Foot exam deferred, done 3/2018  injection sites are ok. No lipo hypertropthy or atrophy    Labs:  Lab Results   Component Value Date    CHOL 115 (L) 03/29/2018    HDL 28 (L) 03/29/2018    LDLCALC 75.2 03/29/2018    TRIG 59 03/29/2018    CHOLHDL 24.3 03/29/2018     Lab Results   Component Value Date     10/04/2018    K 4.8 10/04/2018     10/04/2018    CO2 27 10/04/2018     (H) 10/04/2018    BUN 27 (H) 10/04/2018    CREATININE 2.3 (H) 10/04/2018    CALCIUM 9.3 10/04/2018    PROT 7.2 07/02/2018    ALBUMIN 3.6 07/02/2018    BILITOT 0.6 07/02/2018    ALKPHOS 113 07/02/2018    AST 15 07/02/2018    ALT 15 07/02/2018    ANIONGAP 6 (L) 10/04/2018    ESTGFRAFRICA 32 (A) 10/04/2018    EGFRNONAA 28 (A) 10/04/2018     Assessment and Plan:  1. Type 2 diabetes mellitus with peripheral circulatory disorder  Hemoglobin A1c    Comprehensive metabolic  panel   2. Type 2 diabetes mellitus with diabetic polyneuropathy, with long-term current use of insulin     3. Diabetic ulcer of toe of right foot associated with type 2 diabetes mellitus, with necrosis of bone     4. Diabetic ulcer of right midfoot associated with type 2 diabetes mellitus, with muscle involvement without evidence of necrosis     5. Diabetic polyneuropathy associated with type 2 diabetes mellitus     6. Essential hypertension     7. Mixed hyperlipidemia     8. Ischemic cardiomyopathy       1. Type 2 diabetes mellitus with diabetic polyneuropathy, without long-term current use of insulin  -- A1c goal 7%, most recent A1c of 7.1%.   - continue current medical regimen    - reinforced dietary and lifestyle modifications   - reviewed signs and symptoms of hypoglycemia along with appropriate treatment protocol   - discussed importance of appropriate footwear and daily foot inspections   - continue to smbg 2 times daily and bring log or meter to office visits     2. Type 2 diabetes with stage 4 CKD, with long term current use of insulin   - seeing Nephrology on Tuesday   - optimize BG and BP control     3. Essential hypertension  - stable with current regimen   - followed by Cardiology and PCP   - follow a low sodium diet   - encouraged exercise     4. Ischemic cardiomyopathy/Acute on chronic systolic CHF  - followed by Cardiology     5. Hyperlipidemia, unspecified hyperlipidemia type  - on statin therapy   - discussed the importance of following a low sodium diet   - encouraged to continue exercise as tolerated     6. Diabetic skin ulcers on right foot  - continue to follow with podiatry as advised     Follow-up in about 6 months (around 7/17/2019).  Labs prior to next appointment with me

## 2019-01-15 NOTE — TELEPHONE ENCOUNTER
----- Message from Karli Gordon sent at 1/15/2019  2:14 PM CST -----  Contact: self   Patient is returning a phone call.  Who left a message for the patient: Not Sure   Does patient know what this is regarding: Wednesday is not a good day for pt, Pt request Thursday 01/17  Comments:

## 2019-01-15 NOTE — TELEPHONE ENCOUNTER
Hi, please offer him an appt with me for tomorrow/ 1/16/19 one of the held slots.  Thank you, Riki Huynh

## 2019-01-16 ENCOUNTER — TELEPHONE (OUTPATIENT)
Dept: CARDIOLOGY | Facility: CLINIC | Age: 69
End: 2019-01-16

## 2019-01-16 NOTE — TELEPHONE ENCOUNTER
----- Message from Katya Ramirez MD sent at 1/16/2019 11:57 AM CST -----  Will schedule ASAP  THX  ----- Message -----  From: Bonifacio Epps MD  Sent: 1/14/2019   5:14 PM  To: Katya Ramirez MD        Please ON    I am sending him to you for CKD    Thanks    ZN

## 2019-01-16 NOTE — TELEPHONE ENCOUNTER
Hi, I am sorry I do not have any open slots for tomorrow, I have a few today and 1 open Friday.  Let me know whether he can make one of these.  Thank you, Riki Huynh

## 2019-01-17 ENCOUNTER — OFFICE VISIT (OUTPATIENT)
Dept: ENDOCRINOLOGY | Facility: CLINIC | Age: 69
End: 2019-01-17
Payer: MEDICARE

## 2019-01-17 ENCOUNTER — OFFICE VISIT (OUTPATIENT)
Dept: INTERNAL MEDICINE | Facility: CLINIC | Age: 69
End: 2019-01-17
Payer: MEDICARE

## 2019-01-17 VITALS
BODY MASS INDEX: 39.15 KG/M2 | TEMPERATURE: 98 F | SYSTOLIC BLOOD PRESSURE: 146 MMHG | HEIGHT: 69 IN | HEART RATE: 52 BPM | DIASTOLIC BLOOD PRESSURE: 82 MMHG | WEIGHT: 264.31 LBS | OXYGEN SATURATION: 97 %

## 2019-01-17 VITALS
BODY MASS INDEX: 39.15 KG/M2 | HEIGHT: 69 IN | DIASTOLIC BLOOD PRESSURE: 80 MMHG | WEIGHT: 264.31 LBS | RESPIRATION RATE: 16 BRPM | SYSTOLIC BLOOD PRESSURE: 128 MMHG

## 2019-01-17 DIAGNOSIS — H66.92 LEFT OTITIS MEDIA, UNSPECIFIED OTITIS MEDIA TYPE: Primary | ICD-10-CM

## 2019-01-17 DIAGNOSIS — E78.2 MIXED HYPERLIPIDEMIA: ICD-10-CM

## 2019-01-17 DIAGNOSIS — E11.42 DIABETIC POLYNEUROPATHY ASSOCIATED WITH TYPE 2 DIABETES MELLITUS: ICD-10-CM

## 2019-01-17 DIAGNOSIS — L97.415 DIABETIC ULCER OF RIGHT MIDFOOT ASSOCIATED WITH TYPE 2 DIABETES MELLITUS, WITH MUSCLE INVOLVEMENT WITHOUT EVIDENCE OF NECROSIS: ICD-10-CM

## 2019-01-17 DIAGNOSIS — E11.42 TYPE 2 DIABETES MELLITUS WITH DIABETIC POLYNEUROPATHY, WITH LONG-TERM CURRENT USE OF INSULIN: ICD-10-CM

## 2019-01-17 DIAGNOSIS — I25.5 ISCHEMIC CARDIOMYOPATHY: ICD-10-CM

## 2019-01-17 DIAGNOSIS — I10 ESSENTIAL HYPERTENSION: ICD-10-CM

## 2019-01-17 DIAGNOSIS — J32.9 SINUSITIS, UNSPECIFIED CHRONICITY, UNSPECIFIED LOCATION: ICD-10-CM

## 2019-01-17 DIAGNOSIS — Z79.4 TYPE 2 DIABETES MELLITUS WITH DIABETIC POLYNEUROPATHY, WITH LONG-TERM CURRENT USE OF INSULIN: ICD-10-CM

## 2019-01-17 DIAGNOSIS — L97.514 DIABETIC ULCER OF TOE OF RIGHT FOOT ASSOCIATED WITH TYPE 2 DIABETES MELLITUS, WITH NECROSIS OF BONE: ICD-10-CM

## 2019-01-17 DIAGNOSIS — E11.621 DIABETIC ULCER OF TOE OF RIGHT FOOT ASSOCIATED WITH TYPE 2 DIABETES MELLITUS, WITH NECROSIS OF BONE: ICD-10-CM

## 2019-01-17 DIAGNOSIS — E11.621 DIABETIC ULCER OF RIGHT MIDFOOT ASSOCIATED WITH TYPE 2 DIABETES MELLITUS, WITH MUSCLE INVOLVEMENT WITHOUT EVIDENCE OF NECROSIS: ICD-10-CM

## 2019-01-17 DIAGNOSIS — E11.51 TYPE 2 DIABETES MELLITUS WITH PERIPHERAL CIRCULATORY DISORDER: Primary | ICD-10-CM

## 2019-01-17 PROCEDURE — 99499 RISK ADDL DX/OHS AUDIT: ICD-10-PCS | Mod: S$GLB,,, | Performed by: NURSE PRACTITIONER

## 2019-01-17 PROCEDURE — 99214 PR OFFICE/OUTPT VISIT, EST, LEVL IV, 30-39 MIN: ICD-10-PCS | Mod: S$GLB,,, | Performed by: NURSE PRACTITIONER

## 2019-01-17 PROCEDURE — 3045F PR MOST RECENT HEMOGLOBIN A1C LEVEL 7.0-9.0%: CPT | Mod: CPTII,S$GLB,, | Performed by: NURSE PRACTITIONER

## 2019-01-17 PROCEDURE — 3074F PR MOST RECENT SYSTOLIC BLOOD PRESSURE < 130 MM HG: ICD-10-PCS | Mod: CPTII,S$GLB,, | Performed by: NURSE PRACTITIONER

## 2019-01-17 PROCEDURE — 3045F PR MOST RECENT HEMOGLOBIN A1C LEVEL 7.0-9.0%: ICD-10-PCS | Mod: CPTII,S$GLB,, | Performed by: NURSE PRACTITIONER

## 2019-01-17 PROCEDURE — 99999 PR PBB SHADOW E&M-EST. PATIENT-LVL IV: CPT | Mod: PBBFAC,,, | Performed by: NURSE PRACTITIONER

## 2019-01-17 PROCEDURE — 1101F PT FALLS ASSESS-DOCD LE1/YR: CPT | Mod: CPTII,S$GLB,, | Performed by: NURSE PRACTITIONER

## 2019-01-17 PROCEDURE — 99499 UNLISTED E&M SERVICE: CPT | Mod: S$GLB,,, | Performed by: NURSE PRACTITIONER

## 2019-01-17 PROCEDURE — 1101F PR PT FALLS ASSESS DOC 0-1 FALLS W/OUT INJ PAST YR: ICD-10-PCS | Mod: CPTII,S$GLB,, | Performed by: NURSE PRACTITIONER

## 2019-01-17 PROCEDURE — 99213 OFFICE O/P EST LOW 20 MIN: CPT | Mod: S$GLB,,, | Performed by: NURSE PRACTITIONER

## 2019-01-17 PROCEDURE — 99214 OFFICE O/P EST MOD 30 MIN: CPT | Mod: S$GLB,,, | Performed by: NURSE PRACTITIONER

## 2019-01-17 PROCEDURE — 3077F PR MOST RECENT SYSTOLIC BLOOD PRESSURE >= 140 MM HG: ICD-10-PCS | Mod: CPTII,S$GLB,, | Performed by: NURSE PRACTITIONER

## 2019-01-17 PROCEDURE — 99999 PR PBB SHADOW E&M-EST. PATIENT-LVL IV: ICD-10-PCS | Mod: PBBFAC,,, | Performed by: NURSE PRACTITIONER

## 2019-01-17 PROCEDURE — 3079F PR MOST RECENT DIASTOLIC BLOOD PRESSURE 80-89 MM HG: ICD-10-PCS | Mod: CPTII,S$GLB,, | Performed by: NURSE PRACTITIONER

## 2019-01-17 PROCEDURE — 99213 PR OFFICE/OUTPT VISIT, EST, LEVL III, 20-29 MIN: ICD-10-PCS | Mod: S$GLB,,, | Performed by: NURSE PRACTITIONER

## 2019-01-17 PROCEDURE — 3079F DIAST BP 80-89 MM HG: CPT | Mod: CPTII,S$GLB,, | Performed by: NURSE PRACTITIONER

## 2019-01-17 PROCEDURE — 99999 PR PBB SHADOW E&M-EST. PATIENT-LVL III: CPT | Mod: PBBFAC,,, | Performed by: NURSE PRACTITIONER

## 2019-01-17 PROCEDURE — 3074F SYST BP LT 130 MM HG: CPT | Mod: CPTII,S$GLB,, | Performed by: NURSE PRACTITIONER

## 2019-01-17 PROCEDURE — 3077F SYST BP >= 140 MM HG: CPT | Mod: CPTII,S$GLB,, | Performed by: NURSE PRACTITIONER

## 2019-01-17 PROCEDURE — 99999 PR PBB SHADOW E&M-EST. PATIENT-LVL III: ICD-10-PCS | Mod: PBBFAC,,, | Performed by: NURSE PRACTITIONER

## 2019-01-17 RX ORDER — AMOXICILLIN AND CLAVULANATE POTASSIUM 875; 125 MG/1; MG/1
1 TABLET, FILM COATED ORAL EVERY 12 HOURS
Qty: 20 TABLET | Refills: 0 | Status: SHIPPED | OUTPATIENT
Start: 2019-01-17 | End: 2019-01-27

## 2019-01-17 NOTE — LETTER
January 17, 2019      Bonifacio Epps MD  [address]  [address]  [facility] [address]           [facility] - Endocrinology  [address]  [place] [address]  Phone: [phone]          Patient: Chau Rodarte   MR Number: [id number]   YOB: 1950   Date of Visit: 1/17/2019       Dear Dr. Bonifacio Epps:    Thank you for referring Chau Rodarte to me for evaluation. Attached you will find relevant portions of my assessment and plan of care.    If you have questions, please do not hesitate to call me. I look forward to following Chau Rodarte along with you.    Sincerely,    Tigist Barron, NP    Enclosure  CC:  No Recipients    If you would like to receive this communication electronically, please contact [email or web address] or [phone] to request more information on [employer] access.    For providers and/or their staff who would like to refer a patient to [facility], please contact us through our one-stop-shop provider referral line, [facility], at [phone].    If you feel you have received this communication in error or would no longer like to receive these types of communications, please e-mail [email or web address]

## 2019-01-17 NOTE — PROGRESS NOTES
Patient, Chau Rodarte (MRN #[id number]), presented with a recorded BMI of 39.03 kg/m^2 and a documented comorbidity(s):  - Diabetes Mellitus Type 2  - Hypertension  - Hyperlipidemia  to which the severe obesity is a contributing factor. This is consistent with the definition of severe obesity (BMI 35.0-39.9) with comorbidity (ICD-10 E66.01, Z68.35). The patient's severe obesity was monitored, evaluated, addressed and/or treated. This addendum to the medical record is made on 01/17/2019.

## 2019-01-17 NOTE — PROGRESS NOTES
Subjective:       Patient ID: Chau Rodarte is a 68 y.o. male.    Chief Complaint: Otalgia (left ear, head congestion)    HPI:  69 yo male that presents to clinic to today with complaints of left ear pain and head congestion.    States that symptoms have been going on for about 10 days.  Was seen in ED on 01/7/19 for ear pain.  Was given prescription for ofloxacin drops which he took for 7 days but states no relief.  Rates current ear pain at a 7/10.  Denies any fever, SOB, chest pain, n/v or dizziness.  States that appetite and energy level are okay.    Review of Systems   Constitutional: Negative for activity change, appetite change, fatigue and fever.   HENT: Positive for congestion, ear pain, postnasal drip, rhinorrhea, sinus pressure and sinus pain. Negative for sore throat.    Respiratory: Negative for apnea, cough, shortness of breath and wheezing.    Cardiovascular: Negative for chest pain, palpitations and leg swelling.   Gastrointestinal: Negative for abdominal distention, abdominal pain, constipation, diarrhea, nausea and vomiting.   Musculoskeletal: Negative for arthralgias, back pain, myalgias, neck pain and neck stiffness.   Skin: Negative for color change and rash.   Neurological: Negative for dizziness, light-headedness, numbness and headaches.   Psychiatric/Behavioral: Negative for behavioral problems.       Objective:      Physical Exam   Constitutional: He is oriented to person, place, and time. He appears well-developed and well-nourished. No distress.   HENT:   Head: Normocephalic and atraumatic.   Right Ear: Hearing, tympanic membrane, external ear and ear canal normal. No drainage, swelling or tenderness. Tympanic membrane is not erythematous and not bulging. No middle ear effusion. No decreased hearing is noted.   Left Ear: Hearing and external ear normal. There is swelling and tenderness. No drainage. Tympanic membrane is erythematous and bulging. A middle ear effusion is present. No  decreased hearing is noted.   Nose: Rhinorrhea present. Right sinus exhibits frontal sinus tenderness. Right sinus exhibits no maxillary sinus tenderness. Left sinus exhibits maxillary sinus tenderness. Left sinus exhibits no frontal sinus tenderness.   Mouth/Throat: Oropharynx is clear and moist.   + post nasal drip and mild erythema to oropharynx.   Neck: Normal range of motion. Neck supple. No thyromegaly present.   Abdominal: Soft. Bowel sounds are normal. He exhibits no distension and no mass. There is no tenderness.   Lymphadenopathy:     He has no cervical adenopathy.   Neurological: He is alert and oriented to person, place, and time. No cranial nerve deficit or sensory deficit.   Skin: Skin is warm and dry. No erythema.   Psychiatric: His behavior is normal.       Assessment:       1. Left otitis media, unspecified otitis media type    2. Sinusitis, unspecified chronicity, unspecified location        Plan:    -Blood pressure is mildly elevated in clinic but rest of vitals are stable.  -Can d/c ofloxacin drops.  -Will start on course of Augmentin po bid x 10 days to treat both sinus and ear infection.  -Encouraged to increase water intake and get plenty of rest.  -Can take advil or tylenol for any fever, aches or pains.  -If ear problems persist despite oral antibiotics, will refer to ENT for further evaluation and follow up.

## 2019-01-22 ENCOUNTER — HOSPITAL ENCOUNTER (OUTPATIENT)
Dept: WOUND CARE | Facility: HOSPITAL | Age: 69
Discharge: HOME | End: 2019-01-22
Attending: PODIATRIST
Payer: MEDICARE

## 2019-01-22 DIAGNOSIS — Z79.4 TYPE 2 DIABETES MELLITUS WITH DIABETIC POLYNEUROPATHY, WITH LONG-TERM CURRENT USE OF INSULIN: ICD-10-CM

## 2019-01-22 DIAGNOSIS — I73.9 PAD (PERIPHERAL ARTERY DISEASE): ICD-10-CM

## 2019-01-22 DIAGNOSIS — E11.621 DIABETIC ULCER OF RIGHT MIDFOOT ASSOCIATED WITH TYPE 2 DIABETES MELLITUS, WITH MUSCLE INVOLVEMENT WITHOUT EVIDENCE OF NECROSIS: ICD-10-CM

## 2019-01-22 DIAGNOSIS — E11.621 DIABETIC ULCER OF OTHER PART OF RIGHT FOOT ASSOCIATED WITH TYPE 2 DIABETES MELLITUS, LIMITED TO BREAKDOWN OF SKIN: Primary | ICD-10-CM

## 2019-01-22 DIAGNOSIS — L97.415 DIABETIC ULCER OF RIGHT MIDFOOT ASSOCIATED WITH TYPE 2 DIABETES MELLITUS, WITH MUSCLE INVOLVEMENT WITHOUT EVIDENCE OF NECROSIS: ICD-10-CM

## 2019-01-22 DIAGNOSIS — E11.42 TYPE 2 DIABETES MELLITUS WITH DIABETIC POLYNEUROPATHY, WITH LONG-TERM CURRENT USE OF INSULIN: ICD-10-CM

## 2019-01-22 DIAGNOSIS — L97.511 DIABETIC ULCER OF OTHER PART OF RIGHT FOOT ASSOCIATED WITH TYPE 2 DIABETES MELLITUS, LIMITED TO BREAKDOWN OF SKIN: Primary | ICD-10-CM

## 2019-01-22 PROCEDURE — 93923 UPR/LXTR ART STDY 3+ LVLS: CPT | Mod: CCAT

## 2019-01-22 PROCEDURE — 97597 DBRDMT OPN WND 1ST 20 CM/<: CPT | Mod: ,,, | Performed by: PODIATRIST

## 2019-01-22 PROCEDURE — 99213 OFFICE O/P EST LOW 20 MIN: CPT | Mod: 25 | Performed by: PODIATRIST

## 2019-01-22 PROCEDURE — 97597 DEBRIDEMENT: ICD-10-PCS | Mod: ,,, | Performed by: PODIATRIST

## 2019-01-22 PROCEDURE — 97597 DBRDMT OPN WND 1ST 20 CM/<: CPT

## 2019-01-22 NOTE — PROGRESS NOTES
TCOM's done today.  Patient tolerated procedure well.  Results forwarded to Dr. Bland and Dr. Epps.

## 2019-01-22 NOTE — PROGRESS NOTES
Subjective:       Patient ID: Chau Rodarte is a 68 y.o. male.    Chief Complaint: Diabetic Foot Ulcer (right foot)    1/22/19: Client readmitted today to clinic. Visit with Dr. Bland for diabetic foot callous to right plantar foot. TCOMs completed today also.    There were no vitals filed for this visit.   Past Medical History:   Diagnosis Date    Acute on chronic systolic CHF (congestive heart failure) 11/29/2016    Anemia     Anticoagulant long-term use     CKD (chronic kidney disease) stage 2, GFR 60-89 ml/min 2/21/2016    CKD (chronic kidney disease) stage 3, GFR 30-59 ml/min     Coronary artery disease     Encounter for blood transfusion     HTN (hypertension) 3/3/2014    Hyperlipidemia 3/3/2014    NSTEMI (non-ST elevated myocardial infarction) 11/28/2016    Obesity (BMI 30-39.9) 11/29/2016    PVD (peripheral vascular disease)     Stroke     Type 2 diabetes mellitus with diabetic peripheral angiopathy without gangrene, without long-term current use of insulin 10/24/2013    Type 2 diabetes mellitus with diabetic polyneuropathy, without long-term current use of insulin 11/29/2016       Past Surgical History:   Procedure Laterality Date    ABLATION N/A 2/3/2017    Performed by [facility] at [facility] CATH LAB    ANGIOGRAM-EXTREMITY- Right LOWER CO2, Diagnostic only, Right CFA access Right 7/14/2017    Performed by Nehal William MD at [facility] CATH LAB    WPSBGE-ARENLWK-[id number] Left 6/14/2017    Performed by Nehal William MD at [facility] OR 2ND FLR    CARDIAC SURGERY      COLONOSCOPY N/A 3/21/2017    Performed by Karissa Peck MD at [facility] ENDO (2ND FLR)    CORONARY ANGIOPLASTY WITH STENT PLACEMENT      DEBRIDEMENT, ULCER, WITH SKIN GRAFT APPLICATION Right 11/13/2013    Per[facility] by [facility]ab [facility] D[facility] at [facility] OR    ESOPHAGOGASTRODUODENOSCOPY (EGD) N/A 3/21/2017    Performed by Karissa Peck MD at [facility] ENDO (2ND FLR)    FOOT NERVE GRAFT  11/2013    HEART CATH-LEFT Left 11/28/2016     Performed by Robbie Collazo [facility] at [facility] CATH LAB    left leg stent      Loop Recorder placement      right leg bypass      TRANSESOPHAGEAL ECHOCARDIOGRAM (LIANE) N/A 2/3/2017    Performed by Cayden Moreno MD at [facility] CATH LAB       Family History   Problem Relation Age of Onset    Diabetes Mother     Glaucoma Mother     Cataracts Mother     Heart disease Father     Blindness Maternal Grandmother     Macular degeneration Neg Hx     Retinal detachment Neg Hx     Strabismus Neg Hx     Amblyopia Neg Hx        Social History     Socioeconomic History    Marital status:      Spouse name: Not on file    Number of children: Not on file    Years of education: Not on file    Highest education level: Not on file   Social Needs    Financial resource strain: Not on file    Food insecurity - worry: Not on file    Food insecurity - inability: Not on file    Transportation needs - medical: Not on file    Transportation needs - non-medical: Not on file   Occupational History    Not on file   Tobacco Use    Smoking status: Former Smoker     Packs/day: 1.00     Years: 50.00     Pack years: 50.00     Last attempt to quit: 3/1/2011     Years since quittin.9    Smokeless tobacco: Never Used   Substance and Sexual Activity    Alcohol use: No    Drug use: No    Sexual activity: Yes     Partners: Female     Comment:  with 2 children 3 grand sons and 1 great grand daughter   Other Topics Concern    Not on file   Social History Narrative    Lives with wife, works PT for [employer], 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 Take 2 tablets (400 mg) twice daily for 14 days, then 1 tablet (200 mg) daily. 90 tablet 3    amoxicillin-clavulanate 875-125mg (AUGMENTIN) 875-125 mg per tablet  "Take 1 tablet by mouth every 12 (twelve) hours. for 10 days 20 tablet 0    aspirin (ECOTRIN) 81 MG EC tablet Take 81 mg by mouth once daily.      atorvastatin (LIPITOR) 80 MG tablet TAKE 1 TABLET BY MOUTH EVERY DAY 90 tablet 11    BD ULTRA-FINE HANG PEN NEEDLES 32 gauge x 5/32" Ndle USE TWICE DAILY FOR INSULIN INJECTION 100 each 11    clopidogrel (PLAVIX) 75 mg tablet TAKE 1 TABLET BY MOUTH EVERY DAY 90 tablet 1    gabapentin (NEURONTIN) 300 MG capsule Take 300 mg by mouth 2 (two) times daily.  0    influenza (FLUZONE HIGH-DOSE) 180 mcg/0.5 mL vaccine Inject into the muscle. 0.5 mL 0    insulin detemir (LEVEMIR FLEXTOUCH) 100 unit/mL (3 mL) SubQ InPn pen Inject 16 Units into the skin every evening. 1 Box 3    isosorbide dinitrate (ISORDIL) 20 MG tablet Take 1 tablet (20 mg total) by mouth once daily. 90 tablet 3    metoprolol succinate (TOPROL-XL) 25 MG 24 hr tablet TAKE 1 TABLET (25 MG TOTAL) BY MOUTH ONCE DAILY. 90 tablet 4    pneumococcal 23-homer ps vaccine (PNEUMOVAX 23) 25 mcg/0.5 mL Inject into the muscle. 0.5 mL 0    VICTOZA 2-SAGAR 0.6 mg/0.1 mL (18 mg/3 mL) PnIj INJECT 1.2 MG INTO THE SKIN ONCE DAILY. 18 Syringe 2     Current Facility-Administered Medications   Medication Dose Route Frequency Provider Last Rate Last Dose    lidocaine HCL 10 mg/ml (1%) injection 20 mL  20 mL Intradermal Once Quirino Bland DPM        lidocaine HCL 10 mg/ml (1%) injection 20 mL  20 mL Intradermal 1 time in Clinic/HOD Quirino Bland DPM           Review of patient's allergies indicates:   Allergen Reactions    Adhesive tape-silicones Blisters       Review of Systems   Constitutional: Negative for chills, fatigue and fever.   HENT: Negative for congestion.    Respiratory: Negative for cough and shortness of breath.    Gastrointestinal: Negative for diarrhea, nausea and vomiting.   Musculoskeletal: Negative for arthralgias.   Skin: Positive for wound.   Neurological: Negative for dizziness. "   Psychiatric/Behavioral: Negative for agitation and behavioral problems.       Objective:      Physical Exam   Constitutional: He is oriented to person, place, and time. No distress.   Cardiovascular:   Pulses:       Dorsalis pedis pulses are 1+ on the right side, and 1+ on the left side.        Posterior tibial pulses are 1+ on the right side, and 1+ on the left side.   PT/DP monophasic with doppler right.   Musculoskeletal:   Dorsal and medial subluxation of hallux at 1 MTP right foot.    + equinus bilateral ankle.   Neurological: He is alert and oriented to person, place, and time. A sensory deficit is present.   [place] 5.07 monofilamant testing is diminished German feet.      Skin: Skin is dry. Capillary refill takes 2 to 3 seconds. No ecchymosis and no rash noted. He is not diaphoretic. No cyanosis or erythema. No pallor. Nails show no clubbing.           Assessment:       1. Diabetic ulcer of right midfoot associated with type 2 diabetes mellitus, with muscle involvement without evidence of necrosis    2. PAD (peripheral artery disease)           Wound 01/22/19 1430 Diabetic Ulcer plantar Foot #1 (Active)   01/22/19 1430    Pre-existing: Yes   Primary Wound Type: Diabetic ulc   Side: Right   Orientation: plantar   Location: Foot   Wound/PI Number (optional): #1   Ankle-Brachial Index:    Pulses: doppler   Removal Indication and Assessment:    Wound Outcome:    (Retired) Wound Type:    (Retired) Wound Length (cm):    (Retired) Wound Width (cm):    (Retired) Depth (cm):    Wound Description (Comments):    Removal Indications:    Wound Image   1/22/2019  2:54 PM   Dressing Appearance Open to air 1/22/2019  5:56 PM   Drainage Amount None 1/22/2019  5:56 PM   Tissue loss description Partial thickness 1/22/2019  5:56 PM   Red (%), Wound Tissue Color 100 % 1/22/2019  5:56 PM   Periwound Area Intact;[place];Dry 1/22/2019  5:56 PM   Wound Edges Callused 1/22/2019  5:56 PM   Wound Length (cm) 0.3 cm 1/22/2019  5:56  PM   Wound Width (cm) 0.5 cm 1/22/2019  5:56 PM   Wound Depth (cm) 0.1 cm 1/22/2019  5:56 PM   Wound Volume (cm^3) 0.02 cm^3 1/22/2019  5:56 PM   Wound Surface Area (cm^2) 0.15 cm^2 1/22/2019  5:56 PM   Care Cleansed with:;Sterile normal saline 1/22/2019  5:56 PM   Dressing Applied;Silver;Cast padding;Foam 1/22/2019  5:56 PM   Periwound Care Skin barrier film applied 1/22/2019  5:56 PM   Off Loading Football dressing;Off loading shoe 1/22/2019  5:56 PM   Dressing Change Due 01/30/19 1/22/2019  5:56 PM       Selective debridement of right plantar callous per Dr. Bland. Post measurements 0.4 x 0.6 x 0.1cm. Site cleaned with NS. Saline moist melissa to wound bed, cavilon and benzoin to plantar surface, 1 donut felt aperture, 1 felt aperture below donut, 1 krysten foam over donut, 2 krysten foams to heel, 2 1/2 cast paddings, flexinet, blue bootie, Darco shoe. Request for Regranex to be submitted to insurance.    Plan:       Nurse visit Wednesday 1/30/19, Dr. Balnd Tuesday 2/5/19.    Reviewed TCOM in detail noting very little increase in O2 tension with O2 increase. Baseline above healing threshold. Will need further evaluation per Dr. Epps.

## 2019-01-23 DIAGNOSIS — Z46.9 FITTING AND ADJUSTMENT OF DEVICE: Primary | ICD-10-CM

## 2019-01-23 NOTE — PROCEDURES
"Debridement  Date/Time: 1/22/2019 9:00 PM  Performed by: Quirino Bland DPM  Authorized by: Quirino Bland DPM     Time out: Immediately prior to procedure a "time out" was called to verify the correct patient, procedure, equipment, support staff and site/side marked as required.    Consent Done?:  Yes (Verbal)    Preparation: Patient was prepped and draped in usual sterile fashion    Local anesthesia used?: Yes    Local anesthetic:  Lidocaine 2% topical gel    Wound Details:    Location:  Right foot    Location:  Right 1st Metatarsal Head    Type of Debridement:  Excisional       Length (cm):  0.6       Area (sq cm):  0.24       Width (cm):  0.4       Percent Debrided (%):  100       Depth (cm):  0.1       Total Area Debrided (sq cm):  0.24    Depth of debridement:  Epidermis/Dermis    Tissue debrided:  Epidermis and Dermis    Devitalized tissue debrided:  Callus, Slough and Fibrin    Instruments:  Blade    Bleeding:  Minimal  Hemostasis Achieved: Yes    Method Used:  Pressure  Patient tolerance:  Patient tolerated the procedure well with no immediate complications      "

## 2019-01-30 ENCOUNTER — HOSPITAL ENCOUNTER (OUTPATIENT)
Dept: WOUND CARE | Facility: HOSPITAL | Age: 69
Discharge: HOME | End: 2019-01-30
Attending: PODIATRIST
Payer: MEDICARE

## 2019-01-30 ENCOUNTER — HOSPITAL ENCOUNTER (OUTPATIENT)
Dept: RADIOLOGY | Facility: HOSPITAL | Age: 69
Discharge: HOME | End: 2019-01-30
Attending: INTERNAL MEDICINE
Payer: MEDICARE

## 2019-01-30 DIAGNOSIS — N18.30 CHRONIC KIDNEY DISEASE (CKD), STAGE III (MODERATE): ICD-10-CM

## 2019-01-30 DIAGNOSIS — L97.511 DIABETIC ULCER OF OTHER PART OF RIGHT FOOT ASSOCIATED WITH TYPE 2 DIABETES MELLITUS, LIMITED TO BREAKDOWN OF SKIN: Primary | ICD-10-CM

## 2019-01-30 DIAGNOSIS — E11.621 DIABETIC ULCER OF OTHER PART OF RIGHT FOOT ASSOCIATED WITH TYPE 2 DIABETES MELLITUS, LIMITED TO BREAKDOWN OF SKIN: Primary | ICD-10-CM

## 2019-01-30 PROBLEM — H60.502 ACUTE OTITIS EXTERNA OF LEFT EAR: Status: ACTIVE | Noted: 2019-01-30

## 2019-01-30 PROCEDURE — 76770 US EXAM ABDO BACK WALL COMP: CPT | Mod: 26,,, | Performed by: RADIOLOGY

## 2019-01-30 PROCEDURE — 76770 US EXAM ABDO BACK WALL COMP: CPT | Mod: TC

## 2019-01-30 PROCEDURE — 99213 OFFICE O/P EST LOW 20 MIN: CPT

## 2019-01-30 PROCEDURE — 76770 US KIDNEY: ICD-10-PCS | Mod: 26,,, | Performed by: RADIOLOGY

## 2019-01-30 NOTE — PROGRESS NOTES
Subjective:       Patient ID: Chau Rodarte is a 68 y.o. male.    Chief Complaint: Diabetic Foot Ulcer (right foot)    1/22/19: Client readmitted today to clinic. Visit with Dr. Bland for diabetic foot callous to right plantar foot. TCOMs completed today also.  1/30/19: Nurse visit today. Wound site closed.    There were no vitals filed for this visit.   Past Medical History:   Diagnosis Date    Acute on chronic systolic CHF (congestive heart failure) 11/29/2016    Anemia     Anticoagulant long-term use     CKD (chronic kidney disease) stage 2, GFR 60-89 ml/min 2/21/2016    CKD (chronic kidney disease) stage 3, GFR 30-59 ml/min     Coronary artery disease     Encounter for blood transfusion     HTN (hypertension) 3/3/2014    Hyperlipidemia 3/3/2014    NSTEMI (non-ST elevated myocardial infarction) 11/28/2016    Obesity (BMI 30-39.9) 11/29/2016    PVD (peripheral vascular disease)     Stroke     Type 2 diabetes mellitus with diabetic peripheral angiopathy without gangrene, without long-term current use of insulin 10/24/2013    Type 2 diabetes mellitus with diabetic polyneuropathy, without long-term current use of insulin 11/29/2016       Past Surgical History:   Procedure Laterality Date    ABLATION N/A 2/3/2017    [facility] at [facility] CATH LAB    ANGIOGRAM-EXTREMITY- Right LOWER CO2, Diagnostic only, Right CFA access Right 7/14/2017    Performed by Nehal William MD at [facility] CATH LAB    [id number] Left 6/14/2017    Performed by Nehal William MD at [facility] OR 2ND FLR    CARDIAC SURGERY      COLONOSCOPY N/A 3/21/2017    Performed by Karissa Peck MD at [facility] ENDO (2ND FLR)    CORONARY ANGIOPLASTY WITH STENT PLACEMENT      DEBRIDEMENT, ULCER, WITH SKIN GRAFT APPLICATION Right 11/13/2013    Per[facility] by [facility]ftab [facility] D[facility] at [facility] OR    ESOPHAGOGASTRODUODENOSCOPY (EGD) N/A 3/21/2017    Performed by Karissa Peck MD at [facility] ENDO (2ND FLR)    FOOT NERVE GRAFT   2013    HEART CATH-LEFT Left 2016    Performed by [facility] at [facility] CATH LAB    left leg stent      Loop Recorder placement      right leg bypass      TRANSESOPHAGEAL ECHOCARDIOGRAM (LIANE) N/A 2/3/2017    Performed by [facility]den [facility]no MD at [facility] CATH LAB       Family History   Problem Relation Age of Onset    Diabetes Mother     Glaucoma Mother     Cataracts Mother     Heart disease Father     Blindness Maternal Grandmother     Macular degeneration Neg Hx     Retinal detachment Neg Hx     Strabismus Neg Hx     Amblyopia Neg Hx        Social History     Socioeconomic History    Marital status:      Spouse name: None    Number of children: None    Years of education: None    Highest education level: None   Social Needs    Financial resource strain: None    Food insecurity - worry: None    Food insecurity - inability: None    Transportation needs - medical: None    Transportation needs - non-medical: None   Occupational History    None   Tobacco Use    Smoking status: Former Smoker     Packs/day: 1.00     Years: 50.00     Pack years: 50.00     Last attempt to quit: 3/1/2011     Years since quittin.9    Smokeless tobacco: Never Used   Substance and Sexual Activity    Alcohol use: No    Drug use: No    Sexual activity: Yes     Partners: Female     Comment:  with 2 children 3 grand sons and 1 great grand daughter   Other Topics Concern    None   Social History Narrative    Lives with wife, works PT for [employer], 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 Take 2 tablets (400 mg) twice daily for 14 days, then 1 tablet (200 mg) daily. 90 tablet 3    aspirin (ECOTRIN) 81 MG EC tablet Take 81 mg by mouth once daily.      atorvastatin (LIPITOR) 80  "MG tablet TAKE 1 TABLET BY MOUTH EVERY DAY 90 tablet 11    BD ULTRA-FINE HANG PEN NEEDLES 32 gauge x 5/32" Ndle USE TWICE DAILY FOR INSULIN INJECTION 100 each 11    clopidogrel (PLAVIX) 75 mg tablet TAKE 1 TABLET BY MOUTH EVERY DAY 90 tablet 1    gabapentin (NEURONTIN) 300 MG capsule Take 300 mg by mouth 2 (two) times daily.  0    influenza (FLUZONE HIGH-DOSE) 180 mcg/0.5 mL vaccine Inject into the muscle. 0.5 mL 0    insulin detemir (LEVEMIR FLEXTOUCH) 100 unit/mL (3 mL) SubQ InPn pen Inject 16 Units into the skin every evening. 1 Box 3    isosorbide dinitrate (ISORDIL) 20 MG tablet Take 1 tablet (20 mg total) by mouth once daily. 90 tablet 3    metoprolol succinate (TOPROL-XL) 25 MG 24 hr tablet TAKE 1 TABLET (25 MG TOTAL) BY MOUTH ONCE DAILY. 90 tablet 4    pneumococcal 23-homer ps vaccine (PNEUMOVAX 23) 25 mcg/0.5 mL Inject into the muscle. 0.5 mL 0    VICTOZA 2-SAGAR 0.6 mg/0.1 mL (18 mg/3 mL) PnIj INJECT 1.2 MG INTO THE SKIN ONCE DAILY. 18 Syringe 2     Current Facility-Administered Medications   Medication Dose Route Frequency Provider Last Rate Last Dose    lidocaine HCL 10 mg/ml (1%) injection 20 mL  20 mL Intradermal Once Quirino Bland DPM        lidocaine HCL 10 mg/ml (1%) injection 20 mL  20 mL Intradermal 1 time in Clinic/HOD Quirino Bland DPM           Review of patient's allergies indicates:   Allergen Reactions    Adhesive tape-silicones Blisters       Review of Systems   Constitutional: Negative for chills, fatigue and fever.   HENT: Negative for congestion.    Respiratory: Negative for cough and shortness of breath.    Gastrointestinal: Negative for diarrhea, nausea and vomiting.   Musculoskeletal: Negative for arthralgias.   Skin: Positive for wound.   Neurological: Negative for dizziness.   Psychiatric/Behavioral: Negative for agitation and behavioral problems.       Objective:      Physical Exam   Constitutional: He is oriented to person, place, and time. No distress. "   Cardiovascular:   Pulses:       Dorsalis pedis pulses are 1+ on the right side, and 1+ on the left side.        Posterior tibial pulses are 1+ on the right side, and 1+ on the left side.   PT/DP monophasic with doppler right.   Musculoskeletal:   Dorsal and medial subluxation of hallux at 1 MTP right foot.    + equinus bilateral ankle.   Neurological: He is alert and oriented to person, place, and time. A sensory deficit is present.   [place] 5.07 monofilamant testing is diminished German feet.      Skin: Skin is dry. Capillary refill takes 2 to 3 seconds. No ecchymosis and no rash noted. He is not diaphoretic. No cyanosis or erythema. No pallor. Nails show no clubbing.           Assessment:       1. Diabetic ulcer of other part of right foot associated with type 2 diabetes mellitus, limited to breakdown of skin           Wound 01/22/19 1430 Diabetic Ulcer plantar Foot #1 (Active)   01/22/19 1430    Pre-existing: Yes   Primary Wound Type: Diabetic ulc   Side: Right   Orientation: plantar   Location: Foot   Wound/PI Number (optional): #1   Ankle-Brachial Index:    Pulses: doppler   Removal Indication and Assessment:    Wound Outcome:    (Retired) Wound Type:    (Retired) Wound Length (cm):    (Retired) Wound Width (cm):    (Retired) Depth (cm):    Wound Description (Comments):    Removal Indications:    Dressing Appearance Dry;Intact 1/30/2019  9:28 AM   Drainage Amount None 1/30/2019  9:28 AM   Appearance Red;Dry 1/30/2019  9:28 AM   Tissue loss description Partial thickness 1/30/2019  9:28 AM   Red (%), Wound Tissue Color 100 % 1/30/2019  9:28 AM   Periwound Area Intact;[place];Dry 1/30/2019  9:28 AM   Wound Edges Callused 1/30/2019  9:28 AM   Wound Length (cm) 0 cm 1/30/2019  9:28 AM   Wound Width (cm) 0 cm 1/30/2019  9:28 AM   Wound Depth (cm) 0 cm 1/30/2019  9:28 AM   Wound Volume (cm^3) 0 cm^3 1/30/2019  9:28 AM   Wound Surface Area (cm^2) 0 cm^2 1/30/2019  9:28 AM   Care Cleansed with:;Sterile normal  saline 1/30/2019  9:28 AM   Dressing Silver;Cast padding;Foam 1/30/2019  9:28 AM   Periwound Care Skin barrier film applied;Moisturizer applied 1/30/2019  9:28 AM   Off Loading Football dressing;Off loading shoe 1/30/2019  9:28 AM   Dressing Change Due 02/05/19 1/30/2019  9:28 AM       Site cleaned with NS. Saline moist melissa to wound bed, cavilon and benzoin to plantar surface, 1 donut felt aperture, 1 felt aperture below donut, 1 krysten foam over donut, 2 krysten foams to heel, 2 1/2 cast paddings, flexinet, blue bootie, Darco shoe. Request for Regranex to be submitted to insurance.    Plan:        Dr. Bland Tudeeday 2/5/19.    Reviewed TCOM in detail noting very little increase in O2 tension with O2 increase. Baseline above healing threshold. Will need further evaluation per Dr. Epps.

## 2019-01-31 ENCOUNTER — TELEPHONE (OUTPATIENT)
Dept: OTOLARYNGOLOGY | Facility: CLINIC | Age: 69
End: 2019-01-31

## 2019-01-31 NOTE — TELEPHONE ENCOUNTER
----- Message from Nichol Dunlap sent at 1/31/2019 10:07 AM CST -----  Contact: Wife [phone]  New Patient would like to speak with you about being seen as soon as possible for an ER follow up for ear pain and infection with some blood . Please advise

## 2019-02-01 ENCOUNTER — HOSPITAL ENCOUNTER (EMERGENCY)
Facility: HOSPITAL | Age: 69
Discharge: HOME | End: 2019-02-02
Attending: EMERGENCY MEDICINE
Payer: MEDICARE

## 2019-02-01 DIAGNOSIS — H60.312 ACUTE DIFFUSE OTITIS EXTERNA OF LEFT EAR: Primary | ICD-10-CM

## 2019-02-01 PROCEDURE — 25000003 PHARM REV CODE 250: Performed by: EMERGENCY MEDICINE

## 2019-02-01 PROCEDURE — 99284 EMERGENCY DEPT VISIT MOD MDM: CPT | Mod: ,,, | Performed by: EMERGENCY MEDICINE

## 2019-02-01 PROCEDURE — 99284 PR EMERGENCY DEPT VISIT,LEVEL IV: ICD-10-PCS | Mod: ,,, | Performed by: EMERGENCY MEDICINE

## 2019-02-01 PROCEDURE — 99284 EMERGENCY DEPT VISIT MOD MDM: CPT

## 2019-02-01 RX ORDER — LIDOCAINE HYDROCHLORIDE 20 MG/ML
JELLY TOPICAL
Status: DISCONTINUED | OUTPATIENT
Start: 2019-02-01 | End: 2019-02-02 | Stop reason: HOSPADM

## 2019-02-01 RX ORDER — CIPROFLOXACIN AND DEXAMETHASONE 3; 1 MG/ML; MG/ML
4 SUSPENSION/ DROPS AURICULAR (OTIC)
Status: COMPLETED | OUTPATIENT
Start: 2019-02-01 | End: 2019-02-01

## 2019-02-01 RX ADMIN — CIPROFLOXACIN AND DEXAMETHASONE 4 DROP: 3; 1 SUSPENSION/ DROPS AURICULAR (OTIC) at 10:02

## 2019-02-02 VITALS
BODY MASS INDEX: 39.1 KG/M2 | DIASTOLIC BLOOD PRESSURE: 73 MMHG | TEMPERATURE: 98 F | SYSTOLIC BLOOD PRESSURE: 180 MMHG | HEART RATE: 50 BPM | HEIGHT: 69 IN | RESPIRATION RATE: 16 BRPM | WEIGHT: 264 LBS | OXYGEN SATURATION: 96 %

## 2019-02-02 NOTE — DISCHARGE INSTRUCTIONS
You were seen in the emergency department for an ear infection.  A CT of your head showed it has not extended into the bone.  A wick was placed in your ear to improve antibiotic absorption, and you should apply Ciprodex antibiotic ear drops to your ear (4 drops) twice a day for 7 days. t please follow-up with ENT as soon as possible for re-evaluation.  Return to the emergency department if you have any worsening pain, fever, hearing changes, dizziness, or other concerning symptoms.

## 2019-02-02 NOTE — ED NOTES
Pt presents to ED complaining of left ear pain x3 weeks. Pt states that he has been seen 3x about left ear and given ear drops along with PO meds. Pt states that he is almost done taking his prescription and feels no relief. Pt states that he feels as though ear pain is worse. Pt endorses head congestion along with left earache, but denies cough.

## 2019-02-02 NOTE — ED PROVIDER NOTES
"Encounter Date: 2/1/2019    SCRIBE #1 NOTE: I, Son Barbara, am scribing for, and in the presence of,  Dr. Sanchez . I have scribed the entire note.       History     Chief Complaint   Patient presents with    Otalgia     pt reports otalgia to left ear, reports that he was seen 3 times for this already, denies fever     The patient is a 68 y.o. male with past medical history of T2 DM, recurrent otitis, osteomyelitis of lower extremities, PAD s/p fem-pop bypass, who presents to the ED with a complaint of otalgia of left ear. Patient had similar symptoms in the past, diagnosed with both OM and OE recently, and notes that it has not resolved. He was given prescription for ear drops at most recent visit, but states that he was not able to afford them. He endorses left-side hearing loss (describing it as "if he is in a tunnel") and ear pain radiating toward temple. Denies HA, vision changes, bleeding, confusion, F/C, dental pain, dizziness or unsteady gait.       The history is provided by the patient and medical records.     Review of patient's allergies indicates:   Allergen Reactions    Adhesive tape-silicones Blisters     Past Medical History:   Diagnosis Date    Acute on chronic systolic CHF (congestive heart failure) 11/29/2016    Anemia     Anticoagulant long-term use     CKD (chronic kidney disease) stage 2, GFR 60-89 ml/min 2/21/2016    CKD (chronic kidney disease) stage 3, GFR 30-59 ml/min     Coronary artery disease     Encounter for blood transfusion     HTN (hypertension) 3/3/2014    Hyperlipidemia 3/3/2014    NSTEMI (non-ST elevated myocardial infarction) 11/28/2016    Obesity (BMI 30-39.9) 11/29/2016    PVD (peripheral vascular disease)     Stroke     Type 2 diabetes mellitus with diabetic peripheral angiopathy without gangrene, without long-term current use of insulin 10/24/2013    Type 2 diabetes mellitus with diabetic polyneuropathy, without long-term current use of insulin 11/29/2016 "     Past Surgical History:   Procedure Laterality Date    ABLATION N/A 2/3/2017    Performed by [facility]no [facility] at [facility] CATH LAB    ANGIOGRAM-EXTREMITY- Right LOWER CO2, Diagnostic only, Right CFA access Right 2017    Performed by Nehal William MD at [facility] CATH LAB    [id number] Left 2017    Performed by Nehal William MD at [facility] OR 2ND FLR    CARDIAC SURGERY      COLONOSCOPY N/A 3/21/2017    Performed by Karissa Peck MD at [facility] ENDO (2ND FLR)    CORONARY ANGIOPLASTY WITH STENT PLACEMENT      DEBRIDEMENT, ULCER, WITH SKIN GRAFT APPLICATION Right 2013    Per[facility]ab [facility]    ESOPHAGOGASTRODUODENOSCOPY (EGD) N/A 3/21/2017    Performed by [facility]issa Peck MD at [facility] ENDO (2ND FLR)    FOOT NERVE GRAFT  2013    HEART CATH-LEFT Left 2016    Performed by Robbie Collazo MD at [facility] CATH LAB    left leg stent      Loop Recorder placement      right leg bypass      TRANSESOPHAGEAL ECHOCARDIOGRAM (LIANE) N/A 2/3/2017    Performed by [facility]den [facility]no MD at [facility] CATH LAB     Family History   Problem Relation Age of Onset    Diabetes Mother     Glaucoma Mother     Cataracts Mother     Heart disease Father     Blindness Maternal Grandmother     Macular degeneration Neg Hx     Retinal detachment Neg Hx     Strabismus Neg Hx     Amblyopia Neg Hx      Social History     Tobacco Use    Smoking status: Former Smoker     Packs/day: 1.00     Years: 50.00     Pack years: 50.00     Last attempt to quit: 3/1/2011     Years since quittin.9    Smokeless tobacco: Never Used   Substance Use Topics    Alcohol use: No    Drug use: No     Review of Systems   Constitutional: Negative for chills and fever.   HENT: Positive for ear pain and hearing loss. Negative for congestion, dental problem, ear discharge, rhinorrhea, sinus pressure, sinus pain, tinnitus and voice change.    Eyes: Negative for visual disturbance.   Cardiovascular: Negative  for chest pain.   Gastrointestinal: Negative for nausea and vomiting.   Neurological: Negative for dizziness, speech difficulty and headaches.       Physical Exam     Initial Vitals [02/01/19 1909]   BP Pulse Resp Temp SpO2   (!) 97/53 (!) 54 18 97.8 °F (36.6 °C) 95 %      MAP       --         Physical Exam    Constitutional: He appears well-developed and well-nourished. He is not diaphoretic. No distress.   HENT:   Head: Normocephalic and atraumatic.   Nose: Nose normal.   No mastoid tenderness to palpation, minimal pain with tragal manipulation.  Moderate tenderness to palpation to temporal region.  Ear canal: completely occluded with soft tissue swelling and white discharge.    Eyes: Conjunctivae and EOM are normal. Pupils are equal, round, and reactive to light.   Neck: Normal range of motion. Neck supple.   Cardiovascular: Normal rate and regular rhythm.   Pulmonary/Chest: Breath sounds normal. No respiratory distress. He has no wheezes. He has no rhonchi. He has no rales. He exhibits no tenderness.   Abdominal: Soft. Bowel sounds are normal. He exhibits no distension. There is no tenderness.   Musculoskeletal: Normal range of motion.   Neurological: He is alert and oriented to person, place, and time. He has normal strength.   Skin: Skin is warm and dry. No rash noted.   Psychiatric: He has a normal mood and affect. His behavior is normal. Thought content normal.         ED Course   Procedures  Labs Reviewed - No data to display       Imaging Results          CT Head Without Contrast (Final result)  Result time 02/02/19 00:32:20    Final result by Jim Lema MD (02/02/19 00:32:20)                 Impression:      1.  No CT evidence of acute intracranial abnormality.    2.  Patchy periventricular white matter hypoattenuation suggestive of chronic microvascular ischemic change.  Small focal hypodensities within the left basal ganglia and right cerebellar hemispheres suggestive of remote lacunar type  infarcts.    3.  Nonspecific opacity within the left external auditory canal.  No significant left-sided mastoid effusion or pre/postauricular soft tissue stranding.  Correlation with physical exam/direct visualization is advised.      Electronically signed by: Jim Lema MD  Date:    02/02/2019  Time:    00:32             Narrative:    EXAMINATION:  CT HEAD WITHOUT CONTRAST    CLINICAL HISTORY:  chronic L otitis externa in DM;    TECHNIQUE:  Low dose axial images were obtained through the head.  Coronal and sagittal reformations were also performed. Contrast was not administered.    COMPARISON:  None.    FINDINGS:  There is generalized cerebral volume loss with compensatory sulcal widening and ventricular enlargement.  There is patchy periventricular white matter hypoattenuation suggestive of sequelae of chronic microvascular ischemic change.  Focal hypodensities within the left basal ganglia and right cerebellar hemisphere are suggestive of remote lacunar type infarcts.  There is no evidence of acute intracranial hemorrhage or midline shift.  No definite extra-axial fluid collections identified.  The basal cisterns are patent. The paranasal sinuses appear well aerated.  There is a nonspecific 1.0 cm opacity within the left external auditory canal.  There is no left-sided mastoid effusion.  The right mastoid air cells appear well aerated.  No significant pre or postauricular soft tissue stranding appreciated.  The calvarium appears intact.                                 Medical Decision Making:   History:   Old Medical Records: I decided to obtain old medical records.  Initial Assessment:   69 y/o with recurrent OM and OE most recently prescribed ciprodex drops but unable to afford them and unable to f/u with ENT. Given patient's history as a diabetic with multiple ulcers in osteomyelitis and will obtain CT head to ro/ malignant otitis and attempt to place wick with CiproDex and reassess.   Differential  Diagnosis:   Otitis externa vs otitis media vs malignant otitis vs mastoiditis  Clinical Tests:   Radiological Study: Ordered and Reviewed  ED Management:  2300:  Head CT neg for bone involvement. Ear wick placed in left ear with Ciprodex drops applied. Will give pt bottle to continue treatment at home, and f/u ENT promptly. Discussed return precautions with patient, and he expresses understanding and is comfortable with discharge plan.              Scribe Attestation:   Scribe #1: I performed the above scribed service and the documentation accurately describes the services I performed. I attest to the accuracy of the note.               Clinical Impression:   The encounter diagnosis was Acute diffuse otitis externa of left ear.                             Yvrose Sanchez MD  02/04/19 9205

## 2019-02-04 ENCOUNTER — TELEPHONE (OUTPATIENT)
Dept: OTOLARYNGOLOGY | Facility: CLINIC | Age: 69
End: 2019-02-04

## 2019-02-04 NOTE — TELEPHONE ENCOUNTER
----- Message from Michaelle Granados sent at 2/4/2019  9:10 AM CST -----  Contact: [phone]/Vladimir (pt wife)  Patient needs to be seen asap for a severe ear infection/er follow up, also needs an audiogram. Please contact patient wife at [phone]/Vladimir (pt wife)

## 2019-02-05 ENCOUNTER — HOSPITAL ENCOUNTER (OUTPATIENT)
Dept: WOUND CARE | Facility: HOSPITAL | Age: 69
Discharge: HOME | End: 2019-02-05
Attending: PODIATRIST
Payer: MEDICARE

## 2019-02-05 ENCOUNTER — TELEPHONE (OUTPATIENT)
Dept: OTOLARYNGOLOGY | Facility: CLINIC | Age: 69
End: 2019-02-05

## 2019-02-05 VITALS
HEART RATE: 62 BPM | DIASTOLIC BLOOD PRESSURE: 66 MMHG | SYSTOLIC BLOOD PRESSURE: 115 MMHG | RESPIRATION RATE: 18 BRPM | TEMPERATURE: 98 F

## 2019-02-05 DIAGNOSIS — E11.621 DIABETIC ULCER OF OTHER PART OF RIGHT FOOT ASSOCIATED WITH TYPE 2 DIABETES MELLITUS, LIMITED TO BREAKDOWN OF SKIN: Primary | ICD-10-CM

## 2019-02-05 DIAGNOSIS — L97.511 DIABETIC ULCER OF OTHER PART OF RIGHT FOOT ASSOCIATED WITH TYPE 2 DIABETES MELLITUS, LIMITED TO BREAKDOWN OF SKIN: Primary | ICD-10-CM

## 2019-02-05 DIAGNOSIS — I73.9 PAD (PERIPHERAL ARTERY DISEASE): ICD-10-CM

## 2019-02-05 PROCEDURE — 97597 DBRDMT OPN WND 1ST 20 CM/<: CPT

## 2019-02-05 PROCEDURE — 97597 DBRDMT OPN WND 1ST 20 CM/<: CPT | Mod: ,,, | Performed by: PODIATRIST

## 2019-02-05 PROCEDURE — 97597 DEBRIDEMENT: ICD-10-PCS | Mod: ,,, | Performed by: PODIATRIST

## 2019-02-05 NOTE — PROGRESS NOTES
[facility]                Progress Note    Subjective:       Patient ID: Chau Rodarte is a 68 y.o. male.    Chief Complaint: Wound Care    1/22/19: Client readmitted today to clinic. Visit with Dr. Bland for diabetic foot callous to right plantar foot. TCOMs completed today also.  1/30/19: Nurse visit today. Wound site closed.  02/05/19  Patient presents to wound care for follow up with Dr. Bland.  Selective debridement done per Dr. Bland done.  Post debridement measurements 0.5 X 0.1 X 0.1 cm.  Continue football dressings with adhesive foam/felt and darco shoe             Vitals:    02/05/19 1544   BP: 115/66   Pulse: 62   Resp: 18   Temp: 97.7 °F (36.5 °C)      Past Medical History:   Diagnosis Date    Acute on chronic systolic CHF (congestive heart failure) 11/29/2016    Anemia     Anticoagulant long-term use     CKD (chronic kidney disease) stage 2, GFR 60-89 ml/min 2/21/2016    CKD (chronic kidney disease) stage 3, GFR 30-59 ml/min     Coronary artery disease     Encounter for blood transfusion     HTN (hypertension) 3/3/2014    Hyperlipidemia 3/3/2014    NSTEMI (non-ST elevated myocardial infarction) 11/28/2016    Obesity (BMI 30-39.9) 11/29/2016    PVD (peripheral vascular disease)     Stroke     Type 2 diabetes mellitus with diabetic peripheral angiopathy without gangrene, without long-term current use of insulin 10/24/2013    Type 2 diabetes mellitus with diabetic polyneuropathy, without long-term current use of insulin 11/29/2016       Past Surgical History:   Procedure Laterality Date    ABLATION N/A 2/3/2017    [facility]formed [facility] at [facility] CATH LAB    ANGIOGRAM-EXTREMITY- Right LOWER CO2, Diagnostic only, Right CFA access Right 7/14/2017    Performed by [facility]hal William [facility] at [facility] CATH LAB    [id number] Left 6/14/2017    [facility]    CARDIAC SURGERY       COLONOSCOPY N/A 3/21/2017    Performed by [facility] Peck MD at [facility] ENDO (2ND FLR)    CORONARY ANGIOPLASTY WITH STENT PLACEMENT      DEBRIDEMENT, ULCER, WITH SKIN GRAFT APPLICATION Right 2013    Per[facility] by [facility] OR    ESOPHAGOGASTRODUODENOSCOPY (EGD) N/A 3/21/2017    Performed by Karissa Peck MD at [facility] ENDO (2ND FLR)    FOOT NERVE GRAFT  2013    HEART CATH-LEFT Left 2016    Performed by Robbie Colalzo MD at [facility] CATH LAB    left leg stent      Loop Recorder placement      right leg bypass      TRANSESOPHAGEAL ECHOCARDIOGRAM (LIANE) N/A 2/3/2017    Performed by [facility]den Moreno MD at [facility] CATH LAB       Family History   Problem Relation Age of Onset    Diabetes Mother     Glaucoma Mother     Cataracts Mother     Heart disease Father     Blindness Maternal Grandmother     Macular degeneration Neg Hx     Retinal detachment Neg Hx     Strabismus Neg Hx     Amblyopia Neg Hx        Social History     Socioeconomic History    Marital status:      Spouse name: None    Number of children: None    Years of education: None    Highest education level: None   Social Needs    Financial resource strain: None    Food insecurity - worry: None    Food insecurity - inability: None    Transportation needs - medical: None    Transportation needs - non-medical: None   Occupational History    None   Tobacco Use    Smoking status: Former Smoker     Packs/day: 1.00     Years: 50.00     Pack years: 50.00     Last attempt to quit: 3/1/2011     Years since quittin.9    Smokeless tobacco: Never Used   Substance and Sexual Activity    Alcohol use: No    Drug use: No    Sexual activity: Yes     Partners: Female     Comment:  with 2 children 3 grand sons and 1 great grand daughter   Other Topics Concern    None   Social History Narrative    Lives with wife, works PT for [employer], 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, 3  "grandsons, 1 great grand daughter       Current Outpatient Medications   Medication Sig Dispense Refill    amiodarone (PACERONE) 200 MG Tab Take 1 tablet (200 mg total) by mouth once daily. Take 2 tablets (400 mg) twice daily for 14 days, then 1 tablet (200 mg) daily. 90 tablet 3    aspirin (ECOTRIN) 81 MG EC tablet Take 81 mg by mouth once daily.      atorvastatin (LIPITOR) 80 MG tablet TAKE 1 TABLET BY MOUTH EVERY DAY 90 tablet 11    BD ULTRA-FINE HANG PEN NEEDLES 32 gauge x 5/32" Ndle USE TWICE DAILY FOR INSULIN INJECTION 100 each 11    clopidogrel (PLAVIX) 75 mg tablet TAKE 1 TABLET BY MOUTH EVERY DAY 90 tablet 1    gabapentin (NEURONTIN) 300 MG capsule Take 300 mg by mouth 2 (two) times daily.  0    influenza (FLUZONE HIGH-DOSE) 180 mcg/0.5 mL vaccine Inject into the muscle. 0.5 mL 0    insulin detemir (LEVEMIR FLEXTOUCH) 100 unit/mL (3 mL) SubQ InPn pen Inject 16 Units into the skin every evening. 1 Box 3    isosorbide dinitrate (ISORDIL) 20 MG tablet Take 1 tablet (20 mg total) by mouth once daily. 90 tablet 3    metoprolol succinate (TOPROL-XL) 25 MG 24 hr tablet TAKE 1 TABLET (25 MG TOTAL) BY MOUTH ONCE DAILY. 90 tablet 4    pneumococcal 23-homer ps vaccine (PNEUMOVAX 23) 25 mcg/0.5 mL Inject into the muscle. 0.5 mL 0    VICTOZA 2-SAGAR 0.6 mg/0.1 mL (18 mg/3 mL) PnIj INJECT 1.2 MG INTO THE SKIN ONCE DAILY. 18 Syringe 2     Current Facility-Administered Medications   Medication Dose Route Frequency Provider Last Rate Last Dose    lidocaine HCL 10 mg/ml (1%) injection 20 mL  20 mL Intradermal Once Quirino Bland DPM        lidocaine HCL 10 mg/ml (1%) injection 20 mL  20 mL Intradermal 1 time in Clinic/HOD Quirino Bland DPM           Review of patient's allergies indicates:   Allergen Reactions    Adhesive tape-silicones Blisters         Review of Systems   Constitutional: Negative for chills, fatigue and fever.   HENT: Negative for congestion.    Respiratory: Negative for cough and " shortness of breath.    Gastrointestinal: Negative for diarrhea, nausea and vomiting.   Musculoskeletal: Negative for arthralgias.   Skin: Positive for wound.   Neurological: Negative for dizziness.   Psychiatric/Behavioral: Negative for agitation and behavioral problems.         Objective:        Physical Exam   Constitutional: He is oriented to person, place, and time. No distress.   Cardiovascular:   Pulses:       Dorsalis pedis pulses are 1+ on the right side, and 1+ on the left side.        Posterior tibial pulses are 1+ on the right side, and 1+ on the left side.   PT/DP monophasic with doppler right.   Musculoskeletal:   Dorsal and medial subluxation of hallux at 1 MTP right foot.    + equinus bilateral ankle.   Neurological: He is alert and oriented to person, place, and time. A sensory deficit is present.   Derry-Isiah 5.07 monofilamant testing is diminished German feet.      Skin: Skin is dry. Capillary refill takes 2 to 3 seconds. No ecchymosis and no rash noted. He is not diaphoretic. No cyanosis or erythema. No pallor. Nails show no clubbing.       Vitals:    02/05/19 1544   BP: 115/66   Pulse: 62   Resp: 18   Temp: 97.7 °F (36.5 °C)       Assessment:           ICD-10-CM ICD-9-CM   1. Diabetic ulcer of other part of right foot associated with type 2 diabetes mellitus, limited to breakdown of skin E11.621 250.80    L97.511 707.15   2. PAD (peripheral artery disease) I73.9 443.9            Wound 01/22/19 1430 Diabetic Ulcer plantar Foot #1 (Active)   01/22/19 1430    Pre-existing: Yes   Primary Wound Type: Diabetic ulc   Side: Right   Orientation: plantar   Location: Foot   Wound/PI Number (optional): #1   Ankle-Brachial Index:    Pulses: doppler   Removal Indication and Assessment:    Wound Outcome:    (Retired) Wound Type:    (Retired) Wound Length (cm):    (Retired) Wound Width (cm):    (Retired) Depth (cm):    Wound Description (Comments):    Removal Indications:    Dressing Appearance Dry 2/5/2019   4:32 PM   Drainage Amount None 2/5/2019  4:32 PM   Appearance Crivitz 2/5/2019  4:32 PM   Red (%), Wound Tissue Color 100 % 2/5/2019  4:32 PM   Periwound Area Intact;Dry 2/5/2019  4:32 PM   Wound Edges Callused 2/5/2019  4:32 PM   Wound Length (cm) 0.3 cm 2/5/2019  4:32 PM   Wound Width (cm) 0.1 cm 2/5/2019  4:32 PM   Wound Depth (cm) 0.1 cm 2/5/2019  4:32 PM   Wound Volume (cm^3) 0 cm^3 2/5/2019  4:32 PM   Wound Surface Area (cm^2) 0.03 cm^2 2/5/2019  4:32 PM   Care Cleansed with:;Sterile normal saline 2/5/2019  4:32 PM   Periwound Care Skin barrier film applied 2/5/2019  4:32 PM   Off Loading Football dressing 2/5/2019  4:32 PM   Dressing Change Due 02/12/19 2/5/2019  4:32 PM       [REMOVED]      Wound 09/16/17 0700 heel (Removed)   09/16/17 0700    Pre-existing: Yes   Primary Wound Type:    Side: Right   Orientation:    Location: heel   Wound/PI Number (optional):    Ankle-Brachial Index:    Pulses:    Removal Indication and Assessment:    Wound Outcome: Other   (Retired) Wound Type: Diabetic ulcer   (Retired) Wound Length (cm): 1.1   (Retired) Wound Width (cm): 1.3   (Retired) Depth (cm):    Wound Description (Comments):    Removal Indications:    Removed 02/05/19 1621   Dressing Appearance Dried drainage 2/5/2019  3:47 PM   Drainage Amount None 2/5/2019  3:47 PM   Appearance Pink;Epithelialization 2/5/2019  3:47 PM   Red (%), Wound Tissue Color 100 % 2/5/2019  3:47 PM   Wound Edges Callused 2/5/2019  3:47 PM   Wound Length (cm) 0.5 cm 2/5/2019  3:47 PM   Wound Width (cm) 0.1 cm 2/5/2019  3:47 PM   Wound Depth (cm) 0.1 cm 2/5/2019  3:47 PM   Wound Volume (cm^3) 0 cm^3 2/5/2019  3:47 PM   Wound Surface Area (cm^2) 0.05 cm^2 2/5/2019  3:47 PM   Care Cleansed with:;Sterile normal saline 2/5/2019  3:47 PM   Periwound Care Skin barrier film applied 2/5/2019  3:47 PM   Off Loading Football dressing 2/5/2019  3:47 PM   Dressing Change Due 02/12/19 2/5/2019  3:47 PM   Right plantar foot    Cleanse wound with:Normal  saline  Periwound care: [place]  Primary dressing: melissa moistened with normal saline  Secondary dressing: adptic  Offloading:Adhesive foam aperature pad and football with krysten foam X 3  Edema control:  Patient to elevate lower extremity as much as possible  Frequency:  Once weekly  Follow-up: With Dr. Bland in 2 weeks.  Nurse visit in 1 week  Home Health:  Other Orders:  Selective debridement per Dr. Bland        Plan:                  [unfilled]

## 2019-02-05 NOTE — PROCEDURES
"Debridement  Date/Time: 2/5/2019 3:20 PM  Performed by: Quirino Bland DPM  Authorized by: Quirino Bland DPM     Time out: Immediately prior to procedure a "time out" was called to verify the correct patient, procedure, equipment, support staff and site/side marked as required.    Consent Done?:  Yes (Verbal)    Preparation: Patient was prepped and draped in usual sterile fashion    Local anesthesia used?: Yes    Local anesthetic:  Lidocaine 2% topical gel    Wound Details:    Location:  Right foot    Location:  Right 1st Metatarsal Head    Type of Debridement:  Excisional       Length (cm):  0.5       Area (sq cm):  0.05       Width (cm):  0.1       Percent Debrided (%):  100       Depth (cm):  0.1       Total Area Debrided (sq cm):  0.05    Depth of debridement:  Epidermis/Dermis    Tissue debrided:  Epidermis    Devitalized tissue debrided:  Callus and Slough    Instruments:  Blade    Bleeding:  None  Patient tolerance:  Patient tolerated the procedure well with no immediate complications      "

## 2019-02-05 NOTE — TELEPHONE ENCOUNTER
Spoke with patient he is wanting an appointment for ear pain. Patient was seen in ER and feels ear is not better. Told patient Dr Lucas has no availabilities this week due to her not being in clinic. Told patient nothing available next week offered to provide phone number to ENT at [facility] or [facility] patient states he does not want to go there. Discussed with patient I can schedule him with Dr Lucas on 2/20 at 2:30 with audio and 3:00 with Dr Lucas also will add patient to wait list for any cancellations. Patient states he would like this date and time and to call if any cancellations.

## 2019-02-05 NOTE — TELEPHONE ENCOUNTER
----- Message from Magnolia Kendrick MD sent at 2/4/2019  7:50 PM CST -----  Probable needs main or Yarsani, otherwise, I can see him next week.     ----- Message -----  From: Su Zelaya LPN  Sent: 1/31/2019  10:40 AM  To: Magnolia Kendrick MD    New Patient being referred from ER to see Dr Lucas for a follow up. Refer patient to [facility]?

## 2019-02-06 ENCOUNTER — TELEPHONE (OUTPATIENT)
Dept: INTERNAL MEDICINE | Facility: CLINIC | Age: 69
End: 2019-02-06

## 2019-02-06 ENCOUNTER — CLINICAL SUPPORT (OUTPATIENT)
Dept: CARDIOLOGY | Facility: HOSPITAL | Age: 69
End: 2019-02-06
Attending: INTERNAL MEDICINE
Payer: MEDICARE

## 2019-02-06 DIAGNOSIS — Z46.9 FITTING AND ADJUSTMENT OF DEVICE: ICD-10-CM

## 2019-02-06 PROCEDURE — 93299 CARDIAC DEVICE CHECK - REMOTE: CPT

## 2019-02-06 NOTE — TELEPHONE ENCOUNTER
----- Message from Valerie Stern sent at 2/6/2019  9:04 AM CST -----  Contact: wife/clementina/[phone]  Pt wife called in regards to getting a pre op appointment. Pt needs to be  cleared for cataract surgery. Surgery date (3-21-19). They would like the any appointment after feb 21 and before march 12 early morning.     Please advise

## 2019-02-08 ENCOUNTER — TELEPHONE (OUTPATIENT)
Dept: CARDIOLOGY | Facility: CLINIC | Age: 69
End: 2019-02-08

## 2019-02-08 NOTE — TELEPHONE ENCOUNTER
----- Message from Vicki Sofia sent at 2/8/2019 11:41 AM CST -----  Contact: Vladimir (wife)/ [phone]  Patient's wife called in to check status on a document that was left in your office a few days ago.    Please call.

## 2019-02-11 ENCOUNTER — LAB VISIT (OUTPATIENT)
Dept: LAB | Facility: HOSPITAL | Age: 69
End: 2019-02-11
Attending: INTERNAL MEDICINE
Payer: MEDICARE

## 2019-02-11 DIAGNOSIS — N18.30 CHRONIC KIDNEY DISEASE (CKD), STAGE III (MODERATE): ICD-10-CM

## 2019-02-11 DIAGNOSIS — Z46.9 FITTING AND ADJUSTMENT OF DEVICE: Primary | ICD-10-CM

## 2019-02-11 DIAGNOSIS — N18.30 CHRONIC KIDNEY DISEASE, STAGE III (MODERATE): ICD-10-CM

## 2019-02-11 DIAGNOSIS — D63.1 ANEMIA OF CHRONIC KIDNEY FAILURE, STAGE 3 (MODERATE): ICD-10-CM

## 2019-02-11 DIAGNOSIS — N18.30 ANEMIA OF CHRONIC KIDNEY FAILURE, STAGE 3 (MODERATE): ICD-10-CM

## 2019-02-11 DIAGNOSIS — E11.21 DIABETIC NEPHROPATHY ASSOCIATED WITH TYPE 2 DIABETES MELLITUS: ICD-10-CM

## 2019-02-11 DIAGNOSIS — E55.9 VITAMIN D DEFICIENCY: ICD-10-CM

## 2019-02-11 PROBLEM — N28.1 ACQUIRED CYST OF KIDNEY: Status: ACTIVE | Noted: 2019-02-11

## 2019-02-11 LAB
25(OH)D3+25(OH)D2 SERPL-MCNC: 35 NG/ML
ALBUMIN SERPL BCP-MCNC: 3.3 G/DL
ALBUMIN SERPL BCP-MCNC: 3.3 G/DL
ANION GAP SERPL CALC-SCNC: 9 MMOL/L
ANION GAP SERPL CALC-SCNC: 9 MMOL/L
BASOPHILS # BLD AUTO: 0.03 K/UL
BASOPHILS NFR BLD: 0.3 %
BUN SERPL-MCNC: 22 MG/DL
BUN SERPL-MCNC: 22 MG/DL
C3 SERPL-MCNC: 154 MG/DL
C4 SERPL-MCNC: 41 MG/DL
CALCIUM SERPL-MCNC: 9.1 MG/DL
CALCIUM SERPL-MCNC: 9.1 MG/DL
CHLORIDE SERPL-SCNC: 107 MMOL/L
CHLORIDE SERPL-SCNC: 107 MMOL/L
CO2 SERPL-SCNC: 25 MMOL/L
CO2 SERPL-SCNC: 25 MMOL/L
CREAT SERPL-MCNC: 2.3 MG/DL
CREAT SERPL-MCNC: 2.3 MG/DL
DIFFERENTIAL METHOD: ABNORMAL
EOSINOPHIL # BLD AUTO: 0.3 K/UL
EOSINOPHIL NFR BLD: 2.9 %
ERYTHROCYTE [DISTWIDTH] IN BLOOD BY AUTOMATED COUNT: 14.3 %
EST. GFR  (AFRICAN AMERICAN): 32 ML/MIN/1.73 M^2
EST. GFR  (AFRICAN AMERICAN): 32 ML/MIN/1.73 M^2
EST. GFR  (NON AFRICAN AMERICAN): 28 ML/MIN/1.73 M^2
EST. GFR  (NON AFRICAN AMERICAN): 28 ML/MIN/1.73 M^2
ESTIMATED AVG GLUCOSE: 189 MG/DL
FERRITIN SERPL-MCNC: 158 NG/ML
GLUCOSE SERPL-MCNC: 184 MG/DL
GLUCOSE SERPL-MCNC: 184 MG/DL
HBA1C MFR BLD HPLC: 8.2 %
HCT VFR BLD AUTO: 39.3 %
HGB BLD-MCNC: 12.5 G/DL
IRON SERPL-MCNC: 36 UG/DL
LYMPHOCYTES # BLD AUTO: 1.4 K/UL
LYMPHOCYTES NFR BLD: 15.3 %
MCH RBC QN AUTO: 29 PG
MCHC RBC AUTO-ENTMCNC: 31.8 G/DL
MCV RBC AUTO: 91 FL
MONOCYTES # BLD AUTO: 0.6 K/UL
MONOCYTES NFR BLD: 6.8 %
NEUTROPHILS # BLD AUTO: 6.7 K/UL
NEUTROPHILS NFR BLD: 74.4 %
PHOSPHATE SERPL-MCNC: 2.6 MG/DL
PHOSPHATE SERPL-MCNC: 2.6 MG/DL
PLATELET # BLD AUTO: 277 K/UL
PMV BLD AUTO: 9.9 FL
POTASSIUM SERPL-SCNC: 4.4 MMOL/L
POTASSIUM SERPL-SCNC: 4.4 MMOL/L
PTH-INTACT SERPL-MCNC: 181 PG/ML
RBC # BLD AUTO: 4.31 M/UL
SATURATED IRON: 13 %
SODIUM SERPL-SCNC: 141 MMOL/L
SODIUM SERPL-SCNC: 141 MMOL/L
TOTAL IRON BINDING CAPACITY: 269 UG/DL
TRANSFERRIN SERPL-MCNC: 182 MG/DL
URATE SERPL-MCNC: 7.7 MG/DL
WBC # BLD AUTO: 9 K/UL

## 2019-02-11 PROCEDURE — 83036 HEMOGLOBIN GLYCOSYLATED A1C: CPT

## 2019-02-11 PROCEDURE — 83520 IMMUNOASSAY QUANT NOS NONAB: CPT

## 2019-02-11 PROCEDURE — 36415 COLL VENOUS BLD VENIPUNCTURE: CPT

## 2019-02-11 PROCEDURE — 84165 PATHOLOGIST INTERPRETATION SPE: ICD-10-PCS | Mod: 26,,, | Performed by: PATHOLOGY

## 2019-02-11 PROCEDURE — 86706 HEP B SURFACE ANTIBODY: CPT

## 2019-02-11 PROCEDURE — 86038 ANTINUCLEAR ANTIBODIES: CPT

## 2019-02-11 PROCEDURE — 86160 COMPLEMENT ANTIGEN: CPT | Mod: 59

## 2019-02-11 PROCEDURE — 83970 ASSAY OF PARATHORMONE: CPT

## 2019-02-11 PROCEDURE — 84550 ASSAY OF BLOOD/URIC ACID: CPT

## 2019-02-11 PROCEDURE — 85025 COMPLETE CBC W/AUTO DIFF WBC: CPT

## 2019-02-11 PROCEDURE — 86803 HEPATITIS C AB TEST: CPT

## 2019-02-11 PROCEDURE — 80069 RENAL FUNCTION PANEL: CPT

## 2019-02-11 PROCEDURE — 86431 RHEUMATOID FACTOR QUANT: CPT

## 2019-02-11 PROCEDURE — 86334 PATHOLOGIST INTERPRETATION IFE: ICD-10-PCS | Mod: 26,,, | Performed by: PATHOLOGY

## 2019-02-11 PROCEDURE — 86592 SYPHILIS TEST NON-TREP QUAL: CPT

## 2019-02-11 PROCEDURE — 86334 IMMUNOFIX E-PHORESIS SERUM: CPT | Mod: 26,,, | Performed by: PATHOLOGY

## 2019-02-11 PROCEDURE — 84165 PROTEIN E-PHORESIS SERUM: CPT

## 2019-02-11 PROCEDURE — 86235 NUCLEAR ANTIGEN ANTIBODY: CPT

## 2019-02-11 PROCEDURE — 86160 COMPLEMENT ANTIGEN: CPT

## 2019-02-11 PROCEDURE — 86704 HEP B CORE ANTIBODY TOTAL: CPT

## 2019-02-11 PROCEDURE — 84165 PROTEIN E-PHORESIS SERUM: CPT | Mod: 26,,, | Performed by: PATHOLOGY

## 2019-02-11 PROCEDURE — 82728 ASSAY OF FERRITIN: CPT

## 2019-02-11 PROCEDURE — 87340 HEPATITIS B SURFACE AG IA: CPT

## 2019-02-11 PROCEDURE — 83540 ASSAY OF IRON: CPT

## 2019-02-11 PROCEDURE — 86334 IMMUNOFIX E-PHORESIS SERUM: CPT

## 2019-02-11 PROCEDURE — 82306 VITAMIN D 25 HYDROXY: CPT

## 2019-02-11 PROCEDURE — 86225 DNA ANTIBODY NATIVE: CPT

## 2019-02-12 ENCOUNTER — HOSPITAL ENCOUNTER (OUTPATIENT)
Dept: WOUND CARE | Facility: HOSPITAL | Age: 69
Discharge: HOME | End: 2019-02-12
Attending: PODIATRIST
Payer: MEDICARE

## 2019-02-12 DIAGNOSIS — E11.621 DIABETIC ULCER OF OTHER PART OF RIGHT FOOT ASSOCIATED WITH TYPE 2 DIABETES MELLITUS, LIMITED TO BREAKDOWN OF SKIN: Primary | ICD-10-CM

## 2019-02-12 DIAGNOSIS — L97.511 DIABETIC ULCER OF OTHER PART OF RIGHT FOOT ASSOCIATED WITH TYPE 2 DIABETES MELLITUS, LIMITED TO BREAKDOWN OF SKIN: Primary | ICD-10-CM

## 2019-02-12 DIAGNOSIS — I73.9 PAD (PERIPHERAL ARTERY DISEASE): ICD-10-CM

## 2019-02-12 LAB
ALBUMIN SERPL ELPH-MCNC: 3.26 G/DL
ALPHA1 GLOB SERPL ELPH-MCNC: 0.44 G/DL
ALPHA2 GLOB SERPL ELPH-MCNC: 0.97 G/DL
ANA SER QL IF: NORMAL
ANTI-SSA ANTIBODY: 0.56 EU
ANTI-SSA INTERPRETATION: NEGATIVE
B-GLOBULIN SERPL ELPH-MCNC: 0.95 G/DL
DSDNA AB SER-ACNC: NORMAL [IU]/ML
GAMMA GLOB SERPL ELPH-MCNC: 1.18 G/DL
HBV CORE AB SERPL QL IA: NEGATIVE
HBV SURFACE AB SER-ACNC: NEGATIVE M[IU]/ML
HBV SURFACE AG SERPL QL IA: NEGATIVE
HCV AB SERPL QL IA: NEGATIVE
KAPPA LC SER QL IA: 6.93 MG/DL
KAPPA LC/LAMBDA SER IA: 2.33
LAMBDA LC SER QL IA: 2.98 MG/DL
PATHOLOGIST INTERPRETATION SPE: NORMAL
PROT SERPL-MCNC: 6.8 G/DL
RHEUMATOID FACT SERPL-ACNC: <10 IU/ML
RPR SER QL: NORMAL

## 2019-02-12 PROCEDURE — 99213 OFFICE O/P EST LOW 20 MIN: CPT

## 2019-02-12 NOTE — PROGRESS NOTES
Subjective:       Patient ID: Chau Rodarte is a 68 y.o. male.    Chief Complaint: Diabetic Foot Ulcer    Chief Complaint: Wound Care     1/22/19: Client readmitted today to clinic. Visit with Dr. Bland for diabetic foot callous to right plantar foot. TCOMs completed today also.  1/30/19: Nurse visit today. Wound site closed.  02/05/19  Patient presents to wound care for follow up with Dr. Bland.  Selective debridement done per Dr. Bland done.  Post debridement measurements 0.5 X 0.1 X 0.1 cm.  Continue football dressings with adhesive foam/felt and darco shoe  2/12/19:  Nurse visit to change football dressing.               Review of Systems    Objective:      Physical Exam    Assessment:       1. Diabetic ulcer of other part of right foot associated with type 2 diabetes mellitus, limited to breakdown of skin    2. PAD (peripheral artery disease)           Wound 01/22/19 1430 Diabetic Ulcer plantar Foot #1 (Active)   01/22/19 1430    Pre-existing: Yes   Primary Wound Type: Diabetic ulc   Side: Right   Orientation: plantar   Location: Foot   Wound/PI Number (optional): #1   Ankle-Brachial Index:    Pulses: doppler   Removal Indication and Assessment:    Wound Outcome:    (Retired) Wound Type:    (Retired) Wound Length (cm):    (Retired) Wound Width (cm):    (Retired) Depth (cm):    Wound Description (Comments):    Removal Indications:    Dressing Appearance Dry;Clean;Intact 2/12/2019  9:00 AM   Drainage Amount None 2/12/2019  9:00 AM   Appearance Dry;Epithelialization 2/12/2019  9:00 AM   Periwound Area Intact;Dry 2/12/2019  9:00 AM   Wound Edges Callused 2/12/2019  9:00 AM   Wound Length (cm) 0.1 cm 2/12/2019  9:00 AM   Wound Width (cm) 0.1 cm 2/12/2019  9:00 AM   Wound Depth (cm) 0.1 cm 2/12/2019  9:00 AM   Wound Volume (cm^3) 0 cm^3 2/12/2019  9:00 AM   Wound Surface Area (cm^2) 0.01 cm^2 2/12/2019  9:00 AM   Care Cleansed with:;Sterile normal saline 2/12/2019  9:00 AM   Dressing Collagen;Non-adherent;Cast  padding 2/12/2019  9:00 AM   Periwound Care Dry periwound area maintained;Skin barrier film applied 2/12/2019  9:00 AM   Off Loading Football dressing;Off loading shoe 2/12/2019  9:00 AM   Dressing Change Due 02/19/19 2/12/2019  9:00 AM   Wound #1 Diabetic ulcer plantar foot      Cleanse wound with:Normal saline  Periwound care: [place]  Primary dressing: melissa moistened with normal saline  Secondary dressing: adptic  Offloading:Adhesive foam aperature pad x 3 cut out to off load wound,  Football: krysten foam X 3, cast padding x 2, flexinet, blue bootie, darco  Edema control:  Patient to elevate lower extremity as much as possible  Frequency:  Once weekly        Plan:              Follow up with Dr. Bland on Tuesday in 1 week 2/19/19.

## 2019-02-13 ENCOUNTER — OFFICE VISIT (OUTPATIENT)
Dept: FAMILY MEDICINE | Facility: CLINIC | Age: 69
End: 2019-02-13
Payer: MEDICARE

## 2019-02-13 VITALS
BODY MASS INDEX: 37.2 KG/M2 | OXYGEN SATURATION: 95 % | WEIGHT: 251.13 LBS | HEART RATE: 55 BPM | HEIGHT: 69 IN | DIASTOLIC BLOOD PRESSURE: 86 MMHG | TEMPERATURE: 98 F | SYSTOLIC BLOOD PRESSURE: 128 MMHG

## 2019-02-13 DIAGNOSIS — H60.312 DIFFUSE OTITIS EXTERNA OF LEFT EAR, UNSPECIFIED CHRONICITY: Primary | ICD-10-CM

## 2019-02-13 PROCEDURE — 1101F PR PT FALLS ASSESS DOC 0-1 FALLS W/OUT INJ PAST YR: ICD-10-PCS | Mod: CPTII,S$GLB,, | Performed by: FAMILY MEDICINE

## 2019-02-13 PROCEDURE — 3074F SYST BP LT 130 MM HG: CPT | Mod: CPTII,S$GLB,, | Performed by: FAMILY MEDICINE

## 2019-02-13 PROCEDURE — 1101F PT FALLS ASSESS-DOCD LE1/YR: CPT | Mod: CPTII,S$GLB,, | Performed by: FAMILY MEDICINE

## 2019-02-13 PROCEDURE — 3079F PR MOST RECENT DIASTOLIC BLOOD PRESSURE 80-89 MM HG: ICD-10-PCS | Mod: CPTII,S$GLB,, | Performed by: FAMILY MEDICINE

## 2019-02-13 PROCEDURE — 99999 PR PBB SHADOW E&M-EST. PATIENT-LVL III: ICD-10-PCS | Mod: PBBFAC,,, | Performed by: FAMILY MEDICINE

## 2019-02-13 PROCEDURE — 99214 PR OFFICE/OUTPT VISIT, EST, LEVL IV, 30-39 MIN: ICD-10-PCS | Mod: S$GLB,,, | Performed by: FAMILY MEDICINE

## 2019-02-13 PROCEDURE — 99214 OFFICE O/P EST MOD 30 MIN: CPT | Mod: S$GLB,,, | Performed by: FAMILY MEDICINE

## 2019-02-13 PROCEDURE — 3079F DIAST BP 80-89 MM HG: CPT | Mod: CPTII,S$GLB,, | Performed by: FAMILY MEDICINE

## 2019-02-13 PROCEDURE — 3074F PR MOST RECENT SYSTOLIC BLOOD PRESSURE < 130 MM HG: ICD-10-PCS | Mod: CPTII,S$GLB,, | Performed by: FAMILY MEDICINE

## 2019-02-13 PROCEDURE — 99999 PR PBB SHADOW E&M-EST. PATIENT-LVL III: CPT | Mod: PBBFAC,,, | Performed by: FAMILY MEDICINE

## 2019-02-13 RX ORDER — CIPROFLOXACIN 500 MG/1
500 TABLET ORAL 2 TIMES DAILY
Qty: 20 TABLET | Refills: 0 | Status: SHIPPED | OUTPATIENT
Start: 2019-02-13 | End: 2019-02-20

## 2019-02-13 RX ORDER — LEVOFLOXACIN 500 MG/1
500 TABLET, FILM COATED ORAL DAILY
Qty: 10 TABLET | Refills: 0 | Status: CANCELLED | OUTPATIENT
Start: 2019-02-13 | End: 2019-02-23

## 2019-02-13 RX ORDER — HYDROCODONE BITARTRATE AND ACETAMINOPHEN 5; 325 MG/1; MG/1
1 TABLET ORAL EVERY 6 HOURS PRN
Qty: 30 TABLET | Refills: 0 | Status: SHIPPED | OUTPATIENT
Start: 2019-02-13 | End: 2019-02-20

## 2019-02-13 NOTE — PROGRESS NOTES
HPI:  Chau Rodarte is a 68 y.o. year old male that  presents with left ear pain mor 1 month. He has been to 4 emergency room visits.He describes the pain as intense and spreading into his cheek. He has had a CT of his head done in the ER and was not found to have any signs of bone involvement. He has been given oral amoxicillin and antibiotic drops so far. Neither has helped.  Chief Complaint   Patient presents with    Otalgia     left x 1 month   .           Past Medical History:   Diagnosis Date    Acute on chronic systolic CHF (congestive heart failure) 2016    Anemia     Anticoagulant long-term use     CKD (chronic kidney disease) stage 2, GFR 60-89 ml/min 2016    CKD (chronic kidney disease) stage 3, GFR 30-59 ml/min     Coronary artery disease     Encounter for blood transfusion     HTN (hypertension) 3/3/2014    Hyperlipidemia 3/3/2014    NSTEMI (non-ST elevated myocardial infarction) 2016    Obesity (BMI 30-39.9) 2016    PVD (peripheral vascular disease)     Stroke     Type 2 diabetes mellitus with diabetic peripheral angiopathy without gangrene, without long-term current use of insulin 10/24/2013    Type 2 diabetes mellitus with diabetic polyneuropathy, without long-term current use of insulin 2016     Social History     Socioeconomic History    Marital status:      Spouse name: Not on file    Number of children: Not on file    Years of education: Not on file    Highest education level: Not on file   Social Needs    Financial resource strain: Not on file    Food insecurity - worry: Not on file    Food insecurity - inability: Not on file    Transportation needs - medical: Not on file    Transportation needs - non-medical: Not on file   Occupational History    Not on file   Tobacco Use    Smoking status: Former Smoker     Packs/day: 1.00     Years: 50.00     Pack years: 50.00     Last attempt to quit: 3/1/2011     Years since quittin.9     Smokeless tobacco: Never Used   Substance and Sexual Activity    Alcohol use: No    Drug use: No    Sexual activity: Yes     Partners: Female     Comment:  with 2 children 3 grand sons and 1 great grand daughter   Other Topics Concern    Not on file   Social History Narrative    Lives with wife, works PT for [employer], 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, 3 grandsons, 1 great grand daughter     Past Surgical History:   Procedure Laterality Date    ABLATION N/A 2/3/2017    [facility] at [facility] C[facility] LAB    ANGIOGRAM-EXTREMITY- Right LOWER CO2, Diagnostic only, Right CFA access Right 7/14/2017    Performed by Nehal William MD at [facility] CATH LAB    [id number] Left 6/14/2017    Performed by [facility]hal [facility] at [facility] OR 2ND FLR    CARDIAC SURGERY      COLONOSCOPY N/A 3/21/2017    Performed by Karissa Peck MD at [facility] ENDO (2ND FLR)    CORONARY ANGIOPLASTY WITH STENT PLACEMENT      DEBRIDEMENT, ULCER, WITH SKIN GRAFT APPLICATION Right 11/13/2013    [facility] at [facility] OR    ESOPHAGOGASTRODUODENOSCOPY (EGD) N/A 3/21/2017    Performed by [facility] at [facility] ENDO (2ND FLR)    FOOT NERVE GRAFT  11/2013    HEART CATH-LEFT Left 11/28/2016    Performed by Robbie Collazo MD at [facility] CATH LAB    left leg stent      Loop Recorder placement      right leg bypass      TRANSESOPHAGEAL ECHOCARDIOGRAM (LIANE) N/A 2/3/2017    Performed by [facility] at [facility] CATH LAB     Family History   Problem Relation Age of Onset    Diabetes Mother     Glaucoma Mother     Cataracts Mother     Heart disease Father     Blindness Maternal Grandmother     Macular degeneration Neg Hx     Retinal detachment Neg Hx     Strabismus Neg Hx     Amblyopia Neg Hx            Review of Systems  General ROS: negative for chills, fever or weight loss  ENT ROS: negative for epistaxis, sore throat or rhinorrhea  Respiratory ROS: no  "cough, shortness of breath, or wheezing  Cardiovascular ROS: no chest pain or dyspnea on exertion  Gastrointestinal ROS: no abdominal pain, change in bowel habits, or black/ bloody stools    Physical Exam:  /86 (BP Location: Right arm, Patient Position: Sitting, BP Method: Large (Manual))   Pulse (!) 55   Temp 98.2 °F (36.8 °C) (Oral)   Ht 5' 9" (1.753 m)   Wt 113.9 kg (251 lb 1.7 oz)   SpO2 95%   BMI 37.08 kg/m²   General appearance: alert, cooperative, no distress  Constitutional:Oriented to person, place, and time.appears well-developed and well-nourished.  HEENT: Normocephalic, atraumatic, neck symmetrical, no nasal discharge, TM- clear bilaterally  Lungs: clear to auscultation bilaterally, no dullness to percussion bilaterally  Heart: regular rate and rhythm without rub; no displacement of the PMI , S1&S2 present  Abdomen: soft, non-tender; bowel sounds normoactive; no organomegaly  Physical Exam      LABS:    Complete Blood Count  Lab Results   Component Value Date    RBC 4.31 (L) 02/11/2019    HGB 12.5 (L) 02/11/2019    HCT 39.3 (L) 02/11/2019    MCV 91 02/11/2019    MCH 29.0 02/11/2019    MCHC 31.8 (L) 02/11/2019    RDW 14.3 02/11/2019     02/11/2019    MPV 9.9 02/11/2019    GRAN 6.7 02/11/2019    GRAN 74.4 (H) 02/11/2019    LYMPH 1.4 02/11/2019    LYMPH 15.3 (L) 02/11/2019    MONO 0.6 02/11/2019    MONO 6.8 02/11/2019    EOS 0.3 02/11/2019    BASO 0.03 02/11/2019    EOSINOPHIL 2.9 02/11/2019    BASOPHIL 0.3 02/11/2019    DIFFMETHOD Automated 02/11/2019       Comprehensive Metabolic Panel  Lab Results   Component Value Date     (H) 02/11/2019     (H) 02/11/2019    BUN 22 02/11/2019    BUN 22 02/11/2019    CREATININE 2.3 (H) 02/11/2019    CREATININE 2.3 (H) 02/11/2019     02/11/2019     02/11/2019    K 4.4 02/11/2019    K 4.4 02/11/2019     02/11/2019     02/11/2019    PROT 7.2 01/30/2019    ALBUMIN 3.3 (L) 02/11/2019    ALBUMIN 3.3 (L) 02/11/2019    " "BILITOT 0.6 01/30/2019    AST 13 01/30/2019    ALKPHOS 116 01/30/2019    CO2 25 02/11/2019    CO2 25 02/11/2019    ALT 9 (L) 01/30/2019    ANIONGAP 9 02/11/2019    ANIONGAP 9 02/11/2019    EGFRNONAA 28 (A) 02/11/2019    EGFRNONAA 28 (A) 02/11/2019    ESTGFRAFRICA 32 (A) 02/11/2019    ESTGFRAFRICA 32 (A) 02/11/2019       LIPID  Lab Results   Component Value Date    CHOL 115 (L) 03/29/2018    HDL 28 (L) 03/29/2018         TSH  Lab Results   Component Value Date    TSH 0.395 (L) 07/02/2018       Current Outpatient Medications   Medication Sig Dispense Refill    amiodarone (PACERONE) 200 MG Tab Take 1 tablet (200 mg total) by mouth once daily. Take 2 tablets (400 mg) twice daily for 14 days, then 1 tablet (200 mg) daily. 90 tablet 3    aspirin (ECOTRIN) 81 MG EC tablet Take 81 mg by mouth once daily.      atorvastatin (LIPITOR) 80 MG tablet TAKE 1 TABLET BY MOUTH EVERY DAY 90 tablet 11    BD ULTRA-FINE HANG PEN NEEDLES 32 gauge x 5/32" Ndle USE TWICE DAILY FOR INSULIN INJECTION 100 each 11    clopidogrel (PLAVIX) 75 mg tablet TAKE 1 TABLET BY MOUTH EVERY DAY 90 tablet 1    influenza (FLUZONE HIGH-DOSE) 180 mcg/0.5 mL vaccine Inject into the muscle. 0.5 mL 0    insulin detemir (LEVEMIR FLEXTOUCH) 100 unit/mL (3 mL) SubQ InPn pen Inject 16 Units into the skin every evening. 1 Box 3    isosorbide dinitrate (ISORDIL) 20 MG tablet Take 1 tablet (20 mg total) by mouth once daily. 90 tablet 3    metoprolol succinate (TOPROL-XL) 25 MG 24 hr tablet TAKE 1 TABLET (25 MG TOTAL) BY MOUTH ONCE DAILY. 90 tablet 4    VICTOZA 2-SAGAR 0.6 mg/0.1 mL (18 mg/3 mL) PnIj INJECT 1.2 MG INTO THE SKIN ONCE DAILY. 18 Syringe 2    ciprofloxacin HCl (CIPRO) 500 MG tablet Take 1 tablet (500 mg total) by mouth 2 (two) times daily. for 10 days 20 tablet 0    HYDROcodone-acetaminophen (NORCO) 5-325 mg per tablet Take 1 tablet by mouth every 6 (six) hours as needed for Pain. 30 tablet 0     Current Facility-Administered Medications "   Medication Dose Route Frequency Provider Last Rate Last Dose    lidocaine HCL 10 mg/ml (1%) injection 20 mL  20 mL Intradermal Once Quirino Bland DPM        lidocaine HCL 10 mg/ml (1%) injection 20 mL  20 mL Intradermal 1 time in Clinic/HOD Quirino Bland DPM           Assessment:    ICD-10-CM ICD-9-CM    1. Diffuse otitis externa of left ear, unspecified chronicity H60.312 380.10 HYDROcodone-acetaminophen (NORCO) 5-325 mg per tablet      ciprofloxacin HCl (CIPRO) 500 MG tablet         Plan:    Follow-up for keep appt with PCP.          Yoly Kenney MD

## 2019-02-14 LAB
INTERPRETATION SERPL IFE-IMP: NORMAL
PATHOLOGIST INTERPRETATION IFE: NORMAL

## 2019-02-19 ENCOUNTER — HOSPITAL ENCOUNTER (OUTPATIENT)
Dept: WOUND CARE | Facility: HOSPITAL | Age: 69
Discharge: HOME | End: 2019-02-19
Attending: PODIATRIST
Payer: MEDICARE

## 2019-02-19 ENCOUNTER — HOSPITAL ENCOUNTER (OUTPATIENT)
Dept: CARDIOLOGY | Facility: HOSPITAL | Age: 69
Discharge: HOME | End: 2019-02-19
Attending: INTERNAL MEDICINE
Payer: MEDICARE

## 2019-02-19 VITALS
SYSTOLIC BLOOD PRESSURE: 139 MMHG | BODY MASS INDEX: 37.18 KG/M2 | TEMPERATURE: 98 F | DIASTOLIC BLOOD PRESSURE: 66 MMHG | HEIGHT: 69 IN | HEART RATE: 48 BPM | WEIGHT: 251 LBS

## 2019-02-19 DIAGNOSIS — Z79.4 TYPE 2 DIABETES MELLITUS WITH DIABETIC POLYNEUROPATHY, WITH LONG-TERM CURRENT USE OF INSULIN: ICD-10-CM

## 2019-02-19 DIAGNOSIS — Z87.2 HEALED ULCER OF RIGHT FOOT ON EXAMINATION: ICD-10-CM

## 2019-02-19 DIAGNOSIS — I50.42 CHRONIC COMBINED SYSTOLIC AND DIASTOLIC CONGESTIVE HEART FAILURE, NYHA CLASS 1: ICD-10-CM

## 2019-02-19 DIAGNOSIS — I25.10 CORONARY ARTERY DISEASE INVOLVING NATIVE CORONARY ARTERY OF NATIVE HEART WITHOUT ANGINA PECTORIS: ICD-10-CM

## 2019-02-19 DIAGNOSIS — I73.9 PAD (PERIPHERAL ARTERY DISEASE): Primary | ICD-10-CM

## 2019-02-19 DIAGNOSIS — Z95.828 S/P FEMORAL-POPLITEAL BYPASS SURGERY: ICD-10-CM

## 2019-02-19 DIAGNOSIS — E11.42 TYPE 2 DIABETES MELLITUS WITH DIABETIC POLYNEUROPATHY, WITH LONG-TERM CURRENT USE OF INSULIN: ICD-10-CM

## 2019-02-19 LAB
AORTIC ROOT ANNULUS: 4.35 CM
AV INDEX (PROSTH): 1.12
AV MEAN GRADIENT: 1.7 MMHG
AV PEAK GRADIENT: 3.39 MMHG
AV VALVE AREA: 4.47 CM2
AV VELOCITY RATIO: 0.9
CV ECHO LV RWT: 0.65 CM
DOP CALC AO PEAK VEL: 0.92 M/S
DOP CALC AO VTI: 14.9 CM
DOP CALC LVOT AREA: 3.97 CM2
DOP CALC LVOT DIAMETER: 2.25 CM
DOP CALC LVOT PEAK VEL: 0.83 M/S
DOP CALC LVOT STROKE VOLUME: 66.61 CM3
DOP CALCLVOT PEAK VEL VTI: 16.76 CM
E WAVE DECELERATION TIME: 294.67 MSEC
E/A RATIO: 0.73
ECHO LV POSTERIOR WALL: 1.44 CM (ref 0.6–1.1)
FRACTIONAL SHORTENING: 20 % (ref 28–44)
INTERVENTRICULAR SEPTUM: 1.39 CM (ref 0.6–1.1)
LA MAJOR: 4.82 CM
LA MINOR: 3.64 CM
LA WIDTH: 3.91 CM
LEFT ATRIUM SIZE: 3.56 CM
LEFT ATRIUM VOLUME: 49.07 CM3
LEFT INTERNAL DIMENSION IN SYSTOLE: 3.57 CM (ref 2.1–4)
LEFT VENTRICLE DIASTOLIC VOLUME: 90.5 ML
LEFT VENTRICLE SYSTOLIC VOLUME: 53.34 ML
LEFT VENTRICULAR INTERNAL DIMENSION IN DIASTOLE: 4.46 CM (ref 3.5–6)
LEFT VENTRICULAR MASS: 249.12 G
LV LATERAL E/E' RATIO: 5.75
MV PEAK A VEL: 0.63 M/S
MV PEAK E VEL: 0.46 M/S
RA PRESSURE: 3 MMHG
RIGHT VENTRICULAR END-DIASTOLIC DIMENSION: 2.99 CM
TDI LATERAL: 0.08

## 2019-02-19 PROCEDURE — 99212 OFFICE O/P EST SF 10 MIN: CPT | Mod: 25,27 | Performed by: PODIATRIST

## 2019-02-19 PROCEDURE — C8929 TTE W OR WO FOL WCON,DOPPLER: HCPCS

## 2019-02-19 PROCEDURE — 93306 TRANSTHORACIC ECHO (TTE) COMPLETE (CUPID ONLY): ICD-10-PCS | Mod: 26,,, | Performed by: INTERNAL MEDICINE

## 2019-02-19 PROCEDURE — 99212 OFFICE O/P EST SF 10 MIN: CPT | Mod: 25

## 2019-02-19 PROCEDURE — 93306 TTE W/DOPPLER COMPLETE: CPT | Mod: 26,,, | Performed by: INTERNAL MEDICINE

## 2019-02-19 NOTE — PROGRESS NOTES
"[facility]                Progress Note    Subjective:       Patient ID: Chau Rodarte is a 68 y.o. male.    Chief Complaint: Diabetic Foot Ulcer    Chief Complaint: Diabetic Foot Ulcer     Chief Complaint: Wound Care     1/22/19: Client readmitted today to clinic. Visit with Dr. Bland for diabetic foot callous to right plantar foot. TCOMs completed today also.  1/30/19: Nurse visit today. Wound site closed.  02/05/19  Patient presents to wound care for follow up with Dr. Bland.  Selective debridement done per Dr. Bland done.  Post debridement measurements 0.5 X 0.1 X 0.1 cm.  Continue football dressings with adhesive foam/felt and darco shoe  2/12/19:  Nurse visit to change football dressing.    2/19/19:  Patient discharged from the [facility].  Football dressing applied for offloading today.  Patient to remove football dressing in 1 week and begin using his custom orthotic.  Follow up with Dr. Bland in 6 weeks in his office on the 5th floor.            Vitals:    02/19/19 1518   BP: 139/66   Pulse: (!) 48   Temp: 98.2 °F (36.8 °C)   Weight: 113.9 kg (251 lb)   Height: 5' 9" (1.753 m)   PainSc: 0-No pain      Past Medical History:   Diagnosis Date    Acute on chronic systolic CHF (congestive heart failure) 11/29/2016    Anemia     Anticoagulant long-term use     CKD (chronic kidney disease) stage 2, GFR 60-89 ml/min 2/21/2016    CKD (chronic kidney disease) stage 3, GFR 30-59 ml/min     Coronary artery disease     Encounter for blood transfusion     HTN (hypertension) 3/3/2014    Hyperlipidemia 3/3/2014    NSTEMI (non-ST elevated myocardial infarction) 11/28/2016    Obesity (BMI 30-39.9) 11/29/2016    PVD (peripheral vascular disease)     Stroke     Type 2 diabetes mellitus with diabetic peripheral angiopathy without gangrene, without long-term current use of insulin 10/24/2013    Type 2 diabetes mellitus with diabetic polyneuropathy, without " long-term current use of insulin 11/29/2016       Past Surgical History:   Procedure Laterality Date    ABLATION N/A 2/3/2017    Performed by [facility]den [facility]no MD at [facility] CATH LAB    ANGIOGRAM-EXTREMITY- Right LOWER CO2, Diagnostic only, Right CFA access Right 7/14/2017    Performed by Nehal William MD at [facility] CATH LAB    [id number] Left 6/14/2017    Performed by Nehal William MD at [facility] OR 2ND FLR    CARDIAC SURGERY      COLONOSCOPY N/A 3/21/2017    Performed by Karissa Peck MD at [facility] ENDO (2ND FLR)    CORONARY ANGIOPLASTY WITH STENT PLACEMENT      DEBRIDEMENT, ULCER, WITH SKIN GRAFT APPLICATION Right 11/13/2013    [facility] OR    ESOPHAGOGASTRODUODENOSCOPY (EGD) N/A 3/21/2017    Performed by Karissa Peck MD at [facility] ENDO (2ND FLR)    FOOT NERVE GRAFT  11/2013    HEART CATH-LEFT Left 11/28/2016    Performed by Robbie Collazo MD at [facility] CATH LAB    left leg stent      Loop Recorder placement      right leg bypass      TRANSESOPHAGEAL ECHOCARDIOGRAM (LIANE) N/A 2/3/2017    Performed by Cayden Moreno MD at [facility] CATH LAB       Family History   Problem Relation Age of Onset    Diabetes Mother     Glaucoma Mother     Cataracts Mother     Heart disease Father     Blindness Maternal Grandmother     Macular degeneration Neg Hx     Retinal detachment Neg Hx     Strabismus Neg Hx     Amblyopia Neg Hx        Social History     Socioeconomic History    Marital status:      Spouse name: None    Number of children: None    Years of education: None    Highest education level: None   Social Needs    Financial resource strain: None    Food insecurity - worry: None    Food insecurity - inability: None    Transportation needs - medical: None    Transportation needs - non-medical: None   Occupational History    None   Tobacco Use    Smoking status: Former Smoker     Packs/day: 1.00     Years: 50.00     Pack years: 50.00     Last  "attempt to quit: 3/1/2011     Years since quittin.9    Smokeless tobacco: Never Used   Substance and Sexual Activity    Alcohol use: No    Drug use: No    Sexual activity: Yes     Partners: Female     Comment:  with 2 children 3 grand sons and 1 great grand daughter   Other Topics Concern    None   Social History Narrative    Lives with wife, works PT for [employer], 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 Take 2 tablets (400 mg) twice daily for 14 days, then 1 tablet (200 mg) daily. 90 tablet 3    aspirin (ECOTRIN) 81 MG EC tablet Take 81 mg by mouth once daily.      atorvastatin (LIPITOR) 80 MG tablet TAKE 1 TABLET BY MOUTH EVERY DAY 90 tablet 11    BD ULTRA-FINE HANG PEN NEEDLES 32 gauge x 5/32" Ndle USE TWICE DAILY FOR INSULIN INJECTION 100 each 11    clopidogrel (PLAVIX) 75 mg tablet TAKE 1 TABLET BY MOUTH EVERY DAY 90 tablet 1    insulin detemir (LEVEMIR FLEXTOUCH) 100 unit/mL (3 mL) SubQ InPn pen Inject 16 Units into the skin every evening. 1 Box 3    isosorbide dinitrate (ISORDIL) 20 MG tablet Take 1 tablet (20 mg total) by mouth once daily. 90 tablet 3    metoprolol succinate (TOPROL-XL) 25 MG 24 hr tablet TAKE 1 TABLET (25 MG TOTAL) BY MOUTH ONCE DAILY. 90 tablet 4    VICTOZA 2-SAGAR 0.6 mg/0.1 mL (18 mg/3 mL) PnIj INJECT 1.2 MG INTO THE SKIN ONCE DAILY. 18 Syringe 2    ciprofloxacin HCl (CIPRO) 500 MG tablet Take 1 tablet (500 mg total) by mouth 2 (two) times daily. for 10 days 20 tablet 0    ciprofloxacin-dexamethasone 0.3-0.1% (CIPRODEX) 0.3-0.1 % DrpS Place 4 drops into both ears 2 (two) times daily. for 10 days 7.5 mL 0     Current Facility-Administered Medications   Medication Dose Route Frequency Provider Last Rate Last Dose    lidocaine HCL 10 mg/ml (1%) " injection 20 mL  20 mL Intradermal Once Quirino Bland DPM        lidocaine HCL 10 mg/ml (1%) injection 20 mL  20 mL Intradermal 1 time in Clinic/HOD Quirino Bland DPM           Review of patient's allergies indicates:   Allergen Reactions    Adhesive tape-silicones Blisters         Review of Systems   Constitutional: Negative for chills, fatigue and fever.   HENT: Negative for congestion.    Respiratory: Negative for cough and shortness of breath.    Gastrointestinal: Negative for diarrhea, nausea and vomiting.   Musculoskeletal: Negative for arthralgias.   Skin: Positive for wound.   Neurological: Negative for dizziness.   Psychiatric/Behavioral: Negative for agitation and behavioral problems.         Objective:        Physical Exam   Constitutional: He is oriented to person, place, and time. No distress.   Cardiovascular:   Pulses:       Dorsalis pedis pulses are 1+ on the right side, and 1+ on the left side.        Posterior tibial pulses are 1+ on the right side, and 1+ on the left side.   PT/DP monophasic with doppler right.   Musculoskeletal:   Dorsal and medial subluxation of hallux at 1 MTP right foot.    + equinus bilateral ankle.   Neurological: He is alert and oriented to person, place, and time. A sensory deficit is present.   [place] 5.07 monofilamant testing is diminished German feet.      Skin: Skin is dry. Capillary refill takes 2 to 3 seconds. No ecchymosis and no rash noted. He is not diaphoretic. No cyanosis or erythema. No pallor. Nails show no clubbing.   Refer to wound description below         Vitals:    02/19/19 1518   BP: 139/66   Pulse: (!) 48   Temp: 98.2 °F (36.8 °C)       Assessment:           ICD-10-CM ICD-9-CM   1. PAD (peripheral artery disease) I73.9 443.9   2. Type 2 diabetes mellitus with diabetic polyneuropathy, with long-term current use of insulin E11.42 250.60    Z79.4 357.2     V58.67   3. S/P femoral-popliteal bypass surgery Z95.828 V45.89   4. Healed  ulcer of right foot on examination Z87.2 V13.3       [REMOVED]      Wound 01/22/19 1430 Diabetic Ulcer plantar Foot #1 (Removed)   01/22/19 1430    Pre-existing: Yes   Primary Wound Type: Diabetic ulc   Side: Right   Orientation: plantar   Location: Foot   Wound/PI Number (optional): #1   Ankle-Brachial Index:    Pulses: doppler   Removal Indication and Assessment: closed/resurfaced   Wound Outcome: Healed   (Retired) Wound Type:    (Retired) Wound Length (cm):    (Retired) Wound Width (cm):    (Retired) Depth (cm):    Wound Description (Comments):    Removal Indications:    Removed 02/19/19 1530   Wound Image   2/19/2019  3:00 PM   Dressing Appearance Dry;Intact;Clean 2/19/2019  3:00 PM   Drainage Amount None 2/19/2019  3:00 PM   Appearance Dry;Epithelialization 2/19/2019  3:00 PM   Periwound Area Intact;Dry 2/19/2019  3:00 PM   Wound Edges Callused 2/19/2019  3:00 PM   Care Cleansed with:;Soap and water 2/19/2019  3:00 PM   Dressing Changed;Foam;Cast padding 2/19/2019  3:00 PM   Periwound Care Dry periwound area maintained;Skin barrier film applied;Moisturizer applied 2/19/2019  3:00 PM   Off Loading Football dressing;Off loading shoe 2/19/2019  3:00 PM       Wound #1: Diabetic ulcer right plantar foot--Resolved    Offloading: Football: Adhesive foam aperature pad x 3, krysten foam x2, cast padding x 2, coban loosely applied, flexinet, blue bootie, darco  Edema control:  Patient to elevate lower extremity as much as possible  Patient to remove football dressing and begin wearing his custom orthotics in 1 week.  Inspect feet and use moisturizer daily once football dressing is removed and call Dr. Bland's office with complications.              Plan:            Discharged from the [facility]  Follow up with Dr. Bland in 6 weeks in his office on the 5th floor.      Wound healed. Return into Diabetic shoes.    Reviewed daily emollient therapy for his skin. Avoid barefoot walking. Inspect skin daily for break down and signs  of infection.

## 2019-02-20 ENCOUNTER — OFFICE VISIT (OUTPATIENT)
Dept: OTOLARYNGOLOGY | Facility: CLINIC | Age: 69
End: 2019-02-20
Payer: MEDICARE

## 2019-02-20 VITALS
HEART RATE: 47 BPM | BODY MASS INDEX: 37.2 KG/M2 | HEIGHT: 69 IN | DIASTOLIC BLOOD PRESSURE: 62 MMHG | WEIGHT: 251.13 LBS | SYSTOLIC BLOOD PRESSURE: 110 MMHG

## 2019-02-20 DIAGNOSIS — H60.312 ACUTE DIFFUSE OTITIS EXTERNA OF LEFT EAR: Primary | ICD-10-CM

## 2019-02-20 DIAGNOSIS — H92.12 OTORRHEA OF LEFT EAR: ICD-10-CM

## 2019-02-20 PROCEDURE — 3074F SYST BP LT 130 MM HG: CPT | Mod: CPTII,S$GLB,, | Performed by: OTOLARYNGOLOGY

## 2019-02-20 PROCEDURE — 87070 CULTURE OTHR SPECIMN AEROBIC: CPT

## 2019-02-20 PROCEDURE — 99204 PR OFFICE/OUTPT VISIT, NEW, LEVL IV, 45-59 MIN: ICD-10-PCS | Mod: 25,S$GLB,, | Performed by: OTOLARYNGOLOGY

## 2019-02-20 PROCEDURE — 3074F PR MOST RECENT SYSTOLIC BLOOD PRESSURE < 130 MM HG: ICD-10-PCS | Mod: CPTII,S$GLB,, | Performed by: OTOLARYNGOLOGY

## 2019-02-20 PROCEDURE — 1101F PR PT FALLS ASSESS DOC 0-1 FALLS W/OUT INJ PAST YR: ICD-10-PCS | Mod: CPTII,S$GLB,, | Performed by: OTOLARYNGOLOGY

## 2019-02-20 PROCEDURE — 87075 CULTR BACTERIA EXCEPT BLOOD: CPT

## 2019-02-20 PROCEDURE — 87076 CULTURE ANAEROBE IDENT EACH: CPT

## 2019-02-20 PROCEDURE — 99999 PR PBB SHADOW E&M-EST. PATIENT-LVL III: ICD-10-PCS | Mod: PBBFAC,,, | Performed by: OTOLARYNGOLOGY

## 2019-02-20 PROCEDURE — 87077 CULTURE AEROBIC IDENTIFY: CPT | Mod: 59

## 2019-02-20 PROCEDURE — 99999 PR PBB SHADOW E&M-EST. PATIENT-LVL III: CPT | Mod: PBBFAC,,, | Performed by: OTOLARYNGOLOGY

## 2019-02-20 PROCEDURE — 3078F DIAST BP <80 MM HG: CPT | Mod: CPTII,S$GLB,, | Performed by: OTOLARYNGOLOGY

## 2019-02-20 PROCEDURE — 3078F PR MOST RECENT DIASTOLIC BLOOD PRESSURE < 80 MM HG: ICD-10-PCS | Mod: CPTII,S$GLB,, | Performed by: OTOLARYNGOLOGY

## 2019-02-20 PROCEDURE — 1101F PT FALLS ASSESS-DOCD LE1/YR: CPT | Mod: CPTII,S$GLB,, | Performed by: OTOLARYNGOLOGY

## 2019-02-20 PROCEDURE — 99204 OFFICE O/P NEW MOD 45 MIN: CPT | Mod: 25,S$GLB,, | Performed by: OTOLARYNGOLOGY

## 2019-02-20 PROCEDURE — 87186 SC STD MICRODIL/AGAR DIL: CPT | Mod: 59

## 2019-02-20 RX ORDER — CIPROFLOXACIN 500 MG/1
500 TABLET ORAL 2 TIMES DAILY
Qty: 20 TABLET | Refills: 0 | Status: SHIPPED | OUTPATIENT
Start: 2019-02-20 | End: 2019-02-26

## 2019-02-20 RX ORDER — CIPROFLOXACIN AND DEXAMETHASONE 3; 1 MG/ML; MG/ML
4 SUSPENSION/ DROPS AURICULAR (OTIC) 2 TIMES DAILY
Qty: 7.5 ML | Refills: 0 | Status: SHIPPED | OUTPATIENT
Start: 2019-02-20 | End: 2019-03-02

## 2019-02-20 NOTE — PROCEDURES
Procedures     Binocular Microscopy    Indication:  bloody otorrhea    Additional detail was required for the otoscopic examination of the left ear.  The operating microscope was used to directly visualize the tympanic membrane and middle ear landmarks.  Findings:  Canal skin:  erythematous and edematous tissue of the lateral EAC with debris suctioned.    TM:  intact   [place]:  normal   Effusion:  none     The patient tolerated the procedure well.

## 2019-02-20 NOTE — PROGRESS NOTES
Chief Complaint   Patient presents with    Otitis Media     left ear, patient seen in ER four times. started in Jan    Ear Drainage     left ear   .     HPI:    Chau DAMIAN is a 68 y.o. male who presents for evaluation of left ear drainage.  He reports that this has been present for the last month.  Initially, it was severely painful and he was evaluated and seen in the ED.  He states that he has been treated with ear drops and Augmentin without relief in the ED.  He then followed up with his PCP Dr. Kenney who prescribed PO Cipro.  He has been on this medication for about 1 week and notes improvement in the drainage and feels that the pain nearly gone.  The pain is described as mild. He denies hearing loss, tinnitus, or vertigo. The ear is not tender to touch. The post auricular area is not inflammed.     Past Medical History:   Diagnosis Date    Acute on chronic systolic CHF (congestive heart failure) 11/29/2016    Anemia     Anticoagulant long-term use     CKD (chronic kidney disease) stage 2, GFR 60-89 ml/min 2/21/2016    CKD (chronic kidney disease) stage 3, GFR 30-59 ml/min     Coronary artery disease     Encounter for blood transfusion     HTN (hypertension) 3/3/2014    Hyperlipidemia 3/3/2014    NSTEMI (non-ST elevated myocardial infarction) 11/28/2016    Obesity (BMI 30-39.9) 11/29/2016    PVD (peripheral vascular disease)     Stroke     Type 2 diabetes mellitus with diabetic peripheral angiopathy without gangrene, without long-term current use of insulin 10/24/2013    Type 2 diabetes mellitus with diabetic polyneuropathy, without long-term current use of insulin 11/29/2016     Social History     Socioeconomic History    Marital status:      Spouse name: Not on file    Number of children: Not on file    Years of education: Not on file    Highest education level: Not on file   Social Needs    Financial resource strain: Not on file    Food insecurity - worry: Not on file    Food  insecurity - inability: Not on file    Transportation needs - medical: Not on file    Transportation needs - non-medical: Not on file   Occupational History    Not on file   Tobacco Use    Smoking status: Former Smoker     Packs/day: 1.00     Years: 50.00     Pack years: 50.00     Last attempt to quit: 3/1/2011     Years since quittin.9    Smokeless tobacco: Never Used   Substance and Sexual Activity    Alcohol use: No    Drug use: No    Sexual activity: Yes     Partners: Female     Comment:  with 2 children 3 grand sons and 1 great grand daughter   Other Topics Concern    Not on file   Social History Narrative    Lives with wife, works PT for [employer], 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, 3 grandsons, 1 great grand daughter     Past Surgical History:   Procedure Laterality Date    ABLATION N/A 2/3/2017    Performed by [facility] at [facility] CATH LAB    ANGIOGRAM-EXTREMITY- Right LOWER CO2, Diagnostic only, Right CFA access Right 2017    Performed by Nehal William MD at [facility] CATH LAB    [id number] Left 2017    Performed by Nehal William MD at [facility] OR 2ND FLR    CARDIAC SURGERY      COLONOSCOPY N/A 3/21/2017    Performed by Karissa Peck MD at [facility] ENDO (2ND FLR)    CORONARY ANGIOPLASTY WITH STENT PLACEMENT      DEBRIDEMENT, ULCER, WITH SKIN GRAFT APPLICATION Right 2013    Per[facility] by [facility]ab [facility] at [facility]    ESOPHAGOGASTRODUODENOSCOPY (EGD) N/A 3/21/2017    Performed by Karissa Peck MD at [facility] ENDO (2ND FLR)    FOOT NERVE GRAFT  2013    HEART CATH-LEFT Left 2016    Performed by Robbie Collazo MD at [facility] CATH LAB    left leg stent      Loop Recorder placement      right leg bypass      TRANSESOPHAGEAL ECHOCARDIOGRAM (LIANE) N/A 2/3/2017    Performed by [facility]den [facility]no MD at [facility] CATH LAB     Family History   Problem Relation Age of Onset    Diabetes Mother     Glaucoma Mother      Cataracts Mother     Heart disease Father     Blindness Maternal Grandmother     Macular degeneration Neg Hx     Retinal detachment Neg Hx     Strabismus Neg Hx     Amblyopia Neg Hx            Review of Systems  General: negative for chills, fever or weight loss  Psychological: negative for mood changes or depression  Ophthalmic: negative for blurry vision, photophobia or eye pain  ENT: see HPI  Respiratory: no cough, shortness of breath, or wheezing  Cardiovascular: no chest pain or dyspnea on exertion  Gastrointestinal: no abdominal pain, change in bowel habits, or black/ bloody stools  Musculoskeletal: negative for gait disturbance or muscular weakness  Neurological: no syncope or seizures; no ataxia  Dermatological: negative for puritis,  rash and jaundice  Hematologic/lymphatic: no easy bruising, no new lumps or bumps      Physical Exam:    Vitals:    02/20/19 1433   BP: 110/62   Pulse: (!) 47       Constitutional: Well appearing / communicating without difficutly.  NAD.  Eyes: EOM I Bilaterally  Head/Face: Normocephalic.  Negative paranasal sinus pressure/tenderness.  Salivary glands WNL.  House Brackmann I Bilaterally.    Right Ear: Auricle normal appearance. External Auditory Canal within normal limits no lesions or masses,TM w/o masses/lesions/perforations. TM mobility noted.   Left Ear: Auricle normal appearance. External Auditory Canal within normal limits no lesions or masses,TM w/o masses/lesions/perforations. TM mobility noted.  Nose: No gross nasal septal deviation. Inferior Turbinates 3+ bilaterally. No septal perforation. No masses/lesions. External nasal skin appears normal without masses/lesions.  Oral Cavity: Gingiva/lips within normal limits.  Dentition/gingiva healthy appearing. Mucus membranes moist. Floor of mouth soft, no masses palpated. Oral Tongue mobile. Hard Palate appears normal.    Oropharynx: Base of tongue appears normal. No masses/lesions noted. Tonsillar fossa/pharyngeal  wall without lesions. Posterior oropharynx WNL.  Soft palate without masses. Midline uvula.   Neck/Lymphatic: No LAD I-VI bilaterally.  No thyromegaly.  No masses noted on exam.    Mirror laryngoscopy/nasopharyngoscopy: Active gag reflex.  Unable to perform.    Neuro/Psychiatric: AOx3.  Normal mood and affect.   Cardiovascular: Normal carotid pulses bilaterally, no increasing jugular venous distention noted at cervical region bilaterally.    Respiratory: Normal respiratory effort, no stridor, no retractions noted.      See separate procedure note for binocular microscopy.     CT head 2/20/2019:  Reviewed.    1.  No CT evidence of acute intracranial abnormality.    2.  Patchy periventricular white matter hypoattenuation suggestive of chronic microvascular ischemic change.  Small focal hypodensities within the left basal ganglia and right cerebellar hemispheres suggestive of remote lacunar type infarcts.    3.  Nonspecific opacity within the left external auditory canal.  No significant left-sided mastoid effusion or pre/postauricular soft tissue stranding.  Correlation with physical exam/direct visualization is advised.     Assessment:    ICD-10-CM ICD-9-CM    1. Acute diffuse otitis externa of left ear H60.312 380.10 Aerobic culture      Culture, Anaerobic   2. Otorrhea of left ear H92.12 388.60      The primary encounter diagnosis was Acute diffuse otitis externa of left ear. A diagnosis of Otorrhea of left ear was also pertinent to this visit.      Plan:  Orders Placed This Encounter   Procedures    Aerobic culture    Culture, Anaerobic       The patient has otitis externa of the the left ear today. A culture was obtained from the left ear.   I would recommend that we start the patient on Ciprodex otic drops ear drops.  An oral antibiotic Cipro 500mg PO BID for 7 days will be prescribed because the patient does have risk factors(DM II) requiring systemic therapy. The patient has been instructed to keep the  affected ear dry. Return to clinic in two weeks, sooner if needed.     Magnolia Kendrick MD

## 2019-02-21 ENCOUNTER — TELEPHONE (OUTPATIENT)
Dept: OTOLARYNGOLOGY | Facility: CLINIC | Age: 69
End: 2019-02-21

## 2019-02-21 NOTE — TELEPHONE ENCOUNTER
Spoke with patients wife Vladimir she was calling to clarify which ear is the patient supposed to put ear drops in. She states bottle says put drops in both ears but Dr Lucas told patient just left ear. Told her patient is just to put the drops in left ear only. Vladimir verbalized understanding

## 2019-02-21 NOTE — TELEPHONE ENCOUNTER
----- Message from Michelle Faustin sent at 2/21/2019  9:55 AM CST -----  Contact: wife Vladimir [phone]  Patient's wife is requesting to speak with you regarding clarity on orders for ciprofloxacin-dexamethasone 0.3-0.1% (CIPRODEX) 0.3-0.1 % DrpS. Please advise.

## 2019-02-26 ENCOUNTER — OFFICE VISIT (OUTPATIENT)
Dept: INTERNAL MEDICINE | Facility: CLINIC | Age: 69
End: 2019-02-26
Payer: MEDICARE

## 2019-02-26 ENCOUNTER — TELEPHONE (OUTPATIENT)
Dept: OTOLARYNGOLOGY | Facility: CLINIC | Age: 69
End: 2019-02-26

## 2019-02-26 ENCOUNTER — OFFICE VISIT (OUTPATIENT)
Dept: OTOLARYNGOLOGY | Facility: CLINIC | Age: 69
End: 2019-02-26
Payer: MEDICARE

## 2019-02-26 ENCOUNTER — TELEPHONE (OUTPATIENT)
Dept: INTERNAL MEDICINE | Facility: CLINIC | Age: 69
End: 2019-02-26

## 2019-02-26 VITALS
SYSTOLIC BLOOD PRESSURE: 88 MMHG | HEART RATE: 61 BPM | WEIGHT: 259.56 LBS | HEIGHT: 69 IN | DIASTOLIC BLOOD PRESSURE: 63 MMHG | BODY MASS INDEX: 38.44 KG/M2

## 2019-02-26 VITALS
SYSTOLIC BLOOD PRESSURE: 128 MMHG | WEIGHT: 259.69 LBS | DIASTOLIC BLOOD PRESSURE: 64 MMHG | BODY MASS INDEX: 38.46 KG/M2 | HEART RATE: 64 BPM | OXYGEN SATURATION: 95 % | HEIGHT: 69 IN

## 2019-02-26 DIAGNOSIS — H60.312 ACUTE DIFFUSE OTITIS EXTERNA OF LEFT EAR: Primary | ICD-10-CM

## 2019-02-26 DIAGNOSIS — Z01.818 PREOP EXAMINATION: ICD-10-CM

## 2019-02-26 DIAGNOSIS — E11.42 TYPE 2 DIABETES MELLITUS WITH DIABETIC POLYNEUROPATHY, WITH LONG-TERM CURRENT USE OF INSULIN: Primary | ICD-10-CM

## 2019-02-26 DIAGNOSIS — H92.12 OTORRHEA OF LEFT EAR: ICD-10-CM

## 2019-02-26 DIAGNOSIS — Z79.4 TYPE 2 DIABETES MELLITUS WITH DIABETIC POLYNEUROPATHY, WITH LONG-TERM CURRENT USE OF INSULIN: Primary | ICD-10-CM

## 2019-02-26 DIAGNOSIS — N18.4 CKD (CHRONIC KIDNEY DISEASE) STAGE 4, GFR 15-29 ML/MIN: ICD-10-CM

## 2019-02-26 DIAGNOSIS — I50.22 CHRONIC SYSTOLIC HEART FAILURE: ICD-10-CM

## 2019-02-26 PROBLEM — E11.621 DIABETIC FOOT ULCER WITH OSTEOMYELITIS: Status: RESOLVED | Noted: 2017-06-23 | Resolved: 2019-02-26

## 2019-02-26 PROBLEM — I63.9 CRYPTOGENIC STROKE: Chronic | Status: RESOLVED | Noted: 2017-01-26 | Resolved: 2019-02-26

## 2019-02-26 PROBLEM — E11.69 DIABETIC FOOT ULCER WITH OSTEOMYELITIS: Status: RESOLVED | Noted: 2017-06-23 | Resolved: 2019-02-26

## 2019-02-26 PROBLEM — M86.9 DIABETIC FOOT ULCER WITH OSTEOMYELITIS: Status: RESOLVED | Noted: 2017-06-23 | Resolved: 2019-02-26

## 2019-02-26 PROBLEM — L97.509 DIABETIC FOOT ULCER WITH OSTEOMYELITIS: Status: RESOLVED | Noted: 2017-06-23 | Resolved: 2019-02-26

## 2019-02-26 LAB
BACTERIA SPEC AEROBE CULT: NORMAL
BACTERIA SPEC AEROBE CULT: NORMAL
BACTERIA SPEC ANAEROBE CULT: NORMAL

## 2019-02-26 PROCEDURE — 3074F PR MOST RECENT SYSTOLIC BLOOD PRESSURE < 130 MM HG: ICD-10-PCS | Mod: CPTII,S$GLB,, | Performed by: INTERNAL MEDICINE

## 2019-02-26 PROCEDURE — 1101F PT FALLS ASSESS-DOCD LE1/YR: CPT | Mod: CPTII,S$GLB,, | Performed by: INTERNAL MEDICINE

## 2019-02-26 PROCEDURE — 99214 OFFICE O/P EST MOD 30 MIN: CPT | Mod: S$GLB,,, | Performed by: INTERNAL MEDICINE

## 2019-02-26 PROCEDURE — 99214 PR OFFICE/OUTPT VISIT, EST, LEVL IV, 30-39 MIN: ICD-10-PCS | Mod: S$GLB,,, | Performed by: INTERNAL MEDICINE

## 2019-02-26 PROCEDURE — 3078F PR MOST RECENT DIASTOLIC BLOOD PRESSURE < 80 MM HG: ICD-10-PCS | Mod: CPTII,S$GLB,, | Performed by: INTERNAL MEDICINE

## 2019-02-26 PROCEDURE — 1101F PR PT FALLS ASSESS DOC 0-1 FALLS W/OUT INJ PAST YR: ICD-10-PCS | Mod: CPTII,S$GLB,, | Performed by: OTOLARYNGOLOGY

## 2019-02-26 PROCEDURE — 1101F PR PT FALLS ASSESS DOC 0-1 FALLS W/OUT INJ PAST YR: ICD-10-PCS | Mod: CPTII,S$GLB,, | Performed by: INTERNAL MEDICINE

## 2019-02-26 PROCEDURE — 99999 PR PBB SHADOW E&M-EST. PATIENT-LVL IV: ICD-10-PCS | Mod: PBBFAC,,, | Performed by: OTOLARYNGOLOGY

## 2019-02-26 PROCEDURE — 1101F PT FALLS ASSESS-DOCD LE1/YR: CPT | Mod: CPTII,S$GLB,, | Performed by: OTOLARYNGOLOGY

## 2019-02-26 PROCEDURE — 3078F DIAST BP <80 MM HG: CPT | Mod: CPTII,S$GLB,, | Performed by: INTERNAL MEDICINE

## 2019-02-26 PROCEDURE — 99214 PR OFFICE/OUTPT VISIT, EST, LEVL IV, 30-39 MIN: ICD-10-PCS | Mod: 25,S$GLB,, | Performed by: OTOLARYNGOLOGY

## 2019-02-26 PROCEDURE — 99214 OFFICE O/P EST MOD 30 MIN: CPT | Mod: 25,S$GLB,, | Performed by: OTOLARYNGOLOGY

## 2019-02-26 PROCEDURE — 3074F PR MOST RECENT SYSTOLIC BLOOD PRESSURE < 130 MM HG: ICD-10-PCS | Mod: CPTII,S$GLB,, | Performed by: OTOLARYNGOLOGY

## 2019-02-26 PROCEDURE — 3045F PR MOST RECENT HEMOGLOBIN A1C LEVEL 7.0-9.0%: CPT | Mod: CPTII,S$GLB,, | Performed by: INTERNAL MEDICINE

## 2019-02-26 PROCEDURE — 99999 PR PBB SHADOW E&M-EST. PATIENT-LVL III: ICD-10-PCS | Mod: PBBFAC,,, | Performed by: INTERNAL MEDICINE

## 2019-02-26 PROCEDURE — 3074F SYST BP LT 130 MM HG: CPT | Mod: CPTII,S$GLB,, | Performed by: OTOLARYNGOLOGY

## 2019-02-26 PROCEDURE — 3045F PR MOST RECENT HEMOGLOBIN A1C LEVEL 7.0-9.0%: ICD-10-PCS | Mod: CPTII,S$GLB,, | Performed by: INTERNAL MEDICINE

## 2019-02-26 PROCEDURE — 92504 PR EAR MICROSCOPY EXAMINATION: ICD-10-PCS | Mod: S$GLB,,, | Performed by: OTOLARYNGOLOGY

## 2019-02-26 PROCEDURE — 92504 EAR MICROSCOPY EXAMINATION: CPT | Mod: S$GLB,,, | Performed by: OTOLARYNGOLOGY

## 2019-02-26 PROCEDURE — 3078F DIAST BP <80 MM HG: CPT | Mod: CPTII,S$GLB,, | Performed by: OTOLARYNGOLOGY

## 2019-02-26 PROCEDURE — 99999 PR PBB SHADOW E&M-EST. PATIENT-LVL IV: CPT | Mod: PBBFAC,,, | Performed by: OTOLARYNGOLOGY

## 2019-02-26 PROCEDURE — 3074F SYST BP LT 130 MM HG: CPT | Mod: CPTII,S$GLB,, | Performed by: INTERNAL MEDICINE

## 2019-02-26 PROCEDURE — 3078F PR MOST RECENT DIASTOLIC BLOOD PRESSURE < 80 MM HG: ICD-10-PCS | Mod: CPTII,S$GLB,, | Performed by: OTOLARYNGOLOGY

## 2019-02-26 PROCEDURE — 99999 PR PBB SHADOW E&M-EST. PATIENT-LVL III: CPT | Mod: PBBFAC,,, | Performed by: INTERNAL MEDICINE

## 2019-02-26 RX ORDER — FUROSEMIDE 40 MG/1
40 TABLET ORAL DAILY
Qty: 90 TABLET | Refills: 11 | Status: SHIPPED | OUTPATIENT
Start: 2019-02-26 | End: 2020-01-01

## 2019-02-26 RX ORDER — CIPROFLOXACIN 500 MG/1
500 TABLET ORAL 2 TIMES DAILY
Qty: 20 TABLET | Refills: 0 | Status: SHIPPED | OUTPATIENT
Start: 2019-02-26 | End: 2019-03-08

## 2019-02-26 NOTE — PROGRESS NOTES
Subjective:       Patient ID: Chau Rodarte is a 68 y.o. male.    Chief Complaint: Pre-op Exam (cataract surgery )    Patient is here for followup for chronic conditions and preop cataract surg [facility], Dr Aranda.    L ear infection much btr.    DM2:  good med adherence  no changed Diet  Last chk 2 days ago, 116 < 140 AM sugars  Mostly am chk, Post-prandial sugars  stable Numbness in feet  No new sores in feet  No new Visual changes  no Polyuria/polydipsia.    Has gained over 50 pounds since 1 yr ago, unexplained. Last visit he was on lasix, but it was stopped, he does not know why. Denies leg swelling or sob/rome.      Review of Systems   Constitutional: Positive for unexpected weight change. Negative for activity change, fatigue and fever.   HENT: Negative for congestion.    Respiratory: Negative for cough, chest tightness, shortness of breath and wheezing.    Cardiovascular: Negative for chest pain, palpitations and leg swelling.   Gastrointestinal: Negative for abdominal distention, abdominal pain, anal bleeding, blood in stool, nausea and vomiting.   Genitourinary: Negative for decreased urine volume and difficulty urinating.   Musculoskeletal: Negative for arthralgias and neck pain.   Skin: Negative for rash and wound.   Neurological: Negative for tremors, syncope, weakness and numbness.   Hematological: Negative for adenopathy. Does not bruise/bleed easily.   Psychiatric/Behavioral: Negative for confusion.       Objective:      Physical Exam   Constitutional: He appears well-developed and well-nourished. No distress.   Appears well   HENT:   Head: Normocephalic and atraumatic.   Mouth/Throat: No oropharyngeal exudate.   Eyes: EOM are normal. Pupils are equal, round, and reactive to light. No scleral icterus.   Cardiovascular: Normal rate, regular rhythm and normal heart sounds.   Pulmonary/Chest: Effort normal. He has wheezes (mild).   Abdominal: Soft. Bowel sounds are normal. He exhibits no distension and no mass.  There is no tenderness. There is no rebound and no guarding. No hernia.   Musculoskeletal: He exhibits no edema.   Skin: He is not diaphoretic.   Psychiatric: He has a normal mood and affect. His behavior is normal.       Assessment:       1. Type 2 diabetes mellitus with diabetic polyneuropathy, with long-term current use of insulin    2. Chronic systolic heart failure    3. CKD (chronic kidney disease) stage 4, GFR 15-29 ml/min    4. Preop examination        Plan:       Chau DAMIAN was seen today for pre-op exam.    Diagnoses and all orders for this visit:    Patient is at low risk for low risk cataract surgery -- OK to proceed with surgery.    Type 2 diabetes mellitus with diabetic polyneuropathy, with long-term current use of insulin  Discussed philippe A1c, I will discuss with Tigist Barron.    Chronic systolic heart failure  Has had large wt gain, probably more from ckd than chf since no actual symptoms CHF    CKD (chronic kidney disease) stage 4, GFR 15-29 ml/min  -     furosemide (LASIX) 40 MG tablet; Take 1 tablet (40 mg total) by mouth once daily.  -     Basic metabolic panel; Future  -     Magnesium; Future        Health Maintenance       Date Due Completion Date    TETANUS VACCINE 04/20/1967 ---    LDCT Lung Screen 04/20/2004 ---    Lipid Panel 03/29/2019 3/29/2018    Hemoglobin A1c 05/11/2019 2/11/2019    Foot Exam 10/23/2019 10/23/2018    Override on 10/23/2015: Done (Tyra Hoover)    Eye Exam 01/31/2020 1/31/2019 (Done)    Override on 1/31/2019: Done (dr echavarria, no DR)    Override on 4/11/2018: Done (dr. jones, [facility], [phone])    Override on 7/18/2015: Done    Override on 3/29/2014: Done    High Dose Statin 02/20/2020 2/20/2019    Colonoscopy 03/21/2022 3/21/2017          Follow-up in about 3 months (around 5/26/2019) for Blood work in 2 weeks.    Future Appointments   Date Time Provider Department Center   2/26/2019  2:20 PM Magnolia Kendrick MD [facility]   5/20/2019  7:20 AM  APPOINTMENT LAB, [facility]   5/27/2019  8:00 AM Riki Huynh MD [facility] [place] [facility]

## 2019-02-26 NOTE — Clinical Note
Hi, I would like his BMP and magnesium in 2 weeks, it was scheduled for 3 months instead.please call the patient and change to 2 weeks (please do not email him, I do not think he checks [facility]).Thank you, Riki Huynh

## 2019-02-26 NOTE — TELEPHONE ENCOUNTER
Spoke with patients pharmacy calling to clarify if there is a drug interaction between cipro and pacerone. As per Dr Lucas this is fine patient was on this medication a week ago with no reaction.

## 2019-02-26 NOTE — PROGRESS NOTES
Chief Complaint   Patient presents with    Follow-up    Otitis Media     left ear,   .     HPI:    Chau DAMIAN is a 68 y.o. male who presents for evaluation of left ear drainage.  He reports that this has been present for the last month.  Initially, it was severely painful and he was evaluated and seen in the ED.  He states that he has been treated with ear drops and Augmentin without relief in the ED.  He then followed up with his PCP Dr. Kenney who prescribed PO Cipro.  He has been on this medication for about 1 week and notes improvement in the drainage and feels that the pain nearly gone.  The pain is described as mild. He denies hearing loss, tinnitus, or vertigo. The ear is not tender to touch. The post auricular area is not inflammed.     Interval HPI 2/26/2019:  Mr. Rodarte follows up for otitis externa of the left ear.  He states that his left ear seems to be doing better since the otowick has been placed. He denies otorrhea and otalgia. He denies post-auricular tenderness. He denies changes in hearing.     Past Medical History:   Diagnosis Date    Acute on chronic systolic CHF (congestive heart failure) 11/29/2016    Acute osteomyelitis of toe, right     Anemia     Anticoagulant long-term use     CKD (chronic kidney disease) stage 2, GFR 60-89 ml/min 2/21/2016    CKD (chronic kidney disease) stage 3, GFR 30-59 ml/min     Coronary artery disease     Cryptogenic stroke, remote history (2006) 1/26/2017    Decubitus ulcer, heel, right, unstageable     Diabetic foot ulcer 11/13/2013    Diabetic foot ulcer with osteomyelitis 6/23/2017    Encounter for blood transfusion     HTN (hypertension) 3/3/2014    Hyperlipidemia 3/3/2014    NSTEMI (non-ST elevated myocardial infarction) 11/28/2016    Obesity (BMI 30-39.9) 11/29/2016    Osteomyelitis, chronic     PVD (peripheral vascular disease)     Stroke     Type 2 diabetes mellitus with diabetic peripheral angiopathy without gangrene, without  long-term current use of insulin 10/24/2013    Type 2 diabetes mellitus with diabetic polyneuropathy, without long-term current use of insulin 2016     Social History     Socioeconomic History    Marital status:      Spouse name: Not on file    Number of children: Not on file    Years of education: Not on file    Highest education level: Not on file   Social Needs    Financial resource strain: Not on file    Food insecurity - worry: Not on file    Food insecurity - inability: Not on file    Transportation needs - medical: Not on file    Transportation needs - non-medical: Not on file   Occupational History    Not on file   Tobacco Use    Smoking status: Former Smoker     Packs/day: 1.00     Years: 50.00     Pack years: 50.00     Last attempt to quit: 3/1/2011     Years since quittin.9    Smokeless tobacco: Never Used   Substance and Sexual Activity    Alcohol use: No    Drug use: No    Sexual activity: Yes     Partners: Female     Comment:  with 2 children 3 grand sons and 1 great grand daughter   Other Topics Concern    Not on file   Social History Narrative    Lives with wife, works PT for [employer], 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, 3 grandsons, 1 great grand daughter     Past Surgical History:   Procedure Laterality Date    ABLATION N/A 2/3/2017    [facility] CATH LAB    ANGIOGRAM-EXTREMITY- Right LOWER CO2, Diagnostic only, Right CFA access Right 2017    Performed by Nehal William MD at [facility] CATH LAB    [id number] Left 2017    Performed by Nehal William MD at [facility] OR 2ND FLR    CARDIAC SURGERY      COLONOSCOPY N/A 3/21/2017    Performed by Karissa Peck MD at [facility] ENDO (2ND FLR)    CORONARY ANGIOPLASTY WITH STENT PLACEMENT      DEBRIDEMENT, ULCER, WITH SKIN GRAFT APPLICATION Right 2013    [facility] OR    ESOPHAGOGASTRODUODENOSCOPY  (EGD) N/A 3/21/2017    Performed by Karissa Peck MD at [facility] ENDO (2ND FLR)    FOOT NERVE GRAFT  11/2013    HEART CATH-LEFT Left 11/28/2016    Performed by Robbie Collazo MD at [facility] CATH LAB    left leg stent      Loop Recorder placement      right leg bypass      TRANSESOPHAGEAL ECHOCARDIOGRAM (LIANE) N/A 2/3/2017    Performed by [facility]den [facility] MD at [facility] CATH LAB     Family History   Problem Relation Age of Onset    Diabetes Mother     Glaucoma Mother     Cataracts Mother     Heart disease Father     Blindness Maternal Grandmother     Macular degeneration Neg Hx     Retinal detachment Neg Hx     Strabismus Neg Hx     Amblyopia Neg Hx            Review of Systems  General: negative for chills, fever or weight loss  Psychological: negative for mood changes or depression  Ophthalmic: negative for blurry vision, photophobia or eye pain  ENT: see HPI  Respiratory: no cough, shortness of breath, or wheezing  Cardiovascular: no chest pain or dyspnea on exertion  Gastrointestinal: no abdominal pain, change in bowel habits, or black/ bloody stools  Musculoskeletal: negative for gait disturbance or muscular weakness  Neurological: no syncope or seizures; no ataxia  Dermatological: negative for puritis,  rash and jaundice  Hematologic/lymphatic: no easy bruising, no new lumps or bumps      Physical Exam:    Vitals:    02/26/19 1442   BP: (!) 88/63   Pulse: 61       Constitutional: Well appearing / communicating without difficutly.  NAD.  Eyes: EOM I Bilaterally  Head/Face: Normocephalic.  Negative paranasal sinus pressure/tenderness.  Salivary glands WNL.  House Brackmann I Bilaterally.    Right Ear: Auricle normal appearance. External Auditory Canal within normal limits no lesions or masses,TM w/o masses/lesions/perforations. TM mobility noted.   Left Ear: Auricle normal appearance. External Auditory Canal within normal limits no lesions or masses,TM w/o masses/lesions/perforations. TM mobility  noted.  Nose: No gross nasal septal deviation. Inferior Turbinates 3+ bilaterally. No septal perforation. No masses/lesions. External nasal skin appears normal without masses/lesions.  Oral Cavity: Gingiva/lips within normal limits.  Dentition/gingiva healthy appearing. Mucus membranes moist. Floor of mouth soft, no masses palpated. Oral Tongue mobile. Hard Palate appears normal.    Oropharynx: Base of tongue appears normal. No masses/lesions noted. Tonsillar fossa/pharyngeal wall without lesions. Posterior oropharynx WNL.  Soft palate without masses. Midline uvula.   Neck/Lymphatic: No LAD I-VI bilaterally.  No thyromegaly.  No masses noted on exam.    Mirror laryngoscopy/nasopharyngoscopy: Active gag reflex.  Unable to perform.    Neuro/Psychiatric: AOx3.  Normal mood and affect.   Cardiovascular: Normal carotid pulses bilaterally, no increasing jugular venous distention noted at cervical region bilaterally.    Respiratory: Normal respiratory effort, no stridor, no retractions noted.      See separate procedure note for binocular microscopy.     CT head 2/20/2019:  Reviewed.    1.  No CT evidence of acute intracranial abnormality.    2.  Patchy periventricular white matter hypoattenuation suggestive of chronic microvascular ischemic change.  Small focal hypodensities within the left basal ganglia and right cerebellar hemispheres suggestive of remote lacunar type infarcts.    3.  Nonspecific opacity within the left external auditory canal.  No significant left-sided mastoid effusion or pre/postauricular soft tissue stranding.  Correlation with physical exam/direct visualization is advised.     Culture results pending    Assessment:    ICD-10-CM ICD-9-CM    1. Acute diffuse otitis externa of left ear H60.312 380.10    2. Otorrhea of left ear H92.12 388.60      The primary encounter diagnosis was Acute diffuse otitis externa of left ear. A diagnosis of Otorrhea of left ear was also pertinent to this  visit.      Plan:  No orders of the defined types were placed in this encounter.      Interval mprovement but not resolution of  otitis externa of the the left ear today. Culture was obtained from the left ear at last visit and is still pending.  Continue Ciprodex otic drops ear drops and extend  Cipro 500mg PO BID for 7 days will be prescribed because the patient does have risk factors(DM II) requiring systemic therapy. The patient has been instructed to keep the affected ear dry. Return to clinic in two weeks, sooner if needed.     Magnolia Kendrick MD

## 2019-02-26 NOTE — PROCEDURES
Procedures       Binocular Microscopy    Indication:  bloody otorrhea    Additional detail was required for the otoscopic examination of the left ear.  The operating microscope was used to directly visualize the tympanic membrane and middle ear landmarks.  Findings:  Canal skin:  otowick removed. erythematous and edematous tissue  is improved but not resolved.    TM:  intact   Ossicles:  normal   Effusion:  none     The patient tolerated the procedure well.

## 2019-02-26 NOTE — LETTER
February 26, 2019        Nino Orr Jr., MD  [address] [facility] [address]             [facility]  [address]  [place] [address]  Phone: [phone]  Fax: [phone]   Patient: Chau Rodarte   MR Number: [id number]   YOB: 1950   Date of Visit: 2/26/2019       Dear Dr. Orr:    Thank you for referring Chau Rodarte to me for evaluation. Attached you will find relevant portions of my assessment and plan of care.    If you have questions, please do not hesitate to call me. I look forward to following Chau Rodarte along with you.    Sincerely,      Riki Huynh MD            CC  No Recipients    Enclosure

## 2019-02-26 NOTE — TELEPHONE ENCOUNTER
----- Message from Matilda Granados sent at 2/26/2019  4:17 PM CST -----  Contact: [phone]  They have a possible drug interaction between the ciprofloxacin HCl (CIPRO) 500 MG tablet 20 tablet 0 2/26/2019 3/8/2019 --  Sig - Route: Take 1 tablet (500 mg total) by mouth 2 (two) times daily. for 10 days - Oral    That you prescribed and the amiodarone (PACERONE) 200 MG Tab 90 tablet 3 8/2/2018  No  Sig - Route: Take 1 tablet (200 mg total) by mouth once daily. Take 2 tablets (400 mg) twice daily for 14 days, then 1 tablet (200 mg) daily. - Oral    That he is already on.

## 2019-03-12 ENCOUNTER — LAB VISIT (OUTPATIENT)
Dept: LAB | Facility: HOSPITAL | Age: 69
End: 2019-03-12
Attending: INTERNAL MEDICINE
Payer: MEDICARE

## 2019-03-12 DIAGNOSIS — N18.4 CKD (CHRONIC KIDNEY DISEASE) STAGE 4, GFR 15-29 ML/MIN: ICD-10-CM

## 2019-03-12 LAB
ANION GAP SERPL CALC-SCNC: 7 MMOL/L
BUN SERPL-MCNC: 27 MG/DL
CALCIUM SERPL-MCNC: 9.2 MG/DL
CHLORIDE SERPL-SCNC: 105 MMOL/L
CO2 SERPL-SCNC: 30 MMOL/L
CREAT SERPL-MCNC: 2.5 MG/DL
EST. GFR  (AFRICAN AMERICAN): 29 ML/MIN/1.73 M^2
EST. GFR  (NON AFRICAN AMERICAN): 25 ML/MIN/1.73 M^2
GLUCOSE SERPL-MCNC: 148 MG/DL
MAGNESIUM SERPL-MCNC: 2.2 MG/DL
POTASSIUM SERPL-SCNC: 4.5 MMOL/L
SODIUM SERPL-SCNC: 142 MMOL/L

## 2019-03-12 PROCEDURE — 80048 BASIC METABOLIC PNL TOTAL CA: CPT

## 2019-03-12 PROCEDURE — 83735 ASSAY OF MAGNESIUM: CPT

## 2019-03-12 PROCEDURE — 36415 COLL VENOUS BLD VENIPUNCTURE: CPT

## 2019-03-13 ENCOUNTER — CLINICAL SUPPORT (OUTPATIENT)
Dept: CARDIOLOGY | Facility: HOSPITAL | Age: 69
End: 2019-03-13
Attending: INTERNAL MEDICINE
Payer: MEDICARE

## 2019-03-13 DIAGNOSIS — Z46.9 FITTING AND ADJUSTMENT OF DEVICE: ICD-10-CM

## 2019-03-13 PROCEDURE — 93299 CARDIAC DEVICE CHECK - REMOTE: CPT

## 2019-03-14 ENCOUNTER — OFFICE VISIT (OUTPATIENT)
Dept: OTOLARYNGOLOGY | Facility: CLINIC | Age: 69
End: 2019-03-14
Payer: MEDICARE

## 2019-03-14 VITALS
HEART RATE: 54 BPM | DIASTOLIC BLOOD PRESSURE: 60 MMHG | WEIGHT: 257.5 LBS | HEIGHT: 69 IN | BODY MASS INDEX: 38.14 KG/M2 | SYSTOLIC BLOOD PRESSURE: 100 MMHG

## 2019-03-14 DIAGNOSIS — H60.312 ACUTE DIFFUSE OTITIS EXTERNA OF LEFT EAR: Primary | ICD-10-CM

## 2019-03-14 DIAGNOSIS — H92.12 OTORRHEA OF LEFT EAR: ICD-10-CM

## 2019-03-14 PROCEDURE — 1101F PT FALLS ASSESS-DOCD LE1/YR: CPT | Mod: CPTII,S$GLB,, | Performed by: OTOLARYNGOLOGY

## 2019-03-14 PROCEDURE — 1101F PR PT FALLS ASSESS DOC 0-1 FALLS W/OUT INJ PAST YR: ICD-10-PCS | Mod: CPTII,S$GLB,, | Performed by: OTOLARYNGOLOGY

## 2019-03-14 PROCEDURE — 3074F SYST BP LT 130 MM HG: CPT | Mod: CPTII,S$GLB,, | Performed by: OTOLARYNGOLOGY

## 2019-03-14 PROCEDURE — 99999 PR PBB SHADOW E&M-EST. PATIENT-LVL III: CPT | Mod: PBBFAC,,, | Performed by: OTOLARYNGOLOGY

## 2019-03-14 PROCEDURE — 99213 PR OFFICE/OUTPT VISIT, EST, LEVL III, 20-29 MIN: ICD-10-PCS | Mod: S$GLB,,, | Performed by: OTOLARYNGOLOGY

## 2019-03-14 PROCEDURE — 99213 OFFICE O/P EST LOW 20 MIN: CPT | Mod: S$GLB,,, | Performed by: OTOLARYNGOLOGY

## 2019-03-14 PROCEDURE — 3078F DIAST BP <80 MM HG: CPT | Mod: CPTII,S$GLB,, | Performed by: OTOLARYNGOLOGY

## 2019-03-14 PROCEDURE — 99999 PR PBB SHADOW E&M-EST. PATIENT-LVL III: ICD-10-PCS | Mod: PBBFAC,,, | Performed by: OTOLARYNGOLOGY

## 2019-03-14 PROCEDURE — 3074F PR MOST RECENT SYSTOLIC BLOOD PRESSURE < 130 MM HG: ICD-10-PCS | Mod: CPTII,S$GLB,, | Performed by: OTOLARYNGOLOGY

## 2019-03-14 PROCEDURE — 3078F PR MOST RECENT DIASTOLIC BLOOD PRESSURE < 80 MM HG: ICD-10-PCS | Mod: CPTII,S$GLB,, | Performed by: OTOLARYNGOLOGY

## 2019-04-02 DIAGNOSIS — E11.42 TYPE 2 DIABETES MELLITUS WITH DIABETIC POLYNEUROPATHY, WITH LONG-TERM CURRENT USE OF INSULIN: ICD-10-CM

## 2019-04-02 DIAGNOSIS — Z79.4 TYPE 2 DIABETES MELLITUS WITH DIABETIC POLYNEUROPATHY, WITH LONG-TERM CURRENT USE OF INSULIN: ICD-10-CM

## 2019-04-02 NOTE — TELEPHONE ENCOUNTER
----- Message from Inez Miranda sent at 4/2/2019 11:59 AM CDT -----  Contact: [phone]  RX request - refill or new RX.  Is this a refill or new RX:  Refill   RX name and strength: insulin detemir (LEVEMIR FLEXTOUCH) 100 unit/mL (3 mL) SubQ InPn pen    Local pharmacy or mail order pharmacy:  [facility]/pharmacy #[id number] - [place] [address]   [phone] (Phone)  [phone] (Fax)      Comments:  Please advise,thanks

## 2019-04-04 ENCOUNTER — CLINICAL SUPPORT (OUTPATIENT)
Dept: CARDIOLOGY | Facility: HOSPITAL | Age: 69
End: 2019-04-04
Attending: INTERNAL MEDICINE
Payer: MEDICARE

## 2019-04-04 DIAGNOSIS — Z46.9 FITTING AND ADJUSTMENT OF DEVICE: ICD-10-CM

## 2019-04-04 PROCEDURE — 93299 CARDIAC DEVICE CHECK - REMOTE: CPT

## 2019-05-06 ENCOUNTER — CLINICAL SUPPORT (OUTPATIENT)
Dept: CARDIOLOGY | Facility: HOSPITAL | Age: 69
End: 2019-05-06
Attending: INTERNAL MEDICINE
Payer: MEDICARE

## 2019-05-06 DIAGNOSIS — Z46.9 FITTING AND ADJUSTMENT OF DEVICE: ICD-10-CM

## 2019-05-06 PROCEDURE — 93299 CARDIAC DEVICE CHECK - REMOTE: CPT

## 2019-05-07 ENCOUNTER — TELEPHONE (OUTPATIENT)
Dept: ELECTROPHYSIOLOGY | Facility: CLINIC | Age: 69
End: 2019-05-07

## 2019-05-07 NOTE — TELEPHONE ENCOUNTER
Remote device transmission received; ILR alert for pause 4/20/19 @ 12:35am  Egram c/w 8 sec pause during night hours; cannot appreciate atrial activity.  No symptom episodes were recorded.      Transmission was received today despite episode occurring 4/20/19.    Attempted to contact patient but left voicemail.      Will need to see if he was symptomatic during this event, likely nocturnal.  Also, please reinforce location limits with remote monitor (I.e. Monitor should be close to patient's bed).      Monitor for recurrence, no changes for now per Dr. Rodriguez.

## 2019-05-30 ENCOUNTER — OFFICE VISIT (OUTPATIENT)
Dept: INTERNAL MEDICINE | Facility: CLINIC | Age: 69
End: 2019-05-30
Payer: MEDICARE

## 2019-05-30 ENCOUNTER — LAB VISIT (OUTPATIENT)
Dept: LAB | Facility: HOSPITAL | Age: 69
End: 2019-05-30
Attending: INTERNAL MEDICINE
Payer: MEDICARE

## 2019-05-30 VITALS
BODY MASS INDEX: 37.24 KG/M2 | WEIGHT: 252.19 LBS | DIASTOLIC BLOOD PRESSURE: 70 MMHG | SYSTOLIC BLOOD PRESSURE: 120 MMHG

## 2019-05-30 DIAGNOSIS — I70.25 ATHEROSCLEROSIS OF NATIVE ARTERIES OF THE EXTREMITIES WITH ULCERATION: ICD-10-CM

## 2019-05-30 DIAGNOSIS — Z79.4 TYPE 2 DIABETES MELLITUS WITH DIABETIC POLYNEUROPATHY, WITH LONG-TERM CURRENT USE OF INSULIN: ICD-10-CM

## 2019-05-30 DIAGNOSIS — Z79.4 TYPE 2 DIABETES MELLITUS WITH DIABETIC POLYNEUROPATHY, WITH LONG-TERM CURRENT USE OF INSULIN: Primary | ICD-10-CM

## 2019-05-30 DIAGNOSIS — N18.4 CKD (CHRONIC KIDNEY DISEASE) STAGE 4, GFR 15-29 ML/MIN: ICD-10-CM

## 2019-05-30 DIAGNOSIS — E11.42 TYPE 2 DIABETES MELLITUS WITH DIABETIC POLYNEUROPATHY, WITH LONG-TERM CURRENT USE OF INSULIN: ICD-10-CM

## 2019-05-30 DIAGNOSIS — E11.42 TYPE 2 DIABETES MELLITUS WITH DIABETIC POLYNEUROPATHY, WITH LONG-TERM CURRENT USE OF INSULIN: Primary | ICD-10-CM

## 2019-05-30 LAB
ANION GAP SERPL CALC-SCNC: 12 MMOL/L (ref 8–16)
BUN SERPL-MCNC: 25 MG/DL (ref 8–23)
CALCIUM SERPL-MCNC: 9.2 MG/DL (ref 8.7–10.5)
CHLORIDE SERPL-SCNC: 105 MMOL/L (ref 95–110)
CHOLEST SERPL-MCNC: 125 MG/DL (ref 120–199)
CHOLEST/HDLC SERPL: 3.4 {RATIO} (ref 2–5)
CO2 SERPL-SCNC: 25 MMOL/L (ref 23–29)
CREAT SERPL-MCNC: 2.6 MG/DL (ref 0.5–1.4)
EST. GFR  (AFRICAN AMERICAN): 28 ML/MIN/1.73 M^2
EST. GFR  (NON AFRICAN AMERICAN): 24 ML/MIN/1.73 M^2
ESTIMATED AVG GLUCOSE: 180 MG/DL (ref 68–131)
GLUCOSE SERPL-MCNC: 98 MG/DL (ref 70–110)
HBA1C MFR BLD HPLC: 7.9 % (ref 4–5.6)
HDLC SERPL-MCNC: 37 MG/DL (ref 40–75)
HDLC SERPL: 29.6 % (ref 20–50)
LDLC SERPL CALC-MCNC: 75.8 MG/DL (ref 63–159)
NONHDLC SERPL-MCNC: 88 MG/DL
POTASSIUM SERPL-SCNC: 4.5 MMOL/L (ref 3.5–5.1)
SODIUM SERPL-SCNC: 142 MMOL/L (ref 136–145)
TRIGL SERPL-MCNC: 61 MG/DL (ref 30–150)

## 2019-05-30 PROCEDURE — 36415 COLL VENOUS BLD VENIPUNCTURE: CPT

## 2019-05-30 PROCEDURE — 99499 RISK ADDL DX/OHS AUDIT: ICD-10-PCS | Mod: S$GLB,,, | Performed by: INTERNAL MEDICINE

## 2019-05-30 PROCEDURE — 1101F PT FALLS ASSESS-DOCD LE1/YR: CPT | Mod: CPTII,S$GLB,, | Performed by: INTERNAL MEDICINE

## 2019-05-30 PROCEDURE — 99999 PR PBB SHADOW E&M-EST. PATIENT-LVL III: ICD-10-PCS | Mod: PBBFAC,,, | Performed by: INTERNAL MEDICINE

## 2019-05-30 PROCEDURE — 99214 PR OFFICE/OUTPT VISIT, EST, LEVL IV, 30-39 MIN: ICD-10-PCS | Mod: S$GLB,,, | Performed by: INTERNAL MEDICINE

## 2019-05-30 PROCEDURE — 99499 UNLISTED E&M SERVICE: CPT | Mod: S$GLB,,, | Performed by: INTERNAL MEDICINE

## 2019-05-30 PROCEDURE — 3078F DIAST BP <80 MM HG: CPT | Mod: CPTII,S$GLB,, | Performed by: INTERNAL MEDICINE

## 2019-05-30 PROCEDURE — 3074F SYST BP LT 130 MM HG: CPT | Mod: CPTII,S$GLB,, | Performed by: INTERNAL MEDICINE

## 2019-05-30 PROCEDURE — 99999 PR PBB SHADOW E&M-EST. PATIENT-LVL III: CPT | Mod: PBBFAC,,, | Performed by: INTERNAL MEDICINE

## 2019-05-30 PROCEDURE — 99214 OFFICE O/P EST MOD 30 MIN: CPT | Mod: S$GLB,,, | Performed by: INTERNAL MEDICINE

## 2019-05-30 PROCEDURE — 3045F PR MOST RECENT HEMOGLOBIN A1C LEVEL 7.0-9.0%: ICD-10-PCS | Mod: CPTII,S$GLB,, | Performed by: INTERNAL MEDICINE

## 2019-05-30 PROCEDURE — 3045F PR MOST RECENT HEMOGLOBIN A1C LEVEL 7.0-9.0%: CPT | Mod: CPTII,S$GLB,, | Performed by: INTERNAL MEDICINE

## 2019-05-30 PROCEDURE — 3078F PR MOST RECENT DIASTOLIC BLOOD PRESSURE < 80 MM HG: ICD-10-PCS | Mod: CPTII,S$GLB,, | Performed by: INTERNAL MEDICINE

## 2019-05-30 PROCEDURE — 3074F PR MOST RECENT SYSTOLIC BLOOD PRESSURE < 130 MM HG: ICD-10-PCS | Mod: CPTII,S$GLB,, | Performed by: INTERNAL MEDICINE

## 2019-05-30 PROCEDURE — 80061 LIPID PANEL: CPT

## 2019-05-30 PROCEDURE — 1101F PR PT FALLS ASSESS DOC 0-1 FALLS W/OUT INJ PAST YR: ICD-10-PCS | Mod: CPTII,S$GLB,, | Performed by: INTERNAL MEDICINE

## 2019-05-30 PROCEDURE — 83036 HEMOGLOBIN GLYCOSYLATED A1C: CPT

## 2019-05-30 PROCEDURE — 80048 BASIC METABOLIC PNL TOTAL CA: CPT

## 2019-05-30 NOTE — PROGRESS NOTES
Subjective:       Patient ID: Chau Rodarte is a 69 y.o. male.    Chief Complaint: No chief complaint on file.    Patient is here for followup for chronic conditions.    Sinuses and congestion bothering him, feeling well o/w. Diff breathing through nose bilat 1 month. Using Vicks severe synex. No purulent mucous or fevers.    DM2:  good med adherence  no changed Diet  < 140 AM sugars  <200 Post-prandial sugars  Does not chk daily.  Claims good insulin adh  no Numbness in feet  no sores in feet  no Visual changes  no Polyuria/polydipsia.        Review of Systems   Constitutional: Negative for activity change, fatigue, fever and unexpected weight change.   HENT: Positive for congestion.    Respiratory: Negative for cough, chest tightness, shortness of breath and wheezing.    Cardiovascular: Negative for chest pain, palpitations and leg swelling.   Gastrointestinal: Negative for abdominal distention, abdominal pain, anal bleeding, blood in stool, nausea and vomiting.   Genitourinary: Negative for decreased urine volume and difficulty urinating.   Musculoskeletal: Negative for arthralgias and neck pain.   Skin: Negative for rash and wound.   Neurological: Negative for tremors, syncope, weakness and numbness.   Hematological: Negative for adenopathy. Does not bruise/bleed easily.   Psychiatric/Behavioral: Negative for confusion.       Objective:      Physical Exam   Constitutional: He appears well-developed and well-nourished. No distress.   HENT:   Head: Normocephalic and atraumatic.   Mouth/Throat: No oropharyngeal exudate.   TMs clear, sinuses nontender, nasal mucosa w/o purulence.   Eyes: Pupils are equal, round, and reactive to light. EOM are normal. No scleral icterus.   Neck: Normal range of motion. Neck supple. No thyromegaly present.   Cardiovascular: Normal rate, regular rhythm and normal heart sounds.   Pulmonary/Chest: Effort normal. No respiratory distress. He has no wheezes. He has no rales.   Abdominal:  Soft. Bowel sounds are normal. He exhibits no distension and no mass. There is no tenderness. There is no rebound and no guarding. No hernia.   Musculoskeletal: He exhibits no edema.   Lymphadenopathy:     He has no cervical adenopathy.   Skin: He is not diaphoretic.   Psychiatric: He has a normal mood and affect. His behavior is normal.       Assessment:       1. Type 2 diabetes mellitus with diabetic polyneuropathy, with long-term current use of insulin    2. CKD (chronic kidney disease) stage 4, GFR 15-29 ml/min    3. 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 Try Loratadine or claritin 10mg every day.            Health Maintenance       Date Due Completion Date    TETANUS VACCINE 04/20/1967 ---    LDCT Lung Screen 04/20/2004 ---    Shingles Vaccine (2 of 3) 05/29/2018 4/3/2018    Lipid Panel 03/29/2019 3/29/2018    Hemoglobin A1c 05/11/2019 2/11/2019    Influenza Vaccine 08/01/2019 10/4/2018    Foot Exam 10/23/2019 10/23/2018    Override on 10/23/2015: Done (Tyra Hoover)    Eye Exam 01/31/2020 1/31/2019 (Done)    Override on 1/31/2019: Done (dr echavarria, no DR)    Override on 4/11/2018: Done (dr. jones, [facility], [phone])    Override on 7/18/2015: Done    Override on 3/29/2014: Done    High Dose Statin 03/14/2020 3/14/2019    Aspirin/Antiplatelet Therapy 03/14/2020 3/14/2019    Colonoscopy 03/21/2022 3/21/2017      Next time consider chest ct    Follow up in  about 4 months (around 9/30/2019).    No future appointments.

## 2019-06-03 ENCOUNTER — CLINICAL SUPPORT (OUTPATIENT)
Dept: CARDIOLOGY | Facility: HOSPITAL | Age: 69
End: 2019-06-03
Attending: INTERNAL MEDICINE
Payer: MEDICARE

## 2019-06-03 DIAGNOSIS — Z46.9 FITTING AND ADJUSTMENT OF DEVICE: ICD-10-CM

## 2019-06-03 PROCEDURE — 93299 CARDIAC DEVICE CHECK - REMOTE: CPT

## 2019-08-06 NOTE — TELEPHONE ENCOUNTER
Received alert from Crys(Maricel) concerning pause episode on 7/14/19 labelled 4 secs but possible 10 sec pause with escape beats at 2251 pm.    Indication for loop is Cryptogenic stroke. Pause criteria is not on in device.    Left a voicemail for pt to see if he was symptomatic or if he was aleep.    Asked him to call clinic to discuss loop alert. Will await phone call.      8/6/19 Called and left vm for pt to discuss alert.   8/7/19 Sent pt email asking him to call clinic. No answer. Discussed with Dr. Rodriguez. Mostly likely nocturnal hours.   Continue to monitor and report any pauses >5 secs during nocturnal hours. DG     8/9/19 Pt returned phone call and states he was asymptomatic to this event and has not had any syncopal episodes.

## 2019-08-07 NOTE — TELEPHONE ENCOUNTER
See message sent to pt in Portal:      Mr. Rodarte,  This is Caprice from the [facility]/.  I need you to please call our clinic at [phone] to discuss some information regarding an alert from your Loop recorder.    Thank you,  Kae WILKES

## 2019-08-12 NOTE — TELEPHONE ENCOUNTER
----- Message from Inez Miranda sent at 8/12/2019  2:38 PM CDT -----  Contact: [phone]  Patient is requesting to have a surgery clearance. He is having cataract surgery on Aug 22.  Please advise, thanks

## 2019-08-13 NOTE — TELEPHONE ENCOUNTER
Hi,  If it is OK with his eye doctor I feel comfortably clearing him based on his last appt with me -- 5/30/19. Please ask if he has a form for me to complete.  Thank you, Riki Huynh

## 2019-08-14 NOTE — TELEPHONE ENCOUNTER
Spoke with patient and he has to go to the office tomorrow so he will let them know to send something over in regards for clearance.

## 2019-08-19 NOTE — TELEPHONE ENCOUNTER
----- Message from Carin Hong sent at 8/13/2019 10:36 AM CDT -----  Contact: Patient [phone]  Patient  Is waiting for callback for medical clearance.    Request call back.    Please call and advise  Thank you

## 2019-08-20 NOTE — TELEPHONE ENCOUNTER
Hi, please call him and let him know that we faxed the clearance form last week.  Thank you, Riki Huynh

## 2019-08-20 NOTE — TELEPHONE ENCOUNTER
This message is old. Patient came in last week to get them filled out and it was brought back to patient, I faxed it but also just called patient to ask if they received it and he told me he hand delivered the clearance himself.

## 2019-08-22 PROBLEM — E86.9 VOLUME DEPLETION: Status: ACTIVE | Noted: 2019-01-01

## 2019-08-22 PROBLEM — N18.30 CKD (CHRONIC KIDNEY DISEASE), STAGE III: Status: ACTIVE | Noted: 2019-01-01

## 2019-08-22 PROBLEM — N17.9 AKI (ACUTE KIDNEY INJURY): Status: ACTIVE | Noted: 2019-01-01

## 2019-08-22 PROBLEM — I95.1 ORTHOSTATIC HYPOTENSION: Status: ACTIVE | Noted: 2019-01-01

## 2019-08-22 PROBLEM — K52.9 ACUTE GASTROENTERITIS: Status: ACTIVE | Noted: 2019-01-01

## 2019-08-22 NOTE — MEDICAL/APP STUDENT
[facility] Internal Medicine History and Physical - Medical Student Note: Marcellus Campbell    Admitting Team: Medicine Team A  Attending Physician: Dr. Rodriguez  Resident: Dr. Garza  Interns: Dr. Marcelino    Date of Admit: 8/22/2019    Chief Complaint     Nausea, vomiting, and diarrhea (reports N/V/D, abdominal cramping, sinus congestion today. States he feels overheated but hasn't been outside. Reports had similar symptoms on Tuesday but had resolved. Patient had cataract surgery to left eye this morning) since Monday evening.    Subjective:      History of Present Illness:  Chau Rodarte is a 69 y.o. AA male who has a past medical history significant for acute on chronic systolic heart failure, CKD stage 3, CAD, PVD, NSTEMI, stroke, diabetic foot ulcers, HLD, HTN, and DM2 with polyneuropathy. The patient presented to [facility] on 8/22/2019 with a primary complaint of nausea, vomiting, and diarrhea (reports N/V/D, abdominal cramping, sinus congestion today. States he feels overheated but hasn't been outside. Reports had similar symptoms on Tuesday but had resolved. Patient had cataract surgery to left eye this morning) since Monday evening.    Mr. Rodarte was in his usual state of health until 4 days ago when he began experiencing nausea, vomiting, and diarrhea with associated fatigue and diaphoresis. He continued to endorse vomiting and diarrhea through Tuesday and states that by Wednesday, his symptoms had totally resolved. This morning, he underwent cataracts surgery in his left eye, after which he started feeling fatigued and diaphoretic with associated nausea vomiting and diarrhea. He had a total of 3 episodes of diarrhea between Monday and Tuesday and 2 more episodes today. He does not endorse melena, hematochezia, hematemesis, or coffee ground emesis. He has had periumbilical abdominal pain associated with his symptoms as well. He additionally endorses a cough and sneezing during these episodes.      Patient has a history of atrial fibrillation. He was placed on warfarin at time of diagnosis; however he had a bad reaction ultimately leading to diabetic foot ulcerations. We discussed with him the need to be started on other forms of anticoagulation to cover further complications of his atrial fibrillation. Otherwise, all other review of systems are negative.    Past Medical History:  Past Medical History:   Diagnosis Date    Acute on chronic systolic CHF (congestive heart failure) 11/29/2016    Acute osteomyelitis of toe, right     Anemia     Anticoagulant long-term use     CKD (chronic kidney disease) stage 2, GFR 60-89 ml/min 2/21/2016    CKD (chronic kidney disease) stage 3, GFR 30-59 ml/min     Coronary artery disease     Cryptogenic stroke, remote history (2006) 1/26/2017    Decubitus ulcer, heel, right, unstageable     Diabetic foot ulcer 11/13/2013    Diabetic foot ulcer with osteomyelitis 6/23/2017    Encounter for blood transfusion     HTN (hypertension) 3/3/2014    Hyperlipidemia 3/3/2014    NSTEMI (non-ST elevated myocardial infarction) 11/28/2016    Obesity (BMI 30-39.9) 11/29/2016    Osteomyelitis, chronic     PVD (peripheral vascular disease)     Stroke     Type 2 diabetes mellitus with diabetic peripheral angiopathy without gangrene, without long-term current use of insulin 10/24/2013    Type 2 diabetes mellitus with diabetic polyneuropathy, without long-term current use of insulin 11/29/2016       Past Surgical History:  Past Surgical History:   Procedure Laterality Date    ABLATION N/A 2/3/2017    Performed by [facility]den [facility]no MD at [facility] CATH LAB    ANGIOGRAM-EXTREMITY- Right LOWER CO2, Diagnostic only, Right CFA access Right 7/14/2017    Performed by Nehal William MD at [facility] CATH LAB    CJOTCJ-PYPLOEP-USBVXXTRY Left 6/14/2017    Performed by Nehal William MD at [facility] OR 2ND FLR    CARDIAC SURGERY      COLONOSCOPY N/A 3/21/2017    Performed by Karissa Peck MD  "at [facility] ENDO (2ND FLR)    CORONARY ANGIOPLASTY WITH STENT PLACEMENT      DEBRIDEMENT, ULCER, WITH SKIN GRAFT APPLICATION Right 11/13/2013    Per[facility] by [facility]ftab [facility]oso D[facility] at [facility] OR    ESOPHAGOGASTRODUODENOSCOPY (EGD) N/A 3/21/2017    Performed by Karissa Peck MD at [facility] ENDO (2ND FLR)    FOOT NERVE GRAFT  11/2013    HEART CATH-LEFT Left 11/28/2016    Performed by Robbie Collazo MD at [facility] CATH LAB    left leg stent      Loop Recorder placement      right leg bypass      TRANSESOPHAGEAL ECHOCARDIOGRAM (LIANE) N/A 2/3/2017    Performed by Cayden Moreno MD at [facility] CATH LAB       Allergies:  Review of patient's allergies indicates:   Allergen Reactions    Adhesive tape-silicones Blisters       Home Medications:  Prior to Admission medications    Medication Sig Start Date End Date Taking? Authorizing Provider   amiodarone (PACERONE) 200 MG Tab Take 1 tablet (200 mg total) by mouth once daily. Take 2 tablets (400 mg) twice daily for 14 days, then 1 tablet (200 mg) daily. 8/2/18   Karsten Rodriguez MD   aspirin (ECOTRIN) 81 MG EC tablet Take 81 mg by mouth once daily.    Historical Provider, MD   atorvastatin (LIPITOR) 80 MG tablet TAKE 1 TABLET BY MOUTH EVERY DAY 10/31/18   Riki Huynh MD   BD ULTRA-FINE HANG PEN NEEDLES 32 gauge x 5/32" Ndle USE TWICE DAILY FOR INSULIN INJECTION 2/14/18   Momo Morejon, APRN, FNP   clopidogrel (PLAVIX) 75 mg tablet TAKE 1 TABLET BY MOUTH EVERY DAY 7/31/18   Elmer Smith Jr., MD   furosemide (LASIX) 40 MG tablet Take 1 tablet (40 mg total) by mouth once daily. 2/26/19   Riki Huynh MD   insulin detemir U-100 (LEVEMIR FLEXTOUCH) 100 unit/mL (3 mL) SubQ InPn pen Inject 16 Units into the skin every evening. 4/2/19   Riki Huynh MD   isosorbide dinitrate (ISORDIL) 20 MG tablet Take 1 tablet (20 mg total) by mouth once daily. 1/22/19 1/22/20  Katya Ramirez MD   metoprolol succinate (TOPROL-XL) 25 MG 24 hr tablet TAKE 1 TABLET (25 MG TOTAL) BY MOUTH " "ONCE DAILY. 19   Riki Huynh MD   VICTOZA 2-SAGAR 0.6 mg/0.1 mL (18 mg/3 mL) PnIj INJECT 1.2 MG INTO THE SKIN ONCE DAILY. 18   Cony Saleem NP       Family History:  Family History   Problem Relation Age of Onset    Diabetes Mother     Glaucoma Mother     Cataracts Mother     Heart disease Father     Blindness Maternal Grandmother     Macular degeneration Neg Hx     Retinal detachment Neg Hx     Strabismus Neg Hx     Amblyopia Neg Hx        Social History:  Social History     Tobacco Use    Smoking status: Former Smoker     Packs/day: 1.00     Years: 50.00     Pack years: 50.00     Last attempt to quit: 3/1/2011     Years since quittin.4    Smokeless tobacco: Never Used   Substance Use Topics    Alcohol use: No    Drug use: No       Review of Systems:  Pertinent positives and negatives are listed in HPI.  All other systems are reviewed and are negative.    Health Maintaince :   Primary Care Physician: Amina  Immunizations:   TDap is up to date.  Influenza is up to date.  Pneumovax is up to date.  Cancer Screening:  Colonoscopy: is up to date.     Objective:   Last 24 Hour Vital Signs:  BP  Min: 86/48  Max: 86/48  Temp  Av.8 °F (36.6 °C)  Min: 97.8 °F (36.6 °C)  Max: 97.8 °F (36.6 °C)  Pulse  Av  Min: 56  Max: 56  Resp  Av  Min: 19  Max: 19  SpO2  Av %  Min: 99 %  Max: 99 %  Height  Av' 9" (175.3 cm)  Min: 5' 9" (175.3 cm)  Max: 5' 9" (175.3 cm)  Weight  Av.4 kg (250 lb)  Min: 113.4 kg (250 lb)  Max: 113.4 kg (250 lb)  Body mass index is 36.92 kg/m².  No intake/output data recorded.    Physical Examination:  General: Alert and awake in not acute distress  Head:  Normocephalic and atraumatic  Eyes:  Pupils are equal and reactive to light; extraocular muscle movement intact with anicteric sclera and clear conjunctivae  Mouth:  Oropharynx clear and without exudate; moist mucous membranes  Cardio:  Regular rate and rhythm with normal S1 and S2; no " murmurs or rubs  Resp:  Clear to ascultation bilaterally and unlabored; no wheezes, crackles or rhonchi  Abdom: Soft, non-tender and non-distended with normoactive bowel sounds  Extrem: Warm well perfused with no clubbing, cyanosis or edema  Skin:  No rashes, lesions, or color changes  Pulses: 2+ and symmetric distally  Neuro:  AAOx3; cooperative and pleasant with no focal deficits  Orthostatics:   Positive - Lying down: 128/61 P=58; Sitting up: 108/56 P=60; Standing up: 93/53 P=69    Laboratory:  Most Recent Data:  CBC:   Lab Results   Component Value Date    WBC 10.54 08/22/2019    HGB 13.5 (L) 08/22/2019    HCT 41.5 08/22/2019     08/22/2019    MCV 93 08/22/2019    RDW 14.8 (H) 08/22/2019     BMP:   Lab Results   Component Value Date     08/22/2019    K 4.3 08/22/2019     08/22/2019    CO2 24 08/22/2019    BUN 39 (H) 08/22/2019    CREATININE 3.1 (H) 08/22/2019     (H) 08/22/2019    CALCIUM 9.1 08/22/2019    MG 2.0 08/22/2019    PHOS 2.6 (L) 02/11/2019    PHOS 2.6 (L) 02/11/2019     LFTs:   Lab Results   Component Value Date    PROT 7.2 08/22/2019    ALBUMIN 3.5 08/22/2019    BILITOT 0.8 08/22/2019    AST 18 08/22/2019    ALKPHOS 120 08/22/2019    ALT 17 08/22/2019     Coags:   Lab Results   Component Value Date    INR 1.1 06/19/2017     Urinalysis:   Lab Results   Component Value Date    LABURIN No growth 12/07/2016    COLORU Yellow 02/11/2019    SPECGRAV >=1.030 (A) 02/11/2019    NITRITE Negative 02/11/2019    KETONESU Trace (A) 02/11/2019    UROBILINOGEN 1.0 02/11/2019    WBCUA 8 (H) 02/11/2019       Trended Lab Data:  Recent Labs   Lab 08/22/19  1529   WBC 10.54   HGB 13.5*   HCT 41.5      MCV 93   RDW 14.8*      K 4.3      CO2 24   BUN 39*   CREATININE 3.1*   *   PROT 7.2   ALBUMIN 3.5   BILITOT 0.8   AST 18   ALKPHOS 120   ALT 17     Recent Labs     08/22/19  1529   CPK 88     Microbiology Data:  Influenza A&B (8/22): negative    Other Results:  EKG (my  interpretation): (8/22): Afib with slow ventricular response; left axis deviation    Radiology:  Imaging Results    None            Assessment:     Chau Rodarte is a 69 y.o. male with past medical history significant for acute on chronic systolic heart failure, CKD stage 3, CAD, PVD, NSTEMI, stroke, diabetic foot ulcers, HLD, HTN, and DM2 with polyneuropathy. Mr. Rodarte currently presents with nausea, vomiting and diarrhea likely secondary to viral gastroenteritis.    Plan:     1. Nausea, vomiting and diarrhea 2/2 Viral Gastroenteritis:  -Patient presented due to multiple episodes of nausea, vomiting, and diarrhea since 8/19  -Currently stable  -Influenza A&B (8/22): negative  -CBC on admission: WBC=10.54  -Orthostatic positive: Lying down: 128/61 P=58; Sitting up: 108/56 P=60; Standing up: 93/53 P=69  -Ordered 1L IV Fluids 100cc/hr  -Monitor vitals and consider additional IV fluids if remains orthostatic positive    2. CORRY on CKD Stage 3:  -Baseline CKD with Creatinine~2.5  -On admission, BUN=39 and Creatinine=3.1  -BUN/Creatinine=12.58  -Likely prerenal azotemia 2/2 hypovolemia (vomiting and diarrhea)  -Ordered 1L IV Fluids 100cc/hr  -Continue to monitor BUN and Creatinine  -Renally dose medication and hold nephrotoxic agents    3. Atrial Fibrillation:  -[id number]=6; requires Aspirin and anticoagulant therapy   -Previous complication with Warfarin  -EKG (8/22): Afib with slow ventricular response  -loop recorder in place since 2018  -Heparin ordered because of CKDIII  -Home medication: baby aspirin, metoprolol and Amiodarone  -FU with PCP to add anticoagulant therapy on discharge    4. Chronic Heart Failure with Reduced Ejection Fraction:  -Patient is currently well controlled  -TTE (2/19): decreased LV systolic function with EF=45%; Grade 1 LV diastolic dysfunction  -Home medications=Furosemide 40mg daily  -Hold Furosemide due to fluid depletion 2/2 vomiting and diarrhea  -Ordered 1L IV Fluids 100cc/hr  -Order FU  TTE  -Continue to monitor fluid status    5. Hypertension:  -On admission, patient presented with BP=86/48  current VE=824/53 after 1L bolus of 0.9% NaCl  -Ordered 1L IV Fluids 100cc/hr  -Home medications: Metoprolol succinate 25mg daily, Furosemide 40mg daily  -Hold furosemide; consider holding metoprolol  -Continue to monitor BP    6. Diabetes Mellitus Type 2:  -HbA1C (5/2019): 7.9 (previous from 2/2019=8.2)  -Glucose on sqpugendx=385  -Home medications: Detemir 16 units every evening, Victoza (Liraglutide) 1.2mg daily  -Continue Detemir and hold Victoza   -Start on accuchecks  - on weight loss and diet    7. CAD and PVD:  -Not in acute distress or symptomatic  -Cardiac artery stents placement in 2016  -Bilateral lower extremity stent placement in 2017  -Home medication: Aspirin, Plavix, and Atorvastatin  -Continue home medication      Code Status:     Code: FULL  Diet: clear liquid   DVTppx: Heparin    Dispo: Pending symptomatic recovery of viral gastroenteritis; discharge in the AM    Marcellus Campbell  L3 [facility] Medical Student  [facility] Medicine Service    [facility] Medicine Hospitalist Pager numbers:   [facility] Hospitalist Medicine Team A (Mitchel/Michael): [phone]  [facility] Hospitalist Medicine Team B (Astrid/Harpal):  [phone]

## 2019-08-22 NOTE — ED NOTES
Pt reports having episodes of N/V/D x 4 days. Pt states that he had two days where he felt better and was able to go to his cataract surgery that was this morning at 0630. Pt was discharged around 0900 and passed a PO challenge. By 1100, he was having another episode of N/Vx3/Dx2.

## 2019-08-22 NOTE — ED PROVIDER NOTES
Encounter Date: 8/22/2019    SCRIBE #1 NOTE: I, Armond Jimenez, am scribing for, and in the presence of,  Daryl Gloria MD. I have scribed the entire note.       History     Chief Complaint   Patient presents with    N/V/D     Reports N/V/D, abdominal cramping, sinus congestion today. States he feels overheated but hasn't been outside. Reports had similar symptoms on Tuesday but had resolved. Pt had cataract surgery to [facility] eye this AM.      69 year-old male presents to the ED due to N/V/D that started today. Patient reports abdominal cramping with 2 episodes of diarrhea and 3 episodes of vomiting today. He denies coughing, fever, or chills. States he had similar symptoms 2 days ago that spontaneously resolved. Patient had cataract surgery to [facility] this morning.    The history is provided by the patient.     Review of patient's allergies indicates:   Allergen Reactions    Adhesive tape-silicones Blisters     Past Medical History:   Diagnosis Date    Acute on chronic systolic CHF (congestive heart failure) 11/29/2016    Acute osteomyelitis of toe, right     Anemia     Anticoagulant long-term use     CKD (chronic kidney disease) stage 2, GFR 60-89 ml/min 2/21/2016    CKD (chronic kidney disease) stage 3, GFR 30-59 ml/min     Coronary artery disease     Cryptogenic stroke, remote history (2006) 1/26/2017    Decubitus ulcer, heel, right, unstageable     Diabetic foot ulcer 11/13/2013    Diabetic foot ulcer with osteomyelitis 6/23/2017    Encounter for blood transfusion     HTN (hypertension) 3/3/2014    Hyperlipidemia 3/3/2014    NSTEMI (non-ST elevated myocardial infarction) 11/28/2016    Obesity (BMI 30-39.9) 11/29/2016    Osteomyelitis, chronic     PVD (peripheral vascular disease)     Stroke     Type 2 diabetes mellitus with diabetic peripheral angiopathy without gangrene, without long-term current use of insulin 10/24/2013    Type 2 diabetes mellitus with diabetic polyneuropathy, without long-term  current use of insulin 2016     Past Surgical History:   Procedure Laterality Date    ABLATION N/A 2/3/2017    Performed by [facility] MD at [facility] CATH LAB    ANGIOGRAM-EXTREMITY- Right LOWER CO2, Diagnostic only, Right CFA access Right 2017    Performed by Nehal William MD at [facility] CATH LAB    [id number] Left 2017    Performed by Nehal William MD at [facility] OR 2ND FLR    CARDIAC SURGERY      COLONOSCOPY N/A 3/21/2017    Performed by Karissa Peck MD at [facility] ENDO (2ND FLR)    CORONARY ANGIOPLASTY WITH STENT PLACEMENT      DEBRIDEMENT, ULCER, WITH SKIN GRAFT APPLICATION Right 2013    Performed by Aftab Reynoso DPM at [facility] OR    ESOPHAGOGASTRODUODENOSCOPY (EGD) N/A 3/21/2017    Performed by Karissa Peck MD at [facility] ENDO (2ND FLR)    FOOT NERVE GRAFT  2013    HEART CATH-LEFT Left 2016    Performed by Robbie Collazo MD at [facility] CATH LAB    left leg stent      Loop Recorder placement      right leg bypass      TRANSESOPHAGEAL ECHOCARDIOGRAM (LIANE) N/A 2/3/2017    Performed by Cayden Moreno MD at [facility] CATH LAB     Family History   Problem Relation Age of Onset    Diabetes Mother     Glaucoma Mother     Cataracts Mother     Heart disease Father     Blindness Maternal Grandmother     Macular degeneration Neg Hx     Retinal detachment Neg Hx     Strabismus Neg Hx     Amblyopia Neg Hx      Social History     Tobacco Use    Smoking status: Former Smoker     Packs/day: 1.00     Years: 50.00     Pack years: 50.00     Last attempt to quit: 3/1/2011     Years since quittin.4    Smokeless tobacco: Never Used   Substance Use Topics    Alcohol use: No    Drug use: No     Review of Systems   Constitutional: Negative for chills and fever.   HENT: Positive for congestion (currently resolved). Negative for sore throat.    Respiratory: Negative for cough and shortness of breath.    Cardiovascular: Negative for chest pain.   Gastrointestinal:  Positive for abdominal pain (currently resolved), diarrhea and vomiting. Negative for nausea.   Genitourinary: Negative for dysuria.   Musculoskeletal: Negative for back pain.   Skin: Negative for rash.   Neurological: Negative for weakness.   Hematological: Does not bruise/bleed easily.   All other systems reviewed and are negative.      Physical Exam     Initial Vitals   BP Pulse Resp Temp SpO2   08/22/19 1411 08/22/19 1418 08/22/19 1411 08/22/19 1411 08/22/19 1411   (!) 86/48 (!) 56 19 97.8 °F (36.6 °C) 99 %      MAP       --                Physical Exam    Nursing note and vitals reviewed.  Constitutional: He appears well-developed and well-nourished. No distress.   HENT:   Head: Normocephalic and atraumatic.   Mouth/Throat: Oropharynx is clear and moist.   Eyes: Conjunctivae and EOM are normal. Pupils are equal, round, and reactive to light.   Neck: Normal range of motion. Neck supple.   Cardiovascular: Intact distal pulses.   No murmur heard.  Bradycardic, irregular   Pulmonary/Chest: Breath sounds normal. No respiratory distress. He has no wheezes.   Abdominal: Soft. He exhibits no distension. There is no tenderness.   Musculoskeletal: Normal range of motion. He exhibits no edema.   No LE edema   Neurological: He is alert and oriented to person, place, and time.   Skin: Skin is warm and dry.         ED Course   Procedures  Labs Reviewed   CBC W/ AUTO DIFFERENTIAL - Abnormal; Notable for the following components:       Result Value    RBC 4.47 (*)     Hemoglobin 13.5 (*)     RDW 14.8 (*)     Gran # (ANC) 8.4 (*)     Gran% 79.9 (*)     Lymph% 11.3 (*)     All other components within normal limits   COMPREHENSIVE METABOLIC PANEL - Abnormal; Notable for the following components:    Glucose 222 (*)     BUN, Bld 39 (*)     Creatinine 3.1 (*)     eGFR if  22 (*)     eGFR if non  19 (*)     All other components within normal limits   URINALYSIS - Abnormal; Notable for the following  components:    Glucose, UA Trace (*)     All other components within normal limits   INFLUENZA A & B BY MOLECULAR   LIPASE   MAGNESIUM   CK   CK     EKG Readings: (Independently Interpreted)   EKG rate: 54 bpm, atrial fibrillation, no ST elevations, flipped T waves in leads I and aVL.       Imaging Results    None          Medical Decision Making:   Clinical Tests:   Lab Tests: Reviewed and Ordered  Medical Tests: Ordered and Reviewed  ED Management:  4:18 PM  Discussed with Dr. Rodriguez who will admit for observation.                      Clinical Impression:       ICD-10-CM ICD-9-CM   1. CORRY (acute kidney injury) N17.9 584.9   2. Bradycardia R00.1 427.89   3. Nausea, vomiting and diarrhea R11.2 787.91    R19.7 787.01           Disposition:   Disposition: Placed in Observation  Condition: Fair  I, Dr. Daryl Garcia, personally performed the services described in this documentation. All medical record entries made by the scribe were at my direction and in my presence. I have reviewed the chart and agree that the record reflects my personal performance and is accurate and complete. Daryl Garcia MD.  5:19 PM 08/22/2019                        Daryl Garcia MD  08/22/19 2702

## 2019-08-22 NOTE — H&P
U Internal Medicine History and Physical - Resident Note    Admitting Team: Medicine Team A  Attending Physician: Michael   Resident: Kayla  Interns: Chari     Date of Admit: 8/22/2019    Chief Complaint     Nausea/vomiting and weakness x 3 days     History of Present Illness:  Patient is a 68 yo gentleman with a past medical history HFrEF (EF 45%), CKD stage 3, CAD, HTN, and DM2 who presented to [facility] with a chief complaint of weakness associated with nausea and vomiting for three days. Patient was in his usual state of health (performing all ADL's without assistance) until three days prior to admission. At that time, patient endorses umbilical pain associated with several episodes of nonbloody, nonbilious emesis. He also reports two episodes of loose diarrhea that developed around the same time. The following day, symptoms improved and patient was able to tolerate his regular diet. Then on day of admission, patient was admitted cataract surgery of his left eye. Following the procedure, patient was discharged home. While at home, patient became diaphoretic and had two more episodes of emesis followed by profound weakness. Patient was so lethargic that he became concerned and decided to come to the emergency room. The patient is unaware of any sick contacts. In the ED, patient received one liter of normal saline. Despite fluid resuscitation, patient remained symptomatically weak and blood pressures in 90's. He was therefore admitted to our service for further fluid resuscitation and symptomatic management.      Past Medical History:  Past Medical History:   Diagnosis Date    Acute on chronic systolic CHF (congestive heart failure) 11/29/2016    Acute osteomyelitis of toe, right     Anemia     Anticoagulant long-term use     CKD (chronic kidney disease) stage 2, GFR 60-89 ml/min 2/21/2016    CKD (chronic kidney disease) stage 3, GFR 30-59 ml/min     Coronary artery disease     Cryptogenic  stroke, remote history (2006) 1/26/2017    Decubitus ulcer, heel, right, unstageable     Diabetic foot ulcer 11/13/2013    Diabetic foot ulcer with osteomyelitis 6/23/2017    Encounter for blood transfusion     HTN (hypertension) 3/3/2014    Hyperlipidemia 3/3/2014    NSTEMI (non-ST elevated myocardial infarction) 11/28/2016    Obesity (BMI 30-39.9) 11/29/2016    Osteomyelitis, chronic     PVD (peripheral vascular disease)     Stroke     Type 2 diabetes mellitus with diabetic peripheral angiopathy without gangrene, without long-term current use of insulin 10/24/2013    Type 2 diabetes mellitus with diabetic polyneuropathy, without long-term current use of insulin 11/29/2016       Past Surgical History:  Past Surgical History:   Procedure Laterality Date    ABLATION N/A 2/3/2017    Performed by [facility]no MD at [facility] CATH LAB    ANGIOGRAM-EXTREMITY- Right LOWER CO2, Diagnostic only, Right CFA access Right 7/14/2017    Performed by Nehal William MD at [facility] CATH LAB    [id number] Left 6/14/2017    Performed by Nehal William MD at [facility] OR 2ND FLR    CARDIAC SURGERY      COLONOSCOPY N/A 3/21/2017    Performed by Karissa Peck MD at [facility] ENDO (2ND FLR)    CORONARY ANGIOPLASTY WITH STENT PLACEMENT      DEBRIDEMENT, ULCER, WITH SKIN GRAFT APPLICATION Right 11/13/2013    Per[facility] OR    ESOPHAGOGASTRODUODENOSCOPY (EGD) N/A 3/21/2017    Performed by Karissa Peck MD at [facility] ENDO (2ND FLR)    FOOT NERVE GRAFT  11/2013    HEART CATH-LEFT Left 11/28/2016    Performed by Robbie Collazo MD at [facility] CATH LAB    left leg stent      Loop Recorder placement      right leg bypass      TRANSESOPHAGEAL ECHOCARDIOGRAM (LIANE) N/A 2/3/2017    Performed by Cayden Moreno MD at [facility] CATH LAB       Allergies:  Review of patient's allergies indicates:   Allergen Reactions    Adhesive tape-silicones Blisters       Home Medications:  Prior to Admission  "medications    Medication Sig Start Date End Date Taking? Authorizing Provider   amiodarone (PACERONE) 200 MG Tab Take 1 tablet (200 mg total) by mouth once daily. Take 2 tablets (400 mg) twice daily for 14 days, then 1 tablet (200 mg) daily. 18   Karsten Rodriguez MD   aspirin (ECOTRIN) 81 MG EC tablet Take 81 mg by mouth once daily.    Historical Provider, MD   atorvastatin (LIPITOR) 80 MG tablet TAKE 1 TABLET BY MOUTH EVERY DAY 10/31/18   Riki Huynh MD   BD ULTRA-FINE HANG PEN NEEDLES 32 gauge x 5/32" Ndle USE TWICE DAILY FOR INSULIN INJECTION 18   SYDNEE Johnson, FNP   clopidogrel (PLAVIX) 75 mg tablet TAKE 1 TABLET BY MOUTH EVERY DAY 18   Elmer Smith Jr., MD   furosemide (LASIX) 40 MG tablet Take 1 tablet (40 mg total) by mouth once daily. 19   Riki Huynh MD   insulin detemir U-100 (LEVEMIR FLEXTOUCH) 100 unit/mL (3 mL) SubQ InPn pen Inject 16 Units into the skin every evening. 19   Riki Huynh MD   isosorbide dinitrate (ISORDIL) 20 MG tablet Take 1 tablet (20 mg total) by mouth once daily. 19  Katya Ramirez MD   metoprolol succinate (TOPROL-XL) 25 MG 24 hr tablet TAKE 1 TABLET (25 MG TOTAL) BY MOUTH ONCE DAILY. 19   Riki Huynh MD   VICTOZA 2-SAGAR 0.6 mg/0.1 mL (18 mg/3 mL) PnIj INJECT 1.2 MG INTO THE SKIN ONCE DAILY. 18   Cony Saleem NP       Family History:  Family History   Problem Relation Age of Onset    Diabetes Mother     Glaucoma Mother     Cataracts Mother     Heart disease Father     Blindness Maternal Grandmother     Macular degeneration Neg Hx     Retinal detachment Neg Hx     Strabismus Neg Hx     Amblyopia Neg Hx        Social History:  Social History     Tobacco Use    Smoking status: Former Smoker     Packs/day: 1.00     Years: 50.00     Pack years: 50.00     Last attempt to quit: 3/1/2011     Years since quittin.4    Smokeless tobacco: Never Used   Substance Use Topics    Alcohol use: No " "   Drug use: No       Review of Systems:  Pertinent positives and negatives are listed in HPI.  All other systems are reviewed and are negative.    Health Maintaince :   Primary Care Physician: Amina   Immunizations:   TDap is up to date.  Influenza is up to date.  Pneumovax is up to date.  Cancer Screening:    Colonoscopy: is up to date. 2017 (next one in )     Objective:   Last 24 Hour Vital Signs:  BP  Min: 86/48  Max: 86/48  Temp  Av.8 °F (36.6 °C)  Min: 97.8 °F (36.6 °C)  Max: 97.8 °F (36.6 °C)  Pulse  Av  Min: 56  Max: 56  Resp  Av  Min: 19  Max: 19  SpO2  Av %  Min: 99 %  Max: 99 %  Height  Av' 9" (175.3 cm)  Min: 5' 9" (175.3 cm)  Max: 5' 9" (175.3 cm)  Weight  Av.4 kg (250 lb)  Min: 113.4 kg (250 lb)  Max: 113.4 kg (250 lb)  Body mass index is 36.92 kg/m².  No intake/output data recorded.    Physical Examination:  General: Alert and awake in NAD  Head:  Normocephalic and atraumatic  Eyes:  PERRL; EOMi with anicteric sclera and clear conjunctivae  Mouth:  Oropharynx clear and without exudate; dry mucous membranes  Cardio:  Bradycardic and irregularly irregular rhythm with normal S1 and S2; no murmurs  Resp:  CTAB and unlabored; no wheezes, crackles or rhonchi  Abdom: Soft, NTND with normoactive bowel sounds  Extrem: WWP with no clubbing, cyanosis or edema  Skin:  No rashes, lesions, or color changes  Pulses: 2+ and symmetric distally  Neuro:  AAOx3; cooperative and pleasant with no focal deficits  Orthostatics:  Positive by pressure, asymptomatic     Laboratory:  Most Recent Data:  CBC:   Lab Results   Component Value Date    WBC 10.54 2019    HGB 13.5 (L) 2019    HCT 41.5 2019     2019    MCV 93 2019    RDW 14.8 (H) 2019     BMP:   Lab Results   Component Value Date     2019    K 4.3 2019     2019    CO2 24 2019    BUN 39 (H) 2019    CREATININE 3.1 (H) 2019     (H) 2019 "    CALCIUM 9.1 08/22/2019    MG 2.0 08/22/2019    PHOS 2.6 (L) 02/11/2019    PHOS 2.6 (L) 02/11/2019     LFTs:   Lab Results   Component Value Date    PROT 7.2 08/22/2019    ALBUMIN 3.5 08/22/2019    BILITOT 0.8 08/22/2019    AST 18 08/22/2019    ALKPHOS 120 08/22/2019    ALT 17 08/22/2019     Coags:   Lab Results   Component Value Date    INR 1.1 06/19/2017     Urinalysis:   Lab Results   Component Value Date    LABURIN No growth 12/07/2016    COLORU Yellow 08/22/2019    SPECGRAV 1.025 08/22/2019    NITRITE Negative 08/22/2019    KETONESU Negative 08/22/2019    UROBILINOGEN Negative 08/22/2019    WBCUA 8 (H) 02/11/2019       Trended Lab Data:  Recent Labs   Lab 08/22/19  1529   WBC 10.54   HGB 13.5*   HCT 41.5      MCV 93   RDW 14.8*      K 4.3      CO2 24   BUN 39*   CREATININE 3.1*   *   PROT 7.2   ALBUMIN 3.5   BILITOT 0.8   AST 18   ALKPHOS 120   ALT 17       Trended Cardiac Data:  No results for input(s): TROPONINI, CKTOTAL, CKMB, BNP in the last 168 hours.    Microbiology Data:  None     Other Laboratory Data:  None     Other Results:  EKG (my interpretation):   Atrial fibrillation with slow ventricular response, rate approximately 55bpm     Radiology:  Imaging Results    None       Assessment:     Chau Rodarte is a 69 y.o. male with CKD3, DM2, and HTN who presented with nausea and vomiting likely secondary to viral gastroenteritis. Patient presents with CORRY on CKD secondary to volume depletion from emesis/diarrhea.      Plan:     CORRY on CKD3 2/2 Volume Depletion .  -volume depletion secondary to emesis/diarrhea from viral gastroenteritis   -given 1L normal saline in ED, remains orthostatic positive   -will continue fluid resuscitation, conservative with fluids due to history of heart failure  -admit to observation, reassess fluid status in morning  -monitor with repeat BMP in AM, avoid nephrotoxic agents     Viral Gastroenteritis   -afebrile, normal WBC, likely viral etiology  -low  suspicion for bacterial, no antibiotics   -clear liquid, advance as tolerated to diabetic diet   -treat symptomatically, zofran prn, and bentyl prn     Chronic Heart Failure w/ Reduced Ejection Fraction   -TTE 2/2019 EF 45% with grade 1 diastolic dysfunction   -holding home lasix in setting of volume depletion, monitor for evidence of volume overload   -continue metoprolol     Chronic Atrial Fibrillation   -EKG revealed atrial fibrillation with slow ventricular response   -loop recorder in place, since 2018  -continue home metoprolol and amiodarone   -CEEAK3Rync2 score 7 complication with warfarin in past, discuss potential DOAC with PCP     DM2  -complicated with CKD  -HbA1c 7.9  5/2019  -continue home basal insulin 16 units at night  start SSI with accuchecks  holding home victoza     CAD  -s/p stent placement 2016  -continue home asa and plavix and statin   -no acute issues    PVD  -s/p bilateral stent placement in 2017  -continue asa, plavix   -no acute issues     Diet: clear liquid   DVTppx: Heparin  Code: FULL    Tommie Garza  [facility] Internal Medicine HO-III  [facility] Medicine Service    \[facility] [place] [facility]\"" Medicine Hospitalist Pager numbers:   [facility] Hospitalist Medicine Team A (Mitchel/Michael): [phone]  [facility] Hospitalist Medicine Team B (Astrid/Harpal):  [phone]

## 2019-08-23 NOTE — NURSING
Gave pt and spouse D/C, FU and RX information. Verbalized understanding. Denied questions. Will  RX at outside pharmacy. PIV removed. NAD noted.

## 2019-08-23 NOTE — PROGRESS NOTES
Pharmacy New Medication Education    Patient and/or Caregiver ACCEPTED medication education.    Pharmacy has provided education on the name, indication, and possible side effects of the medication(s) prescribed, using teach-back method.     Learners of pharmacy medication education includes:  patient    Medication Indication Side Effects   amiodarone A. fib GI distress and sun hypersensitivity   dicyclomine Abdominal pain constipation and dry mouth  and dizziness        The following medications have also been discussed, during this admission.     Current Facility-Administered Medications   Medication Frequency    amiodarone tablet 200 mg Daily    aspirin EC tablet 81 mg Daily    atorvastatin tablet 80 mg Daily    clopidogrel tablet 75 mg Daily    dextrose 50 % in water (D50W) injection 12.5 g PRN    dextrose 50 % in water (D50W) injection 25 g PRN    dicyclomine tablet 20 mg QID (AC & HS)    glucagon (human recombinant) injection 1 mg PRN    glucose chewable tablet 16 g PRN    glucose chewable tablet 24 g PRN    heparin (porcine) injection 5,000 Units Q8H    insulin aspart U-100 pen 1-10 Units QID (AC + HS) PRN    insulin detemir U-100 pen 16 Units QHS    loperamide 1 mg/5 mL solution 2 mg QID PRN    metoprolol succinate (TOPROL-XL) 24 hr tablet 25 mg Daily    sodium chloride 0.9% flush 10 mL PRN     Current Outpatient Medications   Medication Sig    amiodarone (PACERONE) 200 MG Tab Take 1 tablet (200 mg total) by mouth once daily. Take 2 tablets (400 mg) twice daily for 14 days, then 1 tablet (200 mg) daily.    aspirin (ECOTRIN) 81 MG EC tablet Take 81 mg by mouth once daily.    atorvastatin (LIPITOR) 80 MG tablet TAKE 1 TABLET BY MOUTH EVERY DAY    clopidogrel (PLAVIX) 75 mg tablet TAKE 1 TABLET BY MOUTH EVERY DAY    furosemide (LASIX) 40 MG tablet Take 1 tablet (40 mg total) by mouth once daily.    insulin detemir U-100 (LEVEMIR FLEXTOUCH) 100 unit/mL (3 mL) SubQ InPn pen Inject 16 Units into the skin every  "evening.    metoprolol succinate (TOPROL-XL) 25 MG 24 hr tablet TAKE 1 TABLET (25 MG TOTAL) BY MOUTH ONCE DAILY.    VICTOZA 2-SAGAR 0.6 mg/0.1 mL (18 mg/3 mL) PnIj INJECT 1.2 MG INTO THE SKIN ONCE DAILY.    BD ULTRA-FINE HANG PEN NEEDLES 32 gauge x 5/32" Ndle USE TWICE DAILY FOR INSULIN INJECTION    dicyclomine (BENTYL) 10 MG capsule Take 1 capsule (10 mg total) by mouth 4 (four) times daily before meals and nightly.    ondansetron (ZOFRAN) 4 MG tablet Take 1 tablet (4 mg total) by mouth every 8 (eight) hours as needed for Nausea.          Thank you  Castillo Jimenez, PharmD            "

## 2019-08-23 NOTE — MEDICAL/APP STUDENT
"[facility] Medical Student Progress Note - Marcellus Campbell    Subjective:      Chau Rodarte is a 69 y.o. AA male who has a past medical history significant for acute on chronic systolic heart failure, CKD stage 3, CAD, PVD, NSTEMI, stroke, diabetic foot ulcers, HLD, HTN, and DM2 with polyneuropathy. The patient presented to [facility] on 2019 with a primary complaint of nausea, vomiting, and diarrhea (reports N/V/D, abdominal cramping, sinus congestion today. States he feels overheated but hasn't been outside. Reports had similar symptoms on Tuesday but had resolved. Patient had cataract surgery to left eye this morning) since Monday evening.    Mr. Rodarte is doing well this morning with no major complaints. He does not complain of abdominal pain, nausea, vomiting or diarrhea. He does not complain of chest pain or shortness of breath. He tolerated his liquid diet yesterday well and this morning we plan to advance his diet to mechanical soft. He has not had a bowel movement since yesterday during his diarrhea episodes. All other review of systems are negative.     Objective:   Last 24 Hour Vital Signs:  BP  Min: 86/48  Max: 157/73  Temp  Av.9 °F (36.6 °C)  Min: 97.8 °F (36.6 °C)  Max: 98.2 °F (36.8 °C)  Pulse  Av.2  Min: 56  Max: 64  Resp  Av.6  Min: 16  Max: 27  SpO2  Av.3 %  Min: 92 %  Max: 99 %  Height  Av' 9" (175.3 cm)  Min: 5' 9" (175.3 cm)  Max: 5' 9" (175.3 cm)  Weight  Av.2 kg (249 lb 9 oz)  Min: 113 kg (249 lb 1.9 oz)  Max: 113.4 kg (250 lb)  I/O last 3 completed shifts:  In: 1000 [IV Piggyback:1000]  Out: 650 [Urine:650]    Physical Examination:  General:          Alert and awake in not acute distress  Head:               Normocephalic and atraumatic  Eyes:               Pupils are equal and reactive to light; extraocular muscle movement intact with anicteric sclera and clear conjunctivae  Mouth:             Oropharynx clear and without exudate; moist mucous " membranes  Cardio:             Regular rate and rhythm with normal S1 and S2; no murmurs or rubs  Resp:               Clear to ascultation bilaterally and unlabored; no wheezes, crackles or rhonchi  Abdom:            Soft, non-tender and non-distended with normoactive bowel sounds  Extrem:            Warm well perfused with no clubbing, cyanosis or edema  Skin:                No rashes, lesions, or color changes  Pulses:            2+ and symmetric distally  Neuro:             AAOx3; cooperative and pleasant with no focal deficits      Laboratory:  Laboratory Data Reviewed: yes  Pertinent Findings:  Recent Labs   Lab 08/22/19  1529   WBC 10.54   HGB 13.5*   HCT 41.5      MCV 93   RDW 14.8*      K 4.3      CO2 24   BUN 39*   CREATININE 3.1*   *   PROT 7.2   ALBUMIN 3.5   BILITOT 0.8   AST 18   ALKPHOS 120   ALT 17     Urinalysis:         Lab Results   Component Value Date     LABURIN No growth 12/07/2016     COLORU Yellow 02/11/2019     SPECGRAV >=1.030 (A) 02/11/2019     NITRITE Negative 02/11/2019     KETONESU Trace (A) 02/11/2019     UROBILINOGEN 1.0 02/11/2019     WBCUA 8 (H) 02/11/2019      Recent Labs     08/22/19  1529   CPK 88     Microbiology Data Reviewed: yes  Influenza A&B (8/22): negative    Other Results:  EKG (my interpretation): (8/22): Afib with slow ventricular response; left axis deviation    Radiology Data Reviewed: yes    None    Current Medications:     Infusions:       Scheduled:   amiodarone  200 mg Oral Daily    aspirin  81 mg Oral Daily    atorvastatin  80 mg Oral Daily    clopidogrel  75 mg Oral Daily    dicyclomine  20 mg Oral QID (AC & HS)    heparin (porcine)  5,000 Units Subcutaneous Q8H    insulin detemir U-100  16 Units Subcutaneous QHS    metoprolol succinate  25 mg Oral Daily        PRN:  dextrose 50 % in water (D50W), dextrose 50 % in water (D50W), glucagon (human recombinant), glucose, glucose, insulin aspart U-100, loperamide, sodium chloride  0.9%      Assessment:     Chau Rodarte is a 69 y.o.male with  Patient Active Problem List    Diagnosis Date Noted    CORRY (acute kidney injury) 08/22/2019    CKD (chronic kidney disease), stage III 08/22/2019    Volume depletion 08/22/2019    Orthostatic hypotension 08/22/2019    Acute gastroenteritis 08/22/2019    Acquired cyst of kidney 02/11/2019    Acute otitis externa of left ear 01/30/2019    Long term current use of amiodarone     Healed ulcer of right foot on examination     Diabetic polyneuropathy associated with type 2 diabetes mellitus     Diabetic ulcer of toe of right foot associated with type 2 diabetes mellitus, with necrosis of bone     Nonhealing surgical wound     Critical lower limb ischemia 09/15/2017    Diabetic ulcer of right midfoot associated with type 2 diabetes mellitus, with muscle involvement without evidence of necrosis 09/07/2017    Diabetic ulcer of right heel associated with type 1 diabetes mellitus 09/07/2017    Diabetic ulcer of heel associated with type 2 diabetes mellitus     PAD (peripheral artery disease)     BPH (benign prostatic hypertrophy) 06/23/2017    Debility 06/19/2017    Preop examination     Atherosclerosis of native arteries of the extremities with ulceration 04/26/2017    Atrial fibrillation with slow ventricular response 03/24/2017    Lower GI bleed 03/20/2017    Type 2 diabetes mellitus with peripheral circulatory disorder 03/20/2017    Acute blood loss anemia 03/20/2017    Typical atrial flutter, s/p ablation 01/26/2017    Coronary artery disease involving native coronary artery of native heart without angina pectoris 12/20/2016    Ischemic cardiomyopathy 12/20/2016    Cardiomegaly 12/05/2016    Type 2 diabetes mellitus with stage 3 chronic kidney disease, with long-term current use of insulin 11/29/2016    Chronic systolic heart failure 11/29/2016    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, NYHA class 2 and  CKD stage 3 02/21/2016    Essential hypertension 03/03/2014    HLD (hyperlipidemia) 10/24/2013    S/P femoral-popliteal bypass surgery 10/24/2013        Plan:     1. Nausea, vomiting and diarrhea 2/2 Viral Gastroenteritis:  -Patient presented due to multiple episodes of nausea, vomiting, and diarrhea since 8/19  -Currently stable  -Influenza A&B (8/22): negative  -CBC on admission: WBC=10.54  -Orthostatic positive (8/22): Lying down: 128/61 P=58; Sitting up: 108/56 P=60; Standing up: 93/53 P=69  -Ordered 1L IV Fluids 100cc/hr  -Monitor vitals and consider additional IV fluids if remains orthostatic positive    2. CORRY on CKD Stage 3:  -Baseline CKD with Creatinine~2.5  -On admission, BUN=39 and Creatinine=3.1  -BUN/Creatinine=12.58  -Likely prerenal azotemia 2/2 hypovolemia (vomiting and diarrhea)  -Ordered 1L IV Fluids 100cc/hr  -Continue to monitor BUN and Creatinine  -Renally dose medication and hold nephrotoxic agents    3. Atrial Fibrillation:  -[id number]=5; requires Aspirin and anticoagulant therapy   -Previous complication with Warfarin  -EKG (8/22): Afib with slow ventricular response  -loop recorder in place since 2018  -Heparin ordered because of CKDIII  -Home medication: baby aspirin, metoprolol and Amiodarone  -FU with PCP to add anticoagulant therapy on discharge     4. Chronic Heart Failure with Reduced Ejection Fraction:  -Patient is currently well controlled  -TTE (2/19): decreased LV systolic function with EF=45%; Grade 1 LV diastolic dysfunction  -Home medications=Furosemide 40mg daily  -Hold Furosemide due to fluid depletion 2/2 vomiting and diarrhea  -Ordered 1L IV Fluids 100cc/hr  -Order FU TTE  -Continue to monitor fluid status     5. Hypertension:  -On admission, patient presented with BP=86/48  current HH=305/73 after 1L bolus of 0.9% NaCl  -Ordered 1L IV Fluids 100cc/hr  -Home medications: Metoprolol succinate 25mg daily, Furosemide 40mg daily  -Hold furosemide; consider holding  metoprolol  -Continue to monitor BP     6. Diabetes Mellitus Type 2:  -HbA1C (5/2019): 7.9 (previous from 2/2019=8.2)  -Glucose on hiovynuuh=669  -Home medications: Detemir 16 units every evening, Victoza (Liraglutide) 1.2mg daily  -Continue Detemir and hold Victoza   -Start on accuchecks  - on weight loss and diet     7. CAD and PVD:  -Not in acute distress or symptomatic  -Cardiac artery stents placement in 2016  -Bilateral lower extremity stent placement in 2017  -Home medication: Aspirin, Plavix, and Atorvastatin  -Continue home medication        Code Status:      Code: FULL  Diet: clear liquid   DVTppx: Heparin     Dispo: Pending symptomatic recovery of viral gastroenteritis; discharge in the AM     Marcellus Campbell  [facility] Medical Student  hospitals Medicine Service    hospitals Medicine Hospitalist Pager numbers:   hospitals Hospitalist Medicine Team A (Mitchel/Michael): [phone]  hospitals Hospitalist Medicine Team B (Astrid/Harpal):  [phone]

## 2019-08-23 NOTE — PROGRESS NOTES
"[employer] IM Resident HO-III Progress Note    Subjective:      Patient resting comfortably in bed with wife at bedside. Overnight, patient had no more episodes of emesis or diarrhea. Abdominal pain improvedwith bentyl. Tolerated clear liquid diet. Denies any fever, chills, chest pain, or shortness of breath.      Objective:   Last 24 Hour Vital Signs:  BP  Min: 86/48  Max: 157/73  Temp  Av.9 °F (36.6 °C)  Min: 97.8 °F (36.6 °C)  Max: 98.2 °F (36.8 °C)  Pulse  Av  Min: 56  Max: 64  Resp  Av.5  Min: 16  Max: 27  SpO2  Av.3 %  Min: 92 %  Max: 99 %  Height  Av' 9" (175.3 cm)  Min: 5' 9" (175.3 cm)  Max: 5' 9" (175.3 cm)  Weight  Av.2 kg (249 lb 9 oz)  Min: 113 kg (249 lb 1.9 oz)  Max: 113.4 kg (250 lb)  I/O last 3 completed shifts:  In: 1000 [IV Piggyback:1000]  Out: 650 [Urine:650]    Physical Examination:  General:          Alert and awake in NAD  Head:               Normocephalic and atraumatic  Eyes:               PERRL; EOMi with anicteric sclera and clear conjunctivae, eye cover over L eye  Mouth:             Oropharynx clear and without exudate; dry mucous membranes  Cardio:             Bradycardic and irregularly irregular rhythm with normal S1 and S2; no murmurs  Resp:               CTAB and unlabored; no wheezes, crackles or rhonchi  Abdom:            Soft, NTND with normoactive bowel sounds  Extrem:            WWP with no clubbing, cyanosis or edema  Skin:                No rashes, lesions, or color changes  Pulses:            2+ and symmetric distally  Neuro:             AAOx3; cooperative and pleasant with no focal deficits  Orthostatics:    Positive by pressure, asymptomatic        Laboratory:  Laboratory Data Reviewed: yes  Pertinent Findings:  Lab Results   Component Value Date    WBC 8.77 2019    HGB 12.4 (L) 2019    HCT 39.1 (L) 2019    MCV 94 2019     2019     Lab Results   Component Value Date    CREATININE 2.3 (H) 2019    BUN 34 (H) " 08/23/2019     08/23/2019    K 4.0 08/23/2019     08/23/2019    CO2 22 (L) 08/23/2019         Microbiology Data Reviewed: yes  Pertinent Findings:  None     Other Results:  EKG (my interpretation):No new tracings     Radiology Data Reviewed: yes  Pertinent Findings:  No new imaging     Current Medications:     Infusions:       Scheduled:   amiodarone  200 mg Oral Daily    aspirin  81 mg Oral Daily    atorvastatin  80 mg Oral Daily    clopidogrel  75 mg Oral Daily    dicyclomine  20 mg Oral QID (AC & HS)    heparin (porcine)  5,000 Units Subcutaneous Q8H    insulin detemir U-100  16 Units Subcutaneous QHS    metoprolol succinate  25 mg Oral Daily        PRN:  dextrose 50 % in water (D50W), dextrose 50 % in water (D50W), glucagon (human recombinant), glucose, glucose, insulin aspart U-100, loperamide, sodium chloride 0.9%      Assessment:     Chau Rodarte is a 69 y.o.male with  Patient Active Problem List    Diagnosis Date Noted    CORRY (acute kidney injury) 08/22/2019    CKD (chronic kidney disease), stage III 08/22/2019    Volume depletion 08/22/2019    Orthostatic hypotension 08/22/2019    Acute gastroenteritis 08/22/2019    Acquired cyst of kidney 02/11/2019    Acute otitis externa of left ear 01/30/2019    Long term current use of amiodarone     Healed ulcer of right foot on examination     Diabetic polyneuropathy associated with type 2 diabetes mellitus     Diabetic ulcer of toe of right foot associated with type 2 diabetes mellitus, with necrosis of bone     Nonhealing surgical wound     Critical lower limb ischemia 09/15/2017    Diabetic ulcer of right midfoot associated with type 2 diabetes mellitus, with muscle involvement without evidence of necrosis 09/07/2017    Diabetic ulcer of right heel associated with type 1 diabetes mellitus 09/07/2017    Diabetic ulcer of heel associated with type 2 diabetes mellitus     PAD (peripheral artery disease)     BPH (benign prostatic  hypertrophy) 06/23/2017    Debility 06/19/2017    Preop examination     Atherosclerosis of native arteries of the extremities with ulceration 04/26/2017    Atrial fibrillation with slow ventricular response 03/24/2017    Lower GI bleed 03/20/2017    Type 2 diabetes mellitus with peripheral circulatory disorder 03/20/2017    Acute blood loss anemia 03/20/2017    Typical atrial flutter, s/p ablation 01/26/2017    Coronary artery disease involving native coronary artery of native heart without angina pectoris 12/20/2016    Ischemic cardiomyopathy 12/20/2016    Cardiomegaly 12/05/2016    Type 2 diabetes mellitus with stage 3 chronic kidney disease, with long-term current use of insulin 11/29/2016    Chronic systolic heart failure 11/29/2016    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, NYHA class 2 and CKD stage 3 02/21/2016    Essential hypertension 03/03/2014    HLD (hyperlipidemia) 10/24/2013    S/P femoral-popliteal bypass surgery 10/24/2013        Plan:     CORRY on CKD3 2/2 Volume Depletion .  -volume depletion secondary to emesis/diarrhea from viral gastroenteritis   -given 1L normal saline in ED, reassess fluid status this morning prior to discharge   -conservative with fluids due to history of heart failure  -Creatinine improved to near baseline, avoid nephrotoxic agents      Viral Gastroenteritis   -afebrile, normal WBC, likely viral etiology  -low suspicion for bacterial, no antibiotics   -clear liquid, advance as tolerated to diabetic diet   -treat symptomatically, zofran prn, and bentyl prn   -improved overnight, tolerating food      Chronic Heart Failure w/ Reduced Ejection Fraction   -TTE 2/2019 EF 45% with grade 1 diastolic dysfunction   -holding home lasix in setting of volume depletion, monitor for evidence of volume overload   -holding metoprolol in setting of bradycardia     Chronic Atrial Fibrillation   -EKG revealed atrial fibrillation with slow ventricular  response   -loop recorder in place, since 2018  -continue home metoprolol and amiodarone   -[id number] score 7 complication with warfarin in past, discuss potential DOAC with PCP      DM2  -complicated with CKD  -HbA1c 7.9  5/2019  -continue home basal insulin 16 units at night  start SSI with accuchecks  holding home victoza      CAD  -s/p stent placement 2016  -continue home asa and plavix and statin   -no acute issues     PVD  -s/p bilateral stent placement in 2017  -continue asa, plavix   -no acute issues      Diet: clear liquid   DVTppx: Heparin  Code: FULL     Tommie Garza  [facility] Internal Medicine HO-III  [employer] Medicine Service     [facility] Medicine Hospitalist Pager numbers:   [facility] Hospitalist Medicine Team A (Mitchel/Michael): [phone]  [facility] Hospitalist Medicine Team B (Astrid/Harpal):  [phone]

## 2019-08-23 NOTE — DISCHARGE SUMMARY
\[facility]\"" Hospital Medicine Discharge Summary    Primary Team: \[facility]\"" Hospitalist Team A  Attending Physician: Michael  Resident: Kayla  Intern: Chari    Date of Admit: 8/22/2019  Date of Discharge: 8/23/2019    Discharge to: Home  Condition: Stable    Discharge Diagnoses     Patient Active Problem List   Diagnosis    HLD (hyperlipidemia)    S/P femoral-popliteal bypass surgery    Essential hypertension    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, NYHA class 2 and CKD stage 3    Type 2 diabetes mellitus with stage 3 chronic kidney disease, with long-term current use of insulin    Chronic systolic heart failure    Cardiomegaly    Coronary artery disease involving native coronary artery of native heart without angina pectoris    Ischemic cardiomyopathy    Typical atrial flutter, s/p ablation    Lower GI bleed    Type 2 diabetes mellitus with peripheral circulatory disorder    Acute blood loss anemia    Atrial fibrillation with slow ventricular response    Atherosclerosis of native arteries of the extremities with ulceration    Preop examination    Debility    BPH (benign prostatic hypertrophy)    PAD (peripheral artery disease)    Diabetic ulcer of heel associated with type 2 diabetes mellitus    Diabetic ulcer of right midfoot associated with type 2 diabetes mellitus, with muscle involvement without evidence of necrosis    Diabetic ulcer of right heel associated with type 1 diabetes mellitus    Critical lower limb ischemia    Nonhealing surgical wound    Diabetic ulcer of toe of right foot associated with type 2 diabetes mellitus, with necrosis of bone    Diabetic polyneuropathy associated with type 2 diabetes mellitus    Healed ulcer of right foot on examination    Long term current use of amiodarone    Acute otitis externa of left ear    Acquired cyst of kidney    CORRY (acute kidney injury)    CKD (chronic kidney disease), stage III    Volume depletion     Orthostatic hypotension    Acute gastroenteritis     Consultants and Procedures     Consultants:  None    Procedures:   None    Imaging:  None    Brief History of Present Illness      Patient is a 68 yo gentleman with a past medical history HFrEF (EF 45%), CKD stage 3, CAD, HTN, and DM2 who presented to [facility] with a chief complaint of weakness associated with nausea and vomiting for three days. Patient was in his usual state of health (performing all ADL's without assistance) until three days prior to admission. At that time, patient endorsed umbilical pain associated with several episodes of nonbloody, nonbilious emesis. He also reported two episodes of loose diarrhea that developed around the same time. The following day, symptoms improved and patient was able to tolerate his regular diet. Then on day of admission, patient was admitted the day of a cataract surgery to his left eye. Following the procedure, patient was discharged home. While at home, patient became diaphoretic and had two more episodes of emesis followed by profound weakness. Patient was so lethargic that he became concerned and decided to come to the emergency room. The patient is unaware of any sick contacts. In the ED, patient received one liter of normal saline. Despite fluid resuscitation, patient remained symptomatically weak and blood pressures in 90's. He was therefore admitted to our service for further fluid resuscitation and symptomatic management.    Hospital Course By Problem with Pertinent Findings     1. CORRY on CKD3 2/2 Volume Depletion .  - Volume depletion secondary to emesis/diarrhea from viral gastroenteritis   - Given 1L normal saline in ED, reassess fluid status this morning prior to discharge   - Conservative with fluids due to history of heart failure  - Creatinine improved to near baseline, avoided nephrotoxic agents  - Patient will be discharged with close follow up with PCP     2. Viral Gastroenteritis   -  "Afebrile, normal WBC, likely viral etiology  - Low suspicion for bacterial, no antibiotics   - Clear liquid, advance as tolerated to diabetic diet   - Treat symptomatically, zofran prn, and bentyl prn   - Improved overnight, tolerating food on morning of discharge     3. Chronic Heart Failure w/ Reduced Ejection Fraction   - TTE 2/2019 EF 45% with grade 1 diastolic dysfunction   - Holding home lasix in setting of volume depletion, monitor for evidence of volume overload   - Held metoprolol in setting of bradycardia  - Patient stable on discharge     4. Chronic Atrial Fibrillation   - EKG revealed atrial fibrillation with slow ventricular response   - Loop recorder in place, since 2018  - Continue home metoprolol and amiodarone   - [id number] score 7 complication with warfarin in past  - Stable on discharge, encouraged to discuss potential DOAC with PCP  - Will have close follow up with Dr. Epps     5. DM2  - Complicated with CKD  - HbA1c 7.9  5/2019  - Continue home basal insulin 16 units at night  started SSI with accuchecks  held home victoza  - No acute issues throughout admission     6. CAD  - S/p stent placement 2016  - Continue home asa and plavix and statin   - No acute issues throughout admission, will have close follow up with Dr. Epps     7. PVD  - S/p bilateral stent placement in 2017  - Continued ASA and Plavix   - No acute issues throughout admission    Discharge Medications      Chau Rodarte   Home Medication Instructions VIRGILIO:[id number]    Printed on:08/23/19 1153   Medication Information                      amiodarone (PACERONE) 200 MG Tab  Take 1 tablet (200 mg total) by mouth once daily. Take 2 tablets (400 mg) twice daily for 14 days, then 1 tablet (200 mg) daily.             aspirin (ECOTRIN) 81 MG EC tablet  Take 81 mg by mouth once daily.             atorvastatin (LIPITOR) 80 MG tablet  TAKE 1 TABLET BY MOUTH EVERY DAY             BD ULTRA-FINE HANG PEN NEEDLES 32 gauge x 5/32" " Ndle  USE TWICE DAILY FOR INSULIN INJECTION             clopidogrel (PLAVIX) 75 mg tablet  TAKE 1 TABLET BY MOUTH EVERY DAY             dicyclomine (BENTYL) 10 MG capsule  Take 1 capsule (10 mg total) by mouth 4 (four) times daily before meals and nightly.             furosemide (LASIX) 40 MG tablet  Take 1 tablet (40 mg total) by mouth once daily.             insulin detemir U-100 (LEVEMIR FLEXTOUCH) 100 unit/mL (3 mL) SubQ InPn pen  Inject 16 Units into the skin every evening.             metoprolol succinate (TOPROL-XL) 25 MG 24 hr tablet  TAKE 1 TABLET (25 MG TOTAL) BY MOUTH ONCE DAILY.             ondansetron (ZOFRAN) 4 MG tablet  Take 1 tablet (4 mg total) by mouth every 8 (eight) hours as needed for Nausea.             VICTOZA 2-SAGAR 0.6 mg/0.1 mL (18 mg/3 mL) PnIj  INJECT 1.2 MG INTO THE SKIN ONCE DAILY.               Discharge Information:   Diet:  Diabetic    Physical Activity:  As tolerated             Instructions:  1. Take all medications as prescribed  2. Keep all follow-up appointments  3. Return to the hospital or call your primary care physicians if any worsening symptoms such as fever, chest pain, shortness of breath, return of symptoms, or any other concerns.    Follow-Up Appointments:  Follow up with Dr. Epps of Cardiology.  Follow up with Dr. Huynh on 9/5/19 at 8:40am.    Gigi Marcelino IV, MD  [facility] Internal Medicine, \[facility]\"" Hospitalist Team A

## 2019-08-23 NOTE — PLAN OF CARE
08/23/19 1048   Final Note   Assessment Type Final Discharge Note   Anticipated Discharge Disposition Home   What phone number can be called within the next 1-3 days to see how you are doing after discharge? [phone]   Hospital Follow Up  Appt(s) scheduled? Yes   Discharge plans and expectations educations in teach back method with documentation complete? Yes   Right Care Referral Info   Post Acute Recommendation No Care

## 2019-08-23 NOTE — PLAN OF CARE
Pt AAO x 4.  VSS.  Pt remained afebrile throughout this shift.   IVF administered per order.   Pt remained free of falls this shift.   Pt had no c/o pain this shift.  Blood glucose monitored, corrected via SSI.  Plan of care reviewed. Patient verbalizes understanding.   Pt moving/turing independently. Frequent weight shifting encouraged.  Bed low, side rails up x 2, wheels locked, call light in reach.   Bed alarm maintained for safety.   Patient instructed to call for assistance.   Hourly rounding completed.   24 hour chart check completed.  Will continue to monitor.

## 2019-08-23 NOTE — TELEPHONE ENCOUNTER
----- Message from Yris Lawrence sent at 8/23/2019 10:21 AM CDT -----  Contact: Case Management  Patient needs a follow up appointment and he prefers early mornings. Please call and advise.

## 2019-08-23 NOTE — PLAN OF CARE
Pt voices no d/c needs. Pt's spouse at bedside and informed Tn she can provide help at home if needed and transportation upon d/c. Pt's Cardiologist is Dr. Epps; Tn left message for hospital follow up and someone from office will call pt.  Tn gave pt d/c folder and card and encouraged to call for any further needs/concerns.  Future Appointments   Date Time Provider Department Center   9/5/2019  8:40 AM Riki Huynh MD [facility]   9/20/2019 11:00 AM HOME MONITOR DEVICE CHECK, [facility]   9/23/2019  8:00 AM Riki Huynh MD [facility]

## 2019-08-23 NOTE — PLAN OF CARE
08/23/19 1021   Discharge Assessment   Assessment Type Discharge Planning Assessment   Confirmed/corrected address and phone number on facesheet? Yes   Assessment information obtained from? Patient   Expected Length of Stay (days) 1   Communicated expected length of stay with patient/caregiver yes   Prior to hospitilization cognitive status: Alert/Oriented   Prior to hospitalization functional status: Independent   Current cognitive status: Alert/Oriented   Current Functional Status: Independent   Lives With spouse   Able to Return to Prior Arrangements yes   Is patient able to care for self after discharge? Yes   Who are your caregiver(s) and their phone number(s)? Vladimir (spouse) [phone]   Patient's perception of discharge disposition home or selfcare   Readmission Within the Last 30 Days no previous admission in last 30 days   Patient currently being followed by outpatient case management? No   Patient currently receives any other outside agency services? No   Equipment Currently Used at Home power chair   Do you have any problems affording any of your prescribed medications? No   Is the patient taking medications as prescribed? yes   Does the patient have transportation home? Yes   Transportation Anticipated family or friend will provide   Does the patient receive services at the [facility]? No   Discharge Plan A Home   Discharge Plan B Home with family   DME Needed Upon Discharge  none   Patient/Family in Agreement with Plan yes

## 2019-09-09 NOTE — PATIENT INSTRUCTIONS
Taking Aspirin for Atherosclerosis       Aspirin is a medicine often prescribed to treat atherosclerosis. This condition affects your arteries. These are the blood vessels that carry blood away from your heart. Having atherosclerosis means youre at greater risk of a heart attack or stroke. Aspirin can help prevent these from happening.  How atherosclerosis affects your arteries   A fatty material (plaque) can build up in your arteries. This makes it harder for blood to flow through them. A blood clot can then form on the plaque. This may block the artery, cutting off blood flow. This can cause conditions such as coronary artery disease (CAD) and peripheral arterial disease (PAD):  · CAD occurs when plaque builds up in the coronary artery. This artery supplies the heart with oxygen-rich blood.  · PAD occurs when plaque forms in leg arteries.  The same things that cause CAD and PAD can also cause plaque to form in other arteries in your body, such as those in the brain. When plaque occurs in any of these arteries, it raises your risk of heart attack or stroke.  What aspirin does  Aspirin is a blood-thinner (antiplatelet medicine). It helps keep blood clots from forming. This reduces the risk of blockage. Aspirin can be taken daily if you are at high risk of or have already had a heart attack or stroke. It is also used after a procedure called a stent placement. This is when a tiny wire mesh tube, or stent, is placed in an artery to keep it open. Aspirin helps prevent blood clots from forming on the stent.  Taking aspirin safely  Tell your healthcare provider about any medicines you are taking. This includes:  · All prescription medicines  · Over-the-counter medicines  · Herbs, vitamins, and other supplements  Also mention if you have a history of ulcers or bleeding problems. Ask whether you will need to stop taking aspirin before having surgery or dental work. Always take medicines as directed.  Tips for taking  aspirin  · Have a routine. For example, take aspirin with the same meal each day.  · Dont take more than prescribed. A low dose gives the same benefit as a higher one, with a lower risk of side effects.  · Dont skip doses. Aspirin needs to be taken each day to work well.  · Keep track of what you take. A pillbox with days of the week can help, especially if you take several medicines. Or use a list or chart to keep track.  When to call your healthcare provider  Side effects of aspirin are not usually serious. If you do have problems, changing your dose may help. Call your healthcare provider if you have any of the following:  · Excessive bruising (some bruising is normal)  · Nosebleeds, bleeding gums, or other excessive bleeding  · An upset stomach or stomach pain   Date Last Reviewed: 6/1/2016  © 4716-1798 [employer]. [address] [place] [address], [place] [address]. All rights reserved. This information is not intended as a substitute for professional medical care. Always follow your healthcare professional's instructions.

## 2019-09-09 NOTE — PROGRESS NOTES
Subjective:   Patient ID:  Chau Rodarte is a 69 y.o. male who presents for evaluation and treatment of Peripheral Arterial Disease; healed R foot wound; and s/p endovascular R leg + surgical intevention L leg      HPI:       Chau Rodarte 69 y.o. male is here follow up and feeling well without any new complaints. He has CAD s/p multivessel PCI, PAD, obesity with BMI 39, HTN, HLP, DM, ICM with EF 45%, PAF with embolic CVA 2006, s/p CTI RFA 2/2017, and ILR for afib surveillance. No claudication. No ulcers. He was recently admitted for dehydration after excessive vomiting post anesthesia. He is well and happy.       9/2017: He is here by recommendation of Dr. Bland (podiatry) for follow up after revascularization for R foot CLI. His R foot is improving. The wounds on the R heel and great toe have healed. The wound on the R first MT joint is healing.  He is taking antibiotics. He is on aspirin and plavix for DAPT.         He has an extensive history of PAD. S/p L pSFA PTAS with 8.0 x 40 mm Lifestent post dilated with 7 mm balloon (5/2012), S/p R fem-pop 2012 at [facility] with 6 mm PTFE graft (Occluded on US and Angio in 2017), and S/p L fem-bk POP bypass-V 6/2017. His L heel wound healed. He was not an ideal surgical candidate for another bypass on the R leg. He had revascularization of R SFA and POP  with re-establishment of flow to his foot. This was a complex IVUS guided intervention that required dual L retrograde cross over CFA and R AT retrograde accesses. Reverse CART was performed in the popliteal artery with a 5.0 x 100 mm balloon and retrograde Astato 30 wire. Thereafter he had multiple stents. Surveillance ultrasounds on 10/8/2017, 10/30/2017, and 12/5/2017 revealed patent SFA/POP/AT and L fem-pop bypass.          PTAS prox & ostial AT with 3.0 x 38 and 4.0 x 38 mm Resolute SOULEYMANE.    PTAS of popliteal with 5.5 x 100 mm Supera stent.     PTAS of dSFA with 6.0 x 150 Supera     PTAS of mSFA with 7 x 100 Zilver,     PTAS prox SFA with 80 x 100 mm Zilver    PTAS ostial SFA with 8 x 40 mm Zilver.     2 vessel run off via AT and PER  with filling of plantar arch.          US 1/2019     Diameters in mm     CFA 10    SFA 7.0   POP 6.0   BTK 3.0   FEM-POP 6           R CFA 53, pSFA 77, mSFA 0, dSFA 10, pPOP 16, dPOP 47, PT 0, AT 34/33/14, DP 34  L CFA 72, entire SFA 0, POP 28/246/97/134, PT 10/13/0, AT 35/33/27, DP 26   Findings consistent with bilateral SFA  with 1 vessel r/o via AT   Patent L fem-pop      10/2018: cr 2.3 with gfr 38.          Care team:    Baldo-pop  Smith-vas sx  Dvmeghan-pcp  Josh/Rich-card        Patient Active Problem List    Diagnosis Date Noted    Critical lower limb ischemia 09/15/2017     Priority: High    Diabetic ulcer of right midfoot associated with type 2 diabetes mellitus, with muscle involvement without evidence of necrosis 09/07/2017     Priority: High     Added automatically from request for surgery [id number]      CORRY (acute kidney injury) 08/22/2019    CKD (chronic kidney disease), stage III 08/22/2019    Volume depletion 08/22/2019    Orthostatic hypotension 08/22/2019    Acute gastroenteritis 08/22/2019    Acquired cyst of kidney 02/11/2019     1.6 cm right lower pole renal cyst. Repeat US Oct 2019      Acute otitis externa of left ear 01/30/2019    Long term current use of amiodarone     Healed ulcer of right foot on examination     Diabetic polyneuropathy associated with type 2 diabetes mellitus     Diabetic ulcer of toe of right foot associated with type 2 diabetes mellitus, with necrosis of bone     Nonhealing surgical wound     Diabetic ulcer of right heel associated with type 1 diabetes mellitus 09/07/2017    Diabetic ulcer of heel associated with type 2 diabetes mellitus     PAD (peripheral artery disease)          S/p L pSFA PTAS with 8.0 x 40 mm Lifestent    Post dilated with 7 mm balloon    S/p R fem-pop 2012 at [facility]   6 mm PTFE graft   Occluded on US and Angio  in 2017       S/p L fem-bk POP bypass-GSV 6/2017      Selective R leg angiogram 9/7/2017     R CFA patent   R SFA and POP    AT and PER reconstitution proximally   DP is the only vessel providing flow to plantar arch      S/p peripheral intervention 9/15/2017       RCART with 5.0 x 100 mm and retrograde Astato 30 wire.     PTAS of AT with 3.0 x 38 and 4.0 x 38 mm Resolute SOULEYMANE.    PTAS of popliteal with 5.5 x 100 mm Supera stent.     PTAS of dSFA with 6.0 x 150 Supera     PTAS of mSFA with 7 x 100 Zilver,    PTAS prox SFA with 80 x 100 mm Zilver    PTAS ostial SFA with 8 x 40 mm Zilver.     2 vessel run off via AT and PER  with filling of plantar arch.      US 1/2019     Diameters in mm     CFA 10    SFA 7.0   POP 6.0   BTK 3.0   FEM-POP 6           R CFA 53, pSFA 77, mSFA 0, dSFA 10, pPOP 16, dPOP 47, PT 0, AT 34/33/14, DP 34  L CFA 72, entire SFA 0, POP 28/246/97/134, PT 10/13/0, AT 35/33/27, DP 26  Findings consistent with bilateral SFA  with 1 vessel r/o via AT  Patent L fem-pop          BPH (benign prostatic hypertrophy) 06/23/2017    Debility 06/19/2017    Preop examination     Atherosclerosis of native arteries of the extremities with ulceration 04/26/2017    Atrial fibrillation with slow ventricular response 03/24/2017    Lower GI bleed 03/20/2017    Type 2 diabetes mellitus with peripheral circulatory disorder 03/20/2017    Acute blood loss anemia 03/20/2017    Typical atrial flutter, s/p ablation 01/26/2017    Coronary artery disease involving native coronary artery of native heart without angina pectoris 12/20/2016         S/p PCI     Distal LM/LAD/LCX with DK crush   4.5 X 18 BMS from LM-LAD and 4 x 15 SOULEYMANE LM-LCX   LAD 4 x 18 SOULEYMANE    RCA        Multi-vessel PCI    Turned down by CTS        EF 40% post PCI      Ischemic cardiomyopathy 12/20/2016    Cardiomegaly 12/05/2016    Type 2 diabetes mellitus with stage 3 chronic kidney disease, with long-term current use of insulin 11/29/2016     Chronic systolic heart failure 2016    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, NYHA class 2 and CKD stage 3 2016    Essential hypertension 2014    HLD (hyperlipidemia) 10/24/2013    S/P femoral-popliteal bypass surgery 10/24/2013         R fem-pop in       L fem-pop in                    Right Arm BP - Sittin/70  Left Arm BP - Sittin/70        LABS    LAST HbA1c  Lab Results   Component Value Date    HGBA1C 7.9 (H) 2019       Lipid panel  Lab Results   Component Value Date    CHOL 125 2019    CHOL 115 (L) 2018    CHOL 110 (L) 2017     Lab Results   Component Value Date    HDL 37 (L) 2019    HDL 28 (L) 2018    HDL 25 (L) 2017     Lab Results   Component Value Date    LDLCALC 75.8 2019    LDLCALC 75.2 2018    LDLCALC 72.2 2017     Lab Results   Component Value Date    TRIG 61 2019    TRIG 59 2018    TRIG 64 2017     Lab Results   Component Value Date    CHOLHDL 29.6 2019    CHOLHDL 24.3 2018    CHOLHDL 22.7 2017            Review of Systems   Constitution: Negative for diaphoresis, night sweats, weight gain and weight loss.   HENT: Negative for congestion.    Eyes: Negative for blurred vision, discharge and double vision.   Cardiovascular: Negative for chest pain, claudication, cyanosis, dyspnea on exertion, irregular heartbeat, leg swelling, near-syncope, orthopnea, palpitations, paroxysmal nocturnal dyspnea and syncope.   Respiratory: Negative for cough, shortness of breath and wheezing.    Endocrine: Negative for cold intolerance, heat intolerance and polyphagia.   Hematologic/Lymphatic: Negative for adenopathy and bleeding problem. Does not bruise/bleed easily.   Skin: Positive for poor wound healing. Negative for dry skin and nail changes.   Musculoskeletal: Negative for arthritis, back pain, falls, joint pain, myalgias and neck pain.    Gastrointestinal: Negative for bloating, abdominal pain, change in bowel habit and constipation.   Genitourinary: Negative for bladder incontinence, dysuria, flank pain, genital sores and missed menses.   Neurological: Negative for aphonia, brief paralysis, difficulty with concentration, dizziness and weakness.   Psychiatric/Behavioral: Negative for altered mental status and memory loss. The patient does not have insomnia.    Allergic/Immunologic: Negative for environmental allergies.       Objective:   Physical Exam   Constitutional: He is oriented to person, place, and time. He appears well-developed and well-nourished. He is not intubated.   HENT:   Head: Normocephalic and atraumatic.   Right Ear: External ear normal.   Left Ear: External ear normal.   Mouth/Throat: Oropharynx is clear and moist.   Eyes: Pupils are equal, round, and reactive to light. Conjunctivae and EOM are normal. Right eye exhibits no discharge. Left eye exhibits no discharge. No scleral icterus.   Neck: Normal range of motion. Neck supple. Normal carotid pulses, no hepatojugular reflux and no JVD present. Carotid bruit is not present. No tracheal deviation present. No thyromegaly present.   Cardiovascular: Normal rate, regular rhythm, S1 normal and S2 normal.  No extrasystoles are present. PMI is not displaced. Exam reveals no gallop, no S3, no distant heart sounds, no friction rub and no midsystolic click.   Murmur heard.   Systolic murmur is present with a grade of 2/6 at the upper right sternal border.  Pulses:       Carotid pulses are 2+ on the right side, and 2+ on the left side.       Radial pulses are 2+ on the right side, and 2+ on the left side.        Femoral pulses are 2+ on the right side, and 2+ on the left side.       Popliteal pulses are 2+ on the right side, and 2+ on the left side.        Dorsalis pedis pulses are 1+ on the right side, and 0 on the left side.        Posterior tibial pulses are 0 on the right side, and 0 on  the left side.       Biphasic R POP with faint R DP and no R PT doppler signals      Biphasic L POP with biphasic L DP and monophasic L PT doppler signals        Pulmonary/Chest: Effort normal and breath sounds normal. No accessory muscle usage or stridor. No apnea, no tachypnea and no bradypnea. He is not intubated. No respiratory distress. He has no decreased breath sounds. He has no wheezes. He has no rales. He exhibits no tenderness and no bony tenderness.   Abdominal: He exhibits no distension, no pulsatile liver, no abdominal bruit, no ascites, no pulsatile midline mass and no mass. There is no tenderness. There is no rebound and no guarding.   Musculoskeletal: Normal range of motion. He exhibits no edema or tenderness.   Lymphadenopathy:     He has no cervical adenopathy.   Neurological: He is alert and oriented to person, place, and time. He has normal reflexes. No cranial nerve deficit. Coordination normal.   Skin: Skin is warm. No rash noted. No erythema. No pallor.       No ulcers           Psychiatric: He has a normal mood and affect. His behavior is normal. Judgment and thought content normal.     8/28/2017              10/15/2017    Left heel       Right heel      R lateral great toe     right great toe      11/13/2017 12/12/2017 1/14/2019                    Assessment:     1. PAD (peripheral artery disease)    2. Stage 3 chronic kidney disease    3. Mixed hyperlipidemia    4. S/P femoral-popliteal bypass surgery    5. Essential hypertension    6. 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, NYHA class 2 and CKD stage 3    7. Coronary artery disease involving native coronary artery of native heart without angina pectoris    8. Chronic systolic heart failure    9. Healed ulcer of right foot on examination        Plan:       Risk factors modification   Weight loss   HTN, HLP, DM II control       Proper foot care  Proper footwear  Maintain relationship for  "podiatry   DAPT + statin  Amiodarone per EP   No acei or arb because of CKD      Enroll in HTN clinic+ weight loss + low salt diet   Resume care with nephrology + endocrinology  Echo to assess EF 2020          Continue with current medical plan and lifestyle changes.  Return sooner for concerns or questions. If symptoms persist go to the ED  I have reviewed all pertinent data on this patient       I have reviewed the patient's medical history in detail and updated the computerized patient record.    Orders Placed This Encounter   Procedures    US Lower Extremity Arteries Bilateral     Standing Status:   Future     Standing Expiration Date:   9/9/2020    Ambulatory Referral to Nephrology     Referral Priority:   Routine     Referral Type:   Consultation     Referral Reason:   Specialty Services Required     Requested Specialty:   Nephrology     Number of Visits Requested:   1       Follow up as scheduled. Return sooner for concerns or questions            He expressed verbal understanding and agreed with the plan        Greater than 50% of the visit of 45 minutes was spent counseling, educating, and coordinating the care of the patient.  -In today's visit, at least 4 established conditions that pose a risk to life or bodily function have been addressed and the conditions are severe.    -In today's visit, monitoring for drug toxicity was accomplished.            Patient's Medications   New Prescriptions    No medications on file   Previous Medications    AMIODARONE (PACERONE) 200 MG TAB    Take 1 tablet (200 mg total) by mouth once daily. Take 2 tablets (400 mg) twice daily for 14 days, then 1 tablet (200 mg) daily.    ASPIRIN (ECOTRIN) 81 MG EC TABLET    Take 81 mg by mouth once daily.    ATORVASTATIN (LIPITOR) 80 MG TABLET    TAKE 1 TABLET BY MOUTH EVERY DAY    BD ULTRA-FINE HANG PEN NEEDLES 32 GAUGE X 5/32" NDLE    USE TWICE DAILY FOR INSULIN INJECTION    CLOPIDOGREL (PLAVIX) 75 MG TABLET    TAKE 1 TABLET BY " MOUTH EVERY DAY    DICYCLOMINE (BENTYL) 10 MG CAPSULE    Take 1 capsule (10 mg total) by mouth 4 (four) times daily before meals and nightly.    FUROSEMIDE (LASIX) 40 MG TABLET    Take 1 tablet (40 mg total) by mouth once daily.    INSULIN DETEMIR U-100 (LEVEMIR FLEXTOUCH) 100 UNIT/ML (3 ML) SUBQ INPN PEN    Inject 16 Units into the skin every evening.    METOPROLOL SUCCINATE (TOPROL-XL) 25 MG 24 HR TABLET    TAKE 1 TABLET (25 MG TOTAL) BY MOUTH ONCE DAILY.    ONDANSETRON (ZOFRAN) 4 MG TABLET    Take 1 tablet (4 mg total) by mouth every 8 (eight) hours as needed for Nausea.    VICTOZA 2-SAGAR 0.6 MG/0.1 ML (18 MG/3 ML) PNIJ    INJECT 1.2 MG INTO THE SKIN ONCE DAILY.   Modified Medications    No medications on file   Discontinued Medications    No medications on file

## 2019-09-10 NOTE — TELEPHONE ENCOUNTER
"RE: AF, hx of Gi bleed   Received: Today   Message Contents   MD Caprice Weinberg RN   Cc: Annette Solis MA             Looks like legitimate AF.     Annette--Can we please get him in to see me in next few weeks to discuss that he is having AF despite Amiodarone. We are going to speak about how we are going to reduce risk of stroke in him as well as options to control afib.     GP    Previous Messages      ----- Message -----   From: Caprice Lee RN   Sent: 9/4/2019   7:57 AM   To: Karsten Rodriguez MD   Subject: AF, hx of Gi bleed                               Pt was in hospital 8/22/19 for Cataract surgery and 6 sec pause that was discussed with you and was reported "post anesthesia and/or vagally mediated".    On 8/20/19 he had SB median rate 41 bpm for 8 secs. pt seen in ED on 8/22/19 and reported N/V/D for 2 days prior.     Loop recorder alerts received for AF 2.5% burden, max 4 hrs 44mins, 10 episodes on 8/20/19 and 8/22/19, median V rates 50s-60s.   There is an ekg from 8/22/19 @1417 with AF with slow v response v rate 54 bpm.     Pt has hx of Gi bleed. Presenting rhythm SR, no new AF episodes since gi illness and cataract surgery.   On Aspirin only.       I will leave this in your door,   Caprice"

## 2019-09-23 NOTE — PROGRESS NOTES
Subjective:       Patient ID: Chau Rodarte is a 69 y.o. male.    Chief Complaint: No chief complaint on file.    Patient is here for followup for chronic conditions.    DM2:  good med adherence  no changed Diet  , 140 AM sugars  n/a Post-prandial sugars  stable Numbness in feet  no sores in feet  No new Visual changes  no Polyuria/polydipsia.    Bowels are OK, no blood or melena.        Review of Systems   Constitutional: Negative for activity change, fatigue, fever and unexpected weight change.   HENT: Positive for congestion.    Respiratory: Negative for cough, chest tightness, shortness of breath and wheezing.    Cardiovascular: Negative for chest pain, palpitations and leg swelling.   Gastrointestinal: Negative for abdominal distention, abdominal pain, anal bleeding, blood in stool, nausea and vomiting.   Genitourinary: Negative for decreased urine volume and difficulty urinating.   Musculoskeletal: Negative for arthralgias and neck pain.   Skin: Negative for rash and wound.   Neurological: Negative for tremors, syncope, weakness and numbness.   Hematological: Negative for adenopathy. Does not bruise/bleed easily.   Psychiatric/Behavioral: Negative for confusion.       Objective:      Physical Exam   Constitutional: He appears well-developed and well-nourished. No distress.   HENT:   Head: Normocephalic and atraumatic.   Mouth/Throat: No oropharyngeal exudate.   TMs clear, sinuses nontender, nasal mucosa w/o purulence.   Eyes: Pupils are equal, round, and reactive to light. EOM are normal. No scleral icterus.   Neck: Normal range of motion. Neck supple. No thyromegaly present.   Cardiovascular: Normal rate, regular rhythm and normal heart sounds.   Pulmonary/Chest: Effort normal. No respiratory distress. He has no wheezes. He has no rales.   Abdominal: Soft. Bowel sounds are normal. He exhibits no distension and no mass. There is no tenderness. There is no rebound and no guarding. No hernia.    Musculoskeletal: He exhibits no edema.   Protective Sensation (w/ 10 gram monofilament):  Right: Intact  Left: Intact    Visual Inspection:R big toe amp, stump CDI    Pedal Pulses:   Right: Absent  Left: Absent    Posterior tibialis:   Right:Absent  Left: Absent     Lymphadenopathy:     He has no cervical adenopathy.   Skin: He is not diaphoretic.   Psychiatric: He has a normal mood and affect. His behavior is normal.       Assessment:       1. History of tobacco abuse    2. Screening for cancer    3. Type 2 diabetes mellitus with diabetic polyneuropathy, with long-term current use of insulin        Plan:       Diagnoses and all orders for this visit:    History of tobacco abuse  -     CT Chest Lung Screening Low Dose; Future  Screening for cancer  -     CT Chest Lung Screening Low Dose; Future    Type 2 diabetes mellitus with diabetic polyneuropathy, with long-term current use of insulin  -     Hemoglobin A1c; Future        Health Maintenance       Date Due Completion Date    TETANUS VACCINE 04/20/1967 ---    LDCT Lung Screen 04/20/2004 ---    Shingles Vaccine (2 of 3) 05/29/2018 4/3/2018    Hemoglobin A1c 08/30/2019 5/30/2019    Influenza Vaccine (1) 09/01/2019 10/4/2018    Foot Exam 10/23/2019 10/23/2018    Override on 10/23/2015: Done (Tyra Hoover)    Eye Exam 01/31/2020 1/31/2019 (Done)    Override on 1/31/2019: Done (dr echavarria, no DR)    Override on 4/11/2018: Done (dr. jones, [facility], [phone])    Override on 7/18/2015: Done    Override on 3/29/2014: Done    Lipid Panel 05/30/2020 5/30/2019    High Dose Statin 09/19/2020 9/19/2019    Colonoscopy 03/21/2022 3/21/2017          Follow up in about 4 months (around 1/23/2020) for Flu vax please.    Future Appointments   Date Time Provider Department Center   10/3/2019  9:15 AM [facility] CT1 LIMIT 400 LBS [facility] CT SCAN [facility]   10/20/2019 11:00 AM HOME MONITOR DEVICE CHECK, [facility]   10/31/2019 10:00 AM True Gregg MD [facility]    1/23/2020  9:20 AM [facility]

## 2019-10-17 NOTE — TELEPHONE ENCOUNTER
----- Message from Whitney Mcclellan sent at 10/16/2019  1:31 PM CDT -----  Contact: Patient  The pt is calling to reschedule his home device check because he will be out of town. Please call him back @ [phone]. Thanks, Whitney

## 2019-10-31 PROBLEM — D63.1 ANEMIA DUE TO STAGE 3 CHRONIC KIDNEY DISEASE: Status: ACTIVE | Noted: 2019-01-01

## 2019-10-31 PROBLEM — E87.20 ACIDOSIS, METABOLIC: Status: ACTIVE | Noted: 2019-01-01

## 2019-11-27 PROBLEM — D63.1 ANEMIA DUE TO STAGE 4 CHRONIC KIDNEY DISEASE: Status: ACTIVE | Noted: 2019-01-01

## 2019-11-27 PROBLEM — N25.81 SECONDARY HYPERPARATHYROIDISM OF RENAL ORIGIN: Status: ACTIVE | Noted: 2019-01-01

## 2019-11-27 PROBLEM — N18.4 CHRONIC KIDNEY DISEASE (CKD), STAGE IV (SEVERE): Status: ACTIVE | Noted: 2019-01-01

## 2019-11-27 PROBLEM — N18.4 ANEMIA DUE TO STAGE 4 CHRONIC KIDNEY DISEASE: Status: ACTIVE | Noted: 2019-01-01

## 2020-01-01 ENCOUNTER — CLINICAL SUPPORT (OUTPATIENT)
Dept: CARDIOLOGY | Facility: HOSPITAL | Age: 70
End: 2020-01-01
Attending: INTERNAL MEDICINE
Payer: MEDICARE

## 2020-01-01 ENCOUNTER — OFFICE VISIT (OUTPATIENT)
Dept: INTERNAL MEDICINE | Facility: CLINIC | Age: 70
End: 2020-01-01
Payer: MEDICARE

## 2020-01-01 ENCOUNTER — IMMUNIZATION (OUTPATIENT)
Dept: PHARMACY | Facility: CLINIC | Age: 70
End: 2020-01-01
Payer: MEDICARE

## 2020-01-01 ENCOUNTER — DOCUMENTATION ONLY (OUTPATIENT)
Dept: ELECTROPHYSIOLOGY | Facility: CLINIC | Age: 70
End: 2020-01-01

## 2020-01-01 ENCOUNTER — ANESTHESIA EVENT (OUTPATIENT)
Dept: SURGERY | Facility: HOSPITAL | Age: 70
DRG: 329 | End: 2020-01-01
Payer: MEDICARE

## 2020-01-01 ENCOUNTER — TELEPHONE (OUTPATIENT)
Dept: CARDIOLOGY | Facility: HOSPITAL | Age: 70
End: 2020-01-01

## 2020-01-01 ENCOUNTER — LAB VISIT (OUTPATIENT)
Dept: LAB | Facility: HOSPITAL | Age: 70
End: 2020-01-01
Attending: INTERNAL MEDICINE
Payer: MEDICARE

## 2020-01-01 ENCOUNTER — PATIENT OUTREACH (OUTPATIENT)
Dept: ADMINISTRATIVE | Facility: OTHER | Age: 70
End: 2020-01-01

## 2020-01-01 ENCOUNTER — TELEPHONE (OUTPATIENT)
Dept: ELECTROPHYSIOLOGY | Facility: CLINIC | Age: 70
End: 2020-01-01

## 2020-01-01 ENCOUNTER — PATIENT MESSAGE (OUTPATIENT)
Dept: PHARMACY | Facility: CLINIC | Age: 70
End: 2020-01-01

## 2020-01-01 ENCOUNTER — ANESTHESIA (OUTPATIENT)
Dept: SURGERY | Facility: HOSPITAL | Age: 70
DRG: 329 | End: 2020-01-01
Payer: MEDICARE

## 2020-01-01 ENCOUNTER — TELEPHONE (OUTPATIENT)
Dept: INTERNAL MEDICINE | Facility: CLINIC | Age: 70
End: 2020-01-01

## 2020-01-01 ENCOUNTER — OFFICE VISIT (OUTPATIENT)
Dept: PODIATRY | Facility: CLINIC | Age: 70
End: 2020-01-01
Payer: MEDICARE

## 2020-01-01 ENCOUNTER — HOSPITAL ENCOUNTER (INPATIENT)
Facility: HOSPITAL | Age: 70
LOS: 5 days | DRG: 329 | End: 2020-06-15
Attending: EMERGENCY MEDICINE | Admitting: PSYCHIATRY & NEUROLOGY
Payer: MEDICARE

## 2020-01-01 VITALS
SYSTOLIC BLOOD PRESSURE: 126 MMHG | HEART RATE: 62 BPM | HEIGHT: 69 IN | OXYGEN SATURATION: 97 % | WEIGHT: 260.81 LBS | DIASTOLIC BLOOD PRESSURE: 78 MMHG | BODY MASS INDEX: 38.63 KG/M2

## 2020-01-01 VITALS
HEIGHT: 69 IN | SYSTOLIC BLOOD PRESSURE: 120 MMHG | OXYGEN SATURATION: 99 % | TEMPERATURE: 99 F | DIASTOLIC BLOOD PRESSURE: 59 MMHG | RESPIRATION RATE: 35 BRPM | BODY MASS INDEX: 43.57 KG/M2 | WEIGHT: 294.13 LBS | HEART RATE: 54 BPM

## 2020-01-01 VITALS
WEIGHT: 250 LBS | HEIGHT: 68 IN | DIASTOLIC BLOOD PRESSURE: 73 MMHG | BODY MASS INDEX: 37.89 KG/M2 | SYSTOLIC BLOOD PRESSURE: 162 MMHG | HEART RATE: 49 BPM

## 2020-01-01 DIAGNOSIS — I70.25 ATHEROSCLEROSIS OF NATIVE ARTERIES OF THE EXTREMITIES WITH ULCERATION: ICD-10-CM

## 2020-01-01 DIAGNOSIS — Z46.9 FITTING AND ADJUSTMENT OF DEVICE: ICD-10-CM

## 2020-01-01 DIAGNOSIS — K55.9 SMALL BOWEL ISCHEMIA: ICD-10-CM

## 2020-01-01 DIAGNOSIS — K56.609 SMALL BOWEL OBSTRUCTION: ICD-10-CM

## 2020-01-01 DIAGNOSIS — Z79.4 TYPE 2 DIABETES MELLITUS WITH DIABETIC PERIPHERAL ANGIOPATHY WITHOUT GANGRENE, WITH LONG-TERM CURRENT USE OF INSULIN: Primary | ICD-10-CM

## 2020-01-01 DIAGNOSIS — N18.4 TYPE 2 DIABETES MELLITUS WITH STAGE 4 CHRONIC KIDNEY DISEASE, WITH LONG-TERM CURRENT USE OF INSULIN: Primary | ICD-10-CM

## 2020-01-01 DIAGNOSIS — D72.829 LEUKOCYTOSIS, UNSPECIFIED TYPE: ICD-10-CM

## 2020-01-01 DIAGNOSIS — E11.9 TYPE 2 DIABETES MELLITUS WITHOUT COMPLICATION, WITH LONG-TERM CURRENT USE OF INSULIN: ICD-10-CM

## 2020-01-01 DIAGNOSIS — Z79.4 TYPE 2 DIABETES MELLITUS WITH STAGE 4 CHRONIC KIDNEY DISEASE, WITH LONG-TERM CURRENT USE OF INSULIN: ICD-10-CM

## 2020-01-01 DIAGNOSIS — Z87.2 HISTORY OF FOOT ULCER: ICD-10-CM

## 2020-01-01 DIAGNOSIS — K92.2 GASTROINTESTINAL HEMORRHAGE, UNSPECIFIED GASTROINTESTINAL HEMORRHAGE TYPE: ICD-10-CM

## 2020-01-01 DIAGNOSIS — E87.5 HYPERKALEMIA: ICD-10-CM

## 2020-01-01 DIAGNOSIS — Z79.4 TYPE 2 DIABETES MELLITUS WITH STAGE 4 CHRONIC KIDNEY DISEASE, WITH LONG-TERM CURRENT USE OF INSULIN: Primary | ICD-10-CM

## 2020-01-01 DIAGNOSIS — I48.3 TYPICAL ATRIAL FLUTTER: Primary | Chronic | ICD-10-CM

## 2020-01-01 DIAGNOSIS — N17.9 ACUTE KIDNEY INJURY: Primary | ICD-10-CM

## 2020-01-01 DIAGNOSIS — R06.00 DYSPNEA, UNSPECIFIED TYPE: ICD-10-CM

## 2020-01-01 DIAGNOSIS — I63.521: ICD-10-CM

## 2020-01-01 DIAGNOSIS — N18.4 TYPE 2 DIABETES MELLITUS WITH STAGE 4 CHRONIC KIDNEY DISEASE, WITH LONG-TERM CURRENT USE OF INSULIN: ICD-10-CM

## 2020-01-01 DIAGNOSIS — Z71.89 ADVANCE CARE PLANNING: ICD-10-CM

## 2020-01-01 DIAGNOSIS — Z71.89 GOALS OF CARE, COUNSELING/DISCUSSION: ICD-10-CM

## 2020-01-01 DIAGNOSIS — I48.91 ATRIAL FIBRILLATION WITH SLOW VENTRICULAR RESPONSE: ICD-10-CM

## 2020-01-01 DIAGNOSIS — I50.22 CHRONIC SYSTOLIC HEART FAILURE: ICD-10-CM

## 2020-01-01 DIAGNOSIS — Z79.4 TYPE 2 DIABETES MELLITUS WITHOUT COMPLICATION, WITH LONG-TERM CURRENT USE OF INSULIN: ICD-10-CM

## 2020-01-01 DIAGNOSIS — K55.9 MESENTERIC ISCHEMIA: ICD-10-CM

## 2020-01-01 DIAGNOSIS — Z89.411 ACQUIRED ABSENCE OF RIGHT GREAT TOE: ICD-10-CM

## 2020-01-01 DIAGNOSIS — I74.3 ARTERIAL EMBOLISM AND THROMBOSIS OF LOWER EXTREMITY: ICD-10-CM

## 2020-01-01 DIAGNOSIS — L84 PRE-ULCERATIVE CALLUSES: ICD-10-CM

## 2020-01-01 DIAGNOSIS — N18.4 CHRONIC KIDNEY DISEASE (CKD), STAGE IV (SEVERE): ICD-10-CM

## 2020-01-01 DIAGNOSIS — I48.0 PAROXYSMAL ATRIAL FIBRILLATION: ICD-10-CM

## 2020-01-01 DIAGNOSIS — N18.4 CKD (CHRONIC KIDNEY DISEASE) STAGE 4, GFR 15-29 ML/MIN: ICD-10-CM

## 2020-01-01 DIAGNOSIS — N25.81 SECONDARY HYPERPARATHYROIDISM OF RENAL ORIGIN: ICD-10-CM

## 2020-01-01 DIAGNOSIS — E11.22 TYPE 2 DIABETES MELLITUS WITH STAGE 4 CHRONIC KIDNEY DISEASE, WITH LONG-TERM CURRENT USE OF INSULIN: ICD-10-CM

## 2020-01-01 DIAGNOSIS — E11.51 TYPE 2 DIABETES MELLITUS WITH DIABETIC PERIPHERAL ANGIOPATHY WITHOUT GANGRENE, WITH LONG-TERM CURRENT USE OF INSULIN: Primary | ICD-10-CM

## 2020-01-01 DIAGNOSIS — Z66 DNR (DO NOT RESUSCITATE): ICD-10-CM

## 2020-01-01 DIAGNOSIS — Z51.5 PALLIATIVE CARE ENCOUNTER: ICD-10-CM

## 2020-01-01 DIAGNOSIS — I73.9 PVD (PERIPHERAL VASCULAR DISEASE): Primary | ICD-10-CM

## 2020-01-01 DIAGNOSIS — E11.22 TYPE 2 DIABETES MELLITUS WITH STAGE 4 CHRONIC KIDNEY DISEASE, WITH LONG-TERM CURRENT USE OF INSULIN: Primary | ICD-10-CM

## 2020-01-01 DIAGNOSIS — R10.84 GENERALIZED ABDOMINAL PAIN: ICD-10-CM

## 2020-01-01 DIAGNOSIS — E11.42 DIABETIC POLYNEUROPATHY ASSOCIATED WITH TYPE 2 DIABETES MELLITUS: ICD-10-CM

## 2020-01-01 DIAGNOSIS — E66.01 MORBID (SEVERE) OBESITY DUE TO EXCESS CALORIES: ICD-10-CM

## 2020-01-01 DIAGNOSIS — I63.9 STROKE: ICD-10-CM

## 2020-01-01 DIAGNOSIS — R52 PAIN: ICD-10-CM

## 2020-01-01 LAB
ABO + RH BLD: NORMAL
ABO + RH BLD: NORMAL
ALBUMIN SERPL BCP-MCNC: 1.5 G/DL (ref 3.5–5.2)
ALBUMIN SERPL BCP-MCNC: 1.7 G/DL (ref 3.5–5.2)
ALBUMIN SERPL BCP-MCNC: 2.2 G/DL (ref 3.5–5.2)
ALBUMIN SERPL BCP-MCNC: 3 G/DL (ref 3.5–5.2)
ALBUMIN SERPL BCP-MCNC: 3.4 G/DL (ref 3.5–5.2)
ALLENS TEST: ABNORMAL
ALP SERPL-CCNC: 115 U/L (ref 55–135)
ALP SERPL-CCNC: 44 U/L (ref 55–135)
ALP SERPL-CCNC: 50 U/L (ref 55–135)
ALP SERPL-CCNC: 56 U/L (ref 55–135)
ALP SERPL-CCNC: 79 U/L (ref 55–135)
ALT SERPL W/O P-5'-P-CCNC: 15 U/L (ref 10–44)
ALT SERPL W/O P-5'-P-CCNC: 20 U/L (ref 10–44)
ALT SERPL W/O P-5'-P-CCNC: 23 U/L (ref 10–44)
ALT SERPL W/O P-5'-P-CCNC: 28 U/L (ref 10–44)
ALT SERPL W/O P-5'-P-CCNC: 32 U/L (ref 10–44)
ANION GAP SERPL CALC-SCNC: 10 MMOL/L (ref 8–16)
ANION GAP SERPL CALC-SCNC: 11 MMOL/L (ref 8–16)
ANION GAP SERPL CALC-SCNC: 12 MMOL/L (ref 8–16)
ANION GAP SERPL CALC-SCNC: 13 MMOL/L (ref 8–16)
ANION GAP SERPL CALC-SCNC: 14 MMOL/L (ref 8–16)
ANION GAP SERPL CALC-SCNC: 14 MMOL/L (ref 8–16)
ANION GAP SERPL CALC-SCNC: 15 MMOL/L (ref 8–16)
ANION GAP SERPL CALC-SCNC: 15 MMOL/L (ref 8–16)
ANION GAP SERPL CALC-SCNC: 16 MMOL/L (ref 8–16)
ANION GAP SERPL CALC-SCNC: 5 MMOL/L (ref 8–16)
ANION GAP SERPL CALC-SCNC: 8 MMOL/L (ref 8–16)
ANION GAP SERPL CALC-SCNC: 9 MMOL/L (ref 8–16)
ANION GAP SERPL CALC-SCNC: 9 MMOL/L (ref 8–16)
ANISOCYTOSIS BLD QL SMEAR: SLIGHT
APTT BLDCRRT: 150 SEC (ref 21–32)
APTT BLDCRRT: 46 SEC (ref 21–32)
APTT BLDCRRT: 96.9 SEC (ref 21–32)
AST SERPL-CCNC: 26 U/L (ref 10–40)
AST SERPL-CCNC: 31 U/L (ref 10–40)
AST SERPL-CCNC: 35 U/L (ref 10–40)
AST SERPL-CCNC: 35 U/L (ref 10–40)
AST SERPL-CCNC: 38 U/L (ref 10–40)
BACTERIA #/AREA URNS AUTO: ABNORMAL /HPF
BACTERIA #/AREA URNS AUTO: ABNORMAL /HPF
BASOPHILS # BLD AUTO: 0.01 K/UL (ref 0–0.2)
BASOPHILS # BLD AUTO: 0.01 K/UL (ref 0–0.2)
BASOPHILS # BLD AUTO: 0.02 K/UL (ref 0–0.2)
BASOPHILS # BLD AUTO: 0.03 K/UL (ref 0–0.2)
BASOPHILS # BLD AUTO: 0.05 K/UL (ref 0–0.2)
BASOPHILS # BLD AUTO: ABNORMAL K/UL (ref 0–0.2)
BASOPHILS NFR BLD: 0 % (ref 0–1.9)
BASOPHILS NFR BLD: 0.2 % (ref 0–1.9)
BASOPHILS NFR BLD: 0.3 % (ref 0–1.9)
BASOPHILS NFR BLD: 0.4 % (ref 0–1.9)
BASOPHILS NFR BLD: 1 % (ref 0–1.9)
BILIRUB SERPL-MCNC: 0.6 MG/DL (ref 0.1–1)
BILIRUB SERPL-MCNC: 0.8 MG/DL (ref 0.1–1)
BILIRUB SERPL-MCNC: 1.1 MG/DL (ref 0.1–1)
BILIRUB UR QL STRIP: NEGATIVE
BILIRUB UR QL STRIP: NEGATIVE
BLD GP AB SCN CELLS X3 SERPL QL: NORMAL
BLD GP AB SCN CELLS X3 SERPL QL: NORMAL
BSA FOR ECHO PROCEDURE: 2.43 M2
BUN SERPL-MCNC: 23 MG/DL (ref 8–23)
BUN SERPL-MCNC: 30 MG/DL (ref 8–23)
BUN SERPL-MCNC: 49 MG/DL (ref 6–30)
BUN SERPL-MCNC: 50 MG/DL (ref 8–23)
BUN SERPL-MCNC: 53 MG/DL (ref 8–23)
BUN SERPL-MCNC: 54 MG/DL (ref 8–23)
BUN SERPL-MCNC: 55 MG/DL (ref 8–23)
BUN SERPL-MCNC: 56 MG/DL (ref 8–23)
BUN SERPL-MCNC: 57 MG/DL (ref 8–23)
BUN SERPL-MCNC: 58 MG/DL (ref 8–23)
BUN SERPL-MCNC: 59 MG/DL (ref 8–23)
BUN SERPL-MCNC: 59 MG/DL (ref 8–23)
BUN SERPL-MCNC: 65 MG/DL (ref 8–23)
BUN SERPL-MCNC: 69 MG/DL (ref 8–23)
BURR CELLS BLD QL SMEAR: ABNORMAL
C DIFF GDH STL QL: NEGATIVE
C DIFF TOX A+B STL QL IA: NEGATIVE
CA-I BLDV-SCNC: 0.84 MMOL/L (ref 1.06–1.42)
CALCIUM SERPL-MCNC: 6.1 MG/DL (ref 8.7–10.5)
CALCIUM SERPL-MCNC: 6.2 MG/DL (ref 8.7–10.5)
CALCIUM SERPL-MCNC: 6.6 MG/DL (ref 8.7–10.5)
CALCIUM SERPL-MCNC: 6.7 MG/DL (ref 8.7–10.5)
CALCIUM SERPL-MCNC: 6.7 MG/DL (ref 8.7–10.5)
CALCIUM SERPL-MCNC: 6.8 MG/DL (ref 8.7–10.5)
CALCIUM SERPL-MCNC: 6.9 MG/DL (ref 8.7–10.5)
CALCIUM SERPL-MCNC: 7 MG/DL (ref 8.7–10.5)
CALCIUM SERPL-MCNC: 7.1 MG/DL (ref 8.7–10.5)
CALCIUM SERPL-MCNC: 7.2 MG/DL (ref 8.7–10.5)
CALCIUM SERPL-MCNC: 7.3 MG/DL (ref 8.7–10.5)
CALCIUM SERPL-MCNC: 7.4 MG/DL (ref 8.7–10.5)
CALCIUM SERPL-MCNC: 7.5 MG/DL (ref 8.7–10.5)
CALCIUM SERPL-MCNC: 8.2 MG/DL (ref 8.7–10.5)
CALCIUM SERPL-MCNC: 8.3 MG/DL (ref 8.7–10.5)
CALCIUM SERPL-MCNC: 8.3 MG/DL (ref 8.7–10.5)
CALCIUM SERPL-MCNC: 8.4 MG/DL (ref 8.7–10.5)
CALCIUM SERPL-MCNC: 8.4 MG/DL (ref 8.7–10.5)
CALCIUM SERPL-MCNC: 8.6 MG/DL (ref 8.7–10.5)
CALCIUM SERPL-MCNC: 9 MG/DL (ref 8.7–10.5)
CHLORIDE SERPL-SCNC: 105 MMOL/L (ref 95–110)
CHLORIDE SERPL-SCNC: 108 MMOL/L (ref 95–110)
CHLORIDE SERPL-SCNC: 109 MMOL/L (ref 95–110)
CHLORIDE SERPL-SCNC: 110 MMOL/L (ref 95–110)
CHLORIDE SERPL-SCNC: 110 MMOL/L (ref 95–110)
CHLORIDE SERPL-SCNC: 111 MMOL/L (ref 95–110)
CHLORIDE SERPL-SCNC: 111 MMOL/L (ref 95–110)
CHLORIDE SERPL-SCNC: 112 MMOL/L (ref 95–110)
CHLORIDE SERPL-SCNC: 112 MMOL/L (ref 95–110)
CHLORIDE SERPL-SCNC: 113 MMOL/L (ref 95–110)
CHLORIDE SERPL-SCNC: 114 MMOL/L (ref 95–110)
CHOLEST SERPL-MCNC: 119 MG/DL (ref 120–199)
CHOLEST/HDLC SERPL: 3.8 {RATIO} (ref 2–5)
CK MB SERPL-MCNC: 1.5 NG/ML (ref 0.1–6.5)
CK MB SERPL-RTO: 2 % (ref 0–5)
CK SERPL-CCNC: 74 U/L (ref 20–200)
CLARITY UR REFRACT.AUTO: CLEAR
CLARITY UR REFRACT.AUTO: CLEAR
CO2 SERPL-SCNC: 13 MMOL/L (ref 23–29)
CO2 SERPL-SCNC: 15 MMOL/L (ref 23–29)
CO2 SERPL-SCNC: 16 MMOL/L (ref 23–29)
CO2 SERPL-SCNC: 17 MMOL/L (ref 23–29)
CO2 SERPL-SCNC: 17 MMOL/L (ref 23–29)
CO2 SERPL-SCNC: 18 MMOL/L (ref 23–29)
CO2 SERPL-SCNC: 19 MMOL/L (ref 23–29)
CO2 SERPL-SCNC: 20 MMOL/L (ref 23–29)
CO2 SERPL-SCNC: 21 MMOL/L (ref 23–29)
CO2 SERPL-SCNC: 21 MMOL/L (ref 23–29)
CO2 SERPL-SCNC: 22 MMOL/L (ref 23–29)
CO2 SERPL-SCNC: 28 MMOL/L (ref 23–29)
CO2 SERPL-SCNC: 8 MMOL/L (ref 23–29)
COLOR UR AUTO: YELLOW
COLOR UR AUTO: YELLOW
CREAT SERPL-MCNC: 1.7 MG/DL (ref 0.5–1.4)
CREAT SERPL-MCNC: 2.5 MG/DL (ref 0.5–1.4)
CREAT SERPL-MCNC: 2.5 MG/DL (ref 0.5–1.4)
CREAT SERPL-MCNC: 2.7 MG/DL (ref 0.5–1.4)
CREAT SERPL-MCNC: 2.7 MG/DL (ref 0.5–1.4)
CREAT SERPL-MCNC: 2.9 MG/DL (ref 0.5–1.4)
CREAT SERPL-MCNC: 3.2 MG/DL (ref 0.5–1.4)
CREAT SERPL-MCNC: 3.2 MG/DL (ref 0.5–1.4)
CREAT SERPL-MCNC: 3.3 MG/DL (ref 0.5–1.4)
CREAT SERPL-MCNC: 3.3 MG/DL (ref 0.5–1.4)
CREAT SERPL-MCNC: 3.4 MG/DL (ref 0.5–1.4)
CREAT SERPL-MCNC: 3.4 MG/DL (ref 0.5–1.4)
CREAT SERPL-MCNC: 3.5 MG/DL (ref 0.5–1.4)
CREAT SERPL-MCNC: 3.5 MG/DL (ref 0.5–1.4)
CREAT SERPL-MCNC: 3.6 MG/DL (ref 0.5–1.4)
CREAT SERPL-MCNC: 3.6 MG/DL (ref 0.5–1.4)
CREAT SERPL-MCNC: 3.7 MG/DL (ref 0.5–1.4)
CREAT SERPL-MCNC: 3.8 MG/DL (ref 0.5–1.4)
CREAT SERPL-MCNC: 3.9 MG/DL (ref 0.5–1.4)
CREAT SERPL-MCNC: 3.9 MG/DL (ref 0.5–1.4)
CREAT SERPL-MCNC: 4 MG/DL (ref 0.5–1.4)
CREAT SERPL-MCNC: 4 MG/DL (ref 0.5–1.4)
CREAT SERPL-MCNC: 4.4 MG/DL (ref 0.5–1.4)
CREAT SERPL-MCNC: 4.8 MG/DL (ref 0.5–1.4)
CREAT SERPL-MCNC: 4.9 MG/DL (ref 0.5–1.4)
CREAT SERPL-MCNC: 4.9 MG/DL (ref 0.5–1.4)
CREAT UR-MCNC: 175 MG/DL (ref 23–375)
CREAT UR-MCNC: 175 MG/DL (ref 23–375)
CV ECHO LV RWT: 0.49 CM
DELSYS: ABNORMAL
DIFFERENTIAL METHOD: ABNORMAL
DOHLE BOD BLD QL SMEAR: PRESENT
DOP CALC LVOT PEAK VEL: 0.73 M/S
DOP CALCLVOT PEAK VEL VTI: 14.6 CM
E WAVE DECELERATION TIME: 255.67 MSEC
E/A RATIO: 0.91
E/E' RATIO: 14.2 M/S
ECHO LV POSTERIOR WALL: 0.9 CM (ref 0.6–1.1)
EOSINOPHIL # BLD AUTO: 0 K/UL (ref 0–0.5)
EOSINOPHIL # BLD AUTO: 0.1 K/UL (ref 0–0.5)
EOSINOPHIL # BLD AUTO: ABNORMAL K/UL (ref 0–0.5)
EOSINOPHIL NFR BLD: 0 % (ref 0–8)
EOSINOPHIL NFR BLD: 0.3 % (ref 0–8)
EOSINOPHIL NFR BLD: 0.3 % (ref 0–8)
ERYTHROCYTE [DISTWIDTH] IN BLOOD BY AUTOMATED COUNT: 13.7 % (ref 11.5–14.5)
ERYTHROCYTE [DISTWIDTH] IN BLOOD BY AUTOMATED COUNT: 14.1 % (ref 11.5–14.5)
ERYTHROCYTE [DISTWIDTH] IN BLOOD BY AUTOMATED COUNT: 14.6 % (ref 11.5–14.5)
ERYTHROCYTE [DISTWIDTH] IN BLOOD BY AUTOMATED COUNT: 14.7 % (ref 11.5–14.5)
ERYTHROCYTE [DISTWIDTH] IN BLOOD BY AUTOMATED COUNT: 14.7 % (ref 11.5–14.5)
ERYTHROCYTE [SEDIMENTATION RATE] IN BLOOD BY WESTERGREN METHOD: 16 MM/H
ERYTHROCYTE [SEDIMENTATION RATE] IN BLOOD BY WESTERGREN METHOD: 18 MM/H
ERYTHROCYTE [SEDIMENTATION RATE] IN BLOOD BY WESTERGREN METHOD: 18 MM/H
ERYTHROCYTE [SEDIMENTATION RATE] IN BLOOD BY WESTERGREN METHOD: 20 MM/H
ERYTHROCYTE [SEDIMENTATION RATE] IN BLOOD BY WESTERGREN METHOD: 20 MM/H
ERYTHROCYTE [SEDIMENTATION RATE] IN BLOOD BY WESTERGREN METHOD: 24 MM/H
EST. GFR  (AFRICAN AMERICAN): 12.8 ML/MIN/1.73 M^2
EST. GFR  (AFRICAN AMERICAN): 12.8 ML/MIN/1.73 M^2
EST. GFR  (AFRICAN AMERICAN): 13.1 ML/MIN/1.73 M^2
EST. GFR  (AFRICAN AMERICAN): 14.5 ML/MIN/1.73 M^2
EST. GFR  (AFRICAN AMERICAN): 16.3 ML/MIN/1.73 M^2
EST. GFR  (AFRICAN AMERICAN): 16.3 ML/MIN/1.73 M^2
EST. GFR  (AFRICAN AMERICAN): 16.8 ML/MIN/1.73 M^2
EST. GFR  (AFRICAN AMERICAN): 17.4 ML/MIN/1.73 M^2
EST. GFR  (AFRICAN AMERICAN): 17.9 ML/MIN/1.73 M^2
EST. GFR  (AFRICAN AMERICAN): 18.5 ML/MIN/1.73 M^2
EST. GFR  (AFRICAN AMERICAN): 18.5 ML/MIN/1.73 M^2
EST. GFR  (AFRICAN AMERICAN): 19.2 ML/MIN/1.73 M^2
EST. GFR  (AFRICAN AMERICAN): 19.2 ML/MIN/1.73 M^2
EST. GFR  (AFRICAN AMERICAN): 19.8 ML/MIN/1.73 M^2
EST. GFR  (AFRICAN AMERICAN): 19.8 ML/MIN/1.73 M^2
EST. GFR  (AFRICAN AMERICAN): 20.6 ML/MIN/1.73 M^2
EST. GFR  (AFRICAN AMERICAN): 20.6 ML/MIN/1.73 M^2
EST. GFR  (AFRICAN AMERICAN): 21.4 ML/MIN/1.73 M^2
EST. GFR  (AFRICAN AMERICAN): 21.4 ML/MIN/1.73 M^2
EST. GFR  (AFRICAN AMERICAN): 24.1 ML/MIN/1.73 M^2
EST. GFR  (AFRICAN AMERICAN): 26.2 ML/MIN/1.73 M^2
EST. GFR  (AFRICAN AMERICAN): 26.2 ML/MIN/1.73 M^2
EST. GFR  (AFRICAN AMERICAN): 28.8 ML/MIN/1.73 M^2
EST. GFR  (AFRICAN AMERICAN): 29 ML/MIN/1.73 M^2
EST. GFR  (AFRICAN AMERICAN): 45.9 ML/MIN/1.73 M^2
EST. GFR  (NON AFRICAN AMERICAN): 11 ML/MIN/1.73 M^2
EST. GFR  (NON AFRICAN AMERICAN): 11 ML/MIN/1.73 M^2
EST. GFR  (NON AFRICAN AMERICAN): 11.3 ML/MIN/1.73 M^2
EST. GFR  (NON AFRICAN AMERICAN): 12.6 ML/MIN/1.73 M^2
EST. GFR  (NON AFRICAN AMERICAN): 14.1 ML/MIN/1.73 M^2
EST. GFR  (NON AFRICAN AMERICAN): 14.1 ML/MIN/1.73 M^2
EST. GFR  (NON AFRICAN AMERICAN): 14.5 ML/MIN/1.73 M^2
EST. GFR  (NON AFRICAN AMERICAN): 15 ML/MIN/1.73 M^2
EST. GFR  (NON AFRICAN AMERICAN): 15.5 ML/MIN/1.73 M^2
EST. GFR  (NON AFRICAN AMERICAN): 16 ML/MIN/1.73 M^2
EST. GFR  (NON AFRICAN AMERICAN): 16 ML/MIN/1.73 M^2
EST. GFR  (NON AFRICAN AMERICAN): 16.6 ML/MIN/1.73 M^2
EST. GFR  (NON AFRICAN AMERICAN): 16.6 ML/MIN/1.73 M^2
EST. GFR  (NON AFRICAN AMERICAN): 17.2 ML/MIN/1.73 M^2
EST. GFR  (NON AFRICAN AMERICAN): 17.2 ML/MIN/1.73 M^2
EST. GFR  (NON AFRICAN AMERICAN): 17.8 ML/MIN/1.73 M^2
EST. GFR  (NON AFRICAN AMERICAN): 17.8 ML/MIN/1.73 M^2
EST. GFR  (NON AFRICAN AMERICAN): 18.5 ML/MIN/1.73 M^2
EST. GFR  (NON AFRICAN AMERICAN): 18.5 ML/MIN/1.73 M^2
EST. GFR  (NON AFRICAN AMERICAN): 20.8 ML/MIN/1.73 M^2
EST. GFR  (NON AFRICAN AMERICAN): 22.7 ML/MIN/1.73 M^2
EST. GFR  (NON AFRICAN AMERICAN): 22.7 ML/MIN/1.73 M^2
EST. GFR  (NON AFRICAN AMERICAN): 24.9 ML/MIN/1.73 M^2
EST. GFR  (NON AFRICAN AMERICAN): 25.1 ML/MIN/1.73 M^2
EST. GFR  (NON AFRICAN AMERICAN): 39.7 ML/MIN/1.73 M^2
ESTIMATED AVG GLUCOSE: 212 MG/DL (ref 68–131)
ESTIMATED AVG GLUCOSE: 223 MG/DL (ref 68–131)
ESTIMATED AVG GLUCOSE: 240 MG/DL (ref 68–131)
FACT X PPP CHRO-ACNC: 0.18 IU/ML (ref 0.3–0.7)
FACT X PPP CHRO-ACNC: 0.28 IU/ML (ref 0.3–0.7)
FACT X PPP CHRO-ACNC: 0.44 IU/ML (ref 0.3–0.7)
FACT X PPP CHRO-ACNC: 0.45 IU/ML (ref 0.3–0.7)
FACT X PPP CHRO-ACNC: 0.45 IU/ML (ref 0.3–0.7)
FACT X PPP CHRO-ACNC: 0.47 IU/ML (ref 0.3–0.7)
FACT X PPP CHRO-ACNC: 0.49 IU/ML (ref 0.3–0.7)
FACT X PPP CHRO-ACNC: 0.6 IU/ML (ref 0.3–0.7)
FACT X PPP CHRO-ACNC: 0.6 IU/ML (ref 0.3–0.7)
FACT X PPP CHRO-ACNC: 0.98 IU/ML (ref 0.3–0.7)
FACT X PPP CHRO-ACNC: 1.11 IU/ML (ref 0.3–0.7)
FACT X PPP CHRO-ACNC: 1.38 IU/ML (ref 0.3–0.7)
FACT X PPP CHRO-ACNC: <0.1 IU/ML (ref 0.3–0.7)
FACT X PPP CHRO-ACNC: <0.1 IU/ML (ref 0.3–0.7)
FIO2: 100
FIO2: 100
FIO2: 40
FIO2: 50
FIO2: 50
FIO2: 70
FRACTIONAL SHORTENING: 28 % (ref 28–44)
GLUCOSE SERPL-MCNC: 122 MG/DL (ref 70–110)
GLUCOSE SERPL-MCNC: 137 MG/DL (ref 70–110)
GLUCOSE SERPL-MCNC: 137 MG/DL (ref 70–110)
GLUCOSE SERPL-MCNC: 140 MG/DL (ref 70–110)
GLUCOSE SERPL-MCNC: 149 MG/DL (ref 70–110)
GLUCOSE SERPL-MCNC: 153 MG/DL (ref 70–110)
GLUCOSE SERPL-MCNC: 154 MG/DL (ref 70–110)
GLUCOSE SERPL-MCNC: 155 MG/DL (ref 70–110)
GLUCOSE SERPL-MCNC: 158 MG/DL (ref 70–110)
GLUCOSE SERPL-MCNC: 168 MG/DL (ref 70–110)
GLUCOSE SERPL-MCNC: 189 MG/DL (ref 70–110)
GLUCOSE SERPL-MCNC: 193 MG/DL (ref 70–110)
GLUCOSE SERPL-MCNC: 193 MG/DL (ref 70–110)
GLUCOSE SERPL-MCNC: 194 MG/DL (ref 70–110)
GLUCOSE SERPL-MCNC: 219 MG/DL (ref 70–110)
GLUCOSE SERPL-MCNC: 227 MG/DL (ref 70–110)
GLUCOSE SERPL-MCNC: 228 MG/DL (ref 70–110)
GLUCOSE SERPL-MCNC: 242 MG/DL (ref 70–110)
GLUCOSE SERPL-MCNC: 271 MG/DL (ref 70–110)
GLUCOSE SERPL-MCNC: 272 MG/DL (ref 70–110)
GLUCOSE SERPL-MCNC: 272 MG/DL (ref 70–110)
GLUCOSE SERPL-MCNC: 273 MG/DL (ref 70–110)
GLUCOSE SERPL-MCNC: 288 MG/DL (ref 70–110)
GLUCOSE SERPL-MCNC: 293 MG/DL (ref 70–110)
GLUCOSE SERPL-MCNC: 305 MG/DL (ref 70–110)
GLUCOSE SERPL-MCNC: 339 MG/DL (ref 70–110)
GLUCOSE SERPL-MCNC: 375 MG/DL (ref 70–110)
GLUCOSE UR QL STRIP: ABNORMAL
GLUCOSE UR QL STRIP: ABNORMAL
HBA1C MFR BLD HPLC: 10 % (ref 4–5.6)
HBA1C MFR BLD HPLC: 9 % (ref 4–5.6)
HBA1C MFR BLD HPLC: 9.4 % (ref 4–5.6)
HCO3 UR-SCNC: 15.9 MMOL/L (ref 24–28)
HCO3 UR-SCNC: 16.7 MMOL/L (ref 24–28)
HCO3 UR-SCNC: 18.8 MMOL/L (ref 24–28)
HCO3 UR-SCNC: 19.8 MMOL/L (ref 24–28)
HCO3 UR-SCNC: 20.1 MMOL/L (ref 24–28)
HCO3 UR-SCNC: 21.4 MMOL/L (ref 24–28)
HCO3 UR-SCNC: 22.2 MMOL/L (ref 24–28)
HCT VFR BLD AUTO: 27.3 % (ref 40–54)
HCT VFR BLD AUTO: 28.6 % (ref 40–54)
HCT VFR BLD AUTO: 30.5 % (ref 40–54)
HCT VFR BLD AUTO: 32.7 % (ref 40–54)
HCT VFR BLD AUTO: 34.6 % (ref 40–54)
HCT VFR BLD AUTO: 35.6 % (ref 40–54)
HCT VFR BLD AUTO: 38.4 % (ref 40–54)
HCT VFR BLD AUTO: 39.5 % (ref 40–54)
HCT VFR BLD AUTO: 41.8 % (ref 40–54)
HCT VFR BLD AUTO: 41.9 % (ref 40–54)
HCT VFR BLD AUTO: 43 % (ref 40–54)
HCT VFR BLD CALC: 30 %PCV (ref 36–54)
HCT VFR BLD CALC: 34 %PCV (ref 36–54)
HCT VFR BLD CALC: 42 %PCV (ref 36–54)
HDLC SERPL-MCNC: 31 MG/DL (ref 40–75)
HDLC SERPL: 26.1 % (ref 20–50)
HGB BLD-MCNC: 10.6 G/DL (ref 14–18)
HGB BLD-MCNC: 11.3 G/DL (ref 14–18)
HGB BLD-MCNC: 11.5 G/DL (ref 14–18)
HGB BLD-MCNC: 12 G/DL (ref 14–18)
HGB BLD-MCNC: 13 G/DL (ref 14–18)
HGB BLD-MCNC: 13.1 G/DL (ref 14–18)
HGB BLD-MCNC: 13.4 G/DL (ref 14–18)
HGB BLD-MCNC: 13.6 G/DL (ref 14–18)
HGB BLD-MCNC: 8.7 G/DL (ref 14–18)
HGB BLD-MCNC: 8.7 G/DL (ref 14–18)
HGB BLD-MCNC: 9.5 G/DL (ref 14–18)
HGB UR QL STRIP: ABNORMAL
HGB UR QL STRIP: NEGATIVE
HYALINE CASTS UR QL AUTO: 2 /LPF
HYALINE CASTS UR QL AUTO: 9 /LPF
HYPOCHROMIA BLD QL SMEAR: ABNORMAL
IMM GRANULOCYTES # BLD AUTO: 0.02 K/UL (ref 0–0.04)
IMM GRANULOCYTES # BLD AUTO: 0.04 K/UL (ref 0–0.04)
IMM GRANULOCYTES # BLD AUTO: 0.05 K/UL (ref 0–0.04)
IMM GRANULOCYTES # BLD AUTO: 0.06 K/UL (ref 0–0.04)
IMM GRANULOCYTES # BLD AUTO: 0.06 K/UL (ref 0–0.04)
IMM GRANULOCYTES # BLD AUTO: 0.07 K/UL (ref 0–0.04)
IMM GRANULOCYTES # BLD AUTO: 0.14 K/UL (ref 0–0.04)
IMM GRANULOCYTES # BLD AUTO: ABNORMAL K/UL (ref 0–0.04)
IMM GRANULOCYTES NFR BLD AUTO: 0.2 % (ref 0–0.5)
IMM GRANULOCYTES NFR BLD AUTO: 0.4 % (ref 0–0.5)
IMM GRANULOCYTES NFR BLD AUTO: 0.4 % (ref 0–0.5)
IMM GRANULOCYTES NFR BLD AUTO: 0.9 % (ref 0–0.5)
IMM GRANULOCYTES NFR BLD AUTO: 1 % (ref 0–0.5)
IMM GRANULOCYTES NFR BLD AUTO: 1.4 % (ref 0–0.5)
IMM GRANULOCYTES NFR BLD AUTO: 1.4 % (ref 0–0.5)
IMM GRANULOCYTES NFR BLD AUTO: ABNORMAL % (ref 0–0.5)
INR PPP: 1.2 (ref 0.8–1.2)
INR PPP: 1.4 (ref 0.8–1.2)
INTERVENTRICULAR SEPTUM: 0.89 CM (ref 0.6–1.1)
KETONES UR QL STRIP: NEGATIVE
KETONES UR QL STRIP: NEGATIVE
LA MAJOR: 4.17 CM
LA WIDTH: 4.37 CM
LACTATE SERPL-SCNC: 1.5 MMOL/L (ref 0.5–2.2)
LACTATE SERPL-SCNC: 2.8 MMOL/L (ref 0.5–2.2)
LACTATE SERPL-SCNC: 3.3 MMOL/L (ref 0.5–2.2)
LACTATE SERPL-SCNC: 3.7 MMOL/L (ref 0.5–2.2)
LACTATE SERPL-SCNC: 4.2 MMOL/L (ref 0.5–2.2)
LACTATE SERPL-SCNC: 4.4 MMOL/L (ref 0.5–2.2)
LACTATE SERPL-SCNC: 4.7 MMOL/L (ref 0.5–2.2)
LACTATE SERPL-SCNC: 4.8 MMOL/L (ref 0.5–2.2)
LACTATE SERPL-SCNC: 5.4 MMOL/L (ref 0.5–2.2)
LACTATE SERPL-SCNC: 7.2 MMOL/L (ref 0.5–2.2)
LACTATE SERPL-SCNC: >12 MMOL/L (ref 0.5–2.2)
LDH SERPL L TO P-CCNC: 3.56 MMOL/L (ref 0.36–1.25)
LDLC SERPL CALC-MCNC: 74.4 MG/DL (ref 63–159)
LEFT ATRIUM SIZE: 3.36 CM
LEFT INTERNAL DIMENSION IN SYSTOLE: 2.65 CM (ref 2.1–4)
LEFT VENTRICLE DIASTOLIC VOLUME INDEX: 24.88 ML/M2
LEFT VENTRICLE DIASTOLIC VOLUME: 58.16 ML
LEFT VENTRICLE MASS INDEX: 41 G/M2
LEFT VENTRICLE SYSTOLIC VOLUME INDEX: 11.1 ML/M2
LEFT VENTRICLE SYSTOLIC VOLUME: 25.85 ML
LEFT VENTRICULAR INTERNAL DIMENSION IN DIASTOLE: 3.7 CM (ref 3.5–6)
LEFT VENTRICULAR MASS: 96.13 G
LEUKOCYTE ESTERASE UR QL STRIP: ABNORMAL
LEUKOCYTE ESTERASE UR QL STRIP: NEGATIVE
LV LATERAL E/E' RATIO: 11.83 M/S
LV SEPTAL E/E' RATIO: 17.75 M/S
LYMPHOCYTES # BLD AUTO: 0.4 K/UL (ref 1–4.8)
LYMPHOCYTES # BLD AUTO: 0.6 K/UL (ref 1–4.8)
LYMPHOCYTES # BLD AUTO: 0.8 K/UL (ref 1–4.8)
LYMPHOCYTES # BLD AUTO: 0.8 K/UL (ref 1–4.8)
LYMPHOCYTES # BLD AUTO: 2.4 K/UL (ref 1–4.8)
LYMPHOCYTES # BLD AUTO: ABNORMAL K/UL (ref 1–4.8)
LYMPHOCYTES NFR BLD: 11 % (ref 18–48)
LYMPHOCYTES NFR BLD: 11.6 % (ref 18–48)
LYMPHOCYTES NFR BLD: 12 % (ref 18–48)
LYMPHOCYTES NFR BLD: 14.4 % (ref 18–48)
LYMPHOCYTES NFR BLD: 5.3 % (ref 18–48)
LYMPHOCYTES NFR BLD: 5.4 % (ref 18–48)
LYMPHOCYTES NFR BLD: 6 % (ref 18–48)
LYMPHOCYTES NFR BLD: 6.5 % (ref 18–48)
LYMPHOCYTES NFR BLD: 7 % (ref 18–48)
LYMPHOCYTES NFR BLD: 7.9 % (ref 18–48)
LYMPHOCYTES NFR BLD: 9.5 % (ref 18–48)
MAGNESIUM SERPL-MCNC: 1.6 MG/DL (ref 1.6–2.6)
MAGNESIUM SERPL-MCNC: 1.7 MG/DL (ref 1.6–2.6)
MAGNESIUM SERPL-MCNC: 1.8 MG/DL (ref 1.6–2.6)
MAGNESIUM SERPL-MCNC: 2 MG/DL (ref 1.6–2.6)
MCH RBC QN AUTO: 29.7 PG (ref 27–31)
MCH RBC QN AUTO: 30.2 PG (ref 27–31)
MCH RBC QN AUTO: 30.3 PG (ref 27–31)
MCH RBC QN AUTO: 30.4 PG (ref 27–31)
MCH RBC QN AUTO: 30.5 PG (ref 27–31)
MCH RBC QN AUTO: 30.5 PG (ref 27–31)
MCH RBC QN AUTO: 30.7 PG (ref 27–31)
MCH RBC QN AUTO: 30.9 PG (ref 27–31)
MCH RBC QN AUTO: 31.1 PG (ref 27–31)
MCHC RBC AUTO-ENTMCNC: 30.4 G/DL (ref 32–36)
MCHC RBC AUTO-ENTMCNC: 31 G/DL (ref 32–36)
MCHC RBC AUTO-ENTMCNC: 31.1 G/DL (ref 32–36)
MCHC RBC AUTO-ENTMCNC: 31.3 G/DL (ref 32–36)
MCHC RBC AUTO-ENTMCNC: 31.3 G/DL (ref 32–36)
MCHC RBC AUTO-ENTMCNC: 31.6 G/DL (ref 32–36)
MCHC RBC AUTO-ENTMCNC: 31.7 G/DL (ref 32–36)
MCHC RBC AUTO-ENTMCNC: 31.9 G/DL (ref 32–36)
MCHC RBC AUTO-ENTMCNC: 32.4 G/DL (ref 32–36)
MCHC RBC AUTO-ENTMCNC: 33.2 G/DL (ref 32–36)
MCHC RBC AUTO-ENTMCNC: 33.9 G/DL (ref 32–36)
MCV RBC AUTO: 90 FL (ref 82–98)
MCV RBC AUTO: 91 FL (ref 82–98)
MCV RBC AUTO: 93 FL (ref 82–98)
MCV RBC AUTO: 94 FL (ref 82–98)
MCV RBC AUTO: 96 FL (ref 82–98)
MCV RBC AUTO: 97 FL (ref 82–98)
MCV RBC AUTO: 99 FL (ref 82–98)
MCV RBC AUTO: 99 FL (ref 82–98)
MCV RBC AUTO: ABNORMAL FL (ref 82–98)
METAMYELOCYTES NFR BLD MANUAL: 0.5 %
METAMYELOCYTES NFR BLD MANUAL: 2 %
METAMYELOCYTES NFR BLD MANUAL: 3 %
MICROSCOPIC COMMENT: ABNORMAL
MICROSCOPIC COMMENT: ABNORMAL
MIN VOL: 12.7
MIN VOL: 14.3
MIN VOL: 8.87
MODE: ABNORMAL
MONOCYTES # BLD AUTO: 0.2 K/UL (ref 0.3–1)
MONOCYTES # BLD AUTO: 0.3 K/UL (ref 0.3–1)
MONOCYTES # BLD AUTO: 0.4 K/UL (ref 0.3–1)
MONOCYTES # BLD AUTO: 0.5 K/UL (ref 0.3–1)
MONOCYTES # BLD AUTO: 0.5 K/UL (ref 0.3–1)
MONOCYTES # BLD AUTO: 0.7 K/UL (ref 0.3–1)
MONOCYTES # BLD AUTO: 1.4 K/UL (ref 0.3–1)
MONOCYTES # BLD AUTO: ABNORMAL K/UL (ref 0.3–1)
MONOCYTES NFR BLD: 1.8 % (ref 4–15)
MONOCYTES NFR BLD: 12.5 % (ref 4–15)
MONOCYTES NFR BLD: 3.8 % (ref 4–15)
MONOCYTES NFR BLD: 5 % (ref 4–15)
MONOCYTES NFR BLD: 5 % (ref 4–15)
MONOCYTES NFR BLD: 6.5 % (ref 4–15)
MONOCYTES NFR BLD: 7.6 % (ref 4–15)
MONOCYTES NFR BLD: 8 % (ref 4–15)
MONOCYTES NFR BLD: 8.5 % (ref 4–15)
MONOCYTES NFR BLD: 8.7 % (ref 4–15)
MONOCYTES NFR BLD: 9.4 % (ref 4–15)
MV PEAK A VEL: 0.78 M/S
MV PEAK E VEL: 0.71 M/S
NEUTROPHILS # BLD AUTO: 12.6 K/UL (ref 1.8–7.7)
NEUTROPHILS # BLD AUTO: 12.9 K/UL (ref 1.8–7.7)
NEUTROPHILS # BLD AUTO: 3.4 K/UL (ref 1.8–7.7)
NEUTROPHILS # BLD AUTO: 4 K/UL (ref 1.8–7.7)
NEUTROPHILS # BLD AUTO: 4.3 K/UL (ref 1.8–7.7)
NEUTROPHILS # BLD AUTO: 8.7 K/UL (ref 1.8–7.7)
NEUTROPHILS # BLD AUTO: 9.5 K/UL (ref 1.8–7.7)
NEUTROPHILS # BLD AUTO: ABNORMAL K/UL (ref 1.8–7.7)
NEUTROPHILS NFR BLD: 49 % (ref 38–73)
NEUTROPHILS NFR BLD: 57 % (ref 38–73)
NEUTROPHILS NFR BLD: 64 % (ref 38–73)
NEUTROPHILS NFR BLD: 73 % (ref 38–73)
NEUTROPHILS NFR BLD: 76.1 % (ref 38–73)
NEUTROPHILS NFR BLD: 77.9 % (ref 38–73)
NEUTROPHILS NFR BLD: 78.5 % (ref 38–73)
NEUTROPHILS NFR BLD: 81.7 % (ref 38–73)
NEUTROPHILS NFR BLD: 83.7 % (ref 38–73)
NEUTROPHILS NFR BLD: 90.3 % (ref 38–73)
NEUTROPHILS NFR BLD: 92.4 % (ref 38–73)
NEUTS BAND NFR BLD MANUAL: 15 %
NEUTS BAND NFR BLD MANUAL: 16.5 %
NEUTS BAND NFR BLD MANUAL: 23 %
NEUTS BAND NFR BLD MANUAL: 34 %
NITRITE UR QL STRIP: NEGATIVE
NITRITE UR QL STRIP: NEGATIVE
NONHDLC SERPL-MCNC: 88 MG/DL
NRBC BLD-RTO: 0 /100 WBC
NRBC BLD-RTO: 1 /100 WBC
NRBC BLD-RTO: 1 /100 WBC
OVALOCYTES BLD QL SMEAR: ABNORMAL
OVALOCYTES BLD QL SMEAR: ABNORMAL
PCO2 BLDA: 25.3 MMHG (ref 35–45)
PCO2 BLDA: 29 MMHG (ref 35–45)
PCO2 BLDA: 33.7 MMHG (ref 35–45)
PCO2 BLDA: 39.4 MMHG (ref 35–45)
PCO2 BLDA: 39.9 MMHG (ref 35–45)
PCO2 BLDA: 40.4 MMHG (ref 35–45)
PCO2 BLDA: 43.3 MMHG (ref 35–45)
PEEP: 5
PEEP: 8
PH SMN: 7.21 [PH] (ref 7.35–7.45)
PH SMN: 7.29 [PH] (ref 7.35–7.45)
PH SMN: 7.3 [PH] (ref 7.35–7.45)
PH SMN: 7.35 [PH] (ref 7.35–7.45)
PH SMN: 7.38 [PH] (ref 7.35–7.45)
PH SMN: 7.43 [PH] (ref 7.35–7.45)
PH SMN: 7.44 [PH] (ref 7.35–7.45)
PH UR STRIP: 5 [PH] (ref 5–8)
PH UR STRIP: 5 [PH] (ref 5–8)
PHOSPHATE SERPL-MCNC: 3.3 MG/DL (ref 2.7–4.5)
PHOSPHATE SERPL-MCNC: 3.3 MG/DL (ref 2.7–4.5)
PHOSPHATE SERPL-MCNC: 3.8 MG/DL (ref 2.7–4.5)
PHOSPHATE SERPL-MCNC: 3.9 MG/DL (ref 2.7–4.5)
PHOSPHATE SERPL-MCNC: 5 MG/DL (ref 2.7–4.5)
PIP: 26
PIP: 29
PIP: 33
PLATELET # BLD AUTO: 117 K/UL (ref 150–350)
PLATELET # BLD AUTO: 122 K/UL (ref 150–350)
PLATELET # BLD AUTO: 132 K/UL (ref 150–350)
PLATELET # BLD AUTO: 148 K/UL (ref 150–350)
PLATELET # BLD AUTO: 157 K/UL (ref 150–350)
PLATELET # BLD AUTO: 173 K/UL (ref 150–350)
PLATELET # BLD AUTO: 173 K/UL (ref 150–350)
PLATELET # BLD AUTO: 186 K/UL (ref 150–350)
PLATELET # BLD AUTO: 186 K/UL (ref 150–350)
PLATELET # BLD AUTO: 210 K/UL (ref 150–350)
PLATELET # BLD AUTO: 66 K/UL (ref 150–350)
PLATELET BLD QL SMEAR: ABNORMAL
PMV BLD AUTO: 10.3 FL (ref 9.2–12.9)
PMV BLD AUTO: 10.5 FL (ref 9.2–12.9)
PMV BLD AUTO: 10.7 FL (ref 9.2–12.9)
PMV BLD AUTO: 10.9 FL (ref 9.2–12.9)
PMV BLD AUTO: 10.9 FL (ref 9.2–12.9)
PMV BLD AUTO: 11 FL (ref 9.2–12.9)
PMV BLD AUTO: 11 FL (ref 9.2–12.9)
PMV BLD AUTO: 11.4 FL (ref 9.2–12.9)
PMV BLD AUTO: 11.5 FL (ref 9.2–12.9)
PMV BLD AUTO: 11.9 FL (ref 9.2–12.9)
PMV BLD AUTO: 12.6 FL (ref 9.2–12.9)
PO2 BLDA: 116 MMHG (ref 80–100)
PO2 BLDA: 141 MMHG (ref 80–100)
PO2 BLDA: 145 MMHG (ref 80–100)
PO2 BLDA: 152 MMHG (ref 80–100)
PO2 BLDA: 183 MMHG (ref 80–100)
PO2 BLDA: 22 MMHG (ref 40–60)
PO2 BLDA: 280 MMHG (ref 80–100)
POC BE: -12 MMOL/L
POC BE: -3 MMOL/L
POC BE: -4 MMOL/L
POC BE: -5 MMOL/L
POC BE: -5 MMOL/L
POC BE: -8 MMOL/L
POC BE: -8 MMOL/L
POC IONIZED CALCIUM: 0.99 MMOL/L (ref 1.06–1.42)
POC IONIZED CALCIUM: 1.08 MMOL/L (ref 1.06–1.42)
POC IONIZED CALCIUM: 1.18 MMOL/L (ref 1.06–1.42)
POC SATURATED O2: 100 % (ref 95–100)
POC SATURATED O2: 100 % (ref 95–100)
POC SATURATED O2: 40 % (ref 95–100)
POC SATURATED O2: 99 % (ref 95–100)
POC TCO2 (MEASURED): 23 MMOL/L (ref 23–29)
POC TCO2: 17 MMOL/L (ref 23–27)
POC TCO2: 17 MMOL/L (ref 24–29)
POC TCO2: 20 MMOL/L (ref 23–27)
POC TCO2: 21 MMOL/L (ref 23–27)
POC TCO2: 21 MMOL/L (ref 23–27)
POC TCO2: 23 MMOL/L (ref 23–27)
POC TCO2: 23 MMOL/L (ref 23–27)
POCT GLUCOSE: 122 MG/DL (ref 70–110)
POCT GLUCOSE: 123 MG/DL (ref 70–110)
POCT GLUCOSE: 128 MG/DL (ref 70–110)
POCT GLUCOSE: 132 MG/DL (ref 70–110)
POCT GLUCOSE: 133 MG/DL (ref 70–110)
POCT GLUCOSE: 133 MG/DL (ref 70–110)
POCT GLUCOSE: 137 MG/DL (ref 70–110)
POCT GLUCOSE: 139 MG/DL (ref 70–110)
POCT GLUCOSE: 140 MG/DL (ref 70–110)
POCT GLUCOSE: 141 MG/DL (ref 70–110)
POCT GLUCOSE: 144 MG/DL (ref 70–110)
POCT GLUCOSE: 145 MG/DL (ref 70–110)
POCT GLUCOSE: 146 MG/DL (ref 70–110)
POCT GLUCOSE: 147 MG/DL (ref 70–110)
POCT GLUCOSE: 148 MG/DL (ref 70–110)
POCT GLUCOSE: 149 MG/DL (ref 70–110)
POCT GLUCOSE: 149 MG/DL (ref 70–110)
POCT GLUCOSE: 150 MG/DL (ref 70–110)
POCT GLUCOSE: 153 MG/DL (ref 70–110)
POCT GLUCOSE: 156 MG/DL (ref 70–110)
POCT GLUCOSE: 157 MG/DL (ref 70–110)
POCT GLUCOSE: 158 MG/DL (ref 70–110)
POCT GLUCOSE: 158 MG/DL (ref 70–110)
POCT GLUCOSE: 160 MG/DL (ref 70–110)
POCT GLUCOSE: 163 MG/DL (ref 70–110)
POCT GLUCOSE: 163 MG/DL (ref 70–110)
POCT GLUCOSE: 164 MG/DL (ref 70–110)
POCT GLUCOSE: 167 MG/DL (ref 70–110)
POCT GLUCOSE: 175 MG/DL (ref 70–110)
POCT GLUCOSE: 177 MG/DL (ref 70–110)
POCT GLUCOSE: 177 MG/DL (ref 70–110)
POCT GLUCOSE: 178 MG/DL (ref 70–110)
POCT GLUCOSE: 180 MG/DL (ref 70–110)
POCT GLUCOSE: 182 MG/DL (ref 70–110)
POCT GLUCOSE: 183 MG/DL (ref 70–110)
POCT GLUCOSE: 186 MG/DL (ref 70–110)
POCT GLUCOSE: 186 MG/DL (ref 70–110)
POCT GLUCOSE: 190 MG/DL (ref 70–110)
POCT GLUCOSE: 191 MG/DL (ref 70–110)
POCT GLUCOSE: 193 MG/DL (ref 70–110)
POCT GLUCOSE: 198 MG/DL (ref 70–110)
POCT GLUCOSE: 202 MG/DL (ref 70–110)
POCT GLUCOSE: 206 MG/DL (ref 70–110)
POCT GLUCOSE: 224 MG/DL (ref 70–110)
POCT GLUCOSE: 249 MG/DL (ref 70–110)
POCT GLUCOSE: 271 MG/DL (ref 70–110)
POCT GLUCOSE: 273 MG/DL (ref 70–110)
POCT GLUCOSE: 296 MG/DL (ref 70–110)
POCT GLUCOSE: 302 MG/DL (ref 70–110)
POCT GLUCOSE: 320 MG/DL (ref 70–110)
POCT GLUCOSE: 324 MG/DL (ref 70–110)
POCT GLUCOSE: 326 MG/DL (ref 70–110)
POIKILOCYTOSIS BLD QL SMEAR: ABNORMAL
POIKILOCYTOSIS BLD QL SMEAR: ABNORMAL
POIKILOCYTOSIS BLD QL SMEAR: SLIGHT
POLYCHROMASIA BLD QL SMEAR: ABNORMAL
POTASSIUM BLD-SCNC: 4.6 MMOL/L (ref 3.5–5.1)
POTASSIUM BLD-SCNC: 4.7 MMOL/L (ref 3.5–5.1)
POTASSIUM BLD-SCNC: 5.6 MMOL/L (ref 3.5–5.1)
POTASSIUM SERPL-SCNC: 4.3 MMOL/L (ref 3.5–5.1)
POTASSIUM SERPL-SCNC: 4.3 MMOL/L (ref 3.5–5.1)
POTASSIUM SERPL-SCNC: 4.4 MMOL/L (ref 3.5–5.1)
POTASSIUM SERPL-SCNC: 4.5 MMOL/L (ref 3.5–5.1)
POTASSIUM SERPL-SCNC: 4.6 MMOL/L (ref 3.5–5.1)
POTASSIUM SERPL-SCNC: 4.7 MMOL/L (ref 3.5–5.1)
POTASSIUM SERPL-SCNC: 4.8 MMOL/L (ref 3.5–5.1)
POTASSIUM SERPL-SCNC: 5 MMOL/L (ref 3.5–5.1)
POTASSIUM SERPL-SCNC: 5.3 MMOL/L (ref 3.5–5.1)
POTASSIUM SERPL-SCNC: 5.4 MMOL/L (ref 3.5–5.1)
POTASSIUM SERPL-SCNC: 5.6 MMOL/L (ref 3.5–5.1)
POTASSIUM SERPL-SCNC: 5.6 MMOL/L (ref 3.5–5.1)
POTASSIUM SERPL-SCNC: 5.8 MMOL/L (ref 3.5–5.1)
POTASSIUM SERPL-SCNC: 5.9 MMOL/L (ref 3.5–5.1)
POTASSIUM SERPL-SCNC: 6.2 MMOL/L (ref 3.5–5.1)
PROCALCITONIN SERPL IA-MCNC: 2.66 NG/ML
PROT SERPL-MCNC: 4.1 G/DL (ref 6–8.4)
PROT SERPL-MCNC: 4.5 G/DL (ref 6–8.4)
PROT SERPL-MCNC: 5.1 G/DL (ref 6–8.4)
PROT SERPL-MCNC: 6.6 G/DL (ref 6–8.4)
PROT SERPL-MCNC: 7 G/DL (ref 6–8.4)
PROT UR QL STRIP: NEGATIVE
PROT UR QL STRIP: NEGATIVE
PROT UR-MCNC: 14 MG/DL (ref 0–15)
PROT/CREAT UR: 0.08 MG/G{CREAT} (ref 0–0.2)
PROTHROMBIN TIME: 11.8 SEC (ref 9–12.5)
PROTHROMBIN TIME: 14 SEC (ref 9–12.5)
RBC # BLD AUTO: 2.8 M/UL (ref 4.6–6.2)
RBC # BLD AUTO: 2.93 M/UL (ref 4.6–6.2)
RBC # BLD AUTO: 3.07 M/UL (ref 4.6–6.2)
RBC # BLD AUTO: 3.47 M/UL (ref 4.6–6.2)
RBC # BLD AUTO: 3.72 M/UL (ref 4.6–6.2)
RBC # BLD AUTO: 3.8 M/UL (ref 4.6–6.2)
RBC # BLD AUTO: 3.97 M/UL (ref 4.6–6.2)
RBC # BLD AUTO: 4.24 M/UL (ref 4.6–6.2)
RBC # BLD AUTO: 4.3 M/UL (ref 4.6–6.2)
RBC # BLD AUTO: 4.37 M/UL (ref 4.6–6.2)
RBC # BLD AUTO: 4.43 M/UL (ref 4.6–6.2)
RBC #/AREA URNS AUTO: 2 /HPF (ref 0–4)
RBC #/AREA URNS AUTO: 2 /HPF (ref 0–4)
RBC AGGLUT BLD QL: PRESENT
SAMPLE: ABNORMAL
SARS-COV-2 RDRP RESP QL NAA+PROBE: NEGATIVE
SCHISTOCYTES BLD QL SMEAR: PRESENT
SITE: ABNORMAL
SODIUM BLD-SCNC: 137 MMOL/L (ref 136–145)
SODIUM BLD-SCNC: 139 MMOL/L (ref 136–145)
SODIUM BLD-SCNC: 142 MMOL/L (ref 136–145)
SODIUM SERPL-SCNC: 132 MMOL/L (ref 136–145)
SODIUM SERPL-SCNC: 135 MMOL/L (ref 136–145)
SODIUM SERPL-SCNC: 136 MMOL/L (ref 136–145)
SODIUM SERPL-SCNC: 137 MMOL/L (ref 136–145)
SODIUM SERPL-SCNC: 138 MMOL/L (ref 136–145)
SODIUM SERPL-SCNC: 139 MMOL/L (ref 136–145)
SODIUM SERPL-SCNC: 140 MMOL/L (ref 136–145)
SODIUM SERPL-SCNC: 141 MMOL/L (ref 136–145)
SODIUM UR-SCNC: <20 MMOL/L (ref 20–250)
SP GR UR STRIP: 1.01 (ref 1–1.03)
SP GR UR STRIP: 1.01 (ref 1–1.03)
SP02: 97
SP02: 99
SP02: 99
SQUAMOUS #/AREA URNS AUTO: 0 /HPF
SQUAMOUS #/AREA URNS AUTO: 0 /HPF
T4 FREE SERPL-MCNC: 1.23 NG/DL (ref 0.71–1.51)
TDI LATERAL: 0.06 M/S
TDI SEPTAL: 0.04 M/S
TDI: 0.05 M/S
TOXIC GRANULES BLD QL SMEAR: PRESENT
TRIGL SERPL-MCNC: 68 MG/DL (ref 30–150)
TROPONIN I SERPL DL<=0.01 NG/ML-MCNC: 0.02 NG/ML (ref 0–0.03)
TSH SERPL DL<=0.005 MIU/L-ACNC: 0.4 UIU/ML (ref 0.4–4)
URN SPEC COLLECT METH UR: ABNORMAL
URN SPEC COLLECT METH UR: ABNORMAL
UUN UR-MCNC: 469 MG/DL (ref 140–1050)
VANCOMYCIN SERPL-MCNC: 13.5 UG/ML
VANCOMYCIN SERPL-MCNC: 15.7 UG/ML
VANCOMYCIN SERPL-MCNC: 17.9 UG/ML
VT: 450
VT: 520
VT: 540
VT: 540
WBC # BLD AUTO: 10.3 K/UL (ref 3.9–12.7)
WBC # BLD AUTO: 10.33 K/UL (ref 3.9–12.7)
WBC # BLD AUTO: 14.27 K/UL (ref 3.9–12.7)
WBC # BLD AUTO: 16.53 K/UL (ref 3.9–12.7)
WBC # BLD AUTO: 3.21 K/UL (ref 3.9–12.7)
WBC # BLD AUTO: 3.58 K/UL (ref 3.9–12.7)
WBC # BLD AUTO: 4.21 K/UL (ref 3.9–12.7)
WBC # BLD AUTO: 5.08 K/UL (ref 3.9–12.7)
WBC # BLD AUTO: 5.43 K/UL (ref 3.9–12.7)
WBC # BLD AUTO: 8.71 K/UL (ref 3.9–12.7)
WBC # BLD AUTO: 9.18 K/UL (ref 3.9–12.7)
WBC #/AREA URNS AUTO: 1 /HPF (ref 0–5)
WBC #/AREA URNS AUTO: 4 /HPF (ref 0–5)

## 2020-01-01 PROCEDURE — 99223 PR INITIAL HOSPITAL CARE,LEVL III: ICD-10-PCS | Mod: ,,, | Performed by: CLINICAL NURSE SPECIALIST

## 2020-01-01 PROCEDURE — 12000002 HC ACUTE/MED SURGE SEMI-PRIVATE ROOM

## 2020-01-01 PROCEDURE — 94761 N-INVAS EAR/PLS OXIMETRY MLT: CPT

## 2020-01-01 PROCEDURE — 85520 HEPARIN ASSAY: CPT

## 2020-01-01 PROCEDURE — 84300 ASSAY OF URINE SODIUM: CPT

## 2020-01-01 PROCEDURE — 83735 ASSAY OF MAGNESIUM: CPT

## 2020-01-01 PROCEDURE — 25000003 PHARM REV CODE 250: Performed by: STUDENT IN AN ORGANIZED HEALTH CARE EDUCATION/TRAINING PROGRAM

## 2020-01-01 PROCEDURE — 63600175 PHARM REV CODE 636 W HCPCS: Performed by: STUDENT IN AN ORGANIZED HEALTH CARE EDUCATION/TRAINING PROGRAM

## 2020-01-01 PROCEDURE — 36000707: Performed by: SURGERY

## 2020-01-01 PROCEDURE — D9220A PRA ANESTHESIA: ICD-10-PCS | Mod: ANES,,, | Performed by: STUDENT IN AN ORGANIZED HEALTH CARE EDUCATION/TRAINING PROGRAM

## 2020-01-01 PROCEDURE — 99999 PR PBB SHADOW E&M-EST. PATIENT-LVL III: CPT | Mod: PBBFAC,,, | Performed by: PODIATRIST

## 2020-01-01 PROCEDURE — 85730 THROMBOPLASTIN TIME PARTIAL: CPT | Mod: 91

## 2020-01-01 PROCEDURE — D9220A PRA ANESTHESIA: ICD-10-PCS | Mod: CRNA,,, | Performed by: NURSE ANESTHETIST, CERTIFIED REGISTERED

## 2020-01-01 PROCEDURE — 3074F PR MOST RECENT SYSTOLIC BLOOD PRESSURE < 130 MM HG: ICD-10-PCS | Mod: CPTII,S$GLB,, | Performed by: INTERNAL MEDICINE

## 2020-01-01 PROCEDURE — 36620 INSERTION CATHETER ARTERY: CPT

## 2020-01-01 PROCEDURE — 84439 ASSAY OF FREE THYROXINE: CPT

## 2020-01-01 PROCEDURE — 88307 PR  SURG PATH,LEVEL V: ICD-10-PCS | Mod: 26,,, | Performed by: PATHOLOGY

## 2020-01-01 PROCEDURE — G2066 INTER DEVC REMOTE 30D: HCPCS | Performed by: INTERNAL MEDICINE

## 2020-01-01 PROCEDURE — 93005 ELECTROCARDIOGRAM TRACING: CPT

## 2020-01-01 PROCEDURE — 80048 BASIC METABOLIC PNL TOTAL CA: CPT

## 2020-01-01 PROCEDURE — 25000003 PHARM REV CODE 250: Performed by: NURSE ANESTHETIST, CERTIFIED REGISTERED

## 2020-01-01 PROCEDURE — 85520 HEPARIN ASSAY: CPT | Mod: 91

## 2020-01-01 PROCEDURE — 99214 OFFICE O/P EST MOD 30 MIN: CPT | Mod: S$GLB,,, | Performed by: INTERNAL MEDICINE

## 2020-01-01 PROCEDURE — C9113 INJ PANTOPRAZOLE SODIUM, VIA: HCPCS | Performed by: PHYSICIAN ASSISTANT

## 2020-01-01 PROCEDURE — 1101F PT FALLS ASSESS-DOCD LE1/YR: CPT | Mod: CPTII,S$GLB,, | Performed by: PODIATRIST

## 2020-01-01 PROCEDURE — 20000000 HC ICU ROOM

## 2020-01-01 PROCEDURE — 27200966 HC CLOSED SUCTION SYSTEM

## 2020-01-01 PROCEDURE — 63600175 PHARM REV CODE 636 W HCPCS: Performed by: INTERNAL MEDICINE

## 2020-01-01 PROCEDURE — 36000706: Performed by: SURGERY

## 2020-01-01 PROCEDURE — 25500020 PHARM REV CODE 255: Performed by: STUDENT IN AN ORGANIZED HEALTH CARE EDUCATION/TRAINING PROGRAM

## 2020-01-01 PROCEDURE — 83605 ASSAY OF LACTIC ACID: CPT

## 2020-01-01 PROCEDURE — 99291 CRITICAL CARE FIRST HOUR: CPT | Mod: 24,GC,, | Performed by: SURGERY

## 2020-01-01 PROCEDURE — 99223 PR INITIAL HOSPITAL CARE,LEVL III: ICD-10-PCS | Mod: AI,GC,, | Performed by: PSYCHIATRY & NEUROLOGY

## 2020-01-01 PROCEDURE — C9399 UNCLASSIFIED DRUGS OR BIOLOG: HCPCS | Performed by: PHYSICIAN ASSISTANT

## 2020-01-01 PROCEDURE — 99214 PR OFFICE/OUTPT VISIT, EST, LEVL IV, 30-39 MIN: ICD-10-PCS | Mod: S$GLB,,, | Performed by: INTERNAL MEDICINE

## 2020-01-01 PROCEDURE — 81001 URINALYSIS AUTO W/SCOPE: CPT | Mod: 91

## 2020-01-01 PROCEDURE — 37000008 HC ANESTHESIA 1ST 15 MINUTES: Performed by: SURGERY

## 2020-01-01 PROCEDURE — 94003 VENT MGMT INPAT SUBQ DAY: CPT

## 2020-01-01 PROCEDURE — 63600175 PHARM REV CODE 636 W HCPCS: Performed by: PHYSICIAN ASSISTANT

## 2020-01-01 PROCEDURE — 27000221 HC OXYGEN, UP TO 24 HOURS

## 2020-01-01 PROCEDURE — 92610 EVALUATE SWALLOWING FUNCTION: CPT

## 2020-01-01 PROCEDURE — 99213 OFFICE O/P EST LOW 20 MIN: CPT | Mod: S$GLB,,, | Performed by: PODIATRIST

## 2020-01-01 PROCEDURE — 82550 ASSAY OF CK (CPK): CPT

## 2020-01-01 PROCEDURE — 96361 HYDRATE IV INFUSION ADD-ON: CPT

## 2020-01-01 PROCEDURE — 1101F PR PT FALLS ASSESS DOC 0-1 FALLS W/OUT INJ PAST YR: ICD-10-PCS | Mod: CPTII,S$GLB,, | Performed by: INTERNAL MEDICINE

## 2020-01-01 PROCEDURE — 93298 CARDIAC DEVICE CHECK - REMOTE: ICD-10-PCS | Mod: ,,, | Performed by: INTERNAL MEDICINE

## 2020-01-01 PROCEDURE — 99497 ADVNCD CARE PLAN 30 MIN: CPT | Mod: 25,,, | Performed by: CLINICAL NURSE SPECIALIST

## 2020-01-01 PROCEDURE — 85007 BL SMEAR W/DIFF WBC COUNT: CPT

## 2020-01-01 PROCEDURE — 87324 CLOSTRIDIUM AG IA: CPT

## 2020-01-01 PROCEDURE — 82800 BLOOD PH: CPT

## 2020-01-01 PROCEDURE — U0002 COVID-19 LAB TEST NON-CDC: HCPCS

## 2020-01-01 PROCEDURE — 99900026 HC AIRWAY MAINTENANCE (STAT)

## 2020-01-01 PROCEDURE — 27201423 OPTIME MED/SURG SUP & DEVICES STERILE SUPPLY: Performed by: SURGERY

## 2020-01-01 PROCEDURE — 88307 TISSUE EXAM BY PATHOLOGIST: CPT | Mod: 26,,, | Performed by: PATHOLOGY

## 2020-01-01 PROCEDURE — 84540 ASSAY OF URINE/UREA-N: CPT

## 2020-01-01 PROCEDURE — 25000003 PHARM REV CODE 250: Performed by: INTERNAL MEDICINE

## 2020-01-01 PROCEDURE — D9220A PRA ANESTHESIA: Mod: CRNA,,, | Performed by: NURSE ANESTHETIST, CERTIFIED REGISTERED

## 2020-01-01 PROCEDURE — 63600175 PHARM REV CODE 636 W HCPCS: Performed by: UROLOGY

## 2020-01-01 PROCEDURE — 99900035 HC TECH TIME PER 15 MIN (STAT)

## 2020-01-01 PROCEDURE — 97530 THERAPEUTIC ACTIVITIES: CPT

## 2020-01-01 PROCEDURE — 3078F DIAST BP <80 MM HG: CPT | Mod: CPTII,S$GLB,, | Performed by: INTERNAL MEDICINE

## 2020-01-01 PROCEDURE — 85014 HEMATOCRIT: CPT

## 2020-01-01 PROCEDURE — 82803 BLOOD GASES ANY COMBINATION: CPT

## 2020-01-01 PROCEDURE — 84100 ASSAY OF PHOSPHORUS: CPT

## 2020-01-01 PROCEDURE — 80047 BASIC METABLC PNL IONIZED CA: CPT

## 2020-01-01 PROCEDURE — 36000709 HC OR TIME LEV III EA ADD 15 MIN: Performed by: SURGERY

## 2020-01-01 PROCEDURE — 82330 ASSAY OF CALCIUM: CPT

## 2020-01-01 PROCEDURE — 44120 PR RESECT SMALL INTEST,SINGL RESEC/ANAS: ICD-10-PCS | Mod: 58,52,, | Performed by: SURGERY

## 2020-01-01 PROCEDURE — 80048 BASIC METABOLIC PNL TOTAL CA: CPT | Mod: 91

## 2020-01-01 PROCEDURE — 83735 ASSAY OF MAGNESIUM: CPT | Mod: 91

## 2020-01-01 PROCEDURE — 25000003 PHARM REV CODE 250: Performed by: PHYSICIAN ASSISTANT

## 2020-01-01 PROCEDURE — 80053 COMPREHEN METABOLIC PANEL: CPT

## 2020-01-01 PROCEDURE — 84132 ASSAY OF SERUM POTASSIUM: CPT

## 2020-01-01 PROCEDURE — 80202 ASSAY OF VANCOMYCIN: CPT

## 2020-01-01 PROCEDURE — 93298 REM INTERROG DEV EVAL SCRMS: CPT | Mod: ,,, | Performed by: INTERNAL MEDICINE

## 2020-01-01 PROCEDURE — 3046F PR MOST RECENT HEMOGLOBIN A1C LEVEL > 9.0%: ICD-10-PCS | Mod: CPTII,S$GLB,, | Performed by: INTERNAL MEDICINE

## 2020-01-01 PROCEDURE — C9113 INJ PANTOPRAZOLE SODIUM, VIA: HCPCS | Performed by: STUDENT IN AN ORGANIZED HEALTH CARE EDUCATION/TRAINING PROGRAM

## 2020-01-01 PROCEDURE — 99223 PR INITIAL HOSPITAL CARE,LEVL III: ICD-10-PCS | Mod: ,,, | Performed by: HOSPITALIST

## 2020-01-01 PROCEDURE — 99499 UNLISTED E&M SERVICE: CPT | Mod: S$GLB,,, | Performed by: INTERNAL MEDICINE

## 2020-01-01 PROCEDURE — 44121 PR RESECT SMALL INTEST,EACH ADDNL: ICD-10-PCS | Mod: 52,,, | Performed by: SURGERY

## 2020-01-01 PROCEDURE — 88307 TISSUE EXAM BY PATHOLOGIST: CPT | Performed by: PATHOLOGY

## 2020-01-01 PROCEDURE — 85025 COMPLETE CBC W/AUTO DIFF WBC: CPT

## 2020-01-01 PROCEDURE — 1159F MED LIST DOCD IN RCRD: CPT | Mod: S$GLB,,, | Performed by: INTERNAL MEDICINE

## 2020-01-01 PROCEDURE — 44120 PR RESECT SMALL INTEST,SINGL RESEC/ANAS: ICD-10-PCS | Mod: 52,,, | Performed by: SURGERY

## 2020-01-01 PROCEDURE — 81001 URINALYSIS AUTO W/SCOPE: CPT

## 2020-01-01 PROCEDURE — 85730 THROMBOPLASTIN TIME PARTIAL: CPT

## 2020-01-01 PROCEDURE — 99223 1ST HOSP IP/OBS HIGH 75: CPT | Mod: AI,GC,, | Performed by: PSYCHIATRY & NEUROLOGY

## 2020-01-01 PROCEDURE — 1126F PR PAIN SEVERITY QUANTIFIED, NO PAIN PRESENT: ICD-10-PCS | Mod: S$GLB,,, | Performed by: INTERNAL MEDICINE

## 2020-01-01 PROCEDURE — 25000003 PHARM REV CODE 250: Performed by: SURGERY

## 2020-01-01 PROCEDURE — 27100025 HC TUBING, SET FLUID WARMER: Performed by: STUDENT IN AN ORGANIZED HEALTH CARE EDUCATION/TRAINING PROGRAM

## 2020-01-01 PROCEDURE — 97605 PR NEG PRESS WOUND THERAPY (NPWT) W/NON-DISPOSABLE WOUND VAC DEVICE (DME), <=50 CM: ICD-10-PCS | Mod: ,,, | Performed by: SURGERY

## 2020-01-01 PROCEDURE — 3078F DIAST BP <80 MM HG: CPT | Mod: CPTII,S$GLB,, | Performed by: PODIATRIST

## 2020-01-01 PROCEDURE — S0030 INJECTION, METRONIDAZOLE: HCPCS | Performed by: INTERNAL MEDICINE

## 2020-01-01 PROCEDURE — S0030 INJECTION, METRONIDAZOLE: HCPCS | Performed by: SURGERY

## 2020-01-01 PROCEDURE — 99223 PR INITIAL HOSPITAL CARE,LEVL III: ICD-10-PCS | Mod: GC,,, | Performed by: INTERNAL MEDICINE

## 2020-01-01 PROCEDURE — 85610 PROTHROMBIN TIME: CPT

## 2020-01-01 PROCEDURE — 25000003 PHARM REV CODE 250: Performed by: UROLOGY

## 2020-01-01 PROCEDURE — 44120 REMOVAL OF SMALL INTESTINE: CPT | Mod: 58,52,, | Performed by: SURGERY

## 2020-01-01 PROCEDURE — 93010 EKG 12-LEAD: ICD-10-PCS | Mod: ,,, | Performed by: INTERNAL MEDICINE

## 2020-01-01 PROCEDURE — D9220A PRA ANESTHESIA: ICD-10-PCS | Mod: ANES,,, | Performed by: ANESTHESIOLOGY

## 2020-01-01 PROCEDURE — 44121 REMOVAL OF SMALL INTESTINE: CPT | Mod: 52,,, | Performed by: SURGERY

## 2020-01-01 PROCEDURE — 11000001 HC ACUTE MED/SURG PRIVATE ROOM

## 2020-01-01 PROCEDURE — 37000009 HC ANESTHESIA EA ADD 15 MINS: Performed by: SURGERY

## 2020-01-01 PROCEDURE — 99499 RISK ADDL DX/OHS AUDIT: ICD-10-PCS | Mod: S$GLB,,, | Performed by: PODIATRIST

## 2020-01-01 PROCEDURE — 36000708 HC OR TIME LEV III 1ST 15 MIN: Performed by: SURGERY

## 2020-01-01 PROCEDURE — 63600175 PHARM REV CODE 636 W HCPCS: Performed by: SURGERY

## 2020-01-01 PROCEDURE — 82553 CREATINE MB FRACTION: CPT

## 2020-01-01 PROCEDURE — 87449 NOS EACH ORGANISM AG IA: CPT

## 2020-01-01 PROCEDURE — 36415 COLL VENOUS BLD VENIPUNCTURE: CPT | Mod: PO

## 2020-01-01 PROCEDURE — 96360 HYDRATION IV INFUSION INIT: CPT

## 2020-01-01 PROCEDURE — 1159F MED LIST DOCD IN RCRD: CPT | Mod: S$GLB,,, | Performed by: PODIATRIST

## 2020-01-01 PROCEDURE — 36556 INSERT NON-TUNNEL CV CATH: CPT

## 2020-01-01 PROCEDURE — 99497 PR ADVNCD CARE PLAN 30 MIN: ICD-10-PCS | Mod: 25,,, | Performed by: CLINICAL NURSE SPECIALIST

## 2020-01-01 PROCEDURE — 3074F SYST BP LT 130 MM HG: CPT | Mod: CPTII,S$GLB,, | Performed by: INTERNAL MEDICINE

## 2020-01-01 PROCEDURE — 99291 PR CRITICAL CARE, E/M 30-74 MINUTES: ICD-10-PCS | Mod: ,,, | Performed by: SURGERY

## 2020-01-01 PROCEDURE — 97605 NEG PRS WND THER DME<=50SQCM: CPT | Mod: ,,, | Performed by: SURGERY

## 2020-01-01 PROCEDURE — 83605 ASSAY OF LACTIC ACID: CPT | Mod: 91

## 2020-01-01 PROCEDURE — 93010 ELECTROCARDIOGRAM REPORT: CPT | Mod: ,,, | Performed by: INTERNAL MEDICINE

## 2020-01-01 PROCEDURE — 99291 CRITICAL CARE FIRST HOUR: CPT | Mod: GC,,, | Performed by: INTERNAL MEDICINE

## 2020-01-01 PROCEDURE — 84295 ASSAY OF SERUM SODIUM: CPT

## 2020-01-01 PROCEDURE — 85025 COMPLETE CBC W/AUTO DIFF WBC: CPT | Mod: 91

## 2020-01-01 PROCEDURE — 99222 1ST HOSP IP/OBS MODERATE 55: CPT | Mod: ,,, | Performed by: NURSE PRACTITIONER

## 2020-01-01 PROCEDURE — 1159F PR MEDICATION LIST DOCUMENTED IN MEDICAL RECORD: ICD-10-PCS | Mod: S$GLB,,, | Performed by: INTERNAL MEDICINE

## 2020-01-01 PROCEDURE — 92523 SPEECH SOUND LANG COMPREHEN: CPT

## 2020-01-01 PROCEDURE — 99499 UNLISTED E&M SERVICE: CPT | Mod: S$GLB,,, | Performed by: PODIATRIST

## 2020-01-01 PROCEDURE — 99222 PR INITIAL HOSPITAL CARE,LEVL II: ICD-10-PCS | Mod: ,,, | Performed by: NURSE PRACTITIONER

## 2020-01-01 PROCEDURE — 3046F HEMOGLOBIN A1C LEVEL >9.0%: CPT | Mod: CPTII,S$GLB,, | Performed by: INTERNAL MEDICINE

## 2020-01-01 PROCEDURE — 31500 INSERT EMERGENCY AIRWAY: CPT

## 2020-01-01 PROCEDURE — 87040 BLOOD CULTURE FOR BACTERIA: CPT

## 2020-01-01 PROCEDURE — 80053 COMPREHEN METABOLIC PANEL: CPT | Mod: 91

## 2020-01-01 PROCEDURE — 82962 GLUCOSE BLOOD TEST: CPT

## 2020-01-01 PROCEDURE — 80061 LIPID PANEL: CPT

## 2020-01-01 PROCEDURE — 25000003 PHARM REV CODE 250

## 2020-01-01 PROCEDURE — 83036 HEMOGLOBIN GLYCOSYLATED A1C: CPT

## 2020-01-01 PROCEDURE — 84156 ASSAY OF PROTEIN URINE: CPT

## 2020-01-01 PROCEDURE — S0028 INJECTION, FAMOTIDINE, 20 MG: HCPCS | Performed by: NURSE ANESTHETIST, CERTIFIED REGISTERED

## 2020-01-01 PROCEDURE — 3078F PR MOST RECENT DIASTOLIC BLOOD PRESSURE < 80 MM HG: ICD-10-PCS | Mod: CPTII,S$GLB,, | Performed by: INTERNAL MEDICINE

## 2020-01-01 PROCEDURE — 3046F HEMOGLOBIN A1C LEVEL >9.0%: CPT | Mod: CPTII,S$GLB,, | Performed by: PODIATRIST

## 2020-01-01 PROCEDURE — 44120 REMOVAL OF SMALL INTESTINE: CPT | Mod: 52,,, | Performed by: SURGERY

## 2020-01-01 PROCEDURE — 36415 COLL VENOUS BLD VENIPUNCTURE: CPT

## 2020-01-01 PROCEDURE — 99291 PR CRITICAL CARE, E/M 30-74 MINUTES: ICD-10-PCS | Mod: 24,GC,, | Performed by: SURGERY

## 2020-01-01 PROCEDURE — 99221 1ST HOSP IP/OBS SF/LOW 40: CPT | Mod: ,,, | Performed by: INTERNAL MEDICINE

## 2020-01-01 PROCEDURE — 51702 INSERT TEMP BLADDER CATH: CPT

## 2020-01-01 PROCEDURE — 99291 CRITICAL CARE FIRST HOUR: CPT | Mod: ,,, | Performed by: SURGERY

## 2020-01-01 PROCEDURE — 1126F PR PAIN SEVERITY QUANTIFIED, NO PAIN PRESENT: ICD-10-PCS | Mod: S$GLB,,, | Performed by: PODIATRIST

## 2020-01-01 PROCEDURE — 37799 UNLISTED PX VASCULAR SURGERY: CPT

## 2020-01-01 PROCEDURE — 99221 PR INITIAL HOSPITAL CARE,LEVL I: ICD-10-PCS | Mod: ,,, | Performed by: INTERNAL MEDICINE

## 2020-01-01 PROCEDURE — 99223 PR INITIAL HOSPITAL CARE,LEVL III: ICD-10-PCS | Mod: ,,, | Performed by: SURGERY

## 2020-01-01 PROCEDURE — 97535 SELF CARE MNGMENT TRAINING: CPT

## 2020-01-01 PROCEDURE — 99213 PR OFFICE/OUTPT VISIT, EST, LEVL III, 20-29 MIN: ICD-10-PCS | Mod: S$GLB,,, | Performed by: PODIATRIST

## 2020-01-01 PROCEDURE — 97165 OT EVAL LOW COMPLEX 30 MIN: CPT

## 2020-01-01 PROCEDURE — 99285 PR EMERGENCY DEPT VISIT,LEVEL V: ICD-10-PCS | Mod: ,,, | Performed by: EMERGENCY MEDICINE

## 2020-01-01 PROCEDURE — 63600175 PHARM REV CODE 636 W HCPCS: Performed by: NURSE ANESTHETIST, CERTIFIED REGISTERED

## 2020-01-01 PROCEDURE — 1126F AMNT PAIN NOTED NONE PRSNT: CPT | Mod: S$GLB,,, | Performed by: INTERNAL MEDICINE

## 2020-01-01 PROCEDURE — 97161 PT EVAL LOW COMPLEX 20 MIN: CPT

## 2020-01-01 PROCEDURE — 99285 EMERGENCY DEPT VISIT HI MDM: CPT | Mod: 25

## 2020-01-01 PROCEDURE — 3077F SYST BP >= 140 MM HG: CPT | Mod: CPTII,S$GLB,, | Performed by: PODIATRIST

## 2020-01-01 PROCEDURE — 3078F PR MOST RECENT DIASTOLIC BLOOD PRESSURE < 80 MM HG: ICD-10-PCS | Mod: CPTII,S$GLB,, | Performed by: PODIATRIST

## 2020-01-01 PROCEDURE — 99499 RISK ADDL DX/OHS AUDIT: ICD-10-PCS | Mod: S$GLB,,, | Performed by: INTERNAL MEDICINE

## 2020-01-01 PROCEDURE — 99223 1ST HOSP IP/OBS HIGH 75: CPT | Mod: ,,, | Performed by: CLINICAL NURSE SPECIALIST

## 2020-01-01 PROCEDURE — 84484 ASSAY OF TROPONIN QUANT: CPT

## 2020-01-01 PROCEDURE — 99223 1ST HOSP IP/OBS HIGH 75: CPT | Mod: ,,, | Performed by: SURGERY

## 2020-01-01 PROCEDURE — 86850 RBC ANTIBODY SCREEN: CPT

## 2020-01-01 PROCEDURE — 84145 PROCALCITONIN (PCT): CPT

## 2020-01-01 PROCEDURE — 99223 1ST HOSP IP/OBS HIGH 75: CPT | Mod: GC,,, | Performed by: INTERNAL MEDICINE

## 2020-01-01 PROCEDURE — 1101F PT FALLS ASSESS-DOCD LE1/YR: CPT | Mod: CPTII,S$GLB,, | Performed by: INTERNAL MEDICINE

## 2020-01-01 PROCEDURE — 1159F PR MEDICATION LIST DOCUMENTED IN MEDICAL RECORD: ICD-10-PCS | Mod: S$GLB,,, | Performed by: PODIATRIST

## 2020-01-01 PROCEDURE — 3046F PR MOST RECENT HEMOGLOBIN A1C LEVEL > 9.0%: ICD-10-PCS | Mod: CPTII,S$GLB,, | Performed by: PODIATRIST

## 2020-01-01 PROCEDURE — 1101F PR PT FALLS ASSESS DOC 0-1 FALLS W/OUT INJ PAST YR: ICD-10-PCS | Mod: CPTII,S$GLB,, | Performed by: PODIATRIST

## 2020-01-01 PROCEDURE — 85027 COMPLETE CBC AUTOMATED: CPT

## 2020-01-01 PROCEDURE — 99291 PR CRITICAL CARE, E/M 30-74 MINUTES: ICD-10-PCS | Mod: GC,,, | Performed by: INTERNAL MEDICINE

## 2020-01-01 PROCEDURE — 1126F AMNT PAIN NOTED NONE PRSNT: CPT | Mod: S$GLB,,, | Performed by: PODIATRIST

## 2020-01-01 PROCEDURE — 99999 PR PBB SHADOW E&M-EST. PATIENT-LVL IV: ICD-10-PCS | Mod: PBBFAC,,, | Performed by: INTERNAL MEDICINE

## 2020-01-01 PROCEDURE — 86901 BLOOD TYPING SEROLOGIC RH(D): CPT

## 2020-01-01 PROCEDURE — 3077F PR MOST RECENT SYSTOLIC BLOOD PRESSURE >= 140 MM HG: ICD-10-PCS | Mod: CPTII,S$GLB,, | Performed by: PODIATRIST

## 2020-01-01 PROCEDURE — 99999 PR PBB SHADOW E&M-EST. PATIENT-LVL III: ICD-10-PCS | Mod: PBBFAC,,, | Performed by: PODIATRIST

## 2020-01-01 PROCEDURE — 99223 1ST HOSP IP/OBS HIGH 75: CPT | Mod: ,,, | Performed by: HOSPITALIST

## 2020-01-01 PROCEDURE — 84443 ASSAY THYROID STIM HORMONE: CPT

## 2020-01-01 PROCEDURE — 99999 PR PBB SHADOW E&M-EST. PATIENT-LVL IV: CPT | Mod: PBBFAC,,, | Performed by: INTERNAL MEDICINE

## 2020-01-01 PROCEDURE — 99285 EMERGENCY DEPT VISIT HI MDM: CPT | Mod: ,,, | Performed by: EMERGENCY MEDICINE

## 2020-01-01 PROCEDURE — D9220A PRA ANESTHESIA: Mod: ANES,,, | Performed by: ANESTHESIOLOGY

## 2020-01-01 PROCEDURE — D9220A PRA ANESTHESIA: Mod: ANES,,, | Performed by: STUDENT IN AN ORGANIZED HEALTH CARE EDUCATION/TRAINING PROGRAM

## 2020-01-01 RX ORDER — HYDRALAZINE HYDROCHLORIDE 20 MG/ML
10 INJECTION INTRAMUSCULAR; INTRAVENOUS EVERY 8 HOURS PRN
Status: DISCONTINUED | OUTPATIENT
Start: 2020-01-01 | End: 2020-01-01

## 2020-01-01 RX ORDER — MIDAZOLAM HYDROCHLORIDE 1 MG/ML
INJECTION, SOLUTION INTRAMUSCULAR; INTRAVENOUS
Status: DISCONTINUED | OUTPATIENT
Start: 2020-01-01 | End: 2020-01-01

## 2020-01-01 RX ORDER — ATORVASTATIN CALCIUM 20 MG/1
80 TABLET, FILM COATED ORAL DAILY
Status: DISCONTINUED | OUTPATIENT
Start: 2020-01-01 | End: 2020-01-01

## 2020-01-01 RX ORDER — LIRAGLUTIDE 6 MG/ML
1.2 INJECTION SUBCUTANEOUS DAILY
Qty: 6 SYRINGE | Refills: 11 | Status: SHIPPED | OUTPATIENT
Start: 2020-01-01

## 2020-01-01 RX ORDER — MORPHINE SULFATE 2 MG/ML
2 INJECTION, SOLUTION INTRAMUSCULAR; INTRAVENOUS ONCE
Status: COMPLETED | OUTPATIENT
Start: 2020-01-01 | End: 2020-01-01

## 2020-01-01 RX ORDER — SODIUM CHLORIDE 0.9 % (FLUSH) 0.9 %
10 SYRINGE (ML) INJECTION
Status: DISCONTINUED | OUTPATIENT
Start: 2020-01-01 | End: 2020-01-01 | Stop reason: HOSPADM

## 2020-01-01 RX ORDER — GLYCOPYRROLATE 0.2 MG/ML
0.3 INJECTION INTRAMUSCULAR; INTRAVENOUS 4 TIMES DAILY PRN
Status: DISCONTINUED | OUTPATIENT
Start: 2020-01-01 | End: 2020-01-01 | Stop reason: HOSPADM

## 2020-01-01 RX ORDER — ONDANSETRON 2 MG/ML
INJECTION INTRAMUSCULAR; INTRAVENOUS
Status: DISCONTINUED | OUTPATIENT
Start: 2020-01-01 | End: 2020-01-01

## 2020-01-01 RX ORDER — FENTANYL CITRATE 50 UG/ML
INJECTION, SOLUTION INTRAMUSCULAR; INTRAVENOUS
Status: DISCONTINUED | OUTPATIENT
Start: 2020-01-01 | End: 2020-01-01

## 2020-01-01 RX ORDER — SODIUM CHLORIDE, SODIUM LACTATE, POTASSIUM CHLORIDE, CALCIUM CHLORIDE 600; 310; 30; 20 MG/100ML; MG/100ML; MG/100ML; MG/100ML
INJECTION, SOLUTION INTRAVENOUS CONTINUOUS
Status: DISCONTINUED | OUTPATIENT
Start: 2020-01-01 | End: 2020-01-01

## 2020-01-01 RX ORDER — FAMOTIDINE 10 MG/ML
INJECTION INTRAVENOUS
Status: DISCONTINUED | OUTPATIENT
Start: 2020-01-01 | End: 2020-01-01

## 2020-01-01 RX ORDER — LIRAGLUTIDE 6 MG/ML
1.2 INJECTION SUBCUTANEOUS DAILY
Qty: 18 SYRINGE | Refills: 2 | Status: SHIPPED | OUTPATIENT
Start: 2020-01-01 | End: 2020-01-01

## 2020-01-01 RX ORDER — VANCOMYCIN HCL IN 5 % DEXTROSE 1G/250ML
1000 PLASTIC BAG, INJECTION (ML) INTRAVENOUS ONCE
Status: COMPLETED | OUTPATIENT
Start: 2020-01-01 | End: 2020-01-01

## 2020-01-01 RX ORDER — CEFEPIME HYDROCHLORIDE 2 G/1
2 INJECTION, POWDER, FOR SOLUTION INTRAVENOUS
Status: DISCONTINUED | OUTPATIENT
Start: 2020-01-01 | End: 2020-01-01

## 2020-01-01 RX ORDER — SODIUM CHLORIDE 9 MG/ML
INJECTION, SOLUTION INTRAVENOUS CONTINUOUS
Status: DISCONTINUED | OUTPATIENT
Start: 2020-01-01 | End: 2020-01-01

## 2020-01-01 RX ORDER — METOPROLOL SUCCINATE 25 MG/1
TABLET, EXTENDED RELEASE ORAL
Qty: 90 TABLET | Refills: 4 | Status: SHIPPED | OUTPATIENT
Start: 2020-01-01

## 2020-01-01 RX ORDER — VANCOMYCIN HCL IN 5 % DEXTROSE 1G/250ML
1000 PLASTIC BAG, INJECTION (ML) INTRAVENOUS ONCE
Status: DISCONTINUED | OUTPATIENT
Start: 2020-01-01 | End: 2020-01-01

## 2020-01-01 RX ORDER — CISATRACURIUM BESYLATE 10 MG/ML
INJECTION, SOLUTION INTRAVENOUS
Status: DISCONTINUED | OUTPATIENT
Start: 2020-01-01 | End: 2020-01-01

## 2020-01-01 RX ORDER — FUROSEMIDE 10 MG/ML
40 INJECTION INTRAMUSCULAR; INTRAVENOUS ONCE
Status: COMPLETED | OUTPATIENT
Start: 2020-01-01 | End: 2020-01-01

## 2020-01-01 RX ORDER — MAGNESIUM SULFATE/D5W 2 G/50 ML
2 INTRAVENOUS SOLUTION, PIGGYBACK (ML) INTRAVENOUS ONCE
Status: COMPLETED | OUTPATIENT
Start: 2020-01-01 | End: 2020-01-01

## 2020-01-01 RX ORDER — PROPOFOL 10 MG/ML
INJECTION, EMULSION INTRAVENOUS
Status: DISCONTINUED | OUTPATIENT
Start: 2020-01-01 | End: 2020-01-01

## 2020-01-01 RX ORDER — CEFAZOLIN SODIUM 1 G/3ML
INJECTION, POWDER, FOR SOLUTION INTRAMUSCULAR; INTRAVENOUS
Status: DISCONTINUED | OUTPATIENT
Start: 2020-01-01 | End: 2020-01-01

## 2020-01-01 RX ORDER — PANTOPRAZOLE SODIUM 40 MG/10ML
80 INJECTION, POWDER, LYOPHILIZED, FOR SOLUTION INTRAVENOUS
Status: COMPLETED | OUTPATIENT
Start: 2020-01-01 | End: 2020-01-01

## 2020-01-01 RX ORDER — AMIODARONE HYDROCHLORIDE 200 MG/1
200 TABLET ORAL DAILY
Qty: 90 TABLET | Refills: 3 | Status: SHIPPED | OUTPATIENT
Start: 2020-01-01

## 2020-01-01 RX ORDER — FENTANYL CITRATE-0.9 % NACL/PF 10 MCG/ML
PLASTIC BAG, INJECTION (ML) INTRAVENOUS CONTINUOUS
Status: DISCONTINUED | OUTPATIENT
Start: 2020-01-01 | End: 2020-01-01

## 2020-01-01 RX ORDER — PANTOPRAZOLE SODIUM 40 MG/10ML
40 INJECTION, POWDER, LYOPHILIZED, FOR SOLUTION INTRAVENOUS 2 TIMES DAILY
Status: DISCONTINUED | OUTPATIENT
Start: 2020-01-01 | End: 2020-01-01

## 2020-01-01 RX ORDER — LORAZEPAM 2 MG/ML
1 INJECTION INTRAMUSCULAR EVERY 30 MIN PRN
Status: DISCONTINUED | OUTPATIENT
Start: 2020-01-01 | End: 2020-01-01 | Stop reason: HOSPADM

## 2020-01-01 RX ORDER — PANTOPRAZOLE SODIUM 40 MG/10ML
40 INJECTION, POWDER, LYOPHILIZED, FOR SOLUTION INTRAVENOUS DAILY
Status: DISCONTINUED | OUTPATIENT
Start: 2020-01-01 | End: 2020-01-01

## 2020-01-01 RX ORDER — FLUCONAZOLE 2 MG/ML
200 INJECTION, SOLUTION INTRAVENOUS
Status: DISCONTINUED | OUTPATIENT
Start: 2020-01-01 | End: 2020-01-01

## 2020-01-01 RX ORDER — INDOMETHACIN 25 MG/1
CAPSULE ORAL
Status: COMPLETED
Start: 2020-01-01 | End: 2020-01-01

## 2020-01-01 RX ORDER — ONDANSETRON 2 MG/ML
4 INJECTION INTRAMUSCULAR; INTRAVENOUS DAILY PRN
Status: CANCELLED | OUTPATIENT
Start: 2020-01-01

## 2020-01-01 RX ORDER — LIDOCAINE HCL/PF 100 MG/5ML
SYRINGE (ML) INTRAVENOUS
Status: DISCONTINUED | OUTPATIENT
Start: 2020-01-01 | End: 2020-01-01

## 2020-01-01 RX ORDER — HYDROMORPHONE HYDROCHLORIDE 2 MG/ML
2 INJECTION, SOLUTION INTRAMUSCULAR; INTRAVENOUS; SUBCUTANEOUS ONCE
Status: COMPLETED | OUTPATIENT
Start: 2020-01-01 | End: 2020-01-01

## 2020-01-01 RX ORDER — METRONIDAZOLE 500 MG/100ML
500 INJECTION, SOLUTION INTRAVENOUS
Status: DISCONTINUED | OUTPATIENT
Start: 2020-01-01 | End: 2020-01-01

## 2020-01-01 RX ORDER — INDOMETHACIN 25 MG/1
200 CAPSULE ORAL ONCE
Status: COMPLETED | OUTPATIENT
Start: 2020-01-01 | End: 2020-01-01

## 2020-01-01 RX ORDER — MORPHINE SULFATE 2 MG/ML
2 INJECTION, SOLUTION INTRAMUSCULAR; INTRAVENOUS EVERY 4 HOURS PRN
Status: DISCONTINUED | OUTPATIENT
Start: 2020-01-01 | End: 2020-01-01

## 2020-01-01 RX ORDER — VASOPRESSIN 20 [USP'U]/ML
INJECTION, SOLUTION INTRAMUSCULAR; SUBCUTANEOUS
Status: DISPENSED
Start: 2020-01-01 | End: 2020-01-01

## 2020-01-01 RX ORDER — FUROSEMIDE 40 MG/1
TABLET ORAL
Qty: 90 TABLET | Refills: 11 | Status: SHIPPED | OUTPATIENT
Start: 2020-01-01

## 2020-01-01 RX ORDER — FENTANYL CITRATE-0.9 % NACL/PF 10 MCG/ML
150 PLASTIC BAG, INJECTION (ML) INTRAVENOUS CONTINUOUS
Status: DISCONTINUED | OUTPATIENT
Start: 2020-01-01 | End: 2020-01-01 | Stop reason: HOSPADM

## 2020-01-01 RX ORDER — INSULIN ASPART 100 [IU]/ML
1-10 INJECTION, SOLUTION INTRAVENOUS; SUBCUTANEOUS EVERY 6 HOURS PRN
Status: DISCONTINUED | OUTPATIENT
Start: 2020-01-01 | End: 2020-01-01

## 2020-01-01 RX ORDER — AMIODARONE HYDROCHLORIDE 200 MG/1
200 TABLET ORAL DAILY
Status: DISCONTINUED | OUTPATIENT
Start: 2020-01-01 | End: 2020-01-01

## 2020-01-01 RX ORDER — LORAZEPAM 2 MG/ML
1 INJECTION INTRAMUSCULAR EVERY 30 MIN PRN
Status: DISCONTINUED | OUTPATIENT
Start: 2020-01-01 | End: 2020-01-01

## 2020-01-01 RX ORDER — DEXAMETHASONE SODIUM PHOSPHATE 4 MG/ML
INJECTION, SOLUTION INTRA-ARTICULAR; INTRALESIONAL; INTRAMUSCULAR; INTRAVENOUS; SOFT TISSUE
Status: DISCONTINUED | OUTPATIENT
Start: 2020-01-01 | End: 2020-01-01

## 2020-01-01 RX ORDER — PROPOFOL 10 MG/ML
VIAL (ML) INTRAVENOUS
Status: DISCONTINUED | OUTPATIENT
Start: 2020-01-01 | End: 2020-01-01

## 2020-01-01 RX ORDER — SODIUM CHLORIDE 0.9 % (FLUSH) 0.9 %
3 SYRINGE (ML) INJECTION
Status: CANCELLED | OUTPATIENT
Start: 2020-01-01

## 2020-01-01 RX ORDER — ROCURONIUM BROMIDE 10 MG/ML
INJECTION, SOLUTION INTRAVENOUS
Status: DISCONTINUED
Start: 2020-01-01 | End: 2020-01-01 | Stop reason: WASHOUT

## 2020-01-01 RX ORDER — POTASSIUM CHLORIDE 14.9 MG/ML
60 INJECTION INTRAVENOUS
Status: DISCONTINUED | OUTPATIENT
Start: 2020-01-01 | End: 2020-01-01

## 2020-01-01 RX ORDER — HYDROMORPHONE HYDROCHLORIDE 1 MG/ML
0.2 INJECTION, SOLUTION INTRAMUSCULAR; INTRAVENOUS; SUBCUTANEOUS EVERY 5 MIN PRN
Status: CANCELLED | OUTPATIENT
Start: 2020-01-01

## 2020-01-01 RX ORDER — AMIODARONE HYDROCHLORIDE 200 MG/1
200 TABLET ORAL DAILY
Qty: 90 TABLET | Refills: 3 | Status: CANCELLED | OUTPATIENT
Start: 2020-01-01

## 2020-01-01 RX ORDER — VASOPRESSIN 20 [USP'U]/ML
INJECTION, SOLUTION INTRAMUSCULAR; SUBCUTANEOUS
Status: DISCONTINUED | OUTPATIENT
Start: 2020-01-01 | End: 2020-01-01

## 2020-01-01 RX ORDER — MAGNESIUM SULFATE HEPTAHYDRATE 40 MG/ML
2 INJECTION, SOLUTION INTRAVENOUS ONCE
Status: COMPLETED | OUTPATIENT
Start: 2020-01-01 | End: 2020-01-01

## 2020-01-01 RX ORDER — PROPOFOL 10 MG/ML
VIAL (ML) INTRAVENOUS CONTINUOUS PRN
Status: DISCONTINUED | OUTPATIENT
Start: 2020-01-01 | End: 2020-01-01

## 2020-01-01 RX ORDER — SODIUM BICARBONATE 42 MG/ML
150 INJECTION, SOLUTION INTRAVENOUS ONCE
Status: DISCONTINUED | OUTPATIENT
Start: 2020-01-01 | End: 2020-01-01

## 2020-01-01 RX ORDER — EPHEDRINE SULFATE 50 MG/ML
INJECTION, SOLUTION INTRAVENOUS
Status: DISCONTINUED | OUTPATIENT
Start: 2020-01-01 | End: 2020-01-01

## 2020-01-01 RX ORDER — NOREPINEPHRINE BITARTRATE/D5W 4MG/250ML
PLASTIC BAG, INJECTION (ML) INTRAVENOUS
Status: COMPLETED
Start: 2020-01-01 | End: 2020-01-01

## 2020-01-01 RX ORDER — POTASSIUM CHLORIDE 29.8 MG/ML
40 INJECTION INTRAVENOUS
Status: DISCONTINUED | OUTPATIENT
Start: 2020-01-01 | End: 2020-01-01

## 2020-01-01 RX ORDER — HEPARIN SODIUM,PORCINE/D5W 25000/250
18 INTRAVENOUS SOLUTION INTRAVENOUS CONTINUOUS
Status: DISCONTINUED | OUTPATIENT
Start: 2020-01-01 | End: 2020-01-01

## 2020-01-01 RX ORDER — PROPOFOL 10 MG/ML
10 INJECTION, EMULSION INTRAVENOUS CONTINUOUS
Status: DISCONTINUED | OUTPATIENT
Start: 2020-01-01 | End: 2020-01-01

## 2020-01-01 RX ORDER — NOREPINEPHRINE BITARTRATE/D5W 4MG/250ML
0.04 PLASTIC BAG, INJECTION (ML) INTRAVENOUS CONTINUOUS
Status: DISCONTINUED | OUTPATIENT
Start: 2020-01-01 | End: 2020-01-01

## 2020-01-01 RX ORDER — ROCURONIUM BROMIDE 10 MG/ML
INJECTION, SOLUTION INTRAVENOUS
Status: DISCONTINUED | OUTPATIENT
Start: 2020-01-01 | End: 2020-01-01

## 2020-01-01 RX ORDER — INDOMETHACIN 25 MG/1
CAPSULE ORAL
Status: DISPENSED
Start: 2020-01-01 | End: 2020-01-01

## 2020-01-01 RX ORDER — MAGNESIUM SULFATE HEPTAHYDRATE 40 MG/ML
4 INJECTION, SOLUTION INTRAVENOUS
Status: DISCONTINUED | OUTPATIENT
Start: 2020-01-01 | End: 2020-01-01

## 2020-01-01 RX ORDER — PHENYLEPHRINE HYDROCHLORIDE 10 MG/ML
INJECTION INTRAVENOUS
Status: DISCONTINUED | OUTPATIENT
Start: 2020-01-01 | End: 2020-01-01

## 2020-01-01 RX ORDER — FENTANYL CITRATE 50 UG/ML
75 INJECTION, SOLUTION INTRAMUSCULAR; INTRAVENOUS
Status: DISCONTINUED | OUTPATIENT
Start: 2020-01-01 | End: 2020-01-01 | Stop reason: HOSPADM

## 2020-01-01 RX ORDER — GLUCAGON 1 MG
1 KIT INJECTION
Status: DISCONTINUED | OUTPATIENT
Start: 2020-01-01 | End: 2020-01-01

## 2020-01-01 RX ORDER — MAGNESIUM SULFATE HEPTAHYDRATE 40 MG/ML
2 INJECTION, SOLUTION INTRAVENOUS
Status: DISCONTINUED | OUTPATIENT
Start: 2020-01-01 | End: 2020-01-01

## 2020-01-01 RX ORDER — ONDANSETRON 8 MG/1
8 TABLET, ORALLY DISINTEGRATING ORAL EVERY 8 HOURS PRN
Status: DISCONTINUED | OUTPATIENT
Start: 2020-01-01 | End: 2020-01-01 | Stop reason: HOSPADM

## 2020-01-01 RX ADMIN — IOHEXOL 15 ML: 350 INJECTION, SOLUTION INTRAVENOUS at 03:06

## 2020-01-01 RX ADMIN — ONDANSETRON 4 MG: 2 INJECTION, SOLUTION INTRAMUSCULAR; INTRAVENOUS at 11:06

## 2020-01-01 RX ADMIN — PROPOFOL 50 MG: 10 INJECTION, EMULSION INTRAVENOUS at 07:06

## 2020-01-01 RX ADMIN — CALCIUM GLUCONATE 2 G: 98 INJECTION, SOLUTION INTRAVENOUS at 02:06

## 2020-01-01 RX ADMIN — Medication 50 MCG/HR: at 09:06

## 2020-01-01 RX ADMIN — METRONIDAZOLE 500 MG: 500 INJECTION, SOLUTION INTRAVENOUS at 03:06

## 2020-01-01 RX ADMIN — CISATRACURIUM BESYLATE 10 MG: 10 INJECTION INTRAVENOUS at 07:06

## 2020-01-01 RX ADMIN — PANTOPRAZOLE SODIUM 40 MG: 40 INJECTION, POWDER, LYOPHILIZED, FOR SOLUTION INTRAVENOUS at 08:06

## 2020-01-01 RX ADMIN — INSULIN HUMAN 10 UNITS: 100 INJECTION, SOLUTION PARENTERAL at 10:06

## 2020-01-01 RX ADMIN — SODIUM CHLORIDE, SODIUM LACTATE, POTASSIUM CHLORIDE, AND CALCIUM CHLORIDE: .6; .31; .03; .02 INJECTION, SOLUTION INTRAVENOUS at 07:06

## 2020-01-01 RX ADMIN — DEXTROSE 250 ML: 10 SOLUTION INTRAVENOUS at 11:06

## 2020-01-01 RX ADMIN — SODIUM CHLORIDE 1000 ML: 0.9 INJECTION, SOLUTION INTRAVENOUS at 05:06

## 2020-01-01 RX ADMIN — SODIUM CHLORIDE 2000 ML: 0.9 INJECTION, SOLUTION INTRAVENOUS at 07:06

## 2020-01-01 RX ADMIN — VASOPRESSIN 1 UNITS: 20 INJECTION INTRAVENOUS at 12:06

## 2020-01-01 RX ADMIN — CEFAZOLIN 3 G: 330 INJECTION, POWDER, FOR SOLUTION INTRAMUSCULAR; INTRAVENOUS at 11:06

## 2020-01-01 RX ADMIN — CEFEPIME 2 G: 2 INJECTION, POWDER, FOR SOLUTION INTRAVENOUS at 08:06

## 2020-01-01 RX ADMIN — MAGNESIUM SULFATE HEPTAHYDRATE 2 G: 500 INJECTION, SOLUTION INTRAMUSCULAR; INTRAVENOUS at 08:06

## 2020-01-01 RX ADMIN — PANTOPRAZOLE SODIUM 40 MG: 40 INJECTION, POWDER, LYOPHILIZED, FOR SOLUTION INTRAVENOUS at 09:06

## 2020-01-01 RX ADMIN — FLUCONAZOLE 200 MG: 2 INJECTION, SOLUTION INTRAVENOUS at 09:06

## 2020-01-01 RX ADMIN — METRONIDAZOLE 500 MG: 500 INJECTION, SOLUTION INTRAVENOUS at 11:06

## 2020-01-01 RX ADMIN — CALCIUM GLUCONATE 2 G: 98 INJECTION, SOLUTION INTRAVENOUS at 04:06

## 2020-01-01 RX ADMIN — PANTOPRAZOLE SODIUM 80 MG: 40 INJECTION, POWDER, LYOPHILIZED, FOR SOLUTION INTRAVENOUS at 02:06

## 2020-01-01 RX ADMIN — PROPOFOL 30 MCG/KG/MIN: 10 INJECTION, EMULSION INTRAVENOUS at 02:06

## 2020-01-01 RX ADMIN — HEPARIN SODIUM 11 UNITS/KG/HR: 10000 INJECTION, SOLUTION INTRAVENOUS at 12:06

## 2020-01-01 RX ADMIN — MIDAZOLAM HYDROCHLORIDE 1 MG: 1 INJECTION, SOLUTION INTRAMUSCULAR; INTRAVENOUS at 07:06

## 2020-01-01 RX ADMIN — VANCOMYCIN HYDROCHLORIDE 1000 MG: 1 INJECTION, POWDER, LYOPHILIZED, FOR SOLUTION INTRAVENOUS at 08:06

## 2020-01-01 RX ADMIN — FENTANYL CITRATE 100 MCG: 50 INJECTION, SOLUTION INTRAMUSCULAR; INTRAVENOUS at 11:06

## 2020-01-01 RX ADMIN — INSULIN HUMAN 10 UNITS: 100 INJECTION, SOLUTION PARENTERAL at 11:06

## 2020-01-01 RX ADMIN — PROPOFOL 150 MG: 10 INJECTION, EMULSION INTRAVENOUS at 11:06

## 2020-01-01 RX ADMIN — METRONIDAZOLE 500 MG: 500 INJECTION, SOLUTION INTRAVENOUS at 12:06

## 2020-01-01 RX ADMIN — FUROSEMIDE 40 MG: 10 INJECTION, SOLUTION INTRAMUSCULAR; INTRAVENOUS at 06:06

## 2020-01-01 RX ADMIN — EPHEDRINE SULFATE 10 MG: 50 INJECTION INTRAVENOUS at 12:06

## 2020-01-01 RX ADMIN — ROCURONIUM BROMIDE 10 MG: 10 INJECTION, SOLUTION INTRAVENOUS at 12:06

## 2020-01-01 RX ADMIN — METRONIDAZOLE 500 MG: 500 INJECTION, SOLUTION INTRAVENOUS at 05:06

## 2020-01-01 RX ADMIN — DEXTROSE 250 ML: 10 SOLUTION INTRAVENOUS at 03:06

## 2020-01-01 RX ADMIN — DEXTROSE 250 ML: 10 SOLUTION INTRAVENOUS at 10:06

## 2020-01-01 RX ADMIN — SODIUM CHLORIDE, SODIUM GLUCONATE, SODIUM ACETATE, POTASSIUM CHLORIDE, MAGNESIUM CHLORIDE, SODIUM PHOSPHATE, DIBASIC, AND POTASSIUM PHOSPHATE: .53; .5; .37; .037; .03; .012; .00082 INJECTION, SOLUTION INTRAVENOUS at 11:06

## 2020-01-01 RX ADMIN — HEPARIN SODIUM 13 UNITS/KG/HR: 10000 INJECTION, SOLUTION INTRAVENOUS at 05:06

## 2020-01-01 RX ADMIN — METRONIDAZOLE 500 MG: 500 INJECTION, SOLUTION INTRAVENOUS at 09:06

## 2020-01-01 RX ADMIN — FAMOTIDINE 20 MG: 10 INJECTION, SOLUTION INTRAVENOUS at 11:06

## 2020-01-01 RX ADMIN — PROPOFOL 30 MCG/KG/MIN: 10 INJECTION, EMULSION INTRAVENOUS at 05:06

## 2020-01-01 RX ADMIN — MORPHINE SULFATE 2 MG: 2 INJECTION, SOLUTION INTRAMUSCULAR; INTRAVENOUS at 11:06

## 2020-01-01 RX ADMIN — PANTOPRAZOLE SODIUM 40 MG: 40 INJECTION, POWDER, LYOPHILIZED, FOR SOLUTION INTRAVENOUS at 10:06

## 2020-01-01 RX ADMIN — CISATRACURIUM BESYLATE 10 MG: 10 INJECTION INTRAVENOUS at 08:06

## 2020-01-01 RX ADMIN — SODIUM CHLORIDE, SODIUM LACTATE, POTASSIUM CHLORIDE, AND CALCIUM CHLORIDE 1000 ML: .6; .31; .03; .02 INJECTION, SOLUTION INTRAVENOUS at 07:06

## 2020-01-01 RX ADMIN — AMIODARONE HYDROCHLORIDE 200 MG: 100 TABLET ORAL at 09:06

## 2020-01-01 RX ADMIN — SODIUM CHLORIDE, SODIUM LACTATE, POTASSIUM CHLORIDE, AND CALCIUM CHLORIDE 2000 ML: .6; .31; .03; .02 INJECTION, SOLUTION INTRAVENOUS at 02:06

## 2020-01-01 RX ADMIN — VASOPRESSIN 0.04 UNITS: 20 INJECTION INTRAVENOUS at 07:06

## 2020-01-01 RX ADMIN — SODIUM CHLORIDE: 0.9 INJECTION, SOLUTION INTRAVENOUS at 01:06

## 2020-01-01 RX ADMIN — VANCOMYCIN HYDROCHLORIDE 1000 MG: 1 INJECTION, POWDER, LYOPHILIZED, FOR SOLUTION INTRAVENOUS at 09:06

## 2020-01-01 RX ADMIN — PROPOFOL 30 MCG/KG/MIN: 10 INJECTION, EMULSION INTRAVENOUS at 11:06

## 2020-01-01 RX ADMIN — CALCIUM GLUCONATE 2 G: 98 INJECTION, SOLUTION INTRAVENOUS at 12:06

## 2020-01-01 RX ADMIN — CALCIUM GLUCONATE 2 G: 98 INJECTION, SOLUTION INTRAVENOUS at 11:06

## 2020-01-01 RX ADMIN — LIDOCAINE HYDROCHLORIDE 100 MG: 20 INJECTION, SOLUTION INTRAVENOUS at 11:06

## 2020-01-01 RX ADMIN — SODIUM CHLORIDE, SODIUM LACTATE, POTASSIUM CHLORIDE, AND CALCIUM CHLORIDE 3000 ML: .6; .31; .03; .02 INJECTION, SOLUTION INTRAVENOUS at 07:06

## 2020-01-01 RX ADMIN — MORPHINE SULFATE 2 MG: 2 INJECTION, SOLUTION INTRAMUSCULAR; INTRAVENOUS at 03:06

## 2020-01-01 RX ADMIN — Medication 0.04 MCG/KG/MIN: at 11:06

## 2020-01-01 RX ADMIN — ROCURONIUM BROMIDE 50 MG: 10 INJECTION, SOLUTION INTRAVENOUS at 11:06

## 2020-01-01 RX ADMIN — SODIUM BICARBONATE 200 MEQ: 84 INJECTION, SOLUTION INTRAVENOUS at 05:06

## 2020-01-01 RX ADMIN — SODIUM CHLORIDE 1000 ML: 0.9 INJECTION, SOLUTION INTRAVENOUS at 08:06

## 2020-01-01 RX ADMIN — EPHEDRINE SULFATE 10 MG: 50 INJECTION INTRAVENOUS at 11:06

## 2020-01-01 RX ADMIN — MORPHINE SULFATE 2 MG: 2 INJECTION, SOLUTION INTRAMUSCULAR; INTRAVENOUS at 09:06

## 2020-01-01 RX ADMIN — PROPOFOL 50 MCG/KG/MIN: 10 INJECTION, EMULSION INTRAVENOUS at 05:06

## 2020-01-01 RX ADMIN — HEPARIN SODIUM 18 UNITS/KG/HR: 10000 INJECTION, SOLUTION INTRAVENOUS at 02:06

## 2020-01-01 RX ADMIN — HYDROMORPHONE HYDROCHLORIDE 2 MG: 2 INJECTION INTRAMUSCULAR; INTRAVENOUS; SUBCUTANEOUS at 08:06

## 2020-01-01 RX ADMIN — Medication 150 MCG/HR: at 03:06

## 2020-01-01 RX ADMIN — Medication 0.04 MCG/KG/MIN: at 02:06

## 2020-01-01 RX ADMIN — VANCOMYCIN HYDROCHLORIDE 1000 MG: 1 INJECTION, POWDER, LYOPHILIZED, FOR SOLUTION INTRAVENOUS at 11:06

## 2020-01-01 RX ADMIN — METRONIDAZOLE 500 MG: 500 INJECTION, SOLUTION INTRAVENOUS at 07:06

## 2020-01-01 RX ADMIN — Medication 0.04 MCG/KG/MIN: at 06:06

## 2020-01-01 RX ADMIN — DEXAMETHASONE SODIUM PHOSPHATE 4 MG: 4 INJECTION, SOLUTION INTRAMUSCULAR; INTRAVENOUS at 11:06

## 2020-01-01 RX ADMIN — PHENYLEPHRINE HYDROCHLORIDE 200 MCG: 10 INJECTION INTRAVENOUS at 12:06

## 2020-01-01 RX ADMIN — Medication 0.08 MCG/KG/MIN: at 10:06

## 2020-01-01 RX ADMIN — VASOPRESSIN 0.04 UNITS/MIN: 20 INJECTION INTRAVENOUS at 06:06

## 2020-01-01 RX ADMIN — VANCOMYCIN HYDROCHLORIDE 2000 MG: 100 INJECTION, POWDER, LYOPHILIZED, FOR SOLUTION INTRAVENOUS at 08:06

## 2020-01-01 RX ADMIN — CEFEPIME 2 G: 2 INJECTION, POWDER, FOR SOLUTION INTRAVENOUS at 07:06

## 2020-01-01 RX ADMIN — SODIUM CHLORIDE 1000 ML: 0.9 INJECTION, SOLUTION INTRAVENOUS at 12:06

## 2020-01-01 RX ADMIN — ATORVASTATIN CALCIUM 80 MG: 20 TABLET, FILM COATED ORAL at 09:06

## 2020-01-01 RX ADMIN — FENTANYL CITRATE 50 MCG: 50 INJECTION, SOLUTION INTRAMUSCULAR; INTRAVENOUS at 07:06

## 2020-01-01 RX ADMIN — MAGNESIUM SULFATE 2 G: 2 INJECTION INTRAVENOUS at 04:06

## 2020-01-01 RX ADMIN — Medication 0.08 MCG/KG/MIN: at 09:06

## 2020-01-01 RX ADMIN — Medication 0.08 MCG/KG/MIN: at 06:06

## 2020-01-01 RX ADMIN — PROPOFOL 20 MCG/KG/MIN: 10 INJECTION, EMULSION INTRAVENOUS at 03:06

## 2020-01-01 RX ADMIN — SODIUM CHLORIDE, SODIUM LACTATE, POTASSIUM CHLORIDE, AND CALCIUM CHLORIDE 2000 ML: .6; .31; .03; .02 INJECTION, SOLUTION INTRAVENOUS at 12:06

## 2020-01-01 RX ADMIN — INSULIN HUMAN 10 UNITS: 100 INJECTION, SOLUTION PARENTERAL at 01:06

## 2020-01-01 RX ADMIN — METRONIDAZOLE 500 MG: 500 INJECTION, SOLUTION INTRAVENOUS at 08:06

## 2020-01-01 RX ADMIN — INSULIN DETEMIR 8 UNITS: 100 INJECTION, SOLUTION SUBCUTANEOUS at 08:06

## 2020-01-01 RX ADMIN — FUROSEMIDE 40 MG: 10 INJECTION, SOLUTION INTRAMUSCULAR; INTRAVENOUS at 08:06

## 2020-01-01 RX ADMIN — Medication 2500 MCG: at 10:06

## 2020-01-01 RX ADMIN — INDOMETHACIN 200 MEQ: 25 CAPSULE ORAL at 05:06

## 2020-01-01 RX ADMIN — PANTOPRAZOLE SODIUM 40 MG: 40 INJECTION, POWDER, LYOPHILIZED, FOR SOLUTION INTRAVENOUS at 07:06

## 2020-01-01 RX ADMIN — SODIUM CHLORIDE, SODIUM GLUCONATE, SODIUM ACETATE, POTASSIUM CHLORIDE, MAGNESIUM CHLORIDE, SODIUM PHOSPHATE, DIBASIC, AND POTASSIUM PHOSPHATE: .53; .5; .37; .037; .03; .012; .00082 INJECTION, SOLUTION INTRAVENOUS at 08:06

## 2020-01-01 RX ADMIN — PROPOFOL 30 MCG/KG/MIN: 10 INJECTION, EMULSION INTRAVENOUS at 12:06

## 2020-01-01 RX ADMIN — Medication 150 MCG: at 08:06

## 2020-01-01 RX ADMIN — SODIUM CHLORIDE 3 UNITS/HR: 9 INJECTION, SOLUTION INTRAVENOUS at 10:06

## 2020-01-01 RX ADMIN — CALCIUM GLUCONATE 2 G: 98 INJECTION, SOLUTION INTRAVENOUS at 01:06

## 2020-01-01 RX ADMIN — SODIUM CHLORIDE: 0.9 INJECTION, SOLUTION INTRAVENOUS at 10:06

## 2020-01-01 RX ADMIN — SODIUM CHLORIDE, SODIUM LACTATE, POTASSIUM CHLORIDE, AND CALCIUM CHLORIDE: .6; .31; .03; .02 INJECTION, SOLUTION INTRAVENOUS at 09:06

## 2020-01-01 RX ADMIN — IOHEXOL 15 ML: 350 INJECTION, SOLUTION INTRAVENOUS at 04:06

## 2020-01-01 RX ADMIN — MIDAZOLAM HYDROCHLORIDE 2 MG: 1 INJECTION, SOLUTION INTRAMUSCULAR; INTRAVENOUS at 12:06

## 2020-01-01 RX ADMIN — GLYCOPYRROLATE 0.3 MG: 0.2 INJECTION INTRAMUSCULAR; INTRAVENOUS at 03:06

## 2020-01-01 RX ADMIN — LORAZEPAM 1 MG: 2 INJECTION INTRAMUSCULAR; INTRAVENOUS at 03:06

## 2020-01-01 RX ADMIN — PROPOFOL 50 MCG/KG/MIN: 10 INJECTION, EMULSION INTRAVENOUS at 12:06

## 2020-01-01 RX ADMIN — SUGAMMADEX 200 MG: 100 INJECTION, SOLUTION INTRAVENOUS at 01:06

## 2020-01-01 RX ADMIN — INSULIN HUMAN 10 UNITS: 100 INJECTION, SOLUTION PARENTERAL at 03:06

## 2020-01-01 RX ADMIN — INSULIN ASPART 8 UNITS: 100 INJECTION, SOLUTION INTRAVENOUS; SUBCUTANEOUS at 11:06

## 2020-01-01 RX ADMIN — PROPOFOL 20 MCG/KG/MIN: 10 INJECTION, EMULSION INTRAVENOUS at 08:06

## 2020-01-01 RX ADMIN — INSULIN DETEMIR 8 UNITS: 100 INJECTION, SOLUTION SUBCUTANEOUS at 09:06

## 2020-01-01 RX ADMIN — FENTANYL CITRATE 50 MCG: 50 INJECTION, SOLUTION INTRAMUSCULAR; INTRAVENOUS at 09:06

## 2020-01-01 RX ADMIN — FLUCONAZOLE 200 MG: 2 INJECTION, SOLUTION INTRAVENOUS at 08:06

## 2020-01-23 PROBLEM — E66.01 MORBID (SEVERE) OBESITY DUE TO EXCESS CALORIES: Status: ACTIVE | Noted: 2020-01-01

## 2020-01-23 PROBLEM — Z89.411 ACQUIRED ABSENCE OF RIGHT GREAT TOE: Status: ACTIVE | Noted: 2020-01-01

## 2020-01-23 PROBLEM — I74.3 ARTERIAL EMBOLISM AND THROMBOSIS OF LOWER EXTREMITY: Status: ACTIVE | Noted: 2020-01-01

## 2020-01-23 NOTE — PROGRESS NOTES
Subjective:       Patient ID: Chau Rodarte is a 69 y.o. male.    Chief Complaint: Follow-up    Patient is here for followup for chronic conditions.    DM2:  good med adherence  soup eating now, no other changed Diet  < 140 AM sugars  <200 Post-prandial sugars  Denies currently Numbness in feet  no sores in feet  Some swelling by eye doctor seen, has f/u re Visual changes  no Polyuria/polydipsia.        Review of Systems   Constitutional: Negative for activity change, fatigue, fever and unexpected weight change.   HENT: Negative for congestion.    Respiratory: Negative for cough, chest tightness, shortness of breath and wheezing.    Cardiovascular: Negative for chest pain, palpitations and leg swelling.   Gastrointestinal: Negative for abdominal distention, abdominal pain, anal bleeding, blood in stool, nausea and vomiting.   Genitourinary: Negative for decreased urine volume and difficulty urinating.   Musculoskeletal: Negative for arthralgias and neck pain.   Skin: Negative for rash and wound.   Neurological: Negative for tremors, syncope, weakness and numbness.   Hematological: Negative for adenopathy. Does not bruise/bleed easily.   Psychiatric/Behavioral: Negative for confusion.       Objective:      Physical Exam   Constitutional: He appears well-developed and well-nourished. No distress.   HENT:   Head: Normocephalic and atraumatic.   Mouth/Throat: No oropharyngeal exudate.   Eyes: Pupils are equal, round, and reactive to light. EOM are normal. No scleral icterus.   Neck: Normal range of motion. Neck supple. No thyromegaly present.   Cardiovascular: Normal rate, regular rhythm and normal heart sounds.   Pulmonary/Chest: Effort normal. No respiratory distress. He has no wheezes. He has no rales.   Abdominal: Soft. Bowel sounds are normal. He exhibits no distension and no mass. There is no tenderness. There is no rebound and no guarding. No hernia.   Musculoskeletal: He exhibits no edema.   Protective Sensation  (w/ 10 gram monofilament):  Right: Intact  Left: Intact    Visual Inspection:R big toe amp, stump CDI    Pedal Pulses:   Right: Absent  Left: Absent    Posterior tibialis:   Right:Absent  Left: Absent     Lymphadenopathy:     He has no cervical adenopathy.   Skin: He is not diaphoretic.   Psychiatric: He has a normal mood and affect. His behavior is normal.       Assessment:       1. PVD (peripheral vascular disease)    2. Type 2 diabetes mellitus with stage 4 chronic kidney disease, with long-term current use of insulin    3. Morbid (severe) obesity due to excess calories    4. Secondary hyperparathyroidism of renal origin    5. 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 Chronic kidney disease (CKD), stage IV (severe)    7. Atherosclerosis of native arteries of the extremities with ulceration    8. Acquired absence of right great toe    9. Chronic systolic heart failure    10. Paroxysmal atrial fibrillation    11. Atrial fibrillation with slow ventricular response        Plan:       Chau was seen today for follow-up.    Diagnoses and all orders for this visit:    PVD (peripheral vascular disease)  Sees vasc med, on appropriates meds    Type 2 diabetes mellitus with stage 4 chronic kidney disease, with long-term current use of insulin  -     Hemoglobin A1c; Future  -     Ambulatory referral to Podiatry  Claims sugars in good range and insulin adh    Morbid (severe) obesity due to excess calories  Lengthy discussion re importance of watching sodium in his diet. . 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  No recurrence    Chronic kidney disease (CKD), stage IV (severe)  Sees nephro, watch sodium    Atherosclerosis of native arteries of the extremities with ulceration    Acquired absence of right great toe  See podiatry    Chronic systolic heart failure  Euvolemic, watch sodium in diet    Paroxysmal  atrial fibrillation  Atrial fibrillation with slow ventricular response  Has monitor and has EP doctor, holding on anticoag due to prev GIB        Health Maintenance       Date Due Completion Date    TETANUS VACCINE 04/20/1967 ---    Shingles Vaccine (2 of 3) 05/29/2018 4/3/2018    Hemoglobin A1c 12/23/2019 9/23/2019    Lipid Panel 05/30/2020 5/30/2019    Foot Exam 09/23/2020 9/23/2019    Override on 10/23/2015: Done (Tyra Hoover)    LDCT Lung Screen 10/03/2020 10/3/2019    Eye Exam 01/13/2021 1/13/2020 (Done)    Override on 1/13/2020: Done (dr up, see scanned)    Override on 1/31/2019: Done (dr echavarria, no DR)    Override on 4/11/2018: Done (dr. jones, [facility], [phone])    Override on 7/18/2015: Done    Override on 3/29/2014: Done    High Dose Statin 01/23/2021 1/23/2020    Colonoscopy 03/21/2022 3/21/2017          Follow up in about 6 months (around 7/23/2020) for Please link a1c with BMP from kidney doctor, Shingles vaccine please.    Future Appointments   Date Time Provider Department Center   2/17/2020 11:00 AM HOME MONITOR DEVICE CHECK, [facility]   3/9/2020 10:00 AM Quirino Bland DPM [facility]   7/23/2020  8:40 AM Riki Huynh MD [facility]

## 2020-02-03 NOTE — TELEPHONE ENCOUNTER
"Hi, I sent him this message and have not yet heard back  "Hi,   The diabetes is not well controlled.     Please email me a list of all the morning sugars levels from the last 2 weeks. Also, please confirm that you are taking the Levemir 16 units each day.       It was a pleasure seeing you back.   Riki Huynh "    Please go over these questions with him and let me know the answers.    Thank you, Riki Huynh      "

## 2020-02-05 NOTE — TELEPHONE ENCOUNTER
Hi, please contact the patient to assist in scheduling    Orders Placed This Encounter    Hemoglobin A1c   for 3 months from now please    Thank you, Riki Huynh      I spoke with him as well, he plans to decrease cold drinks, has changed sugar to swirl substitute, he would like to give Levemir one more change. Offered see me back or just blood work, he prefers the latter for 3 months from now

## 2020-02-05 NOTE — TELEPHONE ENCOUNTER
"Spoke with pt, he states that he doesn't take his blood sugar reading every day. Also, he states that he's aware that he has to take 16 units of Levemir but states that " my pen gets stuck and only delivers 3- 4 units instead of 16 units).  "

## 2020-02-05 NOTE — TELEPHONE ENCOUNTER
Called [facility] to let them know that Dr Rodriguez wants the pt to continue both medications as prescribed.       ----- Message from Karsten Rodriguez MD sent at 2/5/2020  3:37 PM CST -----  Contact: [facility]   Yes I would continue for now . . .     GP    ----- Message -----  From: Christianne Marcus RN  Sent: 2/5/2020  12:50 PM CST  To: Karsten Rodriguez MD    Pt has been on amiodarone and lipitor 80 mg since 2018. Is it okay to continue since he is not having any side effects??  ----- Message -----  From: Radha Wright  Sent: 2/5/2020  11:54 AM CST  To: Christianne Marcus RN    [facility] stating that there is a drug interaction with medication amiodarone (PACERONE) 200 MG Tab and atorvastatin (LIPITOR) 80 MG tablet. Please call [facility]/pharmacy #[id number] - [place] - [phone] [phone] (Phone)  [phone] (Fax). Thank you.

## 2020-03-09 NOTE — PROGRESS NOTES
Subjective:      Patient ID: Chau Rodarte is a 69 y.o. male.    Chief Complaint: Diabetic Foot Exam (Dr. Huynh 1/23/20)    Chau is a 69 y.o. male who presents to the clinic for evaluation and treatment of high risk feet. Chau has a past medical history of Acute on chronic systolic CHF (congestive heart failure) (11/29/2016), Acute osteomyelitis of toe, right, Anemia, Anticoagulant long-term use, CKD (chronic kidney disease) stage 2, GFR 60-89 ml/min (2/21/2016), CKD (chronic kidney disease) stage 3, GFR 30-59 ml/min, Coronary artery disease, Cryptogenic stroke, remote history (2006) (1/26/2017), Decubitus ulcer, heel, right, unstageable, Diabetic foot ulcer (11/13/2013), Diabetic foot ulcer with osteomyelitis (6/23/2017), Encounter for blood transfusion, HTN (hypertension) (3/3/2014), Hyperlipidemia (3/3/2014), NSTEMI (non-ST elevated myocardial infarction) (11/28/2016), Obesity (BMI 30-39.9) (11/29/2016), Osteomyelitis, chronic, PVD (peripheral vascular disease), Stroke, Type 2 diabetes mellitus with diabetic peripheral angiopathy without gangrene, without long-term current use of insulin (10/24/2013), and Type 2 diabetes mellitus with diabetic polyneuropathy, without long-term current use of insulin (11/29/2016). The patient's chief complaint is long, thick toenails. This patient has documented high risk feet requiring routine maintenance secondary to peripheral vascular disease and peripheral neuropathy.  History of left lower extremity bypass and endovascular intervention right lower extremity. Discharged from [facility] on 5/8/18. No new complaints.  Missed annual diabetic foot exam last year.  Recently changed his diet limiting all sugar.    PCP: Riki Huynh MD    Date Last Seen by PCP:  01/23/2020    Current shoe gear:  Affected Foot: Rx diabetic extra depth shoes and custom accommodative insoles     Unaffected Foot: Rx diabetic extra depth shoes and custom accommodative insoles    Hemoglobin A1C    Date Value Ref Range Status   01/23/2020 10.0 (H) 4.0 - 5.6 % Final     Comment:     ADA Screening Guidelines:  5.7-6.4%  Consistent with prediabetes  >or=6.5%  Consistent with diabetes  High levels of fetal hemoglobin interfere with the HbA1C  assay. Heterozygous hemoglobin variants (HbS, HgC, etc)do  not significantly interfere with this assay.   However, presence of multiple variants may affect accuracy.     05/30/2019 7.9 (H) 4.0 - 5.6 % Final     Comment:     ADA Screening Guidelines:  5.7-6.4%  Consistent with prediabetes  >or=6.5%  Consistent with diabetes  High levels of fetal hemoglobin interfere with the HbA1C  assay. Heterozygous hemoglobin variants (HbS, HgC, etc)do  not significantly interfere with this assay.   However, presence of multiple variants may affect accuracy.     02/11/2019 8.2 (H) 4.0 - 5.6 % Final     Comment:     ADA Screening Guidelines:  5.7-6.4%  Consistent with prediabetes  >or=6.5%  Consistent with diabetes  High levels of fetal hemoglobin interfere with the HbA1C  assay. Heterozygous hemoglobin variants (HbS, HgC, etc)do  not significantly interfere with this assay.   However, presence of multiple variants may affect accuracy.         Review of Systems   Constitution: Negative for chills, fever and malaise/fatigue.   HENT: Negative for hearing loss.    Cardiovascular: Negative for chest pain, claudication and leg swelling.   Respiratory: Negative for cough and shortness of breath.    Skin: Positive for dry skin, nail changes and poor wound healing.   Musculoskeletal: Negative for back pain, joint pain, muscle cramps and muscle weakness.   Gastrointestinal: Negative for nausea and vomiting.   Neurological: Positive for numbness and paresthesias. Negative for weakness.   Psychiatric/Behavioral: Negative for altered mental status.           Objective:      Physical Exam   Constitutional: He is oriented to person, place, and time. No distress.   Cardiovascular:   Pulses:        Dorsalis pedis pulses are 1+ on the right side, and 1+ on the left side.        Posterior tibial pulses are 1+ on the right side, and 1+ on the left side.   Skin temp warm bilateral foot. No hair growth bilateral lower extremity. Mild edema bilateral lower extremity.   Musculoskeletal:        Right foot: There is deformity.   Semi-rigid cocked up right hallux.    Cavus foot structure bilateral.    No pain with ROM or MMT bilateral lower extremity.   Feet:   Right Foot:   Protective Sensation: 8 sites tested. 3 sites sensed.   Skin Integrity: Positive for callus and dry skin. Negative for ulcer, blister, skin breakdown, erythema or warmth.   Left Foot:   Protective Sensation: 8 sites tested. 4 sites sensed.   Skin Integrity: Positive for dry skin. Negative for ulcer, blister, skin breakdown, erythema, warmth or callus.   Neurological: He is alert and oriented to person, place, and time. He has normal strength. A sensory deficit is present.   Vibratory sensation absent bilateral foot.   Skin: Skin is warm, dry and intact. Capillary refill takes 2 to 3 seconds. Lesion noted. No ecchymosis and no rash noted. He is not diaphoretic. No cyanosis or erythema. Nails show no clubbing.   Raised hyperkeratotic lesion plantar first met head right foot.    Nails 1-3 left and 2-5 right are trimmed and mildly thickened. Nails 4,5 left are trimmed.    No open lesions or macerations bilateral lower extremity.    Mild skin atrophy bilateral lower extremity.               Assessment:       Encounter Diagnoses   Name Primary?    Type 2 diabetes mellitus with diabetic peripheral angiopathy without gangrene, with long-term current use of insulin Yes    Diabetic polyneuropathy associated with type 2 diabetes mellitus     Pre-ulcerative calluses     History of foot ulcer          Plan:       Chau was seen today for diabetic foot exam.    Diagnoses and all orders for this visit:    Type 2 diabetes mellitus with diabetic peripheral  angiopathy without gangrene, with long-term current use of insulin    Diabetic polyneuropathy associated with type 2 diabetes mellitus    Pre-ulcerative calluses    History of foot ulcer      I counseled the patient on his conditions, their implications and medical management.    Shoe inspection. Diabetic Foot Education. Patient reminded of the importance of good nutrition and blood sugar control to help prevent podiatric complications of diabetes. Patient instructed on proper foot hygeine. We discussed wearing proper shoe gear, daily foot inspections, never walking without protective shoe gear, never putting sharp instruments to feet, routine podiatric nail visits every 2-3 months.      Routine foot care per attached note. Patient relates relief following the procedure. He will continue to monitor the areas daily, inspect his feet, wear protective shoe gear when ambulatory, moisturizer to maintain skin integrity and follow in this office in approximately 2-3 months, sooner p.r.n.    Considered intermediate to high risk for recurring diabetic foot ulceration.  Patient is compliant with the daily foot checks and his wife is heavily involved in his care.    As a courtesy a 10.  Scalpel utilized to trim the hyperkeratotic skin plantar 1st metatarsal head right foot.  He tolerated procedure well without apparent complication.  No blood loss.  No pain.    RTC 1 year p.r.n. as discussed.    A portion of this note was generated by voice recognition software and may contain topical graphical errors.

## 2020-03-31 NOTE — PROGRESS NOTES
Alert received from [employer] for a pause event on 3/4/20 at 2167.  Called patient, L/M for a return call to assess if symptomatic.

## 2020-04-29 NOTE — TELEPHONE ENCOUNTER
Contacted patient re: mook event noted on ILR from 4/8/20 (c/w possible junctional mook).  Pt denies any LH or syncopal spells.  He notes his typical sleep hours are 10:30am-3:30pm, but may go back to sleep later in the morning.  No changes at this time, continue to monitor.  Pt stated appreciating the call.

## 2020-05-11 NOTE — TELEPHONE ENCOUNTER
Unable to reach patient's wife. Telephone line busy tried calling 3 times.. Will call back at a later time

## 2020-05-11 NOTE — TELEPHONE ENCOUNTER
----- Message from Mila Hayes sent at 5/11/2020  2:38 PM CDT -----  Contact: Vladimir---wife--- [phone]  Needs Advice    Reason for call:  Flu shot   Also questions about his health    Communication Preference: Danialjarrett---wife--- [phone]    Additional Information:    Wife is requesting a call back to verify

## 2020-05-13 NOTE — TELEPHONE ENCOUNTER
Spoke to pt wife. She had questions on how to get the shingles vaccine. I explained the pharmacy can provide that. She verbalized understanding

## 2020-05-13 NOTE — TELEPHONE ENCOUNTER
----- Message from Carin Hong sent at 5/13/2020  1:40 PM CDT -----  Contact: spouse [phone]  Patient is returning a phone call.  Who left a message for the patient: Jyoti REYES  Does patient know what this is regarding:  Appt and lab work  Comments:

## 2020-06-11 PROBLEM — D72.829 LEUKOCYTOSIS: Status: ACTIVE | Noted: 2020-01-01

## 2020-06-11 PROBLEM — N17.9 ACUTE KIDNEY INJURY: Status: ACTIVE | Noted: 2020-01-01

## 2020-06-11 PROBLEM — R10.84 GENERALIZED ABDOMINAL PAIN: Status: ACTIVE | Noted: 2020-01-01

## 2020-06-11 PROBLEM — Z87.19 HISTORY OF LOWER GI BLEEDING: Status: ACTIVE | Noted: 2017-03-20

## 2020-06-11 PROBLEM — R19.7 DIARRHEA: Status: ACTIVE | Noted: 2020-01-01

## 2020-06-11 PROBLEM — R53.1 LEFT-SIDED WEAKNESS: Status: ACTIVE | Noted: 2020-01-01

## 2020-06-11 PROBLEM — I63.521 ACUTE RIGHT ACA STROKE: Status: ACTIVE | Noted: 2020-01-01

## 2020-06-11 NOTE — PT/OT/SLP EVAL
Occupational Therapy   Evaluation    Name: Chau Rodarte  MRN: [id number]  Admitting Diagnosis:  Acute right ISABELA stroke    Length of Stay: 0 days    Recommendations:     Discharge Recommendations: rehabilitation facility  Discharge Equipment Recommendations:  other (see comments)(TBD)  Barriers to discharge:  Decreased caregiver support, Inaccessible home environment    Plan:     Patient to be seen 5 x/week to address the above listed problems via self-care/home management, therapeutic activities, therapeutic exercises, neuromuscular re-education, cognitive retraining, sensory integration  · Plan of Care Expires: 07/10/20  · Plan of Care Reviewed with: spouse, patient    Assessment:     Chau Rodarte is a 70 y.o. male with a medical diagnosis of Acute right ISABELA stroke.  He presents with the following performance deficits affecting function: weakness, impaired endurance, impaired sensation, impaired self care skills, impaired functional mobilty, gait instability, impaired balance, visual deficits, decreased upper extremity function, decreased lower extremity function, decreased safety awareness, impaired cognition, abnormal tone, decreased ROM, impaired coordination, impaired fine motor, impaired skin, edema, impaired cardiopulmonary response to activity.      Pt presents with L inattention and LSW, impaired midline and decreased strength.  Pt pleasant and cooperative, eager to participate in today's session. Pt is an excellent candidate for inpatient rehabilitation, as he has a qualifying diagnosis, displays good activity tolerance, has experienced decline from PLOF, has good family support, and is motivated to participate in therapy.       Rehab Prognosis: Good; patient would benefit from acute skilled OT services to address these deficits and reach maximum level of function.       Subjective   Communicated with: RN prior to session.  Patient found HOB elevated with bed alarm, telemetry, peripheral IV upon OT entry to  "room.    Chief Complaint: Cerebrovascular Accident    Patient/Family Comments/goals: "I know I'm going to need rehab! I got to get better"  "why am I leaning?"  And later "i'm not leaning" pt with significant Lward lean with fatigue    Pain/Comfort:  · Pain Rating 1: 0/10  · Pain Rating Post-Intervention 1: 0/10    Patients cultural, spiritual, Rastafarian conflicts given the current situation: no    Occupational Profile:  Living Environment: Pt lives with wife in [facility], 0STE, 1STE, [facility] with grab bar  Prior Level of Function: Patient reports being Mod I, with impaired balance, with mobility & with ADLs.   Patient uses DME as follows: cane, straight, walker, rolling, bedside commode.   DME owned (not currently used): none.  Roles/Repsonsibilities:   Hand Dominance: right   Work: retired .   Drive: yes.   Managing Medicines/Managing Home: yes.   Hobbies: enjoys spending time with wife and family.  Equipment Used at Home:  cane, straight, walker, rolling, bedside commode    Patient reports they will have assistance from family upon discharge.      Objective:     Patient found with: bed alarm, telemetry, peripheral IV   General Precautions: Standard, Cardiac fall, aspiration, diabetic   Orthopedic Precautions:N/A   Braces: N/A   Oxygen Device: Nasal Cannula 3L  Vitals: /60   Pulse (!) 56   Temp 97.2 °F (36.2 °C) (Oral)   Resp 18   Ht 5' 8.9" (1.75 m)   Wt 121.7 kg (268 lb 4.8 oz)   SpO2 98%   BMI 39.74 kg/m²     Cognitive and Psychosocial Function:   · AxOx4 -- Person, Place, Time and Situation   · Follows Commands/attention:follows one-step commands  · Communication:  clear/fluent  · Memory: Poor immediate recall  · Safety awareness/insight to disability: impaired   · Mood/Affect/Coping skills/emotional control: Appropriate to situation, Cooperative and Pleasant    Hearing: Intact    Vision:  Impaired smooth pursuit, pt with difficulty tracking to L    Physical Exam:  Postural examination/scapula " alignment:    -       Rounded shoulders  -       Forward head  -       Posterior pelvic tilt  Skin integrity: Visible skin intact     Left UE Right UE   UE Edema absent absent   UE ROM AAROM WFL AROM WFL   UE Strength adequate ROM, decreased strength within normal limits    Strength grasp WFL grasp WFL   Sensation LUE IMPAIRED:light/touch RUE INTACT: WFL   Fine Motor Coordination:  LUE IMPAIRED: hand, finger to nose and hand thumb/finger opposition skills  RUE INTACT: WFL   Gross Motor Coordination: LUE IMPAIRED: hemiplegia/paresis and WFL, limited by fatigue and impaired functional endurance RUE INTACT:WFL     Occupational Performance:  Bed Mobility:    · Patient completed Rolling/Turning to Left with  maximal assistance  · Scooting to HOB in supine: maximal assistance and 2 persons  · Patient completed Supine to Sit with maximal assistance on L side of bed  · Scooting anteriorly to EOB to have both feet planted on floor: maximal assistance  · Scooting towards HOB on L side of bed: max A, x2 person  · Patient completed Sit to Supine with maximal assistance, x2 person on L side of bed    Functional Mobility/Transfers:  -Static Sitting EOB: Min-Mod A, patient with impaired midline, Lward lean with fatigue  -Dynamic Sitting EOB: ModA-MaxA  Further mobility deferred 2* impaired truncal control in seated as patient fatigued sitting EOB with staff assistance.       Activities of Daily Living:  · Lower Body Dressing: maximal assistance donning socks      AMPAC 6 Click ADL:  AMPAC Total Score: 12    Treatment & Education:  -Pt education on OT role and POC   -Importance of E/OOB activity with staff assistance  -Multiple self-care tasks and functional mobility completed -- assistance level noted above  -Safety during functional transfer and mobility ensured  -White board updated, orientation assessed and provided  -Education provided reviewed, questions answered within OT scope of practice.       Patient left with bed in  chair position with all lines intact, call button in reach, bed alarm on, RN notified and wife present    GOALS:   Multidisciplinary Problems     Occupational Therapy Goals        Problem: Occupational Therapy Goal    Goal Priority Disciplines Outcome Interventions   Occupational Therapy Goal     OT, PT/OT Ongoing, Progressing    Description:  Goals set on 6/11 with expiration date 6/25:  Patient will increase functional independence with ADLs by performing:    Supine <> Sit with Stand-by Assistance.  Feeding with Stand-by Assistance.  Grooming while standing at sink with Stand-by Assistance.  UB Dressing with Stand-by Assistance.  LB Dressing with Stand-by Assistance.  Step transfer with Stand-by Assistance with DME as needed.  Pt will demonstrate understanding of education provided regarding energy conservation and task modification through teach-back method.                         History:     Past Medical History:   Diagnosis Date    Acute on chronic systolic CHF (congestive heart failure) 11/29/2016    Acute osteomyelitis of toe, right     Anemia     Anticoagulant long-term use     CKD (chronic kidney disease) stage 2, GFR 60-89 ml/min 2/21/2016    CKD (chronic kidney disease) stage 3, GFR 30-59 ml/min     Coronary artery disease     Cryptogenic stroke, remote history (2006) 1/26/2017    Decubitus ulcer, heel, right, unstageable     Diabetic foot ulcer 11/13/2013    Diabetic foot ulcer with osteomyelitis 6/23/2017    Encounter for blood transfusion     HTN (hypertension) 3/3/2014    Hyperlipidemia 3/3/2014    NSTEMI (non-ST elevated myocardial infarction) 11/28/2016    Obesity (BMI 30-39.9) 11/29/2016    Osteomyelitis, chronic     PVD (peripheral vascular disease)     Stroke     Type 2 diabetes mellitus with diabetic peripheral angiopathy without gangrene, without long-term current use of insulin 10/24/2013    Type 2 diabetes mellitus with diabetic polyneuropathy, without long-term current  use of insulin 11/29/2016       Past Surgical History:   Procedure Laterality Date    CARDIAC SURGERY      COLONOSCOPY N/A 3/21/2017    Procedure: COLONOSCOPY;  Surgeon: Karissa Peck MD;  Location: [facility] ([address] [place] [address]);  Service: Endoscopy;  Laterality: N/A;    CORONARY ANGIOPLASTY WITH STENT PLACEMENT      FOOT NERVE GRAFT  11/2013    left leg stent      Loop Recorder placement      right leg bypass         Time Tracking:       OT Date of Treatment: 06/11/20  OT Start Time: 0959  OT Stop Time: 1032  OT Total Time (min): 33 min  Additional staff present: PT      Billable Minutes:Evaluation 10  Self Care/Home Management 11  Therapeutic Activity 13      KULDEEP Marrufo  6/11/2020

## 2020-06-11 NOTE — ASSESSMENT & PLAN NOTE
Baseline Cr 2.5-3.1 within the last few months    - will avoid contrast and nephrotoxic medications

## 2020-06-11 NOTE — ASSESSMENT & PLAN NOTE
S/p multivessel PCI + stenting  On DAPT + high intensity statin    - continue atorvastatin  - will hold DAPT for now in the setting of GIB

## 2020-06-11 NOTE — ASSESSMENT & PLAN NOTE
- history of multiple stents, was previously turned down by CTS for CABG per chart review  - on DAPT at home, held inpatient given possible GI bleed    Recs:  - low threshold to restart DAPT when appropriate from GI standpoint  - agree with statin  - recommend close follow up with his cardiologist upon discharge

## 2020-06-11 NOTE — ASSESSMENT & PLAN NOTE
S/p CTI RFA (2/207) + ILR for surveillance  Previously on warfarin, however discontinued due to GI bleeding and switched to DAPT instead  At home on amiodarone 200 mg qd + metoprolol succinate 25 mg qd    - Obtained ECG on arrival -> sinus bradycardia  - Continue amiodarone, metoprolol held due to GIB (possible hypotension) and bradycardia  - Continue cardiac monitoring   - Discussed with cardiology that patient needs to go to MRI and that ILR data would be erased, they are fine with that given the acuity of circumstances

## 2020-06-11 NOTE — CONSULTS
[facility]  Gastroenterology  Consult Note    Patient Name: Chau Rodarte  MRN: [id number]  Admission Date: 6/10/2020  Hospital Length of Stay: 0 days  Code Status: Full Code   Attending Provider: Dane Quiles MD   Consulting Provider: Romulo Marcano MD  Primary Care Physician: Riki Huynh MD  Principal Problem:Acute right ISABELA stroke    Inpatient consult to Gastroenterology  Consult performed by: Romulo Marcano MD  Consult ordered by: Josselyn Kirkland PA-C        Subjective:     HPI: Chau Rodarte is a 70 y.o. male with history of CVA, PAD, CHF, DM, HTN, HLD, AFIB not on OAC presents with acute right ISABELA stroke (takes DAPT at home last dose of plavix 6/9/2020), we are consulted for hematochezia. Stoke etiology thought to be due to cardio embolic phenomenon. Patient is not on OAC at home, used to be on warfarin in the past had hematochezia in 2017, EGD showed gastritis and erosive gastropathy, and mucosal nodule in duodenum, Colonoscopy was poor prep diverticulosis in sigmoid colon, and blood in entire colon, recommended repeat colonoscopy in 1 month, but procedure was not done. Coumadin was stopped at that time. Currently his vitals are stable, hb 13 (at baseline), INR 1.2. Patient has BUN of 54, cr 4.0. Yesterday he noticed several loose bowel movements and 1 mixed with bright red blood. He has mild abdominal pain not related to the bowel movements. He has not had any further bowel movements since then. He has nausea, denies vomiting, denies fevers, chills, night sweats.     He is vitally stable, afebrile, HR wnl, hb 13.1 (baseline).     Past Medical History:   Diagnosis Date    Acute on chronic systolic CHF (congestive heart failure) 11/29/2016    Acute osteomyelitis of toe, right     Anemia     Anticoagulant long-term use     CKD (chronic kidney disease) stage 2, GFR 60-89 ml/min 2/21/2016    CKD (chronic kidney disease) stage 3, GFR 30-59 ml/min     Coronary artery  disease     Cryptogenic stroke, remote history (2006) 1/26/2017    Decubitus ulcer, heel, right, unstageable     Diabetic foot ulcer 11/13/2013    Diabetic foot ulcer with osteomyelitis 6/23/2017    Encounter for blood transfusion     HTN (hypertension) 3/3/2014    Hyperlipidemia 3/3/2014    NSTEMI (non-ST elevated myocardial infarction) 11/28/2016    Obesity (BMI 30-39.9) 11/29/2016    Osteomyelitis, chronic     PVD (peripheral vascular disease)     Stroke     Type 2 diabetes mellitus with diabetic peripheral angiopathy without gangrene, without long-term current use of insulin 10/24/2013    Type 2 diabetes mellitus with diabetic polyneuropathy, without long-term current use of insulin 11/29/2016       Past Surgical History:   Procedure Laterality Date    CARDIAC SURGERY      COLONOSCOPY N/A 3/21/2017    Procedure: COLONOSCOPY;  Surgeon: Karissa Peck MD;  Location: [facility] ([address] [place] [address]);  Service: Endoscopy;  Laterality: N/A;    CORONARY ANGIOPLASTY WITH STENT PLACEMENT      FOOT NERVE GRAFT  11/2013    left leg stent      Loop Recorder placement      right leg bypass         Family History   Problem Relation Age of Onset    Diabetes Mother     Glaucoma Mother     Cataracts Mother     Heart disease Father     Blindness Maternal Grandmother     Macular degeneration Neg Hx     Retinal detachment Neg Hx     Strabismus Neg Hx     Amblyopia Neg Hx        Social History     Socioeconomic History    Marital status:      Spouse name: Not on file    Number of children: Not on file    Years of education: Not on file    Highest education level: Not on file   Occupational History    Not on file   Social Needs    Financial resource strain: Not on file    Food insecurity:     Worry: Not on file     Inability: Not on file    Transportation needs:     Medical: Not on file     Non-medical: Not on file   Tobacco Use    Smoking status: Former Smoker     Packs/day: 1.00     Years: 50.00  "    Pack years: 50.00     Last attempt to quit: 3/1/2011     Years since quittin.2    Smokeless tobacco: Never Used   Substance and Sexual Activity    Alcohol use: No    Drug use: No    Sexual activity: Yes     Partners: Female     Comment:  with 2 children 3 grand sons and 1 great grand daughter   Lifestyle    Physical activity:     Days per week: Not on file     Minutes per session: Not on file    Stress: Not on file   Relationships    Social connections:     Talks on phone: Not on file     Gets together: Not on file     Attends Synagogue service: Not on file     Active member of club or organization: Not on file     Attends meetings of clubs or organizations: Not on file     Relationship status: Not on file   Other Topics Concern    Not on file   Social History Narrative    Lives with wife, works PT for [employer], 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, 3 grandsons, 1 great grand daughter       No current facility-administered medications on file prior to encounter.      Current Outpatient Medications on File Prior to Encounter   Medication Sig Dispense Refill    amiodarone (PACERONE) 200 MG Tab Take 1 tablet (200 mg total) by mouth once daily. Take 2 tablets (400 mg) twice daily for 14 days, then 1 tablet (200 mg) daily. 90 tablet 3    aspirin (ECOTRIN) 81 MG EC tablet Take 81 mg by mouth once daily.      atorvastatin (LIPITOR) 80 MG tablet TAKE 1 TABLET BY MOUTH EVERY DAY 90 tablet 11    BD ULTRA-FINE HANG PEN NEEDLES 32 gauge x 5/32" Ndle USE TWICE DAILY FOR INSULIN INJECTION 100 each 11    calcifediol (RAYALDEE) 30 mcg Cs24 Take 30 tablets by mouth once daily. 90 capsule 3    clopidogreL (PLAVIX) 75 mg tablet TAKE 1 TABLET BY MOUTH EVERY DAY 90 tablet 1    furosemide (LASIX) 40 MG tablet TAKE 1 TABLET BY MOUTH EVERY DAY 90 tablet 11    insulin detemir U-100 (LEVEMIR FLEXTOUCH) 100 unit/mL (3 mL) SubQ InPn pen Inject 16 Units into the skin every evening. 15 mL " 11    lisinopril (PRINIVIL,ZESTRIL) 20 MG tablet Take 20 mg by mouth once daily.      metoprolol succinate (TOPROL-XL) 25 MG 24 hr tablet TAKE 1 TABLET BY MOUTH EVERY DAY 90 tablet 4    ondansetron (ZOFRAN) 4 MG tablet Take 1 tablet (4 mg total) by mouth every 8 (eight) hours as needed for Nausea. (Patient not taking: Reported on 5/27/2020) 30 tablet 0    VICTOZA 2-SAGAR 0.6 mg/0.1 mL (18 mg/3 mL) PnIj INJECT 1.2 MG INTO THE SKIN ONCE DAILY 6 Syringe 11       Review of patient's allergies indicates:   Allergen Reactions    Adhesive tape-silicones Blisters       Review of Systems   Constitutional: Positive for malaise/fatigue. Negative for chills, fever and weight loss.   HENT: Negative for hearing loss and sore throat.    Eyes: Negative for blurred vision and double vision.   Respiratory: Negative for cough and shortness of breath.    Cardiovascular: Negative for chest pain and palpitations.   Gastrointestinal: Positive for blood in stool and diarrhea.   Genitourinary: Negative for dysuria and urgency.   Musculoskeletal: Negative for myalgias and neck pain.   Skin: Negative for itching and rash.   Neurological: Negative for dizziness and loss of consciousness.        Objective:     Vitals:    06/11/20 0815   BP: 125/60   Pulse: 62   Resp:    Temp: 97.2 °F (36.2 °C)         Physical Exam   Constitutional: He is oriented to person, place, and time. No distress.   HENT:   Head: Normocephalic and atraumatic.   Eyes: Conjunctivae are normal. No scleral icterus.   Neck: Neck supple.   Cardiovascular: Normal rate and regular rhythm.   Pulmonary/Chest: Effort normal and breath sounds normal.   Abdominal: Soft. Normal appearance and bowel sounds are normal. He exhibits no distension and no mass. There is no tenderness. There is no rebound and no guarding.   Neurological: He is alert and oriented to person, place, and time.   Skin: Skin is warm and dry. He is not diaphoretic.   Vitals reviewed.       Significant  Labs:  Recent Labs   Lab 06/11/20  0005 06/11/20  0807   HGB 13.6* 13.1*       Lab Results   Component Value Date    WBC 14.27 (H) 06/11/2020    HGB 13.1 (L) 06/11/2020    HCT 41.8 06/11/2020    MCV 97 06/11/2020     06/11/2020       Lab Results   Component Value Date     06/11/2020    K 5.3 (H) 06/11/2020     (H) 06/11/2020    CO2 17 (L) 06/11/2020    BUN 54 (H) 06/11/2020    CREATININE 4.0 (H) 06/11/2020    CALCIUM 8.6 (L) 06/11/2020    ANIONGAP 12 06/11/2020    ESTGFRAFRICA 16.3 (A) 06/11/2020    EGFRNONAA 14.1 (A) 06/11/2020       Lab Results   Component Value Date    ALT 15 06/11/2020    AST 26 06/11/2020    ALKPHOS 115 06/11/2020    BILITOT 0.6 06/11/2020       Lab Results   Component Value Date    INR 1.2 06/11/2020    INR 1.1 06/19/2017    INR 1.9 (H) 03/21/2017       Significant Imaging:  Reviewed pertinent radiology findings.       Assessment/Plan:     Chau Rodarte is a 70 y.o. male with history of CVA, PAD, CHF, DM, HTN, HLD, AFIB not on OAC presents with acute right ISABELA stroke (takes DAPT at home last dose of plavix 6/9/2020), we are consulted for hematochezia. Stoke etiology thought to be due to cardio embolic phenomenon. Patient is not on OAC at home, used to be on warfarin in the past had hematochezia in 2017, EGD showed gastritis and erosive gastropathy, and mucosal nodule in duodenum, Colonoscopy was poor prep diverticulosis in sigmoid colon, and blood in entire colon, recommended repeat colonoscopy in 1 month, but procedure was not done. Coumadin was stopped at that time. Currently his vitals are stable, hb 13 (at baseline), INR 1.2. Patient has BUN of 54, cr 4.0. Yesterday he noticed several loose bowel movements and 1 mixed with bright red blood. He has mild abdominal pain not related to the bowel movements. He has not had any further bowel movements since then. He has nausea, denies vomiting, denies fevers, chills, night sweats.     He is vitally stable, afebrile, HR wnl, hb  13.1 (baseline).     Problem List:  1. Hematochezia  2. afib   3. Acute CVA  4. CAD on aspirin and plavix last dose 6/9/2020  5. CKD  6. anemia    We are ok with anticoagulation, if patient has hemodynamically significant GI bleed we will proceed with endoscopy emergently, but as of right now patient is stable, primary team (discussed with stroke team) ok for endoscopy and colonoscopy on 6/15/20 (this way plavix will be cleared from the system in 5 days).     Plan:  -FLD Saturday, CLD Sunday, colon prep Sunday, NPO from Monday for EGD/colonoscopy Monday. Will need covid testing within 72 hours of procedure.   -Place 2 large bore IVs, volume resuscitation per primary  -Transfuse pRBC for Hb < 7 g/dL (Consider a higher Hb target if there is clinical evidence of intravascular volume depletion or comorbidities, such as CAD or if high suspicion of vigorous active ongoing bleeding or an uncorrected coagulopathy exists.).  -Correct coagulopathy (goal plt >50, INR <1.5) if present in patients without absolute contraindications.  -PPI 80 mg IV bolus once, then 8 mg/hour infusion or IV PPI 40 BID  -Please call with any additional questions, concerns or changes in the patient's clinical status.    High-quality evidence per [employer]        Thank you for involving us in the care of Chau Rodarte. Please call with any additional questions, concerns or changes in the patient's clinical status.    Romulo Marcano MD  Gastroenterology Fellow PGY IV   [facility]

## 2020-06-11 NOTE — HPI
The patient is a 70 y/p AAM with obesity (BMI 40), HTN, HLP, DM-II, PAD, CAD (s/p multivessel PC), ICM with EF 45%, PAF (s/p CTI RFA + ILR, not on AC due to Hx of GI bleed), Hx of CVA (2006, residual mild LSW, on DAPT), who is being evaluated by vascular neurology as an acute stroke code.    History is taken from the EMS staff, patient, and later, his wife at the bedside. Patient woke up around 4 am on 6/10 at normal baseline (walks independently however poor balance). Later in the morning, he developed diarrhea which was associated with worsening of left-sided weakness. His symptoms gradually deteriorated and later in the evening (10 PM) he had an episode of emesis when wife noted left facial droop. At that point he could not walk anymore. She also reports blood in his stool, patient has a Hx of lower GI bleeding back when he was on warfarin, hence discontinuation of AC. Wife reports his compliance with medication. He is a former smoker (quit 4 years ago), 50 PPD.    On arrival to the ED, hemodynamically stable. NIH 4, Initial CTH unremarkable, did not obtain CTA due to CKD and low suspicion for LVO. Not a tPA candidate due to out of the window and current blood in stool. MRI of Brain revealed R ISABELA infarct with smaller focus on L ISABELA territory.

## 2020-06-11 NOTE — ED TRIAGE NOTES
Pt brought to ED via  EMS. Pt states that he has been feeling bad all day with diarrhea. Pt states he was feeling weak all day and that an hour prior to EMS arrival pt states that he was unable to walk due to weakness with left sided weakness.

## 2020-06-11 NOTE — H&P
[facility]  Vascular Neurology  Comprehensive Stroke Center  History & Physical    Consults  Assessment/Plan:     Patient is a 70 y.o. year old male with:    * Acute right ISABELA stroke  Elderly male with multiple risk factors for stroke (extensive coronary and peripheral vascular disease, obesity, HTN, HLP, DM-II, ICM/HFrEF), and previous CVA with residual LSW, p/w worsening of LSW. LKN 4AM, symptom onset 10AM, NIH 4 on arrival to the ED. Initial CTH unremarkable, did not obtain CTA due to CKD and low suspicion for LVO. Not a tPA candidate due to out of the window and current blood in stool. MRI ischemic protocol revealed moderate-sized R ISABELA infarct + smaller L ISABELA infarct, and high-grade stenosis in R A1. Due to low NIH and completion of stroke (already visible on FLAIR) angioplasty was not pursued for the patient. Etiology cardio embolic.     - Antithrombotics: on DAPT at home, holding due to GIB  - Statins: continue home atorvastatin 80 mg daily  - Permissive HTN, target sBP<220   - PT/OT/SLP and PM&R to evaluate and treat  - Diagnostic pending: None  - DVT ppx: Place SCDs, no heparin due to GIB    GI bleeding  Hx of lower GIB in the past when on warfarin  Patient is reporting some blood in stool today as well  Currently hemodynamically stable, H/H stable    - Keep NPO  - Type & Screen  - Monitor CBC q8 hr  - Pantoprazole 80 mg IV x1 in the ED, will continue on 40 mg IV q12 hr  - Consult to GI/hospital medicine in the morning    Paroxysmal atrial fibrillation  S/p CTI RFA (2/207) + ILR for surveillance  Previously on warfarin, however discontinued due to GI bleeding and switched to DAPT instead  At home on amiodarone 200 mg qd + metoprolol succinate 25 mg qd  CHADS VASc 7  HAS-BLED 6    - Obtained ECG on arrival -> sinus bradycardia  - Continue amiodarone, metoprolol held due to GIB (possible hypotension) and bradycardia  - Continue cardiac monitoring   - Discussed with cardiology that patient  needs to go to MRI and that ILR data would be erased, they are fine with that given the acuity of circumstances    Chronic kidney disease (CKD), stage IV (severe)  Baseline Cr 2.5-3.1 within the last few months    - will avoid contrast and nephrotoxic medications    Leukocytosis  Diarrhea since this morning + one episode of emesis in the evening  Afebrile, but WBC elevated  To 16.5K, also hyperglycemic to 288     - Urinalysis ordered and pending  - CXR unremarkable    Essential hypertension  /83 -> 162/77 on arrival  Wife reports his compliance with medication  On GDMT for CHF with additional effect on HTN    - Holding home medication for now  - Permissive HTN, target sBP<220   - Hydralazine PRN    Acute kidney injury  CR 2.5-3.1 at baseline  Current Cr 4  CORRY likely in the setting of GIB and hypovolemia    - NS bolus 1L x1 in the ED  - will continue to monitor    Type 2 diabetes mellitus with stage 4 chronic kidney disease, with long-term current use of insulin  Most recent HbA1c 9  At home on Detemir 16 units qHS    - will hold home regimen for now since he is NPO  - MD SSI  - POCT glucose q6 hr  - Diabetic diet when ok from GIB standpoint    HLD (hyperlipidemia)  At home on high intensity statin for CAD and previous CVA  LDL 74, at goal    - Continue atorvastatin 80 mg daily    Chronic systolic heart failure  EF 45%, no wma, grade I DD  At home on lisinopril 20 mg, metoprolol succinate 25 mg, furosemide 40 mg daily    - Holding home medication for now in the setting of dehydration and GIB    Coronary artery disease involving native coronary artery of native heart without angina pectoris  S/p multivessel PCI + stenting  On DAPT + high intensity statin    - continue atorvastatin  - will hold DAPT for now in the setting of GIB        STROKE DOCUMENTATION     Acute Stroke Times   Last Known Normal Date: 06/10/20  Last Known Normal Time: 0400  Symptom Onset Date: 06/10/20  Symptom Onset Time: 1000  Stroke Team  Called Date: 06/10/20  Stroke Team Called Time: 2357  Stroke Team Arrival Date: 06/11/20  Stroke Team Arrival Time: 0002    NIH Scale:  1a. Level of Consciousness: 0-->Alert, keenly responsive  1b. LOC Questions: 0-->Answers both questions correctly  1c. LOC Commands: 0-->Performs both tasks correctly  2. Best Gaze: 0-->Normal  3. Visual: 0-->No visual loss  4. Facial Palsy: 0-->Normal symmetrical movements  5a. Motor Arm, Left: 0-->No drift, limb holds 90 (or 45) degrees for full 10 secs  5b. Motor Arm, Right: 0-->No drift, limb holds 90 (or 45) degrees for full 10 secs  6a. Motor Leg, Left: 3-->No effort against gravity, leg falls to bed immediately  6b. Motor Leg, Right: 1-->Drift, leg falls by the end of the 5-sec period but does not hit bed  7. Limb Ataxia: 0-->Absent  8. Sensory: 0-->Normal, no sensory loss  9. Best Language: 0-->No aphasia, normal  10. Dysarthria: 0-->Normal  11. Extinction and Inattention (formerly Neglect): 0-->No abnormality  Total (NIH Stroke Scale): 4     Modified Noble Score: 2  [place] Coma Scale:    ABCD2 Score:    [id number] Score:7  HAS -BLED Score:6  ICH Score:   Hunt & Rudd Classification:      Thrombolysis Candidate? No, Out of window , Increased risk of bleedign due to co-morbid conditions     Delays to Thrombolysis?  No    Interventional Revascularization Candidate?   Is the patient eligible for mechanical endovascular reperfusion (HUMBERTO)?  No;  Large core infarct    Hemorrhagic change of an Ischemic Stroke: Does this patient have an ischemic stroke with hemorrhagic changes? No       Subjective:     History of Present Illness:  The patient is a 70 y/p AAM with obesity (BMI 40), HTN, HLP, DM-II, PAD, CAD (s/p multivessel PC), ICM with EF 45%, PAF (s/p CTI RFA + ILR, not on AC due to Hx of GI bleed), Hx of CVA (2006, residual mild LSW, on DAPT), who is being evaluated by vascular neurology as an acute stroke code.    History is taken from the EMS staff, patient, and later, his  wife at the bedside. Patient woke up around 4 am on 6/10 at normal baseline (walks independently however poor balance). Later in the morning, he developed diarrhea which was associated with worsening of left-sided weakness. His symptoms gradually deteriorated and later in the evening (10 PM) he had an episode of emesis when wife noted left facial droop. At that point he could not walk anymore. She also reports blood in his stool, patient has a Hx of lower GI bleeding back when he was on warfarin, hence discontinuation of AC. Wife reports his compliance with medication. He is a former smoker (quit 4 years ago), 50 PPD.    On arrival to the ED, hemodynamically stable. NIH 4, Initial CTH unremarkable, did not obtain CTA due to CKD and low suspicion for LVO. Not a tPA candidate due to out of the window and current blood in stool. MRI of Brain revealed R ISABELA infarct with smaller focus on L ISABELA territory.         Past Medical History:   Diagnosis Date    Acute on chronic systolic CHF (congestive heart failure) 11/29/2016    Acute osteomyelitis of toe, right     Anemia     Anticoagulant long-term use     CKD (chronic kidney disease) stage 2, GFR 60-89 ml/min 2/21/2016    CKD (chronic kidney disease) stage 3, GFR 30-59 ml/min     Coronary artery disease     Cryptogenic stroke, remote history (2006) 1/26/2017    Decubitus ulcer, heel, right, unstageable     Diabetic foot ulcer 11/13/2013    Diabetic foot ulcer with osteomyelitis 6/23/2017    Encounter for blood transfusion     HTN (hypertension) 3/3/2014    Hyperlipidemia 3/3/2014    NSTEMI (non-ST elevated myocardial infarction) 11/28/2016    Obesity (BMI 30-39.9) 11/29/2016    Osteomyelitis, chronic     PVD (peripheral vascular disease)     Stroke     Type 2 diabetes mellitus with diabetic peripheral angiopathy without gangrene, without long-term current use of insulin 10/24/2013    Type 2 diabetes mellitus with diabetic polyneuropathy, without  long-term current use of insulin 2016     Past Surgical History:   Procedure Laterality Date    CARDIAC SURGERY      COLONOSCOPY N/A 3/21/2017    Procedure: COLONOSCOPY;  Surgeon: Karissa Peck MD;  Location: [facility] ([address] [place] [address]);  Service: Endoscopy;  Laterality: N/A;    CORONARY ANGIOPLASTY WITH STENT PLACEMENT      FOOT NERVE GRAFT  2013    left leg stent      Loop Recorder placement      right leg bypass       Family History   Problem Relation Age of Onset    Diabetes Mother     Glaucoma Mother     Cataracts Mother     Heart disease Father     Blindness Maternal Grandmother     Macular degeneration Neg Hx     Retinal detachment Neg Hx     Strabismus Neg Hx     Amblyopia Neg Hx      Social History     Tobacco Use    Smoking status: Former Smoker     Packs/day: 1.00     Years: 50.00     Pack years: 50.00     Last attempt to quit: 3/1/2011     Years since quittin.2    Smokeless tobacco: Never Used   Substance Use Topics    Alcohol use: No    Drug use: No     Review of patient's allergies indicates:   Allergen Reactions    Adhesive tape-silicones Blisters       Medications: I have reviewed the current medication administration record.      (Not in a hospital admission)    Review of Systems   Constitutional: Positive for fatigue. Negative for chills and fever.   HENT: Negative for drooling, trouble swallowing and voice change.    Eyes: Negative for visual disturbance.   Respiratory: Negative.    Cardiovascular: Negative.    Gastrointestinal: Positive for blood in stool, diarrhea and nausea.   Genitourinary: Negative.    Musculoskeletal: Negative.    Skin: Negative.    Neurological: Positive for weakness. Negative for syncope, facial asymmetry and speech difficulty.   Psychiatric/Behavioral: Negative for agitation and confusion.     Objective:     Vital Signs (Most Recent):  Temp: 98.2 °F (36.8 °C) (06/10/20 2358)  Pulse: (!) 59 (20 0032)  Resp: 14 (06/10/20 2358)  BP: (!)  140/58 (06/11/20 0032)  SpO2: 96 % (06/11/20 0032)    Vital Signs Range (Last 24H):  Temp:  [98.2 °F (36.8 °C)]   Pulse:  [59-61]   Resp:  [14]   BP: (120-162)/(58-83)   SpO2:  [96 %-99 %]     Physical Exam   Pulmonary/Chest: Effort normal. No respiratory distress.   Abdominal: Soft. He exhibits no distension.       Neurological Exam:   LOC: alert  Attention Span: Good   Language: No aphasia  Articulation: No dysarthria  Orientation: Person, Place, Time   Visual Fields: Full  EOM (CN III, IV, VI): Full/intact  Pupils (CN II, III): PERRL  Facial Sensation (CN V): Normal  Facial Movement (CN VII): Symmetric facial expression    Motor: Arm left  Paresis: 4/5  Leg left  Paresis: 2/5  Arm right  Normal 5/5  Leg right Paresis: 4/5  Cebellar: No evidence of appendicular or axial ataxia  Sensation: Intact to light touch, temperature and vibration      Laboratory:  CMP: No results for input(s): GLUCOSE, CALCIUM, ALBUMIN, PROT, NA, K, CO2, CL, BUN, CREATININE, ALKPHOS, ALT, AST, BILITOT in the last 24 hours.  CBC:   Recent Labs   Lab 06/11/20  0005 06/11/20  0005   WBC 16.53*  --    RBC 4.43*  --    HGB 13.6*  --    HCT 43.0 42     --    MCV 97  --    MCH 30.7  --    MCHC 31.6*  --      Lipid Panel: No results for input(s): CHOL, LDLCALC, HDL, TRIG in the last 168 hours.  Coagulation:   Recent Labs   Lab 06/11/20  0005   INR 1.2     Hgb A1C: No results for input(s): HGBA1C in the last 168 hours.  TSH: No results for input(s): TSH in the last 168 hours.    Diagnostic Results:    Brain imaging:  CTH non-contrast (6/11/20):  - No acute intracranial pathology  - Chronic microvascular changes in periventricular white matter area    MRI Brain ischemic protocol (6/11/20):  - Acute infarct in R ISABELA territory with corresponding changes on T2/FLAIR  - Smaller area of infarct on L ISABELA territory      Vessel Imaging:  MRA Brain (6/11/20):  - High grade stenosis of R A1  - Irregular narrowing in distal branches anterior and  posterior circulation consistent with atherosclerotic disease    Cardiac Evaluation:   TTE (2/19/19):  - EF 45%, concentric LVH, normal wall motion  - Grade I diastolic dysfunction  - normal sized atria  - Trace TR  - Normal CVP    ECG (6/11/20):  Sinus rhythm and rate  Left axis deviation  QTc 475, prolonged          Michelle Luu MD  Comprehensive Stroke Center  Department of Vascular Neurology   [facility]

## 2020-06-11 NOTE — ASSESSMENT & PLAN NOTE
/83 -> 162/77 on arrival  Wife reports his compliance with medication  On GDMT for CHF with additional effect on HTN    - Holding home medication for now  - Permissive HTN, target sBP<220   - Hydralazine PRN

## 2020-06-11 NOTE — PLAN OF CARE
06/11/20 1054   Post-Acute Status   Post-Acute Authorization Placement   Post-Acute Placement Status Awaiting Internal Medical Clearance   Discharge Delays None known at this time   Discharge Plan   Discharge Plan A Rehab   Discharge Plan B Home with family;Home Health     Ninoska Marsh RN  Case Management  Ext: [phone]  06/11/2020  10:55 AM

## 2020-06-11 NOTE — ASSESSMENT & PLAN NOTE
CR 2.5-3.1 at baseline  Current Cr 4  CORRY likely in the setting of GIB and hypovolemia    - NS bolus 1L x1 in the ED  - will continue to monitor

## 2020-06-11 NOTE — ED PROVIDER NOTES
Encounter Date: 6/10/2020       History     Chief Complaint   Patient presents with    Cerebrovascular Accident     70-year-old male presents to the ER via EMS for evaluation of weakness and diarrhea.  Patient developed diarrhea this morning and has been weak all day.  Approximately 1 hr prior to arrival to the ED he developed left-sided weakness.  Patient states that his left leg gave out on him.  He does have a history of a CVA.  Upon arrival to the ER he is awake alert and oriented.  He denies any fevers or chills.  He reports multiple episodes of diarrhea watery but with formed elements.        Review of patient's allergies indicates:   Allergen Reactions    Adhesive tape-silicones Blisters     Past Medical History:   Diagnosis Date    Acute on chronic systolic CHF (congestive heart failure) 11/29/2016    Acute osteomyelitis of toe, right     Anemia     Anticoagulant long-term use     CKD (chronic kidney disease) stage 2, GFR 60-89 ml/min 2/21/2016    CKD (chronic kidney disease) stage 3, GFR 30-59 ml/min     Coronary artery disease     Cryptogenic stroke, remote history (2006) 1/26/2017    Decubitus ulcer, heel, right, unstageable     Diabetic foot ulcer 11/13/2013    Diabetic foot ulcer with osteomyelitis 6/23/2017    Encounter for blood transfusion     HTN (hypertension) 3/3/2014    Hyperlipidemia 3/3/2014    NSTEMI (non-ST elevated myocardial infarction) 11/28/2016    Obesity (BMI 30-39.9) 11/29/2016    Osteomyelitis, chronic     PVD (peripheral vascular disease)     Stroke     Type 2 diabetes mellitus with diabetic peripheral angiopathy without gangrene, without long-term current use of insulin 10/24/2013    Type 2 diabetes mellitus with diabetic polyneuropathy, without long-term current use of insulin 11/29/2016     Past Surgical History:   Procedure Laterality Date    CARDIAC SURGERY      COLONOSCOPY N/A 3/21/2017    Procedure: COLONOSCOPY;  Surgeon: Karissa Peck MD;   Location: [facility] ([address] [place] [address]);  Service: Endoscopy;  Laterality: N/A;    CORONARY ANGIOPLASTY WITH STENT PLACEMENT      FOOT NERVE GRAFT  2013    left leg stent      Loop Recorder placement      right leg bypass       Family History   Problem Relation Age of Onset    Diabetes Mother     Glaucoma Mother     Cataracts Mother     Heart disease Father     Blindness Maternal Grandmother     Macular degeneration Neg Hx     Retinal detachment Neg Hx     Strabismus Neg Hx     Amblyopia Neg Hx      Social History     Tobacco Use    Smoking status: Former Smoker     Packs/day: 1.00     Years: 50.00     Pack years: 50.00     Last attempt to quit: 3/1/2011     Years since quittin.2    Smokeless tobacco: Never Used   Substance Use Topics    Alcohol use: No    Drug use: No     Review of Systems   Constitutional: Negative for fever.   HENT: Negative for sore throat.    Respiratory: Negative for shortness of breath.    Cardiovascular: Negative for chest pain.   Gastrointestinal: Positive for diarrhea. Negative for abdominal pain and nausea.   Genitourinary: Negative for dysuria.   Musculoskeletal: Negative for back pain.   Skin: Negative for rash.   Neurological: Positive for weakness.   Hematological: Does not bruise/bleed easily.       Physical Exam     Initial Vitals [06/10/20 2358]   BP Pulse Resp Temp SpO2   120/83 61 14 98.2 °F (36.8 °C) 99 %      MAP       --         Physical Exam    Constitutional: Vital signs are normal. He appears well-developed and well-nourished. He is not diaphoretic. No distress.   HENT:   Head: Normocephalic and atraumatic.   Right Ear: Hearing and external ear normal.   Left Ear: Hearing and external ear normal.   Eyes: Conjunctivae are normal.   Cardiovascular: Normal rate and regular rhythm. Exam reveals no gallop and no friction rub.    No murmur heard.  Pulmonary/Chest: No respiratory distress. He has no wheezes. He has no rhonchi. He has no rales. He exhibits no  tenderness.   Abdominal: Soft. Normal appearance and bowel sounds are normal. He exhibits no distension and no mass. There is no tenderness. There is no rebound and no guarding.   Musculoskeletal: Normal range of motion.   Neurological: He is alert and oriented to person, place, and time.   No facial asymmetry  Left-sided pronator drift  Left-sided upper and lower extremity weakness  Diminished sensation on the left  Speech is normal   Skin: Skin is warm and intact.   Psychiatric: He has a normal mood and affect. His speech is normal and behavior is normal. Cognition and memory are normal.         ED Course   Procedures  Labs Reviewed   CBC W/ AUTO DIFFERENTIAL - Abnormal; Notable for the following components:       Result Value    WBC 16.53 (*)     RBC 4.43 (*)     Hemoglobin 13.6 (*)     Mean Corpuscular Hemoglobin Conc 31.6 (*)     RDW 14.6 (*)     Gran # (ANC) 12.6 (*)     Immature Grans (Abs) 0.06 (*)     Mono # 1.4 (*)     Gran% 76.1 (*)     Lymph% 14.4 (*)     All other components within normal limits   POCT GLUCOSE, HAND-HELD DEVICE - Abnormal; Notable for the following components:    POC Glucose 271 (*)     All other components within normal limits   POCT GLUCOSE - Abnormal; Notable for the following components:    POCT Glucose 271 (*)     All other components within normal limits   ISTAT PROCEDURE - Abnormal; Notable for the following components:    POC Glucose 288 (*)     POC BUN 49 (*)     POC Creatinine 3.9 (*)     All other components within normal limits   CLOSTRIDIUM DIFFICILE   PROTIME-INR   HEMOGLOBIN A1C   COMPREHENSIVE METABOLIC PANEL   TSH   LIPID PANEL   URINALYSIS, REFLEX TO URINE CULTURE   SARS-COV-2 RNA AMPLIFICATION, QUAL   POCT CREATININE     EKG Readings: (Independently Interpreted)   EKG:  Sinus bradycardia with first-degree AV block, no ischemic changes       Imaging Results          X-Ray Chest AP Portable (In process)  Result time 06/11/20 01:02:34               CT Head Without  Contrast (Final result)  Result time 06/11/20 00:24:41    Final result by Nino Gomez MD (06/11/20 00:24:41)                 Impression:      No CT evidence of acute intracranial abnormality. If the patient has an acute, focal neurological deficit, MRI of the brain may be indicated.    Generalized cerebral volume loss and chronic ischemic changes as discussed above.      Electronically signed by: Nino Gomez MD  Date:    06/11/2020  Time:    00:24             Narrative:    EXAMINATION:  CT HEAD WITHOUT CONTRAST    CLINICAL HISTORY:  Stroke;    TECHNIQUE:  Low dose axial images were obtained through the head.  Coronal and sagittal reformations were also performed. Contrast was not administered.    COMPARISON:  02/01/2019.    FINDINGS:  No evidence of acute territorial infarct, hemorrhage, mass effect, or midline shift.    Mild generalized cerebral volume loss with ex vacuo dilation of the ventricles and sulci.  Minimal periventricular white matter hypoattenuation suggestive of chronic microvascular ischemic disease.  Hypodensities in the midbrain could be artifactual or related to remote lacunar infarct.  Remote lacunar infarct is present in the right cerebellar hemisphere.    No displaced calvarial fracture.    Visualized paranasal sinuses and mastoid air cells are essentially clear.  There are postoperative changes of bilateral cataract surgery.                                 Medical Decision Making:   History:   Old Medical Records: I decided to obtain old medical records.  Old Records Summarized: records from previous admission(s).  Initial Assessment:   70-year-old male presenting with diarrhea weakness and left-sided weakness  Differential Diagnosis:   Stroke, CVA, head bleed, gastroenteritis, diarrhea, Covid-19  Independently Interpreted Test(s):   I have ordered and independently interpreted EKG Reading(s) - see summary below       <> Summary of EKG Reading(s): Sinus bradycardia no STEMI  Clinical  Tests:   Lab Tests: Ordered and Reviewed  Radiological Study: Ordered and Reviewed  Medical Tests: Ordered and Reviewed  ED Management:  I assessed the patient upon his arrival with EMS.  Stroke code was activated the patient went to CT scan.  Stroke team contacted.  No acute findings on CT noncontrast of the head  Patient has CKD, not a candidate for contrast.  Will get an MRI  MRI remarkable for high-grade stenosis  Patient also had a melanotic bowel movement in the ED, he has a history of GI bleeds, I will start him on Protonix.  The stroke service will admit the patient.  Other:   I discussed test(s) with the performing physician.  I have discussed this case with another health care provider.              Attending Attestation:     Physician Attestation Statement for NP/PA:   I have conducted a face to face encounter with this patient in addition to the NP/PA, due to Medical Complexity    Other NP/PA Attestation Additions:    History of Present Illness: 70 M hx above BIB with diarrhea x 1 day with associated weakness.  He then reported sudden onset left sided weakness approx 1 hr prior to arrival.  History of CVA.     Physical Exam: General:  Elderly male, appears stated age, denies pain  Cardiac:  Lungs:  Clear to auscultation bilaterally  Abdomen:  Soft, nontender, nondistended  Neuro: left leg strength 0/5, right arm/leg 5/5, left arm 4/5.  Sensation intact.  CN II-XII intact. - visual deficit   Medical Decision Making: Stroke code activation arrival, patient was seen by vascular Neurology.   MRI ordered    While in the emergency department, patient had dark BM. Started on PPI.  Stroke team aware.     MRI shows multiple acute ischemic strokes.  Patient admitted stroke team for further treatment.      Not TPA candidate due to GI bleed.                                Clinical Impression:     1. Acute stroke  2. Left leg weakness  3. Gi bleed      Disposition:   Disposition: Admitted  Condition: Stable                         Js Almeida PA-C  06/11/20 0221       Fe Penaloza MD  06/12/20 1168

## 2020-06-11 NOTE — ASSESSMENT & PLAN NOTE
HYPERKALEMIA  - given recent diarrhea, suspect prerenal    Recs:  - patient s/p 1L NS bolus in ED, would recheck labs to see if downtrending  - obtain urine Na, creatinine, urea to assess FeNa and FeUrea  - obtain retroperitoneal ultrasound to rule out postrenal CORRY-->no evidence of obstruction on U/S on 6/11  - for hyperkalemia, would recommend shifting with insulin and glucose as per hyperkalemia order set  - if above studies negative and creatinine still uptrending, would recommend nephrology consult for further evaluation

## 2020-06-11 NOTE — ASSESSMENT & PLAN NOTE
- patient reporting lower abdominal pain on bedside rounds, new since being in the hospital  - lower concern for surgical abdomen on exam, and patient reports passing flatus  - instructed patient to report if stops passing gas    Recs:  - obtain abdominal x ray to check for dilated bowel.  - if concern for surgical abdomen, consult general surgery

## 2020-06-11 NOTE — HPI
Chau Rodarte is a 70-year-old male with PMHx of GI bleed, stroke (extensive coronary and peripheral vascular disease, obesity, HTN, HLP, DM-II, ICM/HFrEF), and previous CVA with residual LSW, p/w worsening of LSW.  Patient presented to [facility] on 6/10 with worsening L sided weakness.  CTH revealed no acute pathology. MRI ischemic protocol revealed moderate-sized R ISABELA infarct + smaller L ISABELA infarct, and high-grade stenosis in R A1.  Due to low NIH and completion of stroke (already visible on FLAIR) angioplasty was not pursued for the patient. Etiology cardio embolic. Hospital course further complicated by blood in stool (GI consulted), emesis, possible bowel obstruction (Gen Surg consulted),leukocytosis, & CORRY.     Functional History: Patient lives with spouse in a single story home with threshold to enter.  Prior to admission, (I) with ADLs and mobility. DME: none.

## 2020-06-11 NOTE — PROGRESS NOTES
Food & Nutrition Education     Diet Education: Diabetic Diet  Time Spent: 0 minutes    Comments: 69 y/o M admitted due to stroke. MedHx: HTN, HLP, CKD, DM II (A1c 9.0) Nutrition education not provided as patient state to be in bad pain and preferred education at a later time. Meal Planning using the Plate Method handout left with family member.       Follow-Up: Yes, RD to follow up with diabetic low Na diet education.     Please Re-consult as needed           Thanks!  Milena Aviles, Dietetic Intern

## 2020-06-11 NOTE — PLAN OF CARE
Problem: Occupational Therapy Goal  Goal: Occupational Therapy Goal  Description  Goals set on 6/11 with expiration date 6/25:  Patient will increase functional independence with ADLs by performing:    Supine <> Sit with Stand-by Assistance.  Feeding with Stand-by Assistance.  Grooming while standing at sink with Stand-by Assistance.  UB Dressing with Stand-by Assistance.  LB Dressing with Stand-by Assistance.  Step transfer with Stand-by Assistance with DME as needed.  Pt will demonstrate understanding of education provided regarding energy conservation and task modification through teach-back method.        Outcome: Ongoing, Progressing     Eval complete. Pt tolerated session well. Initiate OT POC.  Shira Ibanez, KULDEEP  06/11/2020

## 2020-06-11 NOTE — NURSING
Pt arrived to unit via stretcher with x1 transporter in attendance.  A/O x4.  Respirations unlabored.  Skin w/d.  Pt does c/o numbness/weakness to LLE but upon assessment pt does have sensation is more consistent with tingling which patient reports is baseline finding since admit and is generally improving.  VSS.  Able to verbalize wants/needs.  No c/o pain or discomfort at this time.   MD Luu at bedside shortly after arrival.  This nurse asked for clarification on CBG monitoring and NPO/diet order status since per report pt passed DAVID in ED.  MD notes she will enter in CBG orders shortly but to keep NPO at this time until source of bleeding identified and then day team will re-evaluate diet orders.

## 2020-06-11 NOTE — CARE UPDATE
Etiology of stroke cardioembolic due atrial fibrillation.  Unable to anticoagulated due to hematochezia.  GI consulted and going to for EGD and Colonoscopy Monday.  NPO at midnight.  Needs to be off plavix for 5 days.  Hospital medicine managing diabetes and CORRY.      Josselyn Kirkland PA-C  Vascular Neurology  [phone]

## 2020-06-11 NOTE — ASSESSMENT & PLAN NOTE
- patient with 4 bloody BMs on 6/10    Recs:  - agree with GI consult  - agree with PPI, however, if felt more lower GI bleed, can consider stopping PPI as they are more helpful with UGI bleed. Defer to GI  - agree with holding DAPT empirically. Low threshold to restart given ischemic history and stable H/H, breezy if GI agrees.  - would avoid NSAIDs or steroids if possible, as these can be a cause for stomach ulcers and cause GI bleeds

## 2020-06-11 NOTE — CONSULTS
[facility] Medicine  Consult Note    Patient Name: Chau Rodarte  MRN: [id number]  Admission Date: 6/10/2020  Hospital Length of Stay: 0 days  Attending Physician: Dane Quiles MD   Primary Care Provider: Riki Huynh MD           Patient information was obtained from patient, spouse/SO, past medical records and ER records.     Inpatient consult to Hospital Medicine-General  Consult performed by: Alejandra Robin MD  Consult ordered by: Josselyn Kirkland PA-C  Reason for consult: medical management review    Inpatient consult to [facility] Medicine - Stroke Comanagement  Consult performed by: Alejandra Robin MD  Consult ordered by: Mcihelle Luu MD  Reason for consult: medical managment review        Subjective:     Principal Problem: Acute right ISABELA stroke    Chief Complaint:   Chief Complaint   Patient presents with    Cerebrovascular Accident        HPI: Patient is a 70M with PMH of CAD s/p PCI x3 back in 2016 (on DAPT at home), HFrEF (last echo Feb 2019 showing EF 45% and G1DD; on GDMT with lisinopril and metoprolol succinate), A fib (on amiodarone, previously on warfarin, stopped 2/2 GI bleed back in 2017; has a loop recorder in place), CKD (baseline creatinine appears 2.5-3), DM2 (takes insulin detemir 16u qhs and victoza at home), PAD (s/p L femoro-popliteal bypass in 2017; history of diabetic foot ulcers), HTN, HLD, and obesity who is admitted to vascular neurology service in the setting of large R ISABELA stroke and smaller L ISABELA stroke diagnosed overnight 6/10-6/11. Patient reports that he had increasing weakness and 4 loose bowel movements with bloody streaks throughout the day 6/10 and when attempting to leave for the ER, he noticed weakness and inability to move his left leg to put on his shoes.    Patient then presented to ED and was diagnosed with R ISABELA and L ISABELA stroke as above and admitted to stroke service, opted for medical management given  presentation outside the tPA window and history concerning for possible GI bleed (hematechezia with 4 looser bowel movements and known history of GI bleed).     ED evaluation significant for: stable vital signs other than slight bradycardia (sinus mook on EKG with QTc 475), stroke as above diagnosed by MRI, leukocytosis to 16, stable Hgb at 13, CORRY with creatinine of 4, hyperglycemia with glucose in 300s, and UA nonsuggestive of infection (patient also without urinary complaints). C diff negative.     CXR impression: Low lung volumes with elevation of the left hemidiaphragm and adjacent subsegmental atelectasis in the left lower lung zone.  Left basilar aeration is worse when compared with the prior study, raising the question of developing pneumonia or aspiration in the correct clinical setting.    DAPT was held. Antihypertensive meds held for permissive HTN for CVA. Amiodarone continued for rhythm control. Patient was kept NPO for possible intervention by GI, started on IV PPI and GI consulted. Patient given 1L NS bolus in ED.    Hospital medicine consulted for review of medical management of this patient with new stroke.     Past Medical History:   Diagnosis Date    Acute on chronic systolic CHF (congestive heart failure) 11/29/2016    Acute osteomyelitis of toe, right     Anemia     Anticoagulant long-term use     CKD (chronic kidney disease) stage 2, GFR 60-89 ml/min 2/21/2016    CKD (chronic kidney disease) stage 3, GFR 30-59 ml/min     Coronary artery disease     Cryptogenic stroke, remote history (2006) 1/26/2017    Decubitus ulcer, heel, right, unstageable     Diabetic foot ulcer 11/13/2013    Diabetic foot ulcer with osteomyelitis 6/23/2017    Encounter for blood transfusion     HTN (hypertension) 3/3/2014    Hyperlipidemia 3/3/2014    NSTEMI (non-ST elevated myocardial infarction) 11/28/2016    Obesity (BMI 30-39.9) 11/29/2016    Osteomyelitis, chronic     PVD (peripheral vascular  "disease)     Stroke     Type 2 diabetes mellitus with diabetic peripheral angiopathy without gangrene, without long-term current use of insulin 10/24/2013    Type 2 diabetes mellitus with diabetic polyneuropathy, without long-term current use of insulin 11/29/2016       Past Surgical History:   Procedure Laterality Date    CARDIAC SURGERY      COLONOSCOPY N/A 3/21/2017    Procedure: COLONOSCOPY;  Surgeon: Karissa Peck MD;  Location: [facility] ([address] [place] [address]);  Service: Endoscopy;  Laterality: N/A;    CORONARY ANGIOPLASTY WITH STENT PLACEMENT      FOOT NERVE GRAFT  11/2013    left leg stent      Loop Recorder placement      right leg bypass         Review of patient's allergies indicates:   Allergen Reactions    Adhesive tape-silicones Blisters       No current facility-administered medications on file prior to encounter.      Current Outpatient Medications on File Prior to Encounter   Medication Sig    amiodarone (PACERONE) 200 MG Tab Take 1 tablet (200 mg total) by mouth once daily. Take 2 tablets (400 mg) twice daily for 14 days, then 1 tablet (200 mg) daily.    aspirin (ECOTRIN) 81 MG EC tablet Take 81 mg by mouth once daily.    atorvastatin (LIPITOR) 80 MG tablet TAKE 1 TABLET BY MOUTH EVERY DAY    BD ULTRA-FINE HANG PEN NEEDLES 32 gauge x 5/32" Ndle USE TWICE DAILY FOR INSULIN INJECTION    calcifediol (RAYALDEE) 30 mcg Cs24 Take 30 tablets by mouth once daily.    clopidogreL (PLAVIX) 75 mg tablet TAKE 1 TABLET BY MOUTH EVERY DAY    furosemide (LASIX) 40 MG tablet TAKE 1 TABLET BY MOUTH EVERY DAY    insulin detemir U-100 (LEVEMIR FLEXTOUCH) 100 unit/mL (3 mL) SubQ InPn pen Inject 16 Units into the skin every evening.    lisinopril (PRINIVIL,ZESTRIL) 20 MG tablet Take 20 mg by mouth once daily.    metoprolol succinate (TOPROL-XL) 25 MG 24 hr tablet TAKE 1 TABLET BY MOUTH EVERY DAY    ondansetron (ZOFRAN) 4 MG tablet Take 1 tablet (4 mg total) by mouth every 8 (eight) hours as needed for " Nausea. (Patient not taking: Reported on 2020)    VICTOZA 2-SAGAR 0.6 mg/0.1 mL (18 mg/3 mL) PnIj INJECT 1.2 MG INTO THE SKIN ONCE DAILY     Family History     Problem Relation (Age of Onset)    Blindness Maternal Grandmother    Cataracts Mother    Diabetes Mother    Glaucoma Mother    Heart disease Father        Tobacco Use    Smoking status: Former Smoker     Packs/day: 1.00     Years: 50.00     Pack years: 50.00     Last attempt to quit: 3/1/2011     Years since quittin.2    Smokeless tobacco: Never Used   Substance and Sexual Activity    Alcohol use: No    Drug use: No    Sexual activity: Yes     Partners: Female     Comment:  with 2 children 3 grand sons and 1 great grand daughter     Review of Systems   Constitutional: Positive for fatigue. Negative for chills and fever.   HENT: Negative for hearing loss, postnasal drip and sneezing.    Eyes: Negative for visual disturbance.   Respiratory: Negative for cough, choking and shortness of breath.    Cardiovascular: Negative for chest pain, palpitations and leg swelling.   Gastrointestinal: Positive for blood in stool, diarrhea, nausea and vomiting (x1. did not notice any blood in vomit). Negative for constipation.   Genitourinary: Negative for difficulty urinating, frequency and urgency.   Musculoskeletal: Negative for back pain and neck pain.   Skin: Negative for rash.   Allergic/Immunologic: Negative for immunocompromised state.   Neurological: Positive for weakness. Negative for syncope and headaches.   Hematological: Negative for adenopathy. Bruises/bleeds easily (history of GI bleed while on warfarin previously).   Psychiatric/Behavioral: Negative for agitation.     Objective:     Vital Signs (Most Recent):  Temp: 97.2 °F (36.2 °C) (20 0815)  Pulse: (!) 56 (20 1120)  Resp: 18 (20 0335)  BP: 125/60 (20 0845)  SpO2: 98 % (20 0815) Vital Signs (24h Range):  Temp:  [97.2 °F (36.2 °C)-98.2 °F (36.8 °C)] 97.2 °F  (36.2 °C)  Pulse:  [56-62] 56  Resp:  [14-18] 18  SpO2:  [96 %-99 %] 98 %  BP: (120-162)/(58-83) 125/60     Weight: 121.7 kg (268 lb 4.8 oz)  Body mass index is 39.74 kg/m².    Physical Exam   Constitutional: He is oriented to person, place, and time. He appears well-nourished. No distress.   HENT:   Head: Atraumatic.   Eyes: No scleral icterus.   Neck: Neck supple.   Cardiovascular: Normal rate. Exam reveals no gallop and no friction rub.   No murmur heard.  Pulmonary/Chest: Effort normal. No respiratory distress. He has no wheezes. He has no rales.   Abdominal: Soft. He exhibits no distension. There is no tenderness.   Musculoskeletal: He exhibits no edema.   Decreased plantar flexion on LLE, compared to RLE. Unable to lift LLE off bed against gravity.     strength L is decreased compared to R.   Lymphadenopathy:     He has no cervical adenopathy.   Neurological: He is alert and oriented to person, place, and time. No cranial nerve deficit or sensory deficit (able to feel fine touch on UEs and LEs bilaterally).   Skin: Skin is dry.   Psychiatric: He has a normal mood and affect.       Significant Labs:   A1C:   Recent Labs   Lab 01/23/20  1012 05/15/20  0734 06/11/20  0005   HGBA1C 10.0* 9.4* 9.0*     CBC:   Recent Labs   Lab 06/11/20  0005 06/11/20  0005 06/11/20  0807   WBC 16.53*  --  14.27*   HGB 13.6*  --  13.1*   HCT 43.0 42 41.8     --  186     CMP:   Recent Labs   Lab 06/11/20  0005      K 5.3*   *   CO2 17*   *   BUN 54*   CREATININE 4.0*   CALCIUM 8.6*   PROT 7.0   ALBUMIN 3.4*   BILITOT 0.6   ALKPHOS 115   AST 26   ALT 15   ANIONGAP 12   EGFRNONAA 14.1*     Urine Studies:   Recent Labs   Lab 06/11/20  0406   COLORU Yellow   APPEARANCEUA Clear   PHUR 5.0   SPECGRAV 1.010   PROTEINUA Negative   GLUCUA 1+*   KETONESU Negative   BILIRUBINUA Negative   OCCULTUA 1+*   NITRITE Negative   LEUKOCYTESUR Negative   RBCUA 2   WBCUA 1   BACTERIA Rare   SQUAMEPITHEL 0   HYALINECASTS 9*        Significant Imaging: I have reviewed all pertinent imaging results/findings within the past 24 hours.    Assessment/Plan:     GI bleeding  - patient with 4 bloody BMs on 6/10    Recs:  - agree with GI consult  - agree with PPI, however, if felt more lower GI bleed, can consider stopping PPI as they are more helpful with UGI bleed. Defer to GI  - agree with holding DAPT empirically. Low threshold to restart given ischemic history and stable H/H, breezy if GI agrees.  - would avoid NSAIDs or steroids if possible, as these can be a cause for stomach ulcers and cause GI bleeds    Type 2 diabetes mellitus with stage 4 chronic kidney disease, with long-term current use of insulin  - 6/11: patient's glucose seems rather labile since admission, but persistently elevated  - patient takes 16u detemir qhs at home    Recs:  - diabetic diet when patient is not NPO  - accuchecks qACHS if eating or q6h if NPO  - start detemir 8 qhs  - continue MDSSI  - our service will follow and adjust insulin regimen as needed while inpatient  - hold home antihyperglycemics      Acute kidney injury  HYPERKALEMIA  - given recent diarrhea, suspect prerenal    Recs:  - patient s/p 1L NS bolus in ED, would recheck labs to see if downtrending  - obtain urine Na, creatinine, urea to assess FeNa and FeUrea  - obtain retroperitoneal ultrasound to rule out postrenal CORRY-->no evidence of obstruction on U/S on 6/11  - for hyperkalemia, would recommend shifting with insulin and glucose as per hyperkalemia order set  - if above studies negative and creatinine still uptrending, would recommend nephrology consult for further evaluation      Leukocytosis  Patient with elevated leukocytosis on admission with lack of fevers or chills. Only major infectious prodrome elicited on history if possible viral gastroenteritis given diarrhea, which has resolved since arriving to the hospital. He has no joint tenderness in elbows or knees to suggest an infected joint.  He has no evidence of erythema on UEs or LEs to suggest developing cellulitis. He has no urinary symptoms to suggest UTI. He has no fevers, chills or SOB to suggest a pneumonia. His CXR said that he could be possibly developing a LLL pneumonia vs aspiration process, which is possible given that he vomited on 6/10, but seems less likely on the L side. Would suggest monitoring for now, especially as his leukocytosis is already beginning to trend down. Suspect a component of stress could be contributing to leukocytosis.    Recs:  - monitor for fevers, chills, hypotension. If occurs, would obtain blood cultures.    Coronary artery disease involving native coronary artery of native heart without angina pectoris  - history of multiple stents, was previously turned down by CTS for CABG per chart review  - on DAPT at home, held inpatient given possible GI bleed    Recs:  - low threshold to restart DAPT when appropriate from GI standpoint  - agree with statin  - recommend close follow up with his cardiologist upon discharge    Chronic systolic heart failure  - permissive HTN per stroke service; on lasix 40 daily for diuretics at home; on lisinopril and metoprolol succinate for GDMT and HTN at home    Recs:  - agree with obtaining echo  - restart GDMT when appropriate from stroke standpoint (but hold ACEi until CORRY resolves)  - monitor volume status with daily weights and strict I/O  - recommend close follow up with his cardiologist upon discharge    Paroxysmal atrial fibrillation  - agree with amiodarone for rhythm control  - ideally patient would be on anticoagulation to prevent stroke, especially as he now has suffered a stroke, but history of GI bleed limits this unfortunately.    Recs:  - continue amiodarone  - recommend close follow up with his cardiologist upon discharge    Generalized abdominal pain  - patient reporting lower abdominal pain on bedside rounds, new since being in the hospital  - lower concern for  surgical abdomen on exam, and patient reports passing flatus  - instructed patient to report if stops passing gas    Recs:  - obtain abdominal x ray to check for dilated bowel.  - if concern for surgical abdomen, consult general surgery    Chronic kidney disease (CKD), stage IV (severe)  See CORRY      Essential hypertension  See CHF      HLD (hyperlipidemia)  See CAD      VTE Risk Mitigation (From admission, onward)         Ordered     Reason for No Pharmacological VTE Prophylaxis  Once     Question:  Reasons:  Answer:  Active Bleeding    06/11/20 0232     IP VTE HIGH RISK PATIENT  Once      06/11/20 0232     Place sequential compression device  Until discontinued      06/11/20 0232                PATIENT NOT YET SEEN BY STAFF. FURTHER RECS TO FOLLOW PENDING STAFF ATTESTATION.    Thank you for your consult. I will follow-up with patient. Please contact us if you have any additional questions.    Alejandra Robin MD  Department of Hospital Medicine   [facility]

## 2020-06-11 NOTE — ASSESSMENT & PLAN NOTE
Patient with elevated leukocytosis on admission with lack of fevers or chills. Only major infectious prodrome elicited on history if possible viral gastroenteritis given diarrhea, which has resolved since arriving to the hospital. He has no joint tenderness in elbows or knees to suggest an infected joint. He has no evidence of erythema on UEs or LEs to suggest developing cellulitis. He has no urinary symptoms to suggest UTI. He has no fevers, chills or SOB to suggest a pneumonia. His CXR said that he could be possibly developing a LLL pneumonia vs aspiration process, which is possible given that he vomited on 6/10, but seems less likely on the L side. Would suggest monitoring for now, especially as his leukocytosis is already beginning to trend down. Suspect a component of stress could be contributing to leukocytosis.    Recs:  - monitor for fevers, chills, hypotension. If occurs, would obtain blood cultures.

## 2020-06-11 NOTE — PT/OT/SLP EVAL
"Physical Therapy Evaluation    Patient Name:  Chau Rodarte   MRN:  [id number]    Recommendations:     Discharge Recommendations:  rehabilitation facility   Discharge Equipment Recommendations: (TBD)   Barriers to discharge: Inaccessible home and Decreased caregiver support    Assessment:     Chau Rodarte is a 70 y.o. male admitted with a medical diagnosis of Acute right ISABELA stroke.      Upon evaluation, pt requires MaxA with all functional mobility assessed.  Pt is not appropriate for standing activities at this time sec to poor trunk control and body awareness.  Notes pusher's, to the L and L neglect.  LE>UE regarding mm weakness.  0/5 LE throughout, with sensation and coordination impairments.   He presents with the following impairments/functional limitations:  weakness, impaired endurance, impaired self care skills, impaired functional mobilty, gait instability, impaired balance, decreased upper extremity function, decreased lower extremity function, decreased ROM, abnormal tone, impaired coordination, decreased safety awareness, impaired sensation.    Rehab Prognosis: Fair; patient would benefit from acute skilled PT services to address these deficits and reach maximum level of function.    Recent Surgery: Procedure(s) (LRB):  EGD (ESOPHAGOGASTRODUODENOSCOPY) (N/A)  COLONOSCOPY (N/A)      Plan:     During this hospitalization, patient to be seen 5 x/week to address the identified rehab impairments via gait training, therapeutic activities, therapeutic exercises, neuromuscular re-education and progress toward the following goals:    · Plan of Care Expires:  07/09/20    Subjective     Chief Complaint: circulation problems  Patient/Family Comments/goals: "That leg is dead right now." re: L LE  Pain/Comfort:  · Pain Rating 1: 0/10    Patients cultural, spiritual, [employer] conflicts given the current situation: no    Living Environment:  Pt lives with spouse, SSH, threshold to enter.   Prior to admission, patients " level of function was I with all functional mobility and ADLs, use of walls when initially standing from bed or couch.  Equipment used at home: glucometer(electric bed).  DME owned (not currently used): rolling walker, single point cane, bedside commode, transfer tub bench and grab bars.  Upon discharge, patient will have assistance from spouse.    Objective:     Communicated with nursing prior to session.  Patient found supine with peripheral IV  upon PT entry to room.    General Precautions: Standard, fall, diabetic   Orthopedic Precautions:N/A   Braces: N/A     Exams:  · Cognitive Exam:  Patient is oriented to Person, Place, Time and Situation  · Fine Motor Coordination:    · -       Impaired  Left hand, finger to nose  , Left hand thumb/finger opposition skills   and Left hand, diadochokinesis skill    · Gross Motor Coordination:  impaired trunk control  · Postural Exam:  Patient presented with the following abnormalities:    · -       Rounded shoulders  · Sensation:    · -       Impaired  light/touch L LE  · Skin Integrity/Edema:      · -       Edema: None noted B LEs  · RUE ROM: WFL  · RUE Strength: WFL  · LUE ROM: Deficits: throughout  · LUE Strength: Deficits: 2-/5 throughout  · RLE ROM: WFL  · RLE Strength: WFL  · LLE ROM: Deficits: throughout, no active mm contraction  · LLE Strength: Deficits: 0/5 throughout    Functional Mobility:  · Bed Mobility:     · Scooting: MaxA: to scoot ant sitting EOB; MaxA x 2 persons: to scoot laterally to the L sitting EOB  · Supine to Sit: maximal assistance  · Sit to Supine: maximal assistance  · Balance: Mod-MaxA: static sitting balance EOB, requires inc A with fatigue, continuous A and verbal cuing to achieve COG, L neglect and leaning to the L and posteriorly; able to maintain sitting balance <10 sec with SBA with B hands on bed rails      Therapeutic Activities and Exercises:   Whiteboard updated    H&X: impaired smooth pursuit with L movement    AM-PAC 6 CLICK  MOBILITY  Total Score:8     Patient left supine with all lines intact, call button in reach and spouse present.    GOALS:   Multidisciplinary Problems     Physical Therapy Goals        Problem: Physical Therapy Goal    Goal Priority Disciplines Outcome Goal Variances Interventions   Physical Therapy Goal     PT, PT/OT Ongoing, Progressing     Description:  Goals to be met by: 20     Patient will increase functional independence with mobility by performin. Supine to sit with Moderate Assistance.  2. Sit to supine with Moderate Assistance.  3. Sit to stand transfer with Moderate Assistance.  4. Bed to chair transfer with Moderate Assistance using Geoff-walker.  5. Gait  x 10 feet with Moderate Assistance using Geoff-walker.   6. Ascend/Descend 6 inch curb step with Moderate Assistance using Geoff-walker.  7. Lower extremity exercise program x 20 reps per handout, with assistance as needed.                      History:     Past Medical History:   Diagnosis Date    Acute on chronic systolic CHF (congestive heart failure) 2016    Acute osteomyelitis of toe, right     Anemia     Anticoagulant long-term use     CKD (chronic kidney disease) stage 2, GFR 60-89 ml/min 2016    CKD (chronic kidney disease) stage 3, GFR 30-59 ml/min     Coronary artery disease     Cryptogenic stroke, remote history () 2017    Decubitus ulcer, heel, right, unstageable     Diabetic foot ulcer 2013    Diabetic foot ulcer with osteomyelitis 2017    Encounter for blood transfusion     HTN (hypertension) 3/3/2014    Hyperlipidemia 3/3/2014    NSTEMI (non-ST elevated myocardial infarction) 2016    Obesity (BMI 30-39.9) 2016    Osteomyelitis, chronic     PVD (peripheral vascular disease)     Stroke     Type 2 diabetes mellitus with diabetic peripheral angiopathy without gangrene, without long-term current use of insulin 10/24/2013    Type 2 diabetes mellitus with diabetic  polyneuropathy, without long-term current use of insulin 11/29/2016       Past Surgical History:   Procedure Laterality Date    CARDIAC SURGERY      COLONOSCOPY N/A 3/21/2017    Procedure: COLONOSCOPY;  Surgeon: Karissa Peck MD;  Location: [facility] ([address] [place] [address]);  Service: Endoscopy;  Laterality: N/A;    CORONARY ANGIOPLASTY WITH STENT PLACEMENT      FOOT NERVE GRAFT  11/2013    left leg stent      Loop Recorder placement      right leg bypass         Time Tracking:     PT Received On: 06/11/20  PT Start Time: 0930     PT Stop Time: 0954  PT Total Time (min): 24 min     Billable Minutes: Evaluation 12 and Therapeutic Activity 12      Pooja Galicia, PT  06/11/2020

## 2020-06-11 NOTE — ASSESSMENT & PLAN NOTE
- agree with amiodarone for rhythm control  - ideally patient would be on anticoagulation to prevent stroke, especially as he now has suffered a stroke, but history of GI bleed limits this unfortunately.    Recs:  - continue amiodarone  - recommend close follow up with his cardiologist upon discharge

## 2020-06-11 NOTE — ASSESSMENT & PLAN NOTE
Most recent HbA1c 9  At home on Detemir 16 units qHS    - will hold home regimen for now since he is NPO  - MD SSI  - POCT glucose q6 hr  - Diabetic diet when ok from GIB standpoint

## 2020-06-11 NOTE — ASSESSMENT & PLAN NOTE
- 6/11: patient's glucose seems rather labile since admission, but persistently elevated  - patient takes 16u detemir qhs at home    Recs:  - diabetic diet when patient is not NPO  - accuchecks qACHS if eating or q6h if NPO  - start detemir 8 qhs  - continue MDSSI  - our service will follow and adjust insulin regimen as needed while inpatient  - hold home antihyperglycemics

## 2020-06-11 NOTE — SUBJECTIVE & OBJECTIVE
Past Medical History:   Diagnosis Date    Acute on chronic systolic CHF (congestive heart failure) 2016    Acute osteomyelitis of toe, right     Anemia     Anticoagulant long-term use     CKD (chronic kidney disease) stage 2, GFR 60-89 ml/min 2016    CKD (chronic kidney disease) stage 3, GFR 30-59 ml/min     Coronary artery disease     Cryptogenic stroke, remote history () 2017    Decubitus ulcer, heel, right, unstageable     Diabetic foot ulcer 2013    Diabetic foot ulcer with osteomyelitis 2017    Encounter for blood transfusion     HTN (hypertension) 3/3/2014    Hyperlipidemia 3/3/2014    NSTEMI (non-ST elevated myocardial infarction) 2016    Obesity (BMI 30-39.9) 2016    Osteomyelitis, chronic     PVD (peripheral vascular disease)     Stroke     Type 2 diabetes mellitus with diabetic peripheral angiopathy without gangrene, without long-term current use of insulin 10/24/2013    Type 2 diabetes mellitus with diabetic polyneuropathy, without long-term current use of insulin 2016     Past Surgical History:   Procedure Laterality Date    CARDIAC SURGERY      COLONOSCOPY N/A 3/21/2017    Procedure: COLONOSCOPY;  Surgeon: Karissa Peck MD;  Location: [facility] ([address] [place] [address]);  Service: Endoscopy;  Laterality: N/A;    CORONARY ANGIOPLASTY WITH STENT PLACEMENT      FOOT NERVE GRAFT  2013    left leg stent      Loop Recorder placement      right leg bypass       Family History   Problem Relation Age of Onset    Diabetes Mother     Glaucoma Mother     Cataracts Mother     Heart disease Father     Blindness Maternal Grandmother     Macular degeneration Neg Hx     Retinal detachment Neg Hx     Strabismus Neg Hx     Amblyopia Neg Hx      Social History     Tobacco Use    Smoking status: Former Smoker     Packs/day: 1.00     Years: 50.00     Pack years: 50.00     Last attempt to quit: 3/1/2011     Years since quittin.2    Smokeless  tobacco: Never Used   Substance Use Topics    Alcohol use: No    Drug use: No     Review of patient's allergies indicates:   Allergen Reactions    Adhesive tape-silicones Blisters       Medications: I have reviewed the current medication administration record.      (Not in a hospital admission)    Review of Systems   Constitutional: Positive for fatigue. Negative for chills and fever.   HENT: Negative for drooling, trouble swallowing and voice change.    Eyes: Negative for visual disturbance.   Respiratory: Negative.    Cardiovascular: Negative.    Gastrointestinal: Positive for blood in stool, diarrhea and nausea.   Genitourinary: Negative.    Musculoskeletal: Negative.    Skin: Negative.    Neurological: Positive for weakness. Negative for syncope, facial asymmetry and speech difficulty.   Psychiatric/Behavioral: Negative for agitation and confusion.     Objective:     Vital Signs (Most Recent):  Temp: 98.2 °F (36.8 °C) (06/10/20 2358)  Pulse: (!) 59 (06/11/20 0032)  Resp: 14 (06/10/20 2358)  BP: (!) 140/58 (06/11/20 0032)  SpO2: 96 % (06/11/20 0032)    Vital Signs Range (Last 24H):  Temp:  [98.2 °F (36.8 °C)]   Pulse:  [59-61]   Resp:  [14]   BP: (120-162)/(58-83)   SpO2:  [96 %-99 %]     Physical Exam   Pulmonary/Chest: Effort normal. No respiratory distress.   Abdominal: Soft. He exhibits no distension.       Neurological Exam:   LOC: alert  Attention Span: Good   Language: No aphasia  Articulation: No dysarthria  Orientation: Person, Place, Time   Visual Fields: Full  EOM (CN III, IV, VI): Full/intact  Pupils (CN II, III): PERRL  Facial Sensation (CN V): Normal  Facial Movement (CN VII): Symmetric facial expression    Motor: Arm left  Paresis: 4/5  Leg left  Paresis: 2/5  Arm right  Normal 5/5  Leg right Paresis: 4/5  Cebellar: No evidence of appendicular or axial ataxia  Sensation: Intact to light touch, temperature and vibration      Laboratory:  CMP: No results for input(s): GLUCOSE, CALCIUM, ALBUMIN,  PROT, NA, K, CO2, CL, BUN, CREATININE, ALKPHOS, ALT, AST, BILITOT in the last 24 hours.  CBC:   Recent Labs   Lab 06/11/20  0005 06/11/20  0005   WBC 16.53*  --    RBC 4.43*  --    HGB 13.6*  --    HCT 43.0 42     --    MCV 97  --    MCH 30.7  --    MCHC 31.6*  --      Lipid Panel: No results for input(s): CHOL, LDLCALC, HDL, TRIG in the last 168 hours.  Coagulation:   Recent Labs   Lab 06/11/20  0005   INR 1.2     Hgb A1C: No results for input(s): HGBA1C in the last 168 hours.  TSH: No results for input(s): TSH in the last 168 hours.    Diagnostic Results:    Brain imaging:  CTH non-contrast (6/11/20):  - No acute intracranial pathology  - Chronic microvascular changes in periventricular white matter area    MRI Brain ischemic protocol (6/11/20):  - Acute infarct in R ISABELA territory with corresponding changes on T2/FLAIR  - Smaller area of infarct on L ISABELA territory      Vessel Imaging:  MRA Brain (6/11/20):  - High grade stenosis of R A1  - Irregular narrowing in distal branches anterior and posterior circulation consistent with atherosclerotic disease    Cardiac Evaluation:   TTE (2/19/19):  - EF 45%, concentric LVH, normal wall motion  - Grade I diastolic dysfunction  - normal sized atria  - Trace TR  - Normal CVP    ECG (6/11/20):  Sinus rhythm and rate  Left axis deviation  QTc 475, prolonged

## 2020-06-11 NOTE — HOSPITAL COURSE
06/11/2020: PT-Bed mobility MaxA-MaxA x 2 ppl. Passed bedside swallow evaluation.  SLP recommending regular diet and thin liquids. Oropharyngeal swallow and cognitive linguistic aspects deemed WFL.  6/12: OT sintia pending.

## 2020-06-11 NOTE — CONSULTS
Patient was seen and evaluated by vascular neurology as an acute stroke code. Patient was admitted to VN service for an acute R ISABELA infarct. Please see H&P note for full details.      Michelle Luu MD  PGY-III, Neurology  Pager: [phone]

## 2020-06-11 NOTE — PLAN OF CARE
Problem: SLP Goal  Goal: SLP Goal  Outcome: Ongoing, Progressing   Bedside swallow study and Speech language cognitive eval completed. No further ST warranted.   Debi Conway, NINA-SLP  6/11/2020

## 2020-06-11 NOTE — PLAN OF CARE
Problem: Physical Therapy Goal  Goal: Physical Therapy Goal  Description  Goals to be met by: 20     Patient will increase functional independence with mobility by performin. Supine to sit with Moderate Assistance.  2. Sit to supine with Moderate Assistance.  3. Sit to stand transfer with Moderate Assistance.  4. Bed to chair transfer with Moderate Assistance using Geoff-walker.  5. Gait  x 10 feet with Moderate Assistance using Geoff-walker.   6. Ascend/Descend 6 inch curb step with Moderate Assistance using Geoff-walker.  7. Lower extremity exercise program x 20 reps per handout, with assistance as needed.     Outcome: Ongoing, Progressing     PT goals established.

## 2020-06-11 NOTE — CONSULTS
[facility]  General Surgery  Consult Note    Consults  Subjective:     Chief Complaint/Reason for Admission: Stroke    History of Present Illness:  Patient is a 70M with PMH of CAD s/p PCI x3 back in 2016 (on DAPT at home), HFrEF (last echo Feb 2019 showing EF 45% and G1DD; on GDMT with lisinopril and metoprolol succinate), A fib (on amiodarone, previously on warfarin, stopped 2/2 GI bleed back in 2017; has a loop recorder in place), CKD (baseline creatinine appears 2.5-3), DM2 (takes insulin detemir 16u qhs and victoza at home), PAD (s/p L femoro-popliteal bypass in 2017; history of diabetic foot ulcers), HTN, HLD, and obesity who is admitted to vascular neurology service in the setting of large R ISABELA stroke and smaller L ISABELA stroke diagnosed overnight 6/10-6/11. Patient reports that he had increasing weakness and 4 loose bowel movements with bloody streaks throughout the day 6/10 and when attempting to leave for the ER, he noticed weakness and inability to move his left leg to put on his shoes.     Patient then presented to ED and was diagnosed with R ISABELA and L ISABELA stroke as above and admitted to stroke service, opted for medical management given presentation outside the tPA window and history concerning for possible GI bleed (hematechezia with 4 looser bowel movements and known history of GI bleed).     Surgery was consulted after KUb with dilated small bowel.  Pt had a BM earlier and is passing gas.  He complains of mostly righ sided abdominal pain.  No prior abdominal surgeries reported    No current facility-administered medications on file prior to encounter.      Current Outpatient Medications on File Prior to Encounter   Medication Sig    amiodarone (PACERONE) 200 MG Tab Take 1 tablet (200 mg total) by mouth once daily. Take 2 tablets (400 mg) twice daily for 14 days, then 1 tablet (200 mg) daily.    aspirin (ECOTRIN) 81 MG EC tablet Take 81 mg by mouth once daily.    atorvastatin  "(LIPITOR) 80 MG tablet TAKE 1 TABLET BY MOUTH EVERY DAY    BD ULTRA-FINE HANG PEN NEEDLES 32 gauge x 5/32" Ndle USE TWICE DAILY FOR INSULIN INJECTION    calcifediol (RAYALDEE) 30 mcg Cs24 Take 30 tablets by mouth once daily.    clopidogreL (PLAVIX) 75 mg tablet TAKE 1 TABLET BY MOUTH EVERY DAY    furosemide (LASIX) 40 MG tablet TAKE 1 TABLET BY MOUTH EVERY DAY    insulin detemir U-100 (LEVEMIR FLEXTOUCH) 100 unit/mL (3 mL) SubQ InPn pen Inject 16 Units into the skin every evening.    lisinopril (PRINIVIL,ZESTRIL) 20 MG tablet Take 20 mg by mouth once daily.    metoprolol succinate (TOPROL-XL) 25 MG 24 hr tablet TAKE 1 TABLET BY MOUTH EVERY DAY    ondansetron (ZOFRAN) 4 MG tablet Take 1 tablet (4 mg total) by mouth every 8 (eight) hours as needed for Nausea. (Patient not taking: Reported on 5/27/2020)    VICTOZA 2-SAGAR 0.6 mg/0.1 mL (18 mg/3 mL) PnIj INJECT 1.2 MG INTO THE SKIN ONCE DAILY       Review of patient's allergies indicates:   Allergen Reactions    Adhesive tape-silicones Blisters       Past Medical History:   Diagnosis Date    Acute on chronic systolic CHF (congestive heart failure) 11/29/2016    Acute osteomyelitis of toe, right     Anemia     Anticoagulant long-term use     CKD (chronic kidney disease) stage 2, GFR 60-89 ml/min 2/21/2016    CKD (chronic kidney disease) stage 3, GFR 30-59 ml/min     Coronary artery disease     Cryptogenic stroke, remote history (2006) 1/26/2017    Decubitus ulcer, heel, right, unstageable     Diabetic foot ulcer 11/13/2013    Diabetic foot ulcer with osteomyelitis 6/23/2017    Encounter for blood transfusion     HTN (hypertension) 3/3/2014    Hyperlipidemia 3/3/2014    NSTEMI (non-ST elevated myocardial infarction) 11/28/2016    Obesity (BMI 30-39.9) 11/29/2016    Osteomyelitis, chronic     PVD (peripheral vascular disease)     Stroke     Type 2 diabetes mellitus with diabetic peripheral angiopathy without gangrene, without long-term current " use of insulin 10/24/2013    Type 2 diabetes mellitus with diabetic polyneuropathy, without long-term current use of insulin 2016     Past Surgical History:   Procedure Laterality Date    CARDIAC SURGERY      COLONOSCOPY N/A 3/21/2017    Procedure: COLONOSCOPY;  Surgeon: Karissa Peck MD;  Location: [facility] ([address] [place] [address]);  Service: Endoscopy;  Laterality: N/A;    CORONARY ANGIOPLASTY WITH STENT PLACEMENT      FOOT NERVE GRAFT  2013    left leg stent      Loop Recorder placement      right leg bypass       Family History     Problem Relation (Age of Onset)    Blindness Maternal Grandmother    Cataracts Mother    Diabetes Mother    Glaucoma Mother    Heart disease Father        Tobacco Use    Smoking status: Former Smoker     Packs/day: 1.00     Years: 50.00     Pack years: 50.00     Last attempt to quit: 3/1/2011     Years since quittin.2    Smokeless tobacco: Never Used   Substance and Sexual Activity    Alcohol use: No    Drug use: No    Sexual activity: Yes     Partners: Female     Comment:  with 2 children 3 grand sons and 1 great grand daughter     Review of Systems   Constitutional: Negative for appetite change.   HENT: Negative.    Eyes: Negative.    Respiratory: Negative.    Cardiovascular: Negative.    Gastrointestinal: Positive for abdominal pain. Negative for nausea and vomiting.   Endocrine: Negative.    Genitourinary: Negative.    Musculoskeletal: Negative.    Skin: Negative.    Allergic/Immunologic: Negative.    Neurological: Positive for weakness.   Hematological: Negative.    Psychiatric/Behavioral: Negative.      Objective:     Vital Signs (Most Recent):  Temp: 97.2 °F (36.2 °C) (20 0815)  Pulse: (!) 56 (20 1120)  Resp: 18 (20 0335)  BP: 125/60 (20 0845)  SpO2: 98 % (20 0815) Vital Signs (24h Range):  Temp:  [97.2 °F (36.2 °C)-98.2 °F (36.8 °C)] 97.2 °F (36.2 °C)  Pulse:  [56-62] 56  Resp:  [14-18] 18  SpO2:  [96 %-99 %] 98 %  BP:  (120-162)/(58-83) 125/60     Weight: 121.7 kg (268 lb 4.8 oz)  Body mass index is 39.74 kg/m².      Intake/Output Summary (Last 24 hours) at 6/11/2020 1844  Last data filed at 6/11/2020 0406  Gross per 24 hour   Intake --   Output 100 ml   Net -100 ml       Physical Exam   Constitutional: He is oriented to person, place, and time. He appears well-developed and well-nourished. No distress.   HENT:   Head: Normocephalic.   Eyes: Conjunctivae are normal.   Neck: Normal range of motion.   Cardiovascular: Normal rate.   Pulmonary/Chest: Effort normal.   Abdominal: He exhibits distension. There is tenderness. No hernia.   Musculoskeletal: Normal range of motion.   Neurological: He is alert and oriented to person, place, and time.   Skin: Skin is warm.   Psychiatric: He has a normal mood and affect.       Significant Labs:  CBC:   Recent Labs   Lab 06/11/20  1543   WBC 10.30   RBC 4.24*   HGB 13.0*   HCT 41.9      MCV 99*   MCH 30.7   MCHC 31.0*     CMP:   Recent Labs   Lab 06/11/20  0005  06/11/20  1543   *   < > 339*   CALCIUM 8.6*   < > 8.2*   ALBUMIN 3.4*  --   --    PROT 7.0  --   --       < > 138   K 5.3*   < > 5.0  5.0   CO2 17*   < > 15*   *   < > 109   BUN 54*   < > 57*   CREATININE 4.0*   < > 3.8*   ALKPHOS 115  --   --    ALT 15  --   --    AST 26  --   --    BILITOT 0.6  --   --     < > = values in this interval not displayed.     Coagulation:   Recent Labs   Lab 06/11/20  0005   INR 1.2     Lactic Acid: No results for input(s): LACTATE in the last 48 hours.    Significant Diagnostics:  KUB reviewed: Dilated loops of small bowel.  No gastric distention    Assessment/Plan:     Active Diagnoses:    Diagnosis Date Noted POA    PRINCIPAL PROBLEM:  Acute right ISABELA stroke [I63.521] 06/11/2020 Yes    Leukocytosis [D72.829] 06/11/2020 Yes    Acute kidney injury [N17.9] 06/11/2020 Yes    Generalized abdominal pain [R10.84] 06/11/2020 No    Chronic kidney disease (CKD), stage IV (severe)  [N18.4] 11/27/2019 Yes    Paroxysmal atrial fibrillation [I48.0] 03/24/2017 Yes    GI bleeding [K92.2] 03/20/2017 Yes    Coronary artery disease involving native coronary artery of native heart without angina pectoris [I25.10] 12/20/2016 Yes    Type 2 diabetes mellitus with stage 4 chronic kidney disease, with long-term current use of insulin [E11.22, N18.4, Z79.4] 11/29/2016 Not Applicable    Chronic systolic heart failure [I50.22] 11/29/2016 Yes    Essential hypertension [I10] 03/03/2014 Yes    HLD (hyperlipidemia) [E78.5] 10/24/2013 Yes      Problems Resolved During this Admission:       71 y/o with acute stroke.  Pt reports passing flatus, having bowel function with some blood in stool, no nausea or vomiting, and nor prior abdominal operations.  Adhesive SBO is unlikely given above.  Pt does have right sided abdominal pain    Would recommend additional imaging with CT scan, preferably with IV contrast although pt has CKD IV, to help delineate abdominal pain. Will defer ordering to primary.  100cc of oral gastrografin diluted with 50cc of water could be given 4 hours prior  to scan to help confirm patency of small bowel, although this may interfere with planned colonoscopy and will likely give pt diarrhea .    Thank you for your consult. I will follow-up with patient. Please contact us if you have any additional questions.    Gwyn Tuttle MD  General Surgery  [facility]

## 2020-06-11 NOTE — ED NOTES
Telemetry Verification   Patient placed on Telemetry Box  Verified with War Room  Box # [id number]   Monitor Tech    Rate 59   Rhythm SB

## 2020-06-11 NOTE — HPI
Patient is a 70M with PMH of CAD s/p PCI x3 back in 2016 (on DAPT at home), HFrEF (last echo Feb 2019 showing EF 45% and G1DD; on GDMT with lisinopril and metoprolol succinate), A fib (on amiodarone, previously on warfarin, stopped 2/2 GI bleed back in 2017; has a loop recorder in place), CKD (baseline creatinine appears 2.5-3), DM2 (takes insulin detemir 16u qhs and victoza at home), PAD (s/p L femoro-popliteal bypass in 2017; history of diabetic foot ulcers), HTN, HLD, and obesity who is admitted to vascular neurology service in the setting of large R ISABELA stroke and smaller L ISABELA stroke diagnosed overnight 6/10-6/11. Patient reports that he had increasing weakness and 4 loose bowel movements with bloody streaks throughout the day 6/10 and when attempting to leave for the ER, he noticed weakness and inability to move his left leg to put on his shoes.    Patient then presented to ED and was diagnosed with R ISABELA and L ISABELA stroke as above and admitted to stroke service, opted for medical management given presentation outside the tPA window and history concerning for possible GI bleed (hematechezia with 4 looser bowel movements and known history of GI bleed).     ED evaluation significant for: stable vital signs other than slight bradycardia (sinus mook on EKG with QTc 475), stroke as above diagnosed by MRI, leukocytosis to 16, stable Hgb at 13, CORRY with creatinine of 4, hyperglycemia with glucose in 300s, and UA nonsuggestive of infection (patient also without urinary complaints). C diff negative.     CXR impression: Low lung volumes with elevation of the left hemidiaphragm and adjacent subsegmental atelectasis in the left lower lung zone.  Left basilar aeration is worse when compared with the prior study, raising the question of developing pneumonia or aspiration in the correct clinical setting.    DAPT was held. Antihypertensive meds held for permissive HTN for CVA. Amiodarone continued for rhythm control. Patient  was kept NPO for possible intervention by GI, started on IV PPI and GI consulted. Patient given 1L NS bolus in ED.    Hospital medicine consulted for review of medical management of this patient with new stroke.

## 2020-06-11 NOTE — ASSESSMENT & PLAN NOTE
At home on high intensity statin for CAD and previous CVA  LDL 74, at goal    - Continue atorvastatin 80 mg daily

## 2020-06-11 NOTE — SUBJECTIVE & OBJECTIVE
Past Medical History:   Diagnosis Date    Acute on chronic systolic CHF (congestive heart failure) 11/29/2016    Acute osteomyelitis of toe, right     Anemia     Anticoagulant long-term use     CKD (chronic kidney disease) stage 2, GFR 60-89 ml/min 2/21/2016    CKD (chronic kidney disease) stage 3, GFR 30-59 ml/min     Coronary artery disease     Cryptogenic stroke, remote history (2006) 1/26/2017    Decubitus ulcer, heel, right, unstageable     Diabetic foot ulcer 11/13/2013    Diabetic foot ulcer with osteomyelitis 6/23/2017    Encounter for blood transfusion     HTN (hypertension) 3/3/2014    Hyperlipidemia 3/3/2014    NSTEMI (non-ST elevated myocardial infarction) 11/28/2016    Obesity (BMI 30-39.9) 11/29/2016    Osteomyelitis, chronic     PVD (peripheral vascular disease)     Stroke     Type 2 diabetes mellitus with diabetic peripheral angiopathy without gangrene, without long-term current use of insulin 10/24/2013    Type 2 diabetes mellitus with diabetic polyneuropathy, without long-term current use of insulin 11/29/2016       Past Surgical History:   Procedure Laterality Date    CARDIAC SURGERY      COLONOSCOPY N/A 3/21/2017    Procedure: COLONOSCOPY;  Surgeon: Karissa Peck MD;  Location: [address]);  Service: Endoscopy;  Laterality: N/A;    CORONARY ANGIOPLASTY WITH STENT PLACEMENT      FOOT NERVE GRAFT  11/2013    left leg stent      Loop Recorder placement      right leg bypass         Review of patient's allergies indicates:   Allergen Reactions    Adhesive tape-silicones Blisters       No current facility-administered medications on file prior to encounter.      Current Outpatient Medications on File Prior to Encounter   Medication Sig    amiodarone (PACERONE) 200 MG Tab Take 1 tablet (200 mg total) by mouth once daily. Take 2 tablets (400 mg) twice daily for 14 days, then 1 tablet (200 mg) daily.    aspirin (ECOTRIN) 81 MG EC tablet Take 81 mg by mouth once  "daily.    atorvastatin (LIPITOR) 80 MG tablet TAKE 1 TABLET BY MOUTH EVERY DAY    BD ULTRA-FINE HANG PEN NEEDLES 32 gauge x 5/32" Ndle USE TWICE DAILY FOR INSULIN INJECTION    calcifediol (RAYALDEE) 30 mcg Cs24 Take 30 tablets by mouth once daily.    clopidogreL (PLAVIX) 75 mg tablet TAKE 1 TABLET BY MOUTH EVERY DAY    furosemide (LASIX) 40 MG tablet TAKE 1 TABLET BY MOUTH EVERY DAY    insulin detemir U-100 (LEVEMIR FLEXTOUCH) 100 unit/mL (3 mL) SubQ InPn pen Inject 16 Units into the skin every evening.    lisinopril (PRINIVIL,ZESTRIL) 20 MG tablet Take 20 mg by mouth once daily.    metoprolol succinate (TOPROL-XL) 25 MG 24 hr tablet TAKE 1 TABLET BY MOUTH EVERY DAY    ondansetron (ZOFRAN) 4 MG tablet Take 1 tablet (4 mg total) by mouth every 8 (eight) hours as needed for Nausea. (Patient not taking: Reported on 2020)    VICTOZA 2-SAGAR 0.6 mg/0.1 mL (18 mg/3 mL) PnIj INJECT 1.2 MG INTO THE SKIN ONCE DAILY     Family History     Problem Relation (Age of Onset)    Blindness Maternal Grandmother    Cataracts Mother    Diabetes Mother    Glaucoma Mother    Heart disease Father        Tobacco Use    Smoking status: Former Smoker     Packs/day: 1.00     Years: 50.00     Pack years: 50.00     Last attempt to quit: 3/1/2011     Years since quittin.2    Smokeless tobacco: Never Used   Substance and Sexual Activity    Alcohol use: No    Drug use: No    Sexual activity: Yes     Partners: Female     Comment:  with 2 children 3 grand sons and 1 great grand daughter     Review of Systems   Constitutional: Positive for fatigue. Negative for chills and fever.   HENT: Negative for hearing loss, postnasal drip and sneezing.    Eyes: Negative for visual disturbance.   Respiratory: Negative for cough, choking and shortness of breath.    Cardiovascular: Negative for chest pain, palpitations and leg swelling.   Gastrointestinal: Positive for blood in stool, diarrhea, nausea and vomiting (x1. did not notice " any blood in vomit). Negative for constipation.   Genitourinary: Negative for difficulty urinating, frequency and urgency.   Musculoskeletal: Negative for back pain and neck pain.   Skin: Negative for rash.   Allergic/Immunologic: Negative for immunocompromised state.   Neurological: Positive for weakness. Negative for syncope and headaches.   Hematological: Negative for adenopathy. Bruises/bleeds easily (history of GI bleed while on warfarin previously).   Psychiatric/Behavioral: Negative for agitation.     Objective:     Vital Signs (Most Recent):  Temp: 97.2 °F (36.2 °C) (06/11/20 0815)  Pulse: (!) 56 (06/11/20 1120)  Resp: 18 (06/11/20 0335)  BP: 125/60 (06/11/20 0845)  SpO2: 98 % (06/11/20 0815) Vital Signs (24h Range):  Temp:  [97.2 °F (36.2 °C)-98.2 °F (36.8 °C)] 97.2 °F (36.2 °C)  Pulse:  [56-62] 56  Resp:  [14-18] 18  SpO2:  [96 %-99 %] 98 %  BP: (120-162)/(58-83) 125/60     Weight: 121.7 kg (268 lb 4.8 oz)  Body mass index is 39.74 kg/m².    Physical Exam   Constitutional: He is oriented to person, place, and time. He appears well-nourished. No distress.   HENT:   Head: Atraumatic.   Eyes: No scleral icterus.   Neck: Neck supple.   Cardiovascular: Normal rate. Exam reveals no gallop and no friction rub.   No murmur heard.  Pulmonary/Chest: Effort normal. No respiratory distress. He has no wheezes. He has no rales.   Abdominal: Soft. He exhibits no distension. There is no tenderness.   Musculoskeletal: He exhibits no edema.   Decreased plantar flexion on LLE, compared to RLE. Unable to lift LLE off bed against gravity.     strength L is decreased compared to R.   Lymphadenopathy:     He has no cervical adenopathy.   Neurological: He is alert and oriented to person, place, and time. No cranial nerve deficit or sensory deficit (able to feel fine touch on UEs and LEs bilaterally).   Skin: Skin is dry.   Psychiatric: He has a normal mood and affect.       Significant Labs:   A1C:   Recent Labs   Lab  01/23/20  1012 05/15/20  0734 06/11/20  0005   HGBA1C 10.0* 9.4* 9.0*     CBC:   Recent Labs   Lab 06/11/20  0005 06/11/20  0005 06/11/20  0807   WBC 16.53*  --  14.27*   HGB 13.6*  --  13.1*   HCT 43.0 42 41.8     --  186     CMP:   Recent Labs   Lab 06/11/20  0005      K 5.3*   *   CO2 17*   *   BUN 54*   CREATININE 4.0*   CALCIUM 8.6*   PROT 7.0   ALBUMIN 3.4*   BILITOT 0.6   ALKPHOS 115   AST 26   ALT 15   ANIONGAP 12   EGFRNONAA 14.1*     Urine Studies:   Recent Labs   Lab 06/11/20  0406   COLORU Yellow   APPEARANCEUA Clear   PHUR 5.0   SPECGRAV 1.010   PROTEINUA Negative   GLUCUA 1+*   KETONESU Negative   BILIRUBINUA Negative   OCCULTUA 1+*   NITRITE Negative   LEUKOCYTESUR Negative   RBCUA 2   WBCUA 1   BACTERIA Rare   SQUAMEPITHEL 0   HYALINECASTS 9*       Significant Imaging: I have reviewed all pertinent imaging results/findings within the past 24 hours.

## 2020-06-11 NOTE — ASSESSMENT & PLAN NOTE
EF 45%, no wma, grade I DD  At home on lisinopril 20 mg, metoprolol succinate 25 mg, furosemide 40 mg daily    - Holding home medication for now in the setting of dehydration and GIB

## 2020-06-11 NOTE — ASSESSMENT & PLAN NOTE
Hx of lower GIB in the past when on warfarin  Patient is reporting some blood in stool today as well  Currently hemodynamically stable, H/H stable    - Keep NPO  - Type & Screen  - Monitor CBC q8 hr  - Pantoprazole 80 mg IV x1 in the ED, will continue on 40 mg IV q12 hr  - Consult to GI/hospital medicine in the morning

## 2020-06-11 NOTE — ASSESSMENT & PLAN NOTE
Elderly male with multiple risk factors for stroke (extensive coronary and peripheral vascular disease, obesity, HTN, HLP, DM-II, ICM/HFrEF), and previous CVA with residual LSW, p/w worsening of LSW. LKN 4AM, symptom onset 10AM, NIH 4 on arrival to the ED. Initial CTH unremarkable, did not obtain CTA due to CKD and low suspicion for LVO. Not a tPA candidate due to out of the window and current blood in stool. MRI ischemic protocol revealed moderate-sized R ISABELA infarct + smaller L ISABELA infarct, and high-grade stenosis in R A1. Due to low NIH and completion of stroke (already visible on FLAIR) angioplasty was not pursued for the patient.    - Antithrombotics: on DAPT at home, holding due to GIB  - Statins: continue home atorvastatin 80 mg daily  - Permissive HTN, target sBP<220   - PT/OT/SLP and PM&R to evaluate and treat  - Diagnostic pending: None  - DVT ppx: Place SCDs, no heparin due to GIB

## 2020-06-11 NOTE — CONSULTS
Inpatient consult to Physical Medicine Rehab  Consult performed by: Ivet De La Vega NP  Consult ordered by: Michelle Luu MD  Reason for consult: assess rehab needs        Reviewed patient history and current admission.  Rehab team following.  Full consult to follow.    SYDNEE River, FNP-C  Physical Medicine & Rehabilitation   06/11/2020

## 2020-06-11 NOTE — ASSESSMENT & PLAN NOTE
- permissive HTN per stroke service; on lasix 40 daily for diuretics at home; on lisinopril and metoprolol succinate for GDMT and HTN at home    Recs:  - agree with obtaining echo  - restart GDMT when appropriate from stroke standpoint (but hold ACEi until CORRY resolves)  - monitor volume status with daily weights and strict I/O  - recommend close follow up with his cardiologist upon discharge

## 2020-06-11 NOTE — ASSESSMENT & PLAN NOTE
Diarrhea since this morning + one episode of emesis in the evening  Afebrile, but WBC elevated  To 16.5K, also hyperglycemic to 288     - Urinalysis ordered and pending  - CXR unremarkable

## 2020-06-11 NOTE — PLAN OF CARE
CM met with patient and spouse Vladimir in room for Discharge Planning Assessment.  Per patient,  patient lives with spouse in a(n) SSH with 0 steps to enter.   Per patient, patient was independent with ADLS and used no DME for ambulation.  Per patient, the patient will have assistance from spouse upon discharge.  Discharge Planning Booklet given to patient/family and discussed.  All questions addressed.       PCP:  Riki Huynh MD      Pharmacy:    [facility]/pharmacy #[id number] - [place] - [address]  [address] [address]  Phone: [phone] Fax: [phone]        Emergency Contacts:  Extended Emergency Contact Information  Primary Emergency Contact: Vladimir Rodarte  Address: [address] [place] [address] [place]  Mobile Phone: [phone]  Relation: Spouse      Insurance:    Payor: [employer]IC[employer] / Plan: [employer] / Product Type: Medicare Advantage /        06/11/20 1055   Discharge Assessment   Assessment Type Discharge Planning Assessment   Confirmed/corrected address and phone number on facesheet? Yes   Assessment information obtained from? Patient   Expected Length of Stay (days) 5   Communicated expected length of stay with patient/caregiver yes   Prior to hospitilization cognitive status: Alert/Oriented;No Deficits   Prior to hospitalization functional status: Independent   Current cognitive status: Alert/Oriented;No Deficits   Current Functional Status: Needs Assistance;Assistive Equipment;Partially Dependent   Facility Arrived From: ED via [facility]   Lives With spouse   Able to Return to Prior Arrangements yes   Is patient able to care for self after discharge? Yes   Who are your caregiver(s) and their phone number(s)? Vladimir Rodarte Spouse     [phone]    Patient's perception of discharge disposition rehab facility   Readmission Within the Last 30 Days no previous admission in last 30 days   Patient currently  being followed by outpatient case management? No   Patient currently receives any other outside agency services? No   Equipment Currently Used at Home cane, straight;glucometer;grab bar   Do you have any problems affording any of your prescribed medications? No   Is the patient taking medications as prescribed? yes   Does the patient have transportation home? Yes   Transportation Anticipated family or friend will provide  (spouse)   Dialysis Name and Scheduled days na   Does the patient receive services at the Coumadin Clinic? Yes  ([facility])   Discharge Plan A Rehab   Discharge Plan B Home with family;Home Health   DME Needed Upon Discharge  other (see comments)  (tbd)   Patient/Family in Agreement with Plan yes       Ninoska Marsh RN  Case Management  Ext: [phone]  06/11/2020  11:01 AM

## 2020-06-11 NOTE — PT/OT/SLP EVAL
Speech Language Pathology Evaluation  Cognitive/Bedside Swallow  Discharge    Patient Name:  Chau Rodarte   MRN:  [id number]  Admitting Diagnosis: Acute right ISABELA stroke    Recommendations:                  General Recommendations:  Follow-up not indicated  Diet recommendations:  Regular, Thin   Aspiration Precautions: Standard aspiration precautions   General Precautions: Standard,    Communication strategies:  none    History:     Past Medical History:   Diagnosis Date    Acute on chronic systolic CHF (congestive heart failure) 11/29/2016    Acute osteomyelitis of toe, right     Anemia     Anticoagulant long-term use     CKD (chronic kidney disease) stage 2, GFR 60-89 ml/min 2/21/2016    CKD (chronic kidney disease) stage 3, GFR 30-59 ml/min     Coronary artery disease     Cryptogenic stroke, remote history (2006) 1/26/2017    Decubitus ulcer, heel, right, unstageable     Diabetic foot ulcer 11/13/2013    Diabetic foot ulcer with osteomyelitis 6/23/2017    Encounter for blood transfusion     HTN (hypertension) 3/3/2014    Hyperlipidemia 3/3/2014    NSTEMI (non-ST elevated myocardial infarction) 11/28/2016    Obesity (BMI 30-39.9) 11/29/2016    Osteomyelitis, chronic     PVD (peripheral vascular disease)     Stroke     Type 2 diabetes mellitus with diabetic peripheral angiopathy without gangrene, without long-term current use of insulin 10/24/2013    Type 2 diabetes mellitus with diabetic polyneuropathy, without long-term current use of insulin 11/29/2016       Past Surgical History:   Procedure Laterality Date    CARDIAC SURGERY      COLONOSCOPY N/A 3/21/2017    Procedure: COLONOSCOPY;  Surgeon: Karissa Peck MD;  Location: [address];  Service: Endoscopy;  Laterality: N/A;    CORONARY ANGIOPLASTY WITH STENT PLACEMENT      FOOT NERVE GRAFT  11/2013    left leg stent      Loop Recorder placement      right leg bypass         Prior diet: regular/thin..    Subjective      Awake/alert    Pain/Comfort:  · Pain Rating 1: 0/10  · Pain Rating Post-Intervention 1: 0/10    Objective:     Cognitive Status:    Orientation Oriented x4  Memory WFL  Problem Solving Categories 100%, Sequencing 4/4 word set and Compare/contrast 100%      Receptive Language:   Comprehension:   Questions Complex yes/no 100%  Commands  complex/abstract commands 100%    Pragmatics:    [facility]    Expressive Language:  Verbal:    .Verbal language skills were wfl with no evidence of aphasia.  Pt. Expressed their thoughts coherently in conversation with no evidence of confusion or word finding deficits  Nonverbal:   [facility]    Motor Speech:  [facility]    Voice:   [facility]    Visual-Spatial:  [facility]    Reading:   [facility]     Written Expression:   [facility]    Oral Musculature Evaluation  · Oral Musculature: [facility]  · Oral Labial Strength and Mobility: [facility]  · Lingual Strength and Mobility: [facility]  · Voice Prior to PO Intake: clear    Bedside Swallow Eval:   Consistencies Assessed:  · Thin liquids x6 oz   · Solids x5     Oral Phase:   · WFL    Pharyngeal Phase:   · no overt clinical signs/symptoms of aspiration    Compensatory Strategies  · None      Assessment:     Chau Rodarte is a 70 y.o. male with oropharyngeal swallow and cognitive linguistic aspects deemed WFL. No further ST warranted.     Goals:   Multidisciplinary Problems     SLP Goals        Problem: SLP Goal    Goal Priority Disciplines Outcome   SLP Goal     SLP Ongoing, Progressing                   Plan:   · Plan of Care reviewed with:  patient   · SLP Follow-Up:  No        Time Tracking:     SLP Treatment Date:   06/11/20  Speech Start Time:  0921  Speech Stop Time:  0937     Speech Total Time (min):  16 min    Billable Minutes: Eval 8  and Eval Swallow and Oral Function 8    Debi Conway CCC-SLP  06/11/2020

## 2020-06-12 PROBLEM — K56.609 SMALL BOWEL OBSTRUCTION: Status: ACTIVE | Noted: 2020-01-01

## 2020-06-12 PROBLEM — E87.5 HYPERKALEMIA: Status: ACTIVE | Noted: 2020-01-01

## 2020-06-12 PROBLEM — D72.829 LEUKOCYTOSIS: Status: RESOLVED | Noted: 2020-01-01 | Resolved: 2020-01-01

## 2020-06-12 PROBLEM — K55.9 SMALL BOWEL ISCHEMIA: Status: ACTIVE | Noted: 2020-01-01

## 2020-06-12 NOTE — PROGRESS NOTES
Pharmacokinetic Assessment Follow Up: IV Vancomycin    Vancomycin Regimen Assessment & Plan:  - Empirically increasing loading vancomycin dose to 2000 mg IV x1 dose.  - Patient with CORRY, will pulse dose while renal function is unstable. Draw vancomycin level with morning labs tomorrow, goal trough 10-20 ug/mL. Will re-dose as needed depending on level and renal function.    Drug levels (last 3 results):  No results for input(s): VANCOMYCINRA, VANCOMYCINPE, VANCOMYCINTR, VANCOTROUGH in the last 72 hours.    Pharmacy will continue to follow and monitor vancomycin.    Please contact pharmacy at extension [phone] for questions regarding this assessment.    Thank you for the consult,   Giles Faustin     Patient brief summary:  Chau Rodarte is a 70 y.o. male initiated on antimicrobial therapy with IV Vancomycin for treatment of intra-abdominal infection    The patient's current regimen is pulse dosing.    Drug Allergies:   Review of patient's allergies indicates:   Allergen Reactions    Adhesive tape-silicones Blisters       Actual Body Weight:   121.7 kg    Renal Function:   Estimated Creatinine Clearance: 17.8 mL/min (A) (based on SCr of 4.9 mg/dL (H)).,     Dialysis Method (if applicable):  N/A

## 2020-06-12 NOTE — TRANSFER OF CARE
"Anesthesia Transfer of Care Note    Patient: Chau Rodarte    Procedure(s) Performed: Procedure(s) (LRB):  LAPAROTOMY, EXPLORATORY (N/A)  APPLICATION, WOUND VAC (N/A)    Patient location: ICU    Anesthesia Type: general    Transport from OR: Transported from OR intubated on 100% O2 by AMBU with assisted ventilation. Continuous ECG monitoring in transport. Continuous SpO2 monitoring in transport    Post pain: adequate analgesia    Post assessment: no apparent anesthetic complications and tolerated procedure well    Post vital signs: stable    Level of consciousness: responds to stimulation and sedated    Nausea/Vomiting: no nausea/vomiting    Complications: none    Transfer of care protocol was followed      Last vitals:   Visit Vitals  BP (!) 99/55 (BP Location: Left arm, Patient Position: Lying)   Pulse 61   Temp 37 °C (98.6 °F) (Oral)   Resp 13   Ht 5' 8.9" (1.75 m)   Wt 121.7 kg (268 lb 4.8 oz)   SpO2 (!) 92%   BMI 39.74 kg/m²     "

## 2020-06-12 NOTE — SUBJECTIVE & OBJECTIVE
Past Medical History:   Diagnosis Date    Acute on chronic systolic CHF (congestive heart failure) 2016    Acute osteomyelitis of toe, right     Anemia     Anticoagulant long-term use     CKD (chronic kidney disease) stage 2, GFR 60-89 ml/min 2016    CKD (chronic kidney disease) stage 3, GFR 30-59 ml/min     Coronary artery disease     Cryptogenic stroke, remote history () 2017    Decubitus ulcer, heel, right, unstageable     Diabetic foot ulcer 2013    Diabetic foot ulcer with osteomyelitis 2017    Encounter for blood transfusion     HTN (hypertension) 3/3/2014    Hyperlipidemia 3/3/2014    NSTEMI (non-ST elevated myocardial infarction) 2016    Obesity (BMI 30-39.9) 2016    Osteomyelitis, chronic     PVD (peripheral vascular disease)     Stroke     Type 2 diabetes mellitus with diabetic peripheral angiopathy without gangrene, without long-term current use of insulin 10/24/2013    Type 2 diabetes mellitus with diabetic polyneuropathy, without long-term current use of insulin 2016       Past Surgical History:   Procedure Laterality Date    CARDIAC SURGERY      COLONOSCOPY N/A 3/21/2017    Procedure: COLONOSCOPY;  Surgeon: Karissa Peck MD;  Location: [facility] ([address] [place] [address]);  Service: Endoscopy;  Laterality: N/A;    CORONARY ANGIOPLASTY WITH STENT PLACEMENT      FOOT NERVE GRAFT  2013    left leg stent      Loop Recorder placement      right leg bypass         Review of patient's allergies indicates:   Allergen Reactions    Adhesive tape-silicones Blisters       Family History     Problem Relation (Age of Onset)    Blindness Maternal Grandmother    Cataracts Mother    Diabetes Mother    Glaucoma Mother    Heart disease Father        Tobacco Use    Smoking status: Former Smoker     Packs/day: 1.00     Years: 50.00     Pack years: 50.00     Last attempt to quit: 3/1/2011     Years since quittin.2    Smokeless tobacco: Never Used    Substance and Sexual Activity    Alcohol use: No    Drug use: No    Sexual activity: Yes     Partners: Female     Comment:  with 2 children 3 grand sons and 1 great grand daughter      Review of Systems   Constitutional: Negative for chills and fever.   HENT: Negative for sore throat and trouble swallowing.    Eyes: Negative for visual disturbance.   Respiratory: Negative for shortness of breath.    Cardiovascular: Negative for chest pain.   Gastrointestinal: Positive for abdominal pain and blood in stool. Negative for nausea and vomiting.   Genitourinary: Negative for dysuria and hematuria.   Musculoskeletal: Negative for back pain.   Skin: Negative for rash and wound.   Neurological: Positive for weakness (LLE).   Hematological: Does not bruise/bleed easily.   Psychiatric/Behavioral: Negative for agitation.     Objective:     Vital Signs (Most Recent):  Temp: 98.7 °F (37.1 °C) (06/12/20 0000)  Pulse: 65 (06/12/20 0000)  Resp: 18 (06/12/20 0000)  BP: (!) 118/56 (06/12/20 0000)  SpO2: 96 % (06/12/20 0000) Vital Signs (24h Range):  Temp:  [97.2 °F (36.2 °C)-98.7 °F (37.1 °C)] 98.7 °F (37.1 °C)  Pulse:  [56-65] 65  Resp:  [18-20] 18  SpO2:  [93 %-98 %] 96 %  BP: (118-156)/(56-72) 118/56   Weight: 121.7 kg (268 lb 4.8 oz)  Body mass index is 39.74 kg/m².      Intake/Output Summary (Last 24 hours) at 6/12/2020 0302  Last data filed at 6/11/2020 1928  Gross per 24 hour   Intake --   Output 200 ml   Net -200 ml       Physical Exam   Constitutional: He is oriented to person, place, and time. He appears well-developed and well-nourished. No distress.   HENT:   Head: Normocephalic and atraumatic.   Mouth/Throat: No oropharyngeal exudate.   Eyes: Pupils are equal, round, and reactive to light. EOM are normal. No scleral icterus.   Neck: Normal range of motion. Neck supple. No JVD present.   Cardiovascular: Normal rate, regular rhythm and normal heart sounds.   Pulmonary/Chest: Effort normal and breath sounds  normal. No respiratory distress.   Abdominal: Soft. He exhibits no distension. Bowel sounds are decreased. There is tenderness (tenderness to deep and light palpation, no tenderness to firm pressure of stethescope during auscultation) in the right lower quadrant. There is no rigidity, no rebound, no guarding, no tenderness at McBurney's point and negative Hale's sign.   Musculoskeletal: He exhibits no edema, tenderness or deformity.   Neurological: He is alert and oriented to person, place, and time. He exhibits abnormal muscle tone. GCS eye subscore is 4. GCS verbal subscore is 5. GCS motor subscore is 6.   LLE str 3/5, all others 5/5   Skin: He is not diaphoretic.       Vents:     Lines/Drains/Airways     Peripheral Intravenous Line                 Peripheral IV - Single Lumen 06/11/20 0009 20 G Right Hand 1 day         Peripheral IV - Single Lumen 06/11/20 0042 18 G Right Antecubital 1 day              Significant Labs:    CBC/Anemia Profile:  Recent Labs   Lab 06/11/20  0807 06/11/20  1543 06/12/20  0038   WBC 14.27* 10.30 9.18   HGB 13.1* 13.0* 13.4*   HCT 41.8 41.9 39.5*    186 173   MCV 97 99* 90   RDW 14.6* 14.6* 14.6*        Chemistries:  Recent Labs   Lab 06/11/20  0005 06/11/20  0251 06/11/20  1050 06/11/20  1543 06/11/20  2140 06/12/20  0038     --  139 138  --  136   K 5.3*  --  5.9* 5.0  5.0 6.2* 5.6*  5.6*   *  --  112* 109  --  108   CO2 17*  --  15* 15*  --  15*   BUN 54*  --  53* 57*  --  65*   CREATININE 4.0*  --  3.7* 3.8*  --  4.4*   CALCIUM 8.6*  --  8.3* 8.2*  --  8.3*   ALBUMIN 3.4*  --   --   --   --   --    PROT 7.0  --   --   --   --   --    BILITOT 0.6  --   --   --   --   --    ALKPHOS 115  --   --   --   --   --    ALT 15  --   --   --   --   --    AST 26  --   --   --   --   --    MG  --  2.0  --   --   --   --    PHOS  --  3.3  --   --   --   --        CMP:   Recent Labs   Lab 06/11/20  0005 06/11/20  1050 06/11/20  1543 06/11/20  2140 06/12/20  0038     139 138  --  136   K 5.3* 5.9* 5.0  5.0 6.2* 5.6*  5.6*   * 112* 109  --  108   CO2 17* 15* 15*  --  15*   * 228* 339*  --  375*   BUN 54* 53* 57*  --  65*   CREATININE 4.0* 3.7* 3.8*  --  4.4*   CALCIUM 8.6* 8.3* 8.2*  --  8.3*   PROT 7.0  --   --   --   --    ALBUMIN 3.4*  --   --   --   --    BILITOT 0.6  --   --   --   --    ALKPHOS 115  --   --   --   --    AST 26  --   --   --   --    ALT 15  --   --   --   --    ANIONGAP 12 12 14  --  13   EGFRNONAA 14.1* 15.5* 15.0*  --  12.6*     Lactic Acid:   Recent Labs   Lab 06/11/20  2100 06/12/20  0203   LACTATE 4.2* 4.7*     All pertinent labs within the past 24 hours have been reviewed.    Significant Imaging: I have reviewed all pertinent imaging results/findings within the past 24 hours.   X-Ray Abdomen AP 1 View  Narrative: EXAMINATION:  XR ABDOMEN AP 1 VIEW    CLINICAL HISTORY:  NGT placement verification;    TECHNIQUE:  Single AP View of the abdomen was performed.    COMPARISON:  06/11/2020.    FINDINGS:  Cardiac wires and other support devices overlie the lower chest and upper abdomen.  Moderate gaseous distention of bowel again identified in the upper abdomen, similar to the prior study.  Nasogastric tube is coiled overlying the lower chest, with the tip projecting retrograde over the lower esophagus, potentially within a hiatal hernia.  Impression: Nasogastric tube is coiled within the lower esophagus with the tip projecting retrograde.  Suggest advancement/repositioning.    Moderate gaseous distention of bowel in the upper abdomen suggestive of small-bowel obstruction.    Electronically signed by: Nino Gomez MD  Date:    06/12/2020  Time:    01:22

## 2020-06-12 NOTE — SUBJECTIVE & OBJECTIVE
Past Medical History:   Diagnosis Date    Acute on chronic systolic CHF (congestive heart failure) 11/29/2016    Acute osteomyelitis of toe, right     Anemia     Anticoagulant long-term use     CKD (chronic kidney disease) stage 2, GFR 60-89 ml/min 2/21/2016    CKD (chronic kidney disease) stage 3, GFR 30-59 ml/min     Coronary artery disease     Cryptogenic stroke, remote history (2006) 1/26/2017    Decubitus ulcer, heel, right, unstageable     Diabetic foot ulcer 11/13/2013    Diabetic foot ulcer with osteomyelitis 6/23/2017    Encounter for blood transfusion     HTN (hypertension) 3/3/2014    Hyperlipidemia 3/3/2014    NSTEMI (non-ST elevated myocardial infarction) 11/28/2016    Obesity (BMI 30-39.9) 11/29/2016    Osteomyelitis, chronic     PVD (peripheral vascular disease)     Stroke     Type 2 diabetes mellitus with diabetic peripheral angiopathy without gangrene, without long-term current use of insulin 10/24/2013    Type 2 diabetes mellitus with diabetic polyneuropathy, without long-term current use of insulin 11/29/2016     Past Surgical History:   Procedure Laterality Date    CARDIAC SURGERY      COLONOSCOPY N/A 3/21/2017    Procedure: COLONOSCOPY;  Surgeon: Karissa Peck MD;  Location: [address]);  Service: Endoscopy;  Laterality: N/A;    CORONARY ANGIOPLASTY WITH STENT PLACEMENT      FOOT NERVE GRAFT  11/2013    left leg stent      Loop Recorder placement      right leg bypass       Review of patient's allergies indicates:   Allergen Reactions    Adhesive tape-silicones Blisters       Scheduled Medications:    amiodarone  200 mg Oral Daily    atorvastatin  80 mg Oral Daily    ceFEPime (MAXIPIME) IVPB  2 g Intravenous Q24H    insulin detemir U-100  8 Units Subcutaneous QHS    metronidazole  500 mg Intravenous Q8H    pantoprazole  40 mg Intravenous BID    vancomycin (VANCOCIN) IVPB  2,000 mg Intravenous Once       PRN Medications: Dextrose 10% Bolus, Dextrose  10% Bolus, [COMPLETED] Dextrose 10% Bolus **AND** Dextrose 10% Bolus **AND** [COMPLETED] insulin regular, glucagon (human recombinant), hydrALAZINE, insulin aspart U-100, morphine, ondansetron, sodium chloride 0.9%, Pharmacy to dose Vancomycin consult **AND** vancomycin - pharmacy to dose    Family History     Problem Relation (Age of Onset)    Blindness Maternal Grandmother    Cataracts Mother    Diabetes Mother    Glaucoma Mother    Heart disease Father        Tobacco Use    Smoking status: Former Smoker     Packs/day: 1.00     Years: 50.00     Pack years: 50.00     Last attempt to quit: 3/1/2011     Years since quittin.2    Smokeless tobacco: Never Used   Substance and Sexual Activity    Alcohol use: No    Drug use: No    Sexual activity: Yes     Partners: Female     Comment:  with 2 children 3 grand sons and 1 great grand daughter     Review of Systems   Constitutional: Positive for activity change. Negative for fatigue and fever.   HENT: Negative for trouble swallowing and voice change.    Eyes: Negative for photophobia and visual disturbance.   Respiratory: Negative for cough and shortness of breath.    Cardiovascular: Negative for chest pain and palpitations.   Gastrointestinal: Positive for abdominal distention, abdominal pain and blood in stool. Negative for vomiting.   Genitourinary: Negative for difficulty urinating and flank pain.   Musculoskeletal: Positive for gait problem. Negative for arthralgias.   Skin: Negative for color change and rash.   Neurological: Positive for weakness. Negative for speech difficulty and numbness.   Psychiatric/Behavioral: Negative for agitation and confusion.     Objective:     Vital Signs (Most Recent):  Temp: 98.6 °F (37 °C) (20 0700)  Pulse: 65 (20 0900)  Resp: 13 (20 0900)  BP: (!) 100/50 (20 0900)  SpO2: (!) 94 % (20 0900)    Vital Signs (24h Range):  Temp:  [98.1 °F (36.7 °C)-98.7 °F (37.1 °C)] 98.6 °F (37 °C)  Pulse:   [56-75] 65  Resp:  [13-29] 13  SpO2:  [92 %-97 %] 94 %  BP: (100-152)/(50-68) 100/50     Body mass index is 39.74 kg/m².    Physical Exam   Constitutional: He is oriented to person, place, and time. He appears well-developed and well-nourished.   HENT:   Head: Normocephalic and atraumatic.   Eyes: Right eye exhibits no discharge. Left eye exhibits no discharge.   Neck: Neck supple.   Cardiovascular: Intact distal pulses.   Pulmonary/Chest: Effort normal. No respiratory distress.   Abdominal: Soft. He exhibits distension.   NGT in place    Musculoskeletal: He exhibits no edema or deformity.   Neurological: He is alert and oriented to person, place, and time.   Follows commands    Skin: Skin is warm and dry.   Psychiatric: He has a normal mood and affect. His behavior is normal.     NEUROLOGICAL EXAMINATION:     MENTAL STATUS   Oriented to person, place, and time.       Diagnostic Results:   Labs: Reviewed  ECG: Reviewed  X-Ray: Reviewed  CT: Reviewed  MRI: Reviewed

## 2020-06-12 NOTE — SIGNIFICANT EVENT
Talked with Vascular Neurology at the request of Dr. Marmolejo. Message passed along that General Surgery is requesting recommendations for anticoagulation ASAP.    RADHA Tsai PA-C  Critical Care Medicine  [phone]

## 2020-06-12 NOTE — NURSING
Pt. Transferred to CT by x2 RN's. Portable monitor traveled with patient. No acute events noted. Will continue to monitor.

## 2020-06-12 NOTE — H&P
[facility]  Critical Care Medicine  History & Physical    Patient Name: Chau Rodarte  MRN: [id number]  Admission Date: 6/10/2020  Hospital Length of Stay: 1 days  Code Status: Full Code  Attending Physician: Amaris Marmolejo MD   Primary Care Provider: Riki Huynh MD   Principal Problem: Acute right ISABELA stroke    Subjective:     HPI:  Chau Rodarte is a 70 y.o. male with CAD s/p PCI x3 back in 2016 (on DAPT at home), HFrEF (last echo Feb 2019 showing EF 45% and G1DD; on GDMT with lisinopril and metoprolol succinate), A fib (on amiodarone,not on anticoagulation 2/2 GI bleed in 2017; has a loop recorder in place), CKD (baseline creatinine appears 2.5-3), IDDM, PAD (s/p L femoro-popliteal bypass in 2017; history of diabetic foot ulcers), HTN, HLD, and obesity who is admitted to vascular neurology service in the setting of large R ISABELA stroke and smaller L ISABELA stroke diagnosed overnight 6/10-6/11. Patient reports that he had increasing weakness and 4 loose bowel movements with bloody streaks throughout the day 6/10 and when attempting to leave for the ER, he noticed weakness and inability to move his left leg to put on his shoes. Patient then presented to ED and was diagnosed with R ISABELA and L ISABELA stroke as above and admitted to stroke service, opted for medical management given presentation outside the tPA window and history concerning for possible GI bleed (hematechezia with 4 looser bowel movements and known history of GI bleeding). Pt evaluated by GI for possible GI bleeding with plan for colonoscopy 6/15.   Pt reported right sided abdominal pain in the AM on 6/11 with a sore, achy character that continued to worsen throughout the day. A KUB obtained 6/11 concerning for distal small bowel obstruction; NGT placed but patient continued to have worsening abdominal pain. Lactic acid elevated at 4.2 and continued to rise to 4.7 on repeat. Critical care medicine consulted for concern for worsening  abdominal pain and lactic acidosis.     Hospital/ICU Course:  No notes on file     Past Medical History:   Diagnosis Date    Acute on chronic systolic CHF (congestive heart failure) 11/29/2016    Acute osteomyelitis of toe, right     Anemia     Anticoagulant long-term use     CKD (chronic kidney disease) stage 2, GFR 60-89 ml/min 2/21/2016    CKD (chronic kidney disease) stage 3, GFR 30-59 ml/min     Coronary artery disease     Cryptogenic stroke, remote history (2006) 1/26/2017    Decubitus ulcer, heel, right, unstageable     Diabetic foot ulcer 11/13/2013    Diabetic foot ulcer with osteomyelitis 6/23/2017    Encounter for blood transfusion     HTN (hypertension) 3/3/2014    Hyperlipidemia 3/3/2014    NSTEMI (non-ST elevated myocardial infarction) 11/28/2016    Obesity (BMI 30-39.9) 11/29/2016    Osteomyelitis, chronic     PVD (peripheral vascular disease)     Stroke     Type 2 diabetes mellitus with diabetic peripheral angiopathy without gangrene, without long-term current use of insulin 10/24/2013    Type 2 diabetes mellitus with diabetic polyneuropathy, without long-term current use of insulin 11/29/2016       Past Surgical History:   Procedure Laterality Date    CARDIAC SURGERY      COLONOSCOPY N/A 3/21/2017    Procedure: COLONOSCOPY;  Surgeon: Karissa Peck MD;  Location: [facility] ([address] [place] [address]);  Service: Endoscopy;  Laterality: N/A;    CORONARY ANGIOPLASTY WITH STENT PLACEMENT      FOOT NERVE GRAFT  11/2013    left leg stent      Loop Recorder placement      right leg bypass         Review of patient's allergies indicates:   Allergen Reactions    Adhesive tape-silicones Blisters       Family History     Problem Relation (Age of Onset)    Blindness Maternal Grandmother    Cataracts Mother    Diabetes Mother    Glaucoma Mother    Heart disease Father        Tobacco Use    Smoking status: Former Smoker     Packs/day: 1.00     Years: 50.00     Pack years: 50.00     Last attempt to  quit: 3/1/2011     Years since quittin.2    Smokeless tobacco: Never Used   Substance and Sexual Activity    Alcohol use: No    Drug use: No    Sexual activity: Yes     Partners: Female     Comment:  with 2 children 3 grand sons and 1 great grand daughter      Review of Systems   Constitutional: Negative for chills and fever.   HENT: Negative for sore throat and trouble swallowing.    Eyes: Negative for visual disturbance.   Respiratory: Negative for shortness of breath.    Cardiovascular: Negative for chest pain.   Gastrointestinal: Positive for abdominal pain and blood in stool. Negative for nausea and vomiting.   Genitourinary: Negative for dysuria and hematuria.   Musculoskeletal: Negative for back pain.   Skin: Negative for rash and wound.   Neurological: Positive for weakness (LLE).   Hematological: Does not bruise/bleed easily.   Psychiatric/Behavioral: Negative for agitation.     Objective:     Vital Signs (Most Recent):  Temp: 98.7 °F (37.1 °C) (20 0000)  Pulse: 65 (20 0000)  Resp: 18 (20 0000)  BP: (!) 118/56 (20 0000)  SpO2: 96 % (20 0000) Vital Signs (24h Range):  Temp:  [97.2 °F (36.2 °C)-98.7 °F (37.1 °C)] 98.7 °F (37.1 °C)  Pulse:  [56-65] 65  Resp:  [18-20] 18  SpO2:  [93 %-98 %] 96 %  BP: (118-156)/(56-72) 118/56   Weight: 121.7 kg (268 lb 4.8 oz)  Body mass index is 39.74 kg/m².      Intake/Output Summary (Last 24 hours) at 2020 0302  Last data filed at 2020 1928  Gross per 24 hour   Intake --   Output 200 ml   Net -200 ml       Physical Exam   Constitutional: He is oriented to person, place, and time. He appears well-developed and well-nourished. No distress.   HENT:   Head: Normocephalic and atraumatic.   Mouth/Throat: No oropharyngeal exudate.   Eyes: Pupils are equal, round, and reactive to light. EOM are normal. No scleral icterus.   Neck: Normal range of motion. Neck supple. No JVD present.   Cardiovascular: Normal rate, regular rhythm  and normal heart sounds.   Pulmonary/Chest: Effort normal and breath sounds normal. No respiratory distress.   Abdominal: Soft. He exhibits no distension. Bowel sounds are decreased. There is tenderness (tenderness to deep and light palpation, no tenderness to firm pressure of stethescope during auscultation) in the right lower quadrant. There is no rigidity, no rebound, no guarding, no tenderness at McBurney's point and negative Hale's sign.   Musculoskeletal: He exhibits no edema, tenderness or deformity.   Neurological: He is alert and oriented to person, place, and time. He exhibits abnormal muscle tone. GCS eye subscore is 4. GCS verbal subscore is 5. GCS motor subscore is 6.   LLE str 3/5, all others 5/5   Skin: He is not diaphoretic.       Vents:     Lines/Drains/Airways     Peripheral Intravenous Line                 Peripheral IV - Single Lumen 06/11/20 0009 20 G Right Hand 1 day         Peripheral IV - Single Lumen 06/11/20 0042 18 G Right Antecubital 1 day              Significant Labs:    CBC/Anemia Profile:  Recent Labs   Lab 06/11/20  0807 06/11/20  1543 06/12/20  0038   WBC 14.27* 10.30 9.18   HGB 13.1* 13.0* 13.4*   HCT 41.8 41.9 39.5*    186 173   MCV 97 99* 90   RDW 14.6* 14.6* 14.6*        Chemistries:  Recent Labs   Lab 06/11/20  0005 06/11/20  0251 06/11/20  1050 06/11/20  1543 06/11/20  2140 06/12/20  0038     --  139 138  --  136   K 5.3*  --  5.9* 5.0  5.0 6.2* 5.6*  5.6*   *  --  112* 109  --  108   CO2 17*  --  15* 15*  --  15*   BUN 54*  --  53* 57*  --  65*   CREATININE 4.0*  --  3.7* 3.8*  --  4.4*   CALCIUM 8.6*  --  8.3* 8.2*  --  8.3*   ALBUMIN 3.4*  --   --   --   --   --    PROT 7.0  --   --   --   --   --    BILITOT 0.6  --   --   --   --   --    ALKPHOS 115  --   --   --   --   --    ALT 15  --   --   --   --   --    AST 26  --   --   --   --   --    MG  --  2.0  --   --   --   --    PHOS  --  3.3  --   --   --   --        CMP:   Recent Labs   Lab  06/11/20  0005 06/11/20  1050 06/11/20  1543 06/11/20  2140 06/12/20  0038    139 138  --  136   K 5.3* 5.9* 5.0  5.0 6.2* 5.6*  5.6*   * 112* 109  --  108   CO2 17* 15* 15*  --  15*   * 228* 339*  --  375*   BUN 54* 53* 57*  --  65*   CREATININE 4.0* 3.7* 3.8*  --  4.4*   CALCIUM 8.6* 8.3* 8.2*  --  8.3*   PROT 7.0  --   --   --   --    ALBUMIN 3.4*  --   --   --   --    BILITOT 0.6  --   --   --   --    ALKPHOS 115  --   --   --   --    AST 26  --   --   --   --    ALT 15  --   --   --   --    ANIONGAP 12 12 14  --  13   EGFRNONAA 14.1* 15.5* 15.0*  --  12.6*     Lactic Acid:   Recent Labs   Lab 06/11/20  2100 06/12/20  0203   LACTATE 4.2* 4.7*     All pertinent labs within the past 24 hours have been reviewed.    Significant Imaging: I have reviewed all pertinent imaging results/findings within the past 24 hours.   X-Ray Abdomen AP 1 View  Narrative: EXAMINATION:  XR ABDOMEN AP 1 VIEW    CLINICAL HISTORY:  NGT placement verification;    TECHNIQUE:  Single AP View of the abdomen was performed.    COMPARISON:  06/11/2020.    FINDINGS:  Cardiac wires and other support devices overlie the lower chest and upper abdomen.  Moderate gaseous distention of bowel again identified in the upper abdomen, similar to the prior study.  Nasogastric tube is coiled overlying the lower chest, with the tip projecting retrograde over the lower esophagus, potentially within a hiatal hernia.  Impression: Nasogastric tube is coiled within the lower esophagus with the tip projecting retrograde.  Suggest advancement/repositioning.    Moderate gaseous distention of bowel in the upper abdomen suggestive of small-bowel obstruction.    Electronically signed by: Nino Gomez MD  Date:    06/12/2020  Time:    01:22       Assessment/Plan:     Cardiac/Vascular  Paroxysmal atrial fibrillation  Continue amiodarone    Coronary artery disease involving native coronary artery of native heart without angina pectoris  Holding DAPT  due to planned colonoscopy 6/15    Chronic systolic heart failure  Holding lisinopril given CORRY  Holding metoprolol given concern for GI bleed    Renal/  Acute kidney injury  Pt with CORRY on CKD4, now with elevated Cr, decreased urine output, and hyperkalemia  Pt shifted with insulin x2 on 6/11, EKG without t wave changes of hyperkalemia, largely unchanged from previous   -given IVF due to lactic acidosis, will attempt to shift with diuresis with 40mg lasix IV x once  -Strict intake/output  -montior BMP/K q4hrs  -if worsening hyperkalemia and minimal urinary output, will consult nephrology for discussion of hemodialysis    Endocrine  Type 2 diabetes mellitus with stage 4 chronic kidney disease, with long-term current use of insulin  Glucose check q6h, moderate SSI while NPO    GI  Small bowel obstruction  -NPO, NGT placed and will set to low intermittent suction  -CT abdomen/pelvis pending  -General surgery consulted, awaiting results of CT  -Lactic acid and pain out of proportion to exam concerning for potential for mesenteric ischemia in this pt with significant history of atherosclerotic and thromboembolic disease    GI bleeding  - patient with 4 bloody BMs on 6/10  -GI consulted, holding DAPT due to GI Bleed and plan for endoscopy/colonoscopy 6/15  -FLD Saturday, CLD Sunday, colon prep Sunday, NPO from Monday for EGD/colonoscopy Monday.   -Repeat covid testing within 72 hours of procedure.  -Place 2 large bore IVs,  -Transfuse pRBC for Hb < 7 g/dL (Consider a higher Hb target if there is clinical evidence of intravascular volume depletion or comorbidities, such as CAD or if high suspicion of vigorous active ongoing bleeding or an uncorrected coagulopathy exists.).  -Correct coagulopathy (goal plt >50, INR <1.5) if present in patients without absolute contraindications.  -PPI 80 mg IV bolus once, then 8 mg/hour infusion or IV PPI 40 BID          Critical Care Daily Checklist:    A: Awake: RASS Goal/Actual Goal:     Actual:     B: Spontaneous Breathing Trial Performed?     C: SAT & SBT Coordinated?  n/a                      D: Delirium: CAM-ICU     E: Early Mobility Performed? Yes   F: Feeding Goal:    Status:     Current Diet Order   Procedures    Diet NPO    Diet NPO      AS: Analgesia/Sedation Morphine prn   T: Thromboembolic Prophylaxis Contraindicated, GI bleeding   H: HOB > 300 Yes   U: Stress Ulcer Prophylaxis (if needed) PPI   G: Glucose Control SSI   B: Bowel Function Stool Occurrence: 0   I: Indwelling Catheter (Lines & Saucedo) Necessity    D: De-escalation of Antimicrobials/Pharmacotherapies     Plan for the day/ETD CT abdomen pelvis, follow hyperkalemia    Code Status:  Family/Goals of Care: Full Code         Critical secondary to Patient has a condition that poses threat to life and bodily function: Acute Renal Failure and small bowel obstruction     Critical care was time spent personally by me on the following activities: development of treatment plan with patient or surrogate and bedside caregivers, discussions with consultants, evaluation of patient's response to treatment, examination of patient, ordering and performing treatments and interventions, ordering and review of laboratory studies, ordering and review of radiographic studies, pulse oximetry, re-evaluation of patient's condition. This critical care time did not overlap with that of any other provider or involve time for any procedures.     Refugio Sarabia, DO  Critical Care Medicine  [facility]

## 2020-06-12 NOTE — NURSING
Upon initial rounding pt c/o ABD pain 10/10 in right upper/lower quads.  ABD rounded and tender to touch.  Reports pain worse with activity.  Requesting med administration.  VSS.  Call placed to on-call stroke MD to notify and also receive clarification on NPO status if with/without meds with sips of water.  MD Luu states will review chart and round on patient shortly.

## 2020-06-12 NOTE — NURSING
2355- MD called to review NGT x-ray results and also to review 2140 K+=6.2.  MD states to attempt new NGT insertion since doesn't appear to be properly placed and possibly coiled.      0052- Attempted insertion in left nostril with resistance met.  Was able to re-insert in right nostril successfully.  Pt tolerated procedure well.     0123- Call received from pharmacy noting new order for sodium bicarb unable to be constituted as currently ordered.  Med also unable to be pushed on this unit.  Call placed to MD to notify.  Was advised would revise/cancel sodium bicarb order but go ahead and start NS order as written.  MD also reports possible consult/transfer to ICU for advanced level of care.      0139- ICU MD at bedside.  Was advised okay to give oral contrast for now.  Also reports will input orders for additional labs shortly.  Awaiting contrast arrival    0216-Call placed to on-call stroke MD just to update on previous recommendations to take contrast po.  MD okay with recommendation.  Also gives okay to remove NGT since also appears to be displaced.  Will carry out shortly.

## 2020-06-12 NOTE — CONSULTS
[facility]  Physical Medicine & Rehab  Consult Note    Patient Name: Chau Rodarte  MRN: [id number]  Admission Date: 6/10/2020  Hospital Length of Stay: 1 days  Attending Physician: Amaris Marmolejo MD   Consults  Subjective:     Principal Problem: Acute right ISABELA stroke    HPI: Chau Rodarte is a 70-year-old male with PMHx of GI bleed, stroke (extensive coronary and peripheral vascular disease, obesity, HTN, HLP, DM-II, ICM/HFrEF), and previous CVA with residual LSW, p/w worsening of LSW.  Patient presented to [facility] on 6/10 with worsening L sided weakness.  CTH revealed no acute pathology. MRI ischemic protocol revealed moderate-sized R ISABELA infarct + smaller L ISABELA infarct, and high-grade stenosis in R A1.  Due to low NIH and completion of stroke (already visible on FLAIR) angioplasty was not pursued for the patient. Etiology cardio embolic. Hospital course further complicated by blood in stool (GI consulted), emesis, possible bowel obstruction (Gen Surg consulted),leukocytosis, & CORRY.     Functional History: Patient lives with spouse in a single story home with threshold to enter.  Prior to admission, (I) with ADLs and mobility. DME: none.     Hospital Course:   06/11/2020: PT-Bed mobility MaxA-MaxA x 2 ppl. Passed bedside swallow evaluation.  SLP recommending regular diet and thin liquids. Oropharyngeal swallow and cognitive linguistic aspects deemed WFL.  6/12: OT eval pending.     Past Medical History:   Diagnosis Date    Acute on chronic systolic CHF (congestive heart failure) 11/29/2016    Acute osteomyelitis of toe, right     Anemia     Anticoagulant long-term use     CKD (chronic kidney disease) stage 2, GFR 60-89 ml/min 2/21/2016    CKD (chronic kidney disease) stage 3, GFR 30-59 ml/min     Coronary artery disease     Cryptogenic stroke, remote history (2006) 1/26/2017    Decubitus ulcer, heel, right, unstageable     Diabetic foot ulcer 11/13/2013    Diabetic foot ulcer with  osteomyelitis 6/23/2017    Encounter for blood transfusion     HTN (hypertension) 3/3/2014    Hyperlipidemia 3/3/2014    NSTEMI (non-ST elevated myocardial infarction) 11/28/2016    Obesity (BMI 30-39.9) 11/29/2016    Osteomyelitis, chronic     PVD (peripheral vascular disease)     Stroke     Type 2 diabetes mellitus with diabetic peripheral angiopathy without gangrene, without long-term current use of insulin 10/24/2013    Type 2 diabetes mellitus with diabetic polyneuropathy, without long-term current use of insulin 11/29/2016     Past Surgical History:   Procedure Laterality Date    CARDIAC SURGERY      COLONOSCOPY N/A 3/21/2017    Procedure: COLONOSCOPY;  Surgeon: Karissa Peck MD;  Location: [facility] ([address] [place] [address]);  Service: Endoscopy;  Laterality: N/A;    CORONARY ANGIOPLASTY WITH STENT PLACEMENT      FOOT NERVE GRAFT  11/2013    left leg stent      Loop Recorder placement      right leg bypass       Review of patient's allergies indicates:   Allergen Reactions    Adhesive tape-silicones Blisters       Scheduled Medications:    amiodarone  200 mg Oral Daily    atorvastatin  80 mg Oral Daily    ceFEPime (MAXIPIME) IVPB  2 g Intravenous Q24H    insulin detemir U-100  8 Units Subcutaneous QHS    metronidazole  500 mg Intravenous Q8H    pantoprazole  40 mg Intravenous BID    vancomycin (VANCOCIN) IVPB  2,000 mg Intravenous Once       PRN Medications: Dextrose 10% Bolus, Dextrose 10% Bolus, [COMPLETED] Dextrose 10% Bolus **AND** Dextrose 10% Bolus **AND** [COMPLETED] insulin regular, glucagon (human recombinant), hydrALAZINE, insulin aspart U-100, morphine, ondansetron, sodium chloride 0.9%, Pharmacy to dose Vancomycin consult **AND** vancomycin - pharmacy to dose    Family History     Problem Relation (Age of Onset)    Blindness Maternal Grandmother    Cataracts Mother    Diabetes Mother    Glaucoma Mother    Heart disease Father        Tobacco Use    Smoking status: Former Smoker      Packs/day: 1.00     Years: 50.00     Pack years: 50.00     Last attempt to quit: 3/1/2011     Years since quittin.2    Smokeless tobacco: Never Used   Substance and Sexual Activity    Alcohol use: No    Drug use: No    Sexual activity: Yes     Partners: Female     Comment:  with 2 children 3 grand sons and 1 great grand daughter     Review of Systems   Constitutional: Positive for activity change. Negative for fatigue and fever.   HENT: Negative for trouble swallowing and voice change.    Eyes: Negative for photophobia and visual disturbance.   Respiratory: Negative for cough and shortness of breath.    Cardiovascular: Negative for chest pain and palpitations.   Gastrointestinal: Positive for abdominal distention, abdominal pain and blood in stool. Negative for vomiting.   Genitourinary: Negative for difficulty urinating and flank pain.   Musculoskeletal: Positive for gait problem. Negative for arthralgias.   Skin: Negative for color change and rash.   Neurological: Positive for weakness. Negative for speech difficulty and numbness.   Psychiatric/Behavioral: Negative for agitation and confusion.     Objective:     Vital Signs (Most Recent):  Temp: 98.6 °F (37 °C) (20 0700)  Pulse: 65 (20 0900)  Resp: 13 (20 0900)  BP: (!) 100/50 (20 0900)  SpO2: (!) 94 % (20 0900)    Vital Signs (24h Range):  Temp:  [98.1 °F (36.7 °C)-98.7 °F (37.1 °C)] 98.6 °F (37 °C)  Pulse:  [56-75] 65  Resp:  [13-29] 13  SpO2:  [92 %-97 %] 94 %  BP: (100-152)/(50-68) 100/50     Body mass index is 39.74 kg/m².    Physical Exam   Constitutional: He is oriented to person, place, and time. He appears well-developed and well-nourished.   HENT:   Head: Normocephalic and atraumatic.   Eyes: Right eye exhibits no discharge. Left eye exhibits no discharge.   Neck: Neck supple.   Cardiovascular: Intact distal pulses.   Pulmonary/Chest: Effort normal. No respiratory distress.   Abdominal: Soft. He exhibits  distension.   NGT in place    Musculoskeletal: He exhibits no edema or deformity.   Neurological: He is alert and oriented to person, place, and time.   Follows commands    Skin: Skin is warm and dry.   Psychiatric: He has a normal mood and affect. His behavior is normal.       Diagnostic Results:   Labs: Reviewed  ECG: Reviewed  X-Ray: Reviewed  CT: Reviewed  MRI: Reviewed    Assessment/Plan:     * Acute right ISABELA stroke  See hospital course for functional, cognitive/speech/language, and nutrition/swallow status.      Recommendations  -  Encourage mobility, OOB in chair at least 3 hours per day, and early ambulation as appropriate   -  PT/OT evaluate and treat  -  SLP speech and cognitive evaluate and treat  -  Monitor sleep disturbances and establish consistent sleep-wake cycle  -  Monitor for bowel and bladder dysfunction  -  Monitor for shoulder pain, subluxation, & spasticity  -  Monitor for and prevent skin breakdown and pressure ulcers  · Early mobility, repositioning/weight shifting every 20-30 minutes when sitting, turn patient every 2 hours, proper mattress/overlay and chair cushioning, pressure relief/heel protector boots  -  DVT prophylaxis (if appropriate)  -  Reviewed discharge options (IP rehab, SNF, HH therapy, and OP therapy)    Small bowel obstruction  -gensurg consulted and work up in progress    Paroxysmal atrial fibrillation  -Previously on warfarin, however discontinued due to GI bleeding and switched to DAPT instead    GI bleeding  -GI consulted    Therapy evals pending. Work up in progress.    Thank you for your consult.     Ivet De La Vega NP  Department of Physical Medicine & Rehab  [facility]

## 2020-06-12 NOTE — PLAN OF CARE
Per MD, patient transferred to ICU: persistent lactic acidosis in setting of GI bleed and concern for small bowel obstruction.    Not medically ready for discharge.   Therapy recs are currently inpatient rehab.       06/12/20 0937   Discharge Reassessment   Assessment Type Discharge Planning Reassessment   Provided patient/caregiver education on the expected discharge date and the discharge plan No   Do you have any problems affording any of your prescribed medications? No   Discharge Plan A Rehab   Discharge Plan B Home Health;Home with family   DME Needed Upon Discharge  other (see comments)  (tbd)   Anticipated Discharge Disposition Rehab   Can the patient/caregiver answer the patient profile reliably? Yes, cognitively intact   Describe the patient's ability to walk at the present time. Major restrictions/daily assistance from another person       Danay Rodriguez RN, CCRN-K, [facility]  Neuro-Critical Care   X [phone]

## 2020-06-12 NOTE — ASSESSMENT & PLAN NOTE
- patient with 4 bloody BMs on 6/10  -GI consulted, holding DAPT due to GI Bleed and plan for endoscopy/colonoscopy 6/15  -FLD Saturday, CLD Sunday, colon prep Sunday, NPO from Monday for EGD/colonoscopy Monday.   -Repeat covid testing within 72 hours of procedure.  -Place 2 large bore IVs,  -Transfuse pRBC for Hb < 7 g/dL (Consider a higher Hb target if there is clinical evidence of intravascular volume depletion or comorbidities, such as CAD or if high suspicion of vigorous active ongoing bleeding or an uncorrected coagulopathy exists.).  -Correct coagulopathy (goal plt >50, INR <1.5) if present in patients without absolute contraindications.  -PPI 80 mg IV bolus once, then 8 mg/hour infusion or IV PPI 40 BID

## 2020-06-12 NOTE — ANESTHESIA POSTPROCEDURE EVALUATION
Anesthesia Post Evaluation    Patient: Chau Rodarte    Procedure(s) Performed: Procedure(s) (LRB):  LAPAROTOMY, EXPLORATORY (N/A)  APPLICATION, WOUND VAC (N/A)    Final Anesthesia Type: general    Patient location during evaluation: ICU  Patient participation: No - Unable to Participate, Intubation  Level of consciousness: responds to stimulation  Post-procedure vital signs: reviewed and stable  Pain management: adequate  Airway patency: patent    PONV status at discharge: No PONV  Anesthetic complications: no      Cardiovascular status: hemodynamically stable  Respiratory status: ventilator and ETT  Hydration status: euvolemic  Follow-up not needed.          Vitals Value Taken Time   BP 85/53 6/12/2020  2:51 PM   Temp 37 °C (98.6 °F) 6/12/2020 11:00 AM   Pulse 54 6/12/2020  2:51 PM   Resp 18 6/12/2020  2:51 PM   SpO2 100 % 6/12/2020  2:51 PM   Vitals shown include unvalidated device data.      No case tracking events are documented in the log.      Pain/Miroslava Score: Pain Rating Prior to Med Admin: 7 (6/12/2020 11:08 AM)  Pain Rating Post Med Admin: 8 (6/11/2020  9:58 PM)

## 2020-06-12 NOTE — PLAN OF CARE
Patient stepped up to MICU overnight.     Hospital Medicine consult service will sign off.     Alejandra Robin MD  PGY-1  [facility]

## 2020-06-12 NOTE — ASSESSMENT & PLAN NOTE
Pt with CORRY on CKD4, now with elevated Cr, decreased urine output, and hyperkalemia  Pt shifted with insulin x2 on 6/11, EKG without t wave changes of hyperkalemia, largely unchanged from previous   -given IVF due to lactic acidosis, will attempt to shift with diuresis with 40mg lasix IV x once  -Strict intake/output  -montior BMP/K q4hrs  -if worsening hyperkalemia and minimal urinary output, will consult nephrology for discussion of hemodialysis

## 2020-06-12 NOTE — NURSING
Pt transferred off unit to NICU for higher level of care.  A/O x4.  Resiprations unlabored.  Skin w/d. VSS.  Report given to receiving nurse.

## 2020-06-12 NOTE — NURSING
Patient arrived to [facility] from [facility] >>> [facility]    Type of stroke/diagnosis:  Bowel obstruction    TPA start and end time: N/A    Thrombectomy start and end time: N/A    Current symptoms: abdominal pain, abdomina distention     Skin assessment done: Yes    Wounds noted: none     DAVID Armband Applied: No    Patient Belongings on Admit: (be specific)    NCC notified: MD Steinberg

## 2020-06-12 NOTE — OP NOTE
DATE OF PROCEDURE: 6/12/2020    PREOPERATIVE DIAGNOSIS: Small bowel ischemia     POSTOPERATIVE DIAGNOSIS: Same    PROCEDURES PERFORMED:  1. Exploratory laparotomy with small bowel resection x 2  2. ABThera wound vac placement     ATTENDING SURGEON: Dr. LORENZO Cherry     RESIDENT: Dr Nino Luu - JOHANNA Duval - RES    ANESTHESIA: General     KEY FINDINGS: Multiple segments of necrotic and threatened small bowel; 25 inches of proximal jejunum resected; 7 inches of distal jejunum resected; bowel left in discontinuity.     ESTIMATED BLOOD LOSS: 100 ml    DRAINS: ABThera wound vac    COMPLICATIONS: None    INDICATIONS FOR PROCEDURE: The patient is a 70 y.o. male who presented with ischemic stroke and subsequently developed small bowel ischemia detected as pneumatosis intestinalis on CT. Based on this surgery was indicated once the patient was resuscitated.    PROCEDURE IN DETAIL: After informed consent was obtained, the patient was brought to the Operative Theater and placed in in the supine position. After adequate anesthesia the patient was prepped and draped in the usual sterile fashion. A timeout procedure was performed and a midline laparotomy incision was made. This was carried through the subcutaneous tissue and fascia with Bovie. Upon entry into the abdominal cavity, copious turbid fluid was encountered and suctioned. The small bowel was delivered through the wound and multiple segments of ischemia were noted. The bowel was run from the ligament of Treitz to the terminal ileum and two segments of frankly devitalized small bowel were encountered including a 25 inch segment of proximal jejunum as well as a 7 inch segment of distal jejunum/proximal ileum. These segments were resected using four 75 mm blue loads of the FERDINAND stapler; the mesentery between staples lines was sealed and transected using the Impact Ligasure. The proximal and distal ends of these resection lines were tacked together with silk  suture and left in discontinuity. The colon was examined in its entirety and appeared grossly healthy. Segments of the remaining small bowel appeared threatened but not frankly devitalized/gangrenous; the terminal ilium appeared grossly healthy. The abdominal cavity was irrigated with copious saline solution. The ABThera wound vac was placed in standard fashion and the patient's abdomen was left open to facilitate a return to the operating room for a planned second look in 24 hours.     The patient was transported directly to the ICU intubated in critical but stable condition. Dr. Cherry was present for all key portions of the procedure.       I have reviewed the Resident's operative record. I agree with the findings. Any changes were made directly in the note above.    Janneth Cherry MD  Endocrine Surgery & General Surgery

## 2020-06-12 NOTE — PT/OT/SLP PROGRESS
Physical Therapy      Patient Name:  Chau Rodarte   MRN:  [id number]    Patient not seen today secondary to pt transferred to ICU due to concern for bowel obstruction. PT orders D/Ahsan and PT will await further orders.  Lynette Payton, PT 6/12/20

## 2020-06-12 NOTE — PROGRESS NOTES
Anytime anticoagulation is started in ischemic stroke there is a risk for hemorrhagic conversion but due to size a location of stroke the risk is low.  Okay to start anticoagulation from ischemic stroke standpoint.     Wife is updated on information.  Patient returned from OR for small bowel resection x2 on propofol. Will plan for progress note and neuro examination tomorrow.      Josselyn Kirkland PA-C  Vascular Neurology  [phone]

## 2020-06-12 NOTE — PT/OT/SLP PROGRESS
Occupational Therapy Discharge      Patient Name:  Chau Rodarte   MRN:  [id number]    Patient not seen today secondary to Pt transferred to ICU 2* bowel obstruction. OT orders d/c, awaiting new orders.    Shira Ibanez, OT  6/12/2020

## 2020-06-12 NOTE — ANESTHESIA PREPROCEDURE EVALUATION
[facility]  Anesthesia Pre-Operative Evaluation         Patient Name: Chua Rodarte  YOB: 1950  MRN: [id number]    SUBJECTIVE:     Pre-operative evaluation for Procedure(s) (LRB):  EGD (ESOPHAGOGASTRODUODENOSCOPY) (N/A)  COLONOSCOPY (N/A)     06/13/2020    Chau Rodarte is a 70 y.o. male c Hx/o CAD s/p PCIx3 on DAPT, CHF with depressed LV function of 45%, Afib on Amiodarone & no anticoagulation 2/2 previous GI bleed, CKD4 baseline Cr 2-3, DM2 on Insulin, PAD s/p Left Fem-Pop Bypas who presented from home after sudden onsent of left lower extremity weakness 2 days ago. Diagnostic Eval revealing Right > Left ISABELA embolic stroke. Patient was outside of tPA window at the time of evaluation.   Upon admission, patient developed Afib as well as abdominal pain with loose bowel movements. KUB / CT Abdomen & Pelvis revealing dilated SB Loops, Pneumatosis and Venous Gas suggestive of mesenteric ischemia. Taken to OR and found to have multiple segments of necrotic jejunum which were resected.  - S/P laparotomy on 6/12/2020     Patient now presents for repeat ex-lap    Currently sedated, intubated, and requiring vasopressor support.       LDA:        Peripheral IV - Single Lumen 06/11/20 0009 20 G Right Hand (Active)   Site Assessment Clean;Dry;Intact;No redness;No swelling 6/12/2020 11:00 AM   Line Status Flushed;Saline locked 6/12/2020 11:00 AM   Dressing Status Clean;Dry;Intact 6/12/2020 11:00 AM   Dressing Intervention First dressing 6/12/2020 11:00 AM   Dressing Change Due 06/15/20 6/12/2020 11:00 AM   Site Change Due 06/15/20 6/12/2020  7:00 AM   Reason Not Rotated Not due 6/12/2020 11:00 AM   Number of days: 1            Peripheral IV - Single Lumen 06/11/20 0042 18 G Right Antecubital (Active)   Site Assessment Clean;Intact;Dry;No redness;No swelling 6/12/2020 11:00 AM   Line Status Blood return noted;Flushed;Infusing 6/12/2020 11:00 AM   Dressing Status Clean;Dry;Intact 6/12/2020 11:00 AM    Dressing Intervention Integrity maintained 6/12/2020 11:00 AM   Dressing Change Due 06/15/20 6/12/2020 11:00 AM   Site Change Due 06/15/20 6/12/2020  7:00 AM   Reason Not Rotated Not due 6/12/2020 11:00 AM   Number of days: 1            NG/OG Tube 06/12/20 0608 Right nostril (Active)   Placement Check placement verified by aspirate characteristics;placement verified by x-ray 6/12/2020 11:00 AM   Tolerance no signs/symptoms of discomfort 6/12/2020 11:00 AM   Securement secured to nostril center 6/12/2020 11:00 AM   Clamp Status/Tolerance unclamped 6/12/2020 11:00 AM   Suction Setting/Drainage Method low;intermittent setting 6/12/2020 11:00 AM   Insertion Site Appearance no redness, warmth, tenderness, skin breakdown, drainage 6/12/2020 11:00 AM   Drainage Bile;Green 6/12/2020 11:00 AM   Feeding Action feeding held 6/12/2020 11:00 AM   Intake (mL) 0 mL 6/12/2020 11:08 AM   Water Bolus (mL) 0 mL 6/12/2020 11:08 AM   Tube Output(mL)(Include Discarded Residual) 0 mL 6/12/2020 11:08 AM   Intake (mL) - Formula Tube Feeding 0 6/12/2020 11:08 AM   Number of days: 0       Prev airway: Present Prior to Hospital Arrival?: No; Placement Date: 06/12/20; Placement Time: 1146; Method of Intubation: Velasquez; Inserted by: CRNA; Airway Device: Endotracheal Tube; Mask Ventilation: Easy - oral; Intubated: Postinduction; Airway Device Size: 7.5; Style: Cuffed; Cuff Inflation: Minimal occlusive pressure; Placement Verified By: Auscultation, Capnometry; Grade: Grade I; Complicating Factors: Obesity; Intubation Findings: Positive EtCO2, Bilateral breath sounds, Atraumatic/Condition of teeth unchanged;  Depth of Insertion: 23; Securment: Lips; Complications: None; Breath Sounds: Equal Bilateral; Insertion Attempts: 1    Drips: None documented.      Patient Active Problem List   Diagnosis    HLD (hyperlipidemia)    S/P femoral-popliteal bypass surgery    Essential hypertension    Type 2 diabetes mellitus with stage 4 chronic kidney  disease, with long-term current use of insulin    Chronic systolic heart failure    Cardiomegaly    Coronary artery disease involving native coronary artery of native heart without angina pectoris    Ischemic cardiomyopathy    Typical atrial flutter, s/p ablation    Cryptogenic stroke, remote history (2006)    GI bleeding    Type 2 diabetes mellitus with peripheral circulatory disorder    Acute blood loss anemia    Paroxysmal atrial fibrillation    Atherosclerosis of native arteries of the extremities with ulceration    Preop examination    Debility    BPH (benign prostatic hypertrophy)    PVD (peripheral vascular disease)    Diabetic ulcer of heel associated with type 2 diabetes mellitus    Diabetic ulcer of right midfoot associated with type 2 diabetes mellitus, with muscle involvement without evidence of necrosis    Diabetic ulcer of right heel associated with type 1 diabetes mellitus    Critical lower limb ischemia    Nonhealing surgical wound    Diabetic ulcer of toe of right foot associated with type 2 diabetes mellitus, with necrosis of bone    Diabetic polyneuropathy associated with type 2 diabetes mellitus    Healed ulcer of right foot on examination    Long term current use of amiodarone    Acute otitis externa of left ear    Renal cyst    Volume depletion    Orthostatic hypotension    Acute gastroenteritis    Acidosis, metabolic    Chronic kidney disease (CKD), stage IV (severe)    Secondary hyperparathyroidism of renal origin    Morbid (severe) obesity due to excess calories    Arterial embolism and thrombosis of lower extremity    Acquired absence of right great toe    Acute right ISABELA stroke    Acute kidney injury    Generalized abdominal pain    Small bowel obstruction    Hyperkalemia    Small bowel ischemia       Review of patient's allergies indicates:   Allergen Reactions    Adhesive tape-silicones Blisters       Current Inpatient Medications:      No  "current facility-administered medications on file prior to encounter.      Current Outpatient Medications on File Prior to Encounter   Medication Sig Dispense Refill    amiodarone (PACERONE) 200 MG Tab Take 1 tablet (200 mg total) by mouth once daily. Take 2 tablets (400 mg) twice daily for 14 days, then 1 tablet (200 mg) daily. 90 tablet 3    aspirin (ECOTRIN) 81 MG EC tablet Take 81 mg by mouth once daily.      atorvastatin (LIPITOR) 80 MG tablet TAKE 1 TABLET BY MOUTH EVERY DAY 90 tablet 11    BD ULTRA-FINE HANG PEN NEEDLES 32 gauge x 5/32" Ndle USE TWICE DAILY FOR INSULIN INJECTION 100 each 11    calcifediol (RAYALDEE) 30 mcg Cs24 Take 30 tablets by mouth once daily. 90 capsule 3    clopidogreL (PLAVIX) 75 mg tablet TAKE 1 TABLET BY MOUTH EVERY DAY 90 tablet 1    furosemide (LASIX) 40 MG tablet TAKE 1 TABLET BY MOUTH EVERY DAY 90 tablet 11    insulin detemir U-100 (LEVEMIR FLEXTOUCH) 100 unit/mL (3 mL) SubQ InPn pen Inject 16 Units into the skin every evening. 15 mL 11    lisinopril (PRINIVIL,ZESTRIL) 20 MG tablet Take 20 mg by mouth once daily.      metoprolol succinate (TOPROL-XL) 25 MG 24 hr tablet TAKE 1 TABLET BY MOUTH EVERY DAY 90 tablet 4    ondansetron (ZOFRAN) 4 MG tablet Take 1 tablet (4 mg total) by mouth every 8 (eight) hours as needed for Nausea. (Patient not taking: Reported on 5/27/2020) 30 tablet 0    VICTOZA 2-SAGAR 0.6 mg/0.1 mL (18 mg/3 mL) PnIj INJECT 1.2 MG INTO THE SKIN ONCE DAILY 6 Syringe 11       Past Surgical History:   Procedure Laterality Date    CARDIAC SURGERY      COLONOSCOPY N/A 3/21/2017    Procedure: COLONOSCOPY;  Surgeon: Karissa Peck MD;  Location: [facility] ([address] [place] [address]);  Service: Endoscopy;  Laterality: N/A;    CORONARY ANGIOPLASTY WITH STENT PLACEMENT      FOOT NERVE GRAFT  11/2013    left leg stent      Loop Recorder placement      right leg bypass         Social History     Socioeconomic History    Marital status:      Spouse name: Not on file "    Number of children: Not on file    Years of education: Not on file    Highest education level: Not on file   Occupational History    Not on file   Social Needs    Financial resource strain: Not on file    Food insecurity     Worry: Not on file     Inability: Not on file    Transportation needs     Medical: Not on file     Non-medical: Not on file   Tobacco Use    Smoking status: Former Smoker     Packs/day: 1.00     Years: 50.00     Pack years: 50.00     Quit date: 3/1/2011     Years since quittin.2    Smokeless tobacco: Never Used   Substance and Sexual Activity    Alcohol use: No    Drug use: No    Sexual activity: Yes     Partners: Female     Comment:  with 2 children 3 grand sons and 1 great grand daughter   Lifestyle    Physical activity     Days per week: Not on file     Minutes per session: Not on file    Stress: Not on file   Relationships    Social connections     Talks on phone: Not on file     Gets together: Not on file     Attends Jewish service: Not on file     Active member of club or organization: Not on file     Attends meetings of clubs or organizations: Not on file     Relationship status: Not on file   Other Topics Concern    Not on file   Social History Narrative    Lives with wife, works PT for [employer], 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, 3 grandsons, 1 great grand daughter       OBJECTIVE:     Vital Signs Range (Last 24H):  Temp:  [36 °C (96.8 °F)-37.6 °C (99.7 °F)]   Pulse:  [51-83]   Resp:  [12-27]   BP: ()/(46-72)   SpO2:  [84 %-100 %]   Arterial Line BP: ()/(37-74)       Significant Labs:  Lab Results   Component Value Date    WBC 3.21 (L) 2020    HGB 11.5 (L) 2020    HCT 34.6 (L) 2020     (L) 2020    CHOL 119 (L) 2020    TRIG 68 2020    HDL 31 (L) 2020    ALT 23 2020    AST 35 2020     2020     2020    K 4.8 2020    K 4.8  06/13/2020     06/13/2020     06/13/2020    CREATININE 3.5 (H) 06/13/2020    CREATININE 3.5 (H) 06/13/2020    BUN 58 (H) 06/13/2020    BUN 58 (H) 06/13/2020    CO2 20 (L) 06/13/2020    CO2 20 (L) 06/13/2020    TSH 0.397 (L) 06/11/2020    INR 1.4 (H) 06/12/2020    GLUF 111 (H) 01/12/2017    HGBA1C 9.0 (H) 06/11/2020    MICROALBUR 6.8 02/02/2016       Diagnostic Studies: No relevant studies.    EKG:   Results for orders placed or performed during the hospital encounter of 06/10/20   EKG 12-lead    Collection Time: 06/12/20 12:22 AM    Narrative    Test Reason : E87.5,    Vent. Rate : 068 BPM     Atrial Rate : 068 BPM     P-R Int : 332 ms          QRS Dur : 118 ms      QT Int : 444 ms       P-R-T Axes : 069 -63 096 degrees     QTc Int : 472 ms    Sinus rhythm with 1st degree A-V block  Left axis deviation  Low anterior forces  Abnormal ECG  When compared with ECG of 11-JUN-2020 12:55,  No significant change was found  Confirmed by ERNESTINE HUGO MD ([id number]) on 6/12/2020 6:02:20 PM    Referred By: [email or web address]   SELF           Confirmed By:ERNESTINE HUGO MD       2D ECHO:  TTE:  Results for orders placed or performed during the hospital encounter of 06/10/20   Echo Color Flow Doppler? Yes   Result Value Ref Range    IVS 0.89 0.6 - 1.1 cm    LA size 3.36 cm    Left Atrium Major Axis 4.17 cm    LVIDD 3.70 3.5 - 6.0 cm    LVIDS 2.65 2.1 - 4.0 cm    LVOT peak VTI 14.60 cm    PW 0.90 0.6 - 1.1 cm    MV Peak A Ulices 0.78 m/s    E wave decelartion time 255.67 msec    MV Peak E Ulices 0.71 m/s    TDI LATERAL 0.06 m/s    TDI SEPTAL 0.04 m/s    LA WIDTH 4.37 cm    LV Diastolic Volume 58.16 mL    LV Systolic Volume 25.85 mL    LVOT peak ulices 0.73 m/s    LV LATERAL E/E' RATIO 11.83 m/s    LV SEPTAL E/E' RATIO 17.75 m/s    FS 28 %    LV mass 96.13 g    Left Ventricle Relative Wall Thickness 0.49 cm    E/A ratio 0.91     Mean e' 0.05 m/s    E/E' ratio 14.20 m/s    LV Systolic Volume Index 11.1 mL/m2    LV Diastolic Volume Index  24.88 mL/m2    LV Mass Index 41 g/m2    BSA 2.43 m2    Narrative    · Technically very challenging portable study with poor endocardial   visualization.  · Low normal left ventricular systolic function. The estimated ejection   fraction is 45-50%.  · Unable to assess for regional wall motion abnormalities despite use of   echo contrast due to image quality.          LIANE:  No results found for this or any previous visit.    ASSESSMENT/PLAN:                                                                                                                  06/13/2020  Chau Rodarte is a 70 y.o., male.    Pre-op Assessment    I have reviewed the Patient Summary Reports.     I have reviewed the Nursing Notes.    I have reviewed the Medications.     Review of Systems  Anesthesia Hx:  No problems with previous Anesthesia  History of prior surgery of interest to airway management or planning: Denies Family Hx of Anesthesia complications.   Denies Personal Hx of Anesthesia complications.   Hematology/Oncology:         -- Anemia: Hematology Comments: WBC 8.71 06/12/2020  HGB 12.0 (L) 06/12/2020  HCT 38.4 (L) 06/12/2020   06/12/2020     Cardiovascular:   Hypertension Past MI CAD  Dysrhythmias (1st degree AV block)  CHF · Technically very challenging portable study with poor endocardial visualization.  · Low normal left ventricular systolic function. The estimated ejection fraction is 45-50%.  · Unable to assess for regional wall motion abnormalities despite use of echo contrast due to image quality.   Renal/:   Chronic Renal Disease, CRI CREATININE 4.0 (H)   BUN 55 (H)    Neurological:   CVA, residual symptoms   Peripheral Neuropathy    Endocrine:   Diabetes, type 2 a1c 9.0       Physical Exam  General:  Well nourished, Morbid Obesity    Airway/Jaw/Neck:  Airway Findings: Mouth Opening: Normal Tongue: Normal  General Airway Assessment: Adult, Good  Mallampati: II  Improves to I with phonation.  TM Distance: 4 - 6 cm       Dental:  Dental Findings: Upper Dentures   Chest/Lungs:  Chest/Lungs Findings: Normal Respiratory Rate     Heart/Vascular:  Heart Findings: Rate: Normal  Rhythm: Regular Rhythm  Sounds: Normal      Musculoskeletal:  Musculoskeletal Findings: LUE/LLE weakness     Mental Status:  Mental Status Findings:  Cooperative, Alert and Oriented         Anesthesia Plan  Type of Anesthesia, risks & benefits discussed:  Anesthesia Type:  general, MAC  Patient's Preference:   Intra-op Monitoring Plan: standard ASA monitors  Intra-op Monitoring Plan Comments:   Post Op Pain Control Plan: multimodal analgesia, IV/PO Opioids PRN and per primary service following discharge from PACU  Post Op Pain Control Plan Comments:   Induction:   IV  Beta Blocker:  Patient is not currently on a Beta-Blocker (No further documentation required).       Informed Consent: Patient representative understands risks and agrees with Anesthesia plan.  Questions answered. Anesthesia consent signed with patient representative.  ASA Score: 4     Day of Surgery Review of History & Physical:    H&P update referred to the surgeon.         Ready For Surgery From Anesthesia Perspective.

## 2020-06-12 NOTE — NURSING
NGT inserted into rt nare successfully.  O2 sat 94% on RA.  Lab called to report critical K+=6.9.  Shortly after lab called back to correct previous report now stating specimen hemolized and needs to be re-collected.  Will input shortly.

## 2020-06-12 NOTE — ANESTHESIA PREPROCEDURE EVALUATION
06/12/2020  Chau Rodarte is a 70 y.o., male.    Anesthesia Evaluation    I have reviewed the Patient Summary Reports.    I have reviewed the Nursing Notes.    I have reviewed the Medications.     Review of Systems  Anesthesia Hx:  No problems with previous Anesthesia  Denies Family Hx of Anesthesia complications.   Denies Personal Hx of Anesthesia complications.   Hematology/Oncology:         -- Anemia:   Cardiovascular:   Hypertension Past MI CAD   CHF · Technically very challenging portable study with poor endocardial visualization.  · Low normal left ventricular systolic function. The estimated ejection fraction is 45-50%.  · Unable to assess for regional wall motion abnormalities despite use of echo contrast due to image quality.   Renal/:   Chronic Renal Disease, CRI    Neurological:   CVA, residual symptoms    Endocrine:   Diabetes, type 2      Patient Active Problem List   Diagnosis    HLD (hyperlipidemia)    S/P femoral-popliteal bypass surgery    Essential hypertension    Type 2 diabetes mellitus with stage 4 chronic kidney disease, with long-term current use of insulin    Chronic systolic heart failure    Cardiomegaly    Coronary artery disease involving native coronary artery of native heart without angina pectoris    Ischemic cardiomyopathy    Typical atrial flutter, s/p ablation    Cryptogenic stroke, remote history (2006)    GI bleeding    Type 2 diabetes mellitus with peripheral circulatory disorder    Acute blood loss anemia    Paroxysmal atrial fibrillation    Atherosclerosis of native arteries of the extremities with ulceration    Preop examination    Debility    BPH (benign prostatic hypertrophy)    PVD (peripheral vascular disease)    Diabetic ulcer of heel associated with type 2 diabetes mellitus    Diabetic ulcer of right midfoot associated with type 2 diabetes  mellitus, with muscle involvement without evidence of necrosis    Diabetic ulcer of right heel associated with type 1 diabetes mellitus    Critical lower limb ischemia    Nonhealing surgical wound    Diabetic ulcer of toe of right foot associated with type 2 diabetes mellitus, with necrosis of bone    Diabetic polyneuropathy associated with type 2 diabetes mellitus    Healed ulcer of right foot on examination    Long term current use of amiodarone    Acute otitis externa of left ear    Renal cyst    Volume depletion    Orthostatic hypotension    Acute gastroenteritis    Acidosis, metabolic    Chronic kidney disease (CKD), stage IV (severe)    Secondary hyperparathyroidism of renal origin    Morbid (severe) obesity due to excess calories    Arterial embolism and thrombosis of lower extremity    Acquired absence of right great toe    Acute right ISABELA stroke    Acute kidney injury    Generalized abdominal pain    Small bowel obstruction    Hyperkalemia         Physical Exam  General:  Well nourished, Morbid Obesity    Airway/Jaw/Neck:  Airway Findings: Mouth Opening: Normal Tongue: Normal  General Airway Assessment: Adult, Good  Mallampati: II  Improves to I with phonation.  TM Distance: 4 - 6 cm      Dental:  Dental Findings: Upper Dentures   Chest/Lungs:  Chest/Lungs Findings: Normal Respiratory Rate     Heart/Vascular:  Heart Findings: Rate: Normal  Rhythm: Regular Rhythm  Sounds: Normal      Musculoskeletal:  Musculoskeletal Findings: LUE/LLE weakness     Mental Status:  Mental Status Findings:  Cooperative, Alert and Oriented         Anesthesia Plan  Type of Anesthesia, risks & benefits discussed:  Anesthesia Type:  general  Patient's Preference: General  Intra-op Monitoring Plan: standard ASA monitors  Intra-op Monitoring Plan Comments:   Post Op Pain Control Plan: multimodal analgesia and IV/PO Opioids PRN  Post Op Pain Control Plan Comments: Per primary service  Induction:   IV  Beta  Blocker:  Patient is not currently on a Beta-Blocker (No further documentation required).       Informed Consent: Patient understands risks and agrees with Anesthesia plan.  Questions answered. Anesthesia consent signed with patient.  ASA Score: 3     Day of Surgery Review of History & Physical:    H&P update referred to the surgeon.         Ready For Surgery From Anesthesia Perspective.

## 2020-06-12 NOTE — CONSULTS
Pt with persistent lactic acidosis in setting of GI bleed and concern for small bowel obstruction, will transfer to ICU. CT abdomen/pelvis stat to begin shortly. Surgery consulted and aware of patient. Full H&P to follow.

## 2020-06-12 NOTE — CARE UPDATE
"I assumed care for this patient from the off going provider at 7 pm. We discussed the case together. I have personally interviewed and examined the patient.    Summary:   69 y/o M with HFrEF (45%), CAD (on DAPT), DM-II, HTN, paroxysmal A.fib (not on AC), and previous CVA, who was admitted to the VN service with R ISABELA acute infarct at midnight of 6/11.    Hospital Course:  - Patient was evaluated by GI for hematochezia that had started on 6/10, H/H stable, kept NPO, on PPI IV, with plans for EGD/colonoscopy on Monday 6/15 (to be off of plavix x5 days).  - Abdominal pain + distention prompted KUB which was suggestive of partial SBO. General surgery evaluated and suggested CT abdomen + IV/PO contrast for better characterization of intra-abdominal pathology.     Resident update (8:30pm):  I was called by the RN at the beginning my shift for patient having 10/10 abdominal pain. My exam at the bedside revealed a distended abdomen with TTP (R>L), no rebound. Patient extremely distressed and states " I want to cut this part open and take it out".    Plan:  - Placed NGT  - Maintain NPO status  - Morphine 2 mg IV x1 (unable to do NSAIDs due to GIB and CKD/CORRY, IV Tylenol unavailable)  - CT abdomen ordered only with PO contrast due to CrCl 22 (discussed with radiology)  - Blood cultures x2  - Lactic acid   - Procalcitonin   - LR infusion @50 mL/hr (gentle due to low EF)    Resident update (10.21pm):  - Patient examined again --> hemodynamically stable. pain has improved after receiving medication  - Lactic acid --> 4.2  - Procalcitonin --> 2.66  - General surgery was paged and updated on lactic acidosis and elevated procal and concerns for bowel ischemia -> Dr. Tuttle advised to await CT abd   - RN was advised to follow up with radiology regarding the timing of CT scan     Update (11.45pm):  - Was notified about (repeated) K 6.2 -> ECG STAT unchanged from prior, Insulin regular 10 units x1 (w/o dextrose, current glucose " 300s)  - NGT not in place per radiology -> replacing  - CT abd pending NGT and PO contrast administration    Update (1:15am):  - second trial of NGT not in place per X-ray  - switched LR to NS infusion + bicarb x3 amps (can not be pushed on this floor)  - Insulin delayed from pharmacy   - ABG + lytes pending  - Discussed with hospital medicine for assistance who advised contacting CCM due to impending deterioration (appreciate their recommendations)  - Discussed with CCM fellow who agreed with patient's critical status and will evaluate the patient for transfer to MICU (appreciate their assistance)      Michelle Luu MD  PGY-III, Neurology

## 2020-06-12 NOTE — ASSESSMENT & PLAN NOTE
-NPO, NGT placed and will set to low intermittent suction  -CT abdomen/pelvis pending  -General surgery consulted, awaiting results of CT  -Lactic acid and pain out of proportion to exam concerning for potential for mesenteric ischemia in this pt with significant history of atherosclerotic and thromboembolic disease

## 2020-06-12 NOTE — PROGRESS NOTES
Pharmacokinetic Initial Assessment: IV Vancomycin    Assessment/Plan:  -Patient with CORRY, CKD stage4 , current creatinine 4.9 mg/dL  -Patient not on dialysis   -decreased urine output  -Initiate intravenous vancomycin with dose of 1000 mg once with subsequent doses when random concentrations are less than 20 mcg/mL  -Desired empiric serum trough concentration is 10 to 15 mcg/mL  -Draw vancomycin random level on 6/13 at 0300.  Pharmacy will continue to follow and monitor vancomycin.      Please contact pharmacy at extension [phone] with any questions regarding this assessment.     Thank you for the consult,   Ngozi Serrato       Patient brief summary:  Chau Rodarte is a 70 y.o. male initiated on antimicrobial therapy with IV Vancomycin for treatment of suspected intra-abdominal infection    Drug Allergies:   Review of patient's allergies indicates:   Allergen Reactions    Adhesive tape-silicones Blisters       Actual Body Weight:   121.7 kg    Renal Function:   Estimated Creatinine Clearance: 17.8 mL/min (A) (based on SCr of 4.9 mg/dL (H)).,     Dialysis Method (if applicable):  N/A    CBC (last 72 hours):  Recent Labs   Lab Result Units 06/11/20  0005 06/11/20  0807 06/11/20  1543 06/12/20  0038   WBC K/uL 16.53* 14.27* 10.30 9.18   Hemoglobin g/dL 13.6* 13.1* 13.0* 13.4*   Hemoglobin A1C % 9.0*  --   --   --    Hematocrit % 43.0 41.8 41.9 39.5*   Platelets K/uL 210 186 186 173   Gran% % 76.1* 90.3* 92.4* 64.0   Lymph% % 14.4* 5.3* 5.4* 6.5*   Mono% % 8.5 3.8* 1.8* 12.5   Eosinophil% % 0.3 0.0 0.0 0.0   Basophil% % 0.3 0.2 0.2 0.0   Differential Method  Automated Automated Automated Manual       Metabolic Panel (last 72 hours):  Recent Labs   Lab Result Units 06/11/20  0005 06/11/20  0251 06/11/20  0406 06/11/20  1050 06/11/20  1124 06/11/20  1543 06/11/20  2140 06/12/20  0038 06/12/20  0429   Sodium mmol/L 140  --   --  139  --  138  --  136 138  138   Sodium Urine Random mmol/L  --   --   --   --  <20*  --   --    --   --    Potassium mmol/L 5.3*  --   --  5.9*  --  5.0  5.0 6.2* 5.6*  5.6* 5.0  5.0  5.0   Chloride mmol/L 111*  --   --  112*  --  109  --  108 108  108   CO2 mmol/L 17*  --   --  15*  --  15*  --  15* 15*  15*   Glucose mg/dL 305*  --   --  228*  --  339*  --  375* 272*  272*   Glucose, UA   --   --  1+*  --  2+*  --   --   --   --    BUN, Bld mg/dL 54*  --   --  53*  --  57*  --  65* 69*  69*   Creatinine mg/dL 4.0*  --   --  3.7*  --  3.8*  --  4.4* 4.9*  4.9*   Creatinine, Random Ur mg/dL  --   --   --   --  175.0  175.0  --   --   --   --    Albumin g/dL 3.4*  --   --   --   --   --   --   --  3.0*   Total Bilirubin mg/dL 0.6  --   --   --   --   --   --   --  1.1*   Alkaline Phosphatase U/L 115  --   --   --   --   --   --   --  79   AST U/L 26  --   --   --   --   --   --   --  38   ALT U/L 15  --   --   --   --   --   --   --  32   Magnesium mg/dL  --  2.0  --   --   --   --   --   --   --    Phosphorus mg/dL  --  3.3  --   --   --   --   --   --   --        Drug levels (last 3 results):  No results for input(s): VANCOMYCINRA, VANCOMYCINPE, VANCOMYCINTR in the last 72 hours.    Microbiologic Results:  Microbiology Results (last 7 days)     Procedure Component Value Units Date/Time    Blood culture [[id number]] Collected:  06/11/20 2100    Order Status:  Completed Specimen:  Blood Updated:  06/12/20 0515     Blood Culture, Routine No Growth to date    Blood culture [[id number]] Collected:  06/11/20 2100    Order Status:  Completed Specimen:  Blood Updated:  06/12/20 0515     Blood Culture, Routine No Growth to date    Clostridium difficile EIA [[id number]] Collected:  06/11/20 0221    Order Status:  Completed Specimen:  Stool Updated:  06/11/20 0703     C. diff Antigen Negative     C difficile Toxins A+B, EIA Negative     Comment: Testing not recommended for children <24 months old.

## 2020-06-13 NOTE — ANESTHESIA POSTPROCEDURE EVALUATION
Anesthesia Post Evaluation    Patient: Chau Rodarte    Procedure(s) Performed: Procedure(s) (LRB):  LAPAROTOMY, EXPLORATORY , Abdominal Washout, Small Bowel Resection and Abethera Wound Vac Placement. (N/A)    Final Anesthesia Type: general    Patient location during evaluation: ICU  Patient participation: No - Unable to Participate, Intubation  Level of consciousness: sedated  Post-procedure vital signs: reviewed and stable  Pain management: adequate  Airway patency: patent    PONV status at discharge: No PONV  Anesthetic complications: no      Cardiovascular status: hemodynamically stable (Levophed and vasopressin gtt)  Respiratory status: intubated  Hydration status: euvolemic  Follow-up not needed.        /76  Vitals Value Taken Time   BP  06/13/20 0938   Temp 37.3 °C (99.14 °F) 06/13/20 0938   Pulse 75 06/13/20 0938   Resp 18 06/13/20 0938   SpO2 100 % 06/13/20 0938   Vitals shown include unvalidated device data.      No case tracking events are documented in the log.      Pain/Miroslava Score: Pain Rating Prior to Med Admin: 0 (6/13/2020  5:10 AM)  Pain Rating Post Med Admin: 0 (6/12/2020  9:29 PM)

## 2020-06-13 NOTE — TRANSFER OF CARE
"Anesthesia Transfer of Care Note    Patient: Chau Rodarte    Procedure(s) Performed: Procedure(s) (LRB):  LAPAROTOMY, EXPLORATORY , Abdominal Washout, Small Bowel Resection and Abethera Wound Vac Placement. (N/A)    Patient location: ICU    Anesthesia Type: general    Transport from OR: Transported from OR intubated on 100% O2 by AMBU with adequate controlled ventilation. Upon arrival to PACU/ICU, patient attached to ventilator and auscultated to confirm bilateral breath sounds and adequate TV. Continuous ECG monitoring in transport. Continuous SpO2 monitoring in transport. Continuos invasive BP monitoring in transport    Post pain: adequate analgesia    Post assessment: no apparent anesthetic complications and tolerated procedure well    Post vital signs: stable    Level of consciousness: sedated    Nausea/Vomiting: no nausea/vomiting    Complications: none    Transfer of care protocol was followed      Last vitals:   Visit Vitals  /67   Pulse 73   Temp 37.2 °C (99 °F) (Core Esophageal)   Resp 20   Ht 5' 8.9" (1.75 m)   Wt 121.7 kg (268 lb 4.8 oz)   SpO2 98%   BMI 39.74 kg/m²     "

## 2020-06-13 NOTE — ASSESSMENT & PLAN NOTE
69 y/o M with Afib and embolic events to brain and bowel  Taken to OR today for mesenteric ischemia s/p Jejunum resection x 2. Bowel left in discontinuity and to be taken 06/13/2020 for second look.    Neuro:  Sedation: Propofol 10, fentanyl as needed  Pain Control: Tylenol IV, PRN Dilaudid     CV:  MAP goal >65  Currently Normal Sinus Rhythm  On Heparin gtt for mesenteric ischemia (held pre-op)  Levophed .04 and Vasopressin .04 gtt for MAP goals     Respiratory  Intubated and Sedated  Will remain intubated pending procedure in AM  ABG PRN and Daily  Daily CXR     FEN / GI  Protonix GI PPX  NPO until Extubated and Bowel Reanastomosed  NGT to LIWS  Replete Lytes PRN  Will consider TPN pending laparotomy results     Renal:  Baseline CKD4 -- Cr 2-3  Oliguric 20-40 cc/hr  Lactic Acidosis improving (7.2->3.3)  Sodium Bicarbonate Given  Trend Lactates q 6 hours  Aggressive Fluid Resuscitation  Monitor UOP  Replete Lytes PRN  Maintain K>4 and Mg>2 for history of Afib     ID  Afebrile  Leukocytosis present  Cefepime / Vancomycin / Fluconazole     PPx:  Heparin gtt  Protonix 40 mg BID     Disposition: SICU; back to OR today

## 2020-06-13 NOTE — PLAN OF CARE
POC reviewed with pt at 0500. Pt unable to verbalize understanding. Questions and concerns addressed. No acute events overnight. Fentanyl @ 75, propofol @ 10, Insulin @ 1, LR @ 150, Levo @ 0.14, and heparin @ 14. Heparin drip was stopped then restarted during shift per nomogram. Wound vac output of about 850 ml. CVP connected. Pt planned to go to surgery sometime this morning, then to SICU. VSS. Pt progressing toward goals. Will continue to monitor. See flowsheets for full assessment and VS info

## 2020-06-13 NOTE — HPI
71 y/o M with hx of CAD s/p PCIx3 on DAPT, CHF with depressed LV function, Afib on Amiodarone, CKD4, DM2 on Insulin, PAD s/p Left Fem-Pop Bypas who presented from home after sudden onsent of left lower extremity weakness 2 days ago. Diagnostic Eval revealing Right > Left ISABELA embolic stroke. Patient was outside of tPA window at the time of evaluation.   Upon admission, patient developed Afib as well as abdominal pain with loose bowel movements.   KUB / CT Abdomen & Pelvis revealing dilated SB Loops, Pneumatosis and Venous Gas suggestive of mesenteric ischemia  Taken to OR and found to have multiple segments of necrotic jejunum which were resected.  Bowel left in discontinuity with open abdomen to [facility] Vac.     Care transferred to SICU from [facility]  Remains in Sinus Rhythm, Hypotensive, Tachycardic, Oliguric  Aggressive Fluid / Pressor Resuscitation started.   Arterial Line and Central Line Placed at Bedside  Scheduled for Second Look in OR tomorrow AM      male with CAD s/p PCI x3 back in 2016 (on DAPT at home), HFrEF (last echo Feb 2019 showing EF 45% and G1DD; on GDMT with lisinopril and metoprolol succinate), A fib (on amiodarone,not on anticoagulation 2/2 GI bleed in 2017; has a loop recorder in place), CKD (baseline creatinine appears 2.5-3), IDDM, PAD (s/p L femoro-popliteal bypass in 2017; history of diabetic foot ulcers), HTN, HLD, and obesity who is admitted to vascular neurology service in the setting of large R ISABELA stroke and smaller L ISABELA stroke diagnosed overnight 6/10-6/11. Patient reports that he had increasing weakness and 4 loose bowel movements with bloody streaks throughout the day 6/10 and when attempting to leave for the ER, he noticed weakness and inability to move his left leg to put on his shoes. Patient then presented to ED and was diagnosed with R ISABELA and L ISABELA stroke as above and admitted to stroke service, opted for medical management given presentation outside the tPA window and history  concerning for possible GI bleed (hematechezia with 4 looser bowel movements and known history of GI bleeding). Pt evaluated by GI for possible GI bleeding with plan for colonoscopy 6/15.   Pt reported right sided abdominal pain in the AM on 6/11 with a sore, achy character that continued to worsen throughout the day. A KUB obtained 6/11 concerning for distal small bowel obstruction; NGT placed but patient continued to have worsening abdominal pain. Lactic acid elevated at 4.2 and continued to rise to 4.7 on repeat. Critical care medicine consulted for concern for worsening abdominal pain and lactic acidosis.

## 2020-06-13 NOTE — SUBJECTIVE & OBJECTIVE
Interval History: No acute events. Patient's pressor requirements improved with continued resuscitation. Marginal UOP responsive to fluids. Minimal vent settings. Lactate 3.3 from 7.2 this AM.     Medications:  Continuous Infusions:   fentanyl 5 mL/hr at 06/13/20 0602    heparin (porcine) in D5W Stopped (06/13/20 0430)    insulin (HUMAN R) infusion (adults) 0.1 Units/hr (06/13/20 0602)    lactated ringers 150 mL/hr at 06/13/20 0602    norepinephrine bitartrate-D5W 0.02 mcg/kg/min (06/13/20 0602)    propofoL Stopped (06/13/20 0510)    vasopressin (PITRESSIN) infusion 0.04 Units/min (06/13/20 0602)     Scheduled Meds:   amiodarone  200 mg Oral Daily    ceFEPime (MAXIPIME) IVPB  2 g Intravenous Q24H    fluconazole (DIFLUCAN) IVPB  200 mg Intravenous Q24H    insulin detemir U-100  8 Units Subcutaneous QHS    lactated ringers  1,000 mL Intravenous Once    lactated ringers  1,000 mL Intravenous Once    pantoprazole  40 mg Intravenous BID     PRN Meds:calcium gluconate IVPB, calcium gluconate IVPB, calcium gluconate IVPB, Dextrose 10% Bolus, Dextrose 10% Bolus, Dextrose 10% Bolus, Dextrose 10% Bolus, heparin (PORCINE), heparin (PORCINE), hydrALAZINE, magnesium sulfate IVPB, magnesium sulfate IVPB, morphine, ondansetron, potassium chloride in water **AND** potassium chloride in water **AND** potassium chloride in water, sodium chloride 0.9%, sodium phosphate IVPB, sodium phosphate IVPB, sodium phosphate IVPB, Pharmacy to dose Vancomycin consult **AND** vancomycin - pharmacy to dose     Review of patient's allergies indicates:   Allergen Reactions    Adhesive tape-silicones Blisters     Objective:     Vital Signs (Most Recent):  Temp: 99.7 °F (37.6 °C) (06/13/20 0602)  Pulse: 73 (06/13/20 0602)  Resp: 20 (06/13/20 0602)  BP: 117/67 (06/13/20 0602)  SpO2: 98 % (06/13/20 0602) Vital Signs (24h Range):  Temp:  [96.8 °F (36 °C)-99.7 °F (37.6 °C)] 99.7 °F (37.6 °C)  Pulse:  [51-83] 73  Resp:  [12-27] 20  SpO2:  [84  %-100 %] 98 %  BP: ()/(46-72) 117/67  Arterial Line BP: ()/(37-74) 104/54     Weight: 121.7 kg (268 lb 4.8 oz)  Body mass index is 39.74 kg/m².    Intake/Output - Last 3 Shifts       06/11 0700 - 06/12 0659 06/12 0700 - 06/13 0659 06/13 0700 - 06/14 0659    P.O.  0     I.V. (mL/kg) 122.5 (1) 3668.7 (30.1)     NG/GT  0     IV Piggyback 1000 9800     Total Intake(mL/kg) 1122.5 (9.2) 31188.7 (110.7)     Urine (mL/kg/hr) 400 (0.1) 2205 (0.8)     Drains 850 350     Other  835     Stool 0 0     Blood  100     Total Output 1250 3490     Net -127.5 +9978.7            Stool Occurrence 0 x 0 x           Physical Exam  Gen: Intubated, sedated   Pulm: intubated  Vent Mode: A/C  Oxygen Concentration (%):  [] 40  Resp Rate Total:  [18 br/min-25 br/min] 20 br/min  Vt Set:  [520 mL-540 mL] 520 mL  PEEP/CPAP:  [5 cmH20-8 cmH20] 8 cmH20  Pressure Support:  [0 cmH20] 0 cmH20  Mean Airway Pressure:  [11 eoC13-05 cmH20] 15 cmH20   CV: Hypotensive req pressors - vaso 0.04, levo 0.02  Abd: Open with ABThera in place - 800+ cc SS output  Extremities - wwp    Significant Labs:  CBC:   Recent Labs   Lab 06/13/20  0300   WBC 3.21*   RBC 3.80*   HGB 11.5*   HCT 34.6*   *   MCV 91   MCH 30.3   MCHC 33.2     CMP:   Recent Labs   Lab 06/13/20  0300   *  137*   CALCIUM 7.0*  7.0*   ALBUMIN 1.7*   PROT 4.5*     140   K 4.8  4.8   CO2 20*  20*     109   BUN 58*  58*   CREATININE 3.5*  3.5*   ALKPHOS 50*   ALT 23   AST 35   BILITOT 0.6     Coagulation:   Recent Labs   Lab 06/12/20  1352  06/13/20  0300   LABPROT 14.0*  --   --    INR 1.4*  --   --    APTT  --    < > 96.9*    < > = values in this interval not displayed.       Significant Diagnostics:  I have reviewed all pertinent imaging results/findings within the past 24 hours.

## 2020-06-13 NOTE — CARE UPDATE
Pt received from or intubated. Placed on mechanical ventilation with documented settings. Tolerating well. Will continue to monitor.

## 2020-06-13 NOTE — PLAN OF CARE
POC reviewed with pt and family at 1000 prior to surgery, pt wife was at the bedside to sign all consents for all procedure completed today and for procedures planned for tomorrow and Monday. Pt verbalized understanding prior to leaving for surgery. Wound vac in place with plans for more surgery tomorrow Saucedo placed in surgery today. Afsaneh and central line placed at bedside this evening. Wife was here so sign for these lines at the time. Pt has been weaned off the levo and vaso at this time. Propofol remain at 50 at this time for comfort. Heparin gtt was started at 1500 and APTT is due Q 6 hour and is due at 2100. Q 4hour labs were collected throughout the day but due to patient being a hard stick with slow flow blood lab results were inconsistent until afsaneh was place and now have a consistent reliable source for accurate labs. Stat ABG with light was completed and 4 amps of bicarb were given stat and total of 10 liters of LR were given in the last 14 hours. . Questions and concerns addressed. No acute events today. Pt progressing toward goals. Will continue to monitor. See flowsheets for full assessment and VS info.    Problem: Wound  Goal: Optimal Wound Healing  Outcome: Ongoing, Progressing     Problem: Fall Injury Risk  Goal: Absence of Fall and Fall-Related Injury  Outcome: Ongoing, Progressing     Problem: Adult Inpatient Plan of Care  Goal: Plan of Care Review  Outcome: Ongoing, Progressing  Goal: Patient-Specific Goal (Individualization)  Outcome: Ongoing, Progressing  Goal: Absence of Hospital-Acquired Illness or Injury  Outcome: Ongoing, Progressing  Goal: Optimal Comfort and Wellbeing  Outcome: Ongoing, Progressing  Goal: Rounds/Family Conference  Outcome: Ongoing, Progressing     Problem: Bariatric Environmental Safety  Goal: Safety Maintained with Care  Outcome: Ongoing, Progressing     Problem: Diabetes Comorbidity  Goal: Blood Glucose Level Within Desired Range  Outcome: Ongoing, Progressing      Problem: Electrolyte Imbalance (Acute Kidney Injury/Impairment)  Goal: Serum Electrolyte Balance  Outcome: Ongoing, Progressing     Problem: Fluid Imbalance (Acute Kidney Injury/Impairment)  Goal: Optimal Fluid Balance  Outcome: Ongoing, Progressing     Problem: Hematologic Alteration (Acute Kidney Injury/Impairment)  Goal: Hemoglobin, Hematocrit and Platelets Within Normal Range  Outcome: Ongoing, Progressing     Problem: Renal Function Impairment (Acute Kidney Injury/Impairment)  Goal: Effective Renal Function  Outcome: Ongoing, Progressing     Problem: Adjustment to Illness (Stroke, Ischemic/Transient Ischemic Attack)  Goal: Optimal Coping  Outcome: Ongoing, Progressing     Problem: Cerebral Tissue Perfusion Risk (Stroke, Ischemic/Transient Ischemic Attack)  Goal: Optimal Cerebral Tissue Perfusion  Outcome: Ongoing, Progressing     Problem: Communication Impairment (Stroke, Ischemic/Transient Ischemic Attack)  Goal: Improved Communication Skills  Outcome: Ongoing, Progressing     Problem: Eating/Swallowing Impairment (Stroke, Ischemic/Transient Ischemic Attack)  Goal: Oral Intake without Aspiration  Outcome: Ongoing, Progressing     Problem: Functional Ability Impaired (Stroke, Ischemic/Transient Ischemic Attack)  Goal: Optimal Functional Ability  Outcome: Ongoing, Progressing     Problem: Hemodynamic Instability (Stroke, Ischemic/Transient Ischemic Attack)  Goal: Vital Signs Remain in Desired Range  Outcome: Ongoing, Progressing     Problem: Pain (Stroke, Ischemic/Transient Ischemic Attack)  Goal: Acceptable Pain Control  Outcome: Ongoing, Progressing     Problem: Urinary Elimination Impaired (Stroke, Ischemic/Transient Ischemic Attack)  Goal: Effective Urinary Elimination  Outcome: Ongoing, Progressing     Problem: Infection  Goal: Infection Symptom Resolution  Outcome: Ongoing, Progressing     Problem: Skin Injury Risk Increased  Goal: Skin Health and Integrity  Outcome: Ongoing, Progressing     Problem:  Communication Impairment (Mechanical Ventilation, Invasive)  Goal: Effective Communication  Outcome: Ongoing, Progressing     Problem: Device-Related Complication Risk (Mechanical Ventilation, Invasive)  Goal: Optimal Device Function  Outcome: Ongoing, Progressing     Problem: Inability to Wean (Mechanical Ventilation, Invasive)  Goal: Mechanical Ventilation Liberation  Outcome: Ongoing, Progressing     Problem: Nutrition Impairment (Mechanical Ventilation, Invasive)  Goal: Optimal Nutrition Delivery  Outcome: Ongoing, Progressing     Problem: Skin and Tissue Injury (Mechanical Ventilation, Invasive)  Goal: Absence of Device-Related Skin and Tissue Injury  Outcome: Ongoing, Progressing     Problem: Ventilator-Induced Lung Injury (Mechanical Ventilation, Invasive)  Goal: Absence of Ventilator-Induced Lung Injury  Outcome: Ongoing, Progressing     Problem: Communication Impairment (Artificial Airway)  Goal: Effective Communication  Outcome: Ongoing, Progressing     Problem: Device-Related Complication Risk (Artificial Airway)  Goal: Optimal Device Function  Outcome: Ongoing, Progressing     Problem: Skin and Tissue Injury (Artificial Airway)  Goal: Absence of Device-Related Skin or Tissue Injury  Outcome: Ongoing, Progressing     Problem: Noninvasive Ventilation Acute  Goal: Effective Unassisted Ventilation and Oxygenation  Outcome: Ongoing, Progressing

## 2020-06-13 NOTE — PROGRESS NOTES
[facility]  Critical Care - Surgery  Progress Note    Patient Name: Chau Rodarte  MRN: [id number]  Admission Date: 6/10/2020  Hospital Length of Stay: 2 days  Code Status: Full Code  Attending Provider: Janneth Cherry MD  Primary Care Provider: Riki Huynh MD   Principal Problem: Acute right ISABELA stroke    Subjective:       Interval History: Intubated, sedated (propofol 10). Requiring pressor support Levo .04, Vaso .04. Lactic acidosis improving (7.2->3.3). Take back to OR today.    Follow-up For: Procedure(s) (LRB):  LAPAROTOMY, EXPLORATORY (N/A)  APPLICATION, WOUND VAC (N/A)    Post-Operative Day: Day of Surgery     Past Medical History:   Diagnosis Date    Acute on chronic systolic CHF (congestive heart failure) 11/29/2016    Acute osteomyelitis of toe, right     Anemia     Anticoagulant long-term use     CKD (chronic kidney disease) stage 2, GFR 60-89 ml/min 2/21/2016    CKD (chronic kidney disease) stage 3, GFR 30-59 ml/min     Coronary artery disease     Cryptogenic stroke, remote history (2006) 1/26/2017    Decubitus ulcer, heel, right, unstageable     Diabetic foot ulcer 11/13/2013    Diabetic foot ulcer with osteomyelitis 6/23/2017    Encounter for blood transfusion     HTN (hypertension) 3/3/2014    Hyperlipidemia 3/3/2014    NSTEMI (non-ST elevated myocardial infarction) 11/28/2016    Obesity (BMI 30-39.9) 11/29/2016    Osteomyelitis, chronic     PVD (peripheral vascular disease)     Stroke     Type 2 diabetes mellitus with diabetic peripheral angiopathy without gangrene, without long-term current use of insulin 10/24/2013    Type 2 diabetes mellitus with diabetic polyneuropathy, without long-term current use of insulin 11/29/2016       Past Surgical History:   Procedure Laterality Date    CARDIAC SURGERY      COLONOSCOPY N/A 3/21/2017    Procedure: COLONOSCOPY;  Surgeon: Karissa Peck MD;  Location: [address];  Service: Endoscopy;  Laterality:  N/A;    CORONARY ANGIOPLASTY WITH STENT PLACEMENT      FOOT NERVE GRAFT  2013    left leg stent      Loop Recorder placement      right leg bypass         Review of patient's allergies indicates:   Allergen Reactions    Adhesive tape-silicones Blisters       Family History     Problem Relation (Age of Onset)    Blindness Maternal Grandmother    Cataracts Mother    Diabetes Mother    Glaucoma Mother    Heart disease Father        Tobacco Use    Smoking status: Former Smoker     Packs/day: 1.00     Years: 50.00     Pack years: 50.00     Quit date: 3/1/2011     Years since quittin.2    Smokeless tobacco: Never Used   Substance and Sexual Activity    Alcohol use: No    Drug use: No    Sexual activity: Yes     Partners: Female     Comment:  with 2 children 3 grand sons and 1 great grand daughter      Review of Systems   Unable to perform ROS: Intubated     Objective:     Vital Signs (Most Recent):  Temp: 99.7 °F (37.6 °C) (20)  Pulse: 73 (20)  Resp: 20 (20)  BP: 117/67 (20)  SpO2: 98 % (20) Vital Signs (24h Range):  Temp:  [96.8 °F (36 °C)-99.7 °F (37.6 °C)] 99.7 °F (37.6 °C)  Pulse:  [51-83] 73  Resp:  [12-27] 20  SpO2:  [84 %-100 %] 98 %  BP: ()/(46-72) 117/67  Arterial Line BP: ()/(37-74) 104/54     Weight: 121.7 kg (268 lb 4.8 oz)  Body mass index is 39.74 kg/m².      Intake/Output Summary (Last 24 hours) at 2020 0608  Last data filed at 2020 0602  Gross per 24 hour   Intake 72658.69 ml   Output 4340 ml   Net 9128.69 ml       Physical Exam  Vitals signs and nursing note reviewed.   Constitutional:       Appearance: He is well-developed and well-nourished.   HENT:      Head: Normocephalic and atraumatic.   Cardiovascular:      Comments: Tachycardic  Hypotensive on Vasopressor support  Right IJ CVC placed  Pulmonary:      Comments: Intubated  Sedated  Elevated Vent Settings  Abdominal:      General: There is  distension.      Palpations: Abdomen is soft.      Tenderness: There is no abdominal tenderness. There is no guarding.      Comments: Midline laparotomy incision  Abthera wound vac to incision  Serosanguinous output in cannister   Genitourinary:     Comments: Urinary catheter in place  Skin:     General: Skin is warm and dry.         Vents:  Vent Mode: A/C (06/13/20 0513)  Set Rate: 20 BPM (06/13/20 0513)  Vt Set: 520 mL (06/13/20 0513)  Pressure Support: 0 cmH20 (06/13/20 0513)  PEEP/CPAP: 8 cmH20 (06/13/20 0513)  Oxygen Concentration (%): 40 (06/13/20 0602)  Peak Airway Pressure: 29 cmH2O (06/13/20 0513)  Plateau Pressure: 28 cmH20 (06/13/20 0513)  Total Ve: 10.9 mL (06/13/20 0513)  F/VT Ratio<105 (RSBI): (!) 36.76 (06/13/20 0513)    Lines/Drains/Airways     Central Venous Catheter Line            Percutaneous Central Line Insertion/Assessment - Triple Lumen  06/12/20 1810 right internal jugular less than 1 day          Drain                 NG/OG Tube 06/12/20 0608 Right nostril 1 day         Urethral Catheter 06/12/20 1330 less than 1 day          Airway                 Airway - Non-Surgical 06/12/20 1146 Endotracheal Tube less than 1 day          Arterial Line                 Arterial Line 06/12/20 1700 Right Radial less than 1 day          Peripheral Intravenous Line                 Peripheral IV - Single Lumen 06/11/20 0009 20 G Right Hand 2 days         Peripheral IV - Single Lumen 06/11/20 0042 18 G Right Antecubital 2 days                Significant Labs:    CBC/Anemia Profile:  Recent Labs   Lab 06/12/20  1600 06/12/20  1750 06/12/20  1753 06/13/20  0300   WBC 5.43  --  5.08 3.21*   HGB 9.5*  --  11.3* 11.5*   HCT 30.5* 34* 35.6* 34.6*   *  --  157 148*   MCV 99*  --  96 91   RDW 14.6*  --  14.7* 14.6*        Chemistries:  Recent Labs   Lab 06/12/20  0429  06/12/20  1753 06/12/20  2041 06/13/20  0100 06/13/20  0300     138   < > 137 141 141 140  140   K 5.0  5.0  5.0   < > 4.8 4.8 4.7 4.8   4.8     108   < > 109 108 109 109  109   CO2 15*  15*   < > 16* 19* 21* 20*  20*   BUN 69*  69*   < > 69* 59* 57* 58*  58*   CREATININE 4.9*  4.9*   < > 4.8* 3.9* 3.6* 3.5*  3.5*   CALCIUM 8.4*  8.4*   < > 7.3* 7.1* 6.9* 7.0*  7.0*   ALBUMIN 3.0*  --  2.2*  --   --  1.7*   PROT 6.6  --  5.1*  --   --  4.5*   BILITOT 1.1*  --  0.6  --   --  0.6   ALKPHOS 79  --  56  --   --  50*   ALT 32  --  28  --   --  23   AST 38  --  31  --   --  35   MG  --   --  1.6  --   --  1.7   PHOS  --   --  3.9  --   --  3.3    < > = values in this interval not displayed.       All pertinent labs within the past 24 hours have been reviewed.    Significant Imaging: I have reviewed and interpreted all pertinent imaging results/findings within the past 24 hours.    Assessment/Plan:     Small bowel ischemia  71 y/o M with Afib and embolic events to brain and bowel  Taken to OR today for mesenteric ischemia s/p Jejunum resection x 2. Bowel left in discontinuity and to be taken 06/13/2020 for second look.    Neuro:  Sedation: Propofol 10, fentanyl as needed  Pain Control: Tylenol IV, PRN Dilaudid     CV:  MAP goal >65  Currently Normal Sinus Rhythm  On Heparin gtt for mesenteric ischemia (held pre-op)  Levophed .04 and Vasopressin .04 gtt for MAP goals     Respiratory  Intubated and Sedated  Will remain intubated pending procedure in AM  ABG PRN and Daily  Daily CXR     FEN / GI  Protonix GI PPX  NPO until Extubated and Bowel Reanastomosed  NGT to LIWS  Replete Lytes PRN  Will consider TPN pending laparotomy results     Renal:  Baseline CKD4 -- Cr 2-3  Oliguric 20-40 cc/hr  Lactic Acidosis improving (7.2->3.3)  Sodium Bicarbonate Given  Trend Lactates q 6 hours  Aggressive Fluid Resuscitation  Monitor UOP  Replete Lytes PRN  Maintain K>4 and Mg>2 for history of Afib     ID  Afebrile  Leukocytosis present  Cefepime / Vancomycin / Fluconazole     PPx:  Heparin gtt  Protonix 40 mg BID     Disposition: SICU; back to OR today        Byron Hogan MD  Critical Care - Surgery  [facility]

## 2020-06-13 NOTE — ASSESSMENT & PLAN NOTE
71 yo M presenting with CVA and subsequent small bowel ischemia, likely of embolic etiology.     - Return to OR today for washout  - Continued fluid resuscitation  - Minimize pressor and ventilatory support  - Appreciate SICU assistance.

## 2020-06-13 NOTE — PROCEDURES
"Chau Rodarte is a 70 y.o. male patient.    Temp: 97.5 °F (36.4 °C) (06/12/20 1500)  Pulse: 76 (06/12/20 1845)  Resp: (!) 21 (06/12/20 1845)  BP: (!) 167/70 (06/12/20 1845)  SpO2: 99 % (06/12/20 1845)  Weight: 121.7 kg (268 lb 4.8 oz) (06/11/20 0845)  Height: 5' 8.9" (175 cm) (06/11/20 0845)       Arterial Line  Date/Time: 6/12/2020 7:38 PM  Performed by: Zain Soni MD  Authorized by: Castro Cuenca MD   Pre-op Diagnosis: mesenteric ischemia  Post-operative diagnosis: mesenteric ischemia  Consent Done: Yes  Consent: Written consent obtained.  Consent given by: spouse  Time out: Immediately prior to procedure a "time out" was called to verify the correct patient, procedure, equipment, support staff and site/side marked as required.  Preparation: Patient was prepped and draped in the usual sterile fashion.  Indications: multiple ABGs, respiratory failure and hemodynamic monitoring  Location: right radial  Patient sedated: yes  Sedatives: propofol  Joesph's test normal: yes  Needle gauge: 20  Seldinger technique: Seldinger technique used  Number of attempts: 1  Complications: No  Specimens: No  Implants: No  Post-procedure: dressing applied  Post-procedure CMS: normal and unchanged  Patient tolerance: Patient tolerated the procedure well with no immediate complications          Zain Soni  6/12/2020  "

## 2020-06-13 NOTE — SUBJECTIVE & OBJECTIVE
Interval History: Intubated, sedated (propofol 10). Requiring pressor support Levo .04, Vaso .04. Lactic acidosis improving (7.2->3.3). Take back to OR today.    Follow-up For: Procedure(s) (LRB):  LAPAROTOMY, EXPLORATORY (N/A)  APPLICATION, WOUND VAC (N/A)    Post-Operative Day: Day of Surgery     Past Medical History:   Diagnosis Date    Acute on chronic systolic CHF (congestive heart failure) 11/29/2016    Acute osteomyelitis of toe, right     Anemia     Anticoagulant long-term use     CKD (chronic kidney disease) stage 2, GFR 60-89 ml/min 2/21/2016    CKD (chronic kidney disease) stage 3, GFR 30-59 ml/min     Coronary artery disease     Cryptogenic stroke, remote history (2006) 1/26/2017    Decubitus ulcer, heel, right, unstageable     Diabetic foot ulcer 11/13/2013    Diabetic foot ulcer with osteomyelitis 6/23/2017    Encounter for blood transfusion     HTN (hypertension) 3/3/2014    Hyperlipidemia 3/3/2014    NSTEMI (non-ST elevated myocardial infarction) 11/28/2016    Obesity (BMI 30-39.9) 11/29/2016    Osteomyelitis, chronic     PVD (peripheral vascular disease)     Stroke     Type 2 diabetes mellitus with diabetic peripheral angiopathy without gangrene, without long-term current use of insulin 10/24/2013    Type 2 diabetes mellitus with diabetic polyneuropathy, without long-term current use of insulin 11/29/2016       Past Surgical History:   Procedure Laterality Date    CARDIAC SURGERY      COLONOSCOPY N/A 3/21/2017    Procedure: COLONOSCOPY;  Surgeon: Karissa Peck MD;  Location: [address];  Service: Endoscopy;  Laterality: N/A;    CORONARY ANGIOPLASTY WITH STENT PLACEMENT      FOOT NERVE GRAFT  11/2013    left leg stent      Loop Recorder placement      right leg bypass         Review of patient's allergies indicates:   Allergen Reactions    Adhesive tape-silicones Blisters       Family History     Problem Relation (Age of Onset)    Blindness Maternal  Grandmother    Cataracts Mother    Diabetes Mother    Glaucoma Mother    Heart disease Father        Tobacco Use    Smoking status: Former Smoker     Packs/day: 1.00     Years: 50.00     Pack years: 50.00     Quit date: 3/1/2011     Years since quittin.2    Smokeless tobacco: Never Used   Substance and Sexual Activity    Alcohol use: No    Drug use: No    Sexual activity: Yes     Partners: Female     Comment:  with 2 children 3 grand sons and 1 great grand daughter      Review of Systems   Unable to perform ROS: Intubated     Objective:     Vital Signs (Most Recent):  Temp: 99.7 °F (37.6 °C) (20)  Pulse: 73 (20)  Resp: 20 (20)  BP: 117/67 (20)  SpO2: 98 % (20) Vital Signs (24h Range):  Temp:  [96.8 °F (36 °C)-99.7 °F (37.6 °C)] 99.7 °F (37.6 °C)  Pulse:  [51-83] 73  Resp:  [12-27] 20  SpO2:  [84 %-100 %] 98 %  BP: ()/(46-72) 117/67  Arterial Line BP: ()/(37-74) 104/54     Weight: 121.7 kg (268 lb 4.8 oz)  Body mass index is 39.74 kg/m².      Intake/Output Summary (Last 24 hours) at 2020 0608  Last data filed at 2020 0602  Gross per 24 hour   Intake 40055.69 ml   Output 4340 ml   Net 9128.69 ml       Physical Exam  Vitals signs and nursing note reviewed.   Constitutional:       Appearance: He is well-developed and well-nourished.   HENT:      Head: Normocephalic and atraumatic.   Cardiovascular:      Comments: Tachycardic  Hypotensive on Vasopressor support  Right IJ CVC placed  Pulmonary:      Comments: Intubated  Sedated  Elevated Vent Settings  Abdominal:      General: There is distension.      Palpations: Abdomen is soft.      Tenderness: There is no abdominal tenderness. There is no guarding.      Comments: Midline laparotomy incision  Abthera wound vac to incision  Serosanguinous output in cannister   Genitourinary:     Comments: Urinary catheter in place  Skin:     General: Skin is warm and dry.         Vents:  Vent  Mode: A/C (06/13/20 0513)  Set Rate: 20 BPM (06/13/20 0513)  Vt Set: 520 mL (06/13/20 0513)  Pressure Support: 0 cmH20 (06/13/20 0513)  PEEP/CPAP: 8 cmH20 (06/13/20 0513)  Oxygen Concentration (%): 40 (06/13/20 0602)  Peak Airway Pressure: 29 cmH2O (06/13/20 0513)  Plateau Pressure: 28 cmH20 (06/13/20 0513)  Total Ve: 10.9 mL (06/13/20 0513)  F/VT Ratio<105 (RSBI): (!) 36.76 (06/13/20 0513)    Lines/Drains/Airways     Central Venous Catheter Line            Percutaneous Central Line Insertion/Assessment - Triple Lumen  06/12/20 1810 right internal jugular less than 1 day          Drain                 NG/OG Tube 06/12/20 0608 Right nostril 1 day         Urethral Catheter 06/12/20 1330 less than 1 day          Airway                 Airway - Non-Surgical 06/12/20 1146 Endotracheal Tube less than 1 day          Arterial Line                 Arterial Line 06/12/20 1700 Right Radial less than 1 day          Peripheral Intravenous Line                 Peripheral IV - Single Lumen 06/11/20 0009 20 G Right Hand 2 days         Peripheral IV - Single Lumen 06/11/20 0042 18 G Right Antecubital 2 days                Significant Labs:    CBC/Anemia Profile:  Recent Labs   Lab 06/12/20  1600 06/12/20  1750 06/12/20  1753 06/13/20  0300   WBC 5.43  --  5.08 3.21*   HGB 9.5*  --  11.3* 11.5*   HCT 30.5* 34* 35.6* 34.6*   *  --  157 148*   MCV 99*  --  96 91   RDW 14.6*  --  14.7* 14.6*        Chemistries:  Recent Labs   Lab 06/12/20  0429  06/12/20  1753 06/12/20  2041 06/13/20  0100 06/13/20  0300     138   < > 137 141 141 140  140   K 5.0  5.0  5.0   < > 4.8 4.8 4.7 4.8  4.8     108   < > 109 108 109 109  109   CO2 15*  15*   < > 16* 19* 21* 20*  20*   BUN 69*  69*   < > 69* 59* 57* 58*  58*   CREATININE 4.9*  4.9*   < > 4.8* 3.9* 3.6* 3.5*  3.5*   CALCIUM 8.4*  8.4*   < > 7.3* 7.1* 6.9* 7.0*  7.0*   ALBUMIN 3.0*  --  2.2*  --   --  1.7*   PROT 6.6  --  5.1*  --   --  4.5*   BILITOT 1.1*  --   0.6  --   --  0.6   ALKPHOS 79  --  56  --   --  50*   ALT 32  --  28  --   --  23   AST 38  --  31  --   --  35   MG  --   --  1.6  --   --  1.7   PHOS  --   --  3.9  --   --  3.3    < > = values in this interval not displayed.       All pertinent labs within the past 24 hours have been reviewed.    Significant Imaging: I have reviewed and interpreted all pertinent imaging results/findings within the past 24 hours.

## 2020-06-13 NOTE — CARE UPDATE
Care taken over from [facility] after Surgical Intervention  S/p SBR x 2 -- Bowel left in discontinuity.   Abdomen Open with Abthera Vac  Plan to bring back to OR for second look for bowel viability / abdominal closure    Upon examination, patient hypotensive / tachycardic/ oliguric.   Already on 0.12 mg / kg / min of Levophed  Arterial Line and Central Line Placed  Labs sent off  PH 7.1  BE -12  Serum Lactate 12  CO2 - 8    Ordered for 4 amps Sodium Bicarbonate & 5 Liters Ringer's Lactate  Will continue to trend Lactic Acid and ABGs    Zain Soni  06/12/2020  7:38 PM

## 2020-06-13 NOTE — OP NOTE
DATE OF PROCEDURE: 6/13/2020     PREOPERATIVE DIAGNOSIS: Small bowel ischemia      POSTOPERATIVE DIAGNOSIS: Same     PROCEDURES PERFORMED:  1. Exploratory laparotomy with small bowel resection   2. ABThera wound vac placement      ATTENDING SURGEON: Dr. LORENZO Cherry      RESIDENT: Dr Nino Luu - [facility]     ANESTHESIA: General      KEY FINDINGS: Resection of 10 inch segment of necrotic jejunum.      ESTIMATED BLOOD LOSS: Minimal     DRAINS: ABThera wound vac     COMPLICATIONS: None     INDICATIONS FOR PROCEDURE: The patient is a 70 y.o. male who presented with ischemic stroke and subsequently developed small bowel ischemia detected as pneumatosis intestinalis on CT. Based on this surgery was indicated for a second look and washout.     PROCEDURE IN DETAIL: After informed consent was obtained, the patient was brought to the Operative Theater and placed in in the supine position. After adequate anesthesia the patient was prepped and draped in the usual sterile fashion. A timeout procedure was performed and the ABThera wound vac was taken down. The small bowel was delivered through the wound and multiple segments of ischemia were noted. The bowel was run from the LOT to the TI and one segments of frankly devitalized small bowel was encountered in the mid jejunum.  This was within the area of patchy ischemia previously noted during initial surgery. This was resected using two 75 mm blue loads of the FERDINAND stapler; the mesentery between staples lines was resected using the Impact Ligasure. The proximal and distal ends of these resection lines were tacked together with silk suture and left in discontinuity. Segments of the remaining mid jejunum appeared threatened but not frankly devitalized/gangrenous; the proximal jejunum and terminal ilium appeared grossly healthy. The abdominal cavity was irrigated with copious saline solution. The ABThera wound vac was placed in standard fashion and the patient's abdomen was left  open to facilitate a return to the operating room in 24-48 hours for planned re-exploration.      The patient was transported directly to the ICU intubated in critical but stable condition. Dr. Cherry was present and scrubbed for the procedure.       Attending attestation:    I have reviewed the Resident's operative report. I agree with the findings. Any changes were made directly in the note above.    Janneth Cherry MD  Endocrine Surgery & General Surgery

## 2020-06-13 NOTE — PROGRESS NOTES
Dr. Cuenca and Dr. Anthony at bedside to open wound vac dressing and perform mesenteric artery ultrasound.

## 2020-06-13 NOTE — ASSESSMENT & PLAN NOTE
69 y/o M with Afib and embolic events to brain and bowel  Taken to OR today for mesenteric ischemia s/p Jejunum resection x 2. Bowel left in discontinuity and to be taken 06/13/2020 for second look.    Neuro:  Sedation: Propofol and Fentanyl  Pain Control: Tylenol IV, PRN Dilaudid     CV:  MAP goal >65  Currently Normal Sinus Rhythm  Start Heparin gtt for mesenteric ischemia  Levophed and Vasopressin gtt for MAP goals     Respiratory  Intubated and Sedated  Will remain intubated pending procedure in AM  ABG PRN and Daily  Daily CXR     FEN / GI  Protonix GI PPX  NPO until Extubated and Bowel Reanastomosed  NGT to LIWS  Replete Lytes PRN  Will consider TPN pending laparotomy results     Renal:  Baseline CKD4 -- Cr 2-3  Oliguric 20-40 cc/hr  Lactic Acidosis present  Sodium Bicarbonate Given  Trend Lactates q 6 hours  Aggressive Fluid Resuscitation  Monitor UOP  Replete Lytes PRN  Maintain K>4 and Mg>2 for history of Afib     ID  Afebrile  Leukocytosis present  Cefepime / Vancomycin / Fluconazole     PPx:  Heparin gtt  Protonix 40 mg BID     Disposition: SICU

## 2020-06-13 NOTE — PROGRESS NOTES
Unable to visualize SMA using transperitoneal ultrasound    With Dr. Cuenca and Dr. Clarke we opened the ABThera VAC and performed intra-abdominal ultrasound to evaluate the SMA.  We identified the celiac artery which was patent, the SMA could not be reliably visualized.  We palpated the root of the mesentery and felt no pulse.  Performed ultrasound of the mesenteric border of the bowel and saw some flow.    Unfortunately, ischemia has progressed significantly since exploration earlier this morning.  Only the proximal jejunum is spared of ischemia, the remainder of the jejunum and ileum is either frankly necrotic or appears to be acutely threatened.    Discussed with General surgery team.  We could perform lateral angiography to evaluate the flow through the SMA.  Unfortunately, the patient is not likely to survive this extent of small bowel ischemia.  We feel angiography is not going to meaningfully change the patient's clinical course.  We feel transition to palliative care is appropriate.    Rudy nAthony MD PGY6  Vascular and Endovascular Surgery Fellow  06/13/2020

## 2020-06-13 NOTE — PROCEDURES
"Chau Rodarte is a 70 y.o. male patient.    Temp: 97.5 °F (36.4 °C) (06/12/20 1500)  Pulse: 76 (06/12/20 1845)  Resp: (!) 21 (06/12/20 1845)  BP: (!) 167/70 (06/12/20 1845)  SpO2: 99 % (06/12/20 1845)  Weight: 121.7 kg (268 lb 4.8 oz) (06/11/20 0845)  Height: 5' 8.9" (175 cm) (06/11/20 0845)       Central Line  Date/Time: 6/12/2020 7:39 PM  Performed by: Zain Soni MD  Pre-operative Diagnosis: mesenteric ischemia  Post-operative diagnosis: mesenteric ischemia  Consent Done: Yes  Time out: Immediately prior to procedure a "time out" was called to verify the correct patient, procedure, equipment, support staff and site/side marked as required.  Indications: vascular access, hemodynamic monitoring and med administration  Preparation: skin prepped with ChloraPrep  Skin prep agent dried: skin prep agent completely dried prior to procedure  Sterile barriers: all five maximum sterile barriers used - cap, mask, sterile gown, sterile gloves, and large sterile sheet  Hand hygiene: hand hygiene performed prior to central venous catheter insertion  Location details: right internal jugular  Catheter type: triple lumen  Catheter size: 7 Fr  Catheter Length: 16cm    Ultrasound guidance: yes  Vessel Caliber: medium, patent, compressibility normal  Vascular Doppler: not done  Needle advanced into vessel with real time Ultrasound guidance.  Guidewire confirmed in vessel.  Image recorded and saved.  Sterile sheath used.  Manometry: Yes  Number of attempts: 1  Assessment: successful placement  Complications: none  Specimens: No  Implants: No  Post-procedure: line sutured,  chlorhexidine patch,  sterile dressing applied and blood return through all ports  Complications: No          Zain Soni  6/12/2020  "

## 2020-06-13 NOTE — PROGRESS NOTES
[facility]  General Surgery  Progress Note    Subjective:     History of Present Illness:  No notes on file    Post-Op Info:  Procedure(s) (LRB):  LAPAROTOMY, EXPLORATORY - second look (N/A)   Day of Surgery     Interval History: No acute events. Patient's pressor requirements improved with continued resuscitation. Marginal UOP responsive to fluids. Minimal vent settings. Lactate 3.3 from 7.2 this AM.     Medications:  Continuous Infusions:   fentanyl 5 mL/hr at 06/13/20 0602    heparin (porcine) in D5W Stopped (06/13/20 0430)    insulin (HUMAN R) infusion (adults) 0.1 Units/hr (06/13/20 0602)    lactated ringers 150 mL/hr at 06/13/20 0602    norepinephrine bitartrate-D5W 0.02 mcg/kg/min (06/13/20 0602)    propofoL Stopped (06/13/20 0510)    vasopressin (PITRESSIN) infusion 0.04 Units/min (06/13/20 0602)     Scheduled Meds:   amiodarone  200 mg Oral Daily    ceFEPime (MAXIPIME) IVPB  2 g Intravenous Q24H    fluconazole (DIFLUCAN) IVPB  200 mg Intravenous Q24H    insulin detemir U-100  8 Units Subcutaneous QHS    lactated ringers  1,000 mL Intravenous Once    lactated ringers  1,000 mL Intravenous Once    pantoprazole  40 mg Intravenous BID     PRN Meds:calcium gluconate IVPB, calcium gluconate IVPB, calcium gluconate IVPB, Dextrose 10% Bolus, Dextrose 10% Bolus, Dextrose 10% Bolus, Dextrose 10% Bolus, heparin (PORCINE), heparin (PORCINE), hydrALAZINE, magnesium sulfate IVPB, magnesium sulfate IVPB, morphine, ondansetron, potassium chloride in water **AND** potassium chloride in water **AND** potassium chloride in water, sodium chloride 0.9%, sodium phosphate IVPB, sodium phosphate IVPB, sodium phosphate IVPB, Pharmacy to dose Vancomycin consult **AND** vancomycin - pharmacy to dose     Review of patient's allergies indicates:   Allergen Reactions    Adhesive tape-silicones Blisters     Objective:     Vital Signs (Most Recent):  Temp: 99.7 °F (37.6 °C) (06/13/20 0602)  Pulse: 73  (06/13/20 0602)  Resp: 20 (06/13/20 0602)  BP: 117/67 (06/13/20 0602)  SpO2: 98 % (06/13/20 0602) Vital Signs (24h Range):  Temp:  [96.8 °F (36 °C)-99.7 °F (37.6 °C)] 99.7 °F (37.6 °C)  Pulse:  [51-83] 73  Resp:  [12-27] 20  SpO2:  [84 %-100 %] 98 %  BP: ()/(46-72) 117/67  Arterial Line BP: ()/(37-74) 104/54     Weight: 121.7 kg (268 lb 4.8 oz)  Body mass index is 39.74 kg/m².    Intake/Output - Last 3 Shifts       06/11 0700 - 06/12 0659 06/12 0700 - 06/13 0659 06/13 0700 - 06/14 0659    P.O.  0     I.V. (mL/kg) 122.5 (1) 3668.7 (30.1)     NG/GT  0     IV Piggyback 1000 9800     Total Intake(mL/kg) 1122.5 (9.2) 38302.7 (110.7)     Urine (mL/kg/hr) 400 (0.1) 2205 (0.8)     Drains 850 350     Other  835     Stool 0 0     Blood  100     Total Output 1250 3490     Net -127.5 +9978.7            Stool Occurrence 0 x 0 x           Physical Exam  Gen: Intubated, sedated   Pulm: intubated  Vent Mode: A/C  Oxygen Concentration (%):  [] 40  Resp Rate Total:  [18 br/min-25 br/min] 20 br/min  Vt Set:  [520 mL-540 mL] 520 mL  PEEP/CPAP:  [5 cmH20-8 cmH20] 8 cmH20  Pressure Support:  [0 cmH20] 0 cmH20  Mean Airway Pressure:  [11 vyT05-14 cmH20] 15 cmH20   CV: Hypotensive req pressors - vaso 0.04, levo 0.02  Abd: Open with ABThera in place - 800+ cc SS output  Extremities - wwp    Significant Labs:  CBC:   Recent Labs   Lab 06/13/20  0300   WBC 3.21*   RBC 3.80*   HGB 11.5*   HCT 34.6*   *   MCV 91   MCH 30.3   MCHC 33.2     CMP:   Recent Labs   Lab 06/13/20  0300   *  137*   CALCIUM 7.0*  7.0*   ALBUMIN 1.7*   PROT 4.5*     140   K 4.8  4.8   CO2 20*  20*     109   BUN 58*  58*   CREATININE 3.5*  3.5*   ALKPHOS 50*   ALT 23   AST 35   BILITOT 0.6     Coagulation:   Recent Labs   Lab 06/12/20  1352  06/13/20 0300   LABPROT 14.0*  --   --    INR 1.4*  --   --    APTT  --    < > 96.9*    < > = values in this interval not displayed.       Significant Diagnostics:  I have reviewed  all pertinent imaging results/findings within the past 24 hours.    Assessment/Plan:     Small bowel ischemia  71 yo M presenting with CVA and subsequent small bowel ischemia, likely of embolic etiology.     - Return to OR today for washout  - Continued fluid resuscitation  - Minimize pressor and ventilatory support  - Appreciate SICU assistance.            Nino Luu MD  General Surgery  [facility]

## 2020-06-13 NOTE — CONSULTS
VASCULAR SURGERY SERVICE  CONSULTATION NOTE    CHIEF COMPLAINT: ischemic small bowel    HISTORY OF PRESENT ILLNESS: Chau Rodarte is a 70 y.o. male initially admitted to the stroke neurology service for moderate-sized R ISABELA infarct and smaller L ISABELA infarct. His history is significant for CAD s/p PCI x3 back in 2016 (on DAPT at home), HFrEF (last echo Feb 2019 showing EF 45% and G1DD; on GDMT with lisinopril and metoprolol succinate), A fib (on amiodarone, previously on warfarin, stopped 2/2 GI bleed back in 2017; has a loop recorder in place), CKD (baseline creatinine appears 2.5-3), DM2 (takes insulin detemir 16u qhs and victoza at home), PAD (s/p L femoro-popliteal bypass in 2017; history of diabetic foot ulcers), HTN, HLD, and obesity. General surgery was consulted for multiple loose bowel movements, hematochezia, and pneumatosis of small bowel on non-contrast CT scan of abdomen and pelvis. He underwent exploratory laparotomy and small bowel resection x2 for ischemic small bowel 6/12/20. He was left in discontinuity and re-explored 6/13/20, where he underwent additional resection of bowel. Given progression of ischemia, vascular surgery consultation was requested.    Past Medical History:   Diagnosis Date    Acute on chronic systolic CHF (congestive heart failure) 11/29/2016    Acute osteomyelitis of toe, right     Anemia     Anticoagulant long-term use     CKD (chronic kidney disease) stage 2, GFR 60-89 ml/min 2/21/2016    CKD (chronic kidney disease) stage 3, GFR 30-59 ml/min     Coronary artery disease     Cryptogenic stroke, remote history (2006) 1/26/2017    Decubitus ulcer, heel, right, unstageable     Diabetic foot ulcer 11/13/2013    Diabetic foot ulcer with osteomyelitis 6/23/2017    Encounter for blood transfusion     HTN (hypertension) 3/3/2014    Hyperlipidemia 3/3/2014    NSTEMI (non-ST elevated myocardial infarction) 11/28/2016    Obesity (BMI 30-39.9) 11/29/2016    Osteomyelitis,  chronic     PVD (peripheral vascular disease)     Stroke     Type 2 diabetes mellitus with diabetic peripheral angiopathy without gangrene, without long-term current use of insulin 10/24/2013    Type 2 diabetes mellitus with diabetic polyneuropathy, without long-term current use of insulin 11/29/2016       Past Surgical History:   Procedure Laterality Date    CARDIAC SURGERY      COLONOSCOPY N/A 3/21/2017    Procedure: COLONOSCOPY;  Surgeon: Karissa Peck MD;  Location: [facility] ([address] [place] [address]);  Service: Endoscopy;  Laterality: N/A;    CORONARY ANGIOPLASTY WITH STENT PLACEMENT      FOOT NERVE GRAFT  11/2013    left leg stent      Loop Recorder placement      right leg bypass           Current Facility-Administered Medications:     0.9%  NaCl infusion, , Intravenous, Continuous, [facility] Kamaljit Hogan MD, Last Rate: 150 mL/hr at 06/13/20 1009    amiodarone tablet 200 mg, 200 mg, Oral, Daily, Michelle Luu MD, Stopped at 06/13/20 0900    ceFEPIme injection 2 g, 2 g, Intravenous, Q24H, Gustavo Kenney MD, 2 g at 06/13/20 0811    dextrose 10% (D10W) Bolus, 12.5 g, Intravenous, PRN, Dane Quiles MD    dextrose 10% (D10W) Bolus, 25 g, Intravenous, PRN, Dane Quiles MD    dextrose 10% (D10W) Bolus, 25 g, Intravenous, PRN, Zain Soni MD    dextrose 10% (D10W) Bolus, 12.5 g, Intravenous, PRN, Zain Soni MD    fentaNYL 2500 mcg in 0.9% sodium chloride 250 mL infusion premix (titrating), , Intravenous, Continuous, Zain Soni MD, Last Rate: 5 mL/hr at 06/13/20 0934    fluconazole (DIFLUCAN) IVPB 200 mg 100 mL, 200 mg, Intravenous, Q24H, Zain Soni MD, 200 mg at 06/12/20 2119    heparin 25,000 units in dextrose 5% (100 units/ml) IV bolus from bag - ADDITIONAL PRN BOLUS - 30 units/kg, 30 Units/kg (Adjusted), Intravenous, PRN, Nino Luu MD    heparin 25,000 units in dextrose 5% (100 units/ml) IV bolus from bag -  ADDITIONAL PRN BOLUS - 60 units/kg, 60 Units/kg (Adjusted), Intravenous, PRN, Nino Luu MD    heparin 25,000 units in dextrose 5% 250 mL (100 units/mL) infusion HIGH INTENSITY nomogram Anti- Xa, 18 Units/kg/hr (Adjusted), Intravenous, Continuous, Nino Luu MD, Last Rate: 16.4 mL/hr at 06/13/20 0825, 18 Units/kg/hr at 06/13/20 0825    hydrALAZINE injection 10 mg, 10 mg, Intravenous, Q8H PRN, Michelle Luu MD    insulin regular 100 Units in sodium chloride 0.9% 100 mL infusion, 3 Units/hr, Intravenous, Continuous, Zain Soni MD, Last Rate: 0.1 mL/hr at 06/13/20 0934, 0.1 Units/hr at 06/13/20 0934    lactated ringers bolus 1,000 mL, 1,000 mL, Intravenous, Once, Zain Soni MD    metronidazole IVPB 500 mg, 500 mg, Intravenous, Q8H, Castro Cuenca MD, 500 mg at 06/13/20 0901    morphine injection 2 mg, 2 mg, Intravenous, Q4H PRN, Refugio Sarabia DO, 2 mg at 06/12/20 1108    norepinephrine 4 mg in dextrose 5% 250 mL infusion (premix) (titrating), 0.04 mcg/kg/min, Intravenous, Continuous, Zain Soni MD, Last Rate: 9.1 mL/hr at 06/13/20 1009, 0.02 mcg/kg/min at 06/13/20 1009    ondansetron disintegrating tablet 8 mg, 8 mg, Oral, Q8H PRN, Michelle Luu MD    pantoprazole injection 40 mg, 40 mg, Intravenous, BID, Michelle Luu MD, 40 mg at 06/13/20 1022    propofol (DIPRIVAN) 10 mg/mL infusion, 10 mcg/kg/min, Intravenous, Continuous, Zain Soni MD, Last Rate: 21.9 mL/hr at 06/13/20 0934, 30 mcg/kg/min at 06/13/20 0934    sodium chloride 0.9% flush 10 mL, 10 mL, Intravenous, PRN, Michelle Luu MD    Pharmacy to dose Vancomycin consult, , , Once **AND** vancomycin - pharmacy to dose, , Intravenous, pharmacy to manage frequency, Gustavo Kenney MD    vancomycin in dextrose 5 % 1 gram/250 mL IVPB 1,000 mg, 1,000 mg, Intravenous, Once, Janneth Cherry MD    vasopressin (PITRESSIN) 0.2 Units/mL in  "dextrose 5 % 100 mL infusion, 0.04 Units/min, Intravenous, Continuous, Zain Soni MD, Stopped at 20 1009    Review of patient's allergies indicates:   Allergen Reactions    Adhesive tape-silicones Blisters       Family History   Problem Relation Age of Onset    Diabetes Mother     Glaucoma Mother     Cataracts Mother     Heart disease Father     Blindness Maternal Grandmother     Macular degeneration Neg Hx     Retinal detachment Neg Hx     Strabismus Neg Hx     Amblyopia Neg Hx        Social History     Tobacco Use    Smoking status: Former Smoker     Packs/day: 1.00     Years: 50.00     Pack years: 50.00     Quit date: 3/1/2011     Years since quittin.2    Smokeless tobacco: Never Used   Substance Use Topics    Alcohol use: No    Drug use: No       REVIEW OF SYSTEMS:  Unable to obtain - intubated    PHYSICAL EXAM:   /67   Pulse 76   Temp 99 °F (37.2 °C)   Resp 18   Ht 5' 8.9" (1.75 m)   Wt 121.7 kg (268 lb 4.8 oz)   SpO2 97%   BMI 39.74 kg/m²   Constitutional:  Intubated and sedated  Older   Neurological: Sedated - unable to participate in exam   Psychiatric: Sedated - unable to participate in exam   HEENT: Orotracheal tube in place  Midline trachea  No scars across the neck   Cardiac: Regular rate and rhythm.   Pulmonary: Normal pulmonary effort.   Abdomen: Soft, not distended. AbThera in place.   Skin: Warm and well perfused     DIAGNOSTICS:  Recent Labs     20  1753 20  0300 20  0930 20  0932   WBC 5.08 3.21* 4.21  --    HGB 11.3* 11.5* 10.6*  --    HCT 35.6* 34.6* 32.7* 30*    148* 122*  --         Recent Labs     20  0300 20  0740 20  0930     140 140 139   K 4.8  4.8 5.4* 5.8*     109 110 109   CO2 20*  20* 22* 20*   BUN 58*  58* 58* 58*   CREATININE 3.5*  3.5* 3.6* 3.4*   CALCIUM 7.0*  7.0* 6.9* 6.7*   ANIONGAP 11  11 8 10   ESTGFRAFRICA 19.2*  19.2* 18.5* 19.8*   EGFRNONAA 16.6*  " 16.6* 16.0* 17.2*       Recent Labs     06/12/20  1753 06/13/20  0300 06/13/20  0930   ALT 28 23 20   AST 31 35 35   ALKPHOS 56 50* 44*   BILITOT 0.6 0.6 0.8       CT A/P 6/12 reviewed - pneumatosis with poral venous gas. Mild-moderate aortic atherosclerosis. Celiac / SMA mild burden of atherosclerosis.      IMPRESSION & PLAN:  70 y.o. male with small bowel ischemia and recent stroke in setting of atrial fibrillation that was not anticoagulated.    exploratory laparotomy and small bowel resection x2 for ischemic small bowel 6/12/20. He was left in discontinuity and re-explored 6/13/20, where he underwent additional resection of bowel. Suspect this is an embolic process.    Would continue therapeutic anticoagulation.    CKD and ongoing resuscitation make CTA not safe for evaluating the mesenteric circulation.    Will check transabdominal US to evaluate patency of mesenteric vessels. If this exam is limited by the AbThera vac, the general surgical team has graciously offered to interrogate the root of the mesentery with bedside intraabdominal ultrasound.    Rudy Anthony MD PGY6  Vascular and Endovascular Surgery Fellow  06/13/2020      Vascular Attending  Agree with above  71 yo man with h.o Afib who had been off a/c due to recent GI bleed. He presented with an acute abdomen in early AM June 12th; underwent two segmental small bowel resections and left in discontinuity. Given h/o Afib, at this time anticoagulation was started.  He went back to the OR in AM June 13th and more segments of bowel were found to be ischemic.  No SMA interrogation done intra-op  At this time, we were consulted possible mesenteric ischemia at around 11A.  At this time, best plan was to obtain a stat bedside u.s that Acute Care surgery would assist by taking down the abdominal temporary closure for best inspection.  CTA would be avoided dueto Cr > 3.  If SMA was occluded, I would take to the OR for emergent angio (L radial or brachial)  and/or SMA embolectomy.  However, upon entering the abdomen at the bedside for the mesenteric u/s, the entire small bowel was ischemia save the proximal 20 cm.  The decision has been made that this is incompatible with life and no further resection will be done; hence, no SMA angio/revascularization.     Nolan Del Castillo MD DFS FACS   Vascular/Endovascular Surgery

## 2020-06-13 NOTE — PROGRESS NOTES
Pharmacokinetic Assessment Follow Up: IV Vancomycin    Vancomycin Regimen Assessment & Plan:    - Vancomycin random level resulted as 13.6 mg/dl.  Goal trough 10-20 mg/dl.  - Patient with CORRY, continue pulse dosing while renal function is unstable.  - Administer vancomycin 1000 mg x 1 dose.    - Draw vancomycin level with morning labs tomorrow.  Pharmacy to re-dose depending on level and renal function.    Drug levels (last 3 results):  Recent Labs   Lab Result Units 06/13/20  0300   Vancomycin, Random ug/mL 13.5       Pharmacy will continue to follow and monitor vancomycin.    Please contact pharmacy at extension [phone] for questions regarding this assessment.    Thank you for the consult,   Amy Wesley, PharmD, BCCCP     Patient brief summary:  Chau Rodarte is a 70 y.o. male initiated on antimicrobial therapy with IV Vancomycin for treatment of intra-abdominal infection    The patient's current regimen is pulse dosing.    Drug Allergies:   Review of patient's allergies indicates:   Allergen Reactions    Adhesive tape-silicones Blisters       Actual Body Weight:   121.7 kg    Renal Function:   Estimated Creatinine Clearance: 24.9 mL/min (A) (based on SCr of 3.5 mg/dL (H)).,     Dialysis Method (if applicable):  N/A

## 2020-06-13 NOTE — SIGNIFICANT EVENT
Bedside abthera change and mesenteric US was performed and no flow was found in SMA. The celiac appeared to have some flow. The procedure was also notable for finding another large segment of ischemic bowel starting approximately 50 cm from LOT and extending through the discontinuous segments until approximately 80 cm from cecum. The final 80 cm of TI also had areas of patchy ischemia throughout, suggesting worsening ischemic diseased and likely progression to necrotic bowel. The abthera was replaced at this time.    After discussion with Vascular Staff, Critical Care Staff, and the Primary Staff the amount of ischemic bowel constituted a non-survivable injury. While Vascular could perform angiography to confirm SMA occlusion, they would not be able to reverse the damage to the bowel, and thus the procedure would be futile. Discussions were had with wife and sister-in-law and all their questions were answered. They are notifying their loved ones and allowing them to come visit the patient.

## 2020-06-13 NOTE — H&P
[facility]  Critical Care - Surgery  History & Physical    Patient Name: Chau Rodarte  MRN: [id number]  Admission Date: 6/10/2020  Code Status: Full Code  Attending Physician: Janneth Cherry MD   Primary Care Provider: Riki Huynh MD   Principal Problem: Acute right ISABELA stroke    Subjective:     HPI:  71 y/o M with hx of CAD s/p PCIx3 on DAPT, CHF with depressed LV function, Afib on Amiodarone, CKD4, DM2 on Insulin, PAD s/p Left Fem-Pop Bypas who presented from home after sudden onsent of left lower extremity weakness 2 days ago. Diagnostic Eval revealing Right > Left ISABELA embolic stroke. Patient was outside of tPA window at the time of evaluation.   Upon admission, patient developed Afib as well as abdominal pain with loose bowel movements.   KUB / CT Abdomen & Pelvis revealing dilated SB Loops, Pneumatosis and Venous Gas suggestive of mesenteric ischemia  Taken to OR and found to have multiple segments of necrotic jejunum which were resected.  Bowel left in discontinuity with [facility]     Care transferred to SICU from [facility]  Remains in Sinus Rhythm, Hypotensive, Tachycardic, Oliguric  Aggressive Fluid / Pressor Resuscitation started.   Arterial Line and Central Line Placed at Bedside  Scheduled for Second Look in OR tomorrow AM      male with CAD s/p PCI x3 back in 2016 (on DAPT at home), HFrEF (last echo Feb 2019 showing EF 45% and G1DD; on GDMT with lisinopril and metoprolol succinate), A fib (on amiodarone,not on anticoagulation 2/2 GI bleed in 2017; has a loop recorder in place), CKD (baseline creatinine appears 2.5-3), IDDM, PAD (s/p L femoro-popliteal bypass in 2017; history of diabetic foot ulcers), HTN, HLD, and obesity who is admitted to vascular neurology service in the setting of large R ISABELA stroke and smaller L ISABELA stroke diagnosed overnight 6/10-6/11. Patient reports that he had increasing weakness and 4 loose bowel movements with bloody streaks throughout the  day 6/10 and when attempting to leave for the ER, he noticed weakness and inability to move his left leg to put on his shoes. Patient then presented to ED and was diagnosed with R ISABELA and L ISABELA stroke as above and admitted to stroke service, opted for medical management given presentation outside the tPA window and history concerning for possible GI bleed (hematechezia with 4 looser bowel movements and known history of GI bleeding). Pt evaluated by GI for possible GI bleeding with plan for colonoscopy 6/15.   Pt reported right sided abdominal pain in the AM on 6/11 with a sore, achy character that continued to worsen throughout the day. A KUB obtained 6/11 concerning for distal small bowel obstruction; NGT placed but patient continued to have worsening abdominal pain. Lactic acid elevated at 4.2 and continued to rise to 4.7 on repeat. Critical care medicine consulted for concern for worsening abdominal pain and lactic acidosis.     Hospital/ICU Course:  No notes on file    Follow-up For: Procedure(s) (LRB):  LAPAROTOMY, EXPLORATORY (N/A)  APPLICATION, WOUND VAC (N/A)    Post-Operative Day: Day of Surgery     Past Medical History:   Diagnosis Date    Acute on chronic systolic CHF (congestive heart failure) 11/29/2016    Acute osteomyelitis of toe, right     Anemia     Anticoagulant long-term use     CKD (chronic kidney disease) stage 2, GFR 60-89 ml/min 2/21/2016    CKD (chronic kidney disease) stage 3, GFR 30-59 ml/min     Coronary artery disease     Cryptogenic stroke, remote history (2006) 1/26/2017    Decubitus ulcer, heel, right, unstageable     Diabetic foot ulcer 11/13/2013    Diabetic foot ulcer with osteomyelitis 6/23/2017    Encounter for blood transfusion     HTN (hypertension) 3/3/2014    Hyperlipidemia 3/3/2014    NSTEMI (non-ST elevated myocardial infarction) 11/28/2016    Obesity (BMI 30-39.9) 11/29/2016    Osteomyelitis, chronic     PVD (peripheral vascular disease)     Stroke      Type 2 diabetes mellitus with diabetic peripheral angiopathy without gangrene, without long-term current use of insulin 10/24/2013    Type 2 diabetes mellitus with diabetic polyneuropathy, without long-term current use of insulin 2016       Past Surgical History:   Procedure Laterality Date    CARDIAC SURGERY      COLONOSCOPY N/A 3/21/2017    Procedure: COLONOSCOPY;  Surgeon: Karissa Peck MD;  Location: [facility] ([address] [place] [address]);  Service: Endoscopy;  Laterality: N/A;    CORONARY ANGIOPLASTY WITH STENT PLACEMENT      FOOT NERVE GRAFT  2013    left leg stent      Loop Recorder placement      right leg bypass         Review of patient's allergies indicates:   Allergen Reactions    Adhesive tape-silicones Blisters       Family History     Problem Relation (Age of Onset)    Blindness Maternal Grandmother    Cataracts Mother    Diabetes Mother    Glaucoma Mother    Heart disease Father        Tobacco Use    Smoking status: Former Smoker     Packs/day: 1.00     Years: 50.00     Pack years: 50.00     Last attempt to quit: 3/1/2011     Years since quittin.2    Smokeless tobacco: Never Used   Substance and Sexual Activity    Alcohol use: No    Drug use: No    Sexual activity: Yes     Partners: Female     Comment:  with 2 children 3 grand sons and 1 great grand daughter      Review of Systems   Unable to perform ROS: Intubated     Objective:     Vital Signs (Most Recent):  Temp: 97.5 °F (36.4 °C) (20 1500)  Pulse: 76 (20)  Resp: 20 (20)  BP: (!) 167/70 (20)  SpO2: (!) 88 % (20) Vital Signs (24h Range):  Temp:  [97.5 °F (36.4 °C)-98.7 °F (37.1 °C)] 97.5 °F (36.4 °C)  Pulse:  [51-78] 76  Resp:  [12-29] 20  SpO2:  [84 %-100 %] 88 %  BP: ()/(46-72) 167/70  Arterial Line BP: ()/(37-74) 137/74     Weight: 121.7 kg (268 lb 4.8 oz)  Body mass index is 39.74 kg/m².      Intake/Output Summary (Last 24 hours) at 2020  Last data filed  at 6/12/2020 1900  Gross per 24 hour   Intake 54449.92 ml   Output 2540 ml   Net 7643.92 ml       Physical Exam   Constitutional: He appears well-developed and well-nourished.   HENT:   Head: Normocephalic and atraumatic.   Cardiovascular:   Tachycardic  Hypotensive on Vasopressor support  Right IJ CVC placed   Pulmonary/Chest:   Intubated  Sedated  Elevated Vent Settings   Abdominal: Soft. He exhibits distension. There is no tenderness. There is no guarding.   Midline laparotomy incision  Abthera wound vac to incision  Serosanguinous output in cannister   Genitourinary:   Genitourinary Comments: Urinary catheter in place   Skin: Skin is warm and dry.   Nursing note and vitals reviewed.      Vents:  Vent Mode: A/C (06/12/20 1940)  Set Rate: 20 BPM (06/12/20 1940)  Vt Set: 540 mL (06/12/20 1940)  PEEP/CPAP: 8 cmH20 (06/12/20 1940)  Oxygen Concentration (%): 70 (06/12/20 1940)  Peak Airway Pressure: 28 cmH2O (06/12/20 1940)  Plateau Pressure: 25 cmH20 (06/12/20 1940)  Total Ve: 11.1 mL (06/12/20 1940)  F/VT Ratio<105 (RSBI): (!) 36.36 (06/12/20 1940)    Lines/Drains/Airways     Central Venous Catheter Line            Percutaneous Central Line Insertion/Assessment - Triple Lumen  06/12/20 1810 right internal jugular less than 1 day          Drain                 NG/OG Tube 06/12/20 0608 Right nostril less than 1 day         Urethral Catheter 06/12/20 1330 less than 1 day          Airway                 Airway - Non-Surgical 06/12/20 1146 Endotracheal Tube less than 1 day          Arterial Line                 Arterial Line 06/12/20 1700 Right Radial less than 1 day          Peripheral Intravenous Line                 Peripheral IV - Single Lumen 06/11/20 0009 20 G Right Hand 1 day         Peripheral IV - Single Lumen 06/11/20 0042 18 G Right Antecubital 1 day         Peripheral IV - Single Lumen 06/12/20 1150 18 G Left Hand less than 1 day                Significant Labs:    CBC/Anemia Profile:  Recent Labs   Lab  06/12/20  1352 06/12/20  1600 06/12/20  1750 06/12/20  1753   WBC 3.58* 5.43  --  5.08   HGB 8.7* 9.5*  --  11.3*   HCT 28.6* 30.5* 34* 35.6*   * 117*  --  157   MCV SEE COMMENT 99*  --  96   RDW 13.7 14.6*  --  14.7*        Chemistries:  Recent Labs   Lab 06/11/20  0005 06/11/20  0251  06/12/20  0429 06/12/20  0801 06/12/20  1600 06/12/20  1753     --    < > 138  138 138 132* 137   K 5.3*  --    < > 5.0  5.0  5.0 4.3  4.3 4.4  4.4 4.8   *  --    < > 108  108 114* 108 109   CO2 17*  --    < > 15*  15* 13* 8* 16*   BUN 54*  --    < > 69*  69* 55* 30* 69*   CREATININE 4.0*  --    < > 4.9*  4.9* 4.0* 1.7* 4.8*   CALCIUM 8.6*  --    < > 8.4*  8.4* 6.2* 6.1* 7.3*   ALBUMIN 3.4*  --   --  3.0*  --   --  2.2*   PROT 7.0  --   --  6.6  --   --  5.1*   BILITOT 0.6  --   --  1.1*  --   --  0.6   ALKPHOS 115  --   --  79  --   --  56   ALT 15  --   --  32  --   --  28   AST 26  --   --  38  --   --  31   MG  --  2.0  --   --   --   --  1.6   PHOS  --  3.3  --   --   --   --  3.9    < > = values in this interval not displayed.       All pertinent labs within the past 24 hours have been reviewed.    Significant Imaging: I have reviewed and interpreted all pertinent imaging results/findings within the past 24 hours.    Assessment/Plan:     Small bowel ischemia  71 y/o M with Afib and embolic events to brain and bowel  Taken to OR today for mesenteric ischemia s/p Jejunum resection x 2. Bowel left in discontinuity and to be taken 06/13/2020 for second look.    Neuro:  Sedation: Propofol and Fentanyl  Pain Control: Tylenol IV, PRN Dilaudid     CV:  MAP goal >65  Currently Normal Sinus Rhythm  Start Heparin gtt for mesenteric ischemia  Levophed and Vasopressin gtt for MAP goals     Respiratory  Intubated and Sedated  Will remain intubated pending procedure in AM  ABG PRN and Daily  Daily CXR     FEN / GI  Protonix GI PPX  NPO until Extubated and Bowel Reanastomosed  NGT to LIWS  Replnu Lytes PRN  Will  consider TPN pending laparotomy results     Renal:  Baseline CKD4 -- Cr 2-3  Oliguric 20-40 cc/hr  Lactic Acidosis present  Sodium Bicarbonate Given  Trend Lactates q 6 hours  Aggressive Fluid Resuscitation  Monitor UOP  Replete Lytes PRN  Maintain K>4 and Mg>2 for history of Afib     ID  Afebrile  Leukocytosis present  Cefepime / Vancomycin / Fluconazole     PPx:  Heparin gtt  Protonix 40 mg BID     Disposition: SICU           Critical care was time spent personally by me on the following activities: development of treatment plan with patient or surrogate and bedside caregivers, discussions with consultants, evaluation of patient's response to treatment, examination of patient, ordering and performing treatments and interventions, ordering and review of laboratory studies, ordering and review of radiographic studies, pulse oximetry, re-evaluation of patient's condition.  This critical care time did not overlap with that of any other provider or involve time for any procedures.     Zain Soni MD  Critical Care - Surgery  [facility]

## 2020-06-13 NOTE — SUBJECTIVE & OBJECTIVE
Follow-up For: Procedure(s) (LRB):  LAPAROTOMY, EXPLORATORY (N/A)  APPLICATION, WOUND VAC (N/A)    Post-Operative Day: Day of Surgery     Past Medical History:   Diagnosis Date    Acute on chronic systolic CHF (congestive heart failure) 11/29/2016    Acute osteomyelitis of toe, right     Anemia     Anticoagulant long-term use     CKD (chronic kidney disease) stage 2, GFR 60-89 ml/min 2/21/2016    CKD (chronic kidney disease) stage 3, GFR 30-59 ml/min     Coronary artery disease     Cryptogenic stroke, remote history (2006) 1/26/2017    Decubitus ulcer, heel, right, unstageable     Diabetic foot ulcer 11/13/2013    Diabetic foot ulcer with osteomyelitis 6/23/2017    Encounter for blood transfusion     HTN (hypertension) 3/3/2014    Hyperlipidemia 3/3/2014    NSTEMI (non-ST elevated myocardial infarction) 11/28/2016    Obesity (BMI 30-39.9) 11/29/2016    Osteomyelitis, chronic     PVD (peripheral vascular disease)     Stroke     Type 2 diabetes mellitus with diabetic peripheral angiopathy without gangrene, without long-term current use of insulin 10/24/2013    Type 2 diabetes mellitus with diabetic polyneuropathy, without long-term current use of insulin 11/29/2016       Past Surgical History:   Procedure Laterality Date    CARDIAC SURGERY      COLONOSCOPY N/A 3/21/2017    Procedure: COLONOSCOPY;  Surgeon: Karissa Peck MD;  Location: [facility] ([address] [place] [address]);  Service: Endoscopy;  Laterality: N/A;    CORONARY ANGIOPLASTY WITH STENT PLACEMENT      FOOT NERVE GRAFT  11/2013    left leg stent      Loop Recorder placement      right leg bypass         Review of patient's allergies indicates:   Allergen Reactions    Adhesive tape-silicones Blisters       Family History     Problem Relation (Age of Onset)    Blindness Maternal Grandmother    Cataracts Mother    Diabetes Mother    Glaucoma Mother    Heart disease Father        Tobacco Use    Smoking status: Former Smoker     Packs/day: 1.00      Years: 50.00     Pack years: 50.00     Last attempt to quit: 3/1/2011     Years since quittin.2    Smokeless tobacco: Never Used   Substance and Sexual Activity    Alcohol use: No    Drug use: No    Sexual activity: Yes     Partners: Female     Comment:  with 2 children 3 grand sons and 1 great grand daughter      Review of Systems   Unable to perform ROS: Intubated     Objective:     Vital Signs (Most Recent):  Temp: 97.5 °F (36.4 °C) (20 1500)  Pulse: 76 (20)  Resp: 20 (20)  BP: (!) 167/70 (20 184)  SpO2: (!) 88 % (20) Vital Signs (24h Range):  Temp:  [97.5 °F (36.4 °C)-98.7 °F (37.1 °C)] 97.5 °F (36.4 °C)  Pulse:  [51-78] 76  Resp:  [12-29] 20  SpO2:  [84 %-100 %] 88 %  BP: ()/(46-72) 167/70  Arterial Line BP: ()/(37-74) 137/74     Weight: 121.7 kg (268 lb 4.8 oz)  Body mass index is 39.74 kg/m².      Intake/Output Summary (Last 24 hours) at 2020  Last data filed at 2020 1900  Gross per 24 hour   Intake 11308.92 ml   Output 2540 ml   Net 7643.92 ml       Physical Exam   Constitutional: He appears well-developed and well-nourished.   HENT:   Head: Normocephalic and atraumatic.   Cardiovascular:   Tachycardic  Hypotensive on Vasopressor support  Right IJ CVC placed   Pulmonary/Chest:   Intubated  Sedated  Elevated Vent Settings   Abdominal: Soft. He exhibits distension. There is no tenderness. There is no guarding.   Midline laparotomy incision  Abthera wound vac to incision  Serosanguinous output in cannister   Genitourinary:   Genitourinary Comments: Urinary catheter in place   Skin: Skin is warm and dry.   Nursing note and vitals reviewed.      Vents:  Vent Mode: A/C (20)  Set Rate: 20 BPM (20)  Vt Set: 540 mL (20)  PEEP/CPAP: 8 cmH20 (20)  Oxygen Concentration (%): 70 (20)  Peak Airway Pressure: 28 cmH2O (20)  Plateau Pressure: 25 cmH20 (20)  Total  Ve: 11.1 mL (06/12/20 1940)  F/VT Ratio<105 (RSBI): (!) 36.36 (06/12/20 1940)    Lines/Drains/Airways     Central Venous Catheter Line            Percutaneous Central Line Insertion/Assessment - Triple Lumen  06/12/20 1810 right internal jugular less than 1 day          Drain                 NG/OG Tube 06/12/20 0608 Right nostril less than 1 day         Urethral Catheter 06/12/20 1330 less than 1 day          Airway                 Airway - Non-Surgical 06/12/20 1146 Endotracheal Tube less than 1 day          Arterial Line                 Arterial Line 06/12/20 1700 Right Radial less than 1 day          Peripheral Intravenous Line                 Peripheral IV - Single Lumen 06/11/20 0009 20 G Right Hand 1 day         Peripheral IV - Single Lumen 06/11/20 0042 18 G Right Antecubital 1 day         Peripheral IV - Single Lumen 06/12/20 1150 18 G Left Hand less than 1 day                Significant Labs:    CBC/Anemia Profile:  Recent Labs   Lab 06/12/20  1352 06/12/20  1600 06/12/20  1750 06/12/20  1753   WBC 3.58* 5.43  --  5.08   HGB 8.7* 9.5*  --  11.3*   HCT 28.6* 30.5* 34* 35.6*   * 117*  --  157   MCV SEE COMMENT 99*  --  96   RDW 13.7 14.6*  --  14.7*        Chemistries:  Recent Labs   Lab 06/11/20  0005 06/11/20  0251  06/12/20  0429 06/12/20  0801 06/12/20  1600 06/12/20  1753     --    < > 138  138 138 132* 137   K 5.3*  --    < > 5.0  5.0  5.0 4.3  4.3 4.4  4.4 4.8   *  --    < > 108  108 114* 108 109   CO2 17*  --    < > 15*  15* 13* 8* 16*   BUN 54*  --    < > 69*  69* 55* 30* 69*   CREATININE 4.0*  --    < > 4.9*  4.9* 4.0* 1.7* 4.8*   CALCIUM 8.6*  --    < > 8.4*  8.4* 6.2* 6.1* 7.3*   ALBUMIN 3.4*  --   --  3.0*  --   --  2.2*   PROT 7.0  --   --  6.6  --   --  5.1*   BILITOT 0.6  --   --  1.1*  --   --  0.6   ALKPHOS 115  --   --  79  --   --  56   ALT 15  --   --  32  --   --  28   AST 26  --   --  38  --   --  31   MG  --  2.0  --   --   --   --  1.6   PHOS  --  3.3  --    --   --   --  3.9    < > = values in this interval not displayed.       All pertinent labs within the past 24 hours have been reviewed.    Significant Imaging: I have reviewed and interpreted all pertinent imaging results/findings within the past 24 hours.

## 2020-06-14 NOTE — ASSESSMENT & PLAN NOTE
69 y/o M with Afib and embolic events to brain and bowel  Taken to OR today for mesenteric ischemia s/p Jejunum resection x 2. Bowel left in discontinuity and to be taken 06/13/2020 for second look.    Neuro:  Sedation: Propofol 10, fentanyl as needed  Pain Control: Tylenol IV, PRN Dilaudid     CV:  MAP goal >65  Currently Normal Sinus Rhythm  On Heparin gtt for mesenteric ischemia (held pre-op)  Levophed .08,   vaso off currently; titrate as needed      Respiratory  Intubated and Sedated  ABG PRN and Daily  Daily CXR     FEN / GI  Protonix GI PPX  NPO   NGT to LIWS  Replete Lytes PRN  Will consider TPN pending laparotomy results     Renal:  Baseline CKD4 -- Cr 2-3  Oliguric 20-40 cc/hr  Lactic Acidosis improving (2.8)  Sodium Bicarbonate Given  Trend Lactates q 6 hours  Aggressive Fluid Resuscitation  Monitor UOP  Replete Lytes PRN  Maintain K>4 and Mg>2 for history of Afib     ID  Afebrile  Leukocytosis present  Cefepime / Vancomycin / Fluconazole     PPx:  Heparin gtt  Protonix 40 mg BID     Disposition: SICU; Palliative Care consulted to discuss futility of care given severity of insult; currently full code

## 2020-06-14 NOTE — PROGRESS NOTES
Spoke with wife overnight on the phone  She is still processing her 's condition, but does understand his situation  Another family member arrived to town last night and they're planning to come visit this morning  Will continue supportive care until another family discussion can be had  Palliative care has been consulted for assistance with transitioning to comfort care

## 2020-06-14 NOTE — PROGRESS NOTES
Patient's chart was reviewed by a stroke team provider.    Patient with extensive small bowel ischemic necrosis not compatible with life. Palliative care consulted.    There is no new imaging to review.  Pending diagnostics to follow up on include: none      For other recommendations please see our previous note completed on: 06/11/20.  There are no new recommendations at this time. Will continue to follow. Discussed patient with staff. Please contact stroke team for any questions or concerns.           Danuta Montaño PA-C  Vascular Neurology  [phone]

## 2020-06-14 NOTE — ASSESSMENT & PLAN NOTE
71 yo M presenting with CVA and subsequent small bowel ischemia, likely of embolic etiology. Now found to have absence of SMV flow and extensive small bowel ischemic necrosis.     - Transitioning to comfort care.  - Family meeting this AM.  - Appreciate SICU assistance.

## 2020-06-14 NOTE — PLAN OF CARE
"      SICU PLAN OF CARE NOTE    Dx: Acute right ISABELA stroke    Shift Events: Family meeting. Pressors titrated. Sedation titrated. Electrolytes replaced. 1L NS administered. Wound vac output monitored.     Neuro: Unresponsive    Vital Signs: BP (!) 123/59 (BP Location: Left arm, Patient Position: Lying)   Pulse 76   Temp 98.4 °F (36.9 °C)   Resp (!) 29   Ht 5' 8.9" (1.75 m)   Wt 121.7 kg (268 lb 4.8 oz)   SpO2 100%   BMI 39.74 kg/m²     Respiratory: Ventilator AC/VC FiO2 of 50% with 5.0 of PEEP.    Diet: NPO    Gtts: Propfol, Fentanyl, Insulin, Norepinephrine, Vasopressin, Heparin, and MIVF    Urine Output: Urinary Catheter 424 cc/shift of clear, yellow urine.    Drains: NG/OG Tube 0 cc /  shift. NG connected to low, intermittent wall suction.     Wound Vac on a continuous suction setting of 100 mmHg. Wound Vac collected a total of 270 cc / shift of dark red output.    Restraints applied to bilateral upper extremities in order to prevent patient from pulling out lines. No injuries noted.     Labs/Accuchecks:   - WBC: 4.21  - Hgb: 10.6  - Hct: 32.7  - Platelets: 122  - Heparin Anti-Xa: 0.45  - Na: 135  - K: 5.0  - BUN: 59  - Creatinine: 3.2  - Glucose: 193  - Ca: 7.0  - Phos: 5.0  - Ma.6    Skin: Mid-abdominal incision is open with wound vac in place on a continuous suction setting of 100 mmHg. Drainage is dark red. The seal is intact. Belly is distended and firm on palpation. No new breakdown noted on any pressure points throughout.     "

## 2020-06-14 NOTE — PROGRESS NOTES
[facility]  Critical Care - Surgery  Progress Note    Patient Name: Chau Rodarte  MRN: [id number]  Admission Date: 6/10/2020  Hospital Length of Stay: 3 days  Code Status: Full Code  Attending Provider: Janneth Cherry MD  Primary Care Provider: Riki Huynh MD   Principal Problem: Acute right ISABELA stroke    Subjective:        Interval History/Significant Events:  Mesenteric US yesterday showing no flow to SMA, further large segments of ischemic bowel and worsening ischemic disease likely progressing to necrotic bowel. These findings represent a non-survivable injury. Pt currently intubated, sedated, on levo .08. Palliative care consulted. Pt currently full code.     Follow-up For: Procedure(s) (LRB):  LAPAROTOMY, EXPLORATORY , Abdominal Washout, Small Bowel Resection and Abethera Wound Vac Placement. (N/A)    Post-Operative Day: 1 Day Post-Op    Objective:     Vital Signs (Most Recent):  Temp: 98.4 °F (36.9 °C) (06/14/20 0315)  Pulse: 76 (06/14/20 0315)  Resp: (!) 29 (06/14/20 0315)  BP: (!) 123/59 (06/14/20 0300)  SpO2: 100 % (06/14/20 0315) Vital Signs (24h Range):  Temp:  [97.2 °F (36.2 °C)-99.9 °F (37.7 °C)] 98.4 °F (36.9 °C)  Pulse:  [66-83] 76  Resp:  [9-39] 29  SpO2:  [97 %-100 %] 100 %  BP: (102-130)/(56-67) 123/59  Arterial Line BP: ()/(50-63) 122/57     Weight: 121.7 kg (268 lb 4.8 oz)  Body mass index is 39.74 kg/m².      Intake/Output Summary (Last 24 hours) at 6/14/2020 0338  Last data filed at 6/14/2020 0300  Gross per 24 hour   Intake 4319.15 ml   Output 2039 ml   Net 2280.15 ml       Physical Exam  Vitals signs and nursing note reviewed.   Constitutional:       Appearance: He is well-developed and well-nourished.   HENT:      Head: Normocephalic and atraumatic.   Cardiovascular:      Comments: Tachycardic  Hypotensive on Vasopressor support  Right IJ CVC placed  Pulmonary:      Comments: Intubated  Sedated  Elevated Vent Settings  Abdominal:      General: There is  distension.      Palpations: Abdomen is soft.      Tenderness: There is no abdominal tenderness. There is no guarding.      Comments: Midline laparotomy incision  Abthera wound vac to incision  Serosanguinous output in cannister   Genitourinary:     Comments: Urinary catheter in place  Skin:     General: Skin is warm and dry.     Vents:  Vent Mode: A/C (06/14/20 0131)  Set Rate: 18 BPM (06/14/20 0131)  Vt Set: 450 mL (06/14/20 0131)  Pressure Support: 0 cmH20 (06/13/20 0513)  PEEP/CPAP: 5 cmH20 (06/14/20 0131)  Oxygen Concentration (%): 50 (06/14/20 0315)  Peak Airway Pressure: 27 cmH2O (06/14/20 0131)  Plateau Pressure: 24 cmH20 (06/14/20 0131)  Total Ve: 8.72 mL (06/14/20 0131)  F/VT Ratio<105 (RSBI): (!) 37.89 (06/14/20 0131)    Lines/Drains/Airways     Central Venous Catheter Line            Percutaneous Central Line Insertion/Assessment - Triple Lumen  06/12/20 1810 right internal jugular 1 day          Drain                 NG/OG Tube 06/12/20 0608 Right nostril 1 day         Urethral Catheter 06/12/20 1330 1 day          Airway                 Airway - Non-Surgical 06/12/20 1146 Endotracheal Tube 1 day          Arterial Line                 Arterial Line 06/12/20 1700 Right Radial 1 day          Peripheral Intravenous Line                 Peripheral IV - Single Lumen 06/11/20 0009 20 G Right Hand 3 days         Peripheral IV - Single Lumen 06/11/20 0042 18 G Right Antecubital 3 days                Significant Labs:    CBC/Anemia Profile:  Recent Labs   Lab 06/12/20 1753 06/13/20  0300 06/13/20  0930 06/13/20  0932   WBC 5.08 3.21* 4.21  --    HGB 11.3* 11.5* 10.6*  --    HCT 35.6* 34.6* 32.7* 30*    148* 122*  --    MCV 96 91 94  --    RDW 14.7* 14.6* 14.6*  --         Chemistries:  Recent Labs   Lab 06/12/20 1753 06/13/20  0300  06/13/20  0930  06/13/20  1733 06/13/20  1906 06/14/20  0014      < > 140  140   < > 139   < > 138 138 137   K 4.8   < > 4.8  4.8   < > 5.8*   < > 5.4*  5.4* 5.3*   5.3* 5.0  5.0      < > 109  109   < > 109   < > 110 109 109   CO2 16*   < > 20*  20*   < > 20*   < > 17* 19* 18*   BUN 69*   < > 58*  58*   < > 58*   < > 58* 58* 57*   CREATININE 4.8*   < > 3.5*  3.5*   < > 3.4*   < > 3.3* 3.3* 3.2*   CALCIUM 7.3*   < > 7.0*  7.0*   < > 6.7*   < > 6.9* 6.7* 6.9*   ALBUMIN 2.2*  --  1.7*  --  1.5*  --   --   --   --    PROT 5.1*  --  4.5*  --  4.1*  --   --   --   --    BILITOT 0.6  --  0.6  --  0.8  --   --   --   --    ALKPHOS 56  --  50*  --  44*  --   --   --   --    ALT 28  --  23  --  20  --   --   --   --    AST 31  --  35  --  35  --   --   --   --    MG 1.6  --  1.7  --  1.6  --   --   --   --    PHOS 3.9  --  3.3  --   --   --   --   --   --     < > = values in this interval not displayed.         Significant Imaging:  I have reviewed all pertinent imaging results/findings within the past 24 hours.  CXR reviewed this am. Appears to have increased patchy infiltrates bilaterally consistent with pulmonary edema.    Assessment/Plan:     Small bowel ischemia  71 y/o M with Afib and embolic events to brain and bowel  Taken to OR 6/12 and 6/13 for mesenteric ischemia s/p Jejunum resection x 2. Vac taken down at bedside on 6/13 for ultrasound. Showed worsening ischemia and occluded SMA. Unsurvivable given extent of bowel injury. Palliative care has been consulted to assist to discuss GOC and futility of further intervention.    Neuro:  Sedation: Propofol 10, fentanyl as needed.   Pain Control: Tylenol IV, PRN Dilaudid     CV:  MAP goal >65  Currently Normal Sinus Rhythm  On Heparin gtt for mesenteric ischemia (held pre-op)  Levophed .08,   vaso off currently; titrate as needed      Respiratory  Intubated and Sedated  ABG PRN and Daily  Daily CXR - appears to have more diffuse infiltrates consistent with pulmonary edema     FEN / GI  Protonix GI PPX  NPO   NGT to LIWS  Replete Lytes PRN: Maintain K>4 and Mg>2 for history of Afib. Has been hyperkalemic requiring  shifting. Will continue q6 hr BMPs  Significant injury to small bowel which is not survivable. Currently in discontinuity in 3 segments. Worsening ischemia seen during ultrasound yesterday with no flow through SMA.      Renal:  Baseline CKD4 -- Cr 2-3  Oliguric 20-40 cc/hr  Lactic Acidosis now resolved (1.5)  Net pos 13 L. Will give 40 mg IV lasix today     ID  Afebrile  Leukocytosis present  Cefepime / Vancomycin / Flagyl/ Fluconazole     PPx:  Heparin gtt  Protonix 40 mg BID     Disposition: SICU; Palliative Care consulted to discuss futility of care. Dr. Orozco to speak to family today as well      Byron Hogan MD  Critical Care - Surgery  [facility]

## 2020-06-14 NOTE — SUBJECTIVE & OBJECTIVE
Interval History/Significant Events:  Mesenteric US yesterday showing no flow to SMA, further large segments of ischemic bowel and worsening ischemic disease likely progressing to necrotic bowel. These findings represent a non-survivable injury. Pt currently intubated, sedated, on levo .08. Palliative care consulted. Pt currently full code.    Follow-up For: Procedure(s) (LRB):  LAPAROTOMY, EXPLORATORY , Abdominal Washout, Small Bowel Resection and Abethera Wound Vac Placement. (N/A)    Post-Operative Day: 1 Day Post-Op    Objective:     Vital Signs (Most Recent):  Temp: 98.4 °F (36.9 °C) (06/14/20 0315)  Pulse: 76 (06/14/20 0315)  Resp: (!) 29 (06/14/20 0315)  BP: (!) 123/59 (06/14/20 0300)  SpO2: 100 % (06/14/20 0315) Vital Signs (24h Range):  Temp:  [97.2 °F (36.2 °C)-99.9 °F (37.7 °C)] 98.4 °F (36.9 °C)  Pulse:  [66-83] 76  Resp:  [9-39] 29  SpO2:  [97 %-100 %] 100 %  BP: (102-130)/(56-67) 123/59  Arterial Line BP: ()/(50-63) 122/57     Weight: 121.7 kg (268 lb 4.8 oz)  Body mass index is 39.74 kg/m².      Intake/Output Summary (Last 24 hours) at 6/14/2020 0338  Last data filed at 6/14/2020 0300  Gross per 24 hour   Intake 4319.15 ml   Output 2039 ml   Net 2280.15 ml       Physical Exam  Vitals signs and nursing note reviewed.   Constitutional:       Appearance: He is well-developed and well-nourished.   HENT:      Head: Normocephalic and atraumatic.   Cardiovascular:      Comments: Tachycardic  Hypotensive on Vasopressor support  Right IJ CVC placed  Pulmonary:      Comments: Intubated  Sedated  Elevated Vent Settings  Abdominal:      General: There is distension.      Palpations: Abdomen is soft.      Tenderness: There is no abdominal tenderness. There is no guarding.      Comments: Midline laparotomy incision  Abthera wound vac to incision  Serosanguinous output in cannister   Genitourinary:     Comments: Urinary catheter in place  Skin:     General: Skin is warm and dry.     Vents:  Vent Mode: A/C  (06/14/20 0131)  Set Rate: 18 BPM (06/14/20 0131)  Vt Set: 450 mL (06/14/20 0131)  Pressure Support: 0 cmH20 (06/13/20 0513)  PEEP/CPAP: 5 cmH20 (06/14/20 0131)  Oxygen Concentration (%): 50 (06/14/20 0315)  Peak Airway Pressure: 27 cmH2O (06/14/20 0131)  Plateau Pressure: 24 cmH20 (06/14/20 0131)  Total Ve: 8.72 mL (06/14/20 0131)  F/VT Ratio<105 (RSBI): (!) 37.89 (06/14/20 0131)    Lines/Drains/Airways     Central Venous Catheter Line            Percutaneous Central Line Insertion/Assessment - Triple Lumen  06/12/20 1810 right internal jugular 1 day          Drain                 NG/OG Tube 06/12/20 0608 Right nostril 1 day         Urethral Catheter 06/12/20 1330 1 day          Airway                 Airway - Non-Surgical 06/12/20 1146 Endotracheal Tube 1 day          Arterial Line                 Arterial Line 06/12/20 1700 Right Radial 1 day          Peripheral Intravenous Line                 Peripheral IV - Single Lumen 06/11/20 0009 20 G Right Hand 3 days         Peripheral IV - Single Lumen 06/11/20 0042 18 G Right Antecubital 3 days                Significant Labs:    CBC/Anemia Profile:  Recent Labs   Lab 06/12/20 1753 06/13/20 0300 06/13/20  0930 06/13/20  0932   WBC 5.08 3.21* 4.21  --    HGB 11.3* 11.5* 10.6*  --    HCT 35.6* 34.6* 32.7* 30*    148* 122*  --    MCV 96 91 94  --    RDW 14.7* 14.6* 14.6*  --         Chemistries:  Recent Labs   Lab 06/12/20 1753 06/13/20  0300  06/13/20  0930  06/13/20  1733 06/13/20  1906 06/14/20  0014      < > 140  140   < > 139   < > 138 138 137   K 4.8   < > 4.8  4.8   < > 5.8*   < > 5.4*  5.4* 5.3*  5.3* 5.0  5.0      < > 109  109   < > 109   < > 110 109 109   CO2 16*   < > 20*  20*   < > 20*   < > 17* 19* 18*   BUN 69*   < > 58*  58*   < > 58*   < > 58* 58* 57*   CREATININE 4.8*   < > 3.5*  3.5*   < > 3.4*   < > 3.3* 3.3* 3.2*   CALCIUM 7.3*   < > 7.0*  7.0*   < > 6.7*   < > 6.9* 6.7* 6.9*   ALBUMIN 2.2*  --  1.7*  --  1.5*  --   --    --   --    PROT 5.1*  --  4.5*  --  4.1*  --   --   --   --    BILITOT 0.6  --  0.6  --  0.8  --   --   --   --    ALKPHOS 56  --  50*  --  44*  --   --   --   --    ALT 28  --  23  --  20  --   --   --   --    AST 31  --  35  --  35  --   --   --   --    MG 1.6  --  1.7  --  1.6  --   --   --   --    PHOS 3.9  --  3.3  --   --   --   --   --   --     < > = values in this interval not displayed.         Significant Imaging:  I have reviewed all pertinent imaging results/findings within the past 24 hours.

## 2020-06-14 NOTE — PROGRESS NOTES
Pharmacokinetic Assessment Follow Up: IV Vancomycin    Vancomycin Regimen Assessment & Plan:  - Vancomycin random level resulted as 15.7 mcg/mL.  Goal trough 10-20 mcg/mL.  - Patient with CORRY - Scr 3.2 mg/dL, trending down; continue pulse dosing while renal function is unstable.  - Plan to administer Vancomycin 1000 mg IV x 1 dose.    - Draw vancomycin level with morning labs tomorrow.  Pharmacy to re-dose depending on level and renal function.    Drug levels (last 3 results):  Recent Labs   Lab Result Units 06/13/20  0300 06/14/20  0300   Vancomycin, Random ug/mL 13.5 15.7       Pharmacy will continue to follow and monitor vancomycin.    Please contact pharmacy at extension [phone] for questions regarding this assessment.    Thank you for the consult,   Citlalli Crenshaw       Patient brief summary:  Chau Rodarte is a 70 y.o. male initiated on antimicrobial therapy with IV Vancomycin for treatment of intra-abdominal infection    The patient's current regimen is pulse dosing.    Drug Allergies:   Review of patient's allergies indicates:   Allergen Reactions    Adhesive tape-silicones Blisters       Actual Body Weight:   134.5 kg    Renal Function:   Estimated Creatinine Clearance: 28.8 mL/min (A) (based on SCr of 3.2 mg/dL (H)).,     Dialysis Method (if applicable):  N/A    CBC (last 72 hours):  Recent Labs   Lab Result Units 06/11/20  0807 06/11/20  1543 06/12/20  0038 06/12/20  0801 06/12/20  1352 06/12/20  1600 06/12/20  1753 06/13/20  0300 06/13/20  0930   WBC K/uL 14.27* 10.30 9.18 8.71 3.58* 5.43 5.08 3.21* 4.21   Hemoglobin g/dL 13.1* 13.0* 13.4* 12.0* 8.7* 9.5* 11.3* 11.5* 10.6*   Hematocrit % 41.8 41.9 39.5* 38.4* 28.6* 30.5* 35.6* 34.6* 32.7*   Platelets K/uL 186 186 173 173 132* 117* 157 148* 122*   Gran% % 90.3* 92.4* 64.0 73.0 49.0 78.5* 77.9* 57.0 81.7*   Lymph% % 5.3* 5.4* 6.5* 6.0* 7.0* 11.0* 11.6* 12.0* 9.5*   Mono% % 3.8* 1.8* 12.5 5.0 8.0 9.4 8.7 5.0 7.6   Eosinophil% % 0.0 0.0 0.0 0.0 0.0 0.0 0.0 0.0  0.0   Basophil% % 0.2 0.2 0.0 1.0 0.0 0.2 0.4 0.0 0.2   Differential Method  Automated Automated Manual Manual Manual Automated Automated Automated Automated       Metabolic Panel (last 72 hours):  Recent Labs   Lab Result Units 06/11/20  1050 06/11/20  1124 06/11/20  1543 06/11/20  2140 06/12/20  0038 06/12/20  0429 06/12/20  0801 06/12/20  1600 06/12/20  1753 06/12/20  2041 06/13/20  0100 06/13/20  0300 06/13/20  0740 06/13/20  0930 06/13/20  1202 06/13/20  1733 06/13/20  1906 06/14/20  0014 06/14/20  0300   Sodium mmol/L 139  --  138  --  136 138  138 138 132* 137 141 141 140  140 140 139 137 138 138 137 135*   Sodium Urine Random mmol/L  --  <20*  --   --   --   --   --   --   --   --   --   --   --   --   --   --   --   --   --    Potassium mmol/L 5.9*  --  5.0  5.0 6.2* 5.6*  5.6* 5.0  5.0  5.0 4.3  4.3 4.4  4.4 4.8 4.8 4.7 4.8  4.8 5.4* 5.8* 5.3*  5.3* 5.4*  5.4* 5.3*  5.3* 5.0  5.0 5.0  5.0   Chloride mmol/L 112*  --  109  --  108 108  108 114* 108 109 108 109 109  109 110 109 109 110 109 109 108   CO2 mmol/L 15*  --  15*  --  15* 15*  15* 13* 8* 16* 19* 21* 20*  20* 22* 20* 18* 17* 19* 18* 18*   Glucose mg/dL 228*  --  339*  --  375* 272*  272* 227* 122* 293* 242* 155* 137*  137* 140* 153* 273* 194* 189* 193* 193*   Glucose, UA   --  2+*  --   --   --   --   --   --   --   --   --   --   --   --   --   --   --   --   --    BUN, Bld mg/dL 53*  --  57*  --  65* 69*  69* 55* 30* 69* 59* 57* 58*  58* 58* 58* 58* 58* 58* 57* 59*   Creatinine mg/dL 3.7*  --  3.8*  --  4.4* 4.9*  4.9* 4.0* 1.7* 4.8* 3.9* 3.6* 3.5*  3.5* 3.6* 3.4* 3.4* 3.3* 3.3* 3.2* 3.2*   Creatinine, Random Ur mg/dL  --  175.0  175.0  --   --   --   --   --   --   --   --   --   --   --   --   --   --   --   --   --    Albumin g/dL  --   --   --   --   --  3.0*  --   --  2.2*  --   --  1.7*  --  1.5*  --   --   --   --   --    Total Bilirubin mg/dL  --   --   --   --   --  1.1*  --   --  0.6  --   --  0.6  --  0.8  --    --   --   --   --    Alkaline Phosphatase U/L  --   --   --   --   --  79  --   --  56  --   --  50*  --  44*  --   --   --   --   --    AST U/L  --   --   --   --   --  38  --   --  31  --   --  35  --  35  --   --   --   --   --    ALT U/L  --   --   --   --   --  32  --   --  28  --   --  23  --  20  --   --   --   --   --    Magnesium mg/dL  --   --   --   --   --   --   --   --  1.6  --   --  1.7  --  1.6  --   --   --   --  1.6   Phosphorus mg/dL  --   --   --   --   --   --   --   --  3.9  --   --  3.3  --   --   --   --   --   --  5.0*       Vancomycin Administrations:  vancomycin given in the last 96 hours                   vancomycin in dextrose 5 % 1 gram/250 mL IVPB 1,000 mg (mg) 1,000 mg New Bag 06/13/20 1112    vancomycin 2 g in dextrose 5 % 500 mL IVPB (mg) 2,000 mg New Bag 06/12/20 0848                Microbiologic Results:  Microbiology Results (last 7 days)     Procedure Component Value Units Date/Time    Blood culture [[id number]] Collected: 06/11/20 2100    Order Status: Completed Specimen: Blood Updated: 06/13/20 2312     Blood Culture, Routine No Growth to date      No Growth to date      No Growth to date    Blood culture [[id number]] Collected: 06/11/20 2100    Order Status: Completed Specimen: Blood Updated: 06/13/20 2312     Blood Culture, Routine No Growth to date      No Growth to date      No Growth to date    Clostridium difficile EIA [[id number]] Collected: 06/11/20 0221    Order Status: Completed Specimen: Stool Updated: 06/11/20 0703     C. diff Antigen Negative     C difficile Toxins A+B, EIA Negative     Comment: Testing not recommended for children <24 months old.

## 2020-06-14 NOTE — SUBJECTIVE & OBJECTIVE
Interval History: Underwent bedside vac change yesterday to facilitate mesenteric flow evaluation by SICU and Vascular Surgery. Unfortunately, there were no signals detected in the SMA and the remainder of the midgut appeared significantly worse than when previously evaluated in surgery. Patient deemd to have non-survivable injury at this point.     Medications:  Continuous Infusions:   sodium chloride 0.9% 150 mL/hr at 06/14/20 0700    fentanyl 15 mL/hr at 06/14/20 0700    heparin (porcine) in D5W 10.989 Units/kg/hr (06/14/20 0700)    insulin (HUMAN R) infusion (adults) 0.7 Units/hr (06/14/20 0700)    norepinephrine bitartrate-D5W 0.08 mcg/kg/min (06/14/20 0700)    propofoL 30 mcg/kg/min (06/14/20 0700)    vasopressin (PITRESSIN) infusion Stopped (06/13/20 1009)     Scheduled Meds:   amiodarone  200 mg Oral Daily    ceFEPime (MAXIPIME) IVPB  2 g Intravenous Q24H    fluconazole (DIFLUCAN) IVPB  200 mg Intravenous Q24H    lactated ringers  1,000 mL Intravenous Once    metronidazole  500 mg Intravenous Q8H    pantoprazole  40 mg Intravenous BID    vancomycin (VANCOCIN) IVPB  1,000 mg Intravenous Once     PRN Meds:Dextrose 10% Bolus, Dextrose 10% Bolus, Dextrose 10% Bolus, Dextrose 10% Bolus, [COMPLETED] Dextrose 10% Bolus **AND** Dextrose 10% Bolus **AND** [COMPLETED] insulin regular, [COMPLETED] Dextrose 10% Bolus **AND** Dextrose 10% Bolus **AND** [COMPLETED] insulin regular, heparin (PORCINE), heparin (PORCINE), hydrALAZINE, morphine, ondansetron, sodium chloride 0.9%, Pharmacy to dose Vancomycin consult **AND** vancomycin - pharmacy to dose     Review of patient's allergies indicates:   Allergen Reactions    Adhesive tape-silicones Blisters     Objective:     Vital Signs (Most Recent):  Temp: 98.2 °F (36.8 °C) (06/14/20 0700)  Pulse: 74 (06/14/20 0700)  Resp: (!) 21 (06/14/20 0700)  BP: 139/63 (06/14/20 0700)  SpO2: 100 % (06/14/20 0700) Vital Signs (24h Range):  Temp:  [97.2 °F (36.2 °C)-99.1 °F  (37.3 °C)] 98.2 °F (36.8 °C)  Pulse:  [70-83] 74  Resp:  [9-39] 21  SpO2:  [96 %-100 %] 100 %  BP: (113-139)/(56-63) 139/63  Arterial Line BP: ()/(50-63) 126/56     Weight: 134.5 kg (296 lb 8.3 oz)  Body mass index is 43.92 kg/m².    Intake/Output - Last 3 Shifts       06/12 0700 - 06/13 0659 06/13 0700 - 06/14 0659 06/14 0700 - 06/15 0659    P.O. 0      I.V. (mL/kg) 3668.7 (30.1) 2707.3 (20.1)     NG/GT 0      IV Piggyback 9800 3250     Total Intake(mL/kg) 13502.7 (110.7) 5957.3 (44.3)     Urine (mL/kg/hr) 2205 (0.8) 1084 (0.3) 170 (0.6)    Drains 350      Other 835 920     Stool 0 0     Blood 100      Total Output 3490 2004 170    Net +9978.7 +3953.3 -170           Stool Occurrence 0 x 0 x           Physical Exam  Gen: Intubated, sedated   Pulm: intubated  Vent Mode: A/C  Oxygen Concentration (%):  [] 50  Resp Rate Total:  [18 br/min-23 br/min] 20 br/min  Vt Set:  [450 mL] 450 mL  PEEP/CPAP:  [5 cmH20] 5 cmH20  Mean Airway Pressure:  [10 rnR34-15 cmH20] 10 cmH20  CV: Hypotensive req pressors - vaso 0.04, levo 0.02  Abd: Open with ABThera in place  Extremities - wwp    Significant Labs:  CBC:   Recent Labs   Lab 06/13/20 0930 06/13/20  0932   WBC 4.21  --    RBC 3.47*  --    HGB 10.6*  --    HCT 32.7* 30*   *  --    MCV 94  --    MCH 30.5  --    MCHC 32.4  --      CMP:   Recent Labs   Lab 06/13/20 0930 06/14/20  0300   *   < > 193*   CALCIUM 6.7*   < > 7.0*   ALBUMIN 1.5*  --   --    PROT 4.1*  --   --       < > 135*   K 5.8*   < > 5.0  5.0   CO2 20*   < > 18*      < > 108   BUN 58*   < > 59*   CREATININE 3.4*   < > 3.2*   ALKPHOS 44*  --   --    ALT 20  --   --    AST 35  --   --    BILITOT 0.8  --   --     < > = values in this interval not displayed.       Significant Diagnostics:  I have reviewed all pertinent imaging results/findings within the past 24 hours.

## 2020-06-14 NOTE — PROGRESS NOTES
[facility]  General Surgery  Progress Note    Subjective:     History of Present Illness:  No notes on file    Post-Op Info:  Procedure(s) (LRB):  LAPAROTOMY, EXPLORATORY , Abdominal Washout, Small Bowel Resection and Abethera Wound Vac Placement. (N/A)   1 Day Post-Op     Interval History: Underwent bedside vac change yesterday to facilitate mesenteric flow evaluation by SICU and Vascular Surgery. Unfortunately, there were no signals detected in the SMA and the remainder of the midgut appeared significantly worse than when previously evaluated in surgery. Patient deemd to have non-survivable injury at this point.     Medications:  Continuous Infusions:   sodium chloride 0.9% 150 mL/hr at 06/14/20 0700    fentanyl 15 mL/hr at 06/14/20 0700    heparin (porcine) in D5W 10.989 Units/kg/hr (06/14/20 0700)    insulin (HUMAN R) infusion (adults) 0.7 Units/hr (06/14/20 0700)    norepinephrine bitartrate-D5W 0.08 mcg/kg/min (06/14/20 0700)    propofoL 30 mcg/kg/min (06/14/20 0700)    vasopressin (PITRESSIN) infusion Stopped (06/13/20 1009)     Scheduled Meds:   amiodarone  200 mg Oral Daily    ceFEPime (MAXIPIME) IVPB  2 g Intravenous Q24H    fluconazole (DIFLUCAN) IVPB  200 mg Intravenous Q24H    lactated ringers  1,000 mL Intravenous Once    metronidazole  500 mg Intravenous Q8H    pantoprazole  40 mg Intravenous BID    vancomycin (VANCOCIN) IVPB  1,000 mg Intravenous Once     PRN Meds:Dextrose 10% Bolus, Dextrose 10% Bolus, Dextrose 10% Bolus, Dextrose 10% Bolus, [COMPLETED] Dextrose 10% Bolus **AND** Dextrose 10% Bolus **AND** [COMPLETED] insulin regular, [COMPLETED] Dextrose 10% Bolus **AND** Dextrose 10% Bolus **AND** [COMPLETED] insulin regular, heparin (PORCINE), heparin (PORCINE), hydrALAZINE, morphine, ondansetron, sodium chloride 0.9%, Pharmacy to dose Vancomycin consult **AND** vancomycin - pharmacy to dose     Review of patient's allergies indicates:   Allergen Reactions     Adhesive tape-silicones Blisters     Objective:     Vital Signs (Most Recent):  Temp: 98.2 °F (36.8 °C) (06/14/20 0700)  Pulse: 74 (06/14/20 0700)  Resp: (!) 21 (06/14/20 0700)  BP: 139/63 (06/14/20 0700)  SpO2: 100 % (06/14/20 0700) Vital Signs (24h Range):  Temp:  [97.2 °F (36.2 °C)-99.1 °F (37.3 °C)] 98.2 °F (36.8 °C)  Pulse:  [70-83] 74  Resp:  [9-39] 21  SpO2:  [96 %-100 %] 100 %  BP: (113-139)/(56-63) 139/63  Arterial Line BP: ()/(50-63) 126/56     Weight: 134.5 kg (296 lb 8.3 oz)  Body mass index is 43.92 kg/m².    Intake/Output - Last 3 Shifts       06/12 0700 - 06/13 0659 06/13 0700 - 06/14 0659 06/14 0700 - 06/15 0659    P.O. 0      I.V. (mL/kg) 3668.7 (30.1) 2707.3 (20.1)     NG/GT 0      IV Piggyback 9800 3250     Total Intake(mL/kg) 47585.7 (110.7) 5957.3 (44.3)     Urine (mL/kg/hr) 2205 (0.8) 1084 (0.3) 170 (0.6)    Drains 350      Other 835 920     Stool 0 0     Blood 100      Total Output 3490 2004 170    Net +9978.7 +3953.3 -170           Stool Occurrence 0 x 0 x           Physical Exam  Gen: Intubated, sedated   Pulm: intubated  Vent Mode: A/C  Oxygen Concentration (%):  [] 50  Resp Rate Total:  [18 br/min-23 br/min] 20 br/min  Vt Set:  [450 mL] 450 mL  PEEP/CPAP:  [5 cmH20] 5 cmH20  Mean Airway Pressure:  [10 wtF34-09 cmH20] 10 cmH20  CV: Hypotensive req pressors - vaso 0.04, levo 0.02  Abd: Open with ABThera in place  Extremities - wwp    Significant Labs:  CBC:   Recent Labs   Lab 06/13/20 0930 06/13/20  0932   WBC 4.21  --    RBC 3.47*  --    HGB 10.6*  --    HCT 32.7* 30*   *  --    MCV 94  --    MCH 30.5  --    MCHC 32.4  --      CMP:   Recent Labs   Lab 06/13/20 0930  06/14/20  0300   *   < > 193*   CALCIUM 6.7*   < > 7.0*   ALBUMIN 1.5*  --   --    PROT 4.1*  --   --       < > 135*   K 5.8*   < > 5.0  5.0   CO2 20*   < > 18*      < > 108   BUN 58*   < > 59*   CREATININE 3.4*   < > 3.2*   ALKPHOS 44*  --   --    ALT 20  --   --    AST 35  --   --     BILITOT 0.8  --   --     < > = values in this interval not displayed.       Significant Diagnostics:  I have reviewed all pertinent imaging results/findings within the past 24 hours.    Assessment/Plan:     Small bowel ischemia  69 yo M presenting with CVA and subsequent small bowel ischemia, likely of embolic etiology. Now found to have absence of SMV flow and extensive small bowel ischemic necrosis.     - Transitioning to comfort care.  - Family meeting this AM.  - Appreciate SICU assistance.         Nino Luu MD  General Surgery  [facility]

## 2020-06-14 NOTE — CONSULTS
Chart reviewed; discussed with Onelia Cherry and Joellen.  Patient has ischemic bowel with no curative treatment. Patient is dying.  Recommend: DNR with no escalation of current treatments and aggressive pain control.   Family gathering and processing.  Gave them my card.Will follow up as needed.  Cortney Hayes MD  Palliative Medicine  [phone] cell  20 minutes spent in above.

## 2020-06-15 PROBLEM — Z51.5 PALLIATIVE CARE ENCOUNTER: Status: ACTIVE | Noted: 2020-01-01

## 2020-06-15 NOTE — ASSESSMENT & PLAN NOTE
69 yo M presenting with CVA and subsequent small bowel ischemia, likely of embolic etiology. Now found to have absence of SMV flow and extensive small bowel ischemic necrosis.     - Patient remains full code at this time. Family considering status.   - No further surgical or procedural intervention planned given extent of injury.   - Appreciate SICU and Palliative Care assistance.

## 2020-06-15 NOTE — HPI
Pt is a 70 y.o. male with CAD s/p PCI x3 back in 2016 (on DAPT at home), HFrEF (last echo Feb 2019 showing EF 45% and G1DD; on GDMT with lisinopril and metoprolol succinate), A fib (on amiodarone,not on anticoagulation 2/2 GI bleed in 2017; has a loop recorder in place), CKD (baseline creatinine appears 2.5-3), IDDM, PAD (s/p L femoro-popliteal bypass in 2017; history of diabetic foot ulcers), HTN, HLD, and obesity. Per chart review, pt was admitted to vascular neurology service in the setting of large R ISABELA stroke and smaller L ISABELA stroke diagnosed overnight 6/10-6/11. Patient reported that he had increasing weakness and 4 loose bowel movements with bloody streaks throughout the day 6/10 and when attempting to leave for the ER, he noticed weakness and inability to move his left leg to put on his shoes. Patient then presented to ED and was diagnosed with R ISABELA and L ISABELA stroke as above and admitted to stroke service, opted for medical management given presentation outside the tPA window and history concerning for possible GI bleed (hematechezia with 4 looser bowel movements and known history of GI bleeding). Pt evaluated by GI for possible GI bleeding with plan for colonoscopy 6/15.   Pt reported right sided abdominal pain in the AM on 6/11 with a sore, achy character that continued to worsen throughout the day. A KUB obtained 6/11 concerning for distal small bowel obstruction; NGT placed but patient continued to have worsening abdominal pain. Lactic acid elevated at 4.2 and continued to rise to 4.7 on repeat. Critical care medicine consulted for concern for worsening abdominal pain and lactic acidosis.     6-  Pt has Mesenteric US with no SMA flow.  Pt made DNR.   Plan is withdrawal of life-support.   Pt made DNR.

## 2020-06-15 NOTE — TREATMENT PLAN
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 Will hold off on Endoscopy and colonoscopy at this time.     Thank you for involving us in the care of Chau Rodarte. Please call with any additional questions, concerns or changes in the patient's clinical status.    Romulo Marcano MD  Gastroenterology Fellow PGY IV   [facility]

## 2020-06-15 NOTE — PLAN OF CARE
SW is following this Pt for DC planning needs. There are no identified needs at this time. Discharge Disposition: comfort care    Luciana Ruvalcaba LMSW   - Case Management

## 2020-06-15 NOTE — PLAN OF CARE
Pt vss. Afebrile. HR 63, NSR. MAP maintained>65. A/C 40/5, sats>99%. Pt on propofol, fentanyl, levo, insulin, and heparin gtts, see flowsheet. [id number]/hr. Wound vac seal intact, output 200cc dark red output throughout shift. Pt unresponsive on sedation vacation, gag and cough reflexes absent, pupils unequal, MD aware. Belly distended/firm on palpation. No new skin breakdown noted, bed plugged in, waffle inflated, off loading boots in place. Labs drawn, MD Samra notified of Mg 1.8, no orders to replace at this time. Plan of care reviewed with patient and family via phone, no questions/concerns at this time. Will continue to monitor.

## 2020-06-15 NOTE — SUBJECTIVE & OBJECTIVE
Interval History: DNR/withdrawal of life-support today.    Past Medical History:   Diagnosis Date    Acute on chronic systolic CHF (congestive heart failure) 11/29/2016    Acute osteomyelitis of toe, right     Anemia     Anticoagulant long-term use     CKD (chronic kidney disease) stage 2, GFR 60-89 ml/min 2/21/2016    CKD (chronic kidney disease) stage 3, GFR 30-59 ml/min     Coronary artery disease     Cryptogenic stroke, remote history (2006) 1/26/2017    Decubitus ulcer, heel, right, unstageable     Diabetic foot ulcer 11/13/2013    Diabetic foot ulcer with osteomyelitis 6/23/2017    Encounter for blood transfusion     HTN (hypertension) 3/3/2014    Hyperlipidemia 3/3/2014    NSTEMI (non-ST elevated myocardial infarction) 11/28/2016    Obesity (BMI 30-39.9) 11/29/2016    Osteomyelitis, chronic     PVD (peripheral vascular disease)     Stroke     Type 2 diabetes mellitus with diabetic peripheral angiopathy without gangrene, without long-term current use of insulin 10/24/2013    Type 2 diabetes mellitus with diabetic polyneuropathy, without long-term current use of insulin 11/29/2016       Past Surgical History:   Procedure Laterality Date    APPLICATION OF WOUND VACUUM-ASSISTED CLOSURE DEVICE N/A 6/12/2020    Procedure: APPLICATION, WOUND VAC;  Surgeon: Janneth Cherry MD;  Location: [facility] OR [address] [place] [address];  Service: General;  Laterality: N/A;    CARDIAC SURGERY      COLONOSCOPY N/A 3/21/2017    Procedure: COLONOSCOPY;  Surgeon: Karissa Peck MD;  Location: [facility] ([address] [place] [address]);  Service: Endoscopy;  Laterality: N/A;    CORONARY ANGIOPLASTY WITH STENT PLACEMENT      FOOT NERVE GRAFT  11/2013    left leg stent      Loop Recorder placement      right leg bypass         Review of patient's allergies indicates:   Allergen Reactions    Adhesive tape-silicones Blisters       Medications:  Continuous Infusions:   fentanyl citrate-0.9%NaCl (PF)       Scheduled Meds:  PRN Meds:fentaNYL,  glycopyrrolate, lorazepam, ondansetron, sodium chloride 0.9%    Family History     Problem Relation (Age of Onset)    Blindness Maternal Grandmother    Cataracts Mother    Diabetes Mother    Glaucoma Mother    Heart disease Father        Tobacco Use    Smoking status: Former Smoker     Packs/day: 1.00     Years: 50.00     Pack years: 50.00     Quit date: 3/1/2011     Years since quittin.2    Smokeless tobacco: Never Used   Substance and Sexual Activity    Alcohol use: No    Drug use: No    Sexual activity: Yes     Partners: Female     Comment:  with 2 children 3 grand sons and 1 great grand daughter       Review of Systems   Unable to perform ROS: Patient unresponsive     Objective:     Vital Signs (Most Recent):  Temp: 99 °F (37.2 °C) (06/15/20 1400)  Pulse: 60 (06/15/20 1400)  Resp: 20 (06/15/20 1400)  BP: (!) 120/59 (20 0800)  SpO2: 100 % (06/15/20 1400) Vital Signs (24h Range):  Temp:  [97.3 °F (36.3 °C)-99 °F (37.2 °C)] 99 °F (37.2 °C)  Pulse:  [58-93] 60  Resp:  [16-74] 20  SpO2:  [95 %-100 %] 100 %  Arterial Line BP: (105-149)/(47-58) 116/48     Weight: 133.4 kg (294 lb 1.5 oz)  Body mass index is 43.56 kg/m².    Review of Symptoms  Symptom Assessment (ESAS 0-10 scale)   ESAS 0 1 2 3 4 5 6 7 8 9 10   Pain              Dyspnea              Anxiety              Nausea              Depression               Anorexia              Fatigue              Insomnia              Restlessness               Agitation              CAM / Delirium __ --  ___+   Constipation     __ --  ___+   Diarrhea           __ --  ___+  Bowel Management Plan (BMP): No    Comments: Unable to do ROS/- Pt s/p CVA- on vent.     Performance Status: 10    ECOG Performance Status Grade: 4 - Completely disabled    Physical Exam  Constitutional:       Interventions: He is intubated.      Comments: Pt on life-support.     HENT:      Head: Normocephalic and atraumatic.      Mouth/Throat:      Comments: NG tube in place.  "  Cardiovascular:      Comments: Pt on two vasopressors.   Pulmonary:      Effort: He is intubated.      Comments: Pt intubated.   Abdominal:      Comments: NG tube in place.  Wound vac in place.   Genitourinary:     Comments: Saucedo cath in place.   Feet:      Comments: SCDs in place.   Skin:     General: Skin is warm and dry.   Neurological:      Comments: Pt intubated on propofol   Psychiatric:      Comments: Pt intubated/unresponsive.          Significant Labs: All pertinent labs within the past 24 hours have been reviewed.  CBC:   Recent Labs   Lab 06/15/20  0305   WBC 10.33   HGB 8.7*   HCT 27.3*   MCV 93   PLT 66*     BMP:  Recent Labs   Lab 06/15/20  0305 06/15/20  1113   * 149*    139   K 4.5 4.6   * 113*   CO2 19* 21*   BUN 53* 50*   CREATININE 2.7* 2.5*   CALCIUM 7.5* 7.4*   MG 1.8  --      LFT:  Lab Results   Component Value Date    AST 35 06/13/2020    ALKPHOS 44 (L) 06/13/2020    BILITOT 0.8 06/13/2020     Albumin:   Albumin   Date Value Ref Range Status   06/13/2020 1.5 (L) 3.5 - 5.2 g/dL Final     Protein:   Total Protein   Date Value Ref Range Status   06/13/2020 4.1 (L) 6.0 - 8.4 g/dL Final     Lactic acid:   Lab Results   Component Value Date    LACTATE 1.5 06/14/2020    LACTATE 2.8 (H) 06/13/2020       Significant Imaging: I have reviewed all pertinent imaging results/findings within the past 24 hours.    Advance Care Planning   Advanced Directives::  Living Will: No  LaPOST: No  Do Not Resuscitate Status: Yes  Medical Power of : Wife is next of kin    Decision-Making Capacity: Family answered questions, Patient unable to communicate due to disease severity/cognitive impairment       Living Arrangements: Lives with spouse    Psychosocial/Cultural:  Pt is . Pt has large extended family.       Spiritual: "not really"    F- Michelle and Belief:  Yarsanism    I - Importance:   .  C - Community:     A - Address in Care: Family did not want  support at this time. " They are aware  available if needed.

## 2020-06-15 NOTE — DISCHARGE SUMMARY
[facility]  General Surgery  Discharge Summary      Patient Name: Chau Rodarte  MRN: [id number]  Admission Date: 6/10/2020  Hospital Length of Stay: 4 days  Discharge Date and Time: 6/20/2020     Attending Physician: Janneth Cherry MD   Discharging Provider: Jeffrey Matson MD  Primary Care Provider: iRki Huynh MD     HPI:   Pt is a 70 y.o. male with CAD s/p PCI x3 back in 2016 (on DAPT at home), HFrEF (last echo Feb 2019 showing EF 45% and G1DD; on GDMT with lisinopril and metoprolol succinate), A fib (on amiodarone,not on anticoagulation 2/2 GI bleed in 2017; has a loop recorder in place), CKD (baseline creatinine appears 2.5-3), IDDM, PAD (s/p L femoro-popliteal bypass in 2017; history of diabetic foot ulcers), HTN, HLD, and obesity. Per chart review, pt was admitted to vascular neurology service in the setting of large R ISABELA stroke and smaller L ISABELA stroke diagnosed overnight 6/10-6/11. Patient reported that he had increasing weakness and 4 loose bowel movements with bloody streaks throughout the day 6/10 and when attempting to leave for the ER, he noticed weakness and inability to move his left leg to put on his shoes. Patient then presented to ED and was diagnosed with R ISABELA and L ISABELA stroke as above and admitted to stroke service, opted for medical management given presentation outside the tPA window and history concerning for possible GI bleed (hematechezia with 4 looser bowel movements and known history of GI bleeding). Pt evaluated by GI for possible GI bleeding with plan for colonoscopy 6/15.   Pt reported right sided abdominal pain in the AM on 6/11 with a sore, achy character that continued to worsen throughout the day. A KUB obtained 6/11 concerning for distal small bowel obstruction; NGT placed but patient continued to have worsening abdominal pain. Lactic acid elevated at 4.2 and continued to rise to 4.7 on repeat. Critical care medicine consulted for concern for  worsening abdominal pain and lactic acidosis.     Procedure(s) (LRB):  LAPAROTOMY, EXPLORATORY , Abdominal Washout, Small Bowel Resection and Abethera Wound Vac Placement. (N/A)  EXCISION, SMALL INTESTINE     Hospital Course:   Patient underwent exploratory laparotomy and small bowel resection x2 for ischemic small bowel 20 . He was left in discontinuity and re-explored 20, where he underwent additional resection of bowel. Unfortunately, ischemia progressed significantly, an intraabdominal US was performed, celiac artery was patent, however the SMA was not visualized. Palliative was consulted for assistance with transitioning to comfort care. Family agree to withdraw of life-support. Patient  in the SICU     Consults:   Consults (From admission, onward)        Status Ordering Provider     Inpatient consult to Critical Care Medicine  Once     Provider:  (Not yet assigned)    Completed JOSUE TRUJILLO     Inpatient consult to Gastroenterology  Once     Provider:  (Not yet assigned)    Completed SERGIO BRIGHT     Inpatient consult to General Surgery  Once     Provider:  (Not yet assigned)    Completed NMA CHAUDHARY     Inpatient consult to [facility] Medicine - Stroke Comanagement  Once     Provider:  (Not yet assigned)    Completed JOSUE TRUJILLO     Inpatient consult to Hospital Medicine-General  Once     Provider:  (Not yet assigned)    Completed SERGIO BRIGHT     Inpatient consult to Palliative Care  Once     Provider:  (Not yet assigned)    Completed GIANNA MCELROY     Inpatient consult to Physical Medicine Rehab  Once     Provider:  (Not yet assigned)    Completed JOSUE TRUJILLO     Inpatient consult to Vascular (Stroke) Neurology  Once     Provider:  (Not yet assigned)    Completed DEEPTI DUMAS consult to case management/social work  Once     Provider:  (Not yet assigned)    Completed JOSUE TRUJILLO          Significant Diagnostic Studies: Labs:   BMP:    Recent Labs   Lab 06/14/20  0300  06/14/20  2355 06/15/20  0305 06/15/20  1113   *   < > 154* 158* 149*   *   < > 139 139 139   K 5.0  5.0   < > 4.4 4.5 4.6      < > 112* 111* 113*   CO2 18*   < > 19* 19* 21*   BUN 59*   < > 53* 53* 50*   CREATININE 3.2*   < > 2.7* 2.7* 2.5*   CALCIUM 7.0*   < > 6.8* 7.5* 7.4*   MG 1.6  --   --  1.8  --     < > = values in this interval not displayed.    and CMP   Recent Labs   Lab 06/14/20  2355 06/15/20  0305 06/15/20  1113    139 139   K 4.4 4.5 4.6   * 111* 113*   CO2 19* 19* 21*   * 158* 149*   BUN 53* 53* 50*   CREATININE 2.7* 2.7* 2.5*   CALCIUM 6.8* 7.5* 7.4*   ANIONGAP 8 9 5*   ESTGFRAFRICA 26.2* 26.2* 28.8*   EGFRNONAA 22.7* 22.7* 24.9*       Pending Diagnostic Studies:     Procedure Component Value Units Date/Time    APTT [[id number]] Collected: 06/12/20 2234    Order Status: Sent Lab Status: In process Updated: 06/12/20 2234    Specimen: Blood     Basic metabolic panel [[id number]] Collected: 06/14/20 0631    Order Status: Sent Lab Status: In process Updated: 06/14/20 0632    Specimen: Blood     Lactic Acid, Plasma [[id number]] Collected: 06/12/20 1043    Order Status: Sent Lab Status: In process Updated: 06/12/20 1043    Specimen: Blood     Potassium - if not ordered in last 24 hours [[id number]] Collected: 06/12/20 2112    Order Status: Sent Lab Status: In process Updated: 06/12/20 2112    Specimen: Blood     Specimen to Pathology, Surgery General Surgery [[id number]] Collected: 06/13/20 0855    Order Status: Sent Lab Status: In process Updated: 06/15/20 1043    Specimen to Pathology, Surgery General Surgery [[id number]] Collected: 06/12/20 1318    Order Status: Sent Lab Status: In process Updated: 06/12/20 1740        Final Active Diagnoses:    Diagnosis Date Noted POA    PRINCIPAL PROBLEM:  Acute right ISABELA stroke [I63.521] 06/11/2020 Yes    Palliative care encounter [Z51.5] 06/15/2020 Not Applicable    Goals of care,  counseling/discussion [Z71.89]  Not Applicable    Pain [R52]  Unknown    Dyspnea [R06.00]  Unknown    DNR (do not resuscitate) [Z66]  Unknown    Advance care planning [Z71.89]  Not Applicable    Small bowel obstruction [K56.609] 2020 Yes    Hyperkalemia [E87.5] 2020 Yes    Small bowel ischemia [K55.9] 2020 Yes    Acute kidney injury [N17.9] 2020 Yes    Generalized abdominal pain [R10.84] 2020 No    Chronic kidney disease (CKD), stage IV (severe) [N18.4] 2019 Yes    Paroxysmal atrial fibrillation [I48.0] 2017 Yes    GI bleeding [K92.2] 2017 Yes    Coronary artery disease involving native coronary artery of native heart without angina pectoris [I25.10] 2016 Yes    Type 2 diabetes mellitus with stage 4 chronic kidney disease, with long-term current use of insulin [E11.22, N18.4, Z79.4] 2016 Not Applicable    Chronic systolic heart failure [I50.22] 2016 Yes    Essential hypertension [I10] 2014 Yes    HLD (hyperlipidemia) [E78.5] 10/24/2013 Yes      Problems Resolved During this Admission:    Diagnosis Date Noted Date Resolved POA    Leukocytosis [D72.829] 2020 Yes      Discharged Condition:     Disposition:     Follow Up:  Follow-up Information     Riki Huynh MD.    Specialty: Internal Medicine  Why: Outpatient Services  Contact information:  [address]  [place] [address]  [phone]                 Patient Instructions:   No discharge procedures on file.  Medications:  None (patient  at medical facility)    Jeffrey Matson MD  General Surgery  [facility]

## 2020-06-15 NOTE — CARE UPDATE
69 y/o male s/p CVA and bowel ischemia with no SMA flow. Family in agreement to withdraw of life-support. Was called by the nurse, patient in asystole, bedside exam performed, no pulses, no response to painful stimulus, no spontaneous respiration, no heart sounds. Patient was declared dead at 3:14pm

## 2020-06-15 NOTE — PROGRESS NOTES
[facility]  General Surgery  Progress Note    Subjective:     History of Present Illness:  No notes on file    Post-Op Info:  Procedure(s) (LRB):  LAPAROTOMY, EXPLORATORY , Abdominal Washout, Small Bowel Resection and Abethera Wound Vac Placement. (N/A)  EXCISION, SMALL INTESTINE   2 Days Post-Op     Interval History: No significant clinical changes.     Medications:  Continuous Infusions:   sodium chloride 0.9% 150 mL/hr at 06/15/20 0900    fentanyl 150 mcg/hr (06/15/20 0900)    heparin (porcine) in D5W 13 Units/kg/hr (06/15/20 0900)    insulin (HUMAN R) infusion (adults) 1.1 Units/hr (06/15/20 0900)    norepinephrine bitartrate-D5W 0.06 mcg/kg/min (06/15/20 0900)    propofoL 30 mcg/kg/min (06/15/20 0900)    vasopressin (PITRESSIN) infusion Stopped (06/13/20 1009)     Scheduled Meds:   amiodarone  200 mg Oral Daily    ceFEPime (MAXIPIME) IVPB  2 g Intravenous Q24H    fluconazole (DIFLUCAN) IVPB  200 mg Intravenous Q24H    metronidazole  500 mg Intravenous Q8H    pantoprazole  40 mg Intravenous BID    vancomycin (VANCOCIN) IVPB  1,000 mg Intravenous Once     PRN Meds:Dextrose 10% Bolus, Dextrose 10% Bolus, Dextrose 10% Bolus, Dextrose 10% Bolus, heparin (PORCINE), heparin (PORCINE), hydrALAZINE, morphine, ondansetron, sodium chloride 0.9%, Pharmacy to dose Vancomycin consult **AND** vancomycin - pharmacy to dose     Review of patient's allergies indicates:   Allergen Reactions    Adhesive tape-silicones Blisters     Objective:     Vital Signs (Most Recent):  Temp: 98.2 °F (36.8 °C) (06/15/20 0945)  Pulse: 64 (06/15/20 0945)  Resp: (!) 29 (06/15/20 0945)  BP: (!) 120/59 (06/14/20 0800)  SpO2: 100 % (06/15/20 0945) Vital Signs (24h Range):  Temp:  [97.3 °F (36.3 °C)-98.8 °F (37.1 °C)] 98.2 °F (36.8 °C)  Pulse:  [58-93] 64  Resp:  [20-88] 29  SpO2:  [95 %-100 %] 100 %  Arterial Line BP: (105-149)/(47-58) 110/47     Weight: 133.4 kg (294 lb 1.5 oz)  Body mass index is 43.56  kg/m².    Intake/Output - Last 3 Shifts       06/13 0700 - 06/14 0659 06/14 0700 - 06/15 0659 06/15 0700 - 06/16 0659    P.O.       I.V. (mL/kg) 2707.3 (20.1) 4964.8 (37.2)     NG/GT       IV Piggyback 3250      Total Intake(mL/kg) 5957.3 (44.3) 4964.8 (37.2)     Urine (mL/kg/hr) 1084 (0.3) 4280 (1.3) 325 (0.8)    Drains       Other 920 350 50    Stool 0      Blood       Total Output 2004 4630 375    Net +3953.3 +334.8 -375           Stool Occurrence 0 x            Physical Exam  Gen: Intubated, sedated   Pulm: intubated  Vent Mode: A/C  Oxygen Concentration (%):  [30-50] 30  Resp Rate Total:  [20 br/min-24 br/min] 20 br/min  Vt Set:  [410 mL-450 mL] 410 mL  PEEP/CPAP:  [5 cmH20] 5 cmH20  Mean Airway Pressure:  [9.9 noG69-64 cmH20] 12 cmH20  CV: Hypotensive req pressors - levo 0.06  Abd: Open with ABThera in place  Extremities - wwp    Significant Labs:  CBC:   Recent Labs   Lab 06/15/20  0305   WBC 10.33   RBC 2.93*   HGB 8.7*   HCT 27.3*   PLT 66*   MCV 93   MCH 29.7   MCHC 31.9*     CMP:   Recent Labs   Lab 06/13/20  0930  06/15/20  0305   *   < > 158*   CALCIUM 6.7*   < > 7.5*   ALBUMIN 1.5*  --   --    PROT 4.1*  --   --       < > 139   K 5.8*   < > 4.5   CO2 20*   < > 19*      < > 111*   BUN 58*   < > 53*   CREATININE 3.4*   < > 2.7*   ALKPHOS 44*  --   --    ALT 20  --   --    AST 35  --   --    BILITOT 0.8  --   --     < > = values in this interval not displayed.       Significant Diagnostics:  I have reviewed all pertinent imaging results/findings within the past 24 hours.    Assessment/Plan:     Small bowel ischemia  69 yo M presenting with CVA and subsequent small bowel ischemia, likely of embolic etiology. Now found to have absence of SMV flow and extensive small bowel ischemic necrosis.     - Patient remains full code at this time. Family considering status.   - No further surgical or procedural intervention planned given extent of injury.   - Appreciate SICU and Palliative Care  assistance.         Nino Luu MD  General Surgery  [facility]

## 2020-06-15 NOTE — PHYSICIAN QUERY
PT Name: Chau Rodarte  MR #: [id number]     Perfusion Diagnosis Clarification     CDS: Jina Shaffer RN, CCDS  Contact information: [email or web address]  This form is a permanent document in the medical record.     Query Date: Perla 15, 2020    By submitting this query, we are merely seeking further clarification of documentation.  Please utilize your independent clinical judgment when addressing the question(s) below.  The Medical Record contains the following:  Indicators Supporting Clinical Findings Location in Medical Record   x Acute Illness (e.g. AMI, Sepsis, etc.) 69 y/o M with Afib and embolic events to brain and bowel  Taken to OR today for mesenteric ischemia s/p Jejunum resection x 2. Bowel left in discontinuity and to be taken 06/13/2020 for second look      Sepsis. Continue antibiotics. Further treatment pending family discussion.     PN 6/13 MD Joellen Hogan MD              PN Attestation 6/15  MD Oleg   x Vital Signs  Vital Signs (24h Range):    BP: ()/(46-72) 117/67   PN 6/13 MD Joellen Hogan MD   x Acidosis documented Lactic Acidosis improving (7.2->3.3)   PN 6/13 MD Joellen Hogan MD    ABGs/Labs     x Hypotension or Low Blood Pressure documented Hypotensive on Vasopressor support     PN 6/13 MD Joellen Hogan MD    Altered Mental Status or Confusion      Diaphoresis, Cold Extremities or Cyanosis     x Oliguria Upon examination, patient hypotensive / tachycardic/ oliguric.   Already on 0.12 mg / kg / min of Levophed Care update 6/12  Zain Soni MD   x Medication/Treatment:  -Vasopressors  -Inotropic Drugs  -IV Fluids   -IV Antibiotics  -Cardiac Assist Devices  -Hemodynamic Monitoring  -Blood/Blood Products Intubated, sedated (propofol 10). Requiring pressor support Levo .04, Vaso .04   PN 6/13 MD Joellen Hogan MD   x Other Afebrile  Leukocytosis present  Cefepime / Vancomycin / Fluconazole   PN 6/13 MD Joellen Hogan MD     Provider, please specify diagnosis  or diagnoses associated with above clinical findings.    [  x  ] Septic Shock   [    ] Hypovolemic Shock   [    ] Other Shock (please specify): __________   [    ] Shock, Unspecified   [    ] Other Condition (please specify): _________   [  ] Clinically Undetermined     Please document in your progress notes daily for the duration of treatment until resolved and include in your discharge summary.

## 2020-06-15 NOTE — SUBJECTIVE & OBJECTIVE
Interval History/Significant Events:     No acute events overnight  Mesenteric US with no SMA flow  Bedside Abthera removal revealing grossly non-viable bowel  Palliative Care team following    Follow-up For: Procedure(s) (LRB):  LAPAROTOMY, EXPLORATORY , Abdominal Washout, Small Bowel Resection and Abethera Wound Vac Placement. (N/A)    Post-Operative Day: 2 Days Post-Op    Objective:     Vital Signs (Most Recent):  Temp: 98.4 °F (36.9 °C) (06/15/20 0815)  Pulse: 67 (06/15/20 0815)  Resp: (!) 39 (06/15/20 0815)  BP: (!) 120/59 (06/14/20 0800)  SpO2: 99 % (06/15/20 0815) Vital Signs (24h Range):  Temp:  [97.3 °F (36.3 °C)-98.8 °F (37.1 °C)] 98.4 °F (36.9 °C)  Pulse:  [58-93] 67  Resp:  [20-88] 39  SpO2:  [95 %-100 %] 99 %  Arterial Line BP: ()/(47-58) 112/50     Weight: 133.4 kg (294 lb 1.5 oz)  Body mass index is 43.56 kg/m².      Intake/Output Summary (Last 24 hours) at 6/15/2020 0914  Last data filed at 6/15/2020 0700  Gross per 24 hour   Intake 4964.8 ml   Output 4635 ml   Net 329.8 ml       Physical Exam  Vitals signs reviewed.   Constitutional:       Appearance: He is well-developed and well-nourished.   HENT:      Head: Normocephalic and atraumatic.   Cardiovascular:      Rate and Rhythm: Normal rate and regular rhythm.      Comments: Levophed 0.04  Pulmonary:      Comments: Intubated  Sedated  Minimal Vent Settings    Abdominal:      General: There is no distension.      Palpations: Abdomen is soft.      Tenderness: There is no abdominal tenderness. There is no guarding.      Comments: Heavily Sedated   Soft, depressible, non-rigid  Abthera Wound Vac in pLace   Genitourinary:     Comments: Urinary catheter in place        Vents:  Vent Mode: A/C (06/15/20 0758)  Set Rate: 20 BPM (06/15/20 0758)  Vt Set: 410 mL (06/15/20 0758)  Pressure Support: 0 cmH20 (06/13/20 0513)  PEEP/CPAP: 5 cmH20 (06/15/20 0758)  Oxygen Concentration (%): 30 (06/15/20 0815)  Peak Airway Pressure: 22 cmH2O (06/15/20  0758)  Plateau Pressure: 16 cmH20 (06/15/20 0758)  Total Ve: 12.7 mL (06/15/20 0758)  F/VT Ratio<105 (RSBI): (!) 36.27 (06/15/20 0758)    Lines/Drains/Airways     Central Venous Catheter Line            Percutaneous Central Line Insertion/Assessment - Triple Lumen  06/12/20 1810 right internal jugular 2 days          Drain                 NG/OG Tube 06/12/20 0608 Right nostril 3 days         Urethral Catheter 06/12/20 1330 2 days          Airway                 Airway - Non-Surgical 06/12/20 1146 Endotracheal Tube 2 days          Arterial Line                 Arterial Line 06/12/20 1700 Right Radial 2 days          Peripheral Intravenous Line                 Peripheral IV - Single Lumen 06/11/20 0042 18 G Right Antecubital 4 days         Peripheral IV - Single Lumen 06/15/20 0000 18 G Anterior;Left Forearm less than 1 day                Significant Labs:    CBC/Anemia Profile:  Recent Labs   Lab 06/13/20  0930 06/13/20  0932 06/15/20  0305   WBC 4.21  --  10.33   HGB 10.6*  --  8.7*   HCT 32.7* 30* 27.3*   *  --  66*   MCV 94  --  93   RDW 14.6*  --  14.1        Chemistries:  Recent Labs   Lab 06/13/20  0930  06/14/20  0300 06/14/20  1243 06/14/20  2355 06/15/20  0305      < > 135* 137 139 139   K 5.8*   < > 5.0  5.0 4.7 4.4 4.5      < > 108 109 112* 111*   CO2 20*   < > 18* 18* 19* 19*   BUN 58*   < > 59* 56* 53* 53*   CREATININE 3.4*   < > 3.2* 2.9* 2.7* 2.7*   CALCIUM 6.7*   < > 7.0* 7.2* 6.8* 7.5*   ALBUMIN 1.5*  --   --   --   --   --    PROT 4.1*  --   --   --   --   --    BILITOT 0.8  --   --   --   --   --    ALKPHOS 44*  --   --   --   --   --    ALT 20  --   --   --   --   --    AST 35  --   --   --   --   --    MG 1.6  --  1.6  --   --  1.8   PHOS  --   --  5.0*  --   --  3.8    < > = values in this interval not displayed.       All pertinent labs within the past 24 hours have been reviewed.    Significant Imaging:  I have reviewed and interpreted all pertinent imaging results/findings  within the past 24 hours.

## 2020-06-15 NOTE — ASSESSMENT & PLAN NOTE
Impression: Pt is a 71 y/o male s/p CVA and bowel ischemia with no SMA flow. Pt is intubated, on propofol, Fentanyl @ 150 mcg/hr. Pt on two vasopressors. Pt is non-responsive. Pt has Lactic acid of 2.8. Wound vac in ABD wound. Pt has NG tube in place. Pt made DNR today.     Reason for consult: Withdrawal of life-support.     Goals of care/ Advance care planning:   Family meeting with Dr. Glasgow.  Family in agreement to withdraw of life-support.  Dr. Glasgow answered family's questions concerning pt's medical issues and pt's bowel ischemia that is unable to be repaired. Pt has no flow to SMA.  Farawmily verbalized understanding.  Withdrawal of life-support education done with family. Family verbalized understanding. Per family, they want pt comfortable.  Explained withdrawal procedure and medications used.     Dr. Kurtz also spoke to family after meeting.     Withdrawal of life-support Recs- Spoke to Dr Pittman. Per delaney HUMPHREYS for this JIAN to enter orders.     Advance directive:   MPOA: wife is next of kin  Code status: Pt made DNR.      Pain/dyspnea:     Pt on Fentanyl 150 mcg/hr titratable drip at this time.   Spoke to Dr. Pittman. Delaney to order Fentanyl 150 mcg/hr non-titratable drip with Fentanyl 75 mcg IVP bolus prn q 15 minutes for pain/dyspnea.     Orders entered.     Agitation:   Pt started on Ativan 1 mg IVP q 30 minutes prn agitation.     Secretions:   Pt started on glycopyrrolate .3 mg IVP qid prn secretions.      Per delaney Peng for this JIAN to d/c meds not for comfort and enter orders to extubate.   Orders entered.     Bedside nurse aware of above conversation and orders.     Plan:   Withdrawal of life-support today when comfort meds available.   Pt now DNR.    services offered to family. They declined at this time.

## 2020-06-15 NOTE — CONSULTS
[facility]  Palliative Medicine  Consult Note    Patient Name: Chau Rodarte  MRN: [id number]  Admission Date: 6/10/2020  Hospital Length of Stay: 4 days  Code Status: DNR   Attending Provider: Janneth Cherry MD  Consulting Provider: CHICO Beckford  Primary Care Physician: Riki Huynh MD  Principal Problem:Acute right ISABELA stroke    Patient information was obtained from spouse/SO, relative(s) and ER records.      Consults  Assessment/Plan:     Palliative care encounter  Impression: Pt is a 71 y/o male s/p CVA and bowel ischemia with no SMA flow. Pt is intubated, on propofol, Fentanyl @ 150 mcg/hr. Pt on two vasopressors. Pt is non-responsive. Pt has Lactic acid of 2.8. Wound vac in ABD wound. Pt has NG tube in place. Pt made DNR today.     Reason for consult: Withdrawal of life-support.     Goals of care/ Advance care planning:   Family meeting with Dr. Glasgow.  Family in agreement to withdraw of life-support.  Dr. Glasgow answered family's questions concerning pt's medical issues and pt's bowel ischemia that is unable to be repaired. Pt has no flow to SMA.  Farawmily verbalized understanding.  Withdrawal of life-support education done with family. Family verbalized understanding. Per family, they want pt comfortable.  Explained withdrawal procedure and medications used.     Dr. Kurtz also spoke to family after meeting.     Advance directive:   MPOA: wife is next of kin  Code status: Pt made DNR.    Withdrawal of life-support Recs- Spoke to Dr Pittman. Per delaney HUMPHREYS for this JIAN to enter orders.     Pain/dyspnea:     Pt on Fentanyl 150 mcg/hr titratable drip at this time.   Spoke to Dr. Pittman. Delaney to order Fentanyl 150 mcg/hr non-titratable drip with Fentanyl 75 mcg IVP bolus prn q 15 minutes for pain/dyspnea.     Orders entered.     Agitation:   Pt started on Ativan 1 mg IVP q 30 minutes prn agitation.     Secretions:   Pt started on glycopyrrolate .3 mg IVP qid prn secretions.      Per  delaney Peng for this JIAN to d/c meds not for comfort and enter orders to extubate.   Orders entered.     Bedside nurse aware of above conversation and orders.     Plan:   Withdrawal of life-support today when comfort meds available.   Pt now DNR.    services offered to family. They declined at this time.               Thank you for your consult. I will follow-up with patient. Please contact us if you have any additional questions.    Subjective:     HPI:   Pt is a 70 y.o. male with CAD s/p PCI x3 back in 2016 (on DAPT at home), HFrEF (last echo Feb 2019 showing EF 45% and G1DD; on GDMT with lisinopril and metoprolol succinate), A fib (on amiodarone,not on anticoagulation 2/2 GI bleed in 2017; has a loop recorder in place), CKD (baseline creatinine appears 2.5-3), IDDM, PAD (s/p L femoro-popliteal bypass in 2017; history of diabetic foot ulcers), HTN, HLD, and obesity. Per chart review, pt was admitted to vascular neurology service in the setting of large R ISABELA stroke and smaller L ISABELA stroke diagnosed overnight 6/10-6/11. Patient reported that he had increasing weakness and 4 loose bowel movements with bloody streaks throughout the day 6/10 and when attempting to leave for the ER, he noticed weakness and inability to move his left leg to put on his shoes. Patient then presented to ED and was diagnosed with R ISABELA and L ISABELA stroke as above and admitted to stroke service, opted for medical management given presentation outside the tPA window and history concerning for possible GI bleed (hematechezia with 4 looser bowel movements and known history of GI bleeding). Pt evaluated by GI for possible GI bleeding with plan for colonoscopy 6/15.   Pt reported right sided abdominal pain in the AM on 6/11 with a sore, achy character that continued to worsen throughout the day. A KUB obtained 6/11 concerning for distal small bowel obstruction; NGT placed but patient continued to have worsening abdominal pain. Lactic  acid elevated at 4.2 and continued to rise to 4.7 on repeat. Critical care medicine consulted for concern for worsening abdominal pain and lactic acidosis.     6-  Pt has Mesenteric US with no SMA flow.  Pt made DNR.   Plan is withdrawal of life-support.   Pt made DNR.        Hospital Course:  No notes on file    Interval History: DNR/withdrawal of life-support today.    Past Medical History:   Diagnosis Date    Acute on chronic systolic CHF (congestive heart failure) 11/29/2016    Acute osteomyelitis of toe, right     Anemia     Anticoagulant long-term use     CKD (chronic kidney disease) stage 2, GFR 60-89 ml/min 2/21/2016    CKD (chronic kidney disease) stage 3, GFR 30-59 ml/min     Coronary artery disease     Cryptogenic stroke, remote history (2006) 1/26/2017    Decubitus ulcer, heel, right, unstageable     Diabetic foot ulcer 11/13/2013    Diabetic foot ulcer with osteomyelitis 6/23/2017    Encounter for blood transfusion     HTN (hypertension) 3/3/2014    Hyperlipidemia 3/3/2014    NSTEMI (non-ST elevated myocardial infarction) 11/28/2016    Obesity (BMI 30-39.9) 11/29/2016    Osteomyelitis, chronic     PVD (peripheral vascular disease)     Stroke     Type 2 diabetes mellitus with diabetic peripheral angiopathy without gangrene, without long-term current use of insulin 10/24/2013    Type 2 diabetes mellitus with diabetic polyneuropathy, without long-term current use of insulin 11/29/2016       Past Surgical History:   Procedure Laterality Date    APPLICATION OF WOUND VACUUM-ASSISTED CLOSURE DEVICE N/A 6/12/2020    Procedure: APPLICATION, WOUND VAC;  Surgeon: Janneth Cherry MD;  Location: [facility] OR [address] [place] [address];  Service: General;  Laterality: N/A;    CARDIAC SURGERY      COLONOSCOPY N/A 3/21/2017    Procedure: COLONOSCOPY;  Surgeon: Karissa Peck MD;  Location: [facility] ENDO ([address] [place] [address]);  Service: Endoscopy;  Laterality: N/A;    CORONARY ANGIOPLASTY WITH STENT PLACEMENT      FOOT  NERVE GRAFT  2013    left leg stent      Loop Recorder placement      right leg bypass         Review of patient's allergies indicates:   Allergen Reactions    Adhesive tape-silicones Blisters       Medications:  Continuous Infusions:   fentanyl citrate-0.9%NaCl (PF)       Scheduled Meds:  PRN Meds:fentaNYL, glycopyrrolate, lorazepam, ondansetron, sodium chloride 0.9%    Family History     Problem Relation (Age of Onset)    Blindness Maternal Grandmother    Cataracts Mother    Diabetes Mother    Glaucoma Mother    Heart disease Father        Tobacco Use    Smoking status: Former Smoker     Packs/day: 1.00     Years: 50.00     Pack years: 50.00     Quit date: 3/1/2011     Years since quittin.2    Smokeless tobacco: Never Used   Substance and Sexual Activity    Alcohol use: No    Drug use: No    Sexual activity: Yes     Partners: Female     Comment:  with 2 children 3 grand sons and 1 great grand daughter       Review of Systems   Unable to perform ROS: Patient unresponsive     Objective:     Vital Signs (Most Recent):  Temp: 99 °F (37.2 °C) (06/15/20 1400)  Pulse: 60 (06/15/20 1400)  Resp: 20 (06/15/20 1400)  BP: (!) 120/59 (20 0800)  SpO2: 100 % (06/15/20 1400) Vital Signs (24h Range):  Temp:  [97.3 °F (36.3 °C)-99 °F (37.2 °C)] 99 °F (37.2 °C)  Pulse:  [58-93] 60  Resp:  [16-74] 20  SpO2:  [95 %-100 %] 100 %  Arterial Line BP: (105-149)/(47-58) 116/48     Weight: 133.4 kg (294 lb 1.5 oz)  Body mass index is 43.56 kg/m².    Review of Symptoms  Symptom Assessment (ESAS 0-10 scale)   ESAS 0 1 2 3 4 5 6 7 8 9 10   Pain              Dyspnea              Anxiety              Nausea              Depression               Anorexia              Fatigue              Insomnia              Restlessness               Agitation              CAM / Delirium __ --  ___+   Constipation     __ --  ___+   Diarrhea           __ --  ___+  Bowel Management Plan (BMP): No    Comments: Unable to do ROS/- Pt s/p  CVA- on vent.     Performance Status: 10    ECOG Performance Status Grade: 4 - Completely disabled    Physical Exam  Constitutional:       Interventions: He is intubated.      Comments: Pt on life-support.     HENT:      Head: Normocephalic and atraumatic.      Mouth/Throat:      Comments: NG tube in place.   Cardiovascular:      Comments: Pt on two vasopressors.   Pulmonary:      Effort: He is intubated.      Comments: Pt intubated.   Abdominal:      Comments: NG tube in place.  Wound vac in place.   Genitourinary:     Comments: Saucedo cath in place.   Feet:      Comments: SCDs in place.   Skin:     General: Skin is warm and dry.   Neurological:      Comments: Pt intubated on propofol   Psychiatric:      Comments: Pt intubated/unresponsive.          Significant Labs: All pertinent labs within the past 24 hours have been reviewed.  CBC:   Recent Labs   Lab 06/15/20  0305   WBC 10.33   HGB 8.7*   HCT 27.3*   MCV 93   PLT 66*     BMP:  Recent Labs   Lab 06/15/20  0305 06/15/20  1113   * 149*    139   K 4.5 4.6   * 113*   CO2 19* 21*   BUN 53* 50*   CREATININE 2.7* 2.5*   CALCIUM 7.5* 7.4*   MG 1.8  --      LFT:  Lab Results   Component Value Date    AST 35 06/13/2020    ALKPHOS 44 (L) 06/13/2020    BILITOT 0.8 06/13/2020     Albumin:   Albumin   Date Value Ref Range Status   06/13/2020 1.5 (L) 3.5 - 5.2 g/dL Final     Protein:   Total Protein   Date Value Ref Range Status   06/13/2020 4.1 (L) 6.0 - 8.4 g/dL Final     Lactic acid:   Lab Results   Component Value Date    LACTATE 1.5 06/14/2020    LACTATE 2.8 (H) 06/13/2020       Significant Imaging: I have reviewed all pertinent imaging results/findings within the past 24 hours.    Advance Care Planning   Advanced Directives::  Living Will: No  LaPOST: No  Do Not Resuscitate Status: Yes  Medical Power of : Wife is next of kin    Decision-Making Capacity: Family answered questions, Patient unable to communicate due to disease severity/cognitive  "impairment       Living Arrangements: Lives with spouse    Psychosocial/Cultural:  Pt is . Pt has large extended family.       Spiritual: "not really"    F- Michelle and Belief:  Pentec[employer]al    I - Importance:   .  C - Community:     A - Address in Care: Family did not want  support at this time. They are aware  available if needed.       30 minutes of advance care planning completed      Margaret Sy, CNS  Palliative Medicine  [facility]            "

## 2020-06-15 NOTE — PHYSICIAN QUERY
PT Name: Chau Rodarte  MR #: [id number]     RENAL CONDITION CLARIFICATION     CDS: Jina Shaffer RN, CCDS  Contact information: [email or web address]  This form is a permanent document in the medical record.     Query Date: Perla 15, 2020    By submitting this query, we are merely seeking further clarification of documentation.  Please utilize your independent clinical judgment when addressing the question(s) below.    The Medical Record contains the following:   Indicator Supporting Clinical Findings Location in Medical Record    Kidney (Renal) Insufficiency     x Kidney (Renal) Failure/Injury CORRY likely in the setting of GIB and hypovolemia      Acute kidney injury   HYPERKALEMIA   - given recent diarrhea, suspect prerenal        Recs:     - patient s/p 1L NS bolus in ED, would recheck labs to see if downtrending   - obtain urine Na, creatinine, urea to assess FeNa and FeUrea      CORRY on CKD: Monitor urine output.  Substantial metabolic acidosis present- partially 2/2 renal failure but also with elevated lactate. H&P 6/11  Vascular Neuro    HM Consult 6/11                    H&P Critical Care Med 6/12    Nephrotoxic Agents     x BUN/Creatinine GFR  6/11/2020 00:05 6/12/2020 00:38 6/13/2020 01:00 6/14/2020 00:14 6/15/2020 03:05   BUN, Bld 54  65  57  57  53    Creatinine 4.0  4.4  3.6  3.2  2.7    eGFR if African American 16.3  14.5  18.5  21.4  26.2     Labs    x Urine: Casts         Eosinophils  6/11/2020 04:06 6/11/2020 11:24   Hyaline Casts, UA 9  2     Labs    x Dehydration - Holding home medication for now in the setting of dehydration and GIB      H&P 6/11  Vascular Neuro    Nausea/Vomiting      Dialysis/CRRT     x Treatment: - Continue amiodarone, metoprolol held due to GIB (possible hypotension) and bradycardia   H&P 6/11  Vascular Neuro   x Other:  70 M hx above BIB with diarrhea x 1 day with associated weakness.       6/11/2020 11:24 6/11/2020 11:24   Prot/Creat Ratio, Ur  0.08   Sodium Urine Random <20      Protein, Urine Random  14   Creatinine, Random Ur 175.0 175.0   Urine Urea Nitrogen, Random 469     ED provider note 6/11      Labs        Acute Kidney Injury / Acute Renal Failure has different defining criteria. A generally accepted guideline is:   A greater than 100% (2X) rise in serum creatinine from baseline* occurring during the course of a single hospital stay.   *Baseline as determined by the providers judgment and consideration of previous lab values and other documentation, if available.    A diagnosis of Acute Kidney Injury/Acute Renal Failure should incorporate abnormal labs and clinical findings that are clinically significant      References: 1. Sudhakar et al. Acute renal failure-definition, outcome measures, animal models, fluid therapy and information technology needs: the Second International Consensus Conference of the Acute Dialysis Quality Initiative (ADQI) Group. Crit Care 2004; 8:B204; 2. Marino et al. Acute Kidney Injury Network: report of an initiative to improve outcomes in acute kidney injury. Crit Care 2007; 11:R31; 3. Kidney Disease: Improving Global Outcomes (KDIGO). Acute Kidney Injury Work Group. KDIGO clinical practice guidelines for acute kidney injury. Kidney Int Suppl 2012; 2:1.    The clinical guidelines noted above are only a system guideline. It does not replace the providers clinical judgment.     Provider, if possible, please further specify the diagnosis associated with above clinical findings.    [ x   ] Acute Kidney Failure/Injury with Tubular Necrosis  - Damage to the tubule cells of the kidney. Common triggers: shock, hypotension, IV contrast, rhabdomyolysis, medications   [    ] Unspecified Acute Kidney Failure/Injury     [    ] Other Acute Kidney Failure/Injury (please specify): ____________     [    ] Other (please specify): _______________________________   [  ] Clinically Undetermined       Please document in your progress notes daily for the duration of treatment  until resolved and include in your discharge summary.

## 2020-06-15 NOTE — PROGRESS NOTES
Patient's chart was reviewed by a stroke team provider.    Patient with poor prognosis. Palliative care consulted.    There is no new imaging to review.  Pending diagnostics to follow up on include: none      For other recommendations please see our previous note completed on: 06/11/20.  There are no new recommendations at this time. Will continue to follow. Discussed patient with staff. Please contact stroke team for any questions or concerns.           Danuta Montaño PA-C  Vascular Neurology  [phone]

## 2020-06-15 NOTE — PROGRESS NOTES
[facility]  Critical Care - Surgery  Progress Note    Patient Name: Chau Rodarte  MRN: [id number]  Admission Date: 6/10/2020  Hospital Length of Stay: 4 days  Code Status: Full Code  Attending Provider: Janneth Cherry MD  Primary Care Provider: Riki Huynh MD   Principal Problem: Acute right ISABELA stroke    Subjective:     Hospital/ICU Course:  No notes on file    Interval History/Significant Events:     No acute events overnight  Mesenteric US with no SMA flow  Bedside Abthera removal revealing grossly non-viable bowel  Palliative Care team following    Follow-up For: Procedure(s) (LRB):  LAPAROTOMY, EXPLORATORY , Abdominal Washout, Small Bowel Resection and Abethera Wound Vac Placement. (N/A)    Post-Operative Day: 2 Days Post-Op    Objective:     Vital Signs (Most Recent):  Temp: 98.4 °F (36.9 °C) (06/15/20 0815)  Pulse: 67 (06/15/20 0815)  Resp: (!) 39 (06/15/20 0815)  BP: (!) 120/59 (06/14/20 0800)  SpO2: 99 % (06/15/20 0815) Vital Signs (24h Range):  Temp:  [97.3 °F (36.3 °C)-98.8 °F (37.1 °C)] 98.4 °F (36.9 °C)  Pulse:  [58-93] 67  Resp:  [20-88] 39  SpO2:  [95 %-100 %] 99 %  Arterial Line BP: ()/(47-58) 112/50     Weight: 133.4 kg (294 lb 1.5 oz)  Body mass index is 43.56 kg/m².      Intake/Output Summary (Last 24 hours) at 6/15/2020 0914  Last data filed at 6/15/2020 0700  Gross per 24 hour   Intake 4964.8 ml   Output 4635 ml   Net 329.8 ml       Physical Exam  Vitals signs reviewed.   Constitutional:       Appearance: He is well-developed and well-nourished.   HENT:      Head: Normocephalic and atraumatic.   Cardiovascular:      Rate and Rhythm: Normal rate and regular rhythm.      Comments: Levophed 0.04  Pulmonary:      Comments: Intubated  Sedated  Minimal Vent Settings    Abdominal:      General: There is no distension.      Palpations: Abdomen is soft.      Tenderness: There is no abdominal tenderness. There is no guarding.      Comments: Heavily Sedated   Soft,  depressible, non-rigid  Abthera Wound Vac in pLace   Genitourinary:     Comments: Urinary catheter in place        Vents:  Vent Mode: A/C (06/15/20 0758)  Set Rate: 20 BPM (06/15/20 0758)  Vt Set: 410 mL (06/15/20 0758)  Pressure Support: 0 cmH20 (06/13/20 0513)  PEEP/CPAP: 5 cmH20 (06/15/20 0758)  Oxygen Concentration (%): 30 (06/15/20 0815)  Peak Airway Pressure: 22 cmH2O (06/15/20 0758)  Plateau Pressure: 16 cmH20 (06/15/20 0758)  Total Ve: 12.7 mL (06/15/20 0758)  F/VT Ratio<105 (RSBI): (!) 36.27 (06/15/20 0758)    Lines/Drains/Airways     Central Venous Catheter Line            Percutaneous Central Line Insertion/Assessment - Triple Lumen  06/12/20 1810 right internal jugular 2 days          Drain                 NG/OG Tube 06/12/20 0608 Right nostril 3 days         Urethral Catheter 06/12/20 1330 2 days          Airway                 Airway - Non-Surgical 06/12/20 1146 Endotracheal Tube 2 days          Arterial Line                 Arterial Line 06/12/20 1700 Right Radial 2 days          Peripheral Intravenous Line                 Peripheral IV - Single Lumen 06/11/20 0042 18 G Right Antecubital 4 days         Peripheral IV - Single Lumen 06/15/20 0000 18 G Anterior;Left Forearm less than 1 day                Significant Labs:    CBC/Anemia Profile:  Recent Labs   Lab 06/13/20  0930 06/13/20  0932 06/15/20  0305   WBC 4.21  --  10.33   HGB 10.6*  --  8.7*   HCT 32.7* 30* 27.3*   *  --  66*   MCV 94  --  93   RDW 14.6*  --  14.1        Chemistries:  Recent Labs   Lab 06/13/20  0930  06/14/20  0300 06/14/20  1243 06/14/20  2355 06/15/20  0305      < > 135* 137 139 139   K 5.8*   < > 5.0  5.0 4.7 4.4 4.5      < > 108 109 112* 111*   CO2 20*   < > 18* 18* 19* 19*   BUN 58*   < > 59* 56* 53* 53*   CREATININE 3.4*   < > 3.2* 2.9* 2.7* 2.7*   CALCIUM 6.7*   < > 7.0* 7.2* 6.8* 7.5*   ALBUMIN 1.5*  --   --   --   --   --    PROT 4.1*  --   --   --   --   --    BILITOT 0.8  --   --   --   --   --     ALKPHOS 44*  --   --   --   --   --    ALT 20  --   --   --   --   --    AST 35  --   --   --   --   --    MG 1.6  --  1.6  --   --  1.8   PHOS  --   --  5.0*  --   --  3.8    < > = values in this interval not displayed.       All pertinent labs within the past 24 hours have been reviewed.    Significant Imaging:  I have reviewed and interpreted all pertinent imaging results/findings within the past 24 hours.    Assessment/Plan:     Small bowel ischemia  69 y/o M with Afib and embolic events to brain and bowel  Taken to OR today for mesenteric ischemia s/p Jejunum resection x 2. Bowel left in discontinuity and to be taken 06/13/2020 for second look.    Neuro:  Sedation: Propofol and Fentaynl gtt  Pain Control: Tylenol IV, PRN Dilaudid     CV:  MAP goal >65  Currently Normal Sinus Rhythm  On Heparin gtt for mesenteric ischemia   Levophed 0.04 mg/ kg/ min currently  No signs of bleeding     Respiratory  Intubated and Sedated  Fi 30% / PEEP 5  ABG PRN and Daily  Daily CXR     FEN / GI  Protonix GI PPX  NPO   NGT to LIWS  Replete Lytes PRN  Abthera Vac in Place  Mesenteric US without flow through SMA  Grossly non-viable bowel observed at bedside yesterday     Renal:  Baseline CKD4 -- Cr 2-3  -250 cc / hr  Lactic Acidosis Improved  Maintain K>4 and Mg>2 for history of Afib     ID  Afebrile  Leukocytosis present  Cefepime / Flagyl / Fluconazole     PPx:  Heparin gtt  Protonix 40 mg BID     Disposition: SICU;   Patient not surgical candidate due to amount of SB lost.   Palliative Care Consulted and Appreciate Help With Family Dynamics  Family to discuss care status and moving forward  Remains FULL CODE       Critical care was time spent personally by me on the following activities: development of treatment plan with patient or surrogate and bedside caregivers, discussions with consultants, evaluation of patient's response to treatment, examination of patient, ordering and performing treatments and interventions,  ordering and review of laboratory studies, ordering and review of radiographic studies, pulse oximetry, re-evaluation of patient's condition.  This critical care time did not overlap with that of any other provider or involve time for any procedures.     Zain Soni MD  Critical Care - Surgery  [facility]

## 2020-06-15 NOTE — PROGRESS NOTES
Pharmacokinetic Assessment Follow Up: IV Vancomycin    Vancomycin Regimen Assessment & Plan:  - Vancomycin random level resulted as 17.9 mcg/mL.  Goal trough 10-20 mcg/mL.  - Patient with CORRY. Continue pulse dosing while renal function is unstable.  - Administer Vancomycin 1000 mg IV x 1 dose. Draw vancomycin level with morning labs tomorrow.  Pharmacy to re-dose depending on level and renal function.    Drug levels (last 3 results):  Recent Labs   Lab Result Units 06/13/20  0300 06/14/20  0300 06/15/20  0305   Vancomycin, Random ug/mL 13.5 15.7 17.9     Pharmacy will continue to follow and monitor vancomycin.    Please contact pharmacy at extension [phone] for questions regarding this assessment.    Thank you for the consult,   Giles Faustin     Patient brief summary:  Chau Rodarte is a 70 y.o. male initiated on antimicrobial therapy with IV Vancomycin for treatment of intra-abdominal infection    The patient's current regimen is pulse dosing.    Drug Allergies:   Review of patient's allergies indicates:   Allergen Reactions    Adhesive tape-silicones Blisters     Actual Body Weight:   134.5 kg    Renal Function:   Estimated Creatinine Clearance: 34 mL/min (A) (based on SCr of 2.7 mg/dL (H)).,     Dialysis Method (if applicable):  N/A

## 2020-06-15 NOTE — ASSESSMENT & PLAN NOTE
69 y/o M with Afib and embolic events to brain and bowel  Taken to OR today for mesenteric ischemia s/p Jejunum resection x 2. Bowel left in discontinuity and to be taken 06/13/2020 for second look.    Neuro:  Sedation: Propofol and Fentaynl gtt  Pain Control: Tylenol IV, PRN Dilaudid     CV:  MAP goal >65  Currently Normal Sinus Rhythm  On Heparin gtt for mesenteric ischemia   Levophed 0.04 mg/ kg/ min currently  No signs of bleeding     Respiratory  Intubated and Sedated  Fi 30% / PEEP 5  ABG PRN and Daily  Daily CXR     FEN / GI  Protonix GI PPX  NPO   NGT to LIWS  Replete Lytes PRN  Abthera Vac in Place  Mesenteric US without flow through SMA  Grossly non-viable bowel observed at bedside yesterday     Renal:  Baseline CKD4 -- Cr 2-3  -250 cc / hr  Lactic Acidosis Improved  Maintain K>4 and Mg>2 for history of Afib     ID  Afebrile  Leukocytosis present  Cefepime / Flagyl / Fluconazole     PPx:  Heparin gtt  Protonix 40 mg BID     Disposition: SICU;   Patient not surgical candidate due to amount of SB lost.   Palliative Care Consulted and Appreciate Help With Family Dynamics  Family to discuss care status and moving forward  Remains FULL CODE

## 2020-06-15 NOTE — NURSING
Upon initial assessment, pt R pupil found to be irregular. Pt also found to have continuous slight movement to R upper and lower extremities. No response to pain, gag and cough reflex absent. VIVIANA Cabrales MD called to bedside, no new orders at this time. Will continue to monitor.

## 2020-06-15 NOTE — PLAN OF CARE
06/15/20 1007   Discharge Reassessment   Assessment Type Discharge Planning Reassessment   Provided patient/caregiver education on the expected discharge date and the discharge plan Yes   Do you have any problems affording any of your prescribed medications? No   Discharge Plan A Other  (discharge plan dependent upon patient's prognosis)   DME Needed Upon Discharge  other (see comments)  (TBD)       Eve Vitale MPH, RN, CM  Ext. [phone]

## 2020-06-15 NOTE — SUBJECTIVE & OBJECTIVE
Interval History: No significant clinical changes.     Medications:  Continuous Infusions:   sodium chloride 0.9% 150 mL/hr at 06/15/20 0900    fentanyl 150 mcg/hr (06/15/20 0900)    heparin (porcine) in D5W 13 Units/kg/hr (06/15/20 0900)    insulin (HUMAN R) infusion (adults) 1.1 Units/hr (06/15/20 0900)    norepinephrine bitartrate-D5W 0.06 mcg/kg/min (06/15/20 0900)    propofoL 30 mcg/kg/min (06/15/20 0900)    vasopressin (PITRESSIN) infusion Stopped (06/13/20 1009)     Scheduled Meds:   amiodarone  200 mg Oral Daily    ceFEPime (MAXIPIME) IVPB  2 g Intravenous Q24H    fluconazole (DIFLUCAN) IVPB  200 mg Intravenous Q24H    metronidazole  500 mg Intravenous Q8H    pantoprazole  40 mg Intravenous BID    vancomycin (VANCOCIN) IVPB  1,000 mg Intravenous Once     PRN Meds:Dextrose 10% Bolus, Dextrose 10% Bolus, Dextrose 10% Bolus, Dextrose 10% Bolus, heparin (PORCINE), heparin (PORCINE), hydrALAZINE, morphine, ondansetron, sodium chloride 0.9%, Pharmacy to dose Vancomycin consult **AND** vancomycin - pharmacy to dose     Review of patient's allergies indicates:   Allergen Reactions    Adhesive tape-silicones Blisters     Objective:     Vital Signs (Most Recent):  Temp: 98.2 °F (36.8 °C) (06/15/20 0945)  Pulse: 64 (06/15/20 0945)  Resp: (!) 29 (06/15/20 0945)  BP: (!) 120/59 (06/14/20 0800)  SpO2: 100 % (06/15/20 0945) Vital Signs (24h Range):  Temp:  [97.3 °F (36.3 °C)-98.8 °F (37.1 °C)] 98.2 °F (36.8 °C)  Pulse:  [58-93] 64  Resp:  [20-88] 29  SpO2:  [95 %-100 %] 100 %  Arterial Line BP: (105-149)/(47-58) 110/47     Weight: 133.4 kg (294 lb 1.5 oz)  Body mass index is 43.56 kg/m².    Intake/Output - Last 3 Shifts       06/13 0700 - 06/14 0659 06/14 0700 - 06/15 0659 06/15 0700 - 06/16 0659    P.O.       I.V. (mL/kg) 2707.3 (20.1) 4964.8 (37.2)     NG/GT       IV Piggyback 3250      Total Intake(mL/kg) 5957.3 (44.3) 4964.8 (37.2)     Urine (mL/kg/hr) 1084 (0.3) 4280 (1.3) 325 (0.8)    Drains       Other  920 350 50    Stool 0      Blood       Total Output 2004 4630 375    Net +3953.3 +334.8 -375           Stool Occurrence 0 x            Physical Exam  Gen: Intubated, sedated   Pulm: intubated  Vent Mode: A/C  Oxygen Concentration (%):  [30-50] 30  Resp Rate Total:  [20 br/min-24 br/min] 20 br/min  Vt Set:  [410 mL-450 mL] 410 mL  PEEP/CPAP:  [5 cmH20] 5 cmH20  Mean Airway Pressure:  [9.9 ulD90-06 cmH20] 12 cmH20  CV: Hypotensive req pressors - levo 0.06  Abd: Open with ABThera in place  Extremities - wwp    Significant Labs:  CBC:   Recent Labs   Lab 06/15/20  0305   WBC 10.33   RBC 2.93*   HGB 8.7*   HCT 27.3*   PLT 66*   MCV 93   MCH 29.7   MCHC 31.9*     CMP:   Recent Labs   Lab 06/13/20  0930  06/15/20  0305   *   < > 158*   CALCIUM 6.7*   < > 7.5*   ALBUMIN 1.5*  --   --    PROT 4.1*  --   --       < > 139   K 5.8*   < > 4.5   CO2 20*   < > 19*      < > 111*   BUN 58*   < > 53*   CREATININE 3.4*   < > 2.7*   ALKPHOS 44*  --   --    ALT 20  --   --    AST 35  --   --    BILITOT 0.8  --   --     < > = values in this interval not displayed.       Significant Diagnostics:  I have reviewed all pertinent imaging results/findings within the past 24 hours.

## 2020-06-15 NOTE — PROGRESS NOTES
Decision made to withdraw care on patient. Code status changed to DNR. Pt extubated by respiratory. Vasopressors turned off. Dr. Jeffrey Matson at bedside to declare pt.  notified. MADHU notified.

## 2020-06-16 ENCOUNTER — CLINICAL SUPPORT (OUTPATIENT)
Dept: CARDIOLOGY | Facility: HOSPITAL | Age: 70
End: 2020-06-16
Attending: INTERNAL MEDICINE
Payer: MEDICARE

## 2020-06-16 DIAGNOSIS — Z46.9 FITTING AND ADJUSTMENT OF DEVICE: ICD-10-CM

## 2020-06-16 DIAGNOSIS — Z95.818 PRESENCE OF OTHER CARDIAC IMPLANTS AND GRAFTS: ICD-10-CM

## 2020-06-16 LAB
BACTERIA BLD CULT: NORMAL
BACTERIA BLD CULT: NORMAL

## 2020-06-16 PROCEDURE — G2066 INTER DEVC REMOTE 30D: HCPCS | Performed by: INTERNAL MEDICINE

## 2020-06-16 NOTE — PLAN OF CARE
CM notified by SICU team that patient has .  Family and  notified.  CM remains available for questions/concerns.  (see below):      Family in agreement to withdraw of life-support. Was called by the nurse, patient in asystole, bedside exam performed, no pulses, no response to painful stimulus, no spontaneous respiration, no heart sounds. Patient was declared dead at 3:14pm           06/15/20 1314   Final Note   Assessment Type Final Discharge Note   Anticipated Discharge Disposition        Eve Vitale MPH, RN, CM  Ext. [phone]

## 2020-06-17 NOTE — PHYSICIAN QUERY
PT Name: Chau Rodarte  MR #: [id number]     Physician Query Form - Documentation Clarification      CDS: Jina Shaffer RN, CCDS  Contact information: [email or web address]    This form is a permanent document in the medical record.     Query Date: June 17, 2020    By submitting this query, we are merely seeking further clarification of documentation. Please utilize your independent clinical judgment when addressing the question(s) below.    The Medical record reflects the following:    Supporting Clinical Findings Location in Medical Record   70 year old man with history of CAD s/p PCI x3 in the 2016 on DAPT and Afib (not on anticoagulation 2/2 GIB in 2017) presenting with hematochezia and stroke on 6/10 and transferred to ICU overnight 6/11/20 with worsening abdominal pain and lactic acidosis with imaging consistent with pneumatosis of the bowel.        Pulmonary/Chest: Effort normal and breath sounds normal. No respiratory distress.   Initial Critical Care H&P 6/12 0403  MD Jaleel Sarabia MD      6/12/2020 03:11   POC PH 7.428   POC PCO2 25.3    POC PO2 22    POC BE -8   POC HCO3 16.7    POC SATURATED O2 40    POC TCO2 17    Sample VENOUS   DelSys Room Air    Labs/VBG   The patient was transported directly to the ICU intubated in critical but stable condition       0853 Pt. Remaining Intubated 06/13/20 0853 Hoa Polanco CRNA     Reason:      Op note 6/12        Anesthesia note 6/13   Full vent support for acute hypoxic respiratory failure              Respiratory  Intubated and Sedated  Will remain intubated pending procedure in AM    Vital Signs (24h Range):  SpO2:  [84 %-100 %] 88 % Subsequent Critical Care H&P 6/12 1804   Attending attestation  MD Joellen      Subsequent Critical Care H&P 6/12 1804   Daily CXR - appears to have more diffuse infiltrates consistent with pulmonary edema PN 6/14  MD Joellen Hogan MD     Postoperative respiratory insufficiency.  Oxygenating well on minimal ventilator  settings.  Continue to wean and provide lung protective ventilation as needed.   PN 6/15 attending attlaura Dejesus MD                                                                         Doctor, please clarify the diagnosis associated with above clinical findings.    Provider Use Only        [  ] Acute hypoxic respiratory failure         [  ] Routine or expected outcome         [  ] Complication of the surgery         [  ] Complication of anesthesia         [  ] Present, but not a complication of the surgery         [  ] Other explanation: ___________________________      [  ] Post operative respiratory insufficiency- Please clarify if:         [  ] Routine or expected outcome         [  ] Complication of the surgery         [  ] Complication of anesthesia         [  ] Present, but not a complication of the surgery         [  ] Other explanation: ___________________________        [x  ] Respiratory failure ruled out; pt was left intubated for routine care/procedure      [  ] Other: _________________________________________                                                                                                             [  Clinically Undetermined

## 2020-06-23 LAB
FINAL PATHOLOGIC DIAGNOSIS: NORMAL
FINAL PATHOLOGIC DIAGNOSIS: NORMAL
GROSS: NORMAL
GROSS: NORMAL

## 2020-12-04 NOTE — TELEPHONE ENCOUNTER
----- Message from Annemarie South sent at 7/7/2017  9:42 AM CDT -----  Contact: clementina(wife)  Clementina(wife) called, requesting to discuss upcoming device appointment.Ph [phone]. Thank you      I called patients wife back. She wanted to know how to do her husbands home transmission. I informed her that as long as his home monitor is connected they do not have to do a thing. The transmission will come over on its own. She stated understanding.    Bactrim Pregnancy And Lactation Text: This medication is Pregnancy Category D and is known to cause fetal risk.  It is also excreted in breast milk.

## 2022-01-25 NOTE — PLAN OF CARE
Patient discharged per MD orders. Instructions given on medications, wound care, activity, signs of infection, when to call MD, and follow up appointments. Pt verbalized understanding. Telemetry and PIV removed. Patient and family used wc off unit.     
Problem: Cardiac Catheterization (Diagnostic/Interventional) (Adult)  Goal: Signs and Symptoms of Listed Potential Problems Will be Absent, Minimized or Managed (Cardiac Catheterization)  Signs and symptoms of listed potential problems will be absent, minimized or managed by discharge/transition of care (reference Cardiac Catheterization (Diagnostic/Interventional) (Adult) CPG).  Outcome: Ongoing (interventions implemented as appropriate)  Admit assessment done. Labs sent. IV placed x 2. Plan of care and safety prec initiated. Will continue to monitor.      
Problem: Patient Care Overview  Goal: Plan of Care Review  Outcome: Ongoing (interventions implemented as appropriate)  Received report from CHUNG Foote. Patient s/p PTA, AAOx3. VSS, no c/o pain or discomfort at this time, resp even and unlabored. Gauze/tegaderm dressing to R groin is CDI. No active bleeding. No hematoma noted. Post procedure protocol reviewed with patient. Understanding verbalized.  Nurse call bell within reach. Will continue to monitor per post procedure protocol.         
md morton states okay to keep both feet dressings in place  
Vaccinations/[facility]  Screening Program/  Immunization Record/Breastfeeding Log/Bottle Feeding Log/Breastfeeding Mother’s Support Group Information/Guide to Postpartum Care/[facility] Hearing Screen Program/Back To Sleep Handout/Shaken Baby Prevention Handout/Breastfeeding Guide and Packet/Birth Certificate Instructions/Discharge Medication Information for Patients and Families Pocket Guide

## 2022-01-27 NOTE — PROGRESS NOTES
Chief Complaint   Patient presents with    Follow-up     patient reports left ear has improved, would like to discuss culture results   .     HPI:    Chau DAMIAN is a 68 y.o. male who presents for evaluation of left ear drainage.  He reports that this has been present for the last month.  Initially, it was severely painful and he was evaluated and seen in the ED.  He states that he has been treated with ear drops and Augmentin without relief in the ED.  He then followed up with his PCP Dr. Kenney who prescribed PO Cipro.  He has been on this medication for about 1 week and notes improvement in the drainage and feels that the pain nearly gone.  The pain is described as mild. He denies hearing loss, tinnitus, or vertigo. The ear is not tender to touch. The post auricular area is not inflammed.     Interval HPI 3/14/2019:  Mr. Rodarte follows up for otitis externa of the left ear.   He denies otorrhea and otalgia. He denies post-auricular tenderness. He denies changes in hearing. Overall he feels much better.     Past Medical History:   Diagnosis Date    Acute on chronic systolic CHF (congestive heart failure) 11/29/2016    Acute osteomyelitis of toe, right     Anemia     Anticoagulant long-term use     CKD (chronic kidney disease) stage 2, GFR 60-89 ml/min 2/21/2016    CKD (chronic kidney disease) stage 3, GFR 30-59 ml/min     Coronary artery disease     Cryptogenic stroke, remote history (2006) 1/26/2017    Decubitus ulcer, heel, right, unstageable     Diabetic foot ulcer 11/13/2013    Diabetic foot ulcer with osteomyelitis 6/23/2017    Encounter for blood transfusion     HTN (hypertension) 3/3/2014    Hyperlipidemia 3/3/2014    NSTEMI (non-ST elevated myocardial infarction) 11/28/2016    Obesity (BMI 30-39.9) 11/29/2016    Osteomyelitis, chronic     PVD (peripheral vascular disease)     Stroke     Type 2 diabetes mellitus with diabetic peripheral angiopathy without gangrene, without long-term  current use of insulin 10/24/2013    Type 2 diabetes mellitus with diabetic polyneuropathy, without long-term current use of insulin 2016     Social History     Socioeconomic History    Marital status:      Spouse name: Not on file    Number of children: Not on file    Years of education: Not on file    Highest education level: Not on file   Social Needs    Financial resource strain: Not on file    Food insecurity - worry: Not on file    Food insecurity - inability: Not on file    Transportation needs - medical: Not on file    Transportation needs - non-medical: Not on file   Occupational History    Not on file   Tobacco Use    Smoking status: Former Smoker     Packs/day: 1.00     Years: 50.00     Pack years: 50.00     Last attempt to quit: 3/1/2011     Years since quittin.0    Smokeless tobacco: Never Used   Substance and Sexual Activity    Alcohol use: No    Drug use: No    Sexual activity: Yes     Partners: Female     Comment:  with 2 children 3 grand sons and 1 great grand daughter   Other Topics Concern    Not on file   Social History Narrative    Lives with wife, works PT for [employer], 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, 3 grandsons, 1 great grand daughter     Past Surgical History:   Procedure Laterality Date    ABLATION N/A 2/3/2017    Performed by [facility]den [facility]no MD at [facility] CATH LAB    ANGIOGRAM-EXTREMITY- Right LOWER CO2, Diagnostic only, Right CFA access Right 2017    Performed by Nehal William MD at [facility] CATH LAB    [id number]-[id number] Left 2017    Performed by Nehal William MD at [facility] OR 2ND FLR    CARDIAC SURGERY      COLONOSCOPY N/A 3/21/2017    Performed by Karissa Peck MD at [facility] ENDO (2ND FLR)    CORONARY ANGIOPLASTY WITH STENT PLACEMENT      DEBRIDEMENT, ULCER, WITH SKIN GRAFT APPLICATION Right 2013    [facility]ab [facility]    ESOPHAGOGASTRODUODENOSCOPY (EGD) N/A  3/21/2017    [facility] ENDO (2ND FLR)    FOOT NERVE GRAFT  11/2013    HEART CATH-LEFT Left 11/28/2016    Performed by Robbie Collazo MD at [facility] CATH LAB    left leg stent      Loop Recorder placement      right leg bypass      TRANSESOPHAGEAL ECHOCARDIOGRAM (LIANE) N/A 2/3/2017    Performed by [facility] CATH LAB     Family History   Problem Relation Age of Onset    Diabetes Mother     Glaucoma Mother     Cataracts Mother     Heart disease Father     Blindness Maternal Grandmother     Macular degeneration Neg Hx     Retinal detachment Neg Hx     Strabismus Neg Hx     Amblyopia Neg Hx            Review of Systems  General: negative for chills, fever or weight loss  Psychological: negative for mood changes or depression  Ophthalmic: negative for blurry vision, photophobia or eye pain  ENT: see HPI  Respiratory: no cough, shortness of breath, or wheezing  Cardiovascular: no chest pain or dyspnea on exertion  Gastrointestinal: no abdominal pain, change in bowel habits, or black/ bloody stools  Musculoskeletal: negative for gait disturbance or muscular weakness  Neurological: no syncope or seizures; no ataxia  Dermatological: negative for puritis,  rash and jaundice  Hematologic/lymphatic: no easy bruising, no new lumps or bumps      Physical Exam:    Vitals:    03/14/19 1438   BP: 100/60   Pulse: (!) 54       Constitutional: Well appearing / communicating without difficutly.  NAD.  Eyes: EOM I Bilaterally  Head/Face: Normocephalic.  Negative paranasal sinus pressure/tenderness.  Salivary glands WNL.  House Brackmann I Bilaterally.    Right Ear: Auricle normal appearance. External Auditory Canal within normal limits no lesions or masses,TM w/o masses/lesions/perforations. TM mobility noted.   Left Ear: Auricle normal appearance. External Auditory Canal within normal limits no lesions or masses,TM w/o masses/lesions/perforations. TM mobility noted.  Nose: No  gross nasal septal deviation. Inferior Turbinates 3+ bilaterally. No septal perforation. No masses/lesions. External nasal skin appears normal without masses/lesions.  Oral Cavity: Gingiva/lips within normal limits.  Dentition/gingiva healthy appearing. Mucus membranes moist. Floor of mouth soft, no masses palpated. Oral Tongue mobile. Hard Palate appears normal.    Oropharynx: Base of tongue appears normal. No masses/lesions noted. Tonsillar fossa/pharyngeal wall without lesions. Posterior oropharynx WNL.  Soft palate without masses. Midline uvula.   Neck/Lymphatic: No LAD I-VI bilaterally.  No thyromegaly.  No masses noted on exam.    Mirror laryngoscopy/nasopharyngoscopy: Active gag reflex.  Unable to perform.    Neuro/Psychiatric: AOx3.  Normal mood and affect.   Cardiovascular: Normal carotid pulses bilaterally, no increasing jugular venous distention noted at cervical region bilaterally.    Respiratory: Normal respiratory effort, no stridor, no retractions noted.         Culture results:   Aerobic Bacterial Culture PSEUDOMONAS AERUGINOSA   Few       Aerobic Bacterial Culture DERMABACTER HOMINIS   Moderate       Resulting Agency [facility]   Susceptibility      Pseudomonas aeruginosa     CULTURE, AEROBIC  (SPECIFY SOURCE)     Amikacin <=16  Sensitive     Cefepime 16  Intermediate     Ciprofloxacin 2  Intermediate     Gentamicin <=4  Sensitive     Meropenem <=4  Sensitive     Piperacillin/Tazo <=16  Sensitive     Tobramycin <=4  Sensitive         Component 3wk ago   Anaerobic Culture FINEGOLDIA MAGNA   Moderate             Assessment:    ICD-10-CM ICD-9-CM    1. Acute diffuse otitis externa of left ear H60.312 380.10    2. Otorrhea of left ear H92.12 388.60      The primary encounter diagnosis was Acute diffuse otitis externa of left ear. A diagnosis of Otorrhea of left ear was also pertinent to this visit.      Plan:  No orders of the defined types were placed in this encounter.    Resolved. Follow up as needed.      Magnolia Kendrick MD     29 y/o M p/w R eye redness/itching/pain after feeling like he got a long eyelash stuck in it yesterday. This often happens to pt, and he aggressively irrigated eye but still has FB sensation. No contact lens use. No UV light exposure. No crusting, facial redness/swelling or fever.

## 2022-03-11 NOTE — TELEPHONE ENCOUNTER
Please notify patient that his wound culture grew Pseudomonas and I am placing him on ciprofloxacin 500 mg twice a day which is okay for him to take with his current kidney function.   Propranolol Counseling:  I discussed with the patient the risks of propranolol including but not limited to low heart rate, low blood pressure, low blood sugar, restlessness and increased cold sensitivity. They should call the office if they experience any of these side effects.

## 2022-06-14 NOTE — PLAN OF CARE
New Patient Abstract    Reason for Referral: Anemia, unspecified type; 26 weeks gestation of pregnancy    Referring Provider: Cole Ragland MD    Primary Care Provider: None    Family History of Cancer/Hematologic Disorders: Family history is significant for father with lung cancer. Presenting Symptoms: Near syncope, blurry vision, headache, and chest pain    Narrative with recent with Results/Procedures/Biopsies and Dates completed: Ms. Savannah Azevedo is a 21-year-old, , white female with a history of postpartum depression, abnormal pap smear of cervix, appendectomy,  section, cyst removal, and umbilical hernia repair, who is 30w0d pregnant with an [facility] of 22. She presented to [facility] on 22 for new OB visit. Initial prenatal labs drawn the same day were significant for MCV 76, MCH 24.2, and RDW 15.6. HGB was within normal limits at 11.5. On 5/10/22 she presented to L&D triage at [facility] reporting syncope, blurry vision, headache, chest pain, and sharp pain from her lower back shooting to her right shoulder. Labs drawn in L&D Triage were significant for WBC 11.7, RBC 3.84, HGB 8.9, HCT 28.4, MCV 74, MCH 23.2, and MCHC 31.3. Patient was examined and found to be stable with no acute findings on exam. MD discussed with patient that her symptoms were all likely due to anemia and discussed iron supplementation. She was subsequently sent to the main ED for EKG. Work-up in the ED noted positive orthostatics. EKG showed normal sinus rhythm with no ischemic changes noted. Troponin x 2 were normal. Chest x-ray showed no acute cardiopulmonary process and CT of the chest was negative for PE. Note was sent to patients OB regarding symptomatic anemia and possible need for heme referral for IV Fe infusions. Patient was recommended iron sulfate 325 mg po BID plus stool softener OTC.       Patient was subsequently referred to [facility], per OBGYN, for Hematology evaluation and consideration for Problem: Patient Care Overview  Goal: Plan of Care Review  Outcome: Ongoing (interventions implemented as appropriate)  Admit assessment completed. IV placed x 1.  Plan of care initiated. Report given to CHUNG Vasques.  Will continue to monitor.        treatment of anemia with IV iron infusions.  Most recent HGB drawn on 5/24/22 had improved to 9.1.      1/6/2022 15:58 5/10/2022 12:27 5/24/2022 15:20   WBC 5.8 11.7 (H)    RBC 4.75 3.84 (L)    Hemoglobin Quant 11.5 8.9 (L) 9.1 (L)   Hematocrit 35.9 28.4 (L)    MCV 76 (L) 74.0 (L)    MCH 24.2 (L) 23.2 (L)    MCHC 32.0 31.3 (L)    MPV  10.5    RDW 15.6 (H) 14.1    Platelet Count 146 231    Neutrophils % 72     Lymphocyte % 14     Monocytes % 13     Eosinophils % 1     Basophils % 0     Neutrophils Absolute 4.2     Lymphocytes Absolute 0.8     Monocytes Absolute 0.8     Eosinophils Absolute 0.0     Basophils Absolute 0.0     GRANULOCYTE ABSOLUTE COUNT 0.0     Immature Granulocytes 0       Notes from Referring Provider: None    Other Pertinent Information: None    Presented at Tumor Board:  N/A

## 2023-04-05 NOTE — PATIENT INSTRUCTIONS
Addended by: NANI HAILE on: 4/5/2023 09:01 AM     Modules accepted: Orders     Float heels at all time by placing 2 pillows under the legs. Wear open back shoes for now and limit ambulation.

## 2023-05-25 NOTE — PROGRESS NOTES
C3 nurse attempted to conduct a POST ACUTE 2 call with [facility].  [facility] charge nurse is not available at this time.  Message and callback number left with [facility] godwin clerk.   DC instructions

## 2023-07-17 NOTE — SUBJECTIVE & OBJECTIVE
Patient Name: Carlos Murphy Jr.  : 1962    MRN: [id number]                              Today's Date: 2023       Admit Date: 2023    Visit Dx:     ICD-10-CM ICD-9-CM   1. Acute renal failure, unspecified acute renal failure type  N17.9 584.9   2. Altered mental status, unspecified altered mental status type  R41.82 780.97   3. At risk for polypharmacy  Z91.89 V49.89   4. Non-traumatic rhabdomyolysis  M62.82 728.88   5. Oropharyngeal dysphagia  R13.12 787.22   6. Impaired mobility and ADLs  Z74.09 V49.89    Z78.9    7. Difficulty walking  R26.2 719.7     Patient Active Problem List   Diagnosis    Ulcer of toe due to type 2 diabetes mellitus    Centrilobular emphysema    Tobacco abuse, in remission    Obesity (BMI 30-39.9)    Closed fracture of shaft of left fibula    Closed nondisplaced fracture of medial malleolus of left tibia    Aftercare following surgery of ORIF left distal tibia fracture 2022    Colon cancer screening    Colitis    Acute renal failure, unspecified acute renal failure type    Onychomycosis    Onychocryptosis    Foreign body in left foot    Peripheral neuropathy    Charcot foot due to diabetes mellitus    Foot pain, bilateral     Past Medical History:   Diagnosis Date    Acute kidney injury      POST SEIZURES    Astigmatism of both eyes     eyes twitch left and right    Bipolar disorder     Charcot ankle     Closed nondisplaced fracture of medial malleolus of left tibia     COPD (chronic obstructive pulmonary disease)     USES INHALERS    Diabetes     BG RUNS AROUND 90'S IN AM    Hx of schizophrenia     Hyperlipidemia     Hypertension     SEEN DR ROD IN THE PAST, HAD APPT WITH DR MICHAUD IN  [facility] APPT, DENIED CP/SOB    Neuropathy     Seizures     LAST ONE 22    Sleep apnea     DOES NOT USE CPAP    Varicose vein of leg      Past Surgical History:   Procedure Laterality Date    ANKLE OPEN REDUCTION INTERNAL FIXATION Left 2022    Procedure: LEFT OPEN  Prescriptions Prior to Admission   Medication Sig Dispense Refill Last Dose    aspirin 81 MG Chew Take 1 tablet (81 mg total) by mouth once daily.  0 6/19/2017    atorvastatin (LIPITOR) 80 MG tablet Take 1 tablet (80 mg total) by mouth once daily. 90 tablet 3 6/19/2017    cholecalciferol, vitamin D3, (VITAMIN D3) 5,000 unit Tab Take 5,000 Units by mouth once daily.   6/19/2017    clopidogrel (PLAVIX) 75 mg tablet Take 1 tablet (75 mg total) by mouth once daily. 90 tablet 3 6/19/2017    collagenase (SANTYL) ointment Apply topically once daily. 30 g 0 6/19/2017    diclofenac sodium 1 % Gel Apply 2 g topically 3 (three) times daily. 100 g 1 6/19/2017    furosemide (LASIX) 40 MG tablet Take 1 tablet (40 mg total) by mouth once daily. 30 tablet 11 6/19/2017    gabapentin (NEURONTIN) 300 MG capsule Take 300 mg by mouth 2 (two) times daily.  0 6/19/2017    hydrocodone-acetaminophen 10-325mg (NORCO)  mg Tab Take 1 tablet by mouth every 4 (four) hours as needed for Pain. 60 tablet 0 6/19/2017    insulin detemir (LEVEMIR FLEXTOUCH) 100 unit/mL (3 mL) SubQ InPn pen Inject 16 Units into the skin every evening. 1 Box 3 6/18/2017    isosorbide-hydrALAZINE 20-37.5 mg (BIDIL) 20-37.5 mg Tab Take 1 tablet by mouth 3 (three) times daily. 90 tablet 11 6/19/2017    liraglutide 0.6 mg/0.1 mL, 18 mg/3 mL, subq PNIJ (VICTOZA 2-SAGAR) 0.6 mg/0.1 mL (18 mg/3 mL) PnIj Inject 0.6 mg daily x1 week, then 1.2 mg daily x1 week, then 1.8 mg daily thereafter (Patient taking differently: Inject 1.8 mg into the skin once daily. er) 9 mL 3 6/19/2017    metoprolol succinate (TOPROL-XL) 50 MG 24 hr tablet Take 1 tablet (50 mg total) by mouth once daily. 30 tablet 11 6/19/2017    oxycodone (ROXICODONE) 10 mg Tab immediate release tablet Take 1 tablet (10 mg total) by mouth every 4 (four) hours as needed for Pain. 60 tablet 0 6/19/2017    pantoprazole (PROTONIX) 20 MG tablet Take 2 tablets (40 mg total) by mouth once daily. 30 tablet 11  "6/19/2017    pen needle, diabetic (BD ULTRA-FINE HANG PEN NEEDLES) 32 gauge x 5/32" Ndle Uses 2 times daily, on  insulin injections 180 each 4 6/19/2017    senna-docusate 8.6-50 mg (PERICOLACE) 8.6-50 mg per tablet Take 1 tablet by mouth once daily. 60 tablet 0 6/19/2017    tamsulosin (FLOMAX) 0.4 mg Cp24 Take 1 capsule (0.4 mg total) by mouth once daily. 30 capsule 0 6/19/2017       Review of patient's allergies indicates:  No Known Allergies    Past Medical History:   Diagnosis Date    Acute on chronic systolic CHF (congestive heart failure) 11/29/2016    Anticoagulant long-term use     CKD (chronic kidney disease) stage 2, GFR 60-89 ml/min 2/21/2016    Coronary artery disease     Encounter for blood transfusion     HTN (hypertension) 3/3/2014    Hyperlipidemia 3/3/2014    NSTEMI (non-ST elevated myocardial infarction) 11/28/2016    Obesity (BMI 30-39.9) 11/29/2016    PVD (peripheral vascular disease)     Stroke     Type 2 diabetes mellitus with diabetic peripheral angiopathy without gangrene, without long-term current use of insulin 10/24/2013    Type 2 diabetes mellitus with diabetic polyneuropathy, without long-term current use of insulin 11/29/2016     Past Surgical History:   Procedure Laterality Date    CARDIAC SURGERY      COLONOSCOPY N/A 3/21/2017    Procedure: COLONOSCOPY;  Surgeon: Karissa Peck MD;  Location: [address];  Service: Endoscopy;  Laterality: N/A;    CORONARY ANGIOPLASTY WITH STENT PLACEMENT      FOOT NERVE GRAFT  11/2013    left leg stent      Loop Recorder placement      right leg bypass       Family History     Problem Relation (Age of Onset)    Diabetes Mother    Heart disease Father        Social History Main Topics    Smoking status: Former Smoker     Packs/day: 1.00     Years: 50.00     Quit date: 3/1/2011    Smokeless tobacco: Never Used    Alcohol use No    Drug use: No    Sexual activity: Yes     Partners: Female      Comment:  with 2 " REDUCTION INTERNAL FIXATION DISTAL TIBIA FRACTURE ;  Surgeon: Tha Parry MD;  Location: [facility];  Service: Orthopedics;  Laterality: Left;    ANKLE OPEN REDUCTION INTERNAL FIXATION Left 06/01/2022    Procedure: LEFT ANKLE OPEN REDUCTION INTERNAL FIXATION WITH SYNDESMOSIS FIXATION;  Surgeon: Tha Parry MD;  Location: [facility];  Service: Orthopedics;  Laterality: Left;    CHOLECYSTECTOMY      COLONOSCOPY      JOINT REPLACEMENT      RTKR    LUMBAR DISC SURGERY      SHOULDER SURGERY Right       General Information       Row Name 07/17/23 1302          OT Time and Intention    Document Type therapy note (daily note)  -PG     Mode of Treatment individual therapy;occupational therapy  -PG               User Key  (r) = Recorded By, (t) = Taken By, (c) = Cosigned By      Initials Name Provider Type    PG Frank Angulo, OT Occupational Therapist                     Mobility/ADL's       Row Name 07/17/23 1302          Activities of Daily Living    BADL Assessment/Intervention grooming  -PG       Row Name 07/17/23 1302          Grooming Assessment/Training    Blaine Level (Grooming) grooming skills;oral care regimen;contact guard assist  -PG     Position (Grooming) supported standing  -PG               User Key  (r) = Recorded By, (t) = Taken By, (c) = Cosigned By      Initials Name Provider Type    PG Frank Angulo, OT Occupational Therapist                   Obj/Interventions       Row Name 07/17/23 1302          Shoulder (Therapeutic Exercise)    Shoulder (Therapeutic Exercise) strengthening exercise  -PG     Shoulder Strengthening (Therapeutic Exercise) 1 lb free weight;20 repititions  -PG       Row Name 07/17/23 1302          Elbow/Forearm (Therapeutic Exercise)    Elbow/Forearm (Therapeutic Exercise) strengthening exercise  -PG     Elbow/Forearm Strengthening (Therapeutic Exercise) 1 lb free weight;15 repititions;20 repititions  -PG       Row Name 07/17/23 1302          Motor Skills     Therapeutic Exercise shoulder;elbow/forearm  -PG               User Key  (r) = Recorded By, (t) = Taken By, (c) = Cosigned By      Initials Name Provider Type    PG Frank Angulo, OT Occupational Therapist                   Goals/Plan    No documentation.                  Clinical Impression       Row Name 07/17/23 4401          Plan of Care Review    Progress improving  -PG     Outcome Evaluation Patient now walking to the bathroom and completing grooming activities standing at sink with rolling walker for safety and contact-guard assist.  Overall strength and endurance has improved and patient following commands well.  PT will continue to benefit from skilled OT services to maximize ADL performance and return to his previous level of function  -PG               User Key  (r) = Recorded By, (t) = Taken By, (c) = Cosigned By      Initials Name Provider Type    PG Frank Angulo, OT Occupational Therapist                   Outcome Measures       Row Name 07/17/23 8817          How much help from another is currently needed...    Putting on and taking off regular lower body clothing? 2  -PG     Bathing (including washing, rinsing, and drying) 3  -PG     Toileting (which includes using toilet bed pan or urinal) 2  -PG     Putting on and taking off regular upper body clothing 3  -PG     Taking care of personal grooming (such as brushing teeth) 3  -PG     Eating meals 4  -PG     AM-PAC 6 Clicks Score (OT) 17  -PG       Row Name 07/17/23 0915          How much help from another person do you currently need...    Turning from your back to your side while in flat bed without using bedrails? 4  -KY     Moving from lying on back to sitting on the side of a flat bed without bedrails? 4  -KY     Moving to and from a bed to a chair (including a wheelchair)? 3  -KY     Standing up from a chair using your arms (e.g., wheelchair, bedside chair)? 3  -KY     Climbing 3-5 steps with a railing? 3  -KY     To walk in hospital room? 3   children 3 grand sons and 1 great grand daughter     Review of Systems   Constitutional: Negative for chills and fever.   HENT: Negative for facial swelling, hearing loss and nosebleeds.    Eyes: Negative for photophobia, redness and visual disturbance.   Respiratory: Negative for cough, chest tightness, shortness of breath and wheezing.    Cardiovascular: Positive for leg swelling. Negative for chest pain and palpitations.   Gastrointestinal: Negative for abdominal distention, abdominal pain, nausea and vomiting.   Genitourinary: Negative for difficulty urinating, dysuria, flank pain and hematuria.   Musculoskeletal: Positive for gait problem and myalgias. Negative for arthralgias.        Persistent bilateral foot wounds    Skin:        Blistering of the left medial leg around the surgical incision sites with clear yellow drainage    Neurological: Positive for weakness. Negative for dizziness, syncope, light-headedness, numbness and headaches.        + generalized weakness. Inability to perform ADLs. Family notes that they are unable to assist the patient with ADLs   Hematological: Negative.    Psychiatric/Behavioral: Negative.      Objective:     Vital Signs (Most Recent):  Temp: 97.6 °F (36.4 °C) (06/21/17 1200)  Pulse: 66 (06/21/17 1500)  Resp: 18 (06/21/17 1200)  BP: (!) 109/55 (06/21/17 1200)  SpO2: 97 % (06/21/17 1200) Vital Signs (24h Range):  Temp:  [96.3 °F (35.7 °C)-98.2 °F (36.8 °C)] 97.6 °F (36.4 °C)  Pulse:  [66-91] 66  Resp:  [18] 18  SpO2:  [92 %-97 %] 97 %  BP: ()/(51-69) 109/55     Weight: 103.9 kg (229 lb)  Body mass index is 33.82 kg/m².    Physical Exam   Constitutional: He is oriented to person, place, and time. He appears well-developed and well-nourished. No distress.   HENT:   Head: Normocephalic and atraumatic.   Nose: Nose normal.   Eyes: EOM are normal. Pupils are equal, round, and reactive to light. No scleral icterus.   Neck: Normal range of motion. Neck supple. No tracheal  -KY     AM-PAC 6 Clicks Score (PT) 20  -KY     Highest level of mobility 6 --> Walked 10 steps or more  -KY       Row Name 07/17/23 1305 07/17/23 1303       Functional Assessment    Outcome Measure Options AM-PAC 6 Clicks Daily Activity (OT);Optimal Instrument  -PG AM-PAC 6 Clicks Daily Activity (OT);Optimal Instrument  -PG      Row Name 07/17/23 1303          Optimal Instrument    Optimal Instrument Optimal - 3  -PG     Bending/Stooping 2  -PG     Standing 2  -PG     Reaching 1  -PG               User Key  (r) = Recorded By, (t) = Taken By, (c) = Cosigned By      Initials Name Provider Type    PG Frank Angulo OT Occupational Therapist    Ivette Barrera RNA Registered Nurse                    Occupational Therapy Education       Title: PT OT SLP Therapies (In Progress)       Topic: Occupational Therapy (Done)       Point: ADL training (Done)       Description:   Instruct learner(s) on proper safety adaptation and remediation techniques during self care or transfers.   Instruct in proper use of assistive devices.                  Learning Progress Summary             Patient Acceptance, E, VU by SV at 7/9/2023 1750    Acceptance, E, VU by AC at 7/3/2023 1144   Family Acceptance, E, VU by SV at 7/9/2023 1750                         Point: Home exercise program (Done)       Description:   Instruct learner(s) on appropriate technique for monitoring, assisting and/or progressing therapeutic exercises/activities.                  Learning Progress Summary             Patient Acceptance, E, VU by SV at 7/9/2023 1750    Acceptance, E, VU by AC at 7/3/2023 1144   Family Acceptance, E, VU by SV at 7/9/2023 1750                         Point: Precautions (Done)       Description:   Instruct learner(s) on prescribed precautions during self-care and functional transfers.                  Learning Progress Summary             Patient Acceptance, E, VU by SV at 7/9/2023 1750    Acceptance, E, VU by AC at 7/3/2023 1144  deviation present.   Cardiovascular: Normal rate and regular rhythm.    2+ femoral pulses, LLE with biphasic DP, no PT appreciated on doppler exam       Pulmonary/Chest: Effort normal and breath sounds normal. No respiratory distress. He has no wheezes.   Abdominal: Soft. Bowel sounds are normal. He exhibits no distension. There is no tenderness.   Musculoskeletal: He exhibits edema and tenderness.   Left lower extremity surgical incision sites with leidy-incisional blistering from tape from dressings with serous fluid drainage. The surgical incision itself is intact with staples in place with minimal serous drainage. Left groin surgical incision site with dermabond in place.  LLE surgical incision sites are appropriately tender to palpation   Left heel ulcer with overlying eschar   Right plantar diabetic pressure ulcer    Neurological: He is alert and oriented to person, place, and time. No cranial nerve deficit.   Skin: Skin is warm.   Wounds as stated above    Psychiatric: He has a normal mood and affect. His behavior is normal.       Significant Labs:  CBC:     Recent Labs  Lab 06/19/17  1312   WBC 9.98   RBC 2.67*   HGB 7.2*   HCT 22.3*      MCV 84   MCH 27.0   MCHC 32.3     CMP:     Recent Labs  Lab 06/19/17  1312   GLU 87   CALCIUM 8.6*   ALBUMIN 2.4*   PROT 6.2      K 4.1   CO2 24      BUN 35*   CREATININE 2.3*   ALKPHOS 84   ALT 13   AST 32   BILITOT 0.7     Coagulation:     Recent Labs  Lab 06/19/17  1312   LABPROT 11.4   INR 1.1         Significant Diagnostics:  No new imaging      Family Acceptance, E, VU by SV at 7/9/2023 1750                         Point: Body mechanics (Done)       Description:   Instruct learner(s) on proper positioning and spine alignment during self-care, functional mobility activities and/or exercises.                  Learning Progress Summary             Patient Acceptance, E, VU by  at 7/9/2023 1750    Acceptance, E, VU by AC at 7/3/2023 1144   Family Acceptance, E, VU by  at 7/9/2023 1750                                         User Key       Initials Effective Dates Name Provider Type Discipline    AC 06/16/21 -  Kristi Renner OT Occupational Therapist OT     05/04/23 -  Jenny Reveles RN Registered Nurse Nurse                  OT Recommendation and Plan     Plan of Care Review  Progress: improving  Outcome Evaluation: Patient now walking to the bathroom and completing grooming activities standing at sink with rolling walker for safety and contact-guard assist.  Overall strength and endurance has improved and patient following commands well.  PT will continue to benefit from skilled OT services to maximize ADL performance and return to his previous level of function     Time Calculation:    Time Calculation- OT       Row Name 07/17/23 1306             Time Calculation- OT    OT Received On 07/17/23  -PG      OT Goal Re-Cert Due Date 07/22/23  -PG         Timed Charges    30535 - OT Therapeutic Exercise Minutes 10  -PG      57917 - OT Self Care/Mgmt Minutes 13  -PG         Total Minutes    Timed Charges Total Minutes 23  -PG       Total Minutes 23  -PG                User Key  (r) = Recorded By, (t) = Taken By, (c) = Cosigned By      Initials Name Provider Type    PG Frank Angulo OT Occupational Therapist                  Therapy Charges for Today       Code Description Service Date Service Provider Modifiers Qty    49940594114 HC OT THER PROC EA 15 MIN 7/17/2023 Frank Angulo OT GO 1    29438446477 HC OT SELF CARE/MGMT/TRAIN EA 15 MIN 7/17/2023  Frank Angulo, OT GO 1                 Frank Angulo, OT  7/17/2023

## 2024-04-23 NOTE — PROGRESS NOTES
4 Eyes Skin Assessment     NAME:  Levy Vargas  YOB: 1965  MEDICAL RECORD NUMBER:  [id number]    The patient is being assessed for  Admission    I agree that at least one RN has performed a thorough Head to Toe Skin Assessment on the patient. ALL assessment sites listed below have been assessed.      Areas assessed by both nurses:    Head, Face, Ears, Shoulders, Back, Chest, Arms, Elbows, Hands, Sacrum. Buttock, Coccyx, Ischium, and Legs. Feet and Heels        Does the Patient have a Wound? Yes wound(s) were present on assessment. LDA wound assessment was Initiated and completed by RN       Hudson Prevention initiated by RN: NO  Wound Care Orders initiated by RN: Yes    Pressure Injury (Stage 3,4, Unstageable, DTI, NWPT, and Complex wounds) if present, place Wound referral order by RN under : Yes    New Ostomies, if present place, Ostomy referral order under : No     Nurse 1 eSignature: Electronically signed by Nerissa Kim RN on 4/23/24 at 6:42 PM EDT    **SHARE this note so that the co-signing nurse can place an eSignature**    Nurse 2 eSignature: Electronically signed by Gianna Khan RN on 4/23/24 at 7:01 PM EDT     Much of the documentation for this visit was completed in the [employer] system. Please see the attached documentation for further details about this patient's care.     Katy Soliman

## (undated) DEVICE — SEE MEDLINE ITEM 156911

## (undated) DEVICE — PACK UNIVERSAL SPLIT II

## (undated) DEVICE — SUT SILK 2-0 SH 18IN BLACK

## (undated) DEVICE — ADHESIVE DERMABOND ADVANCED

## (undated) DEVICE — SEE MEDLINE ITEM 157216

## (undated) DEVICE — CUTTER PROXIMATE BLUE 75MM

## (undated) DEVICE — SET INFUSION WINGED 19GA X 3/4

## (undated) DEVICE — SUT 3-0 12-18IN SILK

## (undated) DEVICE — ELECTRODE REM PLYHSV RETURN 9

## (undated) DEVICE — SUT PROLENE 6-0 24 C-1 C-1

## (undated) DEVICE — PENCIL ROCKER SWITCH 10FT CORD

## (undated) DEVICE — BANDAGE ACE ELASTIC 6"

## (undated) DEVICE — SEE MEDLINE ITEM 157131

## (undated) DEVICE — BLADE 4 INCH EDGE UN-INS

## (undated) DEVICE — SUT 1 48IN PDS II VIO MONO

## (undated) DEVICE — KIT INTRO MICRO NIT VSI 4FR

## (undated) DEVICE — SEALER LIGASURE IMPACT 18CM

## (undated) DEVICE — SEE MEDLINE ITEM 156902

## (undated) DEVICE — LOOP VESSEL BLUE MAXI

## (undated) DEVICE — HEMOSTAT SURGICEL 4X8IN

## (undated) DEVICE — SET DECANTER MEDICHOICE

## (undated) DEVICE — SUT 2/0 36IN COATED VICRYL

## (undated) DEVICE — STOPCOCK DISCOFIX 3 WAY

## (undated) DEVICE — COVERS PROBE NR-48 STERILE

## (undated) DEVICE — SUT 7/0 24IN PROLENE BL MO

## (undated) DEVICE — SEE MEDLINE ITEM 152622

## (undated) DEVICE — DRESSING ABSRBNT ISLAND 3.6X8

## (undated) DEVICE — TRAY FOLEY 16FR INFECTION CONT

## (undated) DEVICE — APPLICATOR CHLORAPREP ORN 26ML

## (undated) DEVICE — SEE MEDLINE ITEM 153688

## (undated) DEVICE — GAUZE SPONGE 4X4 12PLY

## (undated) DEVICE — SUT PROLENE 6-0 C-1 30IN BL

## (undated) DEVICE — GLOVE BIOGEL ORTHOPEDIC 7

## (undated) DEVICE — VAC WOUND ULTRA THERAPY

## (undated) DEVICE — SEE MEDLINE ITEM 152529

## (undated) DEVICE — SUT 2-0 VICRYL / CT-1

## (undated) DEVICE — SET BLD COLL SAFETY 21G X 3/4

## (undated) DEVICE — CANISTER INFOV.A.C WOUND 500ML

## (undated) DEVICE — SUT SILK 3-0 SH 18IN BLACK

## (undated) DEVICE — TOWEL OR XRAY WHITE 17X26IN

## (undated) DEVICE — SUT MCRYL PLUS 4-0 PS2 27IN

## (undated) DEVICE — STAPLER SKIN PROXIMATE WIDE

## (undated) DEVICE — SPONGE LAP 18X18 PREWASHED

## (undated) DEVICE — DRAPE INCISE IOBAN 2 23X17IN

## (undated) DEVICE — NDL 22GA X1 1/2 REG BEVEL

## (undated) DEVICE — DRAPE ABDOMINAL TIBURON 14X11

## (undated) DEVICE — SYR ONLY LUER LOCK 20CC

## (undated) DEVICE — SEE MEDLINE ITEM 146417

## (undated) DEVICE — RELOAD PROXIMATE CUT BLUE 75MM

## (undated) DEVICE — SET MICROPUNCTURE

## (undated) DEVICE — Device

## (undated) DEVICE — DRESSING ABTHERA SENSA TRAC

## (undated) DEVICE — DRESSING TRANS 6X8 TEGADERM

## (undated) DEVICE — KIT VALVALOTOME EXPANDABLE

## (undated) DEVICE — CLEANER TIP ELECSURG 2X2IN